# Patient Record
Sex: MALE | Race: WHITE | NOT HISPANIC OR LATINO | Employment: OTHER | URBAN - METROPOLITAN AREA
[De-identification: names, ages, dates, MRNs, and addresses within clinical notes are randomized per-mention and may not be internally consistent; named-entity substitution may affect disease eponyms.]

---

## 2017-11-01 ENCOUNTER — OFFICE VISIT (OUTPATIENT)
Dept: CARDIOLOGY | Facility: MEDICAL CENTER | Age: 57
End: 2017-11-01
Payer: MEDICARE

## 2017-11-01 VITALS
SYSTOLIC BLOOD PRESSURE: 130 MMHG | BODY MASS INDEX: 31.25 KG/M2 | HEIGHT: 69 IN | WEIGHT: 211 LBS | HEART RATE: 84 BPM | DIASTOLIC BLOOD PRESSURE: 72 MMHG | OXYGEN SATURATION: 93 %

## 2017-11-01 DIAGNOSIS — I25.84 CORONARY ARTERY DISEASE DUE TO CALCIFIED CORONARY LESION: ICD-10-CM

## 2017-11-01 DIAGNOSIS — I10 ESSENTIAL HYPERTENSION, BENIGN: ICD-10-CM

## 2017-11-01 DIAGNOSIS — E78.5 DYSLIPIDEMIA: ICD-10-CM

## 2017-11-01 DIAGNOSIS — I25.10 CORONARY ARTERY DISEASE DUE TO CALCIFIED CORONARY LESION: ICD-10-CM

## 2017-11-01 PROCEDURE — 99204 OFFICE O/P NEW MOD 45 MIN: CPT | Performed by: INTERNAL MEDICINE

## 2017-11-01 RX ORDER — IBUPROFEN 800 MG/1
800 TABLET ORAL EVERY 8 HOURS PRN
Status: ON HOLD | COMMUNITY
End: 2018-07-05

## 2017-11-01 RX ORDER — SIMVASTATIN 20 MG
20 TABLET ORAL NIGHTLY
COMMUNITY
End: 2021-04-25

## 2017-11-01 RX ORDER — LANOLIN ALCOHOL/MO/W.PET/CERES
1000 CREAM (GRAM) TOPICAL DAILY
COMMUNITY

## 2017-11-01 RX ORDER — NITROGLYCERIN 0.4 MG/1
0.4 TABLET SUBLINGUAL
COMMUNITY

## 2017-11-01 RX ORDER — ARIPIPRAZOLE 15 MG/1
15 TABLET ORAL 2 TIMES DAILY
Status: ON HOLD | COMMUNITY
End: 2021-05-14

## 2017-11-01 RX ORDER — LOSARTAN POTASSIUM 25 MG/1
25 TABLET ORAL DAILY
COMMUNITY

## 2017-11-01 RX ORDER — CARBAMAZEPINE 200 MG/1
200 TABLET ORAL 3 TIMES DAILY
Status: ON HOLD | COMMUNITY
End: 2021-05-14

## 2017-11-01 RX ORDER — OXYBUTYNIN CHLORIDE 5 MG/1
5 TABLET ORAL 3 TIMES DAILY
Status: ON HOLD | COMMUNITY
End: 2021-05-14

## 2017-11-01 RX ORDER — BUSPIRONE HYDROCHLORIDE 15 MG/1
15 TABLET ORAL 2 TIMES DAILY
COMMUNITY
End: 2018-07-20

## 2017-11-01 RX ORDER — GABAPENTIN 300 MG/1
300 CAPSULE ORAL 3 TIMES DAILY
COMMUNITY

## 2017-11-01 ASSESSMENT — ENCOUNTER SYMPTOMS
MEMORY LOSS: 1
FEVER: 0
SPEECH CHANGE: 0
ABDOMINAL PAIN: 0
CHILLS: 0
BRUISES/BLEEDS EASILY: 0
MYALGIAS: 0
EYE PAIN: 0
NAUSEA: 0
BLURRED VISION: 0
HEMOPTYSIS: 0
VOMITING: 0
NERVOUS/ANXIOUS: 0
EYE DISCHARGE: 0
SEIZURES: 1
COUGH: 0
LOSS OF CONSCIOUSNESS: 0
DEPRESSION: 1
PALPITATIONS: 0
INSOMNIA: 1
WHEEZING: 0

## 2017-11-01 NOTE — LETTER
[facility]   [address], [address]  [place] [address]  Phone: [phone]  Fax: [phone]              Perry Davis  1960    Encounter Date: 11/1/2017    Jacob Watkins M.D.          PROGRESS NOTE:  Subjective:   Perry Davis is a 57 y.o. male who presents today For new patient evaluation because of history of coronary bypass graft surgery.    Patient suffered a myocardial infarction and underwent coronary bypass graft surgery in 2015. At that time, he received 4 grafts. He did have an echocardiogram within the last year by his cardiologist.    He is having significant difficulty with life stress. He has had no chest discomfort, dyspnea, edema, palpitations or lightheadedness.    History reviewed. No pertinent past medical history.  Past Surgical History:   Procedure Laterality Date   • HEMORRHOIDECTOMY     • MULTIPLE CORONARY ARTERY BYPASS       Family History   Problem Relation Age of Onset   • Heart Attack Father 75   • Heart Disease Father 59     CABG   • Heart Disease Sister 55     CABG     History   Smoking Status   • Current Every Day Smoker   • Packs/day: 0.50   • Years: 40.00   • Types: Cigarettes   Smokeless Tobacco   • Never Used     No Known Allergies  Outpatient Encounter Prescriptions as of 11/1/2017   Medication Sig Dispense Refill   • simvastatin (ZOCOR) 20 MG Tab Take 20 mg by mouth every evening.     • ibuprofen (MOTRIN) 800 MG Tab Take 800 mg by mouth every 8 hours as needed.     • oxybutynin (DITROPAN) 5 MG Tab Take 5 mg by mouth 3 times a day.     • losartan (COZAAR) 25 MG Tab Take 25 mg by mouth every day.     • aripiprazole (ABILIFY) 15 MG Tab Take 15 mg by mouth every day.     • metoprolol (LOPRESSOR) 25 MG Tab Take 25 mg by mouth 2 times a day.     • busPIRone (BUSPAR) 15 MG tablet Take 15 mg by mouth 2 times a day.     • metformin (GLUCOPHAGE) 1000 MG tablet Take 1,000 mg by mouth 2 times a day, with meals.     • carbamazepine  "(TEGRETOL) 200 MG Tab Take 200 mg by mouth 3 times a day.     • Cyanocobalamin (VITAMIN B-12) 1000 MCG Tab Take 1,000 mcg by mouth every day.     • nitroglycerin (NITROSTAT) 0.4 MG SL Tab Place 0.4 mg under tongue every 5 minutes as needed for Chest Pain.     • gabapentin (NEURONTIN) 300 MG Cap Take 300 mg by mouth 3 times a day.       No facility-administered encounter medications on file as of 11/1/2017.      Review of Systems   Constitutional: Negative for chills and fever.   HENT: Positive for tinnitus. Negative for congestion.    Eyes: Negative for blurred vision, pain and discharge.   Respiratory: Negative for cough, hemoptysis and wheezing.    Cardiovascular: Negative for chest pain and palpitations.   Gastrointestinal: Negative for abdominal pain, nausea and vomiting.   Musculoskeletal: Negative for joint pain and myalgias.   Skin: Negative for itching and rash.   Neurological: Positive for seizures. Negative for speech change and loss of consciousness.   Endo/Heme/Allergies: Does not bruise/bleed easily.   Psychiatric/Behavioral: Positive for depression, memory loss and suicidal ideas. The patient has insomnia. The patient is not nervous/anxious.    All other systems reviewed and are negative.       Objective:   /72   Pulse 84   Ht 1.753 m (5' 9\")   Wt 95.7 kg (211 lb)   SpO2 93%   BMI 31.16 kg/m²      Physical Exam   Constitutional: He is oriented to person, place, and time. He appears well-developed. No distress.   HENT:   Mouth/Throat: Mucous membranes are normal.   Eyes: Conjunctivae and EOM are normal.   Neck: No JVD present. No tracheal deviation present. No thyroid mass and no thyromegaly present.   Cardiovascular: Normal rate, regular rhythm and intact distal pulses.    No murmur heard.  Pulmonary/Chest: Effort normal and breath sounds normal. No respiratory distress. He exhibits no tenderness.   Abdominal: Soft. There is no tenderness.   Musculoskeletal: Normal range of motion. He " exhibits no edema.   Neurological: He is alert and oriented to person, place, and time. He has normal strength. He displays no tremor.   Skin: Skin is warm and dry. He is not diaphoretic.   Psychiatric: He has a normal mood and affect. His behavior is normal.   Vitals reviewed.      Assessment:     1. Coronary artery disease due to calcified coronary lesion: CABG x4, July 2015     2. Essential hypertension, benign     3. Dyslipidemia           Medical Decision Making:  Today's Assessment / Status / Plan:     Mr. Davis is clinically stable. I would like to obtain his recent lab work at his prior echocardiogram for review in addition to his operative report. He will follow-up in about 3 months.      Delores Washington, P.A.  [address] [address]  VIA Facsimile: [phone]

## 2017-11-01 NOTE — PROGRESS NOTES
Subjective:   Perry Davis is a 57 y.o. male who presents today For new patient evaluation because of history of coronary bypass graft surgery.    Patient suffered a myocardial infarction and underwent coronary bypass graft surgery in 2015. At that time, he received 4 grafts. He did have an echocardiogram within the last year by his cardiologist.    He is having significant difficulty with life stress. He has had no chest discomfort, dyspnea, edema, palpitations or lightheadedness.    History reviewed. No pertinent past medical history.  Past Surgical History:   Procedure Laterality Date   • HEMORRHOIDECTOMY     • MULTIPLE CORONARY ARTERY BYPASS       Family History   Problem Relation Age of Onset   • Heart Attack Father 75   • Heart Disease Father 59     CABG   • Heart Disease Sister 55     CABG     History   Smoking Status   • Current Every Day Smoker   • Packs/day: 0.50   • Years: 40.00   • Types: Cigarettes   Smokeless Tobacco   • Never Used     No Known Allergies  Outpatient Encounter Prescriptions as of 11/1/2017   Medication Sig Dispense Refill   • simvastatin (ZOCOR) 20 MG Tab Take 20 mg by mouth every evening.     • ibuprofen (MOTRIN) 800 MG Tab Take 800 mg by mouth every 8 hours as needed.     • oxybutynin (DITROPAN) 5 MG Tab Take 5 mg by mouth 3 times a day.     • losartan (COZAAR) 25 MG Tab Take 25 mg by mouth every day.     • aripiprazole (ABILIFY) 15 MG Tab Take 15 mg by mouth every day.     • metoprolol (LOPRESSOR) 25 MG Tab Take 25 mg by mouth 2 times a day.     • busPIRone (BUSPAR) 15 MG tablet Take 15 mg by mouth 2 times a day.     • metformin (GLUCOPHAGE) 1000 MG tablet Take 1,000 mg by mouth 2 times a day, with meals.     • carbamazepine (TEGRETOL) 200 MG Tab Take 200 mg by mouth 3 times a day.     • Cyanocobalamin (VITAMIN B-12) 1000 MCG Tab Take 1,000 mcg by mouth every day.     • nitroglycerin (NITROSTAT) 0.4 MG SL Tab Place 0.4 mg under tongue every 5 minutes as needed for Chest Pain.     •  "gabapentin (NEURONTIN) 300 MG Cap Take 300 mg by mouth 3 times a day.       No facility-administered encounter medications on file as of 11/1/2017.      Review of Systems   Constitutional: Negative for chills and fever.   HENT: Positive for tinnitus. Negative for congestion.    Eyes: Negative for blurred vision, pain and discharge.   Respiratory: Negative for cough, hemoptysis and wheezing.    Cardiovascular: Negative for chest pain and palpitations.   Gastrointestinal: Negative for abdominal pain, nausea and vomiting.   Musculoskeletal: Negative for joint pain and myalgias.   Skin: Negative for itching and rash.   Neurological: Positive for seizures. Negative for speech change and loss of consciousness.   Endo/Heme/Allergies: Does not bruise/bleed easily.   Psychiatric/Behavioral: Positive for depression, memory loss and suicidal ideas. The patient has insomnia. The patient is not nervous/anxious.    All other systems reviewed and are negative.       Objective:   /72   Pulse 84   Ht 1.753 m (5' 9\")   Wt 95.7 kg (211 lb)   SpO2 93%   BMI 31.16 kg/m²     Physical Exam   Constitutional: He is oriented to person, place, and time. He appears well-developed. No distress.   HENT:   Mouth/Throat: Mucous membranes are normal.   Eyes: Conjunctivae and EOM are normal.   Neck: No JVD present. No tracheal deviation present. No thyroid mass and no thyromegaly present.   Cardiovascular: Normal rate, regular rhythm and intact distal pulses.    No murmur heard.  Pulmonary/Chest: Effort normal and breath sounds normal. No respiratory distress. He exhibits no tenderness.   Abdominal: Soft. There is no tenderness.   Musculoskeletal: Normal range of motion. He exhibits no edema.   Neurological: He is alert and oriented to person, place, and time. He has normal strength. He displays no tremor.   Skin: Skin is warm and dry. He is not diaphoretic.   Psychiatric: He has a normal mood and affect. His behavior is normal.   Vitals " reviewed.      Assessment:     1. Coronary artery disease due to calcified coronary lesion: CABG x4, July 2015     2. Essential hypertension, benign     3. Dyslipidemia           Medical Decision Making:  Today's Assessment / Status / Plan:     Mr. Davis is clinically stable. I would like to obtain his recent lab work at his prior echocardiogram for review in addition to his operative report. He will follow-up in about 3 months.

## 2018-04-20 DIAGNOSIS — Z01.812 PRE-OPERATIVE LABORATORY EXAMINATION: ICD-10-CM

## 2018-04-20 LAB
ERYTHROCYTE [DISTWIDTH] IN BLOOD BY AUTOMATED COUNT: 44.5 FL (ref 35.9–50)
HCT VFR BLD AUTO: 53.5 % (ref 42–52)
HGB BLD-MCNC: 18.4 G/DL (ref 14–18)
INR PPP: 1.01 (ref 0.87–1.13)
MCH RBC QN AUTO: 32.1 PG (ref 27–33)
MCHC RBC AUTO-ENTMCNC: 34.4 G/DL (ref 33.7–35.3)
MCV RBC AUTO: 93.2 FL (ref 81.4–97.8)
PLATELET # BLD AUTO: 173 K/UL (ref 164–446)
PMV BLD AUTO: 11.7 FL (ref 9–12.9)
PROTHROMBIN TIME: 13 SEC (ref 12–14.6)
RBC # BLD AUTO: 5.74 M/UL (ref 4.7–6.1)
WBC # BLD AUTO: 9.6 K/UL (ref 4.8–10.8)

## 2018-04-20 PROCEDURE — 36415 COLL VENOUS BLD VENIPUNCTURE: CPT

## 2018-04-20 PROCEDURE — 85027 COMPLETE CBC AUTOMATED: CPT

## 2018-04-20 PROCEDURE — 85610 PROTHROMBIN TIME: CPT

## 2018-04-20 NOTE — OR NURSING
"Pt states he will not accept blood and wishes to be in the bloodless program. Pt educated on program and pt continues to respond that, \"I will only take blood from family.\" Bloodless paperwork given to pt and instructed to bring back on DOS. ADRIENNE Odell in IR and with Dr Zuleta's  Iza.     Pt instructed to not smoke DOS and to stop recreational drugs 48 hours prior to procedure per pre-op guidelines. After conversation pt's stated, \"Ya right like that's going to happen, I got stoned right before my heart surgery and nothing happened.\" Information and education provided to patient, but patient continued to laugh and shake head.   "

## 2018-04-24 ENCOUNTER — APPOINTMENT (OUTPATIENT)
Dept: RADIOLOGY | Facility: MEDICAL CENTER | Age: 58
End: 2018-04-24
Attending: UROLOGY
Payer: MEDICARE

## 2018-04-24 ENCOUNTER — HOSPITAL ENCOUNTER (OUTPATIENT)
Facility: MEDICAL CENTER | Age: 58
End: 2018-04-24
Attending: UROLOGY | Admitting: UROLOGY
Payer: MEDICARE

## 2018-04-24 VITALS
DIASTOLIC BLOOD PRESSURE: 62 MMHG | BODY MASS INDEX: 30.73 KG/M2 | RESPIRATION RATE: 16 BRPM | WEIGHT: 207.45 LBS | OXYGEN SATURATION: 97 % | SYSTOLIC BLOOD PRESSURE: 111 MMHG | HEART RATE: 55 BPM | TEMPERATURE: 96.9 F | HEIGHT: 69 IN

## 2018-04-24 DIAGNOSIS — N13.1 CROSSING VESSEL AND STRICTURE OF URETER WITH HYDRONEPHROSIS: ICD-10-CM

## 2018-04-24 PROCEDURE — 160002 HCHG RECOVERY MINUTES (STAT)

## 2018-04-24 PROCEDURE — 99153 MOD SED SAME PHYS/QHP EA: CPT

## 2018-04-24 PROCEDURE — A9270 NON-COVERED ITEM OR SERVICE: HCPCS

## 2018-04-24 PROCEDURE — 700111 HCHG RX REV CODE 636 W/ 250 OVERRIDE (IP): Performed by: RADIOLOGY

## 2018-04-24 PROCEDURE — 700111 HCHG RX REV CODE 636 W/ 250 OVERRIDE (IP)

## 2018-04-24 PROCEDURE — 700117 HCHG RX CONTRAST REV CODE 255: Performed by: RADIOLOGY

## 2018-04-24 PROCEDURE — 700102 HCHG RX REV CODE 250 W/ 637 OVERRIDE(OP)

## 2018-04-24 PROCEDURE — 700101 HCHG RX REV CODE 250

## 2018-04-24 RX ORDER — OXYCODONE HYDROCHLORIDE 5 MG/1
5 TABLET ORAL
Status: DISCONTINUED | OUTPATIENT
Start: 2018-04-24 | End: 2018-04-24 | Stop reason: HOSPADM

## 2018-04-24 RX ORDER — MIDAZOLAM HYDROCHLORIDE 1 MG/ML
INJECTION INTRAMUSCULAR; INTRAVENOUS
Status: COMPLETED
Start: 2018-04-24 | End: 2018-04-24

## 2018-04-24 RX ORDER — LIDOCAINE HYDROCHLORIDE 10 MG/ML
INJECTION, SOLUTION INFILTRATION; PERINEURAL
Status: COMPLETED
Start: 2018-04-24 | End: 2018-04-24

## 2018-04-24 RX ORDER — ONDANSETRON 2 MG/ML
4 INJECTION INTRAMUSCULAR; INTRAVENOUS PRN
Status: DISCONTINUED | OUTPATIENT
Start: 2018-04-24 | End: 2018-04-24 | Stop reason: HOSPADM

## 2018-04-24 RX ORDER — OXYCODONE HYDROCHLORIDE 5 MG/1
TABLET ORAL
Status: COMPLETED
Start: 2018-04-24 | End: 2018-04-24

## 2018-04-24 RX ORDER — SODIUM CHLORIDE 9 MG/ML
500 INJECTION, SOLUTION INTRAVENOUS
Status: DISCONTINUED | OUTPATIENT
Start: 2018-04-24 | End: 2018-04-24 | Stop reason: HOSPADM

## 2018-04-24 RX ORDER — MIDAZOLAM HYDROCHLORIDE 1 MG/ML
.5-2 INJECTION INTRAMUSCULAR; INTRAVENOUS PRN
Status: DISCONTINUED | OUTPATIENT
Start: 2018-04-24 | End: 2018-04-24 | Stop reason: HOSPADM

## 2018-04-24 RX ORDER — OXYCODONE HYDROCHLORIDE 5 MG/1
2.5 TABLET ORAL
Status: DISCONTINUED | OUTPATIENT
Start: 2018-04-24 | End: 2018-04-24 | Stop reason: HOSPADM

## 2018-04-24 RX ADMIN — LIDOCAINE HYDROCHLORIDE: 10 INJECTION, SOLUTION INFILTRATION; PERINEURAL at 08:15

## 2018-04-24 RX ADMIN — FENTANYL CITRATE 50 MCG: 50 INJECTION, SOLUTION INTRAMUSCULAR; INTRAVENOUS at 08:34

## 2018-04-24 RX ADMIN — FENTANYL CITRATE 50 MCG: 50 INJECTION, SOLUTION INTRAMUSCULAR; INTRAVENOUS at 08:30

## 2018-04-24 RX ADMIN — MIDAZOLAM 2 MG: 1 INJECTION INTRAMUSCULAR; INTRAVENOUS at 08:18

## 2018-04-24 RX ADMIN — IOHEXOL 16 ML: 300 INJECTION, SOLUTION INTRAVENOUS at 09:00

## 2018-04-24 RX ADMIN — OXYCODONE HYDROCHLORIDE 5 MG: 5 TABLET ORAL at 10:04

## 2018-04-24 RX ADMIN — MIDAZOLAM 1 MG: 1 INJECTION INTRAMUSCULAR; INTRAVENOUS at 08:34

## 2018-04-24 RX ADMIN — MIDAZOLAM 1 MG: 1 INJECTION INTRAMUSCULAR; INTRAVENOUS at 08:30

## 2018-04-24 ASSESSMENT — PAIN SCALES - GENERAL
PAINLEVEL_OUTOF10: 0
PAINLEVEL_OUTOF10: 5
PAINLEVEL_OUTOF10: 1
PAINLEVEL_OUTOF10: 0

## 2018-04-24 NOTE — OR SURGEON
Immediate Post- Operative Note        PostOp Diagnosis: Needs suprapubic catheter placement      Procedure(s): Fluro and Ct guided suprapubic catheter placement      Estimated Blood Loss: Less than 5 ml        Complications: None            4/24/2018     9:36 AM     Latrell Jacobson

## 2018-04-24 NOTE — OR NURSING
"Donald catheter to be clamped upon arrival to [facility] per written order. No donald catheter in place upon arrival. Patient refuses donald catheter by stating, \"The reason I'm having this done today is because catheters don't work.\"  Patient instructed not to urinate prior to procedure. Patient agreeable at this time.    IR staff and MD notified re: refusal.  "

## 2018-04-24 NOTE — OR NURSING
0853    PATIENT RECEIVED FROM IR,  S/P SUPRAPUBIC CATH INSERTION.  SUPRAPUBIC CATH CONNECTING TO LEG BAG AND DRAINS OUT CLEAR URINE.  WIFE IS @ BEDSIDE.  DR WADE TALKING IN LENGTH WITH WIFE.    0930   PATIENT TAKING PO FLUID AND SNACK OK.    1000   MEDICATED PER MAR'S FOR INCISIONAL PAIN.    1025   PATIENT GETS UP TO BATHROOM.  LEGBAG DRAINS AND THROUGH TEACHING AND RETURN DEMONSTRATION GIVEN TO PATIENT AND FAMILY.  PATIENT INSTRUCTED TO NEVER CLOSE THE STOPCOCK.  DC INSTRUCTIONS GIVEN TO PATIENT AND FAMILY.  VERBALIZED UNDERSTANDING OF ALL.  HL DC.  PATIENT DC TO HOME,  VIA WHEELCHAIR, ESCORTED OUT BY CNA.

## 2018-04-24 NOTE — DISCHARGE INSTRUCTIONS
ACTIVITY: Rest and take it easy for the first 24 hours.  A responsible adult is recommended to remain with you during that time.  It is normal to feel sleepy.  We encourage you to not do anything that requires balance, judgment or coordination.    MILD FLU-LIKE SYMPTOMS ARE NORMAL. YOU MAY EXPERIENCE GENERALIZED MUSCLE ACHES, THROAT IRRITATION, HEADACHE AND/OR SOME NAUSEA.    FOR 24 HOURS DO NOT:  Drive, operate machinery or run household appliances.  Drink beer or alcoholic beverages.   Make important decisions or sign legal documents.    SPECIAL INSTRUCTIONS: *KEEP INCISION DRY @ ALL TIME.  **    DIET: To avoid nausea, slowly advance diet as tolerated, avoiding spicy or greasy foods for the first day.  Add more substantial food to your diet according to your physician's instructions.  Babies can be fed formula or breast milk as soon as they are hungry.  INCREASE FLUIDS AND FIBER TO AVOID CONSTIPATION.    SURGICAL DRESSING/BATHING: *WHEN SHOWER NEED TO COVER SITE WITH SARAN WRAP.  EMPTY URINE BAG AS NEEDED.  TEACHING DONE TO WIFE AND PATIENT**    FOLLOW-UP APPOINTMENT:  A follow-up appointment should be arranged with your doctor in *DR [facility]   [phone]**; call to schedule.    You should CALL YOUR PHYSICIAN if you develop:  Fever greater than 101 degrees F.  Pain not relieved by medication, or persistent nausea or vomiting.  Excessive bleeding (blood soaking through dressing) or unexpected drainage from the wound.  Extreme redness or swelling around the incision site, drainage of pus or foul smelling drainage.  Inability to urinate or empty your bladder within 8 hours.  Problems with breathing or chest pain.    You should call 911 if you develop problems with breathing or chest pain.  If you are unable to contact your doctor or surgical center, you should go to the nearest emergency room or urgent care center.  Physician's telephone #: *[phone]**    If any questions arise, call your doctor.  If your doctor  is not available, please feel free to call the Surgical Center at [phone].  The Center is open Monday through Friday from 7AM to 7PM.  You can also call the HEALTH HOTLINE open 24 hours/day, 7 days/week and speak to a nurse at [phone], or toll free at [phone].    A registered nurse may call you a few days after your surgery to see how you are doing after your procedure.    MEDICATIONS: Resume taking daily medication.  Take prescribed pain medication with food.  If no medication is prescribed, you may take non-aspirin pain medication if needed.  PAIN MEDICATION CAN BE VERY CONSTIPATING.  Take a stool softener or laxative such as senokot, pericolace, or milk of magnesia if needed.      If your physician has prescribed pain medication that includes Acetaminophen (Tylenol), do not take additional Acetaminophen (Tylenol) while taking the prescribed medication.    Depression / Suicide Risk    As you are discharged from this [facility] facility, it is important to learn how to keep safe from harming yourself.    Recognize the warning signs:  · Abrupt changes in personality, positive or negative- including increase in energy   · Giving away possessions  · Change in eating patterns- significant weight changes-  positive or negative  · Change in sleeping patterns- unable to sleep or sleeping all the time   · Unwillingness or inability to communicate  · Depression  · Unusual sadness, discouragement and loneliness  · Talk of wanting to die  · Neglect of personal appearance   · Rebelliousness- reckless behavior  · Withdrawal from people/activities they love  · Confusion- inability to concentrate     If you or a loved one observes any of these behaviors or has concerns about self-harm, here's what you can do:  · Talk about it- your feelings and reasons for harming yourself  · Remove any means that you might use to hurt yourself (examples: pills, rope, extension cords, firearm)  · Get professional help from  the community (Mental Health, Substance Abuse, psychological counseling)  · Do not be alone:Call your Safe Contact- someone whom you trust who will be there for you.  · Call your local CRISIS HOTLINE 466-7682 or 872-181-2062  · Call your local Children's Mobile Crisis Response Team [place] (850) 980-4418 or www.ClickToShop  · Call the toll free National Suicide Prevention Hotlines   · National Suicide Prevention Lifeline 987-158-MGVR (8108)  · National Hope Line Network 800-SUICIDE (015-3285)

## 2018-04-26 ENCOUNTER — HOSPITAL ENCOUNTER (OUTPATIENT)
Facility: MEDICAL CENTER | Age: 58
End: 2018-04-26
Attending: UROLOGY | Admitting: UROLOGY
Payer: MEDICARE

## 2018-05-15 ENCOUNTER — HOSPITAL ENCOUNTER (OUTPATIENT)
Dept: RADIOLOGY | Facility: MEDICAL CENTER | Age: 58
End: 2018-05-15
Attending: UROLOGY
Payer: MEDICARE

## 2018-05-15 PROCEDURE — 700117 HCHG RX CONTRAST REV CODE 255: Performed by: UROLOGY

## 2018-05-15 PROCEDURE — 51600 INJECTION FOR BLADDER X-RAY: CPT

## 2018-05-15 RX ADMIN — IOHEXOL 175 ML: 240 INJECTION, SOLUTION INTRATHECAL; INTRAVASCULAR; INTRAVENOUS; ORAL at 14:35

## 2018-05-21 VITALS — HEIGHT: 69 IN | BODY MASS INDEX: 31.22 KG/M2 | WEIGHT: 210.76 LBS

## 2018-05-21 DIAGNOSIS — Z01.810 PRE-OPERATIVE CARDIOVASCULAR EXAMINATION: ICD-10-CM

## 2018-05-21 DIAGNOSIS — Z01.812 PRE-OPERATIVE LABORATORY EXAMINATION: ICD-10-CM

## 2018-05-21 LAB
ANION GAP SERPL CALC-SCNC: 9 MMOL/L (ref 0–11.9)
BUN SERPL-MCNC: 17 MG/DL (ref 8–22)
CALCIUM SERPL-MCNC: 9.6 MG/DL (ref 8.5–10.5)
CHLORIDE SERPL-SCNC: 106 MMOL/L (ref 96–112)
CO2 SERPL-SCNC: 20 MMOL/L (ref 20–33)
CREAT SERPL-MCNC: 0.96 MG/DL (ref 0.5–1.4)
EKG IMPRESSION: NORMAL
ERYTHROCYTE [DISTWIDTH] IN BLOOD BY AUTOMATED COUNT: 46.8 FL (ref 35.9–50)
EST. AVERAGE GLUCOSE BLD GHB EST-MCNC: 169 MG/DL
GLUCOSE SERPL-MCNC: 141 MG/DL (ref 65–99)
HBA1C MFR BLD: 7.5 % (ref 0–5.6)
HCT VFR BLD AUTO: 53.7 % (ref 42–52)
HGB BLD-MCNC: 17.9 G/DL (ref 14–18)
MCH RBC QN AUTO: 31.7 PG (ref 27–33)
MCHC RBC AUTO-ENTMCNC: 33.3 G/DL (ref 33.7–35.3)
MCV RBC AUTO: 95.2 FL (ref 81.4–97.8)
PLATELET # BLD AUTO: 188 K/UL (ref 164–446)
PMV BLD AUTO: 11.7 FL (ref 9–12.9)
POTASSIUM SERPL-SCNC: 4.4 MMOL/L (ref 3.6–5.5)
RBC # BLD AUTO: 5.64 M/UL (ref 4.7–6.1)
SODIUM SERPL-SCNC: 135 MMOL/L (ref 135–145)
WBC # BLD AUTO: 11.4 K/UL (ref 4.8–10.8)

## 2018-05-21 PROCEDURE — 85027 COMPLETE CBC AUTOMATED: CPT

## 2018-05-21 PROCEDURE — 93010 ELECTROCARDIOGRAM REPORT: CPT | Performed by: INTERNAL MEDICINE

## 2018-05-21 PROCEDURE — 36415 COLL VENOUS BLD VENIPUNCTURE: CPT

## 2018-05-21 PROCEDURE — 93005 ELECTROCARDIOGRAM TRACING: CPT

## 2018-05-21 PROCEDURE — 80048 BASIC METABOLIC PNL TOTAL CA: CPT

## 2018-05-21 PROCEDURE — 83036 HEMOGLOBIN GLYCOSYLATED A1C: CPT

## 2018-05-21 RX ORDER — RANITIDINE 150 MG/1
150 TABLET ORAL PRN
Status: ON HOLD | COMMUNITY
End: 2018-07-05

## 2018-05-21 NOTE — OR NURSING
"Pre-admit appointment completed. \"Preparing for your procedure\" sheet given to pt along with verbal and written instructions. Pt instructed to continue regularly prescribed medications through the day before surgery. Pt instructed to take the following medications the day of surgery with a sip of water, per anesthesia protocol; tegretol, neurontin, lopressor, zantac.     Note pt very upset and stating is due to catheter leaking. Pt states he has been sleeping in urine because of catheter leak and has called urology office several times. Pt has appointment today at the office. Pre admit RN assessed catheter and noted SP cath in place, but suture out. Tubing has black electrical tape around catheter port and no visible leak, but pt states is one site of leaking. Leg bag appears well worn with some tears to bottom edge. Noted urine in bag and no visible leakage at this time, but pt states it is leaking from there too. Pt states his anxiety level is high and it causes angina, so pt takes nitrostat for relief. States last used 3 days ago. States he only uses up to 2 per day due to anxiety induced angina and he was instructed by his cardiologist to go to ER if he ever requires 3 doses. Pt's cardiologist is in [place], [place]. Instructed to follow up with his PCP to discuss meds for anxiety to help prevent need for nitrostat. Pt's mother will discuss with his PCP as pt has appointment with PCP 6/6/18.  Reassure pt that RN will speak with urology office. Enc pt and mom to go directly to urology office if any similar problems in the future.    Spoke with Iza @ urology office of above. Pt has appointment today to change out SP catheter, and she states there are not notes of pt calling several times. She will inform Dr Zuleta of above and have him address note to PCP regarding use of nitrostat and anxiety. Urology office will obtain UA/culture today at office visit.    PT given MAINOR education due to hx of sleep apnea and no " "CPAP. Note pt scored 6 on MAINOR screen.      Patient enrolled in Bloodless program. Physician notified (via fax) of bloodless enrollment.  Bloodless consent signed.  Pt declined Advanced directive or durable power of  information offered. \"BLOODLESS\" entered into allergy section, \"NO BLOOD\" armband and stickers attached to chart in pre-admit.                         "

## 2018-05-24 ENCOUNTER — OFFICE VISIT (OUTPATIENT)
Dept: CARDIOLOGY | Facility: MEDICAL CENTER | Age: 58
End: 2018-05-24
Payer: MEDICARE

## 2018-05-24 VITALS
SYSTOLIC BLOOD PRESSURE: 128 MMHG | DIASTOLIC BLOOD PRESSURE: 88 MMHG | HEIGHT: 69 IN | HEART RATE: 82 BPM | BODY MASS INDEX: 31.25 KG/M2 | WEIGHT: 211 LBS | OXYGEN SATURATION: 95 %

## 2018-05-24 DIAGNOSIS — I25.84 CORONARY ARTERY DISEASE DUE TO CALCIFIED CORONARY LESION: ICD-10-CM

## 2018-05-24 DIAGNOSIS — I10 ESSENTIAL HYPERTENSION, BENIGN: ICD-10-CM

## 2018-05-24 DIAGNOSIS — F41.9 ANXIETY: ICD-10-CM

## 2018-05-24 DIAGNOSIS — R07.89 OTHER CHEST PAIN: ICD-10-CM

## 2018-05-24 DIAGNOSIS — E11.9 TYPE 2 DIABETES MELLITUS WITHOUT COMPLICATION, WITHOUT LONG-TERM CURRENT USE OF INSULIN (HCC): ICD-10-CM

## 2018-05-24 DIAGNOSIS — I25.10 CORONARY ARTERY DISEASE DUE TO CALCIFIED CORONARY LESION: ICD-10-CM

## 2018-05-24 DIAGNOSIS — R06.02 SHORTNESS OF BREATH: ICD-10-CM

## 2018-05-24 DIAGNOSIS — G47.39 OTHER SLEEP APNEA: ICD-10-CM

## 2018-05-24 DIAGNOSIS — E78.5 DYSLIPIDEMIA: ICD-10-CM

## 2018-05-24 DIAGNOSIS — G89.29 OTHER CHRONIC PAIN: ICD-10-CM

## 2018-05-24 LAB — EKG IMPRESSION: NORMAL

## 2018-05-24 PROCEDURE — 93000 ELECTROCARDIOGRAM COMPLETE: CPT | Performed by: NURSE PRACTITIONER

## 2018-05-24 PROCEDURE — 99214 OFFICE O/P EST MOD 30 MIN: CPT | Performed by: NURSE PRACTITIONER

## 2018-05-24 ASSESSMENT — ENCOUNTER SYMPTOMS
BACK PAIN: 1
FEVER: 0
CLAUDICATION: 0
SHORTNESS OF BREATH: 1
MYALGIAS: 1
PND: 0
DEPRESSION: 1
COUGH: 0
PALPITATIONS: 0
ABDOMINAL PAIN: 0
SEIZURES: 1
NERVOUS/ANXIOUS: 1
ORTHOPNEA: 0

## 2018-05-24 NOTE — PROGRESS NOTES
"Chief Complaint   Patient presents with   • Coronary Artery Disease     Surgical clearance       Subjective:   Perry Davis is a 57 y.o. male who presents today for surgical clearance for upcoming urology surgical operation.    He is a prior patient of Dr. Watkins in our office, he wants to switch to a different cardiologist for his next apt.    Hx of CAD with prior CABG X4 in '14 (unknown vessels-obtain records), HTN, HLD, anxiety/depression, marijuana and cigarette use, bipolar disorder, renal disorder with suprapubic catheter, GERD, un-treated MAINOR, and DM.    He presents today very frustrated to be in the clinic. He doesn't want to follow up with our office and \"we bill him too many payments.\" He also was not happy with his last office visit with Dr. Watkins.    He has many frustrations, many of which are non-cardiac. He discussed his prior job frustrations and frustrations with many life issues during this apt.    He never sat during the apt, he paced the entire time.    He states he has been taking nitro for chest pain that comes on with anxiety. It happens at rest but it is very similar to his previous chest pains he had in the past prior to MI and bypass surgery.    He has chronic shortness of breath with exertion. He continues to smoke marijuana and cigarettes.    He doesn't drink ETOH.    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    • Diabetes (HCC)     oral medication . 5/21/18-AVG am=90's.   • Heart burn     Treated with Zantac.   • High cholesterol    • Hypertension    • Indigestion     Treated with Zantac.    • Infectious disease 1989 or 1+990    had staph infection in spine, had PICC line, rash all over body,  then thoracic fusion   • Marijuana use 05/21/2018    Daily use for pain control.   • Myocardial infarct (HCC) 04/05/2014    CABG-2015. Cardiologist in " "[facility].   • Pain     hip, knees   • Pain 05/21/2018    \"all over\"   • Psychiatric problem     depression, anxiety, bipolar    • Renal disorder     Hx of renal stone.   • Seizure (HCC)     last seizure 4/5/2014-unknown etiology, but possibly related to stress.   • Sleep apnea     no CPAP    • Snoring    • Urinary bladder disorder     5/21/18-currently has donald cath to leg bag.   • Urinary incontinence     urethral strictures.     Past Surgical History:   Procedure Laterality Date   • CYSTOSCOPY  04/24/2018    SP cath placed.   • HEMORRHOIDECTOMY  02/2016   • HIP ARTHROSCOPY Right 01/2016   • KNEE ARTHROSCOPY Left 01/2016   • LITHOTRIPSY  2016    unsuccessful   • CYSTOSCOPY  2016    S/P unsuccessful lithotripsy   • MULTIPLE CORONARY ARTERY BYPASS  07/2015    4 vessel   • OTHER SURGICAL PROCEDURE Right 1993    Closed some veins in leg.   • THORACIC FUSION POSTERIOR  late 1980's     Family History   Problem Relation Age of Onset   • Heart Attack Father 75   • Heart Disease Father 59     CABG   • Heart Disease Sister 55     CABG     Social History     Social History   • Marital status: Single     Spouse name: N/A   • Number of children: N/A   • Years of education: N/A     Occupational History   • Not on file.     Social History Main Topics   • Smoking status: Current Every Day Smoker     Packs/day: 0.50     Years: 40.00     Types: Cigarettes   • Smokeless tobacco: Never Used   • Alcohol use No   • Drug use: Yes     Types: Inhaled      Comment: marijuana- daily    • Sexual activity: Not on file     Other Topics Concern   • Not on file     Social History Narrative   • No narrative on file     Allergies   Allergen Reactions   • Bloodless      Personal preference     Outpatient Encounter Prescriptions as of 5/24/2018   Medication Sig Dispense Refill   • Empagliflozin-Metformin HCl (SYNJARDY) 12.5-1000 MG Tab Take  by mouth.     • raNITidine (ZANTAC) 150 MG Tab Take 150 mg by mouth as needed for Heartburn.  " "   • simvastatin (ZOCOR) 20 MG Tab Take 20 mg by mouth every evening.     • ibuprofen (MOTRIN) 800 MG Tab Take 800 mg by mouth every 8 hours as needed.     • oxybutynin (DITROPAN) 5 MG Tab Take 5 mg by mouth 3 times a day.     • losartan (COZAAR) 25 MG Tab Take 25 mg by mouth every day.     • aripiprazole (ABILIFY) 15 MG Tab Take 15 mg by mouth 2 Times a Day.     • metoprolol (LOPRESSOR) 25 MG Tab Take 25 mg by mouth 2 times a day.     • busPIRone (BUSPAR) 15 MG tablet Take 15 mg by mouth 2 times a day.     • carbamazepine (TEGRETOL) 200 MG Tab Take 200 mg by mouth 3 times a day.     • Cyanocobalamin (VITAMIN B-12) 1000 MCG Tab Take 1,000 mcg by mouth every day.     • nitroglycerin (NITROSTAT) 0.4 MG SL Tab Place 0.4 mg under tongue every 5 minutes as needed for Chest Pain.     • gabapentin (NEURONTIN) 300 MG Cap Take 300 mg by mouth 3 times a day.     • [DISCONTINUED] metformin (GLUCOPHAGE) 1000 MG tablet Take 1,000 mg by mouth 2 times a day, with meals.       No facility-administered encounter medications on file as of 5/24/2018.      Review of Systems   Constitutional: Negative for fever and malaise/fatigue.   Respiratory: Positive for shortness of breath. Negative for cough.    Cardiovascular: Positive for chest pain. Negative for palpitations, orthopnea, claudication, leg swelling and PND.   Gastrointestinal: Negative for abdominal pain.   Genitourinary:        Suprapubic catheter that leaks     Musculoskeletal: Positive for back pain, joint pain and myalgias.   Neurological: Positive for seizures.   Psychiatric/Behavioral: Positive for depression. The patient is nervous/anxious.                     Objective:   /86   Pulse 82   Ht 1.753 m (5' 9\")   Wt 95.7 kg (211 lb)   SpO2 95%   BMI 31.16 kg/m²     Physical Exam   Constitutional: He appears well-developed and well-nourished.   HENT:   Head: Normocephalic and atraumatic.   Eyes: EOM are normal.   Neck: No JVD present.   Cardiovascular: Normal rate, " regular rhythm, normal heart sounds and intact distal pulses.    Pulmonary/Chest: Effort normal and breath sounds normal.   Musculoskeletal: He exhibits no edema.   Cane for mobility with back, joint, leg pain   Skin: Skin is warm and dry.   Psychiatric: He has a normal mood and affect.   Nursing note and vitals reviewed.      Assessment:     1. Coronary artery disease due to calcified coronary lesion  EKG    NM-CARDIAC STRESS TEST   2. Dyslipidemia  NM-CARDIAC STRESS TEST   3. Essential hypertension, benign  NM-CARDIAC STRESS TEST   4. Other sleep apnea     5. Type 2 diabetes mellitus without complication, without long-term current use of insulin (HCC)     6. Anxiety     7. Other chest pain     8. Other chronic pain     9. Shortness of breath       Medical Decision Making:  Today's Assessment / Status / Plan:     1. CAD with prior CABG ('14)  -pending records, records requested from [facility] in [place], [place]  -atypical angina but CHRISTENSEN with exertion  -recommend stress imaging prior to surgery for ischemic eval  -nitro education given   -recommended stress management with anxiety attacks causing chest pain  -cont asa, statin, bb    2. HLD  -statin  -no recent labs, recommend labs from prior cardiology office  -LDL goal <70 with CAD  -lipid and CMP recommended yearly-defer to PCP if patient doesn't come back    3. HTN  -moderate control, came down after calmed patient down in office  -cont bb, losartan    4. MAINOR  -untreated  -recommend follow up with repeat sleep study and pulmonary referral if warranted    5. DM  -managed by PCP    6. Anxiety/depression/chronic pain  -managed by PCP    7. Smoking  -recommend cessation  -he is not eager for cessation at this time    Again, this patient was very disgruntled during the appointment. I recommended him to our medical records and billing department for concerns on payments and medical records concerns. He will look into this.     FU in clinic in 6 months with  new cardiologist, will call with stress test results    Patient verbalizes understanding and agrees with the plan of care.     Collaborating MD: Vasquez MATOS

## 2018-05-24 NOTE — LETTER
"     [facility]   [address], [address]  [place] [address]  Phone: [phone]  Fax: [phone]              Perry Davis  1960    Encounter Date: 5/24/2018    JASWINDER Lua          PROGRESS NOTE:  Chief Complaint   Patient presents with   • Coronary Artery Disease     Surgical clearance       Subjective:   Perry Davis is a 57 y.o. male who presents today for surgical clearance for upcoming urology surgical operation.    He is a prior patient of Dr. Watkins in our office, he wants to switch to a different cardiologist for his next apt.    Hx of CAD with prior CABG X4 in '14 (unknown vessels-obtain records), HTN, HLD, anxiety/depression, marijuana and cigarette use, bipolar disorder, renal disorder with suprapubic catheter, GERD, un-treated MAINOR, and DM.    He presents today very frustrated to be in the clinic. He doesn't want to follow up with our office and \"we bill him too many payments.\" He also was not happy with his last office visit with Dr. Watkins.    He has many frustrations, many of which are non-cardiac. He discussed his prior job frustrations and frustrations with many life issues during this apt.    He never sat during the apt, he paced the entire time.    He states he has been taking nitro for chest pain that comes on with anxiety. It happens at rest but it is very similar to his previous chest pains he had in the past prior to MI and bypass surgery.    He has chronic shortness of breath with exertion. He continues to smoke marijuana and cigarettes.    He doesn't drink ETOH.    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    • Diabetes (HCC)     oral medication . 5/21/18-AVG am=90's.   • Heart burn     Treated with Zantac.   • High cholesterol    • Hypertension    • Indigestion    " "Treated with Zantac.    • Infectious disease 1989 or 1+990    had staph infection in spine, had PICC line, rash all over body,  then thoracic fusion   • Marijuana use 05/21/2018    Daily use for pain control.   • Myocardial infarct (HCC) 04/05/2014    CABG-2015. Cardiologist in [facility].   • Pain     hip, knees   • Pain 05/21/2018    \"all over\"   • Psychiatric problem     depression, anxiety, bipolar    • Renal disorder     Hx of renal stone.   • Seizure (HCC)     last seizure 4/5/2014-unknown etiology, but possibly related to stress.   • Sleep apnea     no CPAP    • Snoring    • Urinary bladder disorder     5/21/18-currently has donald cath to leg bag.   • Urinary incontinence     urethral strictures.     Past Surgical History:   Procedure Laterality Date   • CYSTOSCOPY  04/24/2018    SP cath placed.   • HEMORRHOIDECTOMY  02/2016   • HIP ARTHROSCOPY Right 01/2016   • KNEE ARTHROSCOPY Left 01/2016   • LITHOTRIPSY  2016    unsuccessful   • CYSTOSCOPY  2016    S/P unsuccessful lithotripsy   • MULTIPLE CORONARY ARTERY BYPASS  07/2015    4 vessel   • OTHER SURGICAL PROCEDURE Right 1993    Closed some veins in leg.   • THORACIC FUSION POSTERIOR  late 1980's     Family History   Problem Relation Age of Onset   • Heart Attack Father 75   • Heart Disease Father 59     CABG   • Heart Disease Sister 55     CABG     Social History     Social History   • Marital status: Single     Spouse name: N/A   • Number of children: N/A   • Years of education: N/A     Occupational History   • Not on file.     Social History Main Topics   • Smoking status: Current Every Day Smoker     Packs/day: 0.50     Years: 40.00     Types: Cigarettes   • Smokeless tobacco: Never Used   • Alcohol use No   • Drug use: Yes     Types: Inhaled      Comment: marijuana- daily    • Sexual activity: Not on file     Other Topics Concern   • Not on file     Social History Narrative   • No narrative on file     Allergies   Allergen Reactions   • " Bloodless      Personal preference     Outpatient Encounter Prescriptions as of 5/24/2018   Medication Sig Dispense Refill   • Empagliflozin-Metformin HCl (SYNJARDY) 12.5-1000 MG Tab Take  by mouth.     • raNITidine (ZANTAC) 150 MG Tab Take 150 mg by mouth as needed for Heartburn.     • simvastatin (ZOCOR) 20 MG Tab Take 20 mg by mouth every evening.     • ibuprofen (MOTRIN) 800 MG Tab Take 800 mg by mouth every 8 hours as needed.     • oxybutynin (DITROPAN) 5 MG Tab Take 5 mg by mouth 3 times a day.     • losartan (COZAAR) 25 MG Tab Take 25 mg by mouth every day.     • aripiprazole (ABILIFY) 15 MG Tab Take 15 mg by mouth 2 Times a Day.     • metoprolol (LOPRESSOR) 25 MG Tab Take 25 mg by mouth 2 times a day.     • busPIRone (BUSPAR) 15 MG tablet Take 15 mg by mouth 2 times a day.     • carbamazepine (TEGRETOL) 200 MG Tab Take 200 mg by mouth 3 times a day.     • Cyanocobalamin (VITAMIN B-12) 1000 MCG Tab Take 1,000 mcg by mouth every day.     • nitroglycerin (NITROSTAT) 0.4 MG SL Tab Place 0.4 mg under tongue every 5 minutes as needed for Chest Pain.     • gabapentin (NEURONTIN) 300 MG Cap Take 300 mg by mouth 3 times a day.     • [DISCONTINUED] metformin (GLUCOPHAGE) 1000 MG tablet Take 1,000 mg by mouth 2 times a day, with meals.       No facility-administered encounter medications on file as of 5/24/2018.      Review of Systems   Constitutional: Negative for fever and malaise/fatigue.   Respiratory: Positive for shortness of breath. Negative for cough.    Cardiovascular: Positive for chest pain. Negative for palpitations, orthopnea, claudication, leg swelling and PND.   Gastrointestinal: Negative for abdominal pain.   Genitourinary:        Suprapubic catheter that leaks     Musculoskeletal: Positive for back pain, joint pain and myalgias.   Neurological: Positive for seizures.   Psychiatric/Behavioral: Positive for depression. The patient is nervous/anxious.                     Objective:   /86   Pulse 82  "  Ht 1.753 m (5' 9\")   Wt 95.7 kg (211 lb)   SpO2 95%   BMI 31.16 kg/m²      Physical Exam   Constitutional: He appears well-developed and well-nourished.   HENT:   Head: Normocephalic and atraumatic.   Eyes: EOM are normal.   Neck: No JVD present.   Cardiovascular: Normal rate, regular rhythm, normal heart sounds and intact distal pulses.    Pulmonary/Chest: Effort normal and breath sounds normal.   Musculoskeletal: He exhibits no edema.   Cane for mobility with back, joint, leg pain   Skin: Skin is warm and dry.   Psychiatric: He has a normal mood and affect.   Nursing note and vitals reviewed.      Assessment:     1. Coronary artery disease due to calcified coronary lesion  EKG    NM-CARDIAC STRESS TEST   2. Dyslipidemia  NM-CARDIAC STRESS TEST   3. Essential hypertension, benign  NM-CARDIAC STRESS TEST   4. Other sleep apnea     5. Type 2 diabetes mellitus without complication, without long-term current use of insulin (HCC)     6. Anxiety     7. Other chest pain     8. Other chronic pain     9. Shortness of breath       Medical Decision Making:  Today's Assessment / Status / Plan:     1. CAD with prior CABG ('14)  -pending records, records requested from [facility] in [place], [place]  -atypical angina but CHRISTENSEN with exertion  -recommend stress imaging prior to surgery for ischemic eval  -nitro education given   -recommended stress management with anxiety attacks causing chest pain  -cont asa, statin, bb    2. HLD  -statin  -no recent labs, recommend labs from prior cardiology office  -LDL goal <70 with CAD  -lipid and CMP recommended yearly-defer to PCP if patient doesn't come back    3. HTN  -moderate control, came down after calmed patient down in office  -cont bb, losartan    4. MAINOR  -untreated  -recommend follow up with repeat sleep study and pulmonary referral if warranted    5. DM  -managed by PCP    6. Anxiety/depression/chronic pain  -managed by PCP    7. Smoking  -recommend cessation  -he is " not eager for cessation at this time    Again, this patient was very disgruntled during the appointment. I recommended him to our medical records and billing department for concerns on payments and medical records concerns. He will look into this.     FU in clinic in 6 months with new cardiologist, will call with stress test results    Patient verbalizes understanding and agrees with the plan of care.     Collaborating MD: Vasquez MATOS          No Recipients

## 2018-05-25 ENCOUNTER — TELEPHONE (OUTPATIENT)
Dept: CARDIOLOGY | Facility: MEDICAL CENTER | Age: 58
End: 2018-05-25

## 2018-05-25 NOTE — TELEPHONE ENCOUNTER
Received cardiac clearance request from [facility] for perineal urethrostomy specifically requesting clearance for pt to hold anticoagulants for 7 days prior to surgery (however pt not on oac per chart) and clearance to go ahead with surgery.     Pt is pending cardiac stress test 6/04.

## 2018-06-04 ENCOUNTER — HOSPITAL ENCOUNTER (OUTPATIENT)
Dept: RADIOLOGY | Facility: MEDICAL CENTER | Age: 58
End: 2018-06-04
Attending: NURSE PRACTITIONER
Payer: MEDICARE

## 2018-06-04 DIAGNOSIS — E78.5 DYSLIPIDEMIA: ICD-10-CM

## 2018-06-04 DIAGNOSIS — I10 ESSENTIAL HYPERTENSION, BENIGN: ICD-10-CM

## 2018-06-04 DIAGNOSIS — I25.84 CORONARY ARTERY DISEASE DUE TO CALCIFIED CORONARY LESION: ICD-10-CM

## 2018-06-04 DIAGNOSIS — I25.10 CORONARY ARTERY DISEASE DUE TO CALCIFIED CORONARY LESION: ICD-10-CM

## 2018-06-04 PROCEDURE — A9502 TC99M TETROFOSMIN: HCPCS

## 2018-06-04 PROCEDURE — 700111 HCHG RX REV CODE 636 W/ 250 OVERRIDE (IP)

## 2018-06-04 RX ORDER — REGADENOSON 0.08 MG/ML
INJECTION, SOLUTION INTRAVENOUS
Status: COMPLETED
Start: 2018-06-04 | End: 2018-06-04

## 2018-06-04 RX ADMIN — REGADENOSON 0.4 MG: 0.08 INJECTION, SOLUTION INTRAVENOUS at 14:20

## 2018-06-21 ENCOUNTER — HOSPITAL ENCOUNTER (OUTPATIENT)
Dept: LAB | Facility: MEDICAL CENTER | Age: 58
End: 2018-06-21
Attending: UROLOGY
Payer: MEDICARE

## 2018-06-21 LAB
ANION GAP SERPL CALC-SCNC: 10 MMOL/L (ref 0–11.9)
APTT PPP: 34.3 SEC (ref 24.7–36)
BASOPHILS # BLD AUTO: 0.2 % (ref 0–1.8)
BASOPHILS # BLD: 0.02 K/UL (ref 0–0.12)
BUN SERPL-MCNC: 18 MG/DL (ref 8–22)
CALCIUM SERPL-MCNC: 8.9 MG/DL (ref 8.5–10.5)
CHLORIDE SERPL-SCNC: 105 MMOL/L (ref 96–112)
CO2 SERPL-SCNC: 20 MMOL/L (ref 20–33)
CREAT SERPL-MCNC: 1.07 MG/DL (ref 0.5–1.4)
EOSINOPHIL # BLD AUTO: 0.59 K/UL (ref 0–0.51)
EOSINOPHIL NFR BLD: 6 % (ref 0–6.9)
ERYTHROCYTE [DISTWIDTH] IN BLOOD BY AUTOMATED COUNT: 43.7 FL (ref 35.9–50)
GLUCOSE SERPL-MCNC: 128 MG/DL (ref 65–99)
HCT VFR BLD AUTO: 53.2 % (ref 42–52)
HGB BLD-MCNC: 18 G/DL (ref 14–18)
IMM GRANULOCYTES # BLD AUTO: 0.03 K/UL (ref 0–0.11)
IMM GRANULOCYTES NFR BLD AUTO: 0.3 % (ref 0–0.9)
INR PPP: 0.98 (ref 0.87–1.13)
LYMPHOCYTES # BLD AUTO: 3.07 K/UL (ref 1–4.8)
LYMPHOCYTES NFR BLD: 31.4 % (ref 22–41)
MCH RBC QN AUTO: 31.6 PG (ref 27–33)
MCHC RBC AUTO-ENTMCNC: 33.8 G/DL (ref 33.7–35.3)
MCV RBC AUTO: 93.5 FL (ref 81.4–97.8)
MONOCYTES # BLD AUTO: 0.67 K/UL (ref 0–0.85)
MONOCYTES NFR BLD AUTO: 6.9 % (ref 0–13.4)
NEUTROPHILS # BLD AUTO: 5.4 K/UL (ref 1.82–7.42)
NEUTROPHILS NFR BLD: 55.2 % (ref 44–72)
NRBC # BLD AUTO: 0 K/UL
NRBC BLD-RTO: 0 /100 WBC
PLATELET # BLD AUTO: 167 K/UL (ref 164–446)
PMV BLD AUTO: 12.2 FL (ref 9–12.9)
POTASSIUM SERPL-SCNC: 4.6 MMOL/L (ref 3.6–5.5)
PROTHROMBIN TIME: 12.7 SEC (ref 12–14.6)
RBC # BLD AUTO: 5.69 M/UL (ref 4.7–6.1)
SODIUM SERPL-SCNC: 135 MMOL/L (ref 135–145)
WBC # BLD AUTO: 9.8 K/UL (ref 4.8–10.8)

## 2018-06-21 PROCEDURE — 85025 COMPLETE CBC W/AUTO DIFF WBC: CPT

## 2018-06-21 PROCEDURE — 85610 PROTHROMBIN TIME: CPT | Mod: GZ

## 2018-06-21 PROCEDURE — 87086 URINE CULTURE/COLONY COUNT: CPT

## 2018-06-21 PROCEDURE — 80048 BASIC METABOLIC PNL TOTAL CA: CPT

## 2018-06-21 PROCEDURE — 85730 THROMBOPLASTIN TIME PARTIAL: CPT | Mod: GZ

## 2018-06-23 LAB
BACTERIA UR CULT: ABNORMAL
BACTERIA UR CULT: ABNORMAL
SIGNIFICANT IND 70042: ABNORMAL
SITE SITE: ABNORMAL
SOURCE SOURCE: ABNORMAL

## 2018-07-02 ENCOUNTER — APPOINTMENT (OUTPATIENT)
Dept: ADMISSIONS | Facility: MEDICAL CENTER | Age: 58
DRG: 663 | End: 2018-07-02
Attending: UROLOGY
Payer: MEDICARE

## 2018-07-02 DIAGNOSIS — Z01.812 PRE-PROCEDURAL LABORATORY EXAMINATION: ICD-10-CM

## 2018-07-02 NOTE — OR NURSING
Pre admit:  Due to patient having a catheter UA was not done during pre admit appointment today.  Dr. Zuleta's office notified and per Dr. Song Melendez's nurse, patient had a recent UA done in the office and doesn't need to repeat it.

## 2018-07-05 ENCOUNTER — HOSPITAL ENCOUNTER (INPATIENT)
Facility: MEDICAL CENTER | Age: 58
LOS: 1 days | DRG: 663 | End: 2018-07-13
Attending: UROLOGY | Admitting: UROLOGY
Payer: MEDICARE

## 2018-07-05 DIAGNOSIS — G89.18 POSTOPERATIVE PAIN: ICD-10-CM

## 2018-07-05 LAB
ERYTHROCYTE [DISTWIDTH] IN BLOOD BY AUTOMATED COUNT: 44.5 FL (ref 35.9–50)
GLUCOSE BLD-MCNC: 122 MG/DL (ref 65–99)
GLUCOSE BLD-MCNC: 125 MG/DL (ref 65–99)
GLUCOSE BLD-MCNC: 191 MG/DL (ref 65–99)
HCT VFR BLD AUTO: 40.6 % (ref 42–52)
HGB BLD-MCNC: 13.8 G/DL (ref 14–18)
MCH RBC QN AUTO: 32.5 PG (ref 27–33)
MCHC RBC AUTO-ENTMCNC: 34 G/DL (ref 33.7–35.3)
MCV RBC AUTO: 95.5 FL (ref 81.4–97.8)
PLATELET # BLD AUTO: 169 K/UL (ref 164–446)
PMV BLD AUTO: 11.1 FL (ref 9–12.9)
RBC # BLD AUTO: 4.25 M/UL (ref 4.7–6.1)
WBC # BLD AUTO: 16.7 K/UL (ref 4.8–10.8)

## 2018-07-05 PROCEDURE — 160035 HCHG PACU - 1ST 60 MINS PHASE I: Performed by: UROLOGY

## 2018-07-05 PROCEDURE — 500383 HCHG DRAIN, PENROSE 3/8X12: Performed by: UROLOGY

## 2018-07-05 PROCEDURE — 500698 HCHG HEMOCLIP, MEDIUM: Performed by: UROLOGY

## 2018-07-05 PROCEDURE — 500697 HCHG HEMOCLIP, LARGE (ORANGE): Performed by: UROLOGY

## 2018-07-05 PROCEDURE — A9270 NON-COVERED ITEM OR SERVICE: HCPCS

## 2018-07-05 PROCEDURE — 500257: Performed by: UROLOGY

## 2018-07-05 PROCEDURE — 700101 HCHG RX REV CODE 250

## 2018-07-05 PROCEDURE — 160048 HCHG OR STATISTICAL LEVEL 1-5: Performed by: UROLOGY

## 2018-07-05 PROCEDURE — 501138 HCHG PLUG, FOLEY CATH: Performed by: UROLOGY

## 2018-07-05 PROCEDURE — 500371 HCHG DRAIN, BLAKE 10MM: Performed by: UROLOGY

## 2018-07-05 PROCEDURE — 0TQD0ZZ REPAIR URETHRA, OPEN APPROACH: ICD-10-PCS | Performed by: UROLOGY

## 2018-07-05 PROCEDURE — 160041 HCHG SURGERY MINUTES - EA ADDL 1 MIN LEVEL 4: Performed by: UROLOGY

## 2018-07-05 PROCEDURE — 500266 HCHG CATH, SUPRAPUBIC INTRO SET: Performed by: UROLOGY

## 2018-07-05 PROCEDURE — 0TJB8ZZ INSPECTION OF BLADDER, VIA NATURAL OR ARTIFICIAL OPENING ENDOSCOPIC: ICD-10-PCS | Performed by: UROLOGY

## 2018-07-05 PROCEDURE — 700101 HCHG RX REV CODE 250: Performed by: UROLOGY

## 2018-07-05 PROCEDURE — 160028 HCHG SURGERY MINUTES - 1ST 30 MINS LEVEL 3: Performed by: UROLOGY

## 2018-07-05 PROCEDURE — 500389 HCHG DRAIN, RESERVOIR SUCT JP 100CC: Performed by: UROLOGY

## 2018-07-05 PROCEDURE — A4358 URINARY LEG OR ABDOMEN BAG: HCPCS | Performed by: UROLOGY

## 2018-07-05 PROCEDURE — 500892 HCHG PACK, PERI-GYN: Performed by: UROLOGY

## 2018-07-05 PROCEDURE — 160039 HCHG SURGERY MINUTES - EA ADDL 1 MIN LEVEL 3: Performed by: UROLOGY

## 2018-07-05 PROCEDURE — 700111 HCHG RX REV CODE 636 W/ 250 OVERRIDE (IP)

## 2018-07-05 PROCEDURE — A6223 GAUZE >16<=48 NO W/SAL W/O B: HCPCS | Performed by: UROLOGY

## 2018-07-05 PROCEDURE — 700105 HCHG RX REV CODE 258

## 2018-07-05 PROCEDURE — 501445 HCHG STAPLER, SKIN DISP: Performed by: UROLOGY

## 2018-07-05 PROCEDURE — 82962 GLUCOSE BLOOD TEST: CPT

## 2018-07-05 PROCEDURE — 160029 HCHG SURGERY MINUTES - 1ST 30 MINS LEVEL 4: Performed by: UROLOGY

## 2018-07-05 PROCEDURE — A6403 STERILE GAUZE>16 <= 48 SQ IN: HCPCS | Performed by: UROLOGY

## 2018-07-05 PROCEDURE — 160002 HCHG RECOVERY MINUTES (STAT): Performed by: UROLOGY

## 2018-07-05 PROCEDURE — 500042 HCHG BAG, URINARY DRAINAGE (CLOSED): Performed by: UROLOGY

## 2018-07-05 PROCEDURE — 0W3M0ZZ CONTROL BLEEDING IN MALE PERINEUM, OPEN APPROACH: ICD-10-PCS | Performed by: UROLOGY

## 2018-07-05 PROCEDURE — 500380 HCHG DRAIN, PENROSE 1/4X12: Performed by: UROLOGY

## 2018-07-05 PROCEDURE — A4338 INDWELLING CATHETER LATEX: HCPCS | Performed by: UROLOGY

## 2018-07-05 PROCEDURE — 501329 HCHG SET, CYSTO IRRIG Y TUR: Performed by: UROLOGY

## 2018-07-05 PROCEDURE — 700102 HCHG RX REV CODE 250 W/ 637 OVERRIDE(OP)

## 2018-07-05 PROCEDURE — 160009 HCHG ANES TIME/MIN: Performed by: UROLOGY

## 2018-07-05 PROCEDURE — G0378 HOSPITAL OBSERVATION PER HR: HCPCS

## 2018-07-05 PROCEDURE — 36415 COLL VENOUS BLD VENIPUNCTURE: CPT

## 2018-07-05 PROCEDURE — 0VC50ZZ EXTIRPATION OF MATTER FROM SCROTUM, OPEN APPROACH: ICD-10-PCS | Performed by: UROLOGY

## 2018-07-05 PROCEDURE — A6222 GAUZE <=16 IN NO W/SAL W/O B: HCPCS | Performed by: UROLOGY

## 2018-07-05 PROCEDURE — 501330 HCHG SET, CYSTO IRRIG TUBING: Performed by: UROLOGY

## 2018-07-05 PROCEDURE — 160036 HCHG PACU - EA ADDL 30 MINS PHASE I: Performed by: UROLOGY

## 2018-07-05 PROCEDURE — 501838 HCHG SUTURE GENERAL: Performed by: UROLOGY

## 2018-07-05 PROCEDURE — 0T2BX0Z CHANGE DRAINAGE DEVICE IN BLADDER, EXTERNAL APPROACH: ICD-10-PCS | Performed by: UROLOGY

## 2018-07-05 PROCEDURE — 85027 COMPLETE CBC AUTOMATED: CPT

## 2018-07-05 RX ORDER — ATROPA BELLADONNA AND OPIUM 16.2; 6 MG/1; MG/1
SUPPOSITORY RECTAL
Status: COMPLETED
Start: 2018-07-05 | End: 2018-07-05

## 2018-07-05 RX ORDER — DEXTROSE MONOHYDRATE, SODIUM CHLORIDE, AND POTASSIUM CHLORIDE 50; 1.49; 4.5 G/1000ML; G/1000ML; G/1000ML
INJECTION, SOLUTION INTRAVENOUS CONTINUOUS
Status: DISCONTINUED | OUTPATIENT
Start: 2018-07-05 | End: 2018-07-09

## 2018-07-05 RX ORDER — ONDANSETRON 2 MG/ML
4 INJECTION INTRAMUSCULAR; INTRAVENOUS EVERY 6 HOURS PRN
Status: DISCONTINUED | OUTPATIENT
Start: 2018-07-05 | End: 2018-07-13 | Stop reason: HOSPADM

## 2018-07-05 RX ORDER — BUPIVACAINE HYDROCHLORIDE AND EPINEPHRINE 5; 5 MG/ML; UG/ML
INJECTION, SOLUTION EPIDURAL; INTRACAUDAL; PERINEURAL
Status: DISCONTINUED | OUTPATIENT
Start: 2018-07-05 | End: 2018-07-05 | Stop reason: HOSPADM

## 2018-07-05 RX ORDER — LIDOCAINE HYDROCHLORIDE 10 MG/ML
0.5 INJECTION, SOLUTION INFILTRATION; PERINEURAL
Status: ACTIVE | OUTPATIENT
Start: 2018-07-05 | End: 2018-07-06

## 2018-07-05 RX ORDER — OXYCODONE HYDROCHLORIDE 10 MG/1
20 TABLET ORAL
Status: DISCONTINUED | OUTPATIENT
Start: 2018-07-05 | End: 2018-07-13 | Stop reason: HOSPADM

## 2018-07-05 RX ORDER — GABAPENTIN 300 MG/1
300 CAPSULE ORAL 3 TIMES DAILY
Status: DISCONTINUED | OUTPATIENT
Start: 2018-07-05 | End: 2018-07-10

## 2018-07-05 RX ORDER — ONDANSETRON 2 MG/ML
INJECTION INTRAMUSCULAR; INTRAVENOUS
Status: COMPLETED
Start: 2018-07-05 | End: 2018-07-05

## 2018-07-05 RX ORDER — ARIPIPRAZOLE 10 MG/1
15 TABLET ORAL 2 TIMES DAILY
Status: DISCONTINUED | OUTPATIENT
Start: 2018-07-05 | End: 2018-07-10

## 2018-07-05 RX ORDER — ACETAMINOPHEN 500 MG
1000 TABLET ORAL EVERY 6 HOURS
Status: DISPENSED | OUTPATIENT
Start: 2018-07-06 | End: 2018-07-10

## 2018-07-05 RX ORDER — OXYCODONE HCL 5 MG/5 ML
SOLUTION, ORAL ORAL
Status: COMPLETED
Start: 2018-07-05 | End: 2018-07-05

## 2018-07-05 RX ORDER — SODIUM CHLORIDE 9 MG/ML
INJECTION, SOLUTION INTRAVENOUS CONTINUOUS
Status: DISCONTINUED | OUTPATIENT
Start: 2018-07-05 | End: 2018-07-09

## 2018-07-05 RX ORDER — LOSARTAN POTASSIUM 50 MG/1
25 TABLET ORAL DAILY
Status: DISCONTINUED | OUTPATIENT
Start: 2018-07-05 | End: 2018-07-13 | Stop reason: HOSPADM

## 2018-07-05 RX ORDER — OXYCODONE HYDROCHLORIDE 10 MG/1
10 TABLET ORAL
Status: DISCONTINUED | OUTPATIENT
Start: 2018-07-05 | End: 2018-07-13 | Stop reason: HOSPADM

## 2018-07-05 RX ORDER — MORPHINE SULFATE 4 MG/ML
INJECTION, SOLUTION INTRAMUSCULAR; INTRAVENOUS
Status: COMPLETED
Start: 2018-07-05 | End: 2018-07-05

## 2018-07-05 RX ORDER — MORPHINE SULFATE 10 MG/ML
INJECTION, SOLUTION INTRAMUSCULAR; INTRAVENOUS
Status: COMPLETED
Start: 2018-07-05 | End: 2018-07-05

## 2018-07-05 RX ORDER — CARBAMAZEPINE 200 MG/1
200 TABLET ORAL 3 TIMES DAILY
Status: DISCONTINUED | OUTPATIENT
Start: 2018-07-05 | End: 2018-07-10

## 2018-07-05 RX ORDER — NITROGLYCERIN 0.4 MG/1
0.4 TABLET SUBLINGUAL
Status: DISCONTINUED | OUTPATIENT
Start: 2018-07-05 | End: 2018-07-13 | Stop reason: HOSPADM

## 2018-07-05 RX ORDER — MORPHINE SULFATE 10 MG/ML
5 INJECTION, SOLUTION INTRAMUSCULAR; INTRAVENOUS
Status: DISCONTINUED | OUTPATIENT
Start: 2018-07-05 | End: 2018-07-13 | Stop reason: HOSPADM

## 2018-07-05 RX ORDER — OXYCODONE HYDROCHLORIDE AND ACETAMINOPHEN 5; 325 MG/1; MG/1
1-2 TABLET ORAL EVERY 6 HOURS PRN
Qty: 30 TAB | Refills: 0 | Status: SHIPPED | OUTPATIENT
Start: 2018-07-05 | End: 2018-07-09

## 2018-07-05 RX ORDER — SODIUM CHLORIDE 9 MG/ML
INJECTION, SOLUTION INTRAVENOUS
Status: COMPLETED
Start: 2018-07-05 | End: 2018-07-05

## 2018-07-05 RX ORDER — OXYCODONE HYDROCHLORIDE 5 MG/1
TABLET ORAL
Status: COMPLETED
Start: 2018-07-05 | End: 2018-07-05

## 2018-07-05 RX ORDER — LIDOCAINE HYDROCHLORIDE 10 MG/ML
INJECTION, SOLUTION INFILTRATION; PERINEURAL
Status: COMPLETED
Start: 2018-07-05 | End: 2018-07-05

## 2018-07-05 RX ORDER — BUSPIRONE HYDROCHLORIDE 10 MG/1
15 TABLET ORAL 2 TIMES DAILY
Status: DISCONTINUED | OUTPATIENT
Start: 2018-07-05 | End: 2018-07-10

## 2018-07-05 RX ORDER — HALOPERIDOL 5 MG/ML
INJECTION INTRAMUSCULAR
Status: DISPENSED
Start: 2018-07-05 | End: 2018-07-06

## 2018-07-05 RX ORDER — POLYETHYLENE GLYCOL 3350 17 G/17G
1 POWDER, FOR SOLUTION ORAL DAILY
Status: DISCONTINUED | OUTPATIENT
Start: 2018-07-06 | End: 2018-07-10

## 2018-07-05 RX ORDER — MIDAZOLAM HYDROCHLORIDE 1 MG/ML
INJECTION INTRAMUSCULAR; INTRAVENOUS
Status: COMPLETED
Start: 2018-07-05 | End: 2018-07-05

## 2018-07-05 RX ADMIN — MIDAZOLAM 1 MG: 1 INJECTION INTRAMUSCULAR; INTRAVENOUS at 10:20

## 2018-07-05 RX ADMIN — LIDOCAINE HYDROCHLORIDE 0.5 ML: 10 INJECTION, SOLUTION INFILTRATION; PERINEURAL at 06:15

## 2018-07-05 RX ADMIN — SODIUM CHLORIDE: 9 INJECTION, SOLUTION INTRAVENOUS at 12:30

## 2018-07-05 RX ADMIN — MORPHINE SULFATE 5 MG: 10 INJECTION INTRAVENOUS at 12:15

## 2018-07-05 RX ADMIN — HYDROMORPHONE HYDROCHLORIDE 0.5 MG: 10 INJECTION, SOLUTION INTRAMUSCULAR; INTRAVENOUS; SUBCUTANEOUS at 12:55

## 2018-07-05 RX ADMIN — ATROPA BELLADONNA AND OPIUM 60 MG: 16.2; 6 SUPPOSITORY RECTAL at 11:18

## 2018-07-05 RX ADMIN — MORPHINE SULFATE 4 MG: 4 INJECTION INTRAVENOUS at 10:35

## 2018-07-05 RX ADMIN — MIDAZOLAM 1 MG: 1 INJECTION INTRAMUSCULAR; INTRAVENOUS at 09:50

## 2018-07-05 RX ADMIN — FENTANYL CITRATE 50 MCG: 50 INJECTION, SOLUTION INTRAMUSCULAR; INTRAVENOUS at 19:25

## 2018-07-05 RX ADMIN — FENTANYL CITRATE 50 MCG: 50 INJECTION, SOLUTION INTRAMUSCULAR; INTRAVENOUS at 19:10

## 2018-07-05 RX ADMIN — ONDANSETRON 4 MG: 2 INJECTION INTRAMUSCULAR; INTRAVENOUS at 10:33

## 2018-07-05 RX ADMIN — HYDROMORPHONE HYDROCHLORIDE 0.5 MG: 10 INJECTION, SOLUTION INTRAMUSCULAR; INTRAVENOUS; SUBCUTANEOUS at 10:58

## 2018-07-05 RX ADMIN — SODIUM CHLORIDE: 9 INJECTION, SOLUTION INTRAVENOUS at 06:15

## 2018-07-05 RX ADMIN — ONDANSETRON 4 MG: 2 INJECTION INTRAMUSCULAR; INTRAVENOUS at 19:55

## 2018-07-05 RX ADMIN — FENTANYL CITRATE 50 MCG: 50 INJECTION, SOLUTION INTRAMUSCULAR; INTRAVENOUS at 09:50

## 2018-07-05 RX ADMIN — HYDROMORPHONE HYDROCHLORIDE 0.5 MG: 10 INJECTION, SOLUTION INTRAMUSCULAR; INTRAVENOUS; SUBCUTANEOUS at 11:06

## 2018-07-05 RX ADMIN — SODIUM CHLORIDE: 9 INJECTION, SOLUTION INTRAVENOUS at 21:15

## 2018-07-05 RX ADMIN — FENTANYL CITRATE 50 MCG: 50 INJECTION, SOLUTION INTRAMUSCULAR; INTRAVENOUS at 10:14

## 2018-07-05 RX ADMIN — FENTANYL CITRATE 50 MCG: 50 INJECTION, SOLUTION INTRAMUSCULAR; INTRAVENOUS at 11:19

## 2018-07-05 RX ADMIN — HYDROMORPHONE HYDROCHLORIDE 0.5 MG: 10 INJECTION, SOLUTION INTRAMUSCULAR; INTRAVENOUS; SUBCUTANEOUS at 12:25

## 2018-07-05 RX ADMIN — POTASSIUM CHLORIDE, DEXTROSE MONOHYDRATE AND SODIUM CHLORIDE: 150; 5; 450 INJECTION, SOLUTION INTRAVENOUS at 23:01

## 2018-07-05 RX ADMIN — MORPHINE SULFATE 4 MG: 4 INJECTION INTRAVENOUS at 11:00

## 2018-07-05 RX ADMIN — ALBUTEROL SULFATE 5 MG: 2.5 SOLUTION RESPIRATORY (INHALATION) at 10:15

## 2018-07-05 RX ADMIN — OXYCODONE HYDROCHLORIDE 20 MG: 5 TABLET ORAL at 13:20

## 2018-07-05 RX ADMIN — MORPHINE SULFATE 5 MG: 10 INJECTION INTRAVENOUS at 12:20

## 2018-07-05 RX ADMIN — OXYCODONE HYDROCHLORIDE 10 MG: 5 SOLUTION ORAL at 10:08

## 2018-07-05 RX ADMIN — FENTANYL CITRATE 50 MCG: 50 INJECTION, SOLUTION INTRAMUSCULAR; INTRAVENOUS at 12:29

## 2018-07-05 ASSESSMENT — PAIN SCALES - GENERAL
PAINLEVEL_OUTOF10: 10
PAINLEVEL_OUTOF10: 0
PAINLEVEL_OUTOF10: 4
PAINLEVEL_OUTOF10: 10
PAINLEVEL_OUTOF10: 8
PAINLEVEL_OUTOF10: 10
PAINLEVEL_OUTOF10: 4
PAINLEVEL_OUTOF10: 10
PAINLEVEL_OUTOF10: 8
PAINLEVEL_OUTOF10: 8
PAINLEVEL_OUTOF10: 10
PAINLEVEL_OUTOF10: 9
PAINLEVEL_OUTOF10: 10
PAINLEVEL_OUTOF10: 4
PAINLEVEL_OUTOF10: 10

## 2018-07-05 ASSESSMENT — COGNITIVE AND FUNCTIONAL STATUS - GENERAL
CLIMB 3 TO 5 STEPS WITH RAILING: A LITTLE
EATING MEALS: A LITTLE
DAILY ACTIVITIY SCORE: 18
HELP NEEDED FOR BATHING: A LITTLE
MOVING TO AND FROM BED TO CHAIR: A LITTLE
SUGGESTED CMS G CODE MODIFIER DAILY ACTIVITY: CK
SUGGESTED CMS G CODE MODIFIER MOBILITY: CJ
DRESSING REGULAR UPPER BODY CLOTHING: A LITTLE
DRESSING REGULAR LOWER BODY CLOTHING: A LITTLE
MOBILITY SCORE: 20
WALKING IN HOSPITAL ROOM: A LITTLE
PERSONAL GROOMING: A LITTLE
TOILETING: A LITTLE
STANDING UP FROM CHAIR USING ARMS: A LITTLE

## 2018-07-05 ASSESSMENT — PATIENT HEALTH QUESTIONNAIRE - PHQ9
SUM OF ALL RESPONSES TO PHQ9 QUESTIONS 1 AND 2: 0
1. LITTLE INTEREST OR PLEASURE IN DOING THINGS: NOT AT ALL
2. FEELING DOWN, DEPRESSED, IRRITABLE, OR HOPELESS: NOT AT ALL

## 2018-07-05 ASSESSMENT — LIFESTYLE VARIABLES
EVER_SMOKED: YES
ALCOHOL_USE: NO

## 2018-07-05 NOTE — OR NURSING
Danisha Barrios and Song at bedside. The scrotum remains grossly enlarged and dark.. The mother is in route. To OR. VSS.

## 2018-07-05 NOTE — PROGRESS NOTES
Patient with continued severe pain in PACU, admit orders written.    Exam now, however, reveals massive scrotal and suprapubic hematoma, tense, painful.    I explained to him and his mother (by phone) need to re-explore in the operating room to do our best to drain the hematoma, and stop the bleeding.    OR imminent.

## 2018-07-05 NOTE — OR NURSING
Pre op admission completed.  Pt and mother educated on surgical plan of care, all questions answered.  Bed in low position and call light within reach.  Hourly rounding in place.  Pt states he has a suprapubic catheter in place.  Pt requests bloodless program.  Bloodless consent completed by pt and on chart.  Paragraph #6 on surgical consent crossed off and initialed by pt.  Bloodless protocol followed.  Pt requested that IV site by clipped by RN prior to IV insertion.  This RN clipped site.

## 2018-07-05 NOTE — OR SURGEON
Immediate Post OP Note    PreOp Diagnosis: panurethral stricture    PostOp Diagnosis: same    Procedure(s):  PERINEAL URETHROSTOMY - Wound Class: Clean Contaminated    Surgeon(s):  Valdez Zuleta M.D.    Anesthesiologist/Type of Anesthesia:  Anesthesiologist: Anahi Hooker M.D./General    Surgical Staff:  Circulator: Lisa Delarosa R.N.  Relief Circulator: Scarlet Christie R.N.  Scrub Person: Tuan Charlton    Specimens removed if any:  * No specimens in log *    Estimated Blood Loss: 50mL    Findings: inverted U flap technique    Complications: none        7/5/2018 9:36 AM Valdez Zuleta M.D.

## 2018-07-05 NOTE — OP REPORT
DATE OF SERVICE:  07/05/2018    NAME OF OPERATIONS:  1.  Perineal urethrostomy.  2.  Exchange of suprapubic tube.  3.  Cystoscopy.    PREOPERATIVE DIAGNOSIS:  Panurethral stricture.    POSTOPERATIVE DIAGNOSIS:  Panurethral stricture.    PRIMARY SURGEON:  Valdez Zuleta MD    ANESTHESIOLOGIST:  Anahi Hooker MD    FINDINGS:  Bulbar urethral to perineal anastomosis utilizing the inverted U   flap technique.  Membranous urethra calibrated to 28 Indonesian without   difficulty.  Cystoscopy demonstrated significant edema within the bladder.  No   stones.  Suprapubic tube changed to a 14-Indonesian.    INDICATIONS:  Briefly, the patient is a 57-year-old gentleman with a history   of BXO resulting in significant and severe urethral stricture disease.  Upon   consideration of his options, he has elected to undergo perineal urethrostomy.    Informed consent has been obtained.    OPERATION IN DETAIL:  The patient was taken to the operating room, placed on   the operating table in the supine position.  After administration of general   anesthetic, his suprapubic region was prepped and draped sterilely.  His   suprapubic tube was removed and a new 14-Indonesian sterile catheter was placed.    This was placed to gravity drainage.    The patient was then placed in an exaggerated lithotomy position with some   towels underneath his lower back.  His perineum and genitals were prepped and   draped sterilely.  An inverted U incision was made in the peritoneum with the   apex just at the very bottom of the scrotum.  A full thickness flap was then   dropped until the bulbospongiosus muscle was encountered in the midline.  A   [place] retractor was used for better visualization of the operative field.    Bulbospongiosus muscle was then opened in the midline.  Dissection of the   bulbar urethra was then carried out proximally and distally.  The proximal   aspect was taken to the level of the external sphincter and approximately 4 cm    proximally.    Blade was then used to incise longitudinally in the ventral aspect of the   urethra cutting through corpora spongiosum until the true lumen was   encountered.  Next, Metzenbaum scissors were used to open up along the length   of the dissected urethra.  At this point, bougie dilators were used to   calibrate the passage into the bladder through the membranous urethra.  A   22-28 South Korean was carried out without difficulty.    The flap was then slightly debulked of a significant amount of fatty tissues   underlying it.  It still was quite robust in terms of its thickness.  Little   of the apex was then excised and 3-0 Vicryl sutures were used to approximate   the most proximal aspect of the urethral cut edge to the apex of the skin   flap.  Three of these were done in a simple interrupted manner.    At this point, flexible cystoscopy was performed.  I was able to pass a   cystoscopy through the membranous urethra and into the prostate.  Prostate was   within normal limits.  In the bladder, there was a significant amount of   edema from the previous indwelling catheter.  No stones were identified.    The urethrostomy was then matured utilizing 3-0 Vicryl sutures.  First stitch   was placed at the apex, the most distal aspect of the urethrotomy making sure   to include mucosa, spongiosum, and dermis as well as skin.  This was tied   down.  The lateral aspects were then sutured with interrupted 3-0 Vicryl in a   like manner.  Once done, the skin of the inferior aspect of the flap closure   was closed with interrupted horizontal mattress sutures of 2-0 Vicryl.    Finally, a 5-0 Monocryl was used to close the skin edge in a running manner on   both of the skin edges.  On final inspection, appeared to be widely patent   urethra.  The 18-South Korean Gomez catheter passed easily in and was found to   irrigate well.  The suprapubic tube was plugged.  Some Xeroform gauze was   packed around the base of the Gomez and the  Gomez was placed to gravity   drainage.  The patient tolerated the procedure well and was taken to recovery   room in stable condition.       ____________________________________     Valdez Zuleta MD MCM / STU    DD:  07/05/2018 09:47:12  DT:  07/05/2018 10:20:16    D#:  [id number]  Job#:  [id number]

## 2018-07-06 LAB
ANION GAP SERPL CALC-SCNC: 7 MMOL/L (ref 0–11.9)
BUN SERPL-MCNC: 16 MG/DL (ref 8–22)
CALCIUM SERPL-MCNC: 7.5 MG/DL (ref 8.5–10.5)
CHLORIDE SERPL-SCNC: 107 MMOL/L (ref 96–112)
CO2 SERPL-SCNC: 22 MMOL/L (ref 20–33)
CREAT SERPL-MCNC: 0.82 MG/DL (ref 0.5–1.4)
ERYTHROCYTE [DISTWIDTH] IN BLOOD BY AUTOMATED COUNT: 43.8 FL (ref 35.9–50)
ERYTHROCYTE [DISTWIDTH] IN BLOOD BY AUTOMATED COUNT: 44.4 FL (ref 35.9–50)
GLUCOSE SERPL-MCNC: 153 MG/DL (ref 65–99)
HCT VFR BLD AUTO: 32.7 % (ref 42–52)
HCT VFR BLD AUTO: 34.7 % (ref 42–52)
HGB BLD-MCNC: 11 G/DL (ref 14–18)
HGB BLD-MCNC: 11.6 G/DL (ref 14–18)
MCH RBC QN AUTO: 31.4 PG (ref 27–33)
MCH RBC QN AUTO: 32 PG (ref 27–33)
MCHC RBC AUTO-ENTMCNC: 33.4 G/DL (ref 33.7–35.3)
MCHC RBC AUTO-ENTMCNC: 33.6 G/DL (ref 33.7–35.3)
MCV RBC AUTO: 93.8 FL (ref 81.4–97.8)
MCV RBC AUTO: 95.1 FL (ref 81.4–97.8)
PLATELET # BLD AUTO: 143 K/UL (ref 164–446)
PLATELET # BLD AUTO: 149 K/UL (ref 164–446)
PMV BLD AUTO: 11.1 FL (ref 9–12.9)
PMV BLD AUTO: 11.3 FL (ref 9–12.9)
POTASSIUM SERPL-SCNC: 4.3 MMOL/L (ref 3.6–5.5)
RBC # BLD AUTO: 3.44 M/UL (ref 4.7–6.1)
RBC # BLD AUTO: 3.7 M/UL (ref 4.7–6.1)
SODIUM SERPL-SCNC: 136 MMOL/L (ref 135–145)
WBC # BLD AUTO: 11.3 K/UL (ref 4.8–10.8)
WBC # BLD AUTO: 13.9 K/UL (ref 4.8–10.8)

## 2018-07-06 PROCEDURE — 85027 COMPLETE CBC AUTOMATED: CPT

## 2018-07-06 PROCEDURE — A9270 NON-COVERED ITEM OR SERVICE: HCPCS | Performed by: UROLOGY

## 2018-07-06 PROCEDURE — 36415 COLL VENOUS BLD VENIPUNCTURE: CPT

## 2018-07-06 PROCEDURE — 700111 HCHG RX REV CODE 636 W/ 250 OVERRIDE (IP): Performed by: UROLOGY

## 2018-07-06 PROCEDURE — 700101 HCHG RX REV CODE 250: Performed by: UROLOGY

## 2018-07-06 PROCEDURE — 80048 BASIC METABOLIC PNL TOTAL CA: CPT

## 2018-07-06 PROCEDURE — G0378 HOSPITAL OBSERVATION PER HR: HCPCS

## 2018-07-06 PROCEDURE — 700102 HCHG RX REV CODE 250 W/ 637 OVERRIDE(OP): Performed by: UROLOGY

## 2018-07-06 RX ORDER — CEFAZOLIN SODIUM 2 G/100ML
2 INJECTION, SOLUTION INTRAVENOUS EVERY 8 HOURS
Status: DISCONTINUED | OUTPATIENT
Start: 2018-07-06 | End: 2018-07-11

## 2018-07-06 RX ORDER — METOCLOPRAMIDE HYDROCHLORIDE 5 MG/ML
10 INJECTION INTRAMUSCULAR; INTRAVENOUS EVERY 8 HOURS PRN
Status: DISCONTINUED | OUTPATIENT
Start: 2018-07-06 | End: 2018-07-13 | Stop reason: HOSPADM

## 2018-07-06 RX ADMIN — METOPROLOL TARTRATE 25 MG: 25 TABLET, FILM COATED ORAL at 20:37

## 2018-07-06 RX ADMIN — GABAPENTIN 300 MG: 300 CAPSULE ORAL at 22:11

## 2018-07-06 RX ADMIN — CARBAMAZEPINE 200 MG: 200 TABLET ORAL at 22:13

## 2018-07-06 RX ADMIN — CARBAMAZEPINE 200 MG: 200 TABLET ORAL at 09:57

## 2018-07-06 RX ADMIN — POTASSIUM CHLORIDE, DEXTROSE MONOHYDRATE AND SODIUM CHLORIDE: 150; 5; 450 INJECTION, SOLUTION INTRAVENOUS at 09:56

## 2018-07-06 RX ADMIN — ARIPIPRAZOLE 15 MG: 10 TABLET ORAL at 09:54

## 2018-07-06 RX ADMIN — POLYETHYLENE GLYCOL 3350 1 PACKET: 17 POWDER, FOR SOLUTION ORAL at 09:53

## 2018-07-06 RX ADMIN — ACETAMINOPHEN 1000 MG: 500 TABLET, FILM COATED ORAL at 23:06

## 2018-07-06 RX ADMIN — METOCLOPRAMIDE 10 MG: 5 INJECTION, SOLUTION INTRAMUSCULAR; INTRAVENOUS at 09:55

## 2018-07-06 RX ADMIN — CEFAZOLIN SODIUM 2 G: 2 INJECTION, SOLUTION INTRAVENOUS at 15:54

## 2018-07-06 RX ADMIN — BUSPIRONE HYDROCHLORIDE 15 MG: 10 TABLET ORAL at 09:54

## 2018-07-06 RX ADMIN — GABAPENTIN 300 MG: 300 CAPSULE ORAL at 15:54

## 2018-07-06 RX ADMIN — CEFAZOLIN SODIUM 2 G: 2 INJECTION, SOLUTION INTRAVENOUS at 21:53

## 2018-07-06 RX ADMIN — OXYCODONE HYDROCHLORIDE 10 MG: 10 TABLET ORAL at 23:06

## 2018-07-06 RX ADMIN — POTASSIUM CHLORIDE, DEXTROSE MONOHYDRATE AND SODIUM CHLORIDE: 150; 5; 450 INJECTION, SOLUTION INTRAVENOUS at 15:53

## 2018-07-06 RX ADMIN — GABAPENTIN 300 MG: 300 CAPSULE ORAL at 09:53

## 2018-07-06 RX ADMIN — BUSPIRONE HYDROCHLORIDE 15 MG: 10 TABLET ORAL at 22:12

## 2018-07-06 RX ADMIN — METOPROLOL TARTRATE 25 MG: 25 TABLET, FILM COATED ORAL at 09:53

## 2018-07-06 RX ADMIN — LOSARTAN POTASSIUM 25 MG: 50 TABLET ORAL at 20:33

## 2018-07-06 RX ADMIN — POTASSIUM CHLORIDE, DEXTROSE MONOHYDRATE AND SODIUM CHLORIDE: 150; 5; 450 INJECTION, SOLUTION INTRAVENOUS at 20:28

## 2018-07-06 RX ADMIN — CARBAMAZEPINE 200 MG: 200 TABLET ORAL at 15:54

## 2018-07-06 RX ADMIN — CEFAZOLIN SODIUM 2 G: 2 INJECTION, SOLUTION INTRAVENOUS at 09:55

## 2018-07-06 RX ADMIN — ARIPIPRAZOLE 15 MG: 10 TABLET ORAL at 22:11

## 2018-07-06 ASSESSMENT — PAIN SCALES - GENERAL
PAINLEVEL_OUTOF10: 10
PAINLEVEL_OUTOF10: 4
PAINLEVEL_OUTOF10: 4
PAINLEVEL_OUTOF10: 10
PAINLEVEL_OUTOF10: 4

## 2018-07-06 NOTE — PROGRESS NOTES
Pt need to get prescription refilled for home medication E/metformin HCL. Pt still nauseous after havin Zofran @2000. Will call   For orders

## 2018-07-06 NOTE — PROGRESS NOTES
Report Received from Day RN, assumed care @1900  A/O x 4  VSS  Pain- 10/10 Groin  O2- 4L NC  IV- R forearm running TKO  Diet- npo  Void- Pos  BM-neg  Wound- Suprepubic Gomez capped, UretheroFoley in place, L and R scrotum penrose drain in place  Scrotum appears black and blue and swollen  SCDs- ON  Bed Locked in Lowest Position  Call light in reach    2 RN skin check completed

## 2018-07-06 NOTE — CARE PLAN
Problem: Communication  Goal: The ability to communicate needs accurately and effectively will improve  Outcome: PROGRESSING AS EXPECTED  Pt uses call light effectively    Problem: Venous Thromboembolism (VTW)/Deep Vein Thrombosis (DVT) Prevention:  Goal: Patient will participate in Venous Thrombosis (VTE)/Deep Vein Thrombosis (DVT)Prevention Measures  Outcome: PROGRESSING AS EXPECTED  Pt wearing SCDs

## 2018-07-06 NOTE — OR NURSING
Report received from [place].  Pt awake alert c/o pain and medicated with rx'd meds. Vs wnl. poc to transfer to surgical room. Will update Rn on gsu.

## 2018-07-06 NOTE — PROGRESS NOTES
"Urology Progress Note    Post op Day # 1    Overnight Events: No acute    S: No fevers, chills, nausea or vomiting.   Pain is better controlled  O:   Blood pressure 115/53, pulse 86, temperature 36.5 °C (97.7 °F), resp. rate 17, height 1.753 m (5' 9\"), weight 94.1 kg (207 lb 7.3 oz), SpO2 99 %.  Recent Labs      07/06/18   0251   SODIUM  136   POTASSIUM  4.3   CHLORIDE  107   CO2  22   GLUCOSE  153*   BUN  16   CREATININE  0.82   CALCIUM  7.5*     Recent Labs      07/05/18   1834  07/06/18   0251   WBC  16.7*  13.9*   RBC  4.25*  3.70*   HEMOGLOBIN  13.8*  11.6*   HEMATOCRIT  40.6*  34.7*   MCV  95.5  93.8   MCH  32.5  31.4   MCHC  34.0  33.4*   RDW  44.5  43.8   PLATELETCT  169  149*   MPV  11.1  11.1         Intake/Output Summary (Last 24 hours) at 07/06/18 0714  Last data filed at 07/06/18 0400   Gross per 24 hour   Intake             5300 ml   Output             3650 ml   Net             1650 ml       Exam:  Suprapubic region less tense, but tender, scrotum much decreased in size from yesterday, very ecchymotic, tender.  Penrose draining blood into dressing. Perineum soft.   Urine: clear      A/P:  There are no active hospital problems to display for this patient.      Stable. Acute blood loss after surgery, scrotal and suprapubic hematoma  Continue pressure dressing, dressing changes  Lab checks  Continue donald x 2 weeks  Continued hospitalization required for monitoring, pain control  "

## 2018-07-06 NOTE — PROGRESS NOTES
2 RN skin check    Pt arrives on unit with discolored and swollen scrotun. Suprapubic donald and urtherofoley present. Pt has 2 penrose drains in his scrotum    No other areas of concern

## 2018-07-06 NOTE — OR SURGEON
Immediate Post OP Note    PreOp Diagnosis: scrotal hematoma    PostOp Diagnosis: same    Procedure(s):  EVACUATION OF HEMATOMA - Wound Class: Clean Contaminated    Surgeon(s):  Valdez Zuleta M.D.    Anesthesiologist/Type of Anesthesia:  Anesthesiologist: Arsen Barrios M.D./General    Surgical Staff:  Circulator: Juan Woods R.N.  Scrub Person: Manjula Taylor  Count Roosevelt: Cherise De La Torre R.N.; Nicole Grace RMERCED    Specimens removed if any:  * No specimens in log *    Estimated Blood Loss: 300mL    Findings: bilateral scrotal hematoma.  Entire urethrostomy taken down and bleeding apparently from very apex near scrotum.  Bilateral penrose drains.    Complications: none        7/5/2018 6:08 PM Valdez Zuleta M.D.

## 2018-07-06 NOTE — OP REPORT
DATE OF SERVICE:  07/05/2018    NAME OF OPERATION:  Take back for postoperative bleeding, evacuation of   scrotal hematoma.    PREOPERATIVE DIAGNOSIS:  Scrotal and suprapubic hematoma.    POSTOPERATIVE DIAGNOSIS:  Scrotal and suprapubic hematoma.    PRIMARY SURGEON:  Valdez Zuleta MD    ANESTHESIOLOGIST:  Arsen Barrios MD    FINDINGS:  Entire urethrostomy taken down to inspect for evidence of bleeding.    Peritoneum was soft around the repair.  There was mild oozing from the   urethra.  Upon entering the base of the scrotum at the apex, the large   hematoma cavity was identified on both sides.  This region was copiously   irrigated and clot evacuated.  There really was no obvious source of bleeding,   but perhaps some scrotal midline artery.  Bilateral Penrose drains were   placed in the scrotum at the case conclusion.    INDICATIONS:  Briefly, the patient is a 57-year-old gentleman who earlier   today underwent a perineal urethrostomy.  He had significant pain in recovery.    On exam, he was found to have an expanding and tense scrotal hematoma that   seemed to be extending into the suprapubic region.  Indications were for   reexploration and take back.    OPERATION IN DETAIL:  The patient was taken to the operating room, placed on   the operating table in supine position.  After administration of general   anesthetic, he was placed in an exaggerated lithotomy position.  His genitals   and perineum were prepped and draped sterilely.  On direct palpation of the   perineum, there was no evidence of any tense hematoma.  The lateral aspects of   the urethrostomy repair were taken down to inspect for any evidence of   bleeding, none was identified.  At this point, given the concern for missed   source of bleeding, the entire urethrostomy was taken down exposing the entire   perineum.  At this point, it was fairly apparent that at the apex of this   incision, which was opened to more fully evaluate the  scrotum, that the   bleeding was coming from the apex at the base of the scrotum.  I cut into the   dartos fascia through the scrotum and entered in the hematoma cavity.  A pool   suction was utilized to evacuate the clot about the bilateral scrotum and   manually some additional clot was debrided.  There was no obvious bleeding,   although the tissues were of course diffusely oozing of blood.  The bilateral   scrotum was copiously irrigated.  Two stab incisions were made in each of the   right and left scrotum for passage of a Penrose drain.  These were secured to   the skin with 3-0 nylon stitch.  The deep dartos fascia where apparently the   bleeding was coming from was oversewn with interrupted 2-0 Vicryl sutures.  At   this point, the urethrostomy repair was reconstituted that this was much more   challenging now that the tissues were so edematous.  The apex of the urethra   was identified and sutured to the skin towards the apex of the incision.  The   lateral aspect was further reapproximated with 2-0 interrupted Vicryl sutures.    Once this was done, there appeared to be adequate spatulation.  There   appeared to be some denudation of the mucosal surfaces from the multiple   needle sticks.  This was causing underlying bleeding from the corpus   spongiosum and these were oversewn with 3-0 Vicryl sutures.  The remainder of   the perineal incision on either side was closed with interrupted horizontal   2-0 Vicryl mattress sutures.  A 5-0 Monocryl was used to close the skin in a   running manner over these lateral skin repairs.  There did appear to be still   what might have been fresh bleeding coming from the right hemiscrotum.    Incision was made to open up this drain site for further inspection.  I was   able to do so and unfortunately not see any clear source of bleeding.  It   appeared that the tissues were oozing from the ____ edematous with blood   scrotal tissues.  A running 3-0 Vicryl suture was used  to close the small   incision in the deep dartos fascial layer and a 3-0 Vicryl suture was used to   close the skin in a running layer as well.  Xeroform gauze was packed around   the urethral catheter.  A copious fluffs were placed and a scrotal support was   placed.    He will be admitted to the hospital for ongoing monitoring, pain control, and   laboratory assessment.       ____________________________________     Valdez Zuleta MD MCM / [facility]    DD:  07/05/2018 18:16:33  DT:  07/05/2018 20:10:04    D#:  [id number]  Job#:  [id number]

## 2018-07-06 NOTE — OR NURSING
Pt requesting transfer to room. Reports scrotal pain and declines additional mediation. Call to Dr. Zuleta verified held orders for diet and meds.     Pt provided diet juice and tolerated.     Report to Rn -2.

## 2018-07-06 NOTE — PROGRESS NOTES
Pt educated on pain medications available to him, declines need. Pt also declines nausea, educated on options availble to him. Moderate output on new dressing.

## 2018-07-06 NOTE — PROGRESS NOTES
Recieved new order for Reglan from Dr. Hull. Patient is refusing all medications at this time. Pt educated on current blood pressure of 145/67 and heart rate of 88. Per patient that is lower than his usual and he goes a few days w/o meds sometimes with no issue.    Pt is still nauseous but does not want  nausea meds anymore. Pt also states he is in immense pain and was educated on IV and PO medication options, but declines both. Per patient he has a high pain tolerance. Pt is relaxing comfortably at this time and dressing was changed. All patient needs met at this time

## 2018-07-07 LAB
ERYTHROCYTE [DISTWIDTH] IN BLOOD BY AUTOMATED COUNT: 44.6 FL (ref 35.9–50)
HCT VFR BLD AUTO: 33.6 % (ref 42–52)
HGB BLD-MCNC: 11.2 G/DL (ref 14–18)
MCH RBC QN AUTO: 31.5 PG (ref 27–33)
MCHC RBC AUTO-ENTMCNC: 33.3 G/DL (ref 33.7–35.3)
MCV RBC AUTO: 94.6 FL (ref 81.4–97.8)
PLATELET # BLD AUTO: 142 K/UL (ref 164–446)
PMV BLD AUTO: 11.3 FL (ref 9–12.9)
RBC # BLD AUTO: 3.55 M/UL (ref 4.7–6.1)
WBC # BLD AUTO: 11 K/UL (ref 4.8–10.8)

## 2018-07-07 PROCEDURE — 700102 HCHG RX REV CODE 250 W/ 637 OVERRIDE(OP): Performed by: UROLOGY

## 2018-07-07 PROCEDURE — 700101 HCHG RX REV CODE 250: Performed by: UROLOGY

## 2018-07-07 PROCEDURE — A9270 NON-COVERED ITEM OR SERVICE: HCPCS | Performed by: UROLOGY

## 2018-07-07 PROCEDURE — G0378 HOSPITAL OBSERVATION PER HR: HCPCS

## 2018-07-07 PROCEDURE — 700111 HCHG RX REV CODE 636 W/ 250 OVERRIDE (IP): Performed by: UROLOGY

## 2018-07-07 PROCEDURE — 85027 COMPLETE CBC AUTOMATED: CPT

## 2018-07-07 PROCEDURE — 36415 COLL VENOUS BLD VENIPUNCTURE: CPT

## 2018-07-07 RX ADMIN — CEFAZOLIN SODIUM 2 G: 2 INJECTION, SOLUTION INTRAVENOUS at 20:35

## 2018-07-07 RX ADMIN — POTASSIUM CHLORIDE, DEXTROSE MONOHYDRATE AND SODIUM CHLORIDE: 150; 5; 450 INJECTION, SOLUTION INTRAVENOUS at 16:17

## 2018-07-07 RX ADMIN — ARIPIPRAZOLE 15 MG: 10 TABLET ORAL at 09:53

## 2018-07-07 RX ADMIN — GABAPENTIN 300 MG: 300 CAPSULE ORAL at 09:52

## 2018-07-07 RX ADMIN — CARBAMAZEPINE 200 MG: 200 TABLET ORAL at 20:34

## 2018-07-07 RX ADMIN — POLYETHYLENE GLYCOL 3350 1 PACKET: 17 POWDER, FOR SOLUTION ORAL at 09:54

## 2018-07-07 RX ADMIN — BUSPIRONE HYDROCHLORIDE 15 MG: 10 TABLET ORAL at 09:52

## 2018-07-07 RX ADMIN — LOSARTAN POTASSIUM 25 MG: 50 TABLET ORAL at 20:34

## 2018-07-07 RX ADMIN — POTASSIUM CHLORIDE, DEXTROSE MONOHYDRATE AND SODIUM CHLORIDE: 150; 5; 450 INJECTION, SOLUTION INTRAVENOUS at 06:01

## 2018-07-07 RX ADMIN — CARBAMAZEPINE 200 MG: 200 TABLET ORAL at 09:53

## 2018-07-07 RX ADMIN — GABAPENTIN 300 MG: 300 CAPSULE ORAL at 20:35

## 2018-07-07 RX ADMIN — CARBAMAZEPINE 200 MG: 200 TABLET ORAL at 14:54

## 2018-07-07 RX ADMIN — CEFAZOLIN SODIUM 2 G: 2 INJECTION, SOLUTION INTRAVENOUS at 14:56

## 2018-07-07 RX ADMIN — METOPROLOL TARTRATE 25 MG: 25 TABLET, FILM COATED ORAL at 09:53

## 2018-07-07 RX ADMIN — METOPROLOL TARTRATE 25 MG: 25 TABLET, FILM COATED ORAL at 20:34

## 2018-07-07 RX ADMIN — CEFAZOLIN SODIUM 2 G: 2 INJECTION, SOLUTION INTRAVENOUS at 06:01

## 2018-07-07 RX ADMIN — ARIPIPRAZOLE 15 MG: 10 TABLET ORAL at 20:35

## 2018-07-07 RX ADMIN — GABAPENTIN 300 MG: 300 CAPSULE ORAL at 14:53

## 2018-07-07 RX ADMIN — BUSPIRONE HYDROCHLORIDE 15 MG: 10 TABLET ORAL at 20:35

## 2018-07-07 ASSESSMENT — PAIN SCALES - GENERAL
PAINLEVEL_OUTOF10: 3
PAINLEVEL_OUTOF10: 3
PAINLEVEL_OUTOF10: ASSUMED PAIN PRESENT

## 2018-07-07 NOTE — PROGRESS NOTES
"Urology Progress Note    Post op Day # 2    Overnight Events: None    S: No fevers, chills, nausea or vomiting.  Good pain control.  Requesting to ambulate    O:   Blood pressure 132/64, pulse 86, temperature 36.5 °C (97.7 °F), resp. rate 18, height 1.753 m (5' 9\"), weight 94.1 kg (207 lb 7.3 oz), SpO2 92 %.  Recent Labs      07/06/18   0251   SODIUM  136   POTASSIUM  4.3   CHLORIDE  107   CO2  22   GLUCOSE  153*   BUN  16   CREATININE  0.82   CALCIUM  7.5*     Recent Labs      07/06/18   0251  07/06/18   1700  07/07/18   0337   WBC  13.9*  11.3*  11.0*   RBC  3.70*  3.44*  3.55*   HEMOGLOBIN  11.6*  11.0*  11.2*   HEMATOCRIT  34.7*  32.7*  33.6*   MCV  93.8  95.1  94.6   MCH  31.4  32.0  31.5   MCHC  33.4*  33.6*  33.3*   RDW  43.8  44.4  44.6   PLATELETCT  149*  143*  142*   MPV  11.1  11.3  11.3         Intake/Output Summary (Last 24 hours) at 07/07/18 1051  Last data filed at 07/07/18 0752   Gross per 24 hour   Intake             2070 ml   Output             5000 ml   Net            -2930 ml       Exam:  scrotum decreased in size, very ecchymotic, tender.  Penrose draining bright red blood into dressing. Perineum soft.              Urine: clear        A/P:  There are no active hospital problems to display for this patient.      PLAN:  Ambulate, IS.  Continue pressure dressing, dressing changes  Repeat am labs  Continue donald x 2 weeks  Continued hospitalization required for monitoring and pain control  "

## 2018-07-07 NOTE — PROGRESS NOTES
Assumed care at 0700. Received report from night shift RN. Bedside report completed. AOx4.    Denies pain  Denies nausea.  Tolerating diet but has poor appetite.   Urethrofoley to DD. -BM  IVF infusing.   B/L scrotal penrose drains in place, dressing changed  Ambulating with 1 assist. Pt call light and belongings within reach, fall precautions in place.

## 2018-07-07 NOTE — PROGRESS NOTES
Patient AOX4  Adventitious lung sounds noted on left lung throughout, Course crackles and Ronchi. Right lung field was clear.  Patients dressing under scrotum was removed. We added Gauze padding to the area.  Patient did not receive breakfast this morning. It was not delivered to the room.

## 2018-07-07 NOTE — PROGRESS NOTES
Report Received from Day RN, assumed care @1900  A/O x 4  VSS  Pain-10/10 in scrotum, pt states he manages it with sleeping, educated med options declines at this time  O2-2l NC  IV-R FA  Diet- Diabetic, states theres nausea denies need for medication  Void-   BM- pos 7/6  Wound-  Supra pubic donald capped. Urethrostomy in place w donald bag.  Bilat NABILA drains on scrotum  Scrtum appears swollen and discolored    SCDs-  Bed Locked in Lowest Position  Call light in reach

## 2018-07-07 NOTE — CARE PLAN
Problem: Communication  Goal: The ability to communicate needs accurately and effectively will improve  Outcome: PROGRESSING AS EXPECTED  Pt uses call light effectively    Problem: Fluid Volume:  Goal: Will maintain balanced intake and output  Outcome: PROGRESSING AS EXPECTED  Pt is on IV fluids to supplement poor appetite

## 2018-07-08 LAB
BASOPHILS # BLD AUTO: 0.1 % (ref 0–1.8)
BASOPHILS # BLD: 0.01 K/UL (ref 0–0.12)
EOSINOPHIL # BLD AUTO: 0.1 K/UL (ref 0–0.51)
EOSINOPHIL NFR BLD: 1.1 % (ref 0–6.9)
ERYTHROCYTE [DISTWIDTH] IN BLOOD BY AUTOMATED COUNT: 43.6 FL (ref 35.9–50)
HCT VFR BLD AUTO: 34.5 % (ref 42–52)
HGB BLD-MCNC: 11.7 G/DL (ref 14–18)
IMM GRANULOCYTES # BLD AUTO: 0.07 K/UL (ref 0–0.11)
IMM GRANULOCYTES NFR BLD AUTO: 0.8 % (ref 0–0.9)
LYMPHOCYTES # BLD AUTO: 2.18 K/UL (ref 1–4.8)
LYMPHOCYTES NFR BLD: 24.1 % (ref 22–41)
MCH RBC QN AUTO: 31.8 PG (ref 27–33)
MCHC RBC AUTO-ENTMCNC: 33.9 G/DL (ref 33.7–35.3)
MCV RBC AUTO: 93.8 FL (ref 81.4–97.8)
MONOCYTES # BLD AUTO: 0.68 K/UL (ref 0–0.85)
MONOCYTES NFR BLD AUTO: 7.5 % (ref 0–13.4)
NEUTROPHILS # BLD AUTO: 6.02 K/UL (ref 1.82–7.42)
NEUTROPHILS NFR BLD: 66.4 % (ref 44–72)
NRBC # BLD AUTO: 0 K/UL
NRBC BLD-RTO: 0 /100 WBC
PLATELET # BLD AUTO: 159 K/UL (ref 164–446)
PMV BLD AUTO: 11.3 FL (ref 9–12.9)
RBC # BLD AUTO: 3.68 M/UL (ref 4.7–6.1)
WBC # BLD AUTO: 9.1 K/UL (ref 4.8–10.8)

## 2018-07-08 PROCEDURE — 85025 COMPLETE CBC W/AUTO DIFF WBC: CPT

## 2018-07-08 PROCEDURE — G0378 HOSPITAL OBSERVATION PER HR: HCPCS

## 2018-07-08 PROCEDURE — 700111 HCHG RX REV CODE 636 W/ 250 OVERRIDE (IP): Performed by: UROLOGY

## 2018-07-08 PROCEDURE — 700101 HCHG RX REV CODE 250: Performed by: UROLOGY

## 2018-07-08 PROCEDURE — A9270 NON-COVERED ITEM OR SERVICE: HCPCS | Performed by: UROLOGY

## 2018-07-08 PROCEDURE — 36415 COLL VENOUS BLD VENIPUNCTURE: CPT

## 2018-07-08 PROCEDURE — 700102 HCHG RX REV CODE 250 W/ 637 OVERRIDE(OP): Performed by: UROLOGY

## 2018-07-08 PROCEDURE — 700105 HCHG RX REV CODE 258

## 2018-07-08 RX ORDER — SODIUM CHLORIDE 9 MG/ML
INJECTION, SOLUTION INTRAVENOUS
Status: COMPLETED
Start: 2018-07-08 | End: 2018-07-08

## 2018-07-08 RX ADMIN — BUSPIRONE HYDROCHLORIDE 15 MG: 10 TABLET ORAL at 21:01

## 2018-07-08 RX ADMIN — ARIPIPRAZOLE 15 MG: 10 TABLET ORAL at 21:01

## 2018-07-08 RX ADMIN — BUSPIRONE HYDROCHLORIDE 15 MG: 10 TABLET ORAL at 08:02

## 2018-07-08 RX ADMIN — ACETAMINOPHEN 1000 MG: 500 TABLET, FILM COATED ORAL at 17:18

## 2018-07-08 RX ADMIN — CEFAZOLIN SODIUM 2 G: 2 INJECTION, SOLUTION INTRAVENOUS at 21:01

## 2018-07-08 RX ADMIN — GABAPENTIN 300 MG: 300 CAPSULE ORAL at 21:02

## 2018-07-08 RX ADMIN — CEFAZOLIN SODIUM 2 G: 2 INJECTION, SOLUTION INTRAVENOUS at 14:05

## 2018-07-08 RX ADMIN — GABAPENTIN 300 MG: 300 CAPSULE ORAL at 14:04

## 2018-07-08 RX ADMIN — OXYCODONE HYDROCHLORIDE 10 MG: 10 TABLET ORAL at 11:56

## 2018-07-08 RX ADMIN — CARBAMAZEPINE 200 MG: 200 TABLET ORAL at 08:04

## 2018-07-08 RX ADMIN — CARBAMAZEPINE 200 MG: 200 TABLET ORAL at 14:05

## 2018-07-08 RX ADMIN — ACETAMINOPHEN 1000 MG: 500 TABLET, FILM COATED ORAL at 11:54

## 2018-07-08 RX ADMIN — POTASSIUM CHLORIDE, DEXTROSE MONOHYDRATE AND SODIUM CHLORIDE: 150; 5; 450 INJECTION, SOLUTION INTRAVENOUS at 04:16

## 2018-07-08 RX ADMIN — CEFAZOLIN SODIUM 2 G: 2 INJECTION, SOLUTION INTRAVENOUS at 05:59

## 2018-07-08 RX ADMIN — GABAPENTIN 300 MG: 300 CAPSULE ORAL at 08:04

## 2018-07-08 RX ADMIN — METOPROLOL TARTRATE 25 MG: 25 TABLET, FILM COATED ORAL at 08:04

## 2018-07-08 RX ADMIN — METOPROLOL TARTRATE 25 MG: 25 TABLET, FILM COATED ORAL at 21:02

## 2018-07-08 RX ADMIN — LOSARTAN POTASSIUM 25 MG: 50 TABLET ORAL at 21:02

## 2018-07-08 RX ADMIN — SODIUM CHLORIDE 500 ML: 9 INJECTION, SOLUTION INTRAVENOUS at 14:10

## 2018-07-08 ASSESSMENT — PAIN SCALES - GENERAL
PAINLEVEL_OUTOF10: 10
PAINLEVEL_OUTOF10: 10
PAINLEVEL_OUTOF10: 0
PAINLEVEL_OUTOF10: 0

## 2018-07-08 NOTE — PROGRESS NOTES
Report received from day RN. Assumed care at 1915.  A/O x4. Calls appropriately before getting OOB.  On room air. Saturation >90%.  Reports pain 15/10 out of 0-10 pain scale at this time, but declines needing any pain medication.  No c/o of N/V. Tolerating regular diet fine.  (+) flatus, (+) BM, normoactive BS, LBM was 7/7 (+) output through urethral catheter.  Noted, general scrotal edema, bilateral penrose drain underneath scrotum at groin site. Urethreal catheter underneath scrotum. ABD pad placed in disposable underwear.  Ambulates independently.  SCD's off. VSS  Reviewed POC for evening. All questions answered.   Call light within reach. Bed at lowest position w/ bed brakes locked. Hourly rounding in place.

## 2018-07-08 NOTE — PROGRESS NOTES
Pt AAOx4.  Pt states pain rating at 15/10, declining intervention at this time. Pt educated on availability of PRN pain meds. Ice pack offered, refused. Pt elevating scrotum.   Bilateral penrose drains in place to scrotum, abd pad in place, changed this morning.  Urethral catheter in place, clear, yellow urine noted.  Suprapubic catheter line in place, clamped.  LBM 7/7, + flatus.   Diabetic diet in place, no c/o n/v.   POC reviewed with pt.  Call light within reach, pt educated to call for assistance.  Hourly rounding in place.

## 2018-07-08 NOTE — CARE PLAN
Problem: Communication  Goal: The ability to communicate needs accurately and effectively will improve  Outcome: PROGRESSING AS EXPECTED  Reviewed plan of care for the evening. Call light within reach. Hourly rounding in place. All questions answered and all needs met.     Problem: Infection  Goal: Will remain free from infection  Outcome: PROGRESSING AS EXPECTED  Monitor daily labs. Scheduled antibiotics given.

## 2018-07-08 NOTE — CARE PLAN
Problem: Pain Management  Goal: Pain level will decrease to patient's comfort goal  Outcome: PROGRESSING AS EXPECTED  Pt educated on pain medication and adequate pain control. Pt stating he is in pain but refusing pain medication despite education. Ice pack offered. Extra pillows in use. Encouraging scrotal elevation to help with edema.     Problem: Mobility  Goal: Risk for activity intolerance will decrease  Outcome: PROGRESSING AS EXPECTED  Mobility assessed. Pt up self, steady gait noted. Rest periods provided. Encouraging ambulation.

## 2018-07-08 NOTE — PROGRESS NOTES
"Urology Progress Note    Post op Day # 3    Overnight Events: None    S: No fevers, chills, nausea or vomiting.  +flatus. +BM.  Ambulating.  H/H stable.    O:   Blood pressure 128/66, pulse 82, temperature 36.7 °C (98.1 °F), resp. rate 18, height 1.753 m (5' 9\"), weight 94.1 kg (207 lb 7.3 oz), SpO2 90 %.  Recent Labs      07/06/18   0251   SODIUM  136   POTASSIUM  4.3   CHLORIDE  107   CO2  22   GLUCOSE  153*   BUN  16   CREATININE  0.82   CALCIUM  7.5*     Recent Labs      07/06/18   1700  07/07/18   0337  07/08/18   0255   WBC  11.3*  11.0*  9.1   RBC  3.44*  3.55*  3.68*   HEMOGLOBIN  11.0*  11.2*  11.7*   HEMATOCRIT  32.7*  33.6*  34.5*   MCV  95.1  94.6  93.8   MCH  32.0  31.5  31.8   MCHC  33.6*  33.3*  33.9   RDW  44.4  44.6  43.6   PLATELETCT  143*  142*  159*   MPV  11.3  11.3  11.3         Intake/Output Summary (Last 24 hours) at 07/08/18 1110  Last data filed at 07/08/18 0800   Gross per 24 hour   Intake             3650 ml   Output             5500 ml   Net            -1850 ml       Exam:  Suprapubic area soft, scrotum continues to decrease in size.  Very ecchymotic and tender.  Penrose draining much less blood into dressing.  Dressing changed at 0830 and there are small areas of blood on dressing.  Not saturated like yesterday.     A/P:  There are no active hospital problems to display for this patient.        PLAN:  Ambulate, IS.  Continue pressure dressing, dressing changes  Repeat am labs  Continue donald x 2 weeks  Anticipate home tomorrow if remains stable  "

## 2018-07-09 LAB
APTT PPP: 26.3 SEC (ref 24.7–36)
ERYTHROCYTE [DISTWIDTH] IN BLOOD BY AUTOMATED COUNT: 43.8 FL (ref 35.9–50)
FIBRINOGEN PPP-MCNC: 521 MG/DL (ref 215–460)
HCT VFR BLD AUTO: 34.1 % (ref 42–52)
HGB BLD-MCNC: 11.6 G/DL (ref 14–18)
INR PPP: 1.05 (ref 0.87–1.13)
MCH RBC QN AUTO: 31.9 PG (ref 27–33)
MCHC RBC AUTO-ENTMCNC: 34 G/DL (ref 33.7–35.3)
MCV RBC AUTO: 93.7 FL (ref 81.4–97.8)
MEDICATIONS NOTED 1688: NORMAL
PLATELET # BLD AUTO: 205 K/UL (ref 164–446)
PLT FUNCTION COL/EPI  1661: 116 SEC (ref 83–170)
PMV BLD AUTO: 11.1 FL (ref 9–12.9)
PROTHROMBIN TIME: 13.4 SEC (ref 12–14.6)
RBC # BLD AUTO: 3.64 M/UL (ref 4.7–6.1)
WBC # BLD AUTO: 9.4 K/UL (ref 4.8–10.8)

## 2018-07-09 PROCEDURE — 85730 THROMBOPLASTIN TIME PARTIAL: CPT

## 2018-07-09 PROCEDURE — 700111 HCHG RX REV CODE 636 W/ 250 OVERRIDE (IP): Performed by: UROLOGY

## 2018-07-09 PROCEDURE — 85240 CLOT FACTOR VIII AHG 1 STAGE: CPT

## 2018-07-09 PROCEDURE — 85291 CLOT FACTOR XIII FIBRIN SCRN: CPT

## 2018-07-09 PROCEDURE — G0378 HOSPITAL OBSERVATION PER HR: HCPCS

## 2018-07-09 PROCEDURE — 700102 HCHG RX REV CODE 250 W/ 637 OVERRIDE(OP): Performed by: UROLOGY

## 2018-07-09 PROCEDURE — 85246 CLOT FACTOR VIII VW ANTIGEN: CPT

## 2018-07-09 PROCEDURE — 85576 BLOOD PLATELET AGGREGATION: CPT

## 2018-07-09 PROCEDURE — 85245 CLOT FACTOR VIII VW RISTOCTN: CPT

## 2018-07-09 PROCEDURE — 700105 HCHG RX REV CODE 258: Performed by: PHYSICIAN ASSISTANT

## 2018-07-09 PROCEDURE — 85384 FIBRINOGEN ACTIVITY: CPT

## 2018-07-09 PROCEDURE — 85027 COMPLETE CBC AUTOMATED: CPT

## 2018-07-09 PROCEDURE — A9270 NON-COVERED ITEM OR SERVICE: HCPCS | Performed by: UROLOGY

## 2018-07-09 PROCEDURE — 700105 HCHG RX REV CODE 258

## 2018-07-09 PROCEDURE — 85610 PROTHROMBIN TIME: CPT

## 2018-07-09 PROCEDURE — 36415 COLL VENOUS BLD VENIPUNCTURE: CPT

## 2018-07-09 RX ORDER — SODIUM CHLORIDE 9 MG/ML
INJECTION, SOLUTION INTRAVENOUS
Status: ACTIVE
Start: 2018-07-09 | End: 2018-07-09

## 2018-07-09 RX ORDER — SODIUM CHLORIDE 9 MG/ML
INJECTION, SOLUTION INTRAVENOUS CONTINUOUS
Status: DISCONTINUED | OUTPATIENT
Start: 2018-07-09 | End: 2018-07-12

## 2018-07-09 RX ADMIN — GABAPENTIN 300 MG: 300 CAPSULE ORAL at 09:02

## 2018-07-09 RX ADMIN — BUSPIRONE HYDROCHLORIDE 15 MG: 10 TABLET ORAL at 09:02

## 2018-07-09 RX ADMIN — METOPROLOL TARTRATE 25 MG: 25 TABLET, FILM COATED ORAL at 21:22

## 2018-07-09 RX ADMIN — BUSPIRONE HYDROCHLORIDE 15 MG: 10 TABLET ORAL at 21:22

## 2018-07-09 RX ADMIN — CARBAMAZEPINE 200 MG: 200 TABLET ORAL at 09:03

## 2018-07-09 RX ADMIN — SODIUM CHLORIDE: 9 INJECTION, SOLUTION INTRAVENOUS at 09:45

## 2018-07-09 RX ADMIN — GABAPENTIN 300 MG: 300 CAPSULE ORAL at 21:22

## 2018-07-09 RX ADMIN — GABAPENTIN 300 MG: 300 CAPSULE ORAL at 13:41

## 2018-07-09 RX ADMIN — ARIPIPRAZOLE 15 MG: 10 TABLET ORAL at 21:21

## 2018-07-09 RX ADMIN — CARBAMAZEPINE 200 MG: 200 TABLET ORAL at 13:42

## 2018-07-09 RX ADMIN — CEFAZOLIN SODIUM 2 G: 2 INJECTION, SOLUTION INTRAVENOUS at 13:41

## 2018-07-09 RX ADMIN — CEFAZOLIN SODIUM 2 G: 2 INJECTION, SOLUTION INTRAVENOUS at 05:59

## 2018-07-09 RX ADMIN — CEFAZOLIN SODIUM 2 G: 2 INJECTION, SOLUTION INTRAVENOUS at 21:23

## 2018-07-09 RX ADMIN — CARBAMAZEPINE 200 MG: 200 TABLET ORAL at 21:22

## 2018-07-09 RX ADMIN — LOSARTAN POTASSIUM 25 MG: 50 TABLET ORAL at 21:22

## 2018-07-09 ASSESSMENT — PAIN SCALES - GENERAL
PAINLEVEL_OUTOF10: 5
PAINLEVEL_OUTOF10: 10
PAINLEVEL_OUTOF10: 10

## 2018-07-09 NOTE — PROGRESS NOTES
Bleeding better controlled. Packing left in place to prevent further bleeding. Orders for bed rest at this time. Will continue to monitor.

## 2018-07-09 NOTE — CONSULTS
DATE OF SERVICE:  07/09/2018    INPATIENT HEMATOLOGY CONSULTATION    REASON FOR CONSULT:  Postoperative bleeding.    HISTORY OF PRESENT ILLNESS:  The patient is a very nice 57-year-old man with a   history of hypertension, hyperlipidemia, diabetes, coronary artery disease,   GERD, COPD, decreased short-term memory, kidney stones and anxiety, who also   has a longstanding history of urethral stricture.  He follows with Dr. Valdez Zuleta as his urologist.  Because of the chronic stricture, it was decided   to perform a perineal urethrostomy.  He has a history of a suprapubic tube   and so this tube was also to be exchanged.  He was brought to the operating   room on 07/05/2018 and this procedure was performed.    Shortly after surgery, he began having trouble with increased bleeding   symptoms.  On the afternoon of 07/05, he was brought back to the operating   room because of the postoperative bleeding, and underwent wound evaluation as   well as evacuation of a scrotal and suprapubic hematoma with a drain   placement.  Since that time, he has had continued trouble with bleeding   unfortunately.  At this point, Dr. Zuleta feels this is not postoperative   in nature, as with his return visit to the operating room, he could not find   any major postoperative source of bleed.  Dr. Zuleta is now requesting   hematology evaluation for consideration of why he is still having significant   bleeding.    He is postoperative day #4 today.  He was having significant bleeding even   this morning, but when I came to talk to the patient today, he tells me his   bleeding has stopped.  He has blood work done on 06/21/2018, which was part of   his preoperative blood work which showed hemoglobin of 18.0.  His blood work   on 07/05 showed hemoglobin of 13.8.  Over the last 3 days, his hemoglobin has   trended down to 11.6 today.  His CBCs have otherwise been normal including a   normal platelet count.  He had a preoperative  PT, INR, and PTT, all of which   were normal.    PAST MEDICAL HISTORY:  1.  Hypertension.  2.  Hyperlipidemia.  3.  Diabetes type 2.  4.  Coronary artery disease -- history of AMI, he had a 4-vessel CABG in 2015.  5.  Urethral stricture.  6.  GERD.  7.  Short-term memory deficits.  8.  COPD.  9.  History of kidney stones.  10.  Anxiety.    PAST SURGICAL HISTORY:  1.  A 4-vessel CABG bypass surgery in 2015.  2.  Left knee arthroscopic surgery in 2015.  3.  Right hip arthroscopic surgery in 2015.  4.  Hemorrhoid surgery in 2015.    ALLERGIES:  No known drug allergies.    HOME MEDICATIONS:  B complex, buspirone, carbamazepine, losartan, metoprolol,   Percocet, ibuprofen, simvastatin, Synjardy, aripiprazole, gabapentin,   Nitrostat, oxybutynin.    FAMILY HISTORY:  He denies any family history of bleeding disorders or   clotting disorders.    SOCIAL HISTORY:  He was born in [place], [place].  He currently lives   [place] [facility] with his mother.  He is single.  He has no children.    He is currently a retired .  He has smoked cigarettes   for 44 years.  He currently smokes a half pack per day, but has been as high   as 3 packs per day.  He does not drink alcohol or use any marijuana or any   illicit drugs.    REVIEW OF SYSTEMS:  In terms of review of systems, he denies any bleeding or   clotting disorders.  In all of his surgeries in the past, he has never had any   bleeding issues or problems.  He has had night sweats for many years as well   as headaches for many years, which have not changed recently.  He has chronic   tenderness.  He has some blurry vision with his diabetes.  He does suffer with   heartburn.  He has had nausea during this hospitalization because of pain   medications.  He does have a chronic cough, which is minimally productive of   yellow phlegm.  He does get some chest pains with anxiety.  He does have some   achiness in his feet, which may be related to  neuropathy.  He does   occasionally have some throbbing pain in his back.  Otherwise, a complete   review of systems per the AMA and CMS criteria is negative.    PHYSICAL EXAMINATION:  VITAL SIGNS:  His height is 5 feet 9 inches, his weight is 94.1 kilograms.    His T-max is 98.6, pulse 95, blood pressure 104/65, respirations 18, satting   97% on room air.  GENERAL:  No acute distress.  Pleasant gentleman, appears stated age.  HEENT:  NC/AT.  Sclerae are anicteric.  Conjunctivae clear.  Oropharynx is   clear without any erythema, exudate, or discharge.  NECK:  Supple, nontender.  No elevated JVP, no carotid bruits, no thyromegaly.  CHEST:  Clear to auscultation and percussion bilaterally.  No wheeze, rales,   or rhonchi.  CARDIOVASCULAR:  Regular rate and rhythm.  No murmurs, gallops, or rubs.    Normal S1 and S2.  ABDOMEN:  Soft, nontender in the upper abdomen, minimally tender in the lower   abdomen.  No guarding or rebound.  Normoactive bowel sounds.  EXTREMITIES:  No cyanosis, clubbing, or edema.  Full range of motion.  No   joint swelling.  GENITOURINARY:  Deferred.  He currently has bandages and gauze in place.  NEUROLOGIC:  Cranial nerves II-XII are intact.  He has intact sensation to   light touch throughout.  He moves all 4 extremities appropriately.    LABORATORY DATA:  White blood cell count 9.4, hemoglobin 11.6, hematocrit   34.1%, platelets 205, MCV 94, fibrinogen 521.  PT 13.4, INR 1.05, PTT 26.3.    Platelet function analysis is normal.    ASSESSMENT AND PLAN:  The patient is a very nice 57-year-old man with the   medical history listed above, who is postop day #4 from a perineal   urethrostomy procedure with exchange of superpubic tube.  He has had trouble   with significant postoperative bleeding over the course of his recovery.  He   has normal workup including normal PT, INR, PTT, normal platelet count, normal   platelet function analysis, and normal fibrinogen.  We have been asked to   consult on  the case.    At this point by history, his bleeding certainly seems unlikely to be related   to an underlying inherited coagulopathy.  He has no family history of such.    He has had a number of surgeries in the past including a coronary artery   bypass graft 4-vessel bypass surgery, and he denies having any significant or   abnormal bleeding with these surgeries.  All of this makes an inherited   disorder seem less likely.    In terms of causes of bleeding, he has no evidence of a coagulation factor   deficiency.  If he did, his PT, PTT, and INR should be abnormal in one respect   or another.  This would argue against a clotting factor deficiency.  We have   ordered a factor XIII level, as this can be abnormal and deficient, but the PT   and PTT would still be normal in this setting.  Again, I think it is unlikely   that he has an inherited clotting factor abnormality, but we will check this   clotting factor.  We have also collected blood for a von Willebrand's factor   panel.  Sometimes with a minor von Willebrand's disease, the PT and PTT would   be normal, but we could see some bleeding in the setting of a normal PT and   PTT.  We will await the results of this panel.    Other causes could include a platelet function disorder.  He has normal   platelet count, so that speaks against some platelet disorders.  We had a   platelet function analysis and that returned normal.  This speaks against   problems with his platelets.  He had a normal BMP, so he is not uremic.  I do   not see a liver function panel, so we will order a CMP in the morning to make   sure he does not have any liver abnormalities.  If he did have liver   abnormalities again, I would expect the PT to be prolonged, which we are not   seeing in this situation.    We could consider disorders of fibrinolysis.  There are very rare inherited   disorders including a plasminogen activator inhibitor-1 (JUSTIN-1) deficiency or   an alpha 2 antiplasmin  deficiency.  Again, he does not seem to have a   consistent history of inherited disorder.  There are acquired disorders of   fibrinolysis including DIC.  With significant DIC, I would have expected the   fibrinogen to drop and the platelets to drop as well as abnormalities of the   PT/PTT, which we are not seeing in this situation.  This tends to speak   against significant DIC.    He was taken back to the operating room, and there was no identified surgical   source of bleeding.  This is still on the differential, but seems less likely   per surgery report.    Other causes of increased bleeding could include the fact that this surgery   was of the genitourinary tract.  These types of surgeries are associated with   increased fibrinolysis related to enzymes along the genitourinary tract that   promote increased fibrinolysis and decreased clotting.  This may be what is at   play in this situation.    One option would be to treat him with tranexamic acid.  This is an   antifibrinolytic treatment that has been used in many types of surgeries to   help decrease bleeding.  He does have some downsides including a less than 1%   risk of deep venous thrombosis or a pulmonary embolism.  We also can see other   side effects including sedation and some stomach upset.  It is generally   fairly safe, however.  At this point, we talked about using this medication,   and we have decided to hold off, as his bleeding has recently subsided.  If he   starts with progressive bleeding again, I think we should give him a trial on   this medication.  We usually use the tranexamic acid at 1000 mg t.i.d. and   see if that stops his bleeding.  I talked to him about the pros and cons of   this medication.  He would like to hold off on it and see if his bleeding is   going to finally stop at this point.    Previously, he was of the opinion that he wanted to be bloodless, and was   declining any transfusions.  At this point, he tells me he has  changed his   mind.  He is more open to transfusions if needed.    At this point, I will continue to follow along the case and help manage his   postoperative bleeding.    Thank you very much for referral of this very nice gentleman.       ____________________________________     MD JOE DAMON / STU    DD:  07/09/2018 15:21:12  DT:  07/09/2018 16:29:31    D#:  [id number]  Job#:  [id number]

## 2018-07-09 NOTE — PROGRESS NOTES
Pt bleeding from urethral catheter site. Packing changed q15 minutes due to saturation. Dr. Osorio paged. PA at bedside. Pelvic swelling increasing. Blood pressure trending down 102/56. Orders to hold metoprolol at this time.     Pt bloodless. Educated pt on current blood loss. Pt states he is bloodless because father had a bad reaction to blood approximately 2 years ago. Pt stating, if he needs blood he is willing to get it. Allergies updated. Pt ok to receive blood if needed.    PA to update Dr. Osorio. Plan for possible surgery today.

## 2018-07-09 NOTE — CARE PLAN
Problem: Urinary Elimination:  Goal: Ability to reestablish a normal urinary elimination pattern will improve  Outcome: PROGRESSING AS EXPECTED  Urethral catheter in place. Monitoring output. Adequate yellow urine noted. Encouraging PO intake.     Problem: Mobility  Goal: Risk for activity intolerance will decrease  Outcome: PROGRESSING AS EXPECTED  Mobility assessed. Pt up ambulating unit self, steady gait noted. No blood draining from urethral cathter site after ambulation.

## 2018-07-09 NOTE — PROGRESS NOTES
Assumed care of pt.   Pt AAOx4.  C/o pain at 5/10, declining intervention at this time.  Urethral catheter in place below scrotum, stat lock put in place.   Pt noted to be bleeding this morning after MD removed sutures from scrotum, packing held in place for pressure. Perineum noted to have increased swelling. MD updated. Bleeding noted to be slowing down. Labs stable.   Clear yellow urine noted in collection bag.   BP noted to be trending down during bleeding episode, NS started at 100 mL/hr per orders, BP stable at this time.  Suprapubic catheter line in place, clamped.   Pt's diet changed from diabetic to NPO pending possible surgery later today.  LBM 7/7, + flatus.  Pt ambulating unit up self.  POC reviewed with pt.  Call light within reach, pt educated to call for assistance.  Hourly rounding in place.

## 2018-07-09 NOTE — PROGRESS NOTES
"Urology Progress Note    Post op Day # 4    Overnight Events: None    S: No fevers, chills, nausea or vomiting.      O:   Blood pressure 131/68, pulse 60, temperature 36.5 °C (97.7 °F), resp. rate 18, height 1.753 m (5' 9\"), weight 94.1 kg (207 lb 7.3 oz), SpO2 97 %.      Recent Labs      07/06/18   1700  07/07/18   0337  07/08/18   0255   WBC  11.3*  11.0*  9.1   RBC  3.44*  3.55*  3.68*   HEMOGLOBIN  11.0*  11.2*  11.7*   HEMATOCRIT  32.7*  33.6*  34.5*   MCV  95.1  94.6  93.8   MCH  32.0  31.5  31.8   MCHC  33.6*  33.3*  33.9   RDW  44.4  44.6  43.6   PLATELETCT  143*  142*  159*   MPV  11.3  11.3  11.3         Intake/Output Summary (Last 24 hours) at 07/09/18 0719  Last data filed at 07/09/18 0305   Gross per 24 hour   Intake             2550 ml   Output             3200 ml   Net             -650 ml       Exam:  Abdomen soft, benign.  SP region soft, scrotum ecchymotic but soft.  Perineum soft.  Penrose drains removed.  Urethrostomy site inspected.  ++ ongoing bleeding, oozing, region odorous.     Urine: clear      A/P:  There are no active hospital problems to display for this patient.      Ongoing bleeding from surgical site.  Patient reports similar history in past- unexplained ongoing post-op bleeding problems.    For now, bleeding appears localized to urethrostomy.  PLAN- sitz baths, then REPACK guaze around donald at urethrostomy for pressure dressing there.  Will continue to monitor in hospital.  "

## 2018-07-10 LAB
ABO GROUP BLD: NORMAL
ABO GROUP BLD: NORMAL
BLD GP AB SCN SERPL QL: NORMAL
ERYTHROCYTE [DISTWIDTH] IN BLOOD BY AUTOMATED COUNT: 45.1 FL (ref 35.9–50)
HCT VFR BLD AUTO: 32.3 % (ref 42–52)
HGB BLD-MCNC: 10.6 G/DL (ref 14–18)
MCH RBC QN AUTO: 31.5 PG (ref 27–33)
MCHC RBC AUTO-ENTMCNC: 32.8 G/DL (ref 33.7–35.3)
MCV RBC AUTO: 95.8 FL (ref 81.4–97.8)
PLATELET # BLD AUTO: 232 K/UL (ref 164–446)
PMV BLD AUTO: 10.5 FL (ref 9–12.9)
RBC # BLD AUTO: 3.37 M/UL (ref 4.7–6.1)
RH BLD: NORMAL
RH BLD: NORMAL
WBC # BLD AUTO: 10.5 K/UL (ref 4.8–10.8)

## 2018-07-10 PROCEDURE — 700111 HCHG RX REV CODE 636 W/ 250 OVERRIDE (IP)

## 2018-07-10 PROCEDURE — 160009 HCHG ANES TIME/MIN: Performed by: UROLOGY

## 2018-07-10 PROCEDURE — 86850 RBC ANTIBODY SCREEN: CPT

## 2018-07-10 PROCEDURE — 160048 HCHG OR STATISTICAL LEVEL 1-5: Performed by: UROLOGY

## 2018-07-10 PROCEDURE — 0W3M0ZZ CONTROL BLEEDING IN MALE PERINEUM, OPEN APPROACH: ICD-10-PCS | Performed by: UROLOGY

## 2018-07-10 PROCEDURE — 160029 HCHG SURGERY MINUTES - 1ST 30 MINS LEVEL 4: Performed by: UROLOGY

## 2018-07-10 PROCEDURE — 700101 HCHG RX REV CODE 250

## 2018-07-10 PROCEDURE — A6403 STERILE GAUZE>16 <= 48 SQ IN: HCPCS | Performed by: UROLOGY

## 2018-07-10 PROCEDURE — 700111 HCHG RX REV CODE 636 W/ 250 OVERRIDE (IP): Performed by: UROLOGY

## 2018-07-10 PROCEDURE — 85027 COMPLETE CBC AUTOMATED: CPT

## 2018-07-10 PROCEDURE — 501445 HCHG STAPLER, SKIN DISP: Performed by: UROLOGY

## 2018-07-10 PROCEDURE — 160041 HCHG SURGERY MINUTES - EA ADDL 1 MIN LEVEL 4: Performed by: UROLOGY

## 2018-07-10 PROCEDURE — A4314 CATH W/DRAINAGE 2-WAY LATEX: HCPCS | Performed by: UROLOGY

## 2018-07-10 PROCEDURE — G0378 HOSPITAL OBSERVATION PER HR: HCPCS

## 2018-07-10 PROCEDURE — 86901 BLOOD TYPING SEROLOGIC RH(D): CPT

## 2018-07-10 PROCEDURE — 700105 HCHG RX REV CODE 258: Performed by: PHYSICIAN ASSISTANT

## 2018-07-10 PROCEDURE — A6222 GAUZE <=16 IN NO W/SAL W/O B: HCPCS | Performed by: UROLOGY

## 2018-07-10 PROCEDURE — 501838 HCHG SUTURE GENERAL: Performed by: UROLOGY

## 2018-07-10 PROCEDURE — 160036 HCHG PACU - EA ADDL 30 MINS PHASE I: Performed by: UROLOGY

## 2018-07-10 PROCEDURE — 36415 COLL VENOUS BLD VENIPUNCTURE: CPT

## 2018-07-10 PROCEDURE — 700102 HCHG RX REV CODE 250 W/ 637 OVERRIDE(OP): Performed by: UROLOGY

## 2018-07-10 PROCEDURE — A9270 NON-COVERED ITEM OR SERVICE: HCPCS | Performed by: UROLOGY

## 2018-07-10 PROCEDURE — 86900 BLOOD TYPING SEROLOGIC ABO: CPT

## 2018-07-10 PROCEDURE — 500423 HCHG DRESSING, ABD COMBINE: Performed by: UROLOGY

## 2018-07-10 PROCEDURE — 160002 HCHG RECOVERY MINUTES (STAT): Performed by: UROLOGY

## 2018-07-10 PROCEDURE — 160035 HCHG PACU - 1ST 60 MINS PHASE I: Performed by: UROLOGY

## 2018-07-10 PROCEDURE — 500380 HCHG DRAIN, PENROSE 1/4X12: Performed by: UROLOGY

## 2018-07-10 RX ORDER — ARIPIPRAZOLE 10 MG/1
15 TABLET ORAL 2 TIMES DAILY
Status: DISCONTINUED | OUTPATIENT
Start: 2018-07-11 | End: 2018-07-13 | Stop reason: HOSPADM

## 2018-07-10 RX ORDER — POLYETHYLENE GLYCOL 3350 17 G/17G
1 POWDER, FOR SOLUTION ORAL DAILY
Status: DISCONTINUED | OUTPATIENT
Start: 2018-07-11 | End: 2018-07-13 | Stop reason: HOSPADM

## 2018-07-10 RX ORDER — CARBAMAZEPINE 200 MG/1
200 TABLET ORAL 3 TIMES DAILY
Status: DISCONTINUED | OUTPATIENT
Start: 2018-07-11 | End: 2018-07-13 | Stop reason: HOSPADM

## 2018-07-10 RX ORDER — GABAPENTIN 300 MG/1
300 CAPSULE ORAL 3 TIMES DAILY
Status: DISCONTINUED | OUTPATIENT
Start: 2018-07-11 | End: 2018-07-13 | Stop reason: HOSPADM

## 2018-07-10 RX ORDER — HALOPERIDOL 5 MG/ML
INJECTION INTRAMUSCULAR
Status: COMPLETED
Start: 2018-07-10 | End: 2018-07-10

## 2018-07-10 RX ORDER — LABETALOL HYDROCHLORIDE 5 MG/ML
INJECTION, SOLUTION INTRAVENOUS
Status: COMPLETED
Start: 2018-07-10 | End: 2018-07-10

## 2018-07-10 RX ORDER — ONDANSETRON 2 MG/ML
INJECTION INTRAMUSCULAR; INTRAVENOUS
Status: COMPLETED
Start: 2018-07-10 | End: 2018-07-10

## 2018-07-10 RX ORDER — MIDAZOLAM HYDROCHLORIDE 1 MG/ML
INJECTION INTRAMUSCULAR; INTRAVENOUS
Status: COMPLETED
Start: 2018-07-10 | End: 2018-07-10

## 2018-07-10 RX ORDER — BUSPIRONE HYDROCHLORIDE 10 MG/1
15 TABLET ORAL 2 TIMES DAILY
Status: DISCONTINUED | OUTPATIENT
Start: 2018-07-11 | End: 2018-07-13 | Stop reason: HOSPADM

## 2018-07-10 RX ADMIN — ONDANSETRON 4 MG: 2 INJECTION, SOLUTION INTRAMUSCULAR; INTRAVENOUS at 09:39

## 2018-07-10 RX ADMIN — MORPHINE SULFATE 5 MG: 10 INJECTION INTRAVENOUS at 09:38

## 2018-07-10 RX ADMIN — BUSPIRONE HYDROCHLORIDE 15 MG: 10 TABLET ORAL at 21:22

## 2018-07-10 RX ADMIN — OXYCODONE HYDROCHLORIDE 20 MG: 10 TABLET ORAL at 11:34

## 2018-07-10 RX ADMIN — ARIPIPRAZOLE 15 MG: 10 TABLET ORAL at 21:22

## 2018-07-10 RX ADMIN — CEFAZOLIN SODIUM 2 G: 2 INJECTION, SOLUTION INTRAVENOUS at 21:29

## 2018-07-10 RX ADMIN — GABAPENTIN 300 MG: 300 CAPSULE ORAL at 18:46

## 2018-07-10 RX ADMIN — METOPROLOL TARTRATE 25 MG: 25 TABLET, FILM COATED ORAL at 21:29

## 2018-07-10 RX ADMIN — METOPROLOL TARTRATE 25 MG: 25 TABLET, FILM COATED ORAL at 04:48

## 2018-07-10 RX ADMIN — GABAPENTIN 300 MG: 300 CAPSULE ORAL at 21:29

## 2018-07-10 RX ADMIN — FENTANYL CITRATE 50 MCG: 50 INJECTION, SOLUTION INTRAMUSCULAR; INTRAVENOUS at 15:35

## 2018-07-10 RX ADMIN — SODIUM CHLORIDE: 9 INJECTION, SOLUTION INTRAVENOUS at 09:39

## 2018-07-10 RX ADMIN — BUSPIRONE HYDROCHLORIDE 15 MG: 10 TABLET ORAL at 04:49

## 2018-07-10 RX ADMIN — CARBAMAZEPINE 200 MG: 200 TABLET ORAL at 04:48

## 2018-07-10 RX ADMIN — CARBAMAZEPINE 200 MG: 200 TABLET ORAL at 11:34

## 2018-07-10 RX ADMIN — HALOPERIDOL LACTATE 1 MG: 5 INJECTION, SOLUTION INTRAMUSCULAR at 16:21

## 2018-07-10 RX ADMIN — ARIPIPRAZOLE 15 MG: 10 TABLET ORAL at 04:49

## 2018-07-10 RX ADMIN — LOSARTAN POTASSIUM 25 MG: 50 TABLET ORAL at 21:29

## 2018-07-10 RX ADMIN — CARBAMAZEPINE 200 MG: 200 TABLET ORAL at 18:46

## 2018-07-10 RX ADMIN — METOCLOPRAMIDE 10 MG: 5 INJECTION, SOLUTION INTRAMUSCULAR; INTRAVENOUS at 07:53

## 2018-07-10 RX ADMIN — CEFAZOLIN SODIUM 2 G: 2 INJECTION, SOLUTION INTRAVENOUS at 04:49

## 2018-07-10 RX ADMIN — OXYCODONE HYDROCHLORIDE 20 MG: 10 TABLET ORAL at 04:48

## 2018-07-10 RX ADMIN — GABAPENTIN 300 MG: 300 CAPSULE ORAL at 04:49

## 2018-07-10 RX ADMIN — LABETALOL HYDROCHLORIDE 5 MG: 5 INJECTION, SOLUTION INTRAVENOUS at 15:50

## 2018-07-10 RX ADMIN — ONDANSETRON 4 MG: 2 INJECTION INTRAMUSCULAR; INTRAVENOUS at 15:50

## 2018-07-10 ASSESSMENT — PAIN SCALES - GENERAL
PAINLEVEL_OUTOF10: 3
PAINLEVEL_OUTOF10: 0
PAINLEVEL_OUTOF10: 0
PAINLEVEL_OUTOF10: 3
PAINLEVEL_OUTOF10: 10
PAINLEVEL_OUTOF10: 3
PAINLEVEL_OUTOF10: 4
PAINLEVEL_OUTOF10: 5
PAINLEVEL_OUTOF10: 9

## 2018-07-10 NOTE — PROGRESS NOTES
Pt back from PACU    AOx1- could only tell me his name. Pt reoriented to time, situation and place. Pt hallucinating.     Pt on 2L nasal cannula. Saturating well. No SOB.     Reporting nausea. Medicated per MAR.     New donald bag is labeled. Stat lock in place    Cotton underwear in place- gauze in place under scrotum- no new drainage. No actively bleeding at this time.

## 2018-07-10 NOTE — PROGRESS NOTES
Pt taken down to pre-op via hospital bed. Pre-op nurse updated on pt's current active bleeding status.   Pt went down on room air, premedicated for pain, AOX4

## 2018-07-10 NOTE — OP REPORT
DATE OF SERVICE:  07/10/2018    NAME OF OPERATION:  Exploration of perineum with control of postoperative   bleeding after urethrostomy.    PREOPERATIVE DIAGNOSIS:  Perineal bleeding.    POSTOPERATIVE DIAGNOSIS:  Perineal bleeding.    PRIMARY SURGEON:  Valdez Zuleta MD    FIRST ASSISTANT:  Colin Freeman MD    ANESTHESIOLOGIST:  Javy Burns MD    FINDINGS:  It appeared that the mucosal lining in the left side of the   spongiosum had torn and he was bleeding from the spongiosal tissues.  Thus,   the entire repair could not have to be taken down, but multiple stitches used   to oversew the corporal spongiosum.    INDICATIONS:  Briefly, the patient is a gentleman with a history of perineal   urethrostomy 5 days ago.  He has had complications with postoperative bleeding   and has had ongoing bleeding requiring a take back now for the second time.    OPERATION IN DETAIL:  The patient was taken to the operating room, placed on   the operating table in supine position.  After administration of general   anesthetic, he was placed in high lithotomy position.  On first inspection,   the patient was not bleeding.  With initiation of the scrub, however, the   patient started bleeding profusely around where the catheter was previously   inserted, but has since been removed.    He was prepped and draped sterilely.  Further inspection revealed this to be   the only site of bleeding.  There was no evidence of other particular   expanding hematomas or other concerns for suture line bleeding.  Thus, our   attention was focused tear.    A 3-0 and 4-0 Vicryl sutures were used to oversew the bleeding spongiosal   tissues.  Eventually, with placement of the final stitch, there was no   evidence of ongoing bleeding.  Some Surgiflo was placed over these tissues and   gentle pressure was held for 10 minutes.  The dressing was taken down and   inspected.  The region was palpated.  There was no evidence of any bleeding    whatsoever.  A Xeroform gauze was placed over this region, and fluffs and   scrotal support were placed.  He was taken to the recovery room in stable   condition.       ____________________________________     Valdez Zuleta MD MCM / STU    DD:  07/10/2018 15:01:31  DT:  07/10/2018 15:18:04    D#:  [id number]  Job#:  [id number]

## 2018-07-10 NOTE — OR SURGEON
Immediate Post OP Note    PreOp Diagnosis: perineal bleeding    PostOp Diagnosis: same    Procedure(s):  GROIN EXPLORATION - Wound Class: Clean Contaminated    Surgeon(s):  MARICRUZ Valdez M.D.    Anesthesiologist/Type of Anesthesia:  Anesthesiologist: Javy Burns M.D./General    Surgical Staff:  Circulator: Kasie Goodman R.N.  Scrub Person: Freda Cosby    Specimens removed if any:  * No specimens in log *    Estimated Blood Loss: 50mL    Findings: area of rama spongiosum on LEFT raw and bleeding.  Oversewn, without evidence of any ongoing bleeding.  Observed for 10minutes after, under anesthesia.    Complications: none        7/10/2018 3:09 PM Valdez Zuleta M.D.

## 2018-07-10 NOTE — PROGRESS NOTES
"Urology Progress Note    Post op Day # 5    Overnight Events: None    S: No fevers, chills, + nausea, worsening scrotal pain    O:   Blood pressure 119/80, pulse 84, temperature 36.6 °C (97.8 °F), resp. rate 18, height 1.753 m (5' 9\"), weight 94.1 kg (207 lb 7.3 oz), SpO2 93 %.      Recent Labs      07/08/18   0255  07/09/18   0936  07/10/18   0414   WBC  9.1  9.4  10.5   RBC  3.68*  3.64*  3.37*   HEMOGLOBIN  11.7*  11.6*  10.6*   HEMATOCRIT  34.5*  34.1*  32.3*   MCV  93.8  93.7  95.8   MCH  31.8  31.9  31.5   MCHC  33.9  34.0  32.8*   RDW  43.6  43.8  45.1   PLATELETCT  159*  205  232   MPV  11.3  11.1  10.5         Intake/Output Summary (Last 24 hours) at 07/10/18 0925  Last data filed at 07/10/18 0734   Gross per 24 hour   Intake             1200 ml   Output             2125 ml   Net             -925 ml       Exam: scrotum and SP region still soft, but tenderness of right scrotum.  Perineum does not show expanding hematoma.  Upon taking down dressing of urethra, active bleeding.     A/P:  There are no active hospital problems to display for this patient.      Ongoing bleeding.  Appreciate hematology assistance given history of reported prior post-op bleeding problems.  At this point, I feel we have to once again explore the area to attempt to stop what may still be surgical site bleeding, despite our conservative efforts to date.  I explained rationale to him and his mother (by phone).  "

## 2018-07-10 NOTE — PROGRESS NOTES
AOx4  Drowsy but easily arousable.     Pain rated 9/10 in scrotum- pt repositioned for comfort- Pain meds to be given once nausea subsides.    Pt on room air. Lower lobes diminished. Pt has weak, dry cough.     Bowels sound normoactive x4. LBM: 7/6. + gas    Suprapubic catheter assessed- taped in place. Slight swelling around incision site.     Bilateral groin swelling present, soft when palpated.     Scrotum has 3+ swelling edema, skin color is dark. Catheter in place under scrotum- yellow, clear urine present in donald bag. Stat lock in place.     PIV running fluids to the right wrist. Dressing reinforced.     Pt actively wretching, no vomit coming out- Reglan IV given. Scrotum assessed and no active bleeding visible. Abdominal pad in place under scrotum.     POC discussed. Awaiting surgeons arrival for reassessment

## 2018-07-10 NOTE — PROGRESS NOTES
Pt nauseous and vomiting.  Afterward pt found to be bleeding from urethral catheter site again.   Pressure held with gauze by primary RN.  Multiple gauze packings saturated.  Page sent to [facility] service for updates.

## 2018-07-11 ENCOUNTER — APPOINTMENT (OUTPATIENT)
Dept: RADIOLOGY | Facility: MEDICAL CENTER | Age: 58
DRG: 663 | End: 2018-07-11
Attending: UROLOGY
Payer: MEDICARE

## 2018-07-11 LAB
FACT VIII ACT/NOR PPP: 145 % (ref 56–191)
VWF AG ACT/NOR PPP IA: 109 % (ref 52–214)
VWF:RCO ACT/NOR PPP PL AGG: 109 % (ref 51–215)

## 2018-07-11 PROCEDURE — 76870 US EXAM SCROTUM: CPT

## 2018-07-11 PROCEDURE — 700111 HCHG RX REV CODE 636 W/ 250 OVERRIDE (IP): Performed by: UROLOGY

## 2018-07-11 PROCEDURE — G0378 HOSPITAL OBSERVATION PER HR: HCPCS

## 2018-07-11 PROCEDURE — 700102 HCHG RX REV CODE 250 W/ 637 OVERRIDE(OP): Performed by: UROLOGY

## 2018-07-11 PROCEDURE — A9270 NON-COVERED ITEM OR SERVICE: HCPCS | Performed by: UROLOGY

## 2018-07-11 PROCEDURE — 700105 HCHG RX REV CODE 258: Performed by: PHYSICIAN ASSISTANT

## 2018-07-11 RX ORDER — BACITRACIN ZINC 500 [USP'U]/G
OINTMENT TOPICAL ONCE
Status: COMPLETED | OUTPATIENT
Start: 2018-07-11 | End: 2018-07-11

## 2018-07-11 RX ORDER — BACITRACIN ZINC 500 [USP'U]/G
OINTMENT TOPICAL 2 TIMES DAILY
Status: DISCONTINUED | OUTPATIENT
Start: 2018-07-11 | End: 2018-07-13 | Stop reason: HOSPADM

## 2018-07-11 RX ORDER — CIPROFLOXACIN 2 MG/ML
400 INJECTION, SOLUTION INTRAVENOUS EVERY 12 HOURS
Status: DISCONTINUED | OUTPATIENT
Start: 2018-07-11 | End: 2018-07-12

## 2018-07-11 RX ADMIN — SODIUM CHLORIDE: 9 INJECTION, SOLUTION INTRAVENOUS at 17:01

## 2018-07-11 RX ADMIN — ONDANSETRON 4 MG: 2 INJECTION, SOLUTION INTRAMUSCULAR; INTRAVENOUS at 14:33

## 2018-07-11 RX ADMIN — ARIPIPRAZOLE 15 MG: 10 TABLET ORAL at 05:09

## 2018-07-11 RX ADMIN — GABAPENTIN 300 MG: 300 CAPSULE ORAL at 12:00

## 2018-07-11 RX ADMIN — CARBAMAZEPINE 200 MG: 200 TABLET ORAL at 18:09

## 2018-07-11 RX ADMIN — SODIUM CHLORIDE: 9 INJECTION, SOLUTION INTRAVENOUS at 05:08

## 2018-07-11 RX ADMIN — METOPROLOL TARTRATE 25 MG: 25 TABLET, FILM COATED ORAL at 18:08

## 2018-07-11 RX ADMIN — GABAPENTIN 300 MG: 300 CAPSULE ORAL at 05:09

## 2018-07-11 RX ADMIN — CIPROFLOXACIN 400 MG: 2 INJECTION, SOLUTION INTRAVENOUS at 20:52

## 2018-07-11 RX ADMIN — BUSPIRONE HYDROCHLORIDE 15 MG: 10 TABLET ORAL at 18:09

## 2018-07-11 RX ADMIN — ONDANSETRON 4 MG: 2 INJECTION, SOLUTION INTRAMUSCULAR; INTRAVENOUS at 08:18

## 2018-07-11 RX ADMIN — OXYCODONE HYDROCHLORIDE 20 MG: 10 TABLET ORAL at 13:19

## 2018-07-11 RX ADMIN — BACITRACIN ZINC: 500 OINTMENT TOPICAL at 07:15

## 2018-07-11 RX ADMIN — BACITRACIN ZINC: 500 OINTMENT TOPICAL at 18:10

## 2018-07-11 RX ADMIN — CARBAMAZEPINE 200 MG: 200 TABLET ORAL at 12:00

## 2018-07-11 RX ADMIN — GABAPENTIN 300 MG: 300 CAPSULE ORAL at 18:09

## 2018-07-11 RX ADMIN — CARBAMAZEPINE 200 MG: 200 TABLET ORAL at 05:13

## 2018-07-11 RX ADMIN — LOSARTAN POTASSIUM 25 MG: 50 TABLET ORAL at 20:52

## 2018-07-11 RX ADMIN — ARIPIPRAZOLE 15 MG: 10 TABLET ORAL at 18:08

## 2018-07-11 RX ADMIN — OXYCODONE HYDROCHLORIDE 20 MG: 10 TABLET ORAL at 08:18

## 2018-07-11 RX ADMIN — CEFAZOLIN SODIUM 2 G: 2 INJECTION, SOLUTION INTRAVENOUS at 13:19

## 2018-07-11 RX ADMIN — CEFAZOLIN SODIUM 2 G: 2 INJECTION, SOLUTION INTRAVENOUS at 05:09

## 2018-07-11 RX ADMIN — BUSPIRONE HYDROCHLORIDE 15 MG: 10 TABLET ORAL at 05:09

## 2018-07-11 RX ADMIN — METOCLOPRAMIDE 10 MG: 5 INJECTION, SOLUTION INTRAMUSCULAR; INTRAVENOUS at 13:19

## 2018-07-11 RX ADMIN — METOPROLOL TARTRATE 25 MG: 25 TABLET, FILM COATED ORAL at 05:09

## 2018-07-11 ASSESSMENT — PAIN SCALES - GENERAL
PAINLEVEL_OUTOF10: 6
PAINLEVEL_OUTOF10: 5
PAINLEVEL_OUTOF10: 8
PAINLEVEL_OUTOF10: 6
PAINLEVEL_OUTOF10: 10

## 2018-07-11 NOTE — OR NURSING
1635-Pt.back to room.Verbalized minimal P/O pain,no bleeding noted from left scrotal surgical site.

## 2018-07-11 NOTE — PROGRESS NOTES
"Urology Progress Note    Post op Day # 6    Overnight Events: None    S: No fevers, chills, nausea or vomiting.     O:   Blood pressure (!) 97/67, pulse 85, temperature 36.8 °C (98.2 °F), resp. rate 18, height 1.753 m (5' 9\"), weight 94.1 kg (207 lb 7.3 oz), SpO2 93 %.      Recent Labs      07/09/18   0936  07/10/18   0414   WBC  9.4  10.5   RBC  3.64*  3.37*   HEMOGLOBIN  11.6*  10.6*   HEMATOCRIT  34.1*  32.3*   MCV  93.7  95.8   MCH  31.9  31.5   MCHC  34.0  32.8*   RDW  43.8  45.1   PLATELETCT  205  232   MPV  11.1  10.5         Intake/Output Summary (Last 24 hours) at 07/11/18 0715  Last data filed at 07/11/18 0316   Gross per 24 hour   Intake              610 ml   Output             3020 ml   Net            -2410 ml       Exam:  Abdomen soft, benign.  Region soft around scrotum and perineum.   He is tender in right scrotum.  Mild drainage from prior drain site.  No bleeding from urethrostomy.   Urine: clear      A/P:  There are no active hospital problems to display for this patient.      Stable. Bleeding stopped.  Ongoing right testis tenderness, continue abx, scrotal support.  Ambulate, IS  Neosporin to urethrostomy site bid  Possible discharge tomorrow  "

## 2018-07-11 NOTE — PROGRESS NOTES
AOx4    Pain rated 10/10 in right testicle- medicated per MAR and repositioned for comfort.     Pt educated that considering his blood loss and narcotic use- he has to call before getting out of bed in order for the staff to keep him safe. Pt verbalized understanding. Pt educated that this RN will have to use a bed alarm if he gets up alone.     Pt on room air. Saturating well. Pt has a small cough. States that he has had the cough for a long time. Lower lobes sound diminished.     Bowels normal. Diet regular. Tolerating well.     PIV running IVF at 100 ml/hr.     Scrotal edema present. Scant bloody drainage from scrotal suture. Dressing changed.     Neosporin to be applied under scrotum BID.     POC discussed

## 2018-07-11 NOTE — PROGRESS NOTES
Pt now AOx4. No more hallucinations. Pt experienced sensitivity to anesthesia. Nausea gone. Denying any pain. Still on 2L.   No bleeding

## 2018-07-12 LAB
FACT XIII CLOT DIS 24H PPP QL: NORMAL
FACT XIII CLOT DIS 24H PPP QL: NORMAL

## 2018-07-12 PROCEDURE — G0378 HOSPITAL OBSERVATION PER HR: HCPCS

## 2018-07-12 PROCEDURE — 700105 HCHG RX REV CODE 258: Performed by: PHYSICIAN ASSISTANT

## 2018-07-12 PROCEDURE — 770006 HCHG ROOM/CARE - MED/SURG/GYN SEMI*

## 2018-07-12 PROCEDURE — 700111 HCHG RX REV CODE 636 W/ 250 OVERRIDE (IP): Performed by: UROLOGY

## 2018-07-12 PROCEDURE — 700102 HCHG RX REV CODE 250 W/ 637 OVERRIDE(OP): Performed by: UROLOGY

## 2018-07-12 PROCEDURE — A9270 NON-COVERED ITEM OR SERVICE: HCPCS | Performed by: UROLOGY

## 2018-07-12 RX ORDER — CIPROFLOXACIN 500 MG/1
500 TABLET, FILM COATED ORAL EVERY 12 HOURS
Status: DISCONTINUED | OUTPATIENT
Start: 2018-07-12 | End: 2018-07-13 | Stop reason: HOSPADM

## 2018-07-12 RX ADMIN — BUSPIRONE HYDROCHLORIDE 15 MG: 10 TABLET ORAL at 05:17

## 2018-07-12 RX ADMIN — BACITRACIN ZINC: 500 OINTMENT TOPICAL at 12:15

## 2018-07-12 RX ADMIN — METOPROLOL TARTRATE 25 MG: 25 TABLET, FILM COATED ORAL at 17:11

## 2018-07-12 RX ADMIN — BUSPIRONE HYDROCHLORIDE 15 MG: 10 TABLET ORAL at 17:10

## 2018-07-12 RX ADMIN — BACITRACIN ZINC: 500 OINTMENT TOPICAL at 05:18

## 2018-07-12 RX ADMIN — GABAPENTIN 300 MG: 300 CAPSULE ORAL at 12:15

## 2018-07-12 RX ADMIN — POLYETHYLENE GLYCOL 3350 1 PACKET: 17 POWDER, FOR SOLUTION ORAL at 05:16

## 2018-07-12 RX ADMIN — CIPROFLOXACIN 400 MG: 2 INJECTION, SOLUTION INTRAVENOUS at 05:16

## 2018-07-12 RX ADMIN — GABAPENTIN 300 MG: 300 CAPSULE ORAL at 17:10

## 2018-07-12 RX ADMIN — SODIUM CHLORIDE: 9 INJECTION, SOLUTION INTRAVENOUS at 02:44

## 2018-07-12 RX ADMIN — CIPROFLOXACIN HYDROCHLORIDE 500 MG: 500 TABLET, FILM COATED ORAL at 17:10

## 2018-07-12 RX ADMIN — CARBAMAZEPINE 200 MG: 200 TABLET ORAL at 12:15

## 2018-07-12 RX ADMIN — CARBAMAZEPINE 200 MG: 200 TABLET ORAL at 05:17

## 2018-07-12 RX ADMIN — BACITRACIN ZINC: 500 OINTMENT TOPICAL at 17:10

## 2018-07-12 RX ADMIN — CARBAMAZEPINE 200 MG: 200 TABLET ORAL at 17:11

## 2018-07-12 RX ADMIN — GABAPENTIN 300 MG: 300 CAPSULE ORAL at 05:17

## 2018-07-12 RX ADMIN — ARIPIPRAZOLE 15 MG: 10 TABLET ORAL at 05:17

## 2018-07-12 RX ADMIN — ARIPIPRAZOLE 15 MG: 10 TABLET ORAL at 17:11

## 2018-07-12 RX ADMIN — METOPROLOL TARTRATE 25 MG: 25 TABLET, FILM COATED ORAL at 05:17

## 2018-07-12 ASSESSMENT — PAIN SCALES - GENERAL
PAINLEVEL_OUTOF10: 3
PAINLEVEL_OUTOF10: 0

## 2018-07-12 NOTE — DISCHARGE PLANNING
Anticipated Discharge Disposition: Home     Action: This RN CM spoke with [facility] PA.  Per Danny he will speak with Dr. Zuleta to see if the pt is cleared for discharge today.    Barriers to Discharge: none    Plan: Pending medical clearance from MD.  Pt has no needs upon discharge.

## 2018-07-12 NOTE — PROGRESS NOTES
AOx4    Pain rated 3/10 in scrotum- pt reports that it is tolerable and does not want any pain medication. Oxycodone has been making the pt very nauseous. Pt repositioned for comfort.     Pt on room air. Lungs sound diminished in lower lobes. IS education reinforced. Pt refusing to use at this time.     Bowels normoactive x4. Tolerating diet now. + gas. LBM:7/12 this morning.     Urethraostomy bag changed to leg bag for pt comfort. Pt educated on how to change bag. Scrotal edema still present but decreased in size since yesterday.   Small trickle of blood still present from suture underneath scrotum- gauze changed as needed. Neosporin on. Bruising to lower abdomen present. Bilateral groin edema still present.   Suprapubic catheter assessed- still clamped and taped in place. Scant purulent drainage present around site. No redness, no swelling.     PIV to the right hand is SL. Pt caught plugging himself back into the IVF and educated that he absolutely can not touch the pumps or self administer anything fluid- pt verbalized understanding. SL orders in.     Pt up with a SBA and a steady gait.    Pt very irritable and rude to staff. Non compliant with calling for help. Refusing bed alarm. Second RN notified and still refusing.     POC discussed- discharge tomorrow.

## 2018-07-13 VITALS
HEIGHT: 69 IN | RESPIRATION RATE: 17 BRPM | WEIGHT: 207.45 LBS | HEART RATE: 82 BPM | OXYGEN SATURATION: 96 % | TEMPERATURE: 98 F | DIASTOLIC BLOOD PRESSURE: 59 MMHG | SYSTOLIC BLOOD PRESSURE: 114 MMHG | BODY MASS INDEX: 30.73 KG/M2

## 2018-07-13 PROCEDURE — 700102 HCHG RX REV CODE 250 W/ 637 OVERRIDE(OP): Performed by: UROLOGY

## 2018-07-13 PROCEDURE — A9270 NON-COVERED ITEM OR SERVICE: HCPCS | Performed by: UROLOGY

## 2018-07-13 RX ORDER — BACITRACIN ZINC 500 [USP'U]/G
1 OINTMENT TOPICAL 2 TIMES DAILY
Qty: 1 TUBE | Refills: 0 | Status: SHIPPED | OUTPATIENT
Start: 2018-07-13 | End: 2018-07-20

## 2018-07-13 RX ORDER — CIPROFLOXACIN 500 MG/1
500 TABLET, FILM COATED ORAL EVERY 12 HOURS
Qty: 20 TAB | Refills: 0 | Status: SHIPPED | OUTPATIENT
Start: 2018-07-13 | End: 2021-04-24

## 2018-07-13 RX ORDER — MORPHINE SULFATE 4 MG/ML
4 INJECTION, SOLUTION INTRAMUSCULAR; INTRAVENOUS ONCE
Status: DISCONTINUED | OUTPATIENT
Start: 2018-07-13 | End: 2018-07-13 | Stop reason: CLARIF

## 2018-07-13 RX ADMIN — BACITRACIN ZINC: 500 OINTMENT TOPICAL at 05:48

## 2018-07-13 RX ADMIN — ARIPIPRAZOLE 15 MG: 10 TABLET ORAL at 05:47

## 2018-07-13 RX ADMIN — CARBAMAZEPINE 200 MG: 200 TABLET ORAL at 05:48

## 2018-07-13 RX ADMIN — CARBAMAZEPINE 200 MG: 200 TABLET ORAL at 12:14

## 2018-07-13 RX ADMIN — METOPROLOL TARTRATE 25 MG: 25 TABLET, FILM COATED ORAL at 05:48

## 2018-07-13 RX ADMIN — BUSPIRONE HYDROCHLORIDE 15 MG: 10 TABLET ORAL at 05:48

## 2018-07-13 RX ADMIN — GABAPENTIN 300 MG: 300 CAPSULE ORAL at 12:14

## 2018-07-13 RX ADMIN — CIPROFLOXACIN HYDROCHLORIDE 500 MG: 500 TABLET, FILM COATED ORAL at 05:48

## 2018-07-13 RX ADMIN — LOSARTAN POTASSIUM 25 MG: 50 TABLET ORAL at 05:48

## 2018-07-13 RX ADMIN — GABAPENTIN 300 MG: 300 CAPSULE ORAL at 05:48

## 2018-07-13 ASSESSMENT — PAIN SCALES - GENERAL: PAINLEVEL_OUTOF10: 10

## 2018-07-13 NOTE — DISCHARGE INSTRUCTIONS
Discharge Instructions    Discharged to home by car with relative. Discharged via walking, hospital escort: Refused.  Special equipment needed: Not Applicable    Be sure to schedule a follow-up appointment with your primary care doctor or any specialists as instructed.     Discharge Plan:   Influenza Vaccine Indication: Patient Refuses    I understand that a diet low in cholesterol, fat, and sodium is recommended for good health. Unless I have been given specific instructions below for another diet, I accept this instruction as my diet prescription.   Other diet: as tolerated    Special Instructions: None    · Is patient discharged on Warfarin / Coumadin?   No     Depression / Suicide Risk    As you are discharged from this [facility] facility, it is important to learn how to keep safe from harming yourself.    Recognize the warning signs:  · Abrupt changes in personality, positive or negative- including increase in energy   · Giving away possessions  · Change in eating patterns- significant weight changes-  positive or negative  · Change in sleeping patterns- unable to sleep or sleeping all the time   · Unwillingness or inability to communicate  · Depression  · Unusual sadness, discouragement and loneliness  · Talk of wanting to die  · Neglect of personal appearance   · Rebelliousness- reckless behavior  · Withdrawal from people/activities they love  · Confusion- inability to concentrate     If you or a loved one observes any of these behaviors or has concerns about self-harm, here's what you can do:  · Talk about it- your feelings and reasons for harming yourself  · Remove any means that you might use to hurt yourself (examples: pills, rope, extension cords, firearm)  · Get professional help from the community (Mental Health, Substance Abuse, psychological counseling)  · Do not be alone:Call your Safe Contact- someone whom you trust who will be there for you.  · Call your local CRISIS HOTLINE 373-2411 or  [phone]  · Call your local Children's Mobile Crisis Response Team [place] (957) 629-6011 or www.IMRSV  · Call the toll free National Suicide Prevention Hotlines   · National Suicide Prevention Lifeline 648-170-KGCE (9507)  · National Hope Line Network 800-SUICIDE (107-5691)      ACTIVITY: Rest and take it easy for the first 24 hours.  A responsible adult is recommended to remain with you during that time.  It is normal to feel sleepy.  We encourage you to not do anything that requires balance, judgment or coordination.    MILD FLU-LIKE SYMPTOMS ARE NORMAL. YOU MAY EXPERIENCE GENERALIZED MUSCLE ACHES, THROAT IRRITATION, HEADACHE AND/OR SOME NAUSEA.    FOR 24 HOURS DO NOT:  Drive, operate machinery or run household appliances.  Drink beer or alcoholic beverages.   Make important decisions or sign legal documents.    SPECIAL INSTRUCTIONS:   SPT with plug.    Urethral donald to large bag to gravity drainage.    We will call for follow up.    DIET: To avoid nausea, slowly advance diet as tolerated, avoiding spicy or greasy foods for the first day.  Add more substantial food to your diet according to your physician's instructions.  INCREASE FLUIDS AND FIBER TO AVOID CONSTIPATION.    SURGICAL DRESSING/BATHING: Ok to shower tomorrow.    FOLLOW-UP APPOINTMENT:  A follow-up appointment should be arranged with your doctor; they will call to schedule.    You should CALL YOUR PHYSICIAN if you develop:  Fever greater than 101 degrees F.  Pain not relieved by medication, or persistent nausea or vomiting.  Excessive bleeding (blood soaking through dressing) or unexpected drainage from the wound.  Extreme redness or swelling around the incision site, drainage of pus or foul smelling drainage.  Inability to urinate or empty your bladder within 8 hours.  Problems with breathing or chest pain.    You should call 911 if you develop problems with breathing or chest pain.  If you are unable to contact your doctor or  surgical center, you should go to the nearest emergency room or urgent care center.  Physician's telephone #: Dr. Zuleta [phone].    If any questions arise, call your doctor.  If your doctor is not available, please feel free to call the Surgical Center at [phone].  The Center is open Monday through Friday from 7AM to 7PM.  You can also call the HEALTH HOTLINE open 24 hours/day, 7 days/week and speak to a nurse at [phone], or toll free at [phone].    A registered nurse may call you a few days after your surgery to see how you are doing after your procedure.    MEDICATIONS: Resume taking daily medication.  Take prescribed pain medication with food.  If no medication is prescribed, you may take non-aspirin pain medication if needed.  PAIN MEDICATION CAN BE VERY CONSTIPATING.  Take a stool softener or laxative such as senokot, pericolace, or milk of magnesia if needed.      Orchitis  Introduction  Orchitis is swelling (inflammation) of a testicle caused by infection. Testicles are the male organs that produce sperm. The testicles are held in a fleshy sac (scrotum) located behind the penis. Orchitis usually affects only one testicle, but it can occur in both. The condition can develop suddenly.  Orchitis can be caused by many different kinds of bacteria and viruses.  What are the causes?  Orchitis can be caused by either a bacterial or viral infection.  Bacterial Infections  · These often occur along with an infection of the coiled tube that collects sperm and sits on top of the testicle (epididymis).  · In men who are not sexually active, bacterial orchitis usually starts as a urinary tract infection and spreads to the testicle.  · In sexually active men, sexually transmitted infections are the most common cause of bacterial orchitis. These can include:  ¨ Gonorrhea.  ¨ Chlamydia.  Viral Infections  · Mumps is still the most common cause of viral orchitis, though mumps is now rare in many  areas because of vaccination.  · Other viruses that can cause orchitis include:  ¨ The chickenpox virus (varicella-zoster virus).  ¨ The virus that causes mononucleosis (Riddhi-Barr virus).  What increases the risk?  Boys and men who have not been vaccinated against mumps are at risk for mumps orchitis.  Risk factors for bacterial orchitis include:  · Frequent urinary tract infections.  · High-risk sexual behaviors.  · Having a sexual partner with a sexually transmitted infection.  · Having had urinary tract surgery.  · Using a tube passed through the penis to drain urine (Gomez catheter).  · An enlarged prostate gland.  What are the signs or symptoms?  The most common symptoms of orchitis are swelling and pain in the scrotum. Other signs and symptoms may include:  · Feeling generally sick (malaise).  · Fever and chills.  · Painful urination.  · Painful ejaculation.  · Blood or discharge from the penis.  · Nausea.  · Headache.  · Fatigue.  How is this diagnosed?  Your health care provider may suspect orchitis if you have a painful, swollen testicle along with other signs and symptoms of the condition. A physical exam will be done. Tests may also be done to help your health care provider make a diagnosis. These may include:  · A blood test to check for signs of infection.  · A urine test to check for a urinary tract infection.  · Using a swab to collect a fluid sample from the tip of the penis to test for sexually transmitted infections.  · Taking an image of the testicle using sound waves and a computer (testicular ultrasound).  How is this treated?  Treatment of orchitis depends on the cause. For orchitis caused by a bacterial infection, your health care provider will most likely prescribe antibiotic medicines. Bacterial infections usually clear up within a few days.  Both viral infections and bacterial infections may be treated with:  · Bed rest.  · Anti-inflammatory medicines.  · Pain medicines.  · Elevating the  scrotum and applying ice.  Follow these instructions at home:  · Rest as directed by your health care provider.  · Take medicines only as directed by your health care provider.  · If you were prescribed an antibiotic medicine, finish it all even if you start to feel better.  · Elevate your scrotum and apply ice as directed:  ¨ Put ice in a plastic bag.  ¨ Place a small towel or pillow between your legs.  ¨ Rest your scrotum on the pillow or towel.  ¨ Place another towel between your skin and the plastic bag.  ¨ Leave the ice on for 20 minutes, 2-3 times a day.  Contact a health care provider if:  · You have a fever.  · Pain and swelling have not gotten better after 3 days.  Get help right away if:  · Your pain is getting worse.  · The swelling in your testicle gets worse.  This information is not intended to replace advice given to you by your health care provider. Make sure you discuss any questions you have with your health care provider.  Document Released: 12/15/2001 Document Revised: 05/25/2017 Document Reviewed: 05/07/2015  © 2017 [employer]      If your physician has prescribed pain medication that includes Acetaminophen (Tylenol), do not take additional Acetaminophen (Tylenol) while taking the prescribed medication.    Depression / Suicide Risk    As you are discharged from this [facility] facility, it is important to learn how to keep safe from harming yourself.    Recognize the warning signs:  · Abrupt changes in personality, positive or negative- including increase in energy   · Giving away possessions  · Change in eating patterns- significant weight changes-  positive or negative  · Change in sleeping patterns- unable to sleep or sleeping all the time   · Unwillingness or inability to communicate  · Depression  · Unusual sadness, discouragement and loneliness  · Talk of wanting to die  · Neglect of personal appearance   · Rebelliousness- reckless behavior  · Withdrawal from people/activities they  love  · Confusion- inability to concentrate     If you or a loved one observes any of these behaviors or has concerns about self-harm, here's what you can do:  · Talk about it- your feelings and reasons for harming yourself  · Remove any means that you might use to hurt yourself (examples: pills, rope, extension cords, firearm)  · Get professional help from the community (Mental Health, Substance Abuse, psychological counseling)  · Do not be alone:Call your Safe Contact- someone whom you trust who will be there for you.  · Call your local CRISIS HOTLINE 582-5913 or 465-640-6946  · Call your local Children's Mobile Crisis Response Team [place] (167) 171-3655 or www.[employer]  · Call the toll free National Suicide Prevention Hotlines   · National Suicide Prevention Lifeline 153-993-DBHF (5823)  · National Hope Line Network 800-SUICIDE (108-7008)

## 2018-07-13 NOTE — DISCHARGE SUMMARY
Discharge Summary    CHIEF COMPLAINT ON ADMISSION  Panurethral stricture    Reason for Admission  CROSSING VESSEL AND STRICTURE OF Urethra     Admission Date  7/5/2018    CODE STATUS  Full Code    HPI & HOSPITAL COURSE  This is a 57 y.o. male here with panurethral stricture, now S/P perineal urethrostomy 7/5/2018, exchange of SP tube, and cystoscopy.  Following the procedure he had scrotal pain and was eventually noted to have a scrotal and suprapubic hematoma.  He was taken back to the OR for evacuation of scrotal hematoma 7/5/18.  For a few days following this, scrotum had decreased in size, donald was draining well. Bleeding persisted despite conservative efforts of pressure dressing.  He was taken back to the OR 7/10 for exploration of perineum with control of postoperative bleeding after urethrostomy.  Since this procedure, his bleeding has been well controlled.  He did developed right testis tenderness and the antibiotic was changed to Cipro for orchitis.  Today there is no bleeding and pain is well controlled.  He is stable for discharge and will discharge with catheter in place, Cipro and [facility]        Therefore, he is discharged in good and stable condition to home with close outpatient follow-up.    The patient met 2-midnight criteria for an inpatient stay at the time of discharge.    Discharge Date  7/13    FOLLOW UP ITEMS POST DISCHARGE  Post op, follow up for management of catheters.    FOLLOW UP  Future Appointments  Date Time Provider Department Center   11/26/2018 12:40 PM Saurabh Rogers M.D. [facility] None     aVldez Zuleta M.D.  [address] [place] [address]  [phone]    Schedule an appointment as soon as possible for a visit  For a hospital follow up visit     ** Needs a post op with Dr. Zuleta.  [facility] [place] will facilitate follow up.      MEDICATIONS ON DISCHARGE     Medication List      CONTINUE taking these medications      Instructions    ARIPiprazole 15 MG Tabs  Commonly known as:  ABILIFY   Take 15 mg by mouth 2 Times a Day.  Dose:  15 mg     busPIRone 15 MG tablet  Commonly known as:  BUSPAR   Take 15 mg by mouth 2 times a day.  Dose:  15 mg     carBAMazepine 200 MG Tabs  Commonly known as:  TEGRETOL   Take 200 mg by mouth 3 times a day.  Dose:  200 mg     gabapentin 300 MG Caps  Commonly known as:  NEURONTIN   Take 300 mg by mouth 3 times a day.  Dose:  300 mg     losartan 25 MG Tabs  Commonly known as:  COZAAR   Take 25 mg by mouth every day.  Dose:  25 mg     metoprolol 25 MG Tabs  Commonly known as:  LOPRESSOR   Take 25 mg by mouth 2 times a day.  Dose:  25 mg     nitroglycerin 0.4 MG Subl  Commonly known as:  NITROSTAT   Place 0.4 mg under tongue every 5 minutes as needed for Chest Pain.  Dose:  0.4 mg     NON SPECIFIED   Take 1 Tab by mouth 2 Times a Day. Antibiotic  Dose:  1 Tab     oxybutynin 5 MG Tabs  Commonly known as:  DITROPAN   Take 5 mg by mouth 3 times a day.  Dose:  5 mg     simvastatin 20 MG Tabs  Commonly known as:  ZOCOR   Take 20 mg by mouth every evening.  Dose:  20 mg     SYNJARDY 12.5-1000 MG Tabs  Generic drug:  Empagliflozin-Metformin HCl   Take 1 Tab by mouth every day.  Dose:  1 Tab     vitamin B-12 1000 MCG Tabs   Take 1,000 mcg by mouth every day.  Dose:  1000 mcg        STOP taking these medications    ibuprofen 800 MG Tabs  Commonly known as:  MOTRIN        ASK your doctor about these medications      Instructions   oxyCODONE-acetaminophen 5-325 MG Tabs  Commonly known as:  PERCOCET  Ask about: Should I take this medication?   Take 1-2 Tabs by mouth every 6 hours as needed for up to 30 doses.  Dose:  1-2 Tab            Allergies  No Known Allergies    DIET  Orders Placed This Encounter   Procedures   • Diet Order Regular (start with clear liquids)     Standing Status:   Standing     Number of Occurrences:   1     Order Specific Question:   Diet:     Answer:   Regular [1]     Comments:   start with clear liquids        ACTIVITY  As tolerated.  Weight bearing as tolerated    CONSULTATIONS  Hematology for ongoing bleeding following procedure.    PROCEDURES  Perineal urethrostomy, exchange of SP tube, and cystoscopy (7/5/2018).    Evacuation of scrotal hematoma (7/5/2018)  Exploration of perineum with control of postoperative bleeding after urethrostomy (7/10/2018)      LABORATORY  Lab Results   Component Value Date    SODIUM 136 07/06/2018    POTASSIUM 4.3 07/06/2018    CHLORIDE 107 07/06/2018    CO2 22 07/06/2018    GLUCOSE 153 (H) 07/06/2018    BUN 16 07/06/2018    CREATININE 0.82 07/06/2018        Lab Results   Component Value Date    WBC 10.5 07/10/2018    HEMOGLOBIN 10.6 (L) 07/10/2018    HEMATOCRIT 32.3 (L) 07/10/2018    PLATELETCT 232 07/10/2018        Total time of the discharge process exceeds 30 minutes.

## 2018-07-13 NOTE — CARE PLAN
Problem: Infection  Goal: Will remain free from infection  Outcome: PROGRESSING AS EXPECTED  Educated pt to perform hand hygiene regularly and after any activity.     Problem: Pain Management  Goal: Pain level will decrease to patient's comfort goal    Intervention: Follow pain managment plan developed in collaboration with patient and Interdisciplinary Team  Will continue to assess pain  Levels. Educated pt of alternative ways to reduce pain.

## 2018-07-13 NOTE — PROGRESS NOTES
Discharging Patient home per physician order.  Discharged with mother.  Demonstrated understanding of discharge instructions, follow up appointments, home medications, prescriptions, home care for surgical wound, and nursing care instructions for wound given.  Ambulating with no assistance, voiding without difficulty, pain well controlled, tolerating oral medications, oxygen saturation greater than 95% , tolerating diet.   Educational handouts given and discussed.  Verbalized understanding of discharge instructions and educational handouts.  All questions answered.  Belongings with patient at time of discharge.

## 2018-07-13 NOTE — PROGRESS NOTES
Pt up self to shower. Didn't inform staff members of showers- was unable to cover his PIV to protect it. Pt called this RN in afterwards- this RN attempted to change dressing over IV and came to find the IV was pulled out of the skin. Pt upset. Pt educated that he needs peripheral access. Pt refusing.

## 2018-07-13 NOTE — PROGRESS NOTES
Report received.  Assumed Care.  Pt in bed, 407 2. AOx4, responds appropriately.  Denies pain, SOB.  Plan of care discussed.  Explained importance of calling before getting OOB and pt verbalizes understanding.  Call light and belongings within reach, treaded slipper socks on, bed alarm in use, bed in lowest position.  Will monitor frequently.

## 2018-07-13 NOTE — PROGRESS NOTES
Pt very agitated walking the halls stating he wants to go home. Pt states his mother is going to be very mad if he cant leave when she gets here. PA paged.

## 2018-07-13 NOTE — PROGRESS NOTES
Observed pt completing dressing change. Pt educated on dressing care and maintenance. All needs are met at this time. Call light within reach and personal items are available.

## 2018-07-16 ENCOUNTER — PATIENT OUTREACH (OUTPATIENT)
Dept: HEALTH INFORMATION MANAGEMENT | Facility: OTHER | Age: 58
End: 2018-07-16

## 2018-07-17 ENCOUNTER — PATIENT OUTREACH (OUTPATIENT)
Dept: HEALTH INFORMATION MANAGEMENT | Facility: OTHER | Age: 58
End: 2018-07-17

## 2018-07-20 ENCOUNTER — NON-PROVIDER VISIT (OUTPATIENT)
Dept: WOUND CARE | Facility: MEDICAL CENTER | Age: 58
End: 2018-07-20
Attending: PHYSICIAN ASSISTANT
Payer: MEDICARE

## 2018-07-20 ENCOUNTER — HOSPITAL ENCOUNTER (OUTPATIENT)
Facility: MEDICAL CENTER | Age: 58
End: 2018-07-20
Attending: NURSE PRACTITIONER
Payer: MEDICARE

## 2018-07-20 DIAGNOSIS — L08.9 INFECTED WOUND: Primary | ICD-10-CM

## 2018-07-20 DIAGNOSIS — T14.8XXA INFECTED WOUND: Primary | ICD-10-CM

## 2018-07-20 PROCEDURE — 97602 WOUND(S) CARE NON-SELECTIVE: CPT

## 2018-07-20 PROCEDURE — 99213 OFFICE O/P EST LOW 20 MIN: CPT

## 2018-07-20 PROCEDURE — 87205 SMEAR GRAM STAIN: CPT

## 2018-07-20 PROCEDURE — 87076 CULTURE ANAEROBE IDENT EACH: CPT

## 2018-07-20 PROCEDURE — 87070 CULTURE OTHR SPECIMN AEROBIC: CPT
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
## 2018-07-21 DIAGNOSIS — T14.8XXA INFECTED WOUND: ICD-10-CM

## 2018-07-21 DIAGNOSIS — L08.9 INFECTED WOUND: ICD-10-CM

## 2018-07-21 LAB
GRAM STN SPEC: NORMAL
SIGNIFICANT IND 70042: NORMAL
SITE SITE: NORMAL
SOURCE SOURCE: NORMAL

## 2018-07-24 ENCOUNTER — NON-PROVIDER VISIT (OUTPATIENT)
Dept: WOUND CARE | Facility: MEDICAL CENTER | Age: 58
End: 2018-07-24
Attending: PHYSICIAN ASSISTANT
Payer: MEDICARE

## 2018-07-24 DIAGNOSIS — L08.9 WOUND INFECTION: Primary | ICD-10-CM

## 2018-07-24 DIAGNOSIS — T14.8XXA WOUND INFECTION: Primary | ICD-10-CM

## 2018-07-24 LAB
BACTERIA WND AEROBE CULT: ABNORMAL
BACTERIA WND AEROBE CULT: ABNORMAL
GRAM STN SPEC: ABNORMAL
SIGNIFICANT IND 70042: ABNORMAL
SITE SITE: ABNORMAL
SOURCE SOURCE: ABNORMAL

## 2018-07-24 PROCEDURE — 97602 WOUND(S) CARE NON-SELECTIVE: CPT

## 2018-07-24 RX ORDER — AMOXICILLIN AND CLAVULANATE POTASSIUM 875; 125 MG/1; MG/1
1 TABLET, FILM COATED ORAL 2 TIMES DAILY
Qty: 20 TAB | Refills: 0 | OUTPATIENT
Start: 2018-07-24 | End: 2018-08-03

## 2018-07-24 NOTE — PROGRESS NOTES
Wound culture results reviewed.  Heavy growth of bacteroides fragilis.  Discussed with ID pharmacist, Mell Serna, informed that Augmentin appropriate.  Rx for Augmentin called to [facility].  TC to patient, informed him of the above.  Will also call Dr. Alexander office to inform of RX
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
## 2018-07-27 ENCOUNTER — OFFICE VISIT (OUTPATIENT)
Dept: WOUND CARE | Facility: MEDICAL CENTER | Age: 58
End: 2018-07-27
Attending: PHYSICIAN ASSISTANT
Payer: MEDICARE

## 2018-07-27 DIAGNOSIS — N50.82 SCROTAL PAIN: ICD-10-CM

## 2018-07-27 DIAGNOSIS — T14.8XXA OPEN WOUND: Primary | ICD-10-CM

## 2018-07-27 DIAGNOSIS — T81.31XD DEHISCENCE OF OPERATIVE WOUND, SUBSEQUENT ENCOUNTER: ICD-10-CM

## 2018-07-27 DIAGNOSIS — Z93.6: ICD-10-CM

## 2018-07-27 PROBLEM — T81.31XA SURGICAL WOUND DEHISCENCE: Status: ACTIVE | Noted: 2018-07-27

## 2018-07-27 PROCEDURE — 99213 OFFICE O/P EST LOW 20 MIN: CPT | Performed by: NURSE PRACTITIONER

## 2018-07-27 NOTE — PROGRESS NOTES
Encounter Note                Referring Physician: Valdez Zuleta M.D.  Primary Physician:    Jane Mclaughlin P.A.-C.    Consulting Physicians:          Wound(s): Perineum, dehisced surgical wound        HPI:    Patient with panurethral stricture, S/P perineal urethrostomy 7/5/2018, exchange of SP tube, and cystoscopy.  Following the procedure he had scrotal pain and was eventually noted to have a scrotal and suprapubic hematoma.  He was taken back to the OR for evacuation of scrotal hematoma 7/5/18.  For a few days following this, scrotum had decreased in size, donald was draining well. Bleeding persisted despite conservative efforts of pressure dressing.  He was taken back to the OR 7/10 for exploration of perineum with control of postoperative bleeding after urethrostomy.  The surgical site broke down, leaving him with an open wound to his perineum.  He was referred to the wound clinic for evaluation and treatment.  He began treatment at [facility] on 7/20.  The wound bed was necrotic and there was a foul odor noted.  Dakin's moistened gauze BID was ordered.  Patient has been coming into the clinic 1-2 times per week.  Pt has been changing the dressing himself at home in between clinic visits, and giving himself sitz baths for comfort.  He is also seeing Dr. Zuleta once per week.    He presents today for wound care.  He was seen by Dr. Zuleta two days ago and had his catheters changed (suprapubic and perineal urethrostomy).  Dr. Zuleta has asked that pt be seen in the wound clinic at least 2x/week for careful monitoring of this wound.  He has ok'd wound VAC, however patient does not like this idea.  The wound today is significantly improved, minimal odor, wound bed 80-90% red, granulating tissue.  We will continue with Dakin's for a few more days and then switch to saline gauze or alginate/hydrofiber.       Wound Characteristics                                                    Location: Perineum     Encounter  Date: 07/27/2018   Tissue Type and %: 80% moist red, 20% tan   Periwound: Decreasing Erythema   Drainage: Minimal serous   Exposed structures Sutures, catheter   Wound Edges:   open   Odor: none   S&S of Infection:   erythema   Edema: localized   Sensation: Intact, hypersensitive to testes               Measurements: Perineum Initial Evaluation  Date: 07/27/2018      Length (cm) 6   Width (cm) 4.5   Depth (cm) 5   Area (cm2) 27 cm2   Tract/undermine None noted         PROCEDURE:  Patient allowed to remove his own packing (his preference).  Tech utilized to elevate scrotum using gauze as a sling.  Wound irrigated with NS, filled with Dakin's moistened kerlix, covered with dry 4x4s and then an abd pad.  Secured with underwear and Spanties        ASSESSMENT/PLAN      ICD-10-CM   1. Open wound- s/p exploration of perineum with control of postoperative bleeding.  Open wound with urethrostomy within, catheter in place, to DD.  Dakin's BID to manage bioburden.  Pt to be seen in clinic 2x /week for assessment and close monitoring.  Dc Dakin's next week if wound is clean enough, switch to saline gauze or alginate/hydrofiber.  Pt to continue with sitz baths (soothing per pt), and BID dressing changes.  Consider NPWT if wound stalls or deteriorates.  T14.8XXA   2. Dehiscence of operative wound, subsequent encounter- see above T81.31XD   3. Scrotal pain- Pain meds per surgeon N50.82   4. Status post urethrostomy (HCC)- 7/5/18, d/t panurethral stricture Z93.6       Time spent with patient, 30 minutes- review of history and diagnostics, wound assessment, wound care, education and counseling

## 2018-07-31 ENCOUNTER — NON-PROVIDER VISIT (OUTPATIENT)
Dept: WOUND CARE | Facility: MEDICAL CENTER | Age: 58
End: 2018-07-31
Attending: PHYSICIAN ASSISTANT
Payer: MEDICARE

## 2018-07-31 PROCEDURE — 97602 WOUND(S) CARE NON-SELECTIVE: CPT

## 2018-07-31 NOTE — WOUND TEAM
"Advanced Wound Care  [facility]  [address]  [address]  [place] [address]  [phone] Fax: [phone]    Encounter Note  For Certification Period: 07/20/2018 - 10/10/2018  Start of Care: 07/20/2018          Referring Physician: Valdez Zuleta M.D.  Primary Physician:    Jane Mclaughlin P.A.-C.    Consulting Physicians:         Wound(s): Perineum  Pharmacy of Choice:        Subjective:        HPI:       Copied from Dr. Zuleta's discharge summary on 07/13/2018 -   Patient with panurethral stricture, now S/P perineal urethrostomy 7/5/2018, exchange of SP tube, and cystoscopy.  Following the procedure he had scrotal pain and was eventually noted to have a scrotal and suprapubic hematoma.  He was taken back to the OR for evacuation of scrotal hematoma 7/5/18.  For a few days following this, scrotum had decreased in size, donald was draining well. Bleeding persisted despite conservative efforts of pressure dressing.  He was taken back to the OR 7/10 for exploration of perineum with control of postoperative bleeding after urethrostomy.  Since this procedure, his bleeding has been well controlled.  He did developed right testis tenderness and the antibiotic was changed to Cipro for orchitis.  Today there is no bleeding and pain is well controlled.  He is stable for discharge and will discharge with catheter in place, Cipro and plan for a follow up with [facility].     Pain:        +10/10; pt states he has run out of pain medication, and due to new state laws in unable to obtain any refills for 2 weeks.     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    • Bronchitis     \"I cough\"   • Diabetes (HCC)     oral medication . 5/21/18-AVG am=90's.   • Heart burn     Treated with Zantac.   • High cholesterol    • Hypertension    • " "Indigestion     Treated with Zantac.    • Infectious disease 1989 or 1+990    had staph infection in spine, had PICC line, rash all over body,  then thoracic fusion   • Marijuana use 05/21/2018    Daily use for pain control.   • Myocardial infarct (HCC) 04/05/2014    CABG-2015. Cardiologist in [facility].   • Pain     hip, knees   • Pain 05/21/2018    \"all over\"   • Psychiatric problem     depression, anxiety, bipolar    • Renal disorder     Hx of renal stone.   • Seizure (HCC)     last seizure 4/5/2014-unknown etiology, but possibly related to stress.   • Sleep apnea     no CPAP, no supplemental oxygen   • Snoring    • Urinary bladder disorder     5/21/18-currently has donald cath to leg bag.   • Urinary incontinence     urethral strictures.       Current Outpatient Prescriptions:   •  amoxicillin-clavulanate, 1 Tab, Oral, BID  •  ciprofloxacin, 500 mg, Oral, Q12HRS  •  NON SPECIFIED, 1 Tab, Oral, BID, 7/4/2018 at finished  •  Empagliflozin-Metformin HCl, 1 Tab, Oral, DAILY, 7/4/2018 at 1800  •  simvastatin, 20 mg, Oral, Nightly, 7/4/2018 at 1800  •  oxybutynin, 5 mg, Oral, TID, 7/4/2018 at 1800  •  losartan, 25 mg, Oral, DAILY, 7/4/2018 at 1800  •  ARIPiprazole, 15 mg, Oral, BID, 7/4/2018 at 1800  •  metoprolol, 25 mg, Oral, BID, 7/4/2018 at 1800  •  carBAMazepine, 200 mg, Oral, TID, 7/4/2018 at 1800  •  vitamin B-12, 1,000 mcg, Oral, DAILY, 7/4/2018 at 1800  •  nitroglycerin, 0.4 mg, Sublingual, Q5 MIN PRN, month ago at Unknown time  •  gabapentin, 300 mg, Oral, TID, 7/4/2018 at 1800    Allergies: No Known Allergies    Past Surgical History:   Past Surgical History:   Procedure Laterality Date   • GROIN EXPLORATION  7/10/2018    Procedure: GROIN EXPLORATION;  Surgeon: Valdez Zuleta M.D.;  Location: SURGERY [facility];  Service: Urology   • URETHRECTOMY  7/5/2018    Procedure: PERINEAL URETHROSTOMY;  Surgeon: Valdez Zuleta M.D.;  Location: SURGERY [facility];  Service: Urology "   • EVACUATION OF HEMATOMA Bilateral 7/5/2018    Procedure: EVACUATION OF HEMATOMA;  Surgeon: Valdez Zuleta M.D.;  Location: SURGERY [facility];  Service: Urology   • CYSTOSCOPY  04/24/2018    SP cath placed.   • HEMORRHOIDECTOMY  02/2016   • HIP ARTHROSCOPY Right 01/2016   • KNEE ARTHROSCOPY Left 01/2016   • LITHOTRIPSY  2016    unsuccessful   • CYSTOSCOPY  2016    S/P unsuccessful lithotripsy   • MULTIPLE CORONARY ARTERY BYPASS  07/2015    4 vessel   • OTHER SURGICAL PROCEDURE Right 1993    Closed some veins in leg.   • THORACIC FUSION POSTERIOR  late 1980's       Social History:   Social History     Social History   • Marital status: Single     Spouse name: N/A   • Number of children: N/A   • Years of education: N/A     Occupational History   • Not on file.     Social History Main Topics   • Smoking status: Current Every Day Smoker     Packs/day: 0.50     Years: 40.00     Types: Cigarettes   • Smokeless tobacco: Never Used   • Alcohol use No   • Drug use: Yes     Types: Inhaled      Comment: last smoked marijuana 7/3/2018   • Sexual activity: Not on file     Other Topics Concern   • Not on file     Social History Narrative   • No narrative on file             Objective:      Tests and Measures: 07/20/2018 - Pt states FSBS 108 this morning. Last A1c on 05/21/2018 - 7.5    Orthotic, protective, supportive devices: none    Fall Risk Assessment (antonia all that apply with an X):  Completed at initial evaluation on 07/20/2018   65 years or older     Fall within the last 2 years  Ambulatory devices  Loss of protective sensation in feet,   Use of prostethic/orthotic, years    Presence of lower extremity/foot/toe amputation   Taking medication that increases risk (per facility policy)    Interventions Recommended (if any of the above are selected):   Use of Assistive Device:________________________   Supervision with ambulation:  Caregiver   Assistance with ambulation:  Caregiver   Home safety education:   Educational material provided         Wound Characteristics                                                    Location: Perineum   Initial Evaluation  Date: 2018   Encounter  Date: 2018   Tissue Type and %: 10% moist red, 90% thick yellow non viable tissue 85% moist red, 30% thick yellow non viable tissue   Periwound: Erythema, sutures Decreasing Erythema   Drainage: Moderate serous (pt changed packing this morning) Minimal serous   Exposed structures Sutures, catheter Sutures, catheter   Wound Edges:   Open open   Odor: Moderate foul none   S&S of Infection:   Erythema, odor - Pt currently on PO Cipro Erythema; on oral ABX   Edema: None localized   Sensation: Intact, hypersensitive to testes Intact, hypersensitive to testes               Measurements: Perineum Initial Evaluation  Date: 2018   Encounter Date: 2018   Length (cm) 6 4.5   Width (cm) 4.5 4   Depth (cm) 5 4   Area (cm2) 27 cm2 18cm2   Tract/undermine None noted None noted             -Wound culture collected 2018; still pending 18, pt on oral ABX     Procedures: carefully lift scrotum gently with gauze sling to examine wound with aide duncan Pickard Technician, with no complaints or pain notified by patient. Pt removed packing on his own.   Debridement :  Non selective with NS, gauze, and cotton tip applicator   Cleansed with: NS irrigation                                                                        Periwound protected with: No sting skin prep   Primary dressin/4 strength Dakin's soaked roll gauze   Secondary Dressing: ABD pad held in place by patient's underwear   Other: P     Patient Education: POC and wound progress discussed with pt. Improving erythema and edema, increasing viable tissue. Pt continues to do sitz bath BID and changes packing on his own after each treatment with no trouble. Discussed 1/4 strength Dakin's this visit. Possible switch next visit, to saline gauze or alginate/hydrofiber per  "Geraldine's APRN note on 7/27/18. Reviewed  s/s infection - chills, fever, malaise, NV, increased redness/swelling/pain/exudate - and to go to ER/Urgent Care. Pt verbalized understanding to all.    7/24/18:Discussed plan of care with patient & APRN; Bobby Chua plans to get in contact with Dr. Zuleta to discuss plan of care, patient sees him tomorrow. Patient does not want home health coming to his mom's house which he resides at. Patient states he is confident in changing and caring for his wound himself at home; he showed other scars of previous wounds which he cared for while in [facility]. Patient wants to be shaved prior to any tape application; such as for NPWT. Patient is very concerned over the handling of his scrotum, stating we are \"[facility]anists\" at [facility]; however patient does not want to assist in positioning. Discussed wound status with increased quality tissue; patient states he feels like area is elodia & closing in.      7/20/18: Discussed POC with patient and APRN. Pt states that testes are exquisitely tender, and threatened to become violent if anatomy was handled too roughly. While discussing POC, pt states, \"Why am I here? Missy handled gunshot wounds and being stabbed ,this is nothing.\" Reviewed with patient that due to anatomical location of wound, and the fact that he has 2 catheters are concern for infection and other issues if wound is not addressed appropriately. Pt appears uninterested when listening to education. Bobby Chua APRN in to suggest care moving forward. She attempted to contact Dr. Zuleta, but he had left the office for the day. Discussed possible Home health, wound vac placement or LTAC. Pt seems strongly opposed to the idea of LTAC, or any type of tape being used to secure dressings due to hair. Pt states that he would have to ask his mother about a RN coming to the house to treat wound as he is currently staying with her due to her health. Pt states that he re packs wounds " "multiple times a day due to taking sitz baths, and would want to continue to do this even with home care visits. When daily dressing changes suggested to patient, pt scoffs, and states, \"Im not coming here every day.\" Per APRN suggestion, wound packed with Dakin's gauze and covered with ABD. Pt instructed on clean technique, how often to change dressing, and packing procedure. Pt states, \"Missy been packing it this whole time, I can handle it.\" Instructed pt on s/s infection - chills, fever, malaise, NV, increased redness/swelling/pain/exudate - and to go to ER/Urgent Care; to keep dressing D/I, and to return to [facility] on 07/23, then weekly for wound care appointment pending further assessment of needs. Pt agreeable with POC so far, and verbalizes understanding of all instruction.     Professional Collaboration: None today      Assessment:      Wound etiology: Surgical/Infection    Wound Progress:  Increased viable tissue    Rationale for Treatment: Dakins for to control bioburden    Patient tolerance/compliance: Pt tolerated treatment however appears uninterested in education.     Complicating factors: Anatomical location, pain    Need for ongoing Advanced Wound Care services:Patient requires skilled therapeutic wound care services for product selection, application of product, debridement, close monitoring with clinical assessment for expedite of wound healing.     Plan:      Treatment Plan and Recommendations:  Diagnosis/ICD10: N35.9 (ICD-10-CM) - Urethral stricture, unspecified    Procedures/CPT: Non selective debridement RN - [id number]    Frequency: 2x/week    Treatment Goals: STG 2 Weeks  LTG 4 Weeks   Granulation Tissue: 25% 50%   Decrease Necrotic Tissue to: 75% 50%   Wound Phase:  Proliferative Proliferative   Decrease Size by: 30% 50%   Periwound:  Mild erythema Intact   Decrease tracts/undermining by: NA NA   Decrease Pain:  8/10 5/10       At the time of each visit a thorough assessment of the patient is " completed to assure the  appropriateness of our plan of care.  The dressings or modalities may need to be adapted   from the original plan to address any significant changes in the wound environment.          Clinician Signature:_______________________________Date__________________      Physician Signature:______________________________Date:__________________

## 2018-08-03 ENCOUNTER — NON-PROVIDER VISIT (OUTPATIENT)
Dept: WOUND CARE | Facility: MEDICAL CENTER | Age: 58
End: 2018-08-03
Attending: PHYSICIAN ASSISTANT
Payer: MEDICARE

## 2018-08-03 PROCEDURE — 97602 WOUND(S) CARE NON-SELECTIVE: CPT

## 2018-08-03 NOTE — WOUND TEAM
"Advanced Wound Care  [facility]  [address]  [address]  [place] [address]  [phone] Fax: [phone]    Encounter Note  For Certification Period: 07/20/2018 - 10/10/2018  Start of Care: 07/20/2018          Referring Physician: Valdez Zuleta M.D.  Primary Physician:    Jane Mclaughlin P.A.-C.    Consulting Physicians:  CARLOS Perez       Wound(s): Perineum  Pharmacy of Choice:        Subjective:        HPI:       Copied from Dr. Zuleta's discharge summary on 07/13/2018 -   Patient with panurethral stricture, now S/P perineal urethrostomy 7/5/2018, exchange of SP tube, and cystoscopy.  Following the procedure he had scrotal pain and was eventually noted to have a scrotal and suprapubic hematoma.  He was taken back to the OR for evacuation of scrotal hematoma 7/5/18.  For a few days following this, scrotum had decreased in size, donald was draining well. Bleeding persisted despite conservative efforts of pressure dressing.  He was taken back to the OR 7/10 for exploration of perineum with control of postoperative bleeding after urethrostomy.  Since this procedure, his bleeding has been well controlled.  He did developed right testis tenderness and the antibiotic was changed to Cipro for orchitis.  Today there is no bleeding and pain is well controlled.  He is stable for discharge and will discharge with catheter in place, Cipro and plan for a follow up with [facility].     Pain:      States \"pretty tender\"    Med:  pt stated he was removed by Augementin by Dr. ZuletaThat was prescribed by Bobby Chua on 7/24 due to + cx    Past Medical History:    Allergies: No Known Allergies    Past Surgical History:     Social History:   Social History     Social History   • Marital status: Single     Spouse name: N/A   • Number of children: N/A   • Years of education: N/A     Occupational History   • Not on file.     Social History Main Topics   • Smoking status: Current Every " Day Smoker     Packs/day: 0.50     Years: 40.00     Types: Cigarettes   • Smokeless tobacco: Never Used   • Alcohol use No   • Drug use: Yes     Types: Inhaled      Comment: last smoked marijuana 7/3/2018   • Sexual activity: Not on file     Other Topics Concern   • Not on file     Social History Narrative   • No narrative on file             Objective:      Tests and Measures: did not take FSBS this am.     07/20/2018 - Pt states FSBS 108 this morning. Last A1c on 05/21/2018 - 7.5    Orthotic, protective, supportive devices: none    Fall Risk Assessment (antonia all that apply with an X):  - fall risk. Completed at initial evaluation on 07/20/2018       Wound Characteristics                                                    Location: Perineum   Initial Evaluation  Date: 07/20/2018   Encounter  Date: 08/03/2018   Tissue Type and %: 10% moist red, 90% thick yellow non viable tissue 90% moist red, 10% marbled yellow stringy non viable tissue   Periwound: Erythema, sutures Decreasing Erythema, sutures   Drainage: Moderate serous (pt changed packing this morning) Minimal serous   Exposed structures Sutures, catheter Sutures, catheter   Wound Edges:   Open Open, epibole   Odor: Moderate foul none   S&S of Infection:   Erythema, odor - Pt currently on PO Cipro Erythema, edema   Edema: None localized   Sensation: Intact, hypersensitive to testes Intact, hypersensitive to testes               Measurements: Perineum Initial Evaluation  Date: 07/20/2018   Encounter Date: 07/30/2018   Length (cm) 6 4.5   Width (cm) 4.5 4   Depth (cm) 5 4   Area (cm2) 27 cm2 18cm2   Tract/undermine None noted None noted              Procedures: carefully lift scrotum gently with gauze sling to examine wound with aide of Technician, with no complaints or pain notified by patient. Pt removed packing on his own.   Debridement :  Non selective with NS, gauze, and cotton tip applicator    Cleansed with: NS irrigation, cotton tip applicator                                                                        Periwound protected with: No sting skin prep   Primary dressin/4 strength Dakin's soaked roll gauze   Secondary Dressing: ABD pad held in place by patient's underwear   Other: pts own underwear, mesh underwear      Patient Education: POC and wound progress discussed with pt. Improving erythema and edema, increasing viable tissue. Pt continues to do sitz bath BID and changes packing on his own after each treatment with no trouble. Continued 1/4 strength Dakin's this visit. Possible switch next visit, to saline gauze or alginate/hydrofiber per Geraldine's APRN note on 18. Discussed tight tubing and possibility of pressure ulcer if not loosened, cleaned underneath in wound and periwound; pt refused to loosen because it rubs on his scrotum otherwise.  Reviewed  s/s infection - chills, fever, malaise, NV, increased redness/swelling/pain/exudate - and to go to ER/Urgent Care. Pt verbalized understanding to all.    Professional Collaboration: None today      Assessment:      Wound etiology: Surgical/Infection    Wound Progress:  Increased viable tissue    Rationale for Treatment: Dakins for to control bioburden, ABD for padding and absorption    Patient tolerance/compliance: Pt tolerated treatment however appears uninterested in education.     Complicating factors: Anatomical location, pain    Need for ongoing Advanced Wound Care services:Patient requires skilled therapeutic wound care services for product selection, application of product, debridement, close monitoring with clinical assessment for expedite of wound healing.     Plan:      Treatment Plan and Recommendations:  Diagnosis/ICD10: N35.9 (ICD-10-CM) - Urethral stricture, unspecified    Procedures/CPT: Non selective debridement RN - [id number]    Frequency: 2x/week    Treatment Goals: STG 2 Weeks  LTG 4 Weeks   Granulation Tissue: 25% 50%   Decrease Necrotic Tissue to: 75% 50%   Wound Phase:  Proliferative  Proliferative   Decrease Size by: 30% 50%   Periwound:  Mild erythema Intact   Decrease tracts/undermining by: NA NA   Decrease Pain:  8/10 5/10       At the time of each visit a thorough assessment of the patient is completed to assure the  appropriateness of our plan of care.  The dressings or modalities may need to be adapted   from the original plan to address any significant changes in the wound environment.          Clinician Signature:_______________________________Date__________________      Physician Signature:______________________________Date:__________________

## 2018-08-07 NOTE — PROGRESS NOTES
IR Procedure Note:    Site Marked and Confirmed with MD, patient and RN pre procedure. Dr. Jacobson placed a suprapubic catheter.  The pt tolerated the procedure well; ETCo2 baseline 34, with consistent waveform during the procedure.  Gauze and medipore applied to lower abdomen, CDI and soft.  Pt alert and verbally appropriate post procedure, vital signs stable during procedure and transport, see flow sheet for vital signs.  Report given to Megan SMITH.  RN transported pt to PPU.    KTM Advance APDL.  14f x 25cm. REF: [id number].  LOT:  [id number]     [facility]  Same-Day Surgery   Adult Discharge Orders & Instructions     For 24 hours after surgery    1. Get plenty of rest.  A responsible adult must stay with you for at least 24 hours after you leave the hospital.   2. Do not drive or use heavy equipment.  If you have weakness or tingling, don't drive or use heavy equipment until this feeling goes away.  3. Do not drink alcohol.  4. Avoid strenuous or risky activities.  Ask for help when climbing stairs.   5. You may feel lightheaded.  IF so, sit for a few minutes before standing.  Have someone help you get up.   6. If you have nausea (feel sick to your stomach): Drink only clear liquids such as apple juice, ginger ale, broth or 7-Up.  Rest may also help.  Be sure to drink enough fluids.  Move to a regular diet as you feel able.  7. You may have a slight fever. Call the doctor if your fever is over 100 F (37.7 C) (taken under the tongue) or lasts longer than 24 hours.  8. You may have a dry mouth, a sore throat, muscle aches or trouble sleeping.  These should go away after 24 hours.  9. Do not make important or legal decisions.   Call your doctor for any of the followin.  Signs of infection (fever, growing tenderness at the surgery site, a large amount of drainage or bleeding, severe pain, foul-smelling drainage, redness, swelling).    2. It has been over 8 to 10 hours since surgery and you are still not able to urinate (pass water).    3.  Headache for over 24 hours.    To contact a doctor, call Dr. [facility] at [phone] or:        [phone] and ask for the resident on call for Oral Surgery or Otolaryngology (answered 24 hours a day)      Emergency Department:    [facility]: [phone]       (TTY for hearing impaired: [phone])

## 2018-08-09 ENCOUNTER — NON-PROVIDER VISIT (OUTPATIENT)
Dept: WOUND CARE | Facility: MEDICAL CENTER | Age: 58
End: 2018-08-09
Attending: PHYSICIAN ASSISTANT
Payer: MEDICARE

## 2018-08-09 PROCEDURE — 97602 WOUND(S) CARE NON-SELECTIVE: CPT
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
## 2018-08-13 ENCOUNTER — PATIENT OUTREACH (OUTPATIENT)
Dept: HEALTH INFORMATION MANAGEMENT | Facility: OTHER | Age: 58
End: 2018-08-13

## 2018-08-14 ENCOUNTER — NON-PROVIDER VISIT (OUTPATIENT)
Dept: WOUND CARE | Facility: MEDICAL CENTER | Age: 58
End: 2018-08-14
Attending: PHYSICIAN ASSISTANT
Payer: MEDICARE

## 2018-08-14 PROCEDURE — 97602 WOUND(S) CARE NON-SELECTIVE: CPT
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
## 2018-08-15 NOTE — PROGRESS NOTES
Perry Davis was an elective admission who discharged on 7/13. [facility] was able to assist the patient when the patient was tentatively scheduled for an appointment with wound care the day of outreach but the order from urology had not been received. [facility] called urology and they had the incorrect fax number. I provided them with the correct # and they resent the order so the patient could keep his appointment. [facility] was also able to speak with the patient several times to ensure that he was taking his medications as directed and following up with providers. The patient kept appointments with Urology on 7/19, 7/25, and 7/30. He also kept appointments with Wound Care Services starting 7/20 and Infectious Disease 7/27. The patient declined assisted with scheduling a soner appointment with their PCP provider. The patient has appointments scheduled for 10/17 with their PCP and 11/26 with Cardiology.

## 2018-08-17 ENCOUNTER — NON-PROVIDER VISIT (OUTPATIENT)
Dept: WOUND CARE | Facility: MEDICAL CENTER | Age: 58
End: 2018-08-17
Attending: PHYSICIAN ASSISTANT
Payer: MEDICARE

## 2018-08-17 PROCEDURE — 97602 WOUND(S) CARE NON-SELECTIVE: CPT
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
## 2018-08-21 ENCOUNTER — NON-PROVIDER VISIT (OUTPATIENT)
Dept: WOUND CARE | Facility: MEDICAL CENTER | Age: 58
End: 2018-08-21
Attending: PHYSICIAN ASSISTANT
Payer: MEDICARE

## 2018-08-21 PROCEDURE — 97602 WOUND(S) CARE NON-SELECTIVE: CPT
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
## 2018-08-24 ENCOUNTER — APPOINTMENT (OUTPATIENT)
Dept: WOUND CARE | Facility: MEDICAL CENTER | Age: 58
End: 2018-08-24
Attending: PHYSICIAN ASSISTANT
Payer: MEDICARE

## 2018-08-28 ENCOUNTER — NON-PROVIDER VISIT (OUTPATIENT)
Dept: WOUND CARE | Facility: MEDICAL CENTER | Age: 58
End: 2018-08-28
Attending: PHYSICIAN ASSISTANT
Payer: MEDICARE

## 2018-08-28 PROCEDURE — 97602 WOUND(S) CARE NON-SELECTIVE: CPT
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
## 2018-09-01 ENCOUNTER — NON-PROVIDER VISIT (OUTPATIENT)
Dept: WOUND CARE | Facility: MEDICAL CENTER | Age: 58
End: 2018-09-01
Attending: PHYSICIAN ASSISTANT
Payer: MEDICARE

## 2018-09-01 PROCEDURE — 99211 OFF/OP EST MAY X REQ PHY/QHP: CPT

## 2018-09-01 NOTE — CERTIFICATION
Advanced Wound Care  [facility]  [address]  [address]  [place] [address]  [phone] Fax: [phone]    Discharge Note  For Certification Period: 07/20/2018 - 10/10/2018  Start of Care: 07/20/2018                Referring Physician: Valdez Zuleta M.D.  Primary Provider: Jane Mclaughlin P.A.-C.  Consulting Providers:  CARLOS Perez   Wound Etiology: Surgical, infection.  Wound location: Perineum  ICD-10: N35.9 (ICD-10-CM) - Urethral stricture, unspecified  Date of Discharge: 9/1/2018    Assessment:  Discharge patient at this time secondary to wound resolution. Thank you for the referral and the opportunity to treat your patient.           Subjective:        HPI: Copied from Dr. Zuleta's discharge summary on 07/13/2018 -   Patient with panurethral stricture, now S/P perineal urethrostomy 7/5/2018, exchange of SP tube, and cystoscopy.  Following the procedure he had scrotal pain and was eventually noted to have a scrotal and suprapubic hematoma.  He was taken back to the OR for evacuation of scrotal hematoma 7/5/18.  For a few days following this, scrotum had decreased in size, donald was draining well. Bleeding persisted despite conservative efforts of pressure dressing.  He was taken back to the OR 7/10 for exploration of perineum with control of postoperative bleeding after urethrostomy.  Since this procedure, his bleeding has been well controlled.  He did developed right testis tenderness and the antibiotic was changed to Cipro for orchitis.  Today there is no bleeding and pain is well controlled.  He is stable for discharge and will discharge with catheter in place, Cipro and plan for a follow up with [facility] [place].     Pain: Patient denies acute pain at wound site today.    Medications:  Current Outpatient Prescriptions [facility] in [facility]   Medication Sig Dispense Refill   • ciprofloxacin (CIPRO) 500 MG Tab Take 1 Tab by mouth every 12 hours. 20 Tab 0   • NON SPECIFIED Take  1 Tab by mouth 2 Times a Day. Antibiotic     • Empagliflozin-Metformin HCl (SYNJARDY) 12.5-1000 MG Tab Take 1 Tab by mouth every day.     • simvastatin (ZOCOR) 20 MG Tab Take 20 mg by mouth every evening.     • oxybutynin (DITROPAN) 5 MG Tab Take 5 mg by mouth 3 times a day.     • losartan (COZAAR) 25 MG Tab Take 25 mg by mouth every day.     • aripiprazole (ABILIFY) 15 MG Tab Take 15 mg by mouth 2 Times a Day.     • metoprolol (LOPRESSOR) 25 MG Tab Take 25 mg by mouth 2 times a day.     • carbamazepine (TEGRETOL) 200 MG Tab Take 200 mg by mouth 3 times a day.     • Cyanocobalamin (VITAMIN B-12) 1000 MCG Tab Take 1,000 mcg by mouth every day.     • nitroglycerin (NITROSTAT) 0.4 MG SL Tab Place 0.4 mg under tongue every 5 minutes as needed for Chest Pain.     • gabapentin (NEURONTIN) 300 MG Cap Take 300 mg by mouth 3 times a day.       No current [facility]-ordered facility-administered medications on file.        Allergies: No Known Allergies        Objective:      Tests and Measures:  9/1/2018: None today.    07/20/2018 - Pt states FSBS 108 this morning. Last A1c on 05/21/2018 - 7.5    Orthotic, protective, supportive devices: None    Fall Risk Assessment (antonia all that apply with an X):  - fall risk. Completed at initial evaluation on 07/20/2018       Wound Characteristics                                                    Location:   Perineum   Initial Evaluation  Date: 07/20/2018   Encounter Date:   9/1/2018   Tissue Type and %: 10% moist red, 90% thick yellow non viable tissue 100% epithelialized   Periwound: Erythema, sutures Scar tissue, intact.   Drainage: Moderate serous (pt changed packing this morning) None   Exposed structures Sutures, catheter Perineal urethrostomy   Wound Edges:   Open Closed   Odor: Moderate foul None   S&S of Infection:   Erythema, odor - Pt currently on PO Cipro None   Edema: None None   Sensation: Intact, hypersensitive to testes Intact               Measurements: Perineum Initial  Evaluation  Date: 07/20/2018   Encounter Date:   9/1/2018   Length (cm) 6 Resolved   Width (cm) 4.5    Depth (cm) 5    Area (cm2) 27 cm2    Tract/undermine None noted             Procedures:    Debridement: None   Cleansed with: None                                                                      Periwound protected with: None   Primary dressing: Open to air   Secondary Dressing:    Other:      Patient Education: Wound progress and plan of care discussed. Wound is resolved today. Patient is knowledgeable regarding how to clean his perineal urethrostomy, and would like to be discharged today rather than to return for a skin check.    Professional Collaboration: Discharge note sent to referring provider via EPIC.      Assessment:      Wound etiology: Surgical/Infection    Wound Progress: Wound is resolved today.    Rationale for Treatment: Resolved wound site left open to air.    Patient tolerance/compliance: Patient agrees with plan of care.    Complicating factors: Anatomical location, pain.    Need for ongoing Advanced Wound Care services: Patient discharged today.    Plan:      Treatment Plan and Recommendations:  Diagnosis/ICD10: N35.9 (ICD-10-CM) - Urethral stricture, unspecified    Procedures/CPT: Level I Established Visit 56858    Frequency: Discharged.    Treatment Goals: STG 2 Weeks  LTG 4 Weeks   Granulation Tissue: Resolved Resolved   Decrease Necrotic Tissue to:     Wound Phase:  Maturation Maturation   Decrease Size by:     Periwound:  Intact Intact   Decrease tracts/undermining by:     Decrease Pain:  None None       At the time of each visit a thorough assessment of the patient is completed to assure the  appropriateness of our plan of care.  The dressings or modalities may need to be adapted   from the original plan to address any significant changes in the wound environment.

## 2018-11-26 ENCOUNTER — OFFICE VISIT (OUTPATIENT)
Dept: CARDIOLOGY | Facility: MEDICAL CENTER | Age: 58
End: 2018-11-26
Payer: MEDICARE

## 2018-11-26 VITALS
HEIGHT: 69 IN | HEART RATE: 82 BPM | OXYGEN SATURATION: 96 % | DIASTOLIC BLOOD PRESSURE: 88 MMHG | SYSTOLIC BLOOD PRESSURE: 150 MMHG | WEIGHT: 210 LBS | BODY MASS INDEX: 31.1 KG/M2

## 2018-11-26 DIAGNOSIS — R07.89 OTHER CHEST PAIN: ICD-10-CM

## 2018-11-26 DIAGNOSIS — E78.5 DYSLIPIDEMIA: ICD-10-CM

## 2018-11-26 DIAGNOSIS — I10 ESSENTIAL HYPERTENSION, BENIGN: ICD-10-CM

## 2018-11-26 DIAGNOSIS — I25.84 CORONARY ARTERY DISEASE DUE TO CALCIFIED CORONARY LESION: ICD-10-CM

## 2018-11-26 DIAGNOSIS — I25.10 CORONARY ARTERY DISEASE DUE TO CALCIFIED CORONARY LESION: ICD-10-CM

## 2018-11-26 PROCEDURE — 99213 OFFICE O/P EST LOW 20 MIN: CPT | Performed by: INTERNAL MEDICINE

## 2018-11-26 NOTE — PROGRESS NOTES
"Chief Complaint   Patient presents with   • Coronary Artery Disease       Subjective:   Perry Davis is a 58 y.o. male who presents today for evaluation of coronary artery disease.  Patient had four-vessel coronary bypass surgery in 2015 currently is asymptomatic.  He was last seen in May 2018 when he had anxiety related chest discomfort.  These symptoms led to a nuclear perfusion study that revealed no ischemia.  The patient has not taken nitroglycerin for 9 months by his estimation.  He has no symptoms suggesting congestive heart failure although he is short of breath from deconditioning and probably COPD.  He is continually disturbed by a urologic situation that actually seems to be a little better at this time.  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    • Bronchitis     \"I cough\"   • Diabetes (HCC)     oral medication . 5/21/18-AVG am=90's.   • Heart burn     Treated with Zantac.   • High cholesterol    • Hypertension    • Indigestion     Treated with Zantac.    • Infectious disease 1989 or 1+990    had staph infection in spine, had PICC line, rash all over body,  then thoracic fusion   • Marijuana use 05/21/2018    Daily use for pain control.   • Myocardial infarct (Formerly [facility]) 04/05/2014    CABG-2015. Cardiologist in [facility].   • Pain     hip, knees   • Pain 05/21/2018    \"all over\"   • Psychiatric problem     depression, anxiety, bipolar    • Renal disorder     Hx of renal stone.   • Seizure (Formerly [facility])     last seizure 4/5/2014-unknown etiology, but possibly related to stress.   • Sleep apnea     no CPAP, no supplemental oxygen   • Snoring    • Urinary bladder disorder     5/21/18-currently has donald cath to leg bag.   • Urinary incontinence     urethral strictures.     Past Surgical History:   Procedure Laterality Date   • GROIN EXPLORATION  7/10/2018    " Procedure: GROIN EXPLORATION;  Surgeon: Valdez Zuleta M.D.;  Location: SURGERY [facility];  Service: Urology   • URETHRECTOMY  7/5/2018    Procedure: PERINEAL URETHROSTOMY;  Surgeon: Valdez Zuleta M.D.;  Location: SURGERY [facility];  Service: Urology   • EVACUATION OF HEMATOMA Bilateral 7/5/2018    Procedure: EVACUATION OF HEMATOMA;  Surgeon: Valdez Zuleta M.D.;  Location: SURGERY [facility];  Service: Urology   • CYSTOSCOPY  04/24/2018    SP cath placed.   • HEMORRHOIDECTOMY  02/2016   • HIP ARTHROSCOPY Right 01/2016   • KNEE ARTHROSCOPY Left 01/2016   • LITHOTRIPSY  2016    unsuccessful   • CYSTOSCOPY  2016    S/P unsuccessful lithotripsy   • MULTIPLE CORONARY ARTERY BYPASS  07/2015    4 vessel   • OTHER SURGICAL PROCEDURE Right 1993    Closed some veins in leg.   • THORACIC FUSION POSTERIOR  late 1980's     Family History   Problem Relation Age of Onset   • Heart Attack Father 75   • Heart Disease Father 59        CABG   • Heart Disease Sister 55        CABG     Social History     Social History   • Marital status: Single     Spouse name: N/A   • Number of children: N/A   • Years of education: N/A     Occupational History   • Not on file.     Social History Main Topics   • Smoking status: Current Every Day Smoker     Packs/day: 0.50     Years: 40.00     Types: Cigarettes   • Smokeless tobacco: Never Used   • Alcohol use No   • Drug use: Yes     Types: Inhaled      Comment: last smoked marijuana 7/3/2018   • Sexual activity: Not on file     Other Topics Concern   • Not on file     Social History Narrative   • No narrative on file     No Known Allergies  Outpatient Encounter Prescriptions as of 11/26/2018   Medication Sig Dispense Refill   • Empagliflozin-Metformin HCl (SYNJARDY) 12.5-1000 MG Tab Take 1 Tab by mouth every day.     • simvastatin (ZOCOR) 20 MG Tab Take 20 mg by mouth every evening.     • oxybutynin (DITROPAN) 5 MG Tab Take 5 mg by mouth 3 times a day.     •  "losartan (COZAAR) 25 MG Tab Take 25 mg by mouth every day.     • aripiprazole (ABILIFY) 15 MG Tab Take 15 mg by mouth 2 Times a Day.     • metoprolol (LOPRESSOR) 25 MG Tab Take 25 mg by mouth 2 times a day.     • carbamazepine (TEGRETOL) 200 MG Tab Take 200 mg by mouth 3 times a day.     • Cyanocobalamin (VITAMIN B-12) 1000 MCG Tab Take 1,000 mcg by mouth every day.     • nitroglycerin (NITROSTAT) 0.4 MG SL Tab Place 0.4 mg under tongue every 5 minutes as needed for Chest Pain.     • gabapentin (NEURONTIN) 300 MG Cap Take 300 mg by mouth 3 times a day.     • ciprofloxacin (CIPRO) 500 MG Tab Take 1 Tab by mouth every 12 hours. 20 Tab 0   • NON SPECIFIED Take 1 Tab by mouth 2 Times a Day. Antibiotic       No facility-administered encounter medications on file as of 11/26/2018.      ROS .  See HPI     Objective:   /88 (BP Location: Left arm, Patient Position: Sitting, BP Cuff Size: Adult)   Pulse 82   Ht 1.753 m (5' 9\")   Wt 95.3 kg (210 lb)   SpO2 96%   BMI 31.01 kg/m²     Physical Exam General: WD, WN, male in NAD.   Neck: No JVD.  Good carotid upstroke and no bruit  Chest: Clear to A & P after coughing  Heart: Regular rhythm, Normal S1 and S2. No murmur, gallop, rub, click  Ext: No edema  Neuro: Alert, oriented  Psych: normal mood, affect    Assessment:     1. Coronary artery disease due to calcified coronary lesion: CABG x4, July 2015     2. Dyslipidemia     3. Essential hypertension, benign     4. Other chest pain         Medical Decision Making:  Today's Assessment / Status / Plan:   Patient is asymptomatic from a cardiac standpoint.  He has a normal nuclear perfusion study in June 2018.  If general anesthesia is required for arthroscopic or hip replacement surgery patient's cardiac risk is acceptably low because he is asymptomatic.  His blood pressure is a little bit elevated today but he over 60 at home.  Tends to be elevated when he is excited exerts himself.  His most recent A1c is 7.5 and his " renal function is normal.  No changes in his medications are made at this time.  He will return in 6 months.  I told him I would be glad to provide a clearance statement for his orthopedic surgeon if that is required.

## 2021-04-19 ENCOUNTER — OFFICE VISIT (OUTPATIENT)
Dept: URGENT CARE | Facility: PHYSICIAN GROUP | Age: 61
End: 2021-04-19
Payer: MEDICARE

## 2021-04-19 VITALS
BODY MASS INDEX: 26.96 KG/M2 | HEART RATE: 120 BPM | WEIGHT: 182 LBS | SYSTOLIC BLOOD PRESSURE: 170 MMHG | TEMPERATURE: 100.2 F | OXYGEN SATURATION: 97 % | HEIGHT: 69 IN | RESPIRATION RATE: 22 BRPM | DIASTOLIC BLOOD PRESSURE: 90 MMHG

## 2021-04-19 DIAGNOSIS — M62.838 TRAPEZIUS MUSCLE SPASM: ICD-10-CM

## 2021-04-19 DIAGNOSIS — M54.2 CERVICALGIA: ICD-10-CM

## 2021-04-19 PROCEDURE — 99203 OFFICE O/P NEW LOW 30 MIN: CPT | Mod: 25 | Performed by: NURSE PRACTITIONER

## 2021-04-19 RX ORDER — CYCLOBENZAPRINE HCL 10 MG
10 TABLET ORAL 3 TIMES DAILY PRN
Qty: 20 TABLET | Refills: 0 | Status: SHIPPED | OUTPATIENT
Start: 2021-04-19

## 2021-04-19 RX ORDER — LINAGLIPTIN 5 MG/1
5 TABLET, FILM COATED ORAL DAILY
COMMUNITY

## 2021-04-19 RX ORDER — ATORVASTATIN CALCIUM 80 MG/1
80 TABLET, FILM COATED ORAL NIGHTLY
Status: ON HOLD | COMMUNITY
End: 2021-05-14

## 2021-04-19 RX ORDER — METHYLPREDNISOLONE 4 MG/1
TABLET ORAL
Qty: 21 TABLET | Refills: 0 | Status: ON HOLD | OUTPATIENT
Start: 2021-04-19 | End: 2021-05-14

## 2021-04-19 RX ORDER — KETOROLAC TROMETHAMINE 30 MG/ML
30 INJECTION, SOLUTION INTRAMUSCULAR; INTRAVENOUS ONCE
Status: COMPLETED | OUTPATIENT
Start: 2021-04-19 | End: 2021-04-19

## 2021-04-19 RX ADMIN — KETOROLAC TROMETHAMINE 30 MG: 30 INJECTION, SOLUTION INTRAMUSCULAR; INTRAVENOUS at 19:20

## 2021-04-19 ASSESSMENT — ENCOUNTER SYMPTOMS: BACK PAIN: 1

## 2021-04-20 NOTE — PROGRESS NOTES
"Subjective:      Perry Davis is a 60 y.o. male who presents with Back Pain (upper back pain. pt thinks he may have pulled something on saturday. extreme pain causing nausea/ vomiting)            Back Pain  This is a new problem. Episode onset: pt reports new onset of neck and upper back pain that started 3 days ago. He denies any injury to the area. he states he was just \"moving his boat\" but not doing anything unusual. reports he is vomiting due to the pain. The problem occurs constantly. The problem has been gradually worsening since onset. The pain is severe.       Review of Systems   Musculoskeletal: Positive for back pain and neck pain.   All other systems reviewed and are negative.    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    • Bronchitis     \"I cough\"   • Diabetes (Formerly [facility])     oral medication . 5/21/18-AVG am=90's.   • Heart burn     Treated with Zantac.   • High cholesterol    • Hypertension    • Indigestion     Treated with Zantac.    • Infectious disease 1989 or 1+990    had staph infection in spine, had PICC line, rash all over body,  then thoracic fusion   • Marijuana use 05/21/2018    Daily use for pain control.   • Myocardial infarct (Formerly [facility]) 04/05/2014    CABG-2015. Cardiologist in [facility].   • Pain     hip, knees   • Pain 05/21/2018    \"all over\"   • Psychiatric problem     depression, anxiety, bipolar    • Renal disorder     Hx of renal stone.   • Seizure (Formerly [facility])     last seizure 4/5/2014-unknown etiology, but possibly related to stress.   • Sleep apnea     no CPAP, no supplemental oxygen   • Snoring    • Urinary bladder disorder     5/21/18-currently has donald cath to leg bag.   • Urinary incontinence     urethral strictures.      Past Surgical History:   Procedure Laterality Date   • GROIN EXPLORATION  7/10/2018    Procedure: GROIN " EXPLORATION;  Surgeon: Valdez Zuleta M.D.;  Location: SURGERY [facility];  Service: Urology   • URETHRECTOMY  7/5/2018    Procedure: PERINEAL URETHROSTOMY;  Surgeon: Valdez Zuleta M.D.;  Location: SURGERY [facility];  Service: Urology   • EVACUATION OF HEMATOMA Bilateral 7/5/2018    Procedure: EVACUATION OF HEMATOMA;  Surgeon: Valdez Zuleta M.D.;  Location: SURGERY [facility];  Service: Urology   • CYSTOSCOPY  04/24/2018    SP cath placed.   • HEMORRHOIDECTOMY  02/2016   • HIP ARTHROSCOPY Right 01/2016   • KNEE ARTHROSCOPY Left 01/2016   • LITHOTRIPSY  2016    unsuccessful   • CYSTOSCOPY  2016    S/P unsuccessful lithotripsy   • MULTIPLE CORONARY ARTERY BYPASS  07/2015    4 vessel   • OTHER SURGICAL PROCEDURE Right 1993    Closed some veins in leg.   • THORACIC FUSION POSTERIOR  late 1980's      Social History     Socioeconomic History   • Marital status: Single     Spouse name: Not on file   • Number of children: Not on file   • Years of education: Not on file   • Highest education level: Not on file   Occupational History   • Not on file   Tobacco Use   • Smoking status: Current Every Day Smoker     Packs/day: 0.50     Years: 40.00     Pack years: 20.00     Types: Cigarettes   • Smokeless tobacco: Never Used   Substance and Sexual Activity   • Alcohol use: No   • Drug use: Yes     Types: Inhaled     Comment: last smoked marijuana 7/3/2018   • Sexual activity: Not on file   Other Topics Concern   • Not on file   Social History Narrative   • Not on file     Social Determinants of Health     Financial Resource Strain:    • Difficulty of Paying Living Expenses:    Food Insecurity:    • Worried About Running Out of Food in the Last Year:    • Ran Out of Food in the Last Year:    Transportation Needs:    • Lack of Transportation (Medical):    • Lack of Transportation (Non-Medical):    Physical Activity:    • Days of Exercise per Week:    • Minutes of Exercise per Session:    Stress:    "  • Feeling of Stress :    Social Connections:    • Frequency of Communication with Friends and Family:    • Frequency of Social Gatherings with Friends and Family:    • Attends Muslim Services:    • Active Member of Clubs or Organizations:    • Attends Club or Organization Meetings:    • Marital Status:    Intimate Partner Violence:    • Fear of Current or Ex-Partner:    • Emotionally Abused:    • Physically Abused:    • Sexually Abused:           Objective:     BP (!) 170/90 (BP Location: Right arm, Patient Position: Sitting, BP Cuff Size: Small adult)   Pulse (!) 120   Temp 37.9 °C (100.2 °F) (Temporal)   Resp (!) 22   Ht 1.753 m (5' 9\")   Wt 82.6 kg (182 lb)   SpO2 97%   BMI 26.88 kg/m²      Physical Exam  Vitals and nursing note reviewed.   Constitutional:       General: He is in acute distress.      Appearance: Normal appearance. He is well-developed.      Comments: Pt presents in considerable discomfort from pain   HENT:      Head: Normocephalic and atraumatic.      Right Ear: External ear normal.      Left Ear: External ear normal.      Nose: Nose normal.      Mouth/Throat:      Mouth: Mucous membranes are moist.   Eyes:      Conjunctiva/sclera: Conjunctivae normal.      Pupils: Pupils are equal, round, and reactive to light.   Neck:     Cardiovascular:      Rate and Rhythm: Normal rate and regular rhythm.   Pulmonary:      Effort: Pulmonary effort is normal.   Musculoskeletal:         General: Normal range of motion.      Cervical back: Normal range of motion. No signs of trauma or torticollis. Muscular tenderness present. No spinous process tenderness.   Skin:     General: Skin is warm and dry.      Capillary Refill: Capillary refill takes less than 2 seconds.   Neurological:      General: No focal deficit present.      Mental Status: He is alert and oriented to person, place, and time. Mental status is at baseline.   Psychiatric:         Mood and Affect: Mood normal.         Behavior: Behavior " normal.         Thought Content: Thought content normal.                 Assessment/Plan:        1. Trapezius muscle spasm  - ketorolac (TORADOL) injection 30 mg  - cyclobenzaprine (FLEXERIL) 10 mg Tab; Take 1 tablet by mouth 3 times a day as needed for Muscle Spasms.  Dispense: 20 tablet; Refill: 0  - methylPREDNISolone (MEDROL DOSEPAK) 4 MG Tablet Therapy Pack; Follow schedule on package instructions.  Dispense: 21 tablet; Refill: 0    2. Cervicalgia  - ketorolac (TORADOL) injection 30 mg  - methylPREDNISolone (MEDROL DOSEPAK) 4 MG Tablet Therapy Pack; Follow schedule on package instructions.  Dispense: 21 tablet; Refill: 0    Pt presents in considerable discomfort, this is likely why his BP, pulse and respirations are elevated  He tolerated injection well, no adverse effects noted, will discharge  Sedating effects of flexeril discussed  Alternate tylenol and ibuprofen as needed for pain  Warm compresses to neck and upper back to help with pain relief  Gentle exercise  No heavy lifting  Red flags discussed and when to seek care in the ER  Supportive care, differential diagnoses, and indications for immediate follow-up discussed with patient.    Pathogenesis of diagnosis discussed including typical length and natural progression.      Instructed to return to  or nearest emergency department if symptoms fail to improve, for any change in condition, further concerns, or new concerning symptoms.  Patient states understanding of the plan of care and discharge instructions.

## 2021-04-22 ENCOUNTER — OFFICE VISIT (OUTPATIENT)
Dept: URGENT CARE | Facility: PHYSICIAN GROUP | Age: 61
End: 2021-04-22
Payer: MEDICARE

## 2021-04-22 ENCOUNTER — HOSPITAL ENCOUNTER (OUTPATIENT)
Dept: RADIOLOGY | Facility: MEDICAL CENTER | Age: 61
DRG: 003 | End: 2021-04-22
Attending: FAMILY MEDICINE
Payer: MEDICARE

## 2021-04-22 VITALS
HEIGHT: 69 IN | HEART RATE: 111 BPM | WEIGHT: 180 LBS | DIASTOLIC BLOOD PRESSURE: 84 MMHG | TEMPERATURE: 98.2 F | SYSTOLIC BLOOD PRESSURE: 182 MMHG | OXYGEN SATURATION: 95 % | BODY MASS INDEX: 26.66 KG/M2 | RESPIRATION RATE: 20 BRPM

## 2021-04-22 DIAGNOSIS — M43.6 TORTICOLLIS: ICD-10-CM

## 2021-04-22 DIAGNOSIS — M54.2 NECK PAIN: ICD-10-CM

## 2021-04-22 DIAGNOSIS — I10 HYPERTENSION, UNSPECIFIED TYPE: ICD-10-CM

## 2021-04-22 DIAGNOSIS — R73.9 HYPERGLYCEMIA: ICD-10-CM

## 2021-04-22 LAB
GLUCOSE BLD-MCNC: NORMAL MG/DL (ref 70–100)
HBA1C MFR BLD: 13 % (ref 0–5.6)
INT CON NEG: NEGATIVE
INT CON POS: POSITIVE

## 2021-04-22 PROCEDURE — 96372 THER/PROPH/DIAG INJ SC/IM: CPT | Performed by: FAMILY MEDICINE

## 2021-04-22 PROCEDURE — 72040 X-RAY EXAM NECK SPINE 2-3 VW: CPT

## 2021-04-22 PROCEDURE — 99215 OFFICE O/P EST HI 40 MIN: CPT | Mod: 25 | Performed by: FAMILY MEDICINE

## 2021-04-22 PROCEDURE — 82962 GLUCOSE BLOOD TEST: CPT | Performed by: FAMILY MEDICINE

## 2021-04-22 PROCEDURE — 83036 HEMOGLOBIN GLYCOSYLATED A1C: CPT | Performed by: FAMILY MEDICINE

## 2021-04-22 RX ORDER — KETOROLAC TROMETHAMINE 30 MG/ML
30 INJECTION, SOLUTION INTRAMUSCULAR; INTRAVENOUS ONCE
Status: DISCONTINUED | OUTPATIENT
Start: 2021-04-22 | End: 2021-04-22

## 2021-04-22 RX ORDER — ACETAMINOPHEN AND CODEINE PHOSPHATE 300; 30 MG/1; MG/1
1 TABLET ORAL EVERY 6 HOURS PRN
Qty: 5 TABLET | Refills: 0 | Status: ON HOLD | OUTPATIENT
Start: 2021-04-22 | End: 2021-05-14

## 2021-04-22 RX ORDER — KETOROLAC TROMETHAMINE 30 MG/ML
30 INJECTION, SOLUTION INTRAMUSCULAR; INTRAVENOUS ONCE
Status: COMPLETED | OUTPATIENT
Start: 2021-04-22 | End: 2021-04-22

## 2021-04-22 RX ADMIN — KETOROLAC TROMETHAMINE 30 MG: 30 INJECTION, SOLUTION INTRAMUSCULAR; INTRAVENOUS at 18:23

## 2021-04-23 NOTE — PROGRESS NOTES
"Subjective:      Chief Complaint   Patient presents with   • Neck Pain     neck\" pt states that the pain radiates from neck to shoulder blades\"                  Neck Pain   This is a new problem. Episode onset: 3 d ago. The problem occurs constantly. The problem has been unchanged.   Pt states the pain started after he spent the day fishing.      \       The pain is moderate and constant and sharp.. The symptoms are aggravated by position. Stiffness is present in the morning. Associated symptoms include: nausea. Pertinent negatives include no fever, headaches, numbness, pain with swallowing or tingling. Pt has tried nothing for the symptoms.     #2. HTN - pt has hx HTN, but has been out of medications for over one year.    Reports that he can not afford his medications.         #3. DM -  [facility] and reports has been out of medications for over one year.    Reports that he can not afford his medications.         Pt denies CP, sob.     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    • Bronchitis     \"I cough\"   • Diabetes (HCC)     oral medication . 5/21/18-AVG am=90's.   • Heart burn     Treated with Zantac.   • High cholesterol    • Hypertension    • Indigestion     Treated with Zantac.    • Infectious disease 1989 or 1+990    had staph infection in spine, had PICC line, rash all over body,  then thoracic fusion   • Marijuana use 05/21/2018    Daily use for pain control.   • Myocardial infarct (HCC) 04/05/2014    CABG-2015. Cardiologist in [facility].   • Pain     hip, knees   • Pain 05/21/2018    \"all over\"   • Psychiatric problem     depression, anxiety, bipolar    • Renal disorder     Hx of renal stone.   • Seizure (HCC)     last seizure 4/5/2014-unknown etiology, but possibly related to stress.   • Sleep apnea     no CPAP, no supplemental oxygen " "  • Snoring    • Urinary bladder disorder     5/21/18-currently has donald cath to leg bag.   • Urinary incontinence     urethral strictures.         Social History     Tobacco Use   • Smoking status: Current Every Day Smoker     Packs/day: 0.50     Years: 40.00     Pack years: 20.00     Types: Cigarettes   • Smokeless tobacco: Never Used   Substance Use Topics   • Alcohol use: No   • Drug use: Yes     Types: Inhaled     Comment: last smoked marijuana 7/3/2018         Family hx was reviewed - no pertinent past family hx        Review of Systems   Constitutional: Negative for fever, chills and weight loss.   HENT - denies cough, ear pain, congestion, sore throat  Eyes: denies vision changes, discharge  Respiratory: Negative for cough and wheezing.    Cardiovascular: Negative for chest pain or PND.   Gastrointestinal:  No abdominal pain,  vomiting, diarrhea.  Negative for  blood in stool.     Neurological:  . Negative for tingling, weakness, numbnes.   musculoskeletal - denies myalgias, calf pain  Psych - denies anxiety/depression/mood changes.  Skin: no itching or rash  All other systems reviewed and are negative.           Objective:   BP (!) 182/84 (BP Location: Left arm, Patient Position: Sitting, BP Cuff Size: Adult)   Pulse (!) 111   Temp 36.8 °C (98.2 °F) (Temporal)   Resp 20   Ht 1.753 m (5' 9\")   Wt 81.6 kg (180 lb)   SpO2 95%         Physical Exam   Constitutional: pt is oriented to person, place, and time. Pt appears well-developed and well-nourished. No distress.   HENT:   Head: Normocephalic and atraumatic.   Eyes: Conjunctivae are normal.   Cardiovascular: Normal rate and regular rhythm.    Pulmonary/Chest: Effort normal and breath sounds normal. No respiratory distress. Pt has no wheezes.   Musculoskeletal:        Cervical spine : pt exhibits decreased range of motion, tenderness over bony spine.  Pt exhibits no swelling, no muscle spasms.   Neurological: pt is alert and oriented to person, place, " "and time.   Strength:    Deltoid - 5/5 on right  5/5 on left     - 5/5 on right, 5/5 on left   Skin: Skin is warm. Pt is not diaphoretic. No erythema.   Psychiatric:  Pt's behavior is normal.   Nursing note and vitals reviewed.    DX-CERVICAL SPINE-2 OR 3 VIEWS  Order: [id number]  Status:  Final result   Visible to patient:  No (scheduled for 4/23/2021  4:34 PM) Next appt:  None Dx:  Torticollis  Details    Reading Physician Reading Date Result Priority   Rogers Payton M.D.  [phone] 4/22/2021 Urgent Care      Narrative & Impression        4/22/2021 6:16 PM     HISTORY/REASON FOR EXAM:  Atraumatic Pain.  Neck pain for 4 days.     TECHNIQUE/EXAM DESCRIPTION AND NUMBER OF VIEWS:  Cervical spine series, 2 views.     COMPARISON:  None.     FINDINGS:  Mild reversal of curvature centered at the C5 level.  Vertebral body heights are preserved.  Multilevel loss of disc height and osteophyte formation.  Cervicothoracic junction is obscured.  Prevertebral soft tissues are within normal limits.  Odontoid is grossly intact.  Lateral masses of C1 are grossly intact.     IMPRESSION:     1.  Limited exam.  Cervicothoracic junction is obscured.  2.  No gross cervical spine fracture or subluxation.  3.  Moderate multilevel degenerative changes.                 A/P:            1.  Neck pain     X-rays were personally reviewed by myself.   There is no fracture, just arthritic changes as noted above.      Pt reports pain improved after toradol injection     - ketorolac (TORADOL) injection 30 mg  - Acetaminophen-Codeine (TYLENOL/CODEINE #3) 300-30 MG Tab; Take 1 tablet by mouth every 6 hours as needed for up to 7 days.  Dispense: 5 tablet; Refill: 0  - diclofenac sodium 1 % Gel; Apply 4 g topically every 8 hours as needed.  Dispense: 100 g; Refill: 0    2. Hyperglycemia  Random glucose - \"HIGH\" - machine will read up to 600, I believe, so his blood glucose is at least that high.     Likely is in DKA and recommend further " "evaluation in ED, which he refused.   I explained the very real risk of further deterioration.   Pt voiced understanding and still refused to go.       I offered to refill his medications (or cheaper alternatives) - pt refused due to \"no money\",  and desires to f/u with his PCP at the [facility].  I informed him that this is not a reasonable plan and urged him to reconsider, but he was adamant in his refusal to go  AMA form signed  - UC AMA/Refusal of Treatment    3. Hypertension, unspecified type  BP is dangerously high.   S/p 4 vessel CABG 2015  Current smoker      I feel that he is imminent danger of going into stroke and recommend further evaluation in ED, which he refused.   I explained the very real risk of further deterioration.   Pt voiced understanding and still refused to go.       - UC AMA/Refusal of Treatment           "

## 2021-04-24 ASSESSMENT — ENCOUNTER SYMPTOMS: NECK PAIN: 1

## 2021-04-25 ENCOUNTER — HOSPITAL ENCOUNTER (INPATIENT)
Facility: MEDICAL CENTER | Age: 61
LOS: 19 days | DRG: 003 | End: 2021-05-14
Attending: EMERGENCY MEDICINE | Admitting: INTERNAL MEDICINE
Payer: MEDICARE

## 2021-04-25 ENCOUNTER — APPOINTMENT (OUTPATIENT)
Dept: RADIOLOGY | Facility: MEDICAL CENTER | Age: 61
DRG: 003 | End: 2021-04-25
Attending: EMERGENCY MEDICINE
Payer: MEDICARE

## 2021-04-25 DIAGNOSIS — J96.01 ACUTE RESPIRATORY FAILURE WITH HYPOXIA (HCC): ICD-10-CM
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
## 2021-04-25 DIAGNOSIS — G06.1 SPINAL EPIDURAL ABSCESS: ICD-10-CM

## 2021-04-25 DIAGNOSIS — Z93.6: ICD-10-CM

## 2021-04-25 DIAGNOSIS — I46.9 CARDIAC ARREST (HCC): ICD-10-CM

## 2021-04-25 DIAGNOSIS — Z91.199 NONCOMPLIANCE WITH DIET AND MEDICATION REGIMEN: ICD-10-CM

## 2021-04-25 DIAGNOSIS — I63.9 ISCHEMIC STROKE (HCC): ICD-10-CM

## 2021-04-25 DIAGNOSIS — Z86.73 HISTORY OF ISCHEMIC STROKE: ICD-10-CM

## 2021-04-25 DIAGNOSIS — Z91.148 NONCOMPLIANCE WITH DIET AND MEDICATION REGIMEN: ICD-10-CM

## 2021-04-25 DIAGNOSIS — E11.10 DIABETIC KETOACIDOSIS WITHOUT COMA ASSOCIATED WITH TYPE 2 DIABETES MELLITUS (HCC): ICD-10-CM

## 2021-04-25 DIAGNOSIS — E11.9 TYPE 2 DIABETES MELLITUS WITHOUT COMPLICATION, WITHOUT LONG-TERM CURRENT USE OF INSULIN (HCC): ICD-10-CM

## 2021-04-25 DIAGNOSIS — S16.1XXA STRAIN OF NECK MUSCLE, INITIAL ENCOUNTER: ICD-10-CM

## 2021-04-25 PROBLEM — Z72.0 TOBACCO ABUSE: Chronic | Status: ACTIVE | Noted: 2021-04-25

## 2021-04-25 PROBLEM — F12.10 MARIJUANA ABUSE: Chronic | Status: ACTIVE | Noted: 2021-04-25

## 2021-04-25 PROBLEM — M54.9 ACUTE BILATERAL BACK PAIN: Status: ACTIVE | Noted: 2021-04-25

## 2021-04-25 PROBLEM — E87.29 HIGH ANION GAP METABOLIC ACIDOSIS: Status: ACTIVE | Noted: 2021-04-25

## 2021-04-25 PROBLEM — D69.6 THROMBOCYTOPENIA (HCC): Status: ACTIVE | Noted: 2021-04-25

## 2021-04-25 LAB
ALBUMIN SERPL BCP-MCNC: 2.9 G/DL (ref 3.2–4.9)
ALBUMIN/GLOB SERPL: 0.8 G/DL
ALP SERPL-CCNC: 147 U/L (ref 30–99)
ALT SERPL-CCNC: 45 U/L (ref 2–50)
ANION GAP SERPL CALC-SCNC: 19 MMOL/L (ref 7–16)
ANION GAP SERPL CALC-SCNC: 20 MMOL/L (ref 7–16)
APTT PPP: 31.8 SEC (ref 24.7–36)
AST SERPL-CCNC: 29 U/L (ref 12–45)
B-OH-BUTYR SERPL-MCNC: 3.68 MMOL/L (ref 0.02–0.27)
BASOPHILS # BLD AUTO: 0 % (ref 0–1.8)
BASOPHILS # BLD: 0 K/UL (ref 0–0.12)
BILIRUB SERPL-MCNC: 0.6 MG/DL (ref 0.1–1.5)
BUN SERPL-MCNC: 26 MG/DL (ref 8–22)
BUN SERPL-MCNC: 27 MG/DL (ref 8–22)
BURR CELLS BLD QL SMEAR: NORMAL
CALCIUM SERPL-MCNC: 8.4 MG/DL (ref 8.5–10.5)
CALCIUM SERPL-MCNC: 8.6 MG/DL (ref 8.5–10.5)
CHLORIDE SERPL-SCNC: 83 MMOL/L (ref 96–112)
CHLORIDE SERPL-SCNC: 87 MMOL/L (ref 96–112)
CO2 SERPL-SCNC: 17 MMOL/L (ref 20–33)
CO2 SERPL-SCNC: 18 MMOL/L (ref 20–33)
CREAT SERPL-MCNC: 0.79 MG/DL (ref 0.5–1.4)
CREAT SERPL-MCNC: 0.86 MG/DL (ref 0.5–1.4)
EKG IMPRESSION: NORMAL
EOSINOPHIL # BLD AUTO: 0 K/UL (ref 0–0.51)
EOSINOPHIL NFR BLD: 0 % (ref 0–6.9)
ERYTHROCYTE [DISTWIDTH] IN BLOOD BY AUTOMATED COUNT: 42.9 FL (ref 35.9–50)
GLOBULIN SER CALC-MCNC: 3.7 G/DL (ref 1.9–3.5)
GLUCOSE BLD-MCNC: 282 MG/DL (ref 65–99)
GLUCOSE BLD-MCNC: 315 MG/DL (ref 65–99)
GLUCOSE SERPL-MCNC: 345 MG/DL (ref 65–99)
GLUCOSE SERPL-MCNC: 363 MG/DL (ref 65–99)
HCT VFR BLD AUTO: 51.4 % (ref 42–52)
HGB BLD-MCNC: 17.9 G/DL (ref 14–18)
INR PPP: 0.97 (ref 0.87–1.13)
LACTATE BLD-SCNC: 1.7 MMOL/L (ref 0.5–2)
LYMPHOCYTES # BLD AUTO: 0.85 K/UL (ref 1–4.8)
LYMPHOCYTES NFR BLD: 6.1 % (ref 22–41)
MAGNESIUM SERPL-MCNC: 2 MG/DL (ref 1.5–2.5)
MANUAL DIFF BLD: NORMAL
MCH RBC QN AUTO: 30.6 PG (ref 27–33)
MCHC RBC AUTO-ENTMCNC: 34.8 G/DL (ref 33.7–35.3)
MCV RBC AUTO: 87.9 FL (ref 81.4–97.8)
MONOCYTES # BLD AUTO: 0.36 K/UL (ref 0–0.85)
MONOCYTES NFR BLD AUTO: 2.6 % (ref 0–13.4)
MORPHOLOGY BLD-IMP: NORMAL
NEUTROPHILS # BLD AUTO: 12.78 K/UL (ref 1.82–7.42)
NEUTROPHILS NFR BLD: 88.7 % (ref 44–72)
NEUTS BAND NFR BLD MANUAL: 2.6 % (ref 0–10)
NRBC # BLD AUTO: 0 K/UL
NRBC BLD-RTO: 0 /100 WBC
PHOSPHATE SERPL-MCNC: 4.9 MG/DL (ref 2.5–4.5)
PLATELET # BLD AUTO: 51 K/UL (ref 164–446)
PLATELET BLD QL SMEAR: NORMAL
PLATELETS.RETICULATED NFR BLD AUTO: 16.8 K/UL (ref 0.6–13.1)
PMV BLD AUTO: 12.7 FL (ref 9–12.9)
POIKILOCYTOSIS BLD QL SMEAR: NORMAL
POTASSIUM SERPL-SCNC: 3.8 MMOL/L (ref 3.6–5.5)
POTASSIUM SERPL-SCNC: 3.9 MMOL/L (ref 3.6–5.5)
PROT SERPL-MCNC: 6.6 G/DL (ref 6–8.2)
PROTHROMBIN TIME: 13.2 SEC (ref 12–14.6)
RBC # BLD AUTO: 5.85 M/UL (ref 4.7–6.1)
RBC BLD AUTO: PRESENT
SODIUM SERPL-SCNC: 120 MMOL/L (ref 135–145)
SODIUM SERPL-SCNC: 124 MMOL/L (ref 135–145)
TROPONIN T SERPL-MCNC: 13 NG/L (ref 6–19)
WBC # BLD AUTO: 14 K/UL (ref 4.8–10.8)

## 2021-04-25 PROCEDURE — 700111 HCHG RX REV CODE 636 W/ 250 OVERRIDE (IP): Performed by: EMERGENCY MEDICINE

## 2021-04-25 PROCEDURE — 700102 HCHG RX REV CODE 250 W/ 637 OVERRIDE(OP): Performed by: STUDENT IN AN ORGANIZED HEALTH CARE EDUCATION/TRAINING PROGRAM

## 2021-04-25 PROCEDURE — 72125 CT NECK SPINE W/O DYE: CPT | Mod: ME

## 2021-04-25 PROCEDURE — 87040 BLOOD CULTURE FOR BACTERIA: CPT | Mod: 91

## 2021-04-25 PROCEDURE — U0005 INFEC AGEN DETEC AMPLI PROBE: HCPCS

## 2021-04-25 PROCEDURE — 80053 COMPREHEN METABOLIC PANEL: CPT

## 2021-04-25 PROCEDURE — 85007 BL SMEAR W/DIFF WBC COUNT: CPT

## 2021-04-25 PROCEDURE — A9270 NON-COVERED ITEM OR SERVICE: HCPCS | Performed by: STUDENT IN AN ORGANIZED HEALTH CARE EDUCATION/TRAINING PROGRAM

## 2021-04-25 PROCEDURE — 99285 EMERGENCY DEPT VISIT HI MDM: CPT

## 2021-04-25 PROCEDURE — 93005 ELECTROCARDIOGRAM TRACING: CPT | Performed by: EMERGENCY MEDICINE

## 2021-04-25 PROCEDURE — 85027 COMPLETE CBC AUTOMATED: CPT

## 2021-04-25 PROCEDURE — 80048 BASIC METABOLIC PNL TOTAL CA: CPT

## 2021-04-25 PROCEDURE — 85730 THROMBOPLASTIN TIME PARTIAL: CPT

## 2021-04-25 PROCEDURE — 84484 ASSAY OF TROPONIN QUANT: CPT

## 2021-04-25 PROCEDURE — 72128 CT CHEST SPINE W/O DYE: CPT | Mod: ME

## 2021-04-25 PROCEDURE — 83735 ASSAY OF MAGNESIUM: CPT

## 2021-04-25 PROCEDURE — 72131 CT LUMBAR SPINE W/O DYE: CPT | Mod: ME

## 2021-04-25 PROCEDURE — 84100 ASSAY OF PHOSPHORUS: CPT

## 2021-04-25 PROCEDURE — U0003 INFECTIOUS AGENT DETECTION BY NUCLEIC ACID (DNA OR RNA); SEVERE ACUTE RESPIRATORY SYNDROME CORONAVIRUS 2 (SARS-COV-2) (CORONAVIRUS DISEASE [COVID-19]), AMPLIFIED PROBE TECHNIQUE, MAKING USE OF HIGH THROUGHPUT TECHNOLOGIES AS DESCRIBED BY CMS-2020-01-R: HCPCS

## 2021-04-25 PROCEDURE — 85055 RETICULATED PLATELET ASSAY: CPT

## 2021-04-25 PROCEDURE — 99223 1ST HOSP IP/OBS HIGH 75: CPT | Mod: GC,AI | Performed by: INTERNAL MEDICINE

## 2021-04-25 PROCEDURE — 82010 KETONE BODYS QUAN: CPT

## 2021-04-25 PROCEDURE — 36415 COLL VENOUS BLD VENIPUNCTURE: CPT

## 2021-04-25 PROCEDURE — 87181 SC STD AGAR DILUTION PER AGT: CPT

## 2021-04-25 PROCEDURE — 96374 THER/PROPH/DIAG INJ IV PUSH: CPT

## 2021-04-25 PROCEDURE — 87077 CULTURE AEROBIC IDENTIFY: CPT

## 2021-04-25 PROCEDURE — 85610 PROTHROMBIN TIME: CPT

## 2021-04-25 PROCEDURE — 700105 HCHG RX REV CODE 258: Performed by: STUDENT IN AN ORGANIZED HEALTH CARE EDUCATION/TRAINING PROGRAM

## 2021-04-25 PROCEDURE — 96375 TX/PRO/DX INJ NEW DRUG ADDON: CPT

## 2021-04-25 PROCEDURE — 82962 GLUCOSE BLOOD TEST: CPT | Mod: 91

## 2021-04-25 PROCEDURE — 700105 HCHG RX REV CODE 258: Performed by: EMERGENCY MEDICINE

## 2021-04-25 PROCEDURE — 71260 CT THORAX DX C+: CPT | Mod: MF

## 2021-04-25 PROCEDURE — 700117 HCHG RX CONTRAST REV CODE 255: Performed by: EMERGENCY MEDICINE

## 2021-04-25 PROCEDURE — 83605 ASSAY OF LACTIC ACID: CPT

## 2021-04-25 PROCEDURE — 770020 HCHG ROOM/CARE - TELE (206)

## 2021-04-25 RX ORDER — ASPIRIN 81 MG/1
81 TABLET, CHEWABLE ORAL DAILY
Status: DISCONTINUED | OUTPATIENT
Start: 2021-04-25 | End: 2021-04-26

## 2021-04-25 RX ORDER — MORPHINE SULFATE 4 MG/ML
4 INJECTION, SOLUTION INTRAMUSCULAR; INTRAVENOUS ONCE
Status: COMPLETED | OUTPATIENT
Start: 2021-04-25 | End: 2021-04-25

## 2021-04-25 RX ORDER — SODIUM CHLORIDE 9 MG/ML
INJECTION, SOLUTION INTRAVENOUS CONTINUOUS
Status: DISCONTINUED | OUTPATIENT
Start: 2021-04-25 | End: 2021-04-25

## 2021-04-25 RX ORDER — OXYCODONE HYDROCHLORIDE 5 MG/1
5 TABLET ORAL EVERY 4 HOURS PRN
Status: DISCONTINUED | OUTPATIENT
Start: 2021-04-25 | End: 2021-04-25

## 2021-04-25 RX ORDER — ACETAMINOPHEN 500 MG
1000 TABLET ORAL EVERY 8 HOURS
Status: DISCONTINUED | OUTPATIENT
Start: 2021-04-25 | End: 2021-04-26

## 2021-04-25 RX ORDER — SODIUM CHLORIDE 9 MG/ML
1000 INJECTION, SOLUTION INTRAVENOUS CONTINUOUS
Status: ACTIVE | OUTPATIENT
Start: 2021-04-25 | End: 2021-04-25

## 2021-04-25 RX ORDER — CYCLOBENZAPRINE HCL 10 MG
5 TABLET ORAL 3 TIMES DAILY
Status: DISCONTINUED | OUTPATIENT
Start: 2021-04-25 | End: 2021-04-26

## 2021-04-25 RX ORDER — POLYETHYLENE GLYCOL 3350 17 G/17G
1 POWDER, FOR SOLUTION ORAL
Status: DISCONTINUED | OUTPATIENT
Start: 2021-04-25 | End: 2021-04-28

## 2021-04-25 RX ORDER — LIDOCAINE 50 MG/G
3 PATCH TOPICAL EVERY 24 HOURS
Status: DISCONTINUED | OUTPATIENT
Start: 2021-04-25 | End: 2021-05-14 | Stop reason: HOSPADM

## 2021-04-25 RX ORDER — GABAPENTIN 300 MG/1
300 CAPSULE ORAL 3 TIMES DAILY
Status: DISCONTINUED | OUTPATIENT
Start: 2021-04-25 | End: 2021-04-26

## 2021-04-25 RX ORDER — SODIUM CHLORIDE, SODIUM LACTATE, POTASSIUM CHLORIDE, CALCIUM CHLORIDE 600; 310; 30; 20 MG/100ML; MG/100ML; MG/100ML; MG/100ML
INJECTION, SOLUTION INTRAVENOUS CONTINUOUS
Status: DISCONTINUED | OUTPATIENT
Start: 2021-04-25 | End: 2021-04-26

## 2021-04-25 RX ORDER — MENTHOL AND METHYL SALICYLATE 10; 30 G/100G; G/100G
CREAM TOPICAL PRN
Status: DISCONTINUED | OUTPATIENT
Start: 2021-04-25 | End: 2021-04-28

## 2021-04-25 RX ORDER — ATORVASTATIN CALCIUM 40 MG/1
80 TABLET, FILM COATED ORAL NIGHTLY
Status: DISCONTINUED | OUTPATIENT
Start: 2021-04-25 | End: 2021-04-28

## 2021-04-25 RX ORDER — ONDANSETRON 2 MG/ML
4 INJECTION INTRAMUSCULAR; INTRAVENOUS ONCE
Status: COMPLETED | OUTPATIENT
Start: 2021-04-25 | End: 2021-04-25

## 2021-04-25 RX ORDER — BISACODYL 10 MG
10 SUPPOSITORY, RECTAL RECTAL
Status: DISCONTINUED | OUTPATIENT
Start: 2021-04-25 | End: 2021-04-28

## 2021-04-25 RX ORDER — LOSARTAN POTASSIUM 25 MG/1
25 TABLET ORAL DAILY
Status: DISCONTINUED | OUTPATIENT
Start: 2021-04-25 | End: 2021-04-27

## 2021-04-25 RX ORDER — AMOXICILLIN 250 MG
2 CAPSULE ORAL 2 TIMES DAILY
Status: DISCONTINUED | OUTPATIENT
Start: 2021-04-25 | End: 2021-04-28

## 2021-04-25 RX ORDER — DEXTROSE MONOHYDRATE 25 G/50ML
50 INJECTION, SOLUTION INTRAVENOUS
Status: DISCONTINUED | OUTPATIENT
Start: 2021-04-25 | End: 2021-04-29

## 2021-04-25 RX ORDER — CYCLOBENZAPRINE HCL 10 MG
10 TABLET ORAL 3 TIMES DAILY
Status: DISCONTINUED | OUTPATIENT
Start: 2021-04-25 | End: 2021-04-25

## 2021-04-25 RX ORDER — OXYCODONE HYDROCHLORIDE 10 MG/1
10 TABLET ORAL EVERY 4 HOURS PRN
Status: DISCONTINUED | OUTPATIENT
Start: 2021-04-25 | End: 2021-04-27

## 2021-04-25 RX ORDER — HYDROMORPHONE HYDROCHLORIDE 1 MG/ML
0.5 INJECTION, SOLUTION INTRAMUSCULAR; INTRAVENOUS; SUBCUTANEOUS ONCE
Status: COMPLETED | OUTPATIENT
Start: 2021-04-25 | End: 2021-04-25

## 2021-04-25 RX ORDER — ACETAMINOPHEN 325 MG/1
650 TABLET ORAL EVERY 6 HOURS PRN
Status: DISCONTINUED | OUTPATIENT
Start: 2021-04-25 | End: 2021-04-25

## 2021-04-25 RX ADMIN — METOPROLOL TARTRATE 25 MG: 25 TABLET, FILM COATED ORAL at 18:17

## 2021-04-25 RX ADMIN — SODIUM CHLORIDE, POTASSIUM CHLORIDE, SODIUM LACTATE AND CALCIUM CHLORIDE: 600; 310; 30; 20 INJECTION, SOLUTION INTRAVENOUS at 19:46

## 2021-04-25 RX ADMIN — SODIUM CHLORIDE: 9 INJECTION, SOLUTION INTRAVENOUS at 18:34

## 2021-04-25 RX ADMIN — HYDROMORPHONE HYDROCHLORIDE: 1 INJECTION, SOLUTION INTRAMUSCULAR; INTRAVENOUS; SUBCUTANEOUS at 13:48

## 2021-04-25 RX ADMIN — LOSARTAN POTASSIUM 25 MG: 25 TABLET, FILM COATED ORAL at 18:16

## 2021-04-25 RX ADMIN — SODIUM CHLORIDE 1000 ML: 9 INJECTION, SOLUTION INTRAVENOUS at 13:11

## 2021-04-25 RX ADMIN — IOHEXOL 80 ML: 350 INJECTION, SOLUTION INTRAVENOUS at 14:11

## 2021-04-25 RX ADMIN — OXYCODONE 5 MG: 5 TABLET ORAL at 16:52

## 2021-04-25 RX ADMIN — ONDANSETRON 4 MG: 2 INJECTION INTRAMUSCULAR; INTRAVENOUS at 12:03

## 2021-04-25 RX ADMIN — MORPHINE SULFATE 4 MG: 4 INJECTION INTRAVENOUS at 12:03

## 2021-04-25 RX ADMIN — OXYCODONE HYDROCHLORIDE 10 MG: 10 TABLET ORAL at 21:40

## 2021-04-25 RX ADMIN — INSULIN HUMAN 5 UNITS: 100 INJECTION, SOLUTION PARENTERAL at 20:46

## 2021-04-25 RX ADMIN — DOCUSATE SODIUM 50 MG AND SENNOSIDES 8.6 MG 2 TABLET: 8.6; 5 TABLET, FILM COATED ORAL at 18:15

## 2021-04-25 RX ADMIN — GABAPENTIN 300 MG: 300 CAPSULE ORAL at 18:15

## 2021-04-25 RX ADMIN — ACETAMINOPHEN 1000 MG: 500 TABLET ORAL at 21:32

## 2021-04-25 RX ADMIN — ATORVASTATIN CALCIUM 80 MG: 80 TABLET, FILM COATED ORAL at 20:49

## 2021-04-25 RX ADMIN — INSULIN HUMAN 6 UNITS: 100 INJECTION, SOLUTION PARENTERAL at 18:30

## 2021-04-25 RX ADMIN — ACETAMINOPHEN 1000 MG: 500 TABLET ORAL at 18:16

## 2021-04-25 RX ADMIN — ASPIRIN 81 MG: 81 TABLET, CHEWABLE ORAL at 18:15

## 2021-04-25 RX ADMIN — CYCLOBENZAPRINE 5 MG: 10 TABLET, FILM COATED ORAL at 18:16

## 2021-04-25 ASSESSMENT — PATIENT HEALTH QUESTIONNAIRE - PHQ9
3. TROUBLE FALLING OR STAYING ASLEEP OR SLEEPING TOO MUCH: SEVERAL DAYS
SUM OF ALL RESPONSES TO PHQ QUESTIONS 1-9: 5
3. TROUBLE FALLING OR STAYING ASLEEP OR SLEEPING TOO MUCH: SEVERAL DAYS
4. FEELING TIRED OR HAVING LITTLE ENERGY: SEVERAL DAYS
SUM OF ALL RESPONSES TO PHQ QUESTIONS 1-9: 5
1. LITTLE INTEREST OR PLEASURE IN DOING THINGS: SEVERAL DAYS
7. TROUBLE CONCENTRATING ON THINGS, SUCH AS READING THE NEWSPAPER OR WATCHING TELEVISION: NOT AT ALL
6. FEELING BAD ABOUT YOURSELF - OR THAT YOU ARE A FAILURE OR HAVE LET YOURSELF OR YOUR FAMILY DOWN: SEVERAL DAYS
8. MOVING OR SPEAKING SO SLOWLY THAT OTHER PEOPLE COULD HAVE NOTICED. OR THE OPPOSITE, BEING SO FIGETY OR RESTLESS THAT YOU HAVE BEEN MOVING AROUND A LOT MORE THAN USUAL: NOT AT ALL
6. FEELING BAD ABOUT YOURSELF - OR THAT YOU ARE A FAILURE OR HAVE LET YOURSELF OR YOUR FAMILY DOWN: SEVERAL DAYS
8. MOVING OR SPEAKING SO SLOWLY THAT OTHER PEOPLE COULD HAVE NOTICED. OR THE OPPOSITE, BEING SO FIGETY OR RESTLESS THAT YOU HAVE BEEN MOVING AROUND A LOT MORE THAN USUAL: NOT AT ALL
1. LITTLE INTEREST OR PLEASURE IN DOING THINGS: SEVERAL DAYS
7. TROUBLE CONCENTRATING ON THINGS, SUCH AS READING THE NEWSPAPER OR WATCHING TELEVISION: NOT AT ALL
4. FEELING TIRED OR HAVING LITTLE ENERGY: SEVERAL DAYS
5. POOR APPETITE OR OVEREATING: SEVERAL DAYS
5. POOR APPETITE OR OVEREATING: SEVERAL DAYS
9. THOUGHTS THAT YOU WOULD BE BETTER OFF DEAD, OR OF HURTING YOURSELF: NOT AT ALL
SUM OF ALL RESPONSES TO PHQ9 QUESTIONS 1 AND 2: 1
2. FEELING DOWN, DEPRESSED, IRRITABLE, OR HOPELESS: NOT AT ALL
9. THOUGHTS THAT YOU WOULD BE BETTER OFF DEAD, OR OF HURTING YOURSELF: NOT AT ALL
SUM OF ALL RESPONSES TO PHQ9 QUESTIONS 1 AND 2: 1
2. FEELING DOWN, DEPRESSED, IRRITABLE, OR HOPELESS: NOT AT ALL

## 2021-04-25 ASSESSMENT — COGNITIVE AND FUNCTIONAL STATUS - GENERAL
EATING MEALS: TOTAL
DAILY ACTIVITIY SCORE: 21
MOBILITY SCORE: 19
MOVING TO AND FROM BED TO CHAIR: A LITTLE
MOVING FROM LYING ON BACK TO SITTING ON SIDE OF FLAT BED: A LITTLE
WALKING IN HOSPITAL ROOM: A LITTLE
CLIMB 3 TO 5 STEPS WITH RAILING: A LITTLE
SUGGESTED CMS G CODE MODIFIER DAILY ACTIVITY: CJ
SUGGESTED CMS G CODE MODIFIER MOBILITY: CK
STANDING UP FROM CHAIR USING ARMS: A LITTLE

## 2021-04-25 ASSESSMENT — ENCOUNTER SYMPTOMS
PALPITATIONS: 0
NECK PAIN: 1
DIARRHEA: 0
WEAKNESS: 1
CHILLS: 0
FALLS: 1
FOCAL WEAKNESS: 0
SEIZURES: 0
VOMITING: 1
HEADACHES: 0
MEMORY LOSS: 0
CONSTIPATION: 0
DOUBLE VISION: 0
DIZZINESS: 0
FEVER: 0
SHORTNESS OF BREATH: 0
ABDOMINAL PAIN: 0
SPEECH CHANGE: 0
COUGH: 0
BLURRED VISION: 0
WHEEZING: 0
SORE THROAT: 0
TINGLING: 1
NERVOUS/ANXIOUS: 0
MYALGIAS: 1
NAUSEA: 1
LOSS OF CONSCIOUSNESS: 0
BACK PAIN: 1

## 2021-04-25 ASSESSMENT — LIFESTYLE VARIABLES
AVERAGE NUMBER OF DAYS PER WEEK YOU HAVE A DRINK CONTAINING ALCOHOL: 0
TOTAL SCORE: 0
ALCOHOL_USE: NO
EVER FELT BAD OR GUILTY ABOUT YOUR DRINKING: NO
HAVE YOU EVER FELT YOU SHOULD CUT DOWN ON YOUR DRINKING: NO
TOTAL SCORE: 0
CONSUMPTION TOTAL: NEGATIVE
EVER HAD A DRINK FIRST THING IN THE MORNING TO STEADY YOUR NERVES TO GET RID OF A HANGOVER: NO
HOW MANY TIMES IN THE PAST YEAR HAVE YOU HAD 5 OR MORE DRINKS IN A DAY: 0
TOTAL SCORE: 0
SUBSTANCE_ABUSE: 1
ON A TYPICAL DAY WHEN YOU DRINK ALCOHOL HOW MANY DRINKS DO YOU HAVE: 0
HAVE PEOPLE ANNOYED YOU BY CRITICIZING YOUR DRINKING: NO
DOES PATIENT WANT TO STOP DRINKING: NO

## 2021-04-25 ASSESSMENT — PAIN DESCRIPTION - PAIN TYPE
TYPE: CHRONIC PAIN
TYPE: ACUTE PAIN
TYPE: CHRONIC PAIN

## 2021-04-25 NOTE — H&P
"History & Physical Note    Date of Admission: 4/25/2021  Admission Status: Inpatient  [facility] Team: FRITZ  Attending: Dr. Garcia   Senior Resident: Dr. Paredes  Intern: Dr. Cano  Contact Number: [phone]    Chief Complaint:   Chief Complaint   Patient presents with   • Back Pain   • Neck Pain        History of Present Illness (HPI):     Note: History is given by both patient and patient's mother, Mary Davis ([phone]).    Perry Davis is a 60 y.o. male with a past medical history significant for coronary artery disease (s/p CABG x4 in 2015), type 2 diabetes mellitus (A1c 13% in 4/2021, currently not on medications), HTN/HLD, marijuana use, remote h/o lower back surgery (1980s 2/2 lower spine cyst), and osteoarthritis who presented 4/25/2021 with neck pain and back pain.  About 1 week ago, patient had been working on his boat when he \"worked too much on his back\" and started getting back pain.  Back pain got worse, after which he presented to [facility] urgent care twice, and was given Toradol injections which help with his back pain somewhat, as well as Tylenol with codeine, which subsequently made him feel dizzy and lose his baseline balance.  Today, while he was getting out of the bathtub, he slipped and fell on his back and neck with \"both legs flipped up\".  He states that since then, the pain got worse; is described as a sharp sensation starting in the back of his head, radiating down his neck, bilateral arms (with tingling sensation), as well as over his entire back, comes concentrating on his spinal area; it is a 10/10 on a pain scale.  He did not lose consciousness.  He was able to crawl to his mother's bedroom, who then called EMS.  He came into the emergency room wearing a back brace/neck brace that he had had for years during his previous surgery in the 1980s for his back.  He stated that this helped with his back pain as well.  Patient does not have chest pain, shortness of breath, changes " in vision, loss of bowel or bladder function.  He does have some nausea with vomiting.  He presented to the ED for this.    Of note, during his last 2 visits to the urgent care, he was found to have very high blood glucose levels as well as hypertension, and was urged to go to the ED.  He had previously been getting medications from wound care center, however, ever since Covid happened he has not been able to fill all of his meds, which has been about for a year.    Review of Systems:   Review of Systems   Constitutional: Positive for malaise/fatigue. Negative for chills and fever.   HENT: Negative for congestion and sore throat.    Eyes: Negative for blurred vision and double vision.   Respiratory: Negative for cough, shortness of breath and wheezing.    Cardiovascular: Negative for chest pain, palpitations and leg swelling.   Gastrointestinal: Positive for nausea and vomiting. Negative for abdominal pain, constipation and diarrhea.   Genitourinary: Negative for dysuria, frequency and urgency.   Musculoskeletal: Positive for back pain, falls, joint pain, myalgias and neck pain.   Neurological: Positive for tingling and weakness. Negative for dizziness, speech change, focal weakness, seizures, loss of consciousness and headaches.   Psychiatric/Behavioral: Positive for substance abuse. Negative for memory loss and suicidal ideas. The patient is not nervous/anxious.          Past Medical History:   Past Medical History was reviewed with patient.   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), Heart burn, High cholesterol, Hypertension, Indigestion, Infectious disease (1989 or 1+990), Marijuana use (05/21/2018), Myocardial infarct (HCC) (04/05/2014), Pain, Pain (05/21/2018), Psychiatric problem, Renal disorder, Seizure ([facility]), Sleep apnea, Snoring, Urinary bladder disorder, and Urinary incontinence.    Past Surgical History: Past Surgical History was reviewed with  patient.   has a past surgical history that includes hemorrhoidectomy (02/2016); multiple coronary artery bypass (07/2015); hip arthroscopy (Right, 01/2016); knee arthroscopy (Left, 01/2016); lithotripsy (2016); cystoscopy (2016); other surgical procedure (Right, 1993); cystoscopy (04/24/2018); thoracic fusion posterior (late 1980's); urethrectomy (7/5/2018); evacuation of hematoma (Bilateral, 7/5/2018); and groin exploration (7/10/2018).    Medications: Medications have been reviewed with patient. Not taking any other than ASA 81 and Tylenol #3  Prior to Admission Medications   Prescriptions Last Dose Informant Patient Reported? Taking?   Acetaminophen-Codeine (TYLENOL/CODEINE #3) 300-30 MG Tab 4/25/2021 at Unknown time Patient's Home Pharmacy No No   Sig: Take 1 tablet by mouth every 6 hours as needed for up to 7 days.   Cyanocobalamin (VITAMIN B-12) 1000 MCG Tab >2 weeks at unknown Patient Yes No   Sig: Take 1,000 mcg by mouth every day.   Empagliflozin-Metformin HCl (SYNJARDY) 12.5-1000 MG Tab >2 weeks at unknown Patient Yes No   Sig: Take 1 Tab by mouth every day.   aripiprazole (ABILIFY) 15 MG Tab >2 weeks at unknown Patient Yes No   Sig: Take 15 mg by mouth 2 Times a Day.   atorvastatin (LIPITOR) 80 MG tablet >2 weeks at unknown Patient Yes No   Sig: Take 80 mg by mouth every evening.   carbamazepine (TEGRETOL) 200 MG Tab >2 weeks at unknown Patient Yes No   Sig: Take 200 mg by mouth 3 times a day.   cyclobenzaprine (FLEXERIL) 10 mg Tab unknown at unknown Patient's Home Pharmacy No No   Sig: Take 1 tablet by mouth 3 times a day as needed for Muscle Spasms.   diclofenac sodium 1 % Gel unknown at unknown Patient's Home Pharmacy No No   Sig: Apply 4 g topically every 8 hours as needed.   gabapentin (NEURONTIN) 300 MG Cap >2 weeks at unknown Patient Yes No   Sig: Take 300 mg by mouth 3 times a day.   linagliptin (TRADJENTA) 5 MG Tab tablet >2 weeks at unknown Patient Yes No   Sig: Take 5 mg by mouth every day.    losartan (COZAAR) 25 MG Tab >2 weeks at unknown Patient Yes No   Sig: Take 25 mg by mouth every day.   methylPREDNISolone (MEDROL DOSEPAK) 4 MG Tablet Therapy Pack unknown at unknown Patient's Home Pharmacy No No   Sig: Follow schedule on package instructions.   metoprolol (LOPRESSOR) 25 MG Tab >2 weeks at unknown Patient Yes No   Sig: Take 25 mg by mouth 2 times a day.   nitroglycerin (NITROSTAT) 0.4 MG SL Tab >2 weeks at unknown Patient Yes No   Sig: Place 0.4 mg under tongue every 5 minutes as needed for Chest Pain.   oxybutynin (DITROPAN) 5 MG Tab >2 weeks at unknown Patient Yes No   Sig: Take 5 mg by mouth 3 times a day.      Facility-Administered Medications: None        Allergies: Allergies have been reviewed with patient.  No Known Allergies    Family History:   family history includes Heart Attack (age of onset: 75) in his father; Heart Disease (age of onset: 55) in his sister; Heart Disease (age of onset: 59) in his father.   Father - diabetes mellitus    Social History:   Tobacco: Smokes one half PPD x20 years  Alcohol: None  Recreational drugs (illegal and prescription): Smokes marijuana daily  Employment: Works on a boat  Activity Level: Performs all ADLs and IADLs by self  Living situation: Lives in house in [place] with mom  Recent travel: None  Primary Care Provider: reviewed none  Other (stressors, spirituality, exposures): Recently overworked/overexerted his back while working on his boat    Physical Exam:   Vitals:  Temp:  [36.1 °C (97 °F)-36.6 °C (97.8 °F)] 36.6 °C (97.8 °F)  Pulse:  [100-112] 100  Resp:  [18-28] 18  BP: (146-183)/(79-99) 183/99  SpO2:  [90 %-97 %] 97 %    Physical Exam  Vitals and nursing note reviewed. Exam conducted with a chaperone present (mom, Mary Davis at bedside).   Constitutional:       General: He is in acute distress.      Appearance: Normal appearance. He is normal weight. He is ill-appearing and diaphoretic. He is not toxic-appearing.   HENT:      Head:  Normocephalic and atraumatic.      Right Ear: External ear normal.      Left Ear: External ear normal.      Nose: Nose normal. No congestion or rhinorrhea.      Mouth/Throat:      Mouth: Mucous membranes are dry.      Pharynx: Oropharynx is clear. No oropharyngeal exudate or posterior oropharyngeal erythema.   Eyes:      General: No scleral icterus.        Right eye: No discharge.         Left eye: No discharge.      Extraocular Movements: Extraocular movements intact.      Conjunctiva/sclera: Conjunctivae normal.      Pupils: Pupils are equal, round, and reactive to light.   Neck:      Comments: Tender to palpation in posterior aspect of neck  Cardiovascular:      Rate and Rhythm: Regular rhythm. Tachycardia present.      Pulses: Normal pulses.      Heart sounds: Normal heart sounds. No murmur.   Pulmonary:      Effort: Pulmonary effort is normal. No respiratory distress.      Breath sounds: Normal breath sounds. No wheezing or rales.      Comments: Tenderness to palpation diffusely in rib cage area bilaterally  Chest:      Chest wall: Tenderness present.   Abdominal:      General: Abdomen is flat. Bowel sounds are normal. There is no distension.      Palpations: Abdomen is soft.      Tenderness: There is no abdominal tenderness. There is no right CVA tenderness, left CVA tenderness or guarding.   Musculoskeletal:         General: Tenderness and deformity present. Normal range of motion.      Cervical back: Tenderness present. No rigidity.      Right lower leg: No edema.      Left lower leg: No edema.      Comments: + Kyphosis.  Tenderness to palpation and paraspinal area from C1 to S1/S2 area.  No noticeable abrasions/scars/open wounds.   Lymphadenopathy:      Cervical: No cervical adenopathy.   Skin:     General: Skin is warm.      Capillary Refill: Capillary refill takes less than 2 seconds.      Coloration: Skin is not jaundiced.   Neurological:      General: No focal deficit present.      Mental Status: He is  alert and oriented to person, place, and time. Mental status is at baseline.      GCS: GCS eye subscore is 4. GCS verbal subscore is 5. GCS motor subscore is 6.      Cranial Nerves: No cranial nerve deficit, dysarthria or facial asymmetry.      Sensory: Sensation is intact. No sensory deficit.      Motor: Motor function is intact. No weakness, atrophy or seizure activity.      Coordination: Coordination normal.      Deep Tendon Reflexes: Reflexes normal.      Reflex Scores:       Tricep reflexes are 1+ on the right side and 1+ on the left side.       Bicep reflexes are 1+ on the right side and 1+ on the left side.       Brachioradialis reflexes are 1+ on the right side and 1+ on the left side.       Patellar reflexes are 1+ on the right side and 1+ on the left side.       Achilles reflexes are 1+ on the right side and 1+ on the left side.     Comments: Unable to perform cranial nerve XI exam due to patient tenderness/pain. cranial nerves II through X, XII intact. Patient declined rest of neuro exam.   Psychiatric:         Mood and Affect: Mood normal.         Behavior: Behavior normal.         Thought Content: Thought content normal.         Judgment: Judgment normal.         Labs:   Recent Labs     04/25/21  1204   WBC 14.0*   RBC 5.85   HEMOGLOBIN 17.9   HEMATOCRIT 51.4   MCV 87.9   MCH 30.6   RDW 42.9   PLATELETCT 51*   MPV 12.7   NEUTSPOLYS 88.70*   LYMPHOCYTES 6.10*   MONOCYTES 2.60   EOSINOPHILS 0.00   BASOPHILS 0.00   RBCMORPHOLO Present     Recent Labs     04/25/21  1204   SODIUM 124*   POTASSIUM 3.8   CHLORIDE 87*   CO2 17*   GLUCOSE 363*   BUN 26*     Results for orders placed or performed during the hospital encounter of 04/25/21   CBC WITH DIFFERENTIAL   Result Value Ref Range    WBC 14.0 (H) 4.8 - 10.8 K/uL    RBC 5.85 4.70 - 6.10 M/uL    Hemoglobin 17.9 14.0 - 18.0 g/dL    Hematocrit 51.4 42.0 - 52.0 %    MCV 87.9 81.4 - 97.8 fL    MCH 30.6 27.0 - 33.0 pg    MCHC 34.8 33.7 - 35.3 g/dL    RDW 42.9 35.9  - 50.0 fL    Platelet Count 51 (L) 164 - 446 K/uL    MPV 12.7 9.0 - 12.9 fL    Neutrophils-Polys 88.70 (H) 44.00 - 72.00 %    Lymphocytes 6.10 (L) 22.00 - 41.00 %    Monocytes 2.60 0.00 - 13.40 %    Eosinophils 0.00 0.00 - 6.90 %    Basophils 0.00 0.00 - 1.80 %    Nucleated RBC 0.00 /100 WBC    Neutrophils (Absolute) 12.78 (H) 1.82 - 7.42 K/uL    Lymphs (Absolute) 0.85 (L) 1.00 - 4.80 K/uL    Monos (Absolute) 0.36 0.00 - 0.85 K/uL    Eos (Absolute) 0.00 0.00 - 0.51 K/uL    Baso (Absolute) 0.00 0.00 - 0.12 K/uL    NRBC (Absolute) 0.00 K/uL   PROTHROMBIN TIME   Result Value Ref Range    PT 13.2 12.0 - 14.6 sec    INR 0.97 0.87 - 1.13   APTT   Result Value Ref Range    APTT 31.8 24.7 - 36.0 sec   COMP METABOLIC PANEL   Result Value Ref Range    Sodium 124 (L) 135 - 145 mmol/L    Potassium 3.8 3.6 - 5.5 mmol/L    Chloride 87 (L) 96 - 112 mmol/L    Co2 17 (L) 20 - 33 mmol/L    Anion Gap 20.0 (H) 7.0 - 16.0    Glucose 363 (H) 65 - 99 mg/dL    Bun 26 (H) 8 - 22 mg/dL    Creatinine 0.86 0.50 - 1.40 mg/dL    Calcium 8.6 8.5 - 10.5 mg/dL    AST(SGOT) 29 12 - 45 U/L    ALT(SGPT) 45 2 - 50 U/L    Alkaline Phosphatase 147 (H) 30 - 99 U/L    Total Bilirubin 0.6 0.1 - 1.5 mg/dL    Albumin 2.9 (L) 3.2 - 4.9 g/dL    Total Protein 6.6 6.0 - 8.2 g/dL    Globulin 3.7 (H) 1.9 - 3.5 g/dL    A-G Ratio 0.8 g/dL   TROPONIN   Result Value Ref Range    Troponin T 13 6 - 19 ng/L   BETA-HYDROXYBUTYRIC ACID   Result Value Ref Range    beta-Hydroxybutyric Acid 3.68 (H) 0.02 - 0.27 mmol/L   ESTIMATED GFR   Result Value Ref Range    GFR If African American >60 >60 mL/min/1.73 m 2    GFR If Non African American >60 >60 mL/min/1.73 m 2   DIFFERENTIAL MANUAL   Result Value Ref Range    Bands-Stabs 2.60 0.00 - 10.00 %    Manual Diff Status PERFORMED    PERIPHERAL SMEAR REVIEW   Result Value Ref Range    Peripheral Smear Review see below    PLATELET ESTIMATE   Result Value Ref Range    Plt Estimation Marked Decrease    MORPHOLOGY   Result Value Ref  Range    RBC Morphology Present     Poikilocytosis 1+     Echinocytes 1+    IMMATURE PLT FRACTION   Result Value Ref Range    Imm. Plt Fraction 16.8 (H) 0.6 - 13.1 K/uL   LACTIC ACID   Result Value Ref Range    Lactic Acid 1.7 0.5 - 2.0 mmol/L   SARS-CoV-2 PCR (24 hour In-House): Collect NP swab in VTM    Specimen: Nasopharyngeal; Respirate   Result Value Ref Range    SARS-CoV-2 Source NP Swab    EKG (NOW)   Result Value Ref Range    Report       [facility]    Test Date:  2021  Pt Name:    GILDARDO MAGANA                Department: ER  MRN:        [id number]                      Room:       [facility]  Gender:     Male                         Technician: [id number]  :        1960                   Requested By:ALEX VIVAR  Order #:    [id number]                    Reading MD: ALEX VIVAR MD    Measurements  Intervals                                Axis  Rate:       106                          P:          69  SD:         147                          QRS:        22  QRSD:       87                           T:          45  QT:         360  QTc:        479    Interpretive Statements  Sinus tachycardia  Atrial premature complexes  Probable left atrial enlargement  Borderline T wave abnormalities  Borderline prolonged QT interval  Baseline wander in lead(s) I,II,III,aVR,aVL,aVF,V1,V2,V6  Compared to ECG 2018 12:48:04  Atrial premature complex(es) now present  Sinus rhythm no longer presen t  T-wave abnormality still present  Electronically Signed On 2021 14:43:14 PDT by ALEX VIVAR MD         EKG: Per my read, sinus tachycardia, normal qtc interval.    Imaging:   CT-CHEST (THORAX) WITH   Final Result      No displaced rib fracture is identified.      No acute cardiopulmonary process is seen.      Atherosclerotic plaque including coronary artery calcification.      CT-TSPINE W/O PLUS RECONS   Final Result      No CT evidence of acute traumatic abnormality.      Mild T6/7  anterior wedging compatible with healed mild chronic compression fracture and there is interbody fusion.      CT-LSPINE W/O PLUS RECONS   Final Result      No CT evidence of acute traumatic injury.      CT-CSPINE WITHOUT PLUS RECONS   Final Result      Multilevel degenerative changes.      Prevertebral edema. Further evaluation with MRI is recommended as this can be seen in the setting of ligamentous injury.      Bilateral carotid atherosclerotic plaque.         MR-CERVICAL SPINE-WITH & W/O    (Results Pending)       Previous Data Review: Reviewed    Problem Representation:60 y.o. male with a past medical history significant for coronary artery disease (s/p CABG x4 in 2015), type 2 diabetes mellitus (A1c 13% in 4/2021, currently not on medications), HTN/HLD, marijuana use, remote h/o lower back surgery (1980s 2/2 lower spine cyst), and osteoarthritis who presented with neck pain and back pain x 1 week, s/p mechanical GLF on 4/25/2021. Also concerning for high BG and high BP in context of not taking previous medications for >1 year.    * Acute bilateral back pain- (present on admission)  Assessment & Plan  This is after mechanical ground-level fall on 4/25/2021, due to balance  issues from taking Tylenol #3 for about 1 week due to back pain  From working on his boat.  Patient has both back and neck pain.  CT spine showed a healed compression fracture from T6/7, prevertebral edema, multilevel degeneration.  CT thorax showed no broken ribs.  However, needs further investigation.  Patient currently does not have loss of bowel or bladder function.    Follow-up on MRI C-spine with and without contrast  If patient starts losing bowel/bladder function, or has acute decompensation, or MRI C-spine shows acute process, immediately consult neurosurgery  Pain control: Cyclobenzaprine 5 mg 3 times daily, Tylenol 1 g 3 times daily, oxycodone 5 every 4 hours as needed, heat packs      High anion gap metabolic acidosis- (present on  admission)  Assessment & Plan  Also see problem of diabetic ketoacidosis.    Diabetic ketoacidosis (HCC)- (present on admission)  Assessment & Plan  Presented with glucose level of 363.  Anion gap of 20.  Beta hydroxybutyrate of 3.68, elevated.  Lactic acid of 1.7.  This is especially concerning because the patient has not been able to get his diabetes medications for more than a year due to issues with Covid.  Hemoglobin A1c 13% in 4/2021.  Currently patient does not have symptoms of severe hyperglycemia such as altered mental status or feeling very thirsty, however, patient has at high risk of having more of the symptoms.    Insulin sliding scale, hypoglycemia protocol, Accu-Cheks  Repeat BMP in 6 hours, monitor anion gap  Low threshold to transfer to ICU          Thrombocytopenia (HCC)- (present on admission)  Assessment & Plan  Presented with a platelet count of 51.  This is unknown for reasons why, the patient does not drink alcohol per history which is corroborated by mother.  There is no known history of any blood clot/clotting disorder in the family.    We will not start any chemical VTE prophylaxis for now, will use SCDs  Continue to monitor, repeat CBC tomorrow    Type 2 diabetes mellitus without complication, without long-term current use of insulin (HCC)- (present on admission)  Assessment & Plan  Also see problems of diabetic ketoacidosis and high anion gap metabolic acidosis    Has not been taking his medications for at least 1 year due to the Covid epidemic.  Presented with A1c of 13% in 4/2021.  This is very concerning as the patient was previously taking his medications regularly and was not insulin-dependent.  The patient may need to be on insulin for a while in an outpatient setting until the A1c comes down.  This will require help from his mother, with whom he lives.    Insulin sliding scale, hypoglycemia protocol, Accu-Cheks    Essential hypertension, benign- (present on admission)  Assessment &  Plan  Patient has not been taking his home medications for hypertension for at least 1 year due to difficulty obtaining medications.    In the hospital, presented with elevated blood pressure with systolic blood pressure up to the 180s.  This is likely with a component of acute pain.  Restart home medications: Losartan and metoprolol    Dyslipidemia- (present on admission)  Assessment & Plan  Restart on home atorvastatin 80  Follow-up on repeat lipid panel; can decrease atorvastatin to 40 if needed    Coronary artery disease due to calcified coronary lesion: CABG x4, July 2015- (present on admission)  Assessment & Plan  Chronic issue.  Patient has a significant history of MI in both his father and his sister.  Patient has not been taking any of his cardioprotective meds other than his baby aspirin in at least 1 year.    Restart on: Home metoprolol, losartan, aspirin 81, atorvastatin    Tobacco abuse- (present on admission)  Assessment & Plan  Encouraged tobacco cessation    Marijuana abuse- (present on admission)  Assessment & Plan  Chronic issue  Encourage marijuana cessation       Quality Measures:  Code: Full  VTE: SCDs (no chemical VTE ppx due to low platelet count)  Diet: Consistent carb  Disposition: Remain inpatient    Miriam Cano MD, MPH    Please note that this dictation was created using voice recognition software. I have worked with technical experts from [facility] to optimize the interface.  I have made every reasonable attempt to correct obvious errors, but there may be errors of grammar and possibly content that I did not discover before finalizing the note.

## 2021-04-25 NOTE — ED TRIAGE NOTES
Pt to triage in  wearing a rigid tlso brace, soft c-collar and bilat velcro wrist splints.  Chief Complaint   Patient presents with   • Back Pain   • Neck Pain     Pt staes he has a hx of back and neck pain. Was pushing his boat around this week then had a slip and fall coming out of the shower this am and now has increased pain. Pt able to stand and walk in triage.

## 2021-04-25 NOTE — ED PROVIDER NOTES
ED Provider Note    Scribed for Lauren Hawley M.D. by Raymon Apodaca. 4/25/2021  11:29 AM    Primary care provider: Jane Mclaughlin P.A.-C.  Means of arrival: Wheel chair  History obtained from: Patient  History limited by: None    CHIEF COMPLAINT  Chief Complaint   Patient presents with    Back Pain    Neck Pain       HPI  Perry Davis is a 60 y.o. male who presents to the Emergency Department complaining of acute on chronic neck pain. He has a history of chronic neck and back pain and has had increased pain for the last three days. He reports significant pain specifically at his posterior neck, bilateral clavicles and radiating to his right arm.  He was seen at the urgent care 3 days ago and was given Tylenol with codeine and an x-ray but has had minimal relief.   Today, he slipped in the shower and fell on his back and reports pain along his entire spine. He also reports sternal pain similar to a previous sternal fracture. He denies loss of consciousness. Due to his pain from the past few days which he exacerbated today, he states he has been having difficulty eating and sleeping. He reports weakness and decreased balance but denies any loss of bowel or bladder control. He does not report any alleviating factors. He endorses a history of smoking but denies any drug or alcohol use.  He has a history of cardiovascular disease and diabetes and elevated blood pressure and states he has not been on his medications in over a year.    PPE Note: I personally donned full PPE for all patient encounters during this visit, including wearing an N95 respirator mask, gloves, gown, and goggles.     Scribe remained outside the patient's room and did not have any contact with the patient for the duration of patient encounter.     REVIEW OF SYSTEMS  HEENT:  No ear pain, headache or dizziness, no dental or facial pain no loss of consciousness   CARDIAC: Positive mid sternal chest pain, no palpitaions   PULMONARY: no  dyspnea, hematemesis or chest wall contusions  BACK: Positive diffuse back and neck pain, lower extremity weakness.  Neuro: no weakness, numbness aphasia or headache  Musculoskeletal: Right arm pain, bilateral shoulder pain, no deformtiy, no swelling no extremity weakness or numbness  SKIN: no erythema or contusions     See history of present illness. All other systems are negative. C.    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    SURGICAL HISTORY   has a past surgical history that includes hemorrhoidectomy (02/2016); multiple coronary artery bypass (07/2015); hip arthroscopy (Right, 01/2016); knee arthroscopy (Left, 01/2016); lithotripsy (2016); cystoscopy (2016); other surgical procedure (Right, 1993); cystoscopy (04/24/2018); thoracic fusion posterior (late 1980's); urethrectomy (7/5/2018); evacuation of hematoma (Bilateral, 7/5/2018); and groin exploration (7/10/2018).    SOCIAL HISTORY  Social History     Tobacco Use    Smoking status: Current Every Day Smoker     Packs/day: 0.50     Years: 40.00     Pack years: 20.00     Types: Cigarettes    Smokeless tobacco: Never Used   Substance Use Topics    Alcohol use: No    Drug use: Yes     Types: Inhaled     Comment: last smoked marijuana 7/3/2018      Social History     Substance and Sexual Activity   Drug Use Yes    Types: Inhaled    Comment: last smoked marijuana 7/3/2018       FAMILY HISTORY  Family History   Problem Relation Age of Onset    Heart Attack Father 75    Heart Disease Father 59        CABG    Heart Disease Sister 55        CABG       CURRENT MEDICATIONS  Current  "Outpatient Medications   Medication Instructions    Acetaminophen-Codeine (TYLENOL/CODEINE #3) 300-30 MG Tab 1 tablet, Oral, EVERY 6 HOURS PRN    ARIPiprazole (ABILIFY) 15 mg, Oral, 2 TIMES DAILY    atorvastatin (LIPITOR) 80 mg, Oral, NIGHTLY    carBAMazepine (TEGRETOL) 200 mg, Oral, 3 TIMES DAILY    cyclobenzaprine (FLEXERIL) 10 mg, Oral, 3 TIMES DAILY PRN    diclofenac sodium 4 g, Apply externally, EVERY 8 HOURS PRN    Empagliflozin-Metformin HCl (SYNJARDY) 12.5-1000 MG Tab 1 tablet, Oral, DAILY    gabapentin (NEURONTIN) 300 mg, Oral, 3 TIMES DAILY    losartan (COZAAR) 25 mg, Oral, DAILY    methylPREDNISolone (MEDROL DOSEPAK) 4 MG Tablet Therapy Pack Follow schedule on package instructions.    metoprolol tartrate (LOPRESSOR) 25 mg, Oral, 2 TIMES DAILY    nitroglycerin (NITROSTAT) 0.4 mg, Sublingual, EVERY 5 MINUTES PRN    oxybutynin (DITROPAN) 5 mg, Oral, 3 TIMES DAILY    simvastatin (ZOCOR) 20 mg, Oral, NIGHTLY    Tradjenta 5 mg, Oral, DAILY    vitamin B-12 1,000 mcg, Oral, DAILY          ALLERGIES  No Known Allergies    PHYSICAL EXAM  VITAL SIGNS: /83   Pulse (!) 110   Temp 36.1 °C (97 °F) (Temporal)   Resp (!) 24   Ht 1.753 m (5' 9\")   Wt 84 kg (185 lb 3 oz)   SpO2 93%   BMI 27.35 kg/m²     Constitutional: Well developed, Well nourished, Uncomfortable appearing in moderate distress, Non-toxic appearance.   HEENT: Normocephalic, Atraumatic,  external ears normal, pharynx pink,  Mucous  Membranes moist, No rhinorrhea or mucosal edema  Eyes: PERRL, EOMI, Conjunctiva normal, No discharge.   Neck: Decreased range of motion with tenderness even to light touch to palpation of the C-spine.  No obvious bony step-offs crepitance or contusions  Lymphatic: No lymphadenopathy    Cardiovascular: Tachycardic, No murmurs,  rubs, or gallops.   Thorax & Lungs: Tachypneic, ungs clear to auscultation bilaterally, No wheezes, rhales or rhonchi, No chest wall tenderness.   Abdomen: Bowel sounds normal, Soft, non tender, " non distended,  No pulsatile masses., no rebound guarding or peritoneal signs.   Skin: Warm, Dry, No erythema, No rash,   Back: The and LS-spine are tender even to light touch. No bruising or deformities noted.  VS bony step-offs or crepitance  Neurologic: Alert & oriented x 3, Normal motor function, Normal sensory function, No focal deficits noted. Patient reports some lower extremity weakness but has 5/5 equal strengths in upper extremities. Patient stands unsteadily with wide gait to keep balance.  Extremities: Both upper extremities in splints. Equal, intact distal pulses, No cyanosis, clubbing or edema.   Musculoskeletal: Good range of motion in all major joints.    DIAGNOSTIC STUDIES / PROCEDURES    LABS  Results for orders placed or performed during the hospital encounter of 04/25/21   CBC WITH DIFFERENTIAL   Result Value Ref Range    WBC 14.0 (H) 4.8 - 10.8 K/uL    RBC 5.85 4.70 - 6.10 M/uL    Hemoglobin 17.9 14.0 - 18.0 g/dL    Hematocrit 51.4 42.0 - 52.0 %    MCV 87.9 81.4 - 97.8 fL    MCH 30.6 27.0 - 33.0 pg    MCHC 34.8 33.7 - 35.3 g/dL    RDW 42.9 35.9 - 50.0 fL    Platelet Count 51 (L) 164 - 446 K/uL    MPV 12.7 9.0 - 12.9 fL    Neutrophils-Polys 88.70 (H) 44.00 - 72.00 %    Lymphocytes 6.10 (L) 22.00 - 41.00 %    Monocytes 2.60 0.00 - 13.40 %    Eosinophils 0.00 0.00 - 6.90 %    Basophils 0.00 0.00 - 1.80 %    Nucleated RBC 0.00 /100 WBC    Neutrophils (Absolute) 12.78 (H) 1.82 - 7.42 K/uL    Lymphs (Absolute) 0.85 (L) 1.00 - 4.80 K/uL    Monos (Absolute) 0.36 0.00 - 0.85 K/uL    Eos (Absolute) 0.00 0.00 - 0.51 K/uL    Baso (Absolute) 0.00 0.00 - 0.12 K/uL    NRBC (Absolute) 0.00 K/uL   PROTHROMBIN TIME   Result Value Ref Range    PT 13.2 12.0 - 14.6 sec    INR 0.97 0.87 - 1.13   APTT   Result Value Ref Range    APTT 31.8 24.7 - 36.0 sec   COMP METABOLIC PANEL   Result Value Ref Range    Sodium 124 (L) 135 - 145 mmol/L    Potassium 3.8 3.6 - 5.5 mmol/L    Chloride 87 (L) 96 - 112 mmol/L    Co2 17 (L)  20 - 33 mmol/L    Anion Gap 20.0 (H) 7.0 - 16.0    Glucose 363 (H) 65 - 99 mg/dL    Bun 26 (H) 8 - 22 mg/dL    Creatinine 0.86 0.50 - 1.40 mg/dL    Calcium 8.6 8.5 - 10.5 mg/dL    AST(SGOT) 29 12 - 45 U/L    ALT(SGPT) 45 2 - 50 U/L    Alkaline Phosphatase 147 (H) 30 - 99 U/L    Total Bilirubin 0.6 0.1 - 1.5 mg/dL    Albumin 2.9 (L) 3.2 - 4.9 g/dL    Total Protein 6.6 6.0 - 8.2 g/dL    Globulin 3.7 (H) 1.9 - 3.5 g/dL    A-G Ratio 0.8 g/dL   TROPONIN   Result Value Ref Range    Troponin T 13 6 - 19 ng/L   BETA-HYDROXYBUTYRIC ACID   Result Value Ref Range    beta-Hydroxybutyric Acid 3.68 (H) 0.02 - 0.27 mmol/L   ESTIMATED GFR   Result Value Ref Range    GFR If African American >60 >60 mL/min/1.73 m 2    GFR If Non African American >60 >60 mL/min/1.73 m 2   DIFFERENTIAL MANUAL   Result Value Ref Range    Bands-Stabs 2.60 0.00 - 10.00 %    Manual Diff Status PERFORMED    PERIPHERAL SMEAR REVIEW   Result Value Ref Range    Peripheral Smear Review see below    PLATELET ESTIMATE   Result Value Ref Range    Plt Estimation Marked Decrease    MORPHOLOGY   Result Value Ref Range    RBC Morphology Present     Poikilocytosis 1+     Echinocytes 1+    IMMATURE PLT FRACTION   Result Value Ref Range    Imm. Plt Fraction 16.8 (H) 0.6 - 13.1 K/uL   EKG (NOW)   Result Value Ref Range    Report       [facility] Emergency Dept.    Test Date:  2021  Pt Name:    GILDARDO MAGANA                Department: ER  MRN:        [id number]                      Room:       [facility]  Gender:     Male                         Technician: [id number]  :        1960                   Requested By:ALEX VIVAR  Order #:    [id number]                    Reading MD: ALEX VIVAR MD    Measurements  Intervals                                Axis  Rate:       106                          P:          69  HI:         147                          QRS:        22  QRSD:       87                           T:          45  QT:         360  QTc:         479    Interpretive Statements  Sinus tachycardia  Atrial premature complexes  Probable left atrial enlargement  Borderline T wave abnormalities  Borderline prolonged QT interval  Baseline wander in lead(s) I,II,III,aVR,aVL,aVF,V1,V2,V6  Compared to ECG 05/24/2018 12:48:04  Atrial premature complex(es) now present  Sinus rhythm no longer presen t  T-wave abnormality still present  Electronically Signed On 4- 14:43:14 PDT by ALEX VIVAR MD        All labs reviewed by me.    EKG  12 lead EKG interpreted by me as noted above.    RADIOLOGY  CT-CHEST (THORAX) WITH   Final Result      No displaced rib fracture is identified.      No acute cardiopulmonary process is seen.      Atherosclerotic plaque including coronary artery calcification.      CT-TSPINE W/O PLUS RECONS   Final Result      No CT evidence of acute traumatic abnormality.      Mild T6/7 anterior wedging compatible with healed mild chronic compression fracture and there is interbody fusion.      CT-LSPINE W/O PLUS RECONS   Final Result      No CT evidence of acute traumatic injury.      CT-CSPINE WITHOUT PLUS RECONS   Final Result      Multilevel degenerative changes.      Prevertebral edema. Further evaluation with MRI is recommended as this can be seen in the setting of ligamentous injury.      Bilateral carotid atherosclerotic plaque.           The radiologist's interpretation of all radiological studies have been reviewed by me.    COURSE & MEDICAL DECISION MAKING  Nursing notes, VS, PMSFHx reviewed in chart.    Patient's medical records reviewed for continuity of care. The patient presented to [facility] urgent care three days ago for neck pain. He had a BG >600 and told staff at  that he has been off medications for diabetes and hypertension for over a year.  wanted to send the patient to the ED for treatment with concern of DKA and the patient refused, [facility]  provided the patient Tylenol/Codeine  and sent him home AMA.    11:29 AM Patient seen and examined at bedside. Patient will be treated with morphine 4 mg injection and Zofran 4 mg injection for his pain. Ordered CT-Chest w/, CT-CSpine w/o, CT-TSpine w/o, CT-LSpine w/o, CBC w/ diff, Prothrombin time, APTT, CMP, Troponin, and EKG to evaluate his symptoms. The differential diagnoses include but are not limited to: Spinal fracture, rib fracture, degenerative disc disease.    1:06 PM - Patient's labs reviewed which show an elevated BG of 363, decreased sodium and CO2, and increased anion gap. Ordered 1L NS infusion to treat the patient for DKA.    1:21 PM - RN informed me the patient was unable to lie down for CT due to his pain. Ordered Dilaudid 0.5 mg injection for pain management.    2:15 PM - Patient's radiology studies were reviewed as noted above. Will plan for hospitalization due to radiologist's recommendation for further evaluation with MRI. Ordered blood culture and lactic acid for further evaluation.    2:44 PM - Patient was reevaluated at bedside. I informed him that due to his unstable neck injury he would need to be hospitalized for further evaluation. He agreed to this plan after a long discussion about the risks associated with leaving his condition untreated. Paged Hospitalist. Ordered SARS-CoV-2 PCR per hospitalization protocol.    3:03 PM - I discussed the patient's case and the above findings with Dr. Paredes (Hospitalist) who agreed to evaluate the patient for hospitalization.    HYDRATION: Based on the patient's presentation of DKA the patient was given IV fluids. IV Hydration was used because oral hydration was not adequate alone. Upon recheck following hydration, the patient was improved.    CRITICAL CARE  The very real possibilty of a deterioration of this patient's condition required the highest level of my preparedness for sudden, emergent intervention.  I provided critical care services, which included medication orders, frequent  reevaluations of the patient's condition and response to treatment, ordering and reviewing test results, and discussing the case with various consultants.  The critical care time associated with the care of the patient was 35 minutes. Review chart for interventions. This time is exclusive of any other billable procedures.      DISPOSITION:  Patient will be hospitalized by Dr. Garcia in guarded condition.     FINAL IMPRESSION  1. Fall, initial encounter    2. Strain of neck muscle, initial encounter    3. Diabetic ketoacidosis without coma associated with type 2 diabetes mellitus (HCC)    4. Noncompliance with diet and medication regimen    5.      The critical care time associated with this patient was 35 minutes exclusive of any other billable procedures.     Raymon ARNOLD (Yashibeduarda), am scribing for, and in the presence of, Lauren Hawley M.D..    Electronically signed by: Raymon Apodaca (Yashibeduarda), 4/25/2021    Lauren ARNOLD M.D. personally performed the services described in this documentation, as scribed by Raymon Apodaca in my presence, and it is both accurate and complete.    C.    The note accurately reflects work and decisions made by me.  Lauren Hawley M.D.  4/25/2021  4:12 PM

## 2021-04-25 NOTE — ED NOTES
Wheeled to the restroom assisted by Student RN, pt able to ambulate few steps from gurney to toilet and able to sit up straight.  Pt wheeled back to room and assisted in transferring to [facility].

## 2021-04-25 NOTE — ED NOTES
1150  Erp at bedside. Plan of care explained to pt. Pt noted hyperventilating during piv insertion. Instructed to take slow deep breath. piv established labs sent.

## 2021-04-25 NOTE — ED NOTES
Med Rec completed: per patient at bedside, and call to home pharmacy, the only drugs filled recently are those given at discharge at urgent care facility on [address] 4/22/21:  Tylenol c Codeine   Medrol Dosepak  Diclofenac gel  Flexeril 10mg tabs    Pt reports not taking medications for over 2 weeks due to lack of refills.    Preferred Pharmacy: [facility] [phone]  Allergies:  No Known Allergies    No ORAL antibiotics in last 14 days

## 2021-04-26 PROBLEM — R13.10 DIFFICULTY SWALLOWING: Status: ACTIVE | Noted: 2021-04-26

## 2021-04-26 PROBLEM — A40.9 SEPSIS DUE TO STREPTOCOCCUS SPECIES (HCC): Status: ACTIVE | Noted: 2021-04-26

## 2021-04-26 LAB
ALBUMIN SERPL BCP-MCNC: 2.6 G/DL (ref 3.2–4.9)
ALBUMIN/GLOB SERPL: 0.8 G/DL
ALP SERPL-CCNC: 124 U/L (ref 30–99)
ALT SERPL-CCNC: 36 U/L (ref 2–50)
ANION GAP SERPL CALC-SCNC: 14 MMOL/L (ref 7–16)
ANION GAP SERPL CALC-SCNC: 14 MMOL/L (ref 7–16)
ANION GAP SERPL CALC-SCNC: 16 MMOL/L (ref 7–16)
AST SERPL-CCNC: 27 U/L (ref 12–45)
B-OH-BUTYR SERPL-MCNC: 3.15 MMOL/L (ref 0.02–0.27)
BASOPHILS # BLD AUTO: 0.4 % (ref 0–1.8)
BASOPHILS # BLD: 0.06 K/UL (ref 0–0.12)
BILIRUB SERPL-MCNC: 0.9 MG/DL (ref 0.1–1.5)
BUN SERPL-MCNC: 19 MG/DL (ref 8–22)
BUN SERPL-MCNC: 21 MG/DL (ref 8–22)
BUN SERPL-MCNC: 25 MG/DL (ref 8–22)
CALCIUM SERPL-MCNC: 8 MG/DL (ref 8.5–10.5)
CALCIUM SERPL-MCNC: 8.2 MG/DL (ref 8.5–10.5)
CALCIUM SERPL-MCNC: 8.2 MG/DL (ref 8.5–10.5)
CHLORIDE SERPL-SCNC: 87 MMOL/L (ref 96–112)
CHLORIDE SERPL-SCNC: 89 MMOL/L (ref 96–112)
CHLORIDE SERPL-SCNC: 89 MMOL/L (ref 96–112)
CHOLEST SERPL-MCNC: 140 MG/DL (ref 100–199)
CO2 SERPL-SCNC: 18 MMOL/L (ref 20–33)
CO2 SERPL-SCNC: 21 MMOL/L (ref 20–33)
CO2 SERPL-SCNC: 23 MMOL/L (ref 20–33)
CREAT SERPL-MCNC: 0.48 MG/DL (ref 0.5–1.4)
CREAT SERPL-MCNC: 0.53 MG/DL (ref 0.5–1.4)
CREAT SERPL-MCNC: 0.62 MG/DL (ref 0.5–1.4)
EOSINOPHIL # BLD AUTO: 0.05 K/UL (ref 0–0.51)
EOSINOPHIL NFR BLD: 0.3 % (ref 0–6.9)
ERYTHROCYTE [DISTWIDTH] IN BLOOD BY AUTOMATED COUNT: 42.3 FL (ref 35.9–50)
GGT SERPL-CCNC: 186 U/L (ref 7–51)
GLOBULIN SER CALC-MCNC: 3.1 G/DL (ref 1.9–3.5)
GLUCOSE BLD-MCNC: 213 MG/DL (ref 65–99)
GLUCOSE BLD-MCNC: 217 MG/DL (ref 65–99)
GLUCOSE BLD-MCNC: 231 MG/DL (ref 65–99)
GLUCOSE SERPL-MCNC: 174 MG/DL (ref 65–99)
GLUCOSE SERPL-MCNC: 219 MG/DL (ref 65–99)
GLUCOSE SERPL-MCNC: 223 MG/DL (ref 65–99)
HAV IGM SERPL QL IA: NORMAL
HBV CORE IGM SER QL: NORMAL
HBV SURFACE AG SER QL: NORMAL
HCT VFR BLD AUTO: 47.6 % (ref 42–52)
HCV AB SER QL: NORMAL
HDLC SERPL-MCNC: 11 MG/DL
HGB BLD-MCNC: 16.5 G/DL (ref 14–18)
IMM GRANULOCYTES # BLD AUTO: 0.3 K/UL (ref 0–0.11)
IMM GRANULOCYTES NFR BLD AUTO: 1.9 % (ref 0–0.9)
LDLC SERPL CALC-MCNC: 69 MG/DL
LYMPHOCYTES # BLD AUTO: 1.15 K/UL (ref 1–4.8)
LYMPHOCYTES NFR BLD: 7.4 % (ref 22–41)
MAGNESIUM SERPL-MCNC: 1.9 MG/DL (ref 1.5–2.5)
MAGNESIUM SERPL-MCNC: 2 MG/DL (ref 1.5–2.5)
MCH RBC QN AUTO: 30.1 PG (ref 27–33)
MCHC RBC AUTO-ENTMCNC: 34.7 G/DL (ref 33.7–35.3)
MCV RBC AUTO: 86.7 FL (ref 81.4–97.8)
MONOCYTES # BLD AUTO: 1.24 K/UL (ref 0–0.85)
MONOCYTES NFR BLD AUTO: 8 % (ref 0–13.4)
MRSA DNA SPEC QL NAA+PROBE: NORMAL
NEUTROPHILS # BLD AUTO: 12.68 K/UL (ref 1.82–7.42)
NEUTROPHILS NFR BLD: 82 % (ref 44–72)
NRBC # BLD AUTO: 0 K/UL
NRBC BLD-RTO: 0 /100 WBC
PHOSPHATE SERPL-MCNC: 1.8 MG/DL (ref 2.5–4.5)
PHOSPHATE SERPL-MCNC: 4.1 MG/DL (ref 2.5–4.5)
PLATELET # BLD AUTO: 47 K/UL (ref 164–446)
PMV BLD AUTO: 13.8 FL (ref 9–12.9)
POTASSIUM SERPL-SCNC: 3.2 MMOL/L (ref 3.6–5.5)
POTASSIUM SERPL-SCNC: 3.6 MMOL/L (ref 3.6–5.5)
POTASSIUM SERPL-SCNC: 3.7 MMOL/L (ref 3.6–5.5)
PROT SERPL-MCNC: 5.7 G/DL (ref 6–8.2)
RBC # BLD AUTO: 5.49 M/UL (ref 4.7–6.1)
SARS-COV-2 RNA RESP QL NAA+PROBE: NOTDETECTED
SIGNIFICANT IND 70042: NORMAL
SITE SITE: NORMAL
SODIUM SERPL-SCNC: 122 MMOL/L (ref 135–145)
SODIUM SERPL-SCNC: 123 MMOL/L (ref 135–145)
SODIUM SERPL-SCNC: 126 MMOL/L (ref 135–145)
SOURCE SOURCE: NORMAL
SPECIMEN SOURCE: NORMAL
TRIGL SERPL-MCNC: 299 MG/DL (ref 0–149)
WBC # BLD AUTO: 15.5 K/UL (ref 4.8–10.8)

## 2021-04-26 PROCEDURE — 82010 KETONE BODYS QUAN: CPT

## 2021-04-26 PROCEDURE — 700105 HCHG RX REV CODE 258: Performed by: STUDENT IN AN ORGANIZED HEALTH CARE EDUCATION/TRAINING PROGRAM

## 2021-04-26 PROCEDURE — 97161 PT EVAL LOW COMPLEX 20 MIN: CPT

## 2021-04-26 PROCEDURE — 700101 HCHG RX REV CODE 250: Performed by: STUDENT IN AN ORGANIZED HEALTH CARE EDUCATION/TRAINING PROGRAM

## 2021-04-26 PROCEDURE — 97166 OT EVAL MOD COMPLEX 45 MIN: CPT

## 2021-04-26 PROCEDURE — 80053 COMPREHEN METABOLIC PANEL: CPT

## 2021-04-26 PROCEDURE — 83735 ASSAY OF MAGNESIUM: CPT

## 2021-04-26 PROCEDURE — A9270 NON-COVERED ITEM OR SERVICE: HCPCS | Performed by: STUDENT IN AN ORGANIZED HEALTH CARE EDUCATION/TRAINING PROGRAM

## 2021-04-26 PROCEDURE — 99233 SBSQ HOSP IP/OBS HIGH 50: CPT | Mod: GC | Performed by: INTERNAL MEDICINE

## 2021-04-26 PROCEDURE — 770020 HCHG ROOM/CARE - TELE (206)

## 2021-04-26 PROCEDURE — 36415 COLL VENOUS BLD VENIPUNCTURE: CPT

## 2021-04-26 PROCEDURE — 87535 HIV-1 PROBE&REVERSE TRNSCRPJ: CPT

## 2021-04-26 PROCEDURE — 700102 HCHG RX REV CODE 250 W/ 637 OVERRIDE(OP): Performed by: STUDENT IN AN ORGANIZED HEALTH CARE EDUCATION/TRAINING PROGRAM

## 2021-04-26 PROCEDURE — 700111 HCHG RX REV CODE 636 W/ 250 OVERRIDE (IP): Performed by: STUDENT IN AN ORGANIZED HEALTH CARE EDUCATION/TRAINING PROGRAM

## 2021-04-26 PROCEDURE — 80048 BASIC METABOLIC PNL TOTAL CA: CPT

## 2021-04-26 PROCEDURE — 82962 GLUCOSE BLOOD TEST: CPT

## 2021-04-26 PROCEDURE — 84100 ASSAY OF PHOSPHORUS: CPT

## 2021-04-26 PROCEDURE — 80061 LIPID PANEL: CPT

## 2021-04-26 PROCEDURE — 85025 COMPLETE CBC W/AUTO DIFF WBC: CPT

## 2021-04-26 PROCEDURE — 82977 ASSAY OF GGT: CPT

## 2021-04-26 PROCEDURE — 80074 ACUTE HEPATITIS PANEL: CPT

## 2021-04-26 PROCEDURE — 87641 MR-STAPH DNA AMP PROBE: CPT

## 2021-04-26 RX ORDER — SODIUM CHLORIDE 9 MG/ML
500 INJECTION, SOLUTION INTRAVENOUS ONCE
Status: COMPLETED | OUTPATIENT
Start: 2021-04-26 | End: 2021-04-26

## 2021-04-26 RX ORDER — POTASSIUM CHLORIDE 20 MEQ/1
40 TABLET, EXTENDED RELEASE ORAL
Status: COMPLETED | OUTPATIENT
Start: 2021-04-26 | End: 2021-04-26

## 2021-04-26 RX ORDER — INSULIN GLARGINE 100 [IU]/ML
10 INJECTION, SOLUTION SUBCUTANEOUS EVERY EVENING
Status: DISCONTINUED | OUTPATIENT
Start: 2021-04-26 | End: 2021-04-27

## 2021-04-26 RX ORDER — LORAZEPAM 2 MG/ML
2 INJECTION INTRAMUSCULAR
Status: ACTIVE | OUTPATIENT
Start: 2021-04-26 | End: 2021-04-26

## 2021-04-26 RX ORDER — SODIUM CHLORIDE 9 MG/ML
INJECTION, SOLUTION INTRAVENOUS CONTINUOUS
Status: DISCONTINUED | OUTPATIENT
Start: 2021-04-26 | End: 2021-04-28

## 2021-04-26 RX ORDER — POTASSIUM CHLORIDE 20 MEQ/1
40 TABLET, EXTENDED RELEASE ORAL ONCE
Status: COMPLETED | OUTPATIENT
Start: 2021-04-27 | End: 2021-04-27

## 2021-04-26 RX ORDER — CYCLOBENZAPRINE HCL 10 MG
10 TABLET ORAL 3 TIMES DAILY
Status: DISCONTINUED | OUTPATIENT
Start: 2021-04-26 | End: 2021-04-28

## 2021-04-26 RX ORDER — GABAPENTIN 400 MG/1
400 CAPSULE ORAL 3 TIMES DAILY
Status: DISCONTINUED | OUTPATIENT
Start: 2021-04-26 | End: 2021-04-28

## 2021-04-26 RX ORDER — MORPHINE SULFATE 4 MG/ML
2 INJECTION, SOLUTION INTRAMUSCULAR; INTRAVENOUS EVERY 4 HOURS PRN
Status: DISCONTINUED | OUTPATIENT
Start: 2021-04-26 | End: 2021-04-27

## 2021-04-26 RX ORDER — ACETAMINOPHEN 500 MG
1000 TABLET ORAL EVERY 6 HOURS
Status: DISCONTINUED | OUTPATIENT
Start: 2021-04-27 | End: 2021-04-28

## 2021-04-26 RX ADMIN — LOSARTAN POTASSIUM 25 MG: 25 TABLET, FILM COATED ORAL at 05:47

## 2021-04-26 RX ADMIN — DOCUSATE SODIUM 50 MG AND SENNOSIDES 8.6 MG 2 TABLET: 8.6; 5 TABLET, FILM COATED ORAL at 05:46

## 2021-04-26 RX ADMIN — INSULIN HUMAN 3 UNITS: 100 INJECTION, SOLUTION PARENTERAL at 17:08

## 2021-04-26 RX ADMIN — ACETAMINOPHEN 1000 MG: 500 TABLET ORAL at 05:46

## 2021-04-26 RX ADMIN — CYCLOBENZAPRINE 5 MG: 10 TABLET, FILM COATED ORAL at 05:46

## 2021-04-26 RX ADMIN — INSULIN GLARGINE 10 UNITS: 100 INJECTION, SOLUTION SUBCUTANEOUS at 17:08

## 2021-04-26 RX ADMIN — INSULIN HUMAN 3 UNITS: 100 INJECTION, SOLUTION PARENTERAL at 21:26

## 2021-04-26 RX ADMIN — OXYCODONE HYDROCHLORIDE 10 MG: 10 TABLET ORAL at 19:40

## 2021-04-26 RX ADMIN — ATORVASTATIN CALCIUM 80 MG: 80 TABLET, FILM COATED ORAL at 21:23

## 2021-04-26 RX ADMIN — INSULIN HUMAN 3 UNITS: 100 INJECTION, SOLUTION PARENTERAL at 12:59

## 2021-04-26 RX ADMIN — ASPIRIN 81 MG: 81 TABLET, CHEWABLE ORAL at 05:46

## 2021-04-26 RX ADMIN — ACETAMINOPHEN 1000 MG: 500 TABLET ORAL at 21:23

## 2021-04-26 RX ADMIN — SODIUM CHLORIDE: 9 INJECTION, SOLUTION INTRAVENOUS at 08:55

## 2021-04-26 RX ADMIN — CYCLOBENZAPRINE 10 MG: 10 TABLET, FILM COATED ORAL at 12:57

## 2021-04-26 RX ADMIN — OXYCODONE HYDROCHLORIDE 10 MG: 10 TABLET ORAL at 12:57

## 2021-04-26 RX ADMIN — GABAPENTIN 300 MG: 300 CAPSULE ORAL at 05:47

## 2021-04-26 RX ADMIN — MAGNESIUM CITRATE 296 ML: 1.75 LIQUID ORAL at 08:18

## 2021-04-26 RX ADMIN — GABAPENTIN 400 MG: 400 CAPSULE ORAL at 17:00

## 2021-04-26 RX ADMIN — ACETAMINOPHEN 1000 MG: 500 TABLET ORAL at 14:15

## 2021-04-26 RX ADMIN — VANCOMYCIN HYDROCHLORIDE 2000 MG: 500 INJECTION, POWDER, LYOPHILIZED, FOR SOLUTION INTRAVENOUS at 06:04

## 2021-04-26 RX ADMIN — SODIUM CHLORIDE 500 ML: 9 INJECTION, SOLUTION INTRAVENOUS at 08:24

## 2021-04-26 RX ADMIN — CYCLOBENZAPRINE 10 MG: 10 TABLET, FILM COATED ORAL at 17:00

## 2021-04-26 RX ADMIN — OXYCODONE HYDROCHLORIDE 10 MG: 10 TABLET ORAL at 08:47

## 2021-04-26 RX ADMIN — SODIUM CHLORIDE: 9 INJECTION, SOLUTION INTRAVENOUS at 17:18

## 2021-04-26 RX ADMIN — CEFTRIAXONE SODIUM 2 G: 2 INJECTION, POWDER, FOR SOLUTION INTRAMUSCULAR; INTRAVENOUS at 08:56

## 2021-04-26 RX ADMIN — METOPROLOL TARTRATE 25 MG: 25 TABLET, FILM COATED ORAL at 05:46

## 2021-04-26 RX ADMIN — LIDOCAINE 3 PATCH: 50 PATCH TOPICAL at 17:00

## 2021-04-26 RX ADMIN — INSULIN HUMAN 3 UNITS: 100 INJECTION, SOLUTION PARENTERAL at 08:15

## 2021-04-26 RX ADMIN — GABAPENTIN 400 MG: 400 CAPSULE ORAL at 12:57

## 2021-04-26 RX ADMIN — DOCUSATE SODIUM 50 MG AND SENNOSIDES 8.6 MG 2 TABLET: 8.6; 5 TABLET, FILM COATED ORAL at 17:00

## 2021-04-26 RX ADMIN — METOPROLOL TARTRATE 25 MG: 25 TABLET, FILM COATED ORAL at 17:00

## 2021-04-26 RX ADMIN — POTASSIUM CHLORIDE 40 MEQ: 1500 TABLET, EXTENDED RELEASE ORAL at 05:46

## 2021-04-26 RX ADMIN — MORPHINE SULFATE 2 MG: 4 INJECTION INTRAVENOUS at 22:01

## 2021-04-26 RX ADMIN — POTASSIUM CHLORIDE 40 MEQ: 1500 TABLET, EXTENDED RELEASE ORAL at 04:02

## 2021-04-26 ASSESSMENT — ENCOUNTER SYMPTOMS
LOSS OF CONSCIOUSNESS: 0
NERVOUS/ANXIOUS: 0
HEADACHES: 0
TINGLING: 1
SORE THROAT: 1
DOUBLE VISION: 0
SPEECH CHANGE: 0
WEAKNESS: 1
CONSTIPATION: 0
COUGH: 1
WHEEZING: 0
FEVER: 0
CHILLS: 0
VOMITING: 1
DIZZINESS: 0
FALLS: 1
FOCAL WEAKNESS: 0
MYALGIAS: 1
BACK PAIN: 1
PALPITATIONS: 0
ABDOMINAL PAIN: 0
NECK PAIN: 1
DIARRHEA: 0
HEMOPTYSIS: 0
SEIZURES: 0
BLURRED VISION: 0
SHORTNESS OF BREATH: 0
NAUSEA: 1
MEMORY LOSS: 0

## 2021-04-26 ASSESSMENT — COGNITIVE AND FUNCTIONAL STATUS - GENERAL
MOVING FROM LYING ON BACK TO SITTING ON SIDE OF FLAT BED: A LITTLE
WALKING IN HOSPITAL ROOM: A LOT
DAILY ACTIVITIY SCORE: 18
MOBILITY SCORE: 15
SUGGESTED CMS G CODE MODIFIER DAILY ACTIVITY: CK
TURNING FROM BACK TO SIDE WHILE IN FLAT BAD: A LITTLE
SUGGESTED CMS G CODE MODIFIER MOBILITY: CK
HELP NEEDED FOR BATHING: A LOT
CLIMB 3 TO 5 STEPS WITH RAILING: A LOT
MOVING TO AND FROM BED TO CHAIR: A LOT
DRESSING REGULAR UPPER BODY CLOTHING: A LITTLE
DRESSING REGULAR LOWER BODY CLOTHING: A LOT
STANDING UP FROM CHAIR USING ARMS: A LITTLE
TOILETING: A LITTLE

## 2021-04-26 ASSESSMENT — PAIN DESCRIPTION - PAIN TYPE
TYPE: ACUTE PAIN
TYPE: CHRONIC PAIN
TYPE: ACUTE PAIN

## 2021-04-26 ASSESSMENT — GAIT ASSESSMENTS
ASSISTIVE DEVICE: HAND HELD ASSIST
DISTANCE (FEET): 3
GAIT LEVEL OF ASSIST: MODERATE ASSIST
DEVIATION: SHUFFLED GAIT;BRADYKINETIC;DECREASED BASE OF SUPPORT

## 2021-04-26 ASSESSMENT — ACTIVITIES OF DAILY LIVING (ADL): TOILETING: INDEPENDENT

## 2021-04-26 ASSESSMENT — LIFESTYLE VARIABLES: SUBSTANCE_ABUSE: 1

## 2021-04-26 NOTE — ASSESSMENT & PLAN NOTE
Presented with difficulty swallowing, is coughing and vomiting when swallowing large/hard foods    -SLP evaluation post extubation  -Aspiration and seizure precautions  -Soft GI diet

## 2021-04-26 NOTE — THERAPY
"Physical Therapy   Initial Evaluation     Patient Name: Perry Davis  Age:  60 y.o., Sex:  male  Medical Record #: [id number]  Today's Date: 4/26/2021     Precautions: Fall Risk  Comments: Parkinson's Disease    Assessment  Patient is 60 y.o. male admitted after a fall with neck and back pain. Back pain started 1 week ago after working on his boat and since has gotten worse. Pt had 2 urgent care visits and was given Toradol injections and was prescribed tylenol with codeine. Per notes, meds made pt dizzy and experience balance deficits leading to a fall in the bathtub. CT of l-spine and t-spine both cleared for acute traumatic abnormality. CT of c-spine showed multilevel degenerative changes and prevertebral edema. PMH significant for CAD, CABG x4 in 2015, DM2, HTN, mariajuana use, lumbar spine surgery, Osteoarthitis, parkinson's disease. Pt currently complaining of significant pain but agreeable to work with therapy. Pt required min assist with bed mobility, STS, and mod assist for gait of 3ft using HHA. Pt demonstrated mild ataxia when stepping but reported to therapist that he has Parkinson's Disease and has tremors at baseline. PT will cont while pt is in acute care setting to address deficits.     Currently recommend placement but anticipate with improved pain management, pt will be able to progress back to baseline and dc home with home health services.       Plan    Recommend Physical Therapy 3 times per week until therapy goals are met for the following treatments:  Bed Mobility, Gait Training, Neuro Re-Education / Balance, Self Care/Home Evaluation, Therapeutic Activities and Therapeutic Exercises    DC Equipment Recommendations: Unable to determine at this time  Discharge Recommendations: Other -(anticipate home with home health once pain improves)       Subjective    \"I need more drugs\"       04/26/21 1123   Prior Living Situation   Prior Services None   Housing / Facility [address]   Steps Into " Home 0   Steps In Home 0   Bathroom Set up Bathtub / Shower Combination   Equipment Owned Front-Wheel Walker   Lives with - Patient's Self Care Capacity Parents   Comments pt lives with his mother and they assist eachother as needed.    Prior Level of Functional Mobility   Bed Mobility Independent   Transfer Status Independent   Ambulation Independent   Distance Ambulation (Feet)   (community)   Assistive Devices Used None   Comments independent with mobility prior   History of Falls   History of Falls Yes   Date of Last Fall   (reason for admit)   Cognition    Cognition / Consciousness WDL   Comments distracted by pain   Strength Lower Body   Lower Body Strength  WDL   Comments functional assessment   Coordination Lower Body    Coordination Lower Body  X   Comments hx of parkinsons likely causing tremors   Gait Analysis   Gait Level Of Assist Moderate Assist   Assistive Device Hand Held Assist   Distance (Feet) 3   # of Times Distance was Traveled 1   Deviation Shuffled Gait;Bradykinetic;Decreased Base Of Support   # of Stairs Climbed 0   Weight Bearing Status no restrictions   Comments slightly ataxic   Bed Mobility    Supine to Sit Minimal Assist   Sit to Supine Supervised   Scooting Supervised   Comments log roll   Functional Mobility   Sit to Stand Minimal Assist   Transfer Method Stand Step   Mobility bed mob, STS, 3 ft of gait   Short Term Goals    Short Term Goal # 1 pt will be able to complete bed mobility from flat bed with SPV in 6tx in order to return home   Short Term Goal # 2 pt will be able to complete functional transfers with SPV and LRAD in 6tx in order to return home   Short Term Goal # 3 pt will be able to ambulate 150ft with LRAD and SPV in 6tx in order to return home   Education Group   Education Provided Role of Physical Therapist;Cervical Precautions   Cervical Precautions Patient Response Patient;Acceptance;Explanation;Verbal Demonstration   Role of Physical Therapist Patient Response  Patient;Acceptance;Explanation;Verbal Demonstration   Anticipated Discharge Equipment and Recommendations   DC Equipment Recommendations Unable to determine at this time   Discharge Recommendations Other -  (anticipate home with home health once pain improves)

## 2021-04-26 NOTE — PROGRESS NOTES
Daily Progress Note:     Date of Service: 4/26/2021  Primary Team: [facility] Team   Attending: Dax Garcia M.D.   Senior Resident: Dr. Reggie MD  Intern: Dr. Miriam Cano M.D.  Contact:  [phone]    Chief Complaint:   Chief Complaint   Patient presents with   • Back Pain   • Neck Pain         Subjective:   Overnight, the patient's blood cultures returned 2x positive for strep. His AG improved with increase in bicarb. Patient was started on vanc IV and then was changed to IV C3 this AM. This morning, patient states he feels sick. States he still has chest pain (musculoskeletal in origin), back pain, and neck pain. Denies headache. States he has some cough, sore throat, and neck pain. He tries to eat food but afterward he coughs and vomits it up.    Consultants/Specialty:  Radiology    Review of Systems:   Review of Systems   Constitutional: Positive for malaise/fatigue. Negative for chills and fever.   HENT: Positive for sore throat. Negative for congestion.    Eyes: Negative for blurred vision and double vision.   Respiratory: Positive for cough. Negative for hemoptysis, shortness of breath and wheezing.    Cardiovascular: Positive for chest pain. Negative for palpitations and leg swelling.        MSK chest pain   Gastrointestinal: Positive for nausea and vomiting. Negative for abdominal pain, constipation and diarrhea.   Genitourinary: Negative for dysuria, frequency and urgency.   Musculoskeletal: Positive for back pain, falls, joint pain, myalgias and neck pain.   Neurological: Positive for tingling and weakness. Negative for dizziness, speech change, focal weakness, seizures, loss of consciousness and headaches.   Psychiatric/Behavioral: Positive for substance abuse. Negative for memory loss and suicidal ideas. The patient is not nervous/anxious.        Objective Data:   Physical Exam:   Vitals:   Temp:  [36 °C (96.8 °F)-36.6 °C (97.9 °F)] 36.5 °C (97.7 °F)  Pulse:  [] 96  Resp:  [16-28]  18  BP: (145-183)/(70-99) 145/75  SpO2:  [90 %-97 %] 90 %     Physical Exam  Vitals and nursing note reviewed.   Constitutional:       General: He is in acute distress.      Appearance: Normal appearance. He is normal weight. He is ill-appearing and diaphoretic. He is not toxic-appearing.   HENT:      Head: Normocephalic and atraumatic.      Comments: Julio cheeks     Right Ear: External ear normal.      Left Ear: External ear normal.      Nose: Nose normal. No congestion or rhinorrhea.      Mouth/Throat:      Mouth: Mucous membranes are dry.      Pharynx: Oropharynx is clear. No oropharyngeal exudate or posterior oropharyngeal erythema.      Comments: Hoarse voice  Eyes:      General: No scleral icterus.        Right eye: No discharge.         Left eye: No discharge.      Extraocular Movements: Extraocular movements intact.      Conjunctiva/sclera: Conjunctivae normal.      Pupils: Pupils are equal, round, and reactive to light.   Neck:      Comments: Tender to palpation in posterior aspect of neck  Cardiovascular:      Rate and Rhythm: Regular rhythm. Tachycardia present.      Pulses: Normal pulses.      Heart sounds: Normal heart sounds. No murmur.   Pulmonary:      Effort: Pulmonary effort is normal. No respiratory distress.      Breath sounds: Normal breath sounds. No wheezing or rales.      Comments: Tenderness to palpation diffusely in rib cage area bilaterally  Chest:      Chest wall: Tenderness present.   Abdominal:      General: Abdomen is flat. Bowel sounds are normal. There is no distension.      Palpations: Abdomen is soft.      Tenderness: There is no abdominal tenderness. There is no right CVA tenderness, left CVA tenderness or guarding.   Musculoskeletal:         General: Tenderness and deformity present. Normal range of motion.      Cervical back: Tenderness present. No rigidity.      Right lower leg: No edema.      Left lower leg: No edema.      Comments: + Kyphosis.  Tenderness to palpation and  paraspinal area from C1 to S1/S2 area.  No noticeable abrasions/scars/open wounds.   Lymphadenopathy:      Cervical: No cervical adenopathy.   Skin:     General: Skin is warm.      Capillary Refill: Capillary refill takes less than 2 seconds.      Coloration: Skin is not jaundiced.   Neurological:      General: No focal deficit present.      Mental Status: He is alert and oriented to person, place, and time. Mental status is at baseline.      GCS: GCS eye subscore is 4. GCS verbal subscore is 5. GCS motor subscore is 6.      Cranial Nerves: No cranial nerve deficit, dysarthria or facial asymmetry.      Sensory: Sensation is intact. No sensory deficit.      Motor: Motor function is intact. No weakness, atrophy or seizure activity.      Coordination: Coordination normal.      Deep Tendon Reflexes: Reflexes normal.      Reflex Scores:       Tricep reflexes are 1+ on the right side and 1+ on the left side.       Bicep reflexes are 1+ on the right side and 1+ on the left side.       Brachioradialis reflexes are 1+ on the right side and 1+ on the left side.       Patellar reflexes are 1+ on the right side and 1+ on the left side.       Achilles reflexes are 1+ on the right side and 1+ on the left side.     Comments: Unable to perform cranial nerve XI exam due to patient tenderness/pain. cranial nerves II through X, XII intact. Patient declined rest of neuro exam.   Psychiatric:         Mood and Affect: Mood normal.         Behavior: Behavior normal.         Thought Content: Thought content normal.         Judgment: Judgment normal.           Labs:   Recent Labs     04/25/21  1204 04/26/21  0156   WBC 14.0* 15.5*   RBC 5.85 5.49   HEMOGLOBIN 17.9 16.5   HEMATOCRIT 51.4 47.6   MCV 87.9 86.7   MCH 30.6 30.1   RDW 42.9 42.3   PLATELETCT 51* 47*   MPV 12.7 13.8*   NEUTSPOLYS 88.70* 82.00*   LYMPHOCYTES 6.10* 7.40*   MONOCYTES 2.60 8.00   EOSINOPHILS 0.00 0.30   BASOPHILS 0.00 0.40   RBCMORPHOLO Present  --      Recent Labs      04/25/21  1204 04/25/21  1924 04/26/21  0156   SODIUM 124* 120* 126*   POTASSIUM 3.8 3.9 3.2*   CHLORIDE 87* 83* 89*   CO2 17* 18* 23   GLUCOSE 363* 345* 174*   BUN 26* 27* 25*        Imaging:   CT-CHEST (THORAX) WITH   Final Result      No displaced rib fracture is identified.      No acute cardiopulmonary process is seen.      Atherosclerotic plaque including coronary artery calcification.      CT-TSPINE W/O PLUS RECONS   Final Result      No CT evidence of acute traumatic abnormality.      Mild T6/7 anterior wedging compatible with healed mild chronic compression fracture and there is interbody fusion.      CT-LSPINE W/O PLUS RECONS   Final Result      No CT evidence of acute traumatic injury.      CT-CSPINE WITHOUT PLUS RECONS   Final Result      Multilevel degenerative changes.      Prevertebral edema. Further evaluation with MRI is recommended as this can be seen in the setting of ligamentous injury.      Bilateral carotid atherosclerotic plaque.         MR-CERVICAL SPINE-WITH & W/O    (Results Pending)       Problem Representation:   60 y.o. male with a past medical history significant for coronary artery disease (s/p CABG x4 in 2015), type 2 diabetes mellitus (A1c 13% in 4/2021, currently not on medications), HTN/HLD, marijuana use, remote h/o lower back surgery (1980s 2/2 lower spine cyst), and osteoarthritis who presented with neck pain and back pain x 1 week, s/p mechanical GLF on 4/25/2021. Also concerning for high BG and high BP in context of not taking previous medications for >1 year. Found to have strep bacteremia, currently on C3.    * Sepsis due to Streptococcus species (HCC)- (present on admission)  Assessment & Plan  This is Sepsis Present on admission  SIRS criteria identified on my evaluation include: Tachycardia, with heart rate greater than 90 BPM, Tachypnea, with respirations greater than 20 per minute and Leukocytosis, with WBC greater than 12,000  Source is possible  retropharyngeal/pharyngeal  Suspected onset of infection (date and time) Unknown  Sepsis protocol initiated  Fluid resuscitation ordered per protocol  IV antibiotics as appropriate for source of sepsis  While organ dysfunction may be noted elsewhere in this problem list or in the chart, degree of organ dysfunction does not meet CMS criteria for severe sepsis    Possibly due to pharyngitis or retropharyngeal abscess, as seen with prevertebral swelling on CT 4/25. This is especially in light of sore throat w/neck pain, vomiting when coughing    4/25 evening: Started on vancomycin  4/26 AM: d/c'ed vanc, started C3  -IVF resuscitation  -Follow up on MRI C-spine  -If soft tissue abscess -> drainage, biopsy, continue 4-6 weeks antibiotics  -Follow up on final cultures and sensitivities  -Repeat blood cultures 4/27  -Holding ASA 81 in case patient will need biopsy    High anion gap metabolic acidosis- (present on admission)  Assessment & Plan  IMPROVING  Also see problem of diabetic ketoacidosis.    Acute bilateral back pain- (present on admission)  Assessment & Plan  This is after mechanical ground-level fall on 4/25/2021, due to balance  issues from taking Tylenol #3 for about 1 week due to back pain  From working on his boat.  Patient has both back and neck pain.  CT spine showed a healed compression fracture from T6/7, prevertebral edema, multilevel degeneration.  CT thorax showed no broken ribs.  However, needs further investigation.  Patient currently does not have loss of bowel or bladder function.    Follow-up on MRI C-spine with and without contrast (under seation)  If patient starts losing bowel/bladder function, or has acute decompensation, or MRI C-spine shows acute process, immediately consult neurosurgery  Pain control: Cyclobenzaprine 10 mg 3 times daily, Tylenol 1 g 3 times daily, oxycodone 10 every 4 hours as needed, heat packs  PT/OT consult      Diabetic ketoacidosis (HCC)- (present on  admission)  Assessment & Plan  IMPROVING  Presented with glucose level of 363.  Anion gap of 20.  Beta hydroxybutyrate of 3.68, elevated.  Lactic acid of 1.7.  This is especially concerning because the patient has not been able to get his diabetes medications for more than a year due to issues with Covid.  Hemoglobin A1c 13% in 4/2021.  Currently patient does not have symptoms of severe hyperglycemia such as altered mental status or feeling very thirsty, however, patient has at high risk of having more of the symptoms.    -AG closed on AM of 4/26    Insulin sliding scale, hypoglycemia protocol, Accu-Cheks  Low threshold to transfer to ICU  -Diabetes education        Difficulty swallowing- (present on admission)  Assessment & Plan  Patient has difficulty swallowing, is coughing and vomiting when swallowing large/hard foods    -Aspiration and seizure precautions  -Soft GI diet    Thrombocytopenia (HCC)- (present on admission)  Assessment & Plan  Presented with a platelet count of 51.  This is unknown for reasons why, the patient does not drink alcohol per history which is corroborated by mother.  There is no known history of any blood clot/clotting disorder in the family.    We will not start any chemical VTE prophylaxis for now, will use SCDs  Plt 47 on 4/26. This is likely due to sepsis, though lower on the differential includes previously undiagnosed infection.    -Follow up on HIV and Hep panel  -Continue C3    Type 2 diabetes mellitus without complication, without long-term current use of insulin (HCC)- (present on admission)  Assessment & Plan  Also see problems of diabetic ketoacidosis and high anion gap metabolic acidosis    Has not been taking his medications for at least 1 year due to the Covid epidemic.  Presented with A1c of 13% in 4/2021.  This is very concerning as the patient was previously taking his medications regularly and was not insulin-dependent.  The patient may need to be on insulin for a while  in an outpatient setting until the A1c comes down.  This will require help from his mother, with whom he lives.    Insulin sliding scale, hypoglycemia protocol, Accu-Cheks    Essential hypertension, benign- (present on admission)  Assessment & Plan  Patient has not been taking his home medications for hypertension for at least 1 year due to difficulty obtaining medications.    In the hospital, presented with elevated blood pressure with systolic blood pressure up to the 180s.  This is likely with a component of acute pain.  Restart home medications: Losartan and metoprolol    Dyslipidemia- (present on admission)  Assessment & Plan  Continue home atorvastatin 80    Coronary artery disease due to calcified coronary lesion: CABG x4, July 2015- (present on admission)  Assessment & Plan  Chronic issue.  Patient has a significant history of MI in both his father and his sister.  Patient has not been taking any of his cardioprotective meds other than his baby aspirin in at least 1 year.    Restart on: Home metoprolol, losartan, atorvastatin  Holding ASA due to possibly needing biopsy of retropharyngeal abscess/soft tissue abscess    Tobacco abuse- (present on admission)  Assessment & Plan  Encouraged tobacco cessation    Marijuana abuse- (present on admission)  Assessment & Plan  Chronic issue  Encourage marijuana cessation      Quality Measures:  Code: Full  VTE: SCDs  Diet: GI soft, consistent carb; NPO at midnight for full sedation w/ MRI C-spine tomorrow  Disposition: Remain inpatient    Please note that this dictation was created using voice recognition software. I have worked with technical experts from [employer] to optimize the interface.  I have made every reasonable attempt to correct obvious errors, but there may be errors of grammar and possibly content that I did not discover before finalizing the note.

## 2021-04-26 NOTE — ASSESSMENT & PLAN NOTE
This is Sepsis Present on admission  SIRS criteria identified on my evaluation include: Tachycardia, with heart rate greater than 90 BPM, Tachypnea, with respirations greater than 20 per minute and Leukocytosis, with WBC greater than 12,000  Source is possible retropharyngeal/pharyngeal  Suspected onset of infection (date and time) Unknown  Sepsis protocol initiated  Fluid resuscitation ordered per protocol  IV antibiotics as appropriate for source of sepsis  While organ dysfunction may be noted elsewhere in this problem list or in the chart, degree of organ dysfunction does not meet CMS criteria for severe sepsis    MRI C-T-spine with evidence of epidural abscess, discitis, vertebral osteomyelitis.  Underwent emergent laminectomy, decompression 4/28.  Blood culture 4/24 and cervical thoracic 4/28 : Growth of Streptococcus anginosus. Blood c/s 4/27 1/2 + for Strep. Viridans, likely contaminant. 4/28 1/2 + CONS, likely contaminant, currently on ampicillin.    4/25 evening: Started on vancomycin  4/26 AM: d/c'ed vanc, started IV ceftriaxone, anticipate 6 weeks course of treatment  -IVF resuscitation  -Infectious disease following  -CHRISTOPHER done, results pending

## 2021-04-26 NOTE — PROGRESS NOTES
Report received from night shift RN, assumed care of pt. Plan of care discussed with pt, labs and chart reviewed. Pt A&Ox4. All needs met at this time. Tele box on. Call light within reach, bed locked and in lowest position. All fall precautions and hourly rounding in place. Will continue to monitor.

## 2021-04-26 NOTE — ASSESSMENT & PLAN NOTE
Resolved  -Insulin sliding scale, hypoglycemia protocol, Accu-Cheks  -Diabetic education  -Diabetes education

## 2021-04-26 NOTE — CARE PLAN
Problem: Safety  Goal: Will remain free from falls  Outcome: PROGRESSING AS EXPECTED   Pt instructed on level of risk for falls. Non-slip socks on. Bed locked and in lowest position.     Problem: Knowledge Deficit  Goal: Knowledge of disease process/condition, treatment plan, diagnostic tests, and medications will improve  Outcome: PROGRESSING AS EXPECTED   Discussed plan of care and medications with patient. Collaborated with provider to get MRI with sedation scheduled to rule out further injuries r/t fall.     Problem: Pain Management  Goal: Pain level will decrease to patient's comfort goal  Outcome: PROGRESSING SLOWER THAN EXPECTED   Pt reporting nearly constant pain. Pharmacologic and nonpharmacologic methods in place. Pain is worst in neck and back.

## 2021-04-26 NOTE — THERAPY
Occupational Therapy   Initial Evaluation     Patient Name: Perry Davis  Age:  60 y.o., Sex:  male  Medical Record #: [id number]  Today's Date: 4/26/2021     Precautions  Precautions: (P) Fall Risk  Comments: (P) function is limited by pain level    Assessment  Patient is 60 y.o. male with a diagnosis of back and neck pain, fell in bathtub.  Additional factors influencing patient status / progress: high pain level impedes function, good family support available upon DC  Will benefit from OT to focus on ADLS, body mechanics, transfers, activity tolerance.      Plan    Recommend Occupational Therapy 3 times per week until therapy goals are met for the following treatments:  Adaptive Equipment, Self Care/Activities of Daily Living, Therapeutic Activities and Therapeutic Exercises.    DC Equipment Recommendations: (P) Unable to determine at this time  Discharge Recommendations: (P) Recommend home health for continued occupational therapy services(anticipate function will improve as pain is controlled)     Subjective    Cooperative. Function is limited by pain level     Objective       04/26/21 1130   Total Time Spent   Total Time Spent (Mins) 35   Charge Group   OT Evaluation OT Evaluation Mod   Initial Contact Note    Initial Contact Note Order Received and Verified, Occupational Therapy Evaluation in Progress with Full Report to Follow.   Prior Living Situation   Prior Services None   Housing / Facility 1 Story House   Steps Into Home 0   Steps In Home 0   Bathroom Set up Bathtub / Shower Combination   Equipment Owned Front-Wheel Walker   Lives with - Patient's Self Care Capacity Parents   Comments lives with his mother, they assist each other as needed, did not use DME prior to fall/ onset of severe pain   Prior Level of ADL Function   Self Feeding Independent   Grooming / Hygiene Independent   Bathing Independent   Dressing Independent   Toileting Independent   Prior Level of IADL Function   Medication  Management Independent   Laundry Independent   Kitchen Mobility Independent   Finances Independent   Home Management Requires Assist   Shopping Independent   Prior Level Of Mobility Independent Without Device in Home   History of Falls   History of Falls Yes   Date of Last Fall   (reason for admit)   Precautions   Precautions Fall Risk   Comments function is limited by pain level   Vitals   O2 Delivery Device None - Room Air   Pain 0 - 10 Group   Therapist Pain Assessment During Activity;Post Activity Pain Same as Prior to Activity;Nurse Notified;10  (reports a 12)   Cognition    Cognition / Consciousness WDL   Passive ROM Upper Body   Passive ROM Upper Body WDL   Active ROM Upper Body   Active ROM Upper Body  WDL   Dominant Hand Right   Strength Upper Body   Upper Body Strength  WDL   Sensation Upper Body   Upper Extremity Sensation  WDL   Upper Body Muscle Tone   Upper Body Muscle Tone  WDL   Coordination Upper Body   Coordination WDL   Balance Assessment   Sitting Balance (Static) Fair   Sitting Balance (Dynamic) Fair   Standing Balance (Static) Fair -   Standing Balance (Dynamic) Poor +   Weight Shift Sitting Fair   Weight Shift Standing Poor   Bed Mobility    Supine to Sit Minimal Assist   Sit to Supine Minimal Assist   Scooting Minimal Assist   ADL Assessment   Eating Supervision   Grooming Supervision;Seated   Bathing   (NT)   Upper Body Dressing Minimal Assist   Lower Body Dressing Maximal Assist   How much help from another person does the patient currently need...   Putting on and taking off regular lower body clothing? 2   Bathing (including washing, rinsing, and drying)? 2   Toileting, which includes using a toilet, bedpan, or urinal? 3   Putting on and taking off regular upper body clothing? 3   Taking care of personal grooming such as brushing teeth? 4   Eating meals? 4   6 Clicks Daily Activity Score 18   Functional Mobility   Sit to Stand Minimal Assist   Bed, Chair, Wheelchair Transfer Minimal  Assist   Toilet Transfers Minimal Assist   Transfer Method Stand Pivot   Visual Perception   Visual Perception  WDL   Patient / Family Goals   Patient / Family Goal #1 none stated   Short Term Goals   Short Term Goal # 1 supervised level for toileting tasks   Short Term Goal # 2 set up for seated dressingt asks, appropriate use of AE   Short Term Goal # 3 tolerate stand at sink x 10 minutes for ADL participation at SBA level   Short Term Goal # 4 demo good carryover of back precautions during ADL completion   Education Group   Education Provided Back Safety   Role of Occupational Therapist Patient Response Patient;Acceptance;Explanation;Demonstration;Reinforcement Needed   Back Safety Patient Response Patient;Acceptance;Explanation;Demonstration;Reinforcement Needed   Problem List   Problem List Decreased Active Daily Living Skills;Decreased Functional Mobility;Decreased Activity Tolerance;Impaired Postural Control / Balance   Anticipated Discharge Equipment and Recommendations   DC Equipment Recommendations Unable to determine at this time   Discharge Recommendations Recommend home health for continued occupational therapy services  (anticipate function will improve as pain is controlled)   Interdisciplinary Plan of Care Collaboration   IDT Collaboration with  Nursing;Physical Therapist   Patient Position at End of Therapy In Bed;Call Light within Reach;Tray Table within Reach;Phone within Reach   Collaboration Comments report given   Session Information   Date / Session Number  4/26,1 ( 1/3,5/2)   Priority 2

## 2021-04-26 NOTE — ASSESSMENT & PLAN NOTE
Noncompliance issues  In the hospital, presented with elevated blood pressure with systolic blood pressure up to the 180s.  This is likely with a component of acute pain.  Hold blood pressure medications due to hypotension.  Resume as appropriate

## 2021-04-26 NOTE — DISCHARGE PLANNING
Anticipated Discharge Disposition: home    Action: Patient discussed in IDT rounds. He is not medically cleared and being treated for infection.  Discharge needs unknown at this time.    Barriers to Discharge: medical clearance    Plan: Case coordination to f/u with treatment team for discharge planning needs

## 2021-04-26 NOTE — ASSESSMENT & PLAN NOTE
Resolved  Plt 47 on 4/26. This is likely due to sepsis, though lower on the differential includes previously undiagnosed infection.  Hep panel negative. HIV non reactive

## 2021-04-26 NOTE — PROGRESS NOTES
"Pharmacy Kinetics 60 y.o. male on vancomycin day # 1 2021    Currently on Vancomycin 1500 mg iv q12hr  Provider specified end date: TBD     Indication for Treatment: GPC bacteremia (unknown source)    Pertinent history per medical record: Admitted on 2021 after GLF, complaints acute bilateral back pain. WBC is elevated and trending up. Pt is becoming tachycardic. 2 of 2 PBCx grew GPC, possible strep. Source unknown. Imaging unremarkable, need to follow up with MRI C-spine results. IV vancomycin initiated empirically while waiting for sensitivity.     Other antibiotics: none    Allergies: Patient has no known allergies.     List concerns for renal function: low albumin (2.6), hypermetabolic state (SIRS), CT contrast    Pertinent cultures to date:   : PBCx 2 Gram positive cocci: Possible Streptococcus sp.    MRSA nares swab if pneumonia is a concern (ordered/positive/negative/n-a): ordered    Recent Labs     21  1204 21  0156   WBC 14.0* 15.5*   NEUTSPOLYS 88.70* 82.00*   BANDSSTABS 2.60  --      Recent Labs     21  1204 21  1924 21  0156   BUN 26* 27* 25*   CREATININE 0.86 0.79 0.62   ALBUMIN 2.9*  --  2.6*     No results for input(s): VANCOTROUGH, VANCOPEAK, VANCORANDOM in the last 72 hours.    Intake/Output Summary (Last 24 hours) at 2021 0618  Last data filed at 2021 0600  Gross per 24 hour   Intake 2143.33 ml   Output 1150 ml   Net 993.33 ml      /70   Pulse (!) 105   Temp 36.6 °C (97.9 °F) (Temporal)   Resp 16   Ht 1.753 m (5' 9\")   Wt 84 kg (185 lb 3 oz)   SpO2 92%  Temp (24hrs), Av.3 °C (97.4 °F), Min:36 °C (96.8 °F), Max:36.6 °C (97.9 °F)      A/P   1. Vancomycin dose change: new start, 17 mg/kg IV q12hr x 3 days ordered  2. Next vancomycin level: not yet ordered, recommend in 2 days (or sooner if clinically indicated)  3. Goal trough: 10-15 mcg/ml  4. Comments: unknown vancomycin hx, no major risk factors for renal accumulation. Pharmacy " will continue to monitor, order trough, and recommend de-escalation if appropriate.     Eliza Kasper, FavianD

## 2021-04-26 NOTE — ASSESSMENT & PLAN NOTE
Presented with worsening neck and upper back pain after ground-level fall.   CT C-spine concerning for prevertebral edema, ligamental damage.  MRI recommended    MRI C/T-spine with extensive epidural abscess, discitis, osteomyelitis s/p laminectomy and decompression  See plan for spinal epidural abscess  As needed pain medications  PT/OT as appropriate

## 2021-04-26 NOTE — ASSESSMENT & PLAN NOTE
Presented with elevated blood glucose and mild DKA.  Apparently issues with noncompliance.   HbA1c done on admission 13  Continue insulin sliding scale, Lantus  Accu-Cheks, hypoglycemia protocols  Diabetic diet months allowed p.o.  Diabetes education

## 2021-04-26 NOTE — NON-PROVIDER
"This note is intended for the purposes of medical student education and feedback only.   Please refer to the documentation by this patient's assigned medical practitioner for details of care and plans.    Reason for admission: (Patient is a 60 year old Male with CAD. T2DM, HTN, HLD, post CABG, who was admitted with acute neck pain, cough, sore throat, neuropathy and diffuse bilateral back pain. He is thrombocytopenic and CBP bacteremic.)     HD/POD#: 2    SUBJECTIVE  (Overnight events?) No overnight events.  (How the patient is today?) He was sitting up in bed drinking juice and ranks his pain level as a \"10+/10\"    OBJECTIVE   Vital Signs:  • Max 24 hour temp:97.7  • Current temp:97.7  • Current HR:96  • Current BP:145/75  • Current RR:18  • Current O2 Sat: 90 on room air    Physical Exam (Components adjusted/added/removed when applicable):  General: middle aged man apprearing older than stated age sitting up on hospital bed drinking juice. In no acute distress, alert and oriented.   HEENT: Raspy voice and limited speech. Normocephalic, no thyromegaly or lymphadenopathy or gross deformities.  Cardiovascular: RRR, no mourners, rubs or gallops  Respiratory: exam limited by kyphosis and back pain and tenderness  Neurological: CN II-X and XII grossly intact. Limited ROM of neck due to pain limits a complete neuro exam.     Ins/Outs:  • P/O Intake:240  • IV Intake:2143.3  • Urine output:1150  • Drain output:none  • Other output:none    Lab Results:  Recent Labs     04/25/21  1204 04/26/21  0156   WBC 14.0* 15.5*   RBC 5.85 5.49   HEMOGLOBIN 17.9 16.5   HEMATOCRIT 51.4 47.6   MCV 87.9 86.7   MCH 30.6 30.1   MCHC 34.8 34.7   RDW 42.9 42.3   PLATELETCT 51* 47*   MPV 12.7 13.8*     Recent Labs     04/25/21  1204 04/25/21  1924 04/26/21  0156   SODIUM 124* 120* 126*   POTASSIUM 3.8 3.9 3.2*   CHLORIDE 87* 83* 89*   CO2 17* 18* 23   GLUCOSE 363* 345* 174*   BUN 26* 27* 25*   CREATININE 0.86 0.79 0.62   CALCIUM 8.6 8.4* 8.2* "     Recent Labs     04/25/21  1204   APTT 31.8   INR 0.97     Recent Labs     04/25/21  1204 04/26/21  0156   ASTSGOT 29 27   ALTSGPT 45 36   TBILIRUBIN 0.6 0.9   ALKPHOSPHAT 147* 124*   GLOBULIN 3.7* 3.1   INR 0.97  --        Imaging Results:  CT Cspine 4/25 revealed prevertebral edema consistent with possible ligamentous injury or possible abscess.    ASSESSMENT/PLAN  1)Back and neck pain, possible TSCI or prevertebral abscess- Awaiting MRI and soft tissue CT scan of the neck to R/O trauma and confirm if there is an abscess present. May consult IR to drain.  2)GPC Bacteremia- continue his IV Recofen for at least two weeks depending on the source of infx. Possible source includes a retropharyngeal abscess based on his sxs of sore throat, cough, neck pain and hx of T2DM. Will await results of soft tissue CT and swallowing study.  3)Thrombocytopenia-possibly due to sepsis or liver injury. He had an elevated Gamma GT and alk phos. Continue IV abx and repeate CBC daily.  4)J4XQ-tyhmahul insulin sliding scale  5)HTN-continue current antihypertensive meds and monitor BP  6)HLD- continue atorvastatin  7)OA- Continue current pain management  8)CAD- Continue to monitor vitals and continue statin regimen and antihypertensives and diabetic medications    PROPHYLAXIS  • DVT: none  • GI: Soft diet   • Other: Soft diet

## 2021-04-27 ENCOUNTER — APPOINTMENT (OUTPATIENT)
Dept: RADIOLOGY | Facility: MEDICAL CENTER | Age: 61
DRG: 003 | End: 2021-04-27
Attending: STUDENT IN AN ORGANIZED HEALTH CARE EDUCATION/TRAINING PROGRAM
Payer: MEDICARE

## 2021-04-27 ENCOUNTER — ANESTHESIA (OUTPATIENT)
Dept: SURGERY | Facility: MEDICAL CENTER | Age: 61
DRG: 003 | End: 2021-04-27
Payer: MEDICARE

## 2021-04-27 ENCOUNTER — ANESTHESIA EVENT (OUTPATIENT)
Dept: SURGERY | Facility: MEDICAL CENTER | Age: 61
DRG: 003 | End: 2021-04-27
Payer: MEDICARE

## 2021-04-27 PROBLEM — E87.1 HYPONATREMIA: Status: ACTIVE | Noted: 2021-04-27

## 2021-04-27 PROBLEM — E83.42 HYPOMAGNESEMIA: Status: ACTIVE | Noted: 2021-04-27

## 2021-04-27 PROBLEM — E87.6 HYPOKALEMIA: Status: ACTIVE | Noted: 2021-04-27

## 2021-04-27 LAB
ALBUMIN SERPL BCP-MCNC: 2.4 G/DL (ref 3.2–4.9)
ALBUMIN/GLOB SERPL: 0.7 G/DL
ALP SERPL-CCNC: 140 U/L (ref 30–99)
ALT SERPL-CCNC: 32 U/L (ref 2–50)
ANION GAP SERPL CALC-SCNC: 11 MMOL/L (ref 7–16)
ANION GAP SERPL CALC-SCNC: 13 MMOL/L (ref 7–16)
AST SERPL-CCNC: 21 U/L (ref 12–45)
BILIRUB SERPL-MCNC: 1.2 MG/DL (ref 0.1–1.5)
BUN SERPL-MCNC: 16 MG/DL (ref 8–22)
BUN SERPL-MCNC: 16 MG/DL (ref 8–22)
CALCIUM SERPL-MCNC: 7.8 MG/DL (ref 8.5–10.5)
CALCIUM SERPL-MCNC: 7.8 MG/DL (ref 8.5–10.5)
CFT BLD TEG: 5.8 MIN (ref 5–10)
CHLORIDE SERPL-SCNC: 89 MMOL/L (ref 96–112)
CHLORIDE SERPL-SCNC: 91 MMOL/L (ref 96–112)
CLOT ANGLE BLD TEG: 61.9 DEGREES (ref 53–72)
CLOT LYSIS 30M P MA LENFR BLD TEG: 0 % (ref 0–8)
CO2 SERPL-SCNC: 20 MMOL/L (ref 20–33)
CO2 SERPL-SCNC: 21 MMOL/L (ref 20–33)
CREAT SERPL-MCNC: 0.41 MG/DL (ref 0.5–1.4)
CREAT SERPL-MCNC: 0.44 MG/DL (ref 0.5–1.4)
CT.EXTRINSIC BLD ROTEM: 2.1 MIN (ref 1–3)
ERYTHROCYTE [DISTWIDTH] IN BLOOD BY AUTOMATED COUNT: 43.7 FL (ref 35.9–50)
ETEST SENSITIVITY ETEST: NORMAL
GLOBULIN SER CALC-MCNC: 3.4 G/DL (ref 1.9–3.5)
GLUCOSE BLD-MCNC: 157 MG/DL (ref 65–99)
GLUCOSE BLD-MCNC: 167 MG/DL (ref 65–99)
GLUCOSE BLD-MCNC: 173 MG/DL (ref 65–99)
GLUCOSE BLD-MCNC: 176 MG/DL (ref 65–99)
GLUCOSE BLD-MCNC: 187 MG/DL (ref 65–99)
GLUCOSE BLD-MCNC: 216 MG/DL (ref 65–99)
GLUCOSE SERPL-MCNC: 164 MG/DL (ref 65–99)
GLUCOSE SERPL-MCNC: 172 MG/DL (ref 65–99)
HCT VFR BLD AUTO: 44 % (ref 42–52)
HGB BLD-MCNC: 15.4 G/DL (ref 14–18)
MAGNESIUM SERPL-MCNC: 1.7 MG/DL (ref 1.5–2.5)
MAGNESIUM SERPL-MCNC: 1.8 MG/DL (ref 1.5–2.5)
MCF BLD TEG: 63.5 MM (ref 50–70)
MCH RBC QN AUTO: 30.4 PG (ref 27–33)
MCHC RBC AUTO-ENTMCNC: 35 G/DL (ref 33.7–35.3)
MCV RBC AUTO: 86.8 FL (ref 81.4–97.8)
OSMOLALITY SERPL: 264 MOSM/KG H2O (ref 278–298)
OSMOLALITY SERPL: 270 MOSM/KG H2O (ref 278–298)
OSMOLALITY UR: 745 MOSM/KG H2O (ref 300–900)
PA AA BLD-ACNC: 50.6 %
PA ADP BLD-ACNC: 93 %
PHOSPHATE SERPL-MCNC: 2.2 MG/DL (ref 2.5–4.5)
PHOSPHATE SERPL-MCNC: 2.2 MG/DL (ref 2.5–4.5)
PLATELET # BLD AUTO: 77 K/UL (ref 164–446)
PMV BLD AUTO: 12.4 FL (ref 9–12.9)
POTASSIUM SERPL-SCNC: 3.6 MMOL/L (ref 3.6–5.5)
POTASSIUM SERPL-SCNC: 3.6 MMOL/L (ref 3.6–5.5)
POTASSIUM UR-SCNC: 43 MMOL/L
PROT SERPL-MCNC: 5.8 G/DL (ref 6–8.2)
RBC # BLD AUTO: 5.07 M/UL (ref 4.7–6.1)
SODIUM SERPL-SCNC: 122 MMOL/L (ref 135–145)
SODIUM SERPL-SCNC: 123 MMOL/L (ref 135–145)
SODIUM UR-SCNC: 70 MMOL/L
TEG ALGORITHM TGALG: NORMAL
TSH SERPL DL<=0.005 MIU/L-ACNC: 1.76 UIU/ML (ref 0.38–5.33)
WBC # BLD AUTO: 16.4 K/UL (ref 4.8–10.8)

## 2021-04-27 PROCEDURE — 700101 HCHG RX REV CODE 250: Performed by: STUDENT IN AN ORGANIZED HEALTH CARE EDUCATION/TRAINING PROGRAM

## 2021-04-27 PROCEDURE — 71045 X-RAY EXAM CHEST 1 VIEW: CPT

## 2021-04-27 PROCEDURE — 99233 SBSQ HOSP IP/OBS HIGH 50: CPT | Mod: GC | Performed by: INTERNAL MEDICINE

## 2021-04-27 PROCEDURE — 700111 HCHG RX REV CODE 636 W/ 250 OVERRIDE (IP): Performed by: ANESTHESIOLOGY

## 2021-04-27 PROCEDURE — 87077 CULTURE AEROBIC IDENTIFY: CPT

## 2021-04-27 PROCEDURE — 85347 COAGULATION TIME ACTIVATED: CPT

## 2021-04-27 PROCEDURE — 83930 ASSAY OF BLOOD OSMOLALITY: CPT

## 2021-04-27 PROCEDURE — 84300 ASSAY OF URINE SODIUM: CPT

## 2021-04-27 PROCEDURE — 700102 HCHG RX REV CODE 250 W/ 637 OVERRIDE(OP): Performed by: STUDENT IN AN ORGANIZED HEALTH CARE EDUCATION/TRAINING PROGRAM

## 2021-04-27 PROCEDURE — A9270 NON-COVERED ITEM OR SERVICE: HCPCS | Performed by: STUDENT IN AN ORGANIZED HEALTH CARE EDUCATION/TRAINING PROGRAM

## 2021-04-27 PROCEDURE — 700105 HCHG RX REV CODE 258: Performed by: STUDENT IN AN ORGANIZED HEALTH CARE EDUCATION/TRAINING PROGRAM

## 2021-04-27 PROCEDURE — 700117 HCHG RX CONTRAST REV CODE 255: Performed by: STUDENT IN AN ORGANIZED HEALTH CARE EDUCATION/TRAINING PROGRAM

## 2021-04-27 PROCEDURE — 700101 HCHG RX REV CODE 250

## 2021-04-27 PROCEDURE — 160002 HCHG RECOVERY MINUTES (STAT)

## 2021-04-27 PROCEDURE — 82533 TOTAL CORTISOL: CPT

## 2021-04-27 PROCEDURE — 85027 COMPLETE CBC AUTOMATED: CPT

## 2021-04-27 PROCEDURE — 84443 ASSAY THYROID STIM HORMONE: CPT

## 2021-04-27 PROCEDURE — 82962 GLUCOSE BLOOD TEST: CPT | Mod: 91

## 2021-04-27 PROCEDURE — 4410588 MR-CERVICAL SPINE-WITH & W/O

## 2021-04-27 PROCEDURE — 87040 BLOOD CULTURE FOR BACTERIA: CPT | Mod: 91

## 2021-04-27 PROCEDURE — 85384 FIBRINOGEN ACTIVITY: CPT

## 2021-04-27 PROCEDURE — 700111 HCHG RX REV CODE 636 W/ 250 OVERRIDE (IP): Performed by: STUDENT IN AN ORGANIZED HEALTH CARE EDUCATION/TRAINING PROGRAM

## 2021-04-27 PROCEDURE — 84133 ASSAY OF URINE POTASSIUM: CPT

## 2021-04-27 PROCEDURE — 83735 ASSAY OF MAGNESIUM: CPT

## 2021-04-27 PROCEDURE — A9576 INJ PROHANCE MULTIPACK: HCPCS | Performed by: STUDENT IN AN ORGANIZED HEALTH CARE EDUCATION/TRAINING PROGRAM

## 2021-04-27 PROCEDURE — 700101 HCHG RX REV CODE 250: Performed by: ANESTHESIOLOGY

## 2021-04-27 PROCEDURE — 770020 HCHG ROOM/CARE - TELE (206)

## 2021-04-27 PROCEDURE — 85576 BLOOD PLATELET AGGREGATION: CPT

## 2021-04-27 PROCEDURE — 80053 COMPREHEN METABOLIC PANEL: CPT

## 2021-04-27 PROCEDURE — 700105 HCHG RX REV CODE 258: Performed by: ANESTHESIOLOGY

## 2021-04-27 PROCEDURE — 80048 BASIC METABOLIC PNL TOTAL CA: CPT

## 2021-04-27 PROCEDURE — 84100 ASSAY OF PHOSPHORUS: CPT

## 2021-04-27 PROCEDURE — 83935 ASSAY OF URINE OSMOLALITY: CPT

## 2021-04-27 RX ORDER — LABETALOL HYDROCHLORIDE 5 MG/ML
5 INJECTION, SOLUTION INTRAVENOUS
Status: DISCONTINUED | OUTPATIENT
Start: 2021-04-27 | End: 2021-04-27 | Stop reason: HOSPADM

## 2021-04-27 RX ORDER — LOSARTAN POTASSIUM 50 MG/1
50 TABLET ORAL DAILY
Status: DISCONTINUED | OUTPATIENT
Start: 2021-04-28 | End: 2021-04-28

## 2021-04-27 RX ORDER — MAGNESIUM SULFATE HEPTAHYDRATE 40 MG/ML
2 INJECTION, SOLUTION INTRAVENOUS ONCE
Status: COMPLETED | OUTPATIENT
Start: 2021-04-27 | End: 2021-04-27

## 2021-04-27 RX ORDER — MIDAZOLAM HYDROCHLORIDE 1 MG/ML
INJECTION INTRAMUSCULAR; INTRAVENOUS
Status: DISPENSED
Start: 2021-04-27 | End: 2021-04-28

## 2021-04-27 RX ORDER — INSULIN GLARGINE 100 [IU]/ML
15 INJECTION, SOLUTION SUBCUTANEOUS EVERY EVENING
Status: DISCONTINUED | OUTPATIENT
Start: 2021-04-27 | End: 2021-04-30

## 2021-04-27 RX ORDER — KETAMINE HYDROCHLORIDE 50 MG/ML
INJECTION, SOLUTION INTRAMUSCULAR; INTRAVENOUS
Status: COMPLETED
Start: 2021-04-27 | End: 2021-04-28

## 2021-04-27 RX ORDER — LIDOCAINE HYDROCHLORIDE 20 MG/ML
INJECTION, SOLUTION EPIDURAL; INFILTRATION; INTRACAUDAL; PERINEURAL PRN
Status: DISCONTINUED | OUTPATIENT
Start: 2021-04-27 | End: 2021-04-27 | Stop reason: SURG

## 2021-04-27 RX ORDER — MIDAZOLAM HYDROCHLORIDE 1 MG/ML
1 INJECTION INTRAMUSCULAR; INTRAVENOUS
Status: DISCONTINUED | OUTPATIENT
Start: 2021-04-27 | End: 2021-04-27 | Stop reason: HOSPADM

## 2021-04-27 RX ORDER — OXYCODONE HCL 5 MG/5 ML
10 SOLUTION, ORAL ORAL
Status: DISCONTINUED | OUTPATIENT
Start: 2021-04-27 | End: 2021-04-27 | Stop reason: HOSPADM

## 2021-04-27 RX ORDER — ONDANSETRON 2 MG/ML
4 INJECTION INTRAMUSCULAR; INTRAVENOUS
Status: DISCONTINUED | OUTPATIENT
Start: 2021-04-27 | End: 2021-04-27 | Stop reason: HOSPADM

## 2021-04-27 RX ORDER — HYDROMORPHONE HYDROCHLORIDE 1 MG/ML
0.2 INJECTION, SOLUTION INTRAMUSCULAR; INTRAVENOUS; SUBCUTANEOUS
Status: DISCONTINUED | OUTPATIENT
Start: 2021-04-27 | End: 2021-04-27 | Stop reason: HOSPADM

## 2021-04-27 RX ORDER — HYDROMORPHONE HYDROCHLORIDE 1 MG/ML
0.5 INJECTION, SOLUTION INTRAMUSCULAR; INTRAVENOUS; SUBCUTANEOUS
Status: DISCONTINUED | OUTPATIENT
Start: 2021-04-27 | End: 2021-04-28

## 2021-04-27 RX ORDER — CARVEDILOL 12.5 MG/1
6.25 TABLET ORAL 2 TIMES DAILY WITH MEALS
Status: DISCONTINUED | OUTPATIENT
Start: 2021-04-27 | End: 2021-04-28

## 2021-04-27 RX ORDER — EPINEPHRINE 1 MG/ML(1)
AMPUL (ML) INJECTION PRN
Status: DISCONTINUED | OUTPATIENT
Start: 2021-04-27 | End: 2021-04-27 | Stop reason: SURG

## 2021-04-27 RX ORDER — HYDROMORPHONE HYDROCHLORIDE 1 MG/ML
0.4 INJECTION, SOLUTION INTRAMUSCULAR; INTRAVENOUS; SUBCUTANEOUS
Status: DISCONTINUED | OUTPATIENT
Start: 2021-04-27 | End: 2021-04-27 | Stop reason: HOSPADM

## 2021-04-27 RX ORDER — HALOPERIDOL 5 MG/ML
1 INJECTION INTRAMUSCULAR
Status: DISCONTINUED | OUTPATIENT
Start: 2021-04-27 | End: 2021-04-27 | Stop reason: HOSPADM

## 2021-04-27 RX ORDER — OXYCODONE HYDROCHLORIDE 10 MG/1
10 TABLET ORAL EVERY 4 HOURS PRN
Status: DISCONTINUED | OUTPATIENT
Start: 2021-04-27 | End: 2021-04-29

## 2021-04-27 RX ORDER — METOPROLOL TARTRATE 1 MG/ML
1 INJECTION, SOLUTION INTRAVENOUS
Status: DISCONTINUED | OUTPATIENT
Start: 2021-04-27 | End: 2021-04-27 | Stop reason: HOSPADM

## 2021-04-27 RX ORDER — HYDRALAZINE HYDROCHLORIDE 20 MG/ML
5 INJECTION INTRAMUSCULAR; INTRAVENOUS
Status: DISCONTINUED | OUTPATIENT
Start: 2021-04-27 | End: 2021-04-27 | Stop reason: HOSPADM

## 2021-04-27 RX ORDER — PHENYLEPHRINE HYDROCHLORIDE 10 MG/ML
INJECTION, SOLUTION INTRAMUSCULAR; INTRAVENOUS; SUBCUTANEOUS PRN
Status: DISCONTINUED | OUTPATIENT
Start: 2021-04-27 | End: 2021-04-27 | Stop reason: SURG

## 2021-04-27 RX ORDER — MORPHINE SULFATE 4 MG/ML
2 INJECTION, SOLUTION INTRAMUSCULAR; INTRAVENOUS EVERY 4 HOURS PRN
Status: DISCONTINUED | OUTPATIENT
Start: 2021-04-27 | End: 2021-04-27

## 2021-04-27 RX ORDER — SODIUM CHLORIDE, SODIUM GLUCONATE, SODIUM ACETATE, POTASSIUM CHLORIDE AND MAGNESIUM CHLORIDE 526; 502; 368; 37; 30 MG/100ML; MG/100ML; MG/100ML; MG/100ML; MG/100ML
INJECTION, SOLUTION INTRAVENOUS
Status: DISCONTINUED | OUTPATIENT
Start: 2021-04-27 | End: 2021-04-27 | Stop reason: SURG

## 2021-04-27 RX ORDER — DIPHENHYDRAMINE HYDROCHLORIDE 50 MG/ML
12.5 INJECTION INTRAMUSCULAR; INTRAVENOUS
Status: DISCONTINUED | OUTPATIENT
Start: 2021-04-27 | End: 2021-04-27 | Stop reason: HOSPADM

## 2021-04-27 RX ORDER — HYDROMORPHONE HYDROCHLORIDE 1 MG/ML
0.1 INJECTION, SOLUTION INTRAMUSCULAR; INTRAVENOUS; SUBCUTANEOUS
Status: DISCONTINUED | OUTPATIENT
Start: 2021-04-27 | End: 2021-04-27 | Stop reason: HOSPADM

## 2021-04-27 RX ORDER — OXYCODONE HCL 5 MG/5 ML
5 SOLUTION, ORAL ORAL
Status: DISCONTINUED | OUTPATIENT
Start: 2021-04-27 | End: 2021-04-27 | Stop reason: HOSPADM

## 2021-04-27 RX ORDER — MEPERIDINE HYDROCHLORIDE 25 MG/ML
12.5 INJECTION INTRAMUSCULAR; INTRAVENOUS; SUBCUTANEOUS
Status: DISCONTINUED | OUTPATIENT
Start: 2021-04-27 | End: 2021-04-27 | Stop reason: HOSPADM

## 2021-04-27 RX ORDER — POTASSIUM CHLORIDE 20 MEQ/1
40 TABLET, EXTENDED RELEASE ORAL ONCE
Status: COMPLETED | OUTPATIENT
Start: 2021-04-27 | End: 2021-04-27

## 2021-04-27 RX ORDER — LABETALOL HYDROCHLORIDE 5 MG/ML
10 INJECTION, SOLUTION INTRAVENOUS EVERY 4 HOURS PRN
Status: DISCONTINUED | OUTPATIENT
Start: 2021-04-27 | End: 2021-04-28

## 2021-04-27 RX ORDER — SODIUM CHLORIDE, SODIUM LACTATE, POTASSIUM CHLORIDE, CALCIUM CHLORIDE 600; 310; 30; 20 MG/100ML; MG/100ML; MG/100ML; MG/100ML
INJECTION, SOLUTION INTRAVENOUS CONTINUOUS
Status: DISCONTINUED | OUTPATIENT
Start: 2021-04-27 | End: 2021-04-27 | Stop reason: HOSPADM

## 2021-04-27 RX ADMIN — GADOTERIDOL 15 ML: 279.3 INJECTION, SOLUTION INTRAVENOUS at 14:15

## 2021-04-27 RX ADMIN — PHENYLEPHRINE HYDROCHLORIDE 100 MCG: 10 INJECTION INTRAVENOUS at 13:54

## 2021-04-27 RX ADMIN — MAGNESIUM SULFATE IN WATER 2 G: 40 INJECTION, SOLUTION INTRAVENOUS at 19:58

## 2021-04-27 RX ADMIN — CYCLOBENZAPRINE 10 MG: 10 TABLET, FILM COATED ORAL at 05:18

## 2021-04-27 RX ADMIN — DIBASIC SODIUM PHOSPHATE, MONOBASIC POTASSIUM PHOSPHATE AND MONOBASIC SODIUM PHOSPHATE 500 MG: 852; 155; 130 TABLET ORAL at 05:18

## 2021-04-27 RX ADMIN — INSULIN GLARGINE 15 UNITS: 100 INJECTION, SOLUTION SUBCUTANEOUS at 17:38

## 2021-04-27 RX ADMIN — PHENYLEPHRINE HYDROCHLORIDE 100 MCG: 10 INJECTION INTRAVENOUS at 13:48

## 2021-04-27 RX ADMIN — MORPHINE SULFATE 2 MG: 4 INJECTION INTRAVENOUS at 19:40

## 2021-04-27 RX ADMIN — LOSARTAN POTASSIUM 25 MG: 25 TABLET, FILM COATED ORAL at 05:18

## 2021-04-27 RX ADMIN — PHENYLEPHRINE HYDROCHLORIDE 100 MCG: 10 INJECTION INTRAVENOUS at 14:15

## 2021-04-27 RX ADMIN — LABETALOL HYDROCHLORIDE 10 MG: 5 INJECTION, SOLUTION INTRAVENOUS at 12:43

## 2021-04-27 RX ADMIN — FENTANYL CITRATE 150 MCG: 50 INJECTION, SOLUTION INTRAMUSCULAR; INTRAVENOUS at 13:30

## 2021-04-27 RX ADMIN — EPINEPHRINE 10 MCG: 1 INJECTION INTRAMUSCULAR; INTRAVENOUS; SUBCUTANEOUS at 14:02

## 2021-04-27 RX ADMIN — EPINEPHRINE 10 MCG: 1 INJECTION INTRAMUSCULAR; INTRAVENOUS; SUBCUTANEOUS at 14:23

## 2021-04-27 RX ADMIN — EPINEPHRINE 10 MCG: 1 INJECTION INTRAMUSCULAR; INTRAVENOUS; SUBCUTANEOUS at 14:15

## 2021-04-27 RX ADMIN — PHENYLEPHRINE HYDROCHLORIDE 200 MCG: 10 INJECTION INTRAVENOUS at 14:00

## 2021-04-27 RX ADMIN — PHENYLEPHRINE HYDROCHLORIDE 100 MCG: 10 INJECTION INTRAVENOUS at 13:36

## 2021-04-27 RX ADMIN — PHENYLEPHRINE HYDROCHLORIDE 100 MCG: 10 INJECTION INTRAVENOUS at 14:23

## 2021-04-27 RX ADMIN — CEFTRIAXONE SODIUM 2 G: 2 INJECTION, POWDER, FOR SOLUTION INTRAMUSCULAR; INTRAVENOUS at 05:19

## 2021-04-27 RX ADMIN — EPHEDRINE SULFATE 10 MG: 50 INJECTION INTRAMUSCULAR; INTRAVENOUS; SUBCUTANEOUS at 13:56

## 2021-04-27 RX ADMIN — EPHEDRINE SULFATE 20 MG: 50 INJECTION INTRAMUSCULAR; INTRAVENOUS; SUBCUTANEOUS at 13:34

## 2021-04-27 RX ADMIN — EPHEDRINE SULFATE 10 MG: 50 INJECTION INTRAMUSCULAR; INTRAVENOUS; SUBCUTANEOUS at 13:50

## 2021-04-27 RX ADMIN — LABETALOL HYDROCHLORIDE 10 MG: 5 INJECTION, SOLUTION INTRAVENOUS at 06:51

## 2021-04-27 RX ADMIN — GABAPENTIN 400 MG: 400 CAPSULE ORAL at 05:18

## 2021-04-27 RX ADMIN — ALBUTEROL SULFATE 2.5 MG: 2.5 SOLUTION RESPIRATORY (INHALATION) at 17:25

## 2021-04-27 RX ADMIN — EPHEDRINE SULFATE 10 MG: 50 INJECTION INTRAMUSCULAR; INTRAVENOUS; SUBCUTANEOUS at 13:54

## 2021-04-27 RX ADMIN — PHENYLEPHRINE HYDROCHLORIDE 100 MCG: 10 INJECTION INTRAVENOUS at 13:50

## 2021-04-27 RX ADMIN — INSULIN HUMAN 2 UNITS: 100 INJECTION, SOLUTION PARENTERAL at 17:37

## 2021-04-27 RX ADMIN — OXYCODONE HYDROCHLORIDE 10 MG: 10 TABLET ORAL at 00:35

## 2021-04-27 RX ADMIN — DIBASIC SODIUM PHOSPHATE, MONOBASIC POTASSIUM PHOSPHATE AND MONOBASIC SODIUM PHOSPHATE 500 MG: 852; 155; 130 TABLET ORAL at 00:39

## 2021-04-27 RX ADMIN — LIDOCAINE HYDROCHLORIDE 100 MG: 20 INJECTION, SOLUTION EPIDURAL; INFILTRATION; INTRACAUDAL at 13:30

## 2021-04-27 RX ADMIN — EPINEPHRINE 10 MCG: 1 INJECTION INTRAMUSCULAR; INTRAVENOUS; SUBCUTANEOUS at 14:00

## 2021-04-27 RX ADMIN — MAGNESIUM SULFATE 2 G: 2 INJECTION INTRAVENOUS at 04:05

## 2021-04-27 RX ADMIN — ACETAMINOPHEN 1000 MG: 500 TABLET ORAL at 06:49

## 2021-04-27 RX ADMIN — POTASSIUM CHLORIDE 40 MEQ: 1500 TABLET, EXTENDED RELEASE ORAL at 00:35

## 2021-04-27 RX ADMIN — PHENYLEPHRINE HYDROCHLORIDE 100 MCG: 10 INJECTION INTRAVENOUS at 13:56

## 2021-04-27 RX ADMIN — POTASSIUM CHLORIDE 40 MEQ: 1500 TABLET, EXTENDED RELEASE ORAL at 06:49

## 2021-04-27 RX ADMIN — SODIUM CHLORIDE, SODIUM GLUCONATE, SODIUM ACETATE, POTASSIUM CHLORIDE AND MAGNESIUM CHLORIDE: 526; 502; 368; 37; 30 INJECTION, SOLUTION INTRAVENOUS at 13:23

## 2021-04-27 RX ADMIN — INSULIN HUMAN 2 UNITS: 100 INJECTION, SOLUTION PARENTERAL at 12:39

## 2021-04-27 RX ADMIN — FENTANYL CITRATE 50 MCG: 50 INJECTION, SOLUTION INTRAMUSCULAR; INTRAVENOUS at 13:38

## 2021-04-27 RX ADMIN — INSULIN HUMAN 2 UNITS: 100 INJECTION, SOLUTION PARENTERAL at 07:42

## 2021-04-27 RX ADMIN — PROPOFOL 200 MG: 10 INJECTION, EMULSION INTRAVENOUS at 13:30

## 2021-04-27 RX ADMIN — DIBASIC SODIUM PHOSPHATE, MONOBASIC POTASSIUM PHOSPHATE AND MONOBASIC SODIUM PHOSPHATE 500 MG: 852; 155; 130 TABLET ORAL at 12:43

## 2021-04-27 RX ADMIN — SODIUM CHLORIDE: 9 INJECTION, SOLUTION INTRAVENOUS at 19:58

## 2021-04-27 RX ADMIN — OXYCODONE HYDROCHLORIDE 10 MG: 10 TABLET ORAL at 07:53

## 2021-04-27 RX ADMIN — METOPROLOL TARTRATE 25 MG: 25 TABLET, FILM COATED ORAL at 05:17

## 2021-04-27 RX ADMIN — LIDOCAINE 3 PATCH: 50 PATCH TOPICAL at 17:46

## 2021-04-27 RX ADMIN — GABAPENTIN 400 MG: 400 CAPSULE ORAL at 12:43

## 2021-04-27 ASSESSMENT — LIFESTYLE VARIABLES: SUBSTANCE_ABUSE: 1

## 2021-04-27 ASSESSMENT — ENCOUNTER SYMPTOMS
NAUSEA: 1
VOMITING: 1
NERVOUS/ANXIOUS: 0
DIZZINESS: 0
HEADACHES: 0
FOCAL WEAKNESS: 0
MEMORY LOSS: 0
FALLS: 1
BLURRED VISION: 0
CONSTIPATION: 0
SEIZURES: 0
SORE THROAT: 1
BACK PAIN: 1
LOSS OF CONSCIOUSNESS: 0
NECK PAIN: 1
SPEECH CHANGE: 0
WHEEZING: 0
HEMOPTYSIS: 0
DIARRHEA: 0
PALPITATIONS: 0
MYALGIAS: 1
CHILLS: 0
FEVER: 0
SHORTNESS OF BREATH: 0
WEAKNESS: 1
COUGH: 1
ABDOMINAL PAIN: 0
DOUBLE VISION: 0
TINGLING: 1

## 2021-04-27 ASSESSMENT — PAIN DESCRIPTION - PAIN TYPE
TYPE: ACUTE PAIN;CHRONIC PAIN
TYPE: ACUTE PAIN
TYPE: ACUTE PAIN
TYPE: ACUTE PAIN;CHRONIC PAIN
TYPE: ACUTE PAIN

## 2021-04-27 ASSESSMENT — FIBROSIS 4 INDEX: FIB4 SCORE: 2.8

## 2021-04-27 ASSESSMENT — PAIN SCALES - GENERAL: PAIN_LEVEL: 0

## 2021-04-27 NOTE — PROGRESS NOTES
Pt back to floor with RN and CNA. On 2L of oxygen via NC. Monitor room notified, back on tele box..

## 2021-04-27 NOTE — PROGRESS NOTES
Received bedside report from SARAH Lagos. Pt restless from pain. POC discussed with pt and verbalizes no questions. Pt denies any additional needs at this time. Bed in lowest position, bed alarm on. Pt educated on fall risk and verbalized understanding, call light within reach, hourly rounding initiated.

## 2021-04-27 NOTE — CARE PLAN
"Problem: Communication  Goal: The ability to communicate needs accurately and effectively will improve  Outcome: PROGRESSING AS EXPECTED  Pt is A&Ox4 and can communicate appropriately. Pt uses call light to request assistance from staff.     Problem: Pain Management  Goal: Pain level will decrease to patient's comfort goal  Outcome: PROGRESSING AS EXPECTED   Pt reports that pain level ranges from 2-8. Pt states that he experiences \"surges\" of pain periodically in which intensity increases.    Problem: Venous Thromboembolism (VTW)/Deep Vein Thrombosis (DVT) Prevention:  Goal: Patient will participate in Venous Thrombosis (VTE)/Deep Vein Thrombosis (DVT)Prevention Measures  Outcome: PROGRESSING SLOWER THAN EXPECTED   Pt refuses SCDs and ambulates infrequently due to pain in neck and spine. ROM is limited by his pain.   "

## 2021-04-27 NOTE — PROGRESS NOTES
Diabetes education: Met with pt this afternoon. Please see consult note. Pt is [place].  Plan: CDE will follow up tomorrow to attempt insulin instruction. Please have pt give his own insulin and do finger stick with nursing. Pt may need assistance with approved services as he does not have a Medicare part D coverage.

## 2021-04-27 NOTE — ANESTHESIA POSTPROCEDURE EVALUATION
Patient: Perry Davis    Procedure Summary     Date: 04/27/21 Room / Location: [facility] INTERVENTIONAL RADIOLOGY 03 / SURGERY [facility]    Anesthesia Start: 1323 Anesthesia Stop: 1452    Procedure: RECOVERY ONLY Diagnosis:     Surgeons: Recoveryonly Surgery Responsible Provider: Ravi Lopez D.O.    Anesthesia Type: general ASA Status: 3          Final Anesthesia Type: general  Last vitals  BP   Blood Pressure: (!) 93/43    Temp   37.6 °C (99.6 °F)    Pulse   86   Resp   16    SpO2   98 %      Anesthesia Post Evaluation    Patient location during evaluation: PACU  Patient participation: complete - patient participated  Level of consciousness: awake and alert  Pain score: 0    Airway patency: patent  Anesthetic complications: no  Cardiovascular status: hemodynamically stable  Respiratory status: acceptable  Hydration status: euvolemic    PONV: none          No complications documented.     Nurse Pain Score: 0 (NPRS)

## 2021-04-27 NOTE — DISCHARGE PLANNING
Anticipated Discharge Disposition: home vs home with home health    Action: Per request of Dr. Cano, RN LEXY called [facility] to obtain the medication list he was last prescribed at their facility. List obtained and given to Dr. Cano.It was from September of 2020. Patient is currently not medically cleared.    Barriers to Discharge: medical clearance, may need home health depending on pain and progress with PT/OT    Plan: Case coordination to continue to follow for discharge planning support    7055-Patient gone from room for procedure. RN CM attempted to reach patient's mother for assessment but no answer at either phone number listed on facesheet.

## 2021-04-27 NOTE — OR NURSING
Patient arrived to PACU, hypotensive. Dr Lopez remains at bedside managing BP. Patient resting quietly, appears comfortable. OPA in place, respirations even and unlabored.

## 2021-04-27 NOTE — ANESTHESIA TIME REPORT
Anesthesia Start and Stop Event Times     Date Time Event    4/27/2021 1303 Ready for Procedure     1323 Anesthesia Start     1452 Anesthesia Stop        Responsible Staff  04/27/21    Name Role Begin End    Ravi Lopez D.O. Anesth 1323 1452        Preop Diagnosis (Free Text):  Pre-op Diagnosis             Preop Diagnosis (Codes):    Post op Diagnosis  Abscess in epidural space of cervical spine      Premium Reason  Non-Premium    Comments:

## 2021-04-27 NOTE — PROGRESS NOTES
· 2 RN skin check complete with Carmel SMITH  · Devices in place N/A.  · Skin assessed under devices N/A.  · Confirmed pressure ulcers found on N/A.  · New potential pressure ulcers noted on N/A. Wound consult placed? N/A. Photo uploaded? N/A.   · The following interventions are in place  mepilex.    Feet bilaterally dry and flaky. Right great toe was cracked from dryness. Excoriation on sacrum.

## 2021-04-27 NOTE — CONSULTS
Diabetes education : Pt has a hx of diabetes and per chart was to be on oral medications but had been off of them for a period of time ( one note says 1 yr). [facility] and [facility] not [facility] of [facility]. Spoke with pt and he to says [facility] not follow [facility]. He states he does not have a Medicare Part D coverage that is why the medications were too costly for him.  Pt was admitted with blood sugar of 363 and Hg a1c of 13.0%.  Pt is currently on Lantus 10 units pm and regular insulin sliding scale coverage ac and hs with blood sugars of 213 (3 units), 231  ( 3 units) and not charted or downloaded ( 3 units).  Pt was sitting eating when visited and not interested in much conversation. Pt is [place] as well .  Plan: CDE will follow up tomorrow to attempt insulin instruction. Please have pt give his own insulin and do finger stick with nursing. Pt may need assistance with approved services as he does not have a Medicare part D coverage.

## 2021-04-27 NOTE — ASSESSMENT & PLAN NOTE
Mild hyponatremia , likely chronic   Likely SIADH.  Serum Osm 264, urine osm 745, urine Na 70  Follow daily Na  Will consider fluid restriction as appropriate if worsens

## 2021-04-27 NOTE — PROGRESS NOTES
Daily Progress Note:     Date of Service: 4/27/2021  Primary Team: [facility] Team   Attending: Dax Garcia M.D.   Senior Resident: Dr. Reggie MD  Intern: Dr. Miriam Cano M.D.  Contact:  [phone]    Chief Complaint:   Chief Complaint   Patient presents with   • Back Pain   • Neck Pain         Subjective:     Overnight, the patient had persistent pain in his neck and back, which was alleviated by IV morphine.  For a brief moment of time, he was AAO x1 and also had some numbness in his upper arms and lower legs.  At that time, he did not have any hypoglycemia.  The night resident, Dr. Jane, saw him at bedside, and and at that time his mental status and physical status changed; he went back to AAO x3 and he had improved sensation/last numbness in his arms and legs.  His blood pressure remained high, in the 180s/60s-70s despite the use of IV labetalol.  This morning, he states he still has chest pain (musculoskeletal in origin), back pain, and neck pain. Denies headache. States he has some cough, sore throat, and neck pain.  Upon reassessment, patient had 2/10 back pain.    Consultants/Specialty:  Radiology    Review of Systems:   Review of Systems   Constitutional: Positive for malaise/fatigue. Negative for chills and fever.   HENT: Positive for sore throat. Negative for congestion.    Eyes: Negative for blurred vision and double vision.   Respiratory: Positive for cough. Negative for hemoptysis, shortness of breath and wheezing.    Cardiovascular: Positive for chest pain. Negative for palpitations and leg swelling.        MSK chest pain   Gastrointestinal: Positive for nausea and vomiting. Negative for abdominal pain, constipation and diarrhea.   Genitourinary: Negative for dysuria, frequency and urgency.   Musculoskeletal: Positive for back pain, falls, joint pain, myalgias and neck pain.   Neurological: Positive for tingling and weakness. Negative for dizziness, speech change, focal weakness,  seizures, loss of consciousness and headaches.        Positive for altered level of orientation during the night, AAO x1, reverted to AAO x3 shortly thereafter   Psychiatric/Behavioral: Positive for substance abuse. Negative for memory loss and suicidal ideas. The patient is not nervous/anxious.        Objective Data:   Physical Exam:   Vitals:   Temp:  [36.1 °C (97 °F)-37.1 °C (98.7 °F)] 36.5 °C (97.7 °F)  Pulse:  [] 100  Resp:  [18-20] 18  BP: (169-193)/() 192/105  SpO2:  [86 %-96 %] 93 %     Physical Exam  Vitals and nursing note reviewed.   Constitutional:       General: He is in acute distress.      Appearance: Normal appearance. He is normal weight. He is ill-appearing. He is not toxic-appearing or diaphoretic.      Comments: Laying in bed, sleeping peacefully   HENT:      Head: Normocephalic and atraumatic.      Comments: Julio cheeks     Right Ear: External ear normal.      Left Ear: External ear normal.      Nose: Nose normal. No congestion or rhinorrhea.      Mouth/Throat:      Mouth: Mucous membranes are dry.      Pharynx: Oropharynx is clear. No oropharyngeal exudate or posterior oropharyngeal erythema.      Comments: Hoarse voice  Eyes:      General: No scleral icterus.        Right eye: No discharge.         Left eye: No discharge.      Extraocular Movements: Extraocular movements intact.      Conjunctiva/sclera: Conjunctivae normal.      Pupils: Pupils are equal, round, and reactive to light.   Neck:      Comments: Tender to palpation in posterior aspect of neck  Cardiovascular:      Rate and Rhythm: Regular rhythm. Tachycardia present.      Pulses: Normal pulses.      Heart sounds: Normal heart sounds. No murmur.   Pulmonary:      Effort: Pulmonary effort is normal. No respiratory distress.      Breath sounds: Normal breath sounds. No wheezing or rales.      Comments: Tenderness to palpation diffusely in rib cage area bilaterally  Chest:      Chest wall: Tenderness present.   Abdominal:       General: Abdomen is flat. Bowel sounds are normal. There is no distension.      Palpations: Abdomen is soft.      Tenderness: There is no abdominal tenderness. There is no right CVA tenderness, left CVA tenderness or guarding.   Musculoskeletal:         General: Tenderness and deformity present. Normal range of motion.      Cervical back: Tenderness present. No rigidity.      Right lower leg: No edema.      Left lower leg: No edema.      Comments: + Kyphosis.  Tenderness to palpation and paraspinal area from C1 to S1/S2 area.  No noticeable abrasions/scars/open wounds.   Skin:     General: Skin is warm.      Capillary Refill: Capillary refill takes less than 2 seconds.      Coloration: Skin is not jaundiced.   Neurological:      General: No focal deficit present.      Mental Status: He is alert and oriented to person, place, and time. Mental status is at baseline.      GCS: GCS eye subscore is 4. GCS verbal subscore is 5. GCS motor subscore is 6.      Cranial Nerves: No cranial nerve deficit, dysarthria or facial asymmetry.      Sensory: Sensation is intact. No sensory deficit.      Motor: Motor function is intact. No weakness, atrophy or seizure activity.      Coordination: Coordination normal.      Deep Tendon Reflexes:      Reflex Scores:       Tricep reflexes are 1+ on the right side and 1+ on the left side.       Bicep reflexes are 1+ on the right side and 1+ on the left side.       Brachioradialis reflexes are 1+ on the right side and 1+ on the left side.       Patellar reflexes are 1+ on the right side and 1+ on the left side.       Achilles reflexes are 1+ on the right side and 1+ on the left side.     Comments: Unable to perform cranial nerve XI exam due to patient tenderness/pain. cranial nerves II through X, XII intact. Patient declined rest of neuro exam.   Psychiatric:         Mood and Affect: Mood normal.         Behavior: Behavior normal.         Thought Content: Thought content normal.          Judgment: Judgment normal.           Labs:   Recent Labs     04/25/21  1204 04/26/21  0156 04/27/21  0256   WBC 14.0* 15.5* 16.4*   RBC 5.85 5.49 5.07   HEMOGLOBIN 17.9 16.5 15.4   HEMATOCRIT 51.4 47.6 44.0   MCV 87.9 86.7 86.8   MCH 30.6 30.1 30.4   RDW 42.9 42.3 43.7   PLATELETCT 51* 47* 77*   MPV 12.7 13.8* 12.4   NEUTSPOLYS 88.70* 82.00*  --    LYMPHOCYTES 6.10* 7.40*  --    MONOCYTES 2.60 8.00  --    EOSINOPHILS 0.00 0.30  --    BASOPHILS 0.00 0.40  --    RBCMORPHOLO Present  --   --      Recent Labs     04/26/21  1426 04/26/21  2137 04/27/21  0248   SODIUM 123* 122* 122*   POTASSIUM 3.6 3.7 3.6   CHLORIDE 89* 87* 89*   CO2 18* 21 20   GLUCOSE 223* 219* 172*   BUN 21 19 16        Imaging:   CT-CHEST (THORAX) WITH   Final Result      No displaced rib fracture is identified.      No acute cardiopulmonary process is seen.      Atherosclerotic plaque including coronary artery calcification.      CT-TSPINE W/O PLUS RECONS   Final Result      No CT evidence of acute traumatic abnormality.      Mild T6/7 anterior wedging compatible with healed mild chronic compression fracture and there is interbody fusion.      CT-LSPINE W/O PLUS RECONS   Final Result      No CT evidence of acute traumatic injury.      CT-CSPINE WITHOUT PLUS RECONS   Final Result      Multilevel degenerative changes.      Prevertebral edema. Further evaluation with MRI is recommended as this can be seen in the setting of ligamentous injury.      Bilateral carotid atherosclerotic plaque.         MR-CERVICAL SPINE-WITH & W/O    (Results Pending)       Problem Representation:   60 y.o. male with a past medical history significant for coronary artery disease (s/p CABG x4 in 2015), type 2 diabetes mellitus (A1c 13% in 4/2021, currently not on medications), HTN/HLD, marijuana use, remote h/o lower back surgery (1980s 2/2 lower spine cyst), and osteoarthritis who presented with neck pain and back pain x 1 week, s/p mechanical GLF on 4/25/2021. Also  concerning for high BG and high BP in context of not taking previous medications for >1 year. Found to have strep bacteremia, currently on C3.      * Sepsis due to Streptococcus species (HCC)- (present on admission)  Assessment & Plan  This is Sepsis Present on admission  SIRS criteria identified on my evaluation include: Tachycardia, with heart rate greater than 90 BPM, Tachypnea, with respirations greater than 20 per minute and Leukocytosis, with WBC greater than 12,000  Source is possible retropharyngeal/pharyngeal  Suspected onset of infection (date and time) Unknown  Sepsis protocol initiated  Fluid resuscitation ordered per protocol  IV antibiotics as appropriate for source of sepsis  While organ dysfunction may be noted elsewhere in this problem list or in the chart, degree of organ dysfunction does not meet CMS criteria for severe sepsis    Possibly due to pharyngitis or retropharyngeal abscess, as seen with prevertebral swelling on CT 4/25. This is especially in light of sore throat w/neck pain, vomiting when coughing    4/25 evening: Started on vancomycin  4/26 AM: d/c'ed vanc, started C3  -IVF resuscitation  -Follow up on MRI C-spine: (under full anesthesia, n.p.o.)  -If MRI C-spine does not show any signs of infection, consider CT abdomen for further investigation of underlying infection  -If soft tissue abscess -> drainage, biopsy, continue 4-6 weeks antibiotics, may need neurosurgery consult  -Follow up on final cultures and sensitivities; per my conversation with  on 4/26, final cultures may return 4/28  -Repeat blood cultures 4/27: Follow-up  -Holding home ASA 81 in case patient will need biopsy    High anion gap metabolic acidosis- (present on admission)  Assessment & Plan  IMPROVING  Also see problem of diabetic ketoacidosis.    Acute bilateral back pain- (present on admission)  Assessment & Plan  This is after mechanical ground-level fall on 4/25/2021, due to balance  issues from taking  Tylenol #3 for about 1 week due to back pain  From working on his boat.  Patient has both back and neck pain.  CT spine showed a healed compression fracture from T6/7, prevertebral edema, multilevel degeneration.  CT thorax showed no broken ribs.  However, needs further investigation.  Patient currently does not have loss of bowel or bladder function.    Follow-up on MRI C-spine with and without contrast  If patient starts losing bowel/bladder function, or has acute decompensation, or MRI C-spine shows acute process, immediately consult neurosurgery  Pain control: Cyclobenzaprine 10 mg 3 times daily, Tylenol 1 g 3 times daily, oxycodone 10 every 4 hours as needed, IV morphine, heat packs  PT/OT consult      Diabetic ketoacidosis (HCC)- (present on admission)  Assessment & Plan  IMPROVING  Presented with glucose level of 363.  Anion gap of 20.  Beta hydroxybutyrate of 3.68, elevated.  Lactic acid of 1.7.  This is especially concerning because the patient has not been able to get his diabetes medications for more than a year due to issues with Covid.  Hemoglobin A1c 13% in 4/2021.  Currently patient does not have symptoms of severe hyperglycemia such as altered mental status or feeling very thirsty, however, patient has at high risk of having more of the symptoms.    Insulin sliding scale, hypoglycemia protocol, Accu-Cheks  Low threshold to transfer to ICU  -Diabetes education  -Continue to monitor BMP, monitor anion gap and beta hydroxybutyrate      Hyponatremia- (present on admission)  Assessment & Plan  This could be an issue of SIADH due to pain.  There are other differential diagnoses, need to rule that out.    Follow up on: TSH w/ reflex to FT4, cortisol, urine potassium, urine sodium, urine osm, serum osm    Hypomagnesemia- (present on admission)  Assessment & Plan  Replete with goal of MG=2    Hypokalemia- (present on admission)  Assessment & Plan  Replete with goal of K of 4    Difficulty swallowing- (present  on admission)  Assessment & Plan  Patient has difficulty swallowing, is coughing and vomiting when swallowing large/hard foods    -Aspiration and seizure precautions  -Soft GI diet    Thrombocytopenia (HCC)- (present on admission)  Assessment & Plan  IMPROVING  Presented with a platelet count of 51.  This is unknown for reasons why, the patient does not drink alcohol per history which is corroborated by mother.  There is no known history of any blood clot/clotting disorder in the family.    We will not start any chemical VTE prophylaxis for now, will use SCDs  Plt 47 on 4/26. This is likely due to sepsis, though lower on the differential includes previously undiagnosed infection.  Hep panel negative.    -Follow up on HIV lab  -Continue C3, narrow antibiotic pending sensitivity results    Type 2 diabetes mellitus without complication, without long-term current use of insulin (HCC)- (present on admission)  Assessment & Plan  Also see problems of diabetic ketoacidosis and high anion gap metabolic acidosis    Has not been taking his medications for at least 1 year due to the Covid epidemic.  Presented with A1c of 13% in 4/2021.  This is very concerning as the patient was previously taking his medications regularly and was not insulin-dependent.  The patient may need to be on insulin for a while in an outpatient setting until the A1c comes down.  This will require help from his mother, with whom he lives.    Insulin sliding scale, hypoglycemia protocol, Accu-Cheks, glargine 15 units nightly  Diabetic education    Essential hypertension, benign- (present on admission)  Assessment & Plan  Patient has not been taking his home medications for hypertension since at least September 2020 due to difficulty obtaining medications.  Currently, patient has blood pressures of 140s-190s/90s-110s.  This is likely a component of acute pain, however, likely also patient has been having uncontrolled hypertension for at least since  September 2020.     In the hospital, presented with elevated blood pressure with systolic blood pressure up to the 180s.  This is likely with a component of acute pain.  Carvedilol 6.25 mg twice daily  Increase losartan to 50 mg daily    Dyslipidemia- (present on admission)  Assessment & Plan  Continue home atorvastatin 80    Coronary artery disease due to calcified coronary lesion: CABG x4, July 2015- (present on admission)  Assessment & Plan  Chronic issue.  Patient has a significant history of MI in both his father and his sister.  Patient has not been taking any of his cardioprotective meds other than his baby aspirin in at least 1 year.    Restart on: Home atorvastatin  -Increasing losartan to 50 mg daily due to persistently high blood pressures  -Carvedilol 6.25 mg twice daily  Holding ASA due to possibly needing biopsy of retropharyngeal abscess/soft tissue abscess    Tobacco abuse- (present on admission)  Assessment & Plan  Encouraged tobacco cessation    Marijuana abuse- (present on admission)  Assessment & Plan  Chronic issue  Encourage marijuana cessation      Quality Measures:  Code: Full  VTE: SCDs; no chemical VTE ppx in case patient needs biopsy procedure  Diet: GI soft, consistent carb; NPO for MRI C-spine under full anesthesia  Disposition: Remain inpatient    Please note that this dictation was created using voice recognition software. I have worked with technical experts from [employer] to optimize the interface.  I have made every reasonable attempt to correct obvious errors, but there may be errors of grammar and possibly content that I did not discover before finalizing the note.

## 2021-04-27 NOTE — NON-PROVIDER
This note is intended for the purposes of medical student education and feedback only.   Please refer to the documentation by this patient's assigned medical practitioner for details of care and plans.    Reason for admission: Patient is a 60 year old male with CAD, HTN, HLD, OA, T2DM and post CABG admitted for acute neck and back pain and sepsis    HD/POD#: 3    SUBJECTIVE  No overnight events reported by patient of nursing staff.  Patient is well appearing but still in pain. He ranks it a 2/10 today verses 10+/10 from yesterday.    OBJECTIVE   Vital Signs:  • Max 24 hour temp:97.6  • Current temp:97.6  • Current HR:110  • Current BP:185/99  • Current RR:18  • Current O2 Sat: 93 on room air    Physical Exam:  General: middle aged man appearing older than stated age resting on hospital bed.   Cardiovascular: RRR no mummers rubs or gallops  Respiratory: difficult to appreciate due to patients back pain and kyphosis    Ins/Outs:  • IV Intake:2143.3  • Urine output:1115      Lab Results:  Recent Labs     04/25/21  1204 04/26/21  0156 04/27/21  0256   WBC 14.0* 15.5* 16.4*   RBC 5.85 5.49 5.07   HEMOGLOBIN 17.9 16.5 15.4   HEMATOCRIT 51.4 47.6 44.0   MCV 87.9 86.7 86.8   MCH 30.6 30.1 30.4   MCHC 34.8 34.7 35.0   RDW 42.9 42.3 43.7   PLATELETCT 51* 47* 77*   MPV 12.7 13.8* 12.4     Recent Labs     04/26/21  1426 04/26/21  2137 04/27/21  0248   SODIUM 123* 122* 122*   POTASSIUM 3.6 3.7 3.6   CHLORIDE 89* 87* 89*   CO2 18* 21 20   GLUCOSE 223* 219* 172*   BUN 21 19 16   CREATININE 0.48* 0.53 0.41*   CALCIUM 8.0* 8.2* 7.8*     Recent Labs     04/25/21  1204   APTT 31.8   INR 0.97     Recent Labs     04/25/21  1204 04/26/21  0156 04/27/21  0248   ASTSGOT 29 27 21   ALTSGPT 45 36 32   TBILIRUBIN 0.6 0.9 1.2   ALKPHOSPHAT 147* 124* 140*   GLOBULIN 3.7* 3.1 3.4   INR 0.97  --   --        Imaging Results:  Pending MRI of Cspine    ASSESSMENT/PLAN  Problem Representation:   60 y.o. male with a past medical history significant  for coronary artery disease (s/p CABG x4 in 2015), type 2 diabetes mellitus (A1c 13% in 4/2021, currently not on medications), HTN/HLD, marijuana use, remote h/o lower back surgery (1980s 2/2 lower spine cyst), and osteoarthritis who presented with neck pain and back pain x 1 week, s/p mechanical GLF on 4/25/2021. Also concerning for high BG and high BP in context of not taking previous medications for >1 year. Found to have strep bacteremia, currently on C3.     * Sepsis due to Streptococcus species (HCC)- (present on admission)  Assessment & Plan  This is Sepsis Present on admission  SIRS criteria identified on my evaluation include: Tachycardia, with heart rate greater than 90 BPM, Tachypnea, with respirations greater than 20 per minute and Leukocytosis, with WBC greater than 12,000  Source is possible retropharyngeal/pharyngeal  Suspected onset of infection (date and time) Unknown  Sepsis protocol initiated  Fluid resuscitation ordered per protocol  IV antibiotics as appropriate for source of sepsis  While organ dysfunction may be noted elsewhere in this problem list or in the chart, degree of organ dysfunction does not meet CMS criteria for severe sepsis     Possibly due to pharyngitis or retropharyngeal abscess, as seen with prevertebral swelling on CT 4/25. This is especially in light of sore throat w/neck pain, vomiting when coughing     4/25 evening: Started on vancomycin  4/26 AM: d/c'ed vanc, started C3  -IVF resuscitation  -Follow up on MRI C-spine  -If soft tissue abscess -> drainage, biopsy, continue 4-6 weeks antibiotics  -Follow up on final cultures and sensitivities  -Repeat blood cultures 4/27  -Holding ASA 81 in case patient will need biopsy  -His WBC count, Alk Phos levels and Tbili are increasing from yesterday, and his Gamma-GT was elevated. Consider abdominal CT to look for other sources of infection if MRI of C-spine is non-significant     High anion gap metabolic acidosis- (present on  admission)  Assessment & Plan  IMPROVING  Also see problem of diabetic ketoacidosis.     Acute bilateral back pain- (present on admission)  Assessment & Plan  This is after mechanical ground-level fall on 4/25/2021, due to balance  issues from taking Tylenol #3 for about 1 week due to back pain  From working on his boat.  Patient has both back and neck pain.  CT spine showed a healed compression fracture from T6/7, prevertebral edema, multilevel degeneration.  CT thorax showed no broken ribs.  However, needs further investigation.  Patient currently does not have loss of bowel or bladder function.     Follow-up on MRI C-spine with and without contrast (under seation)  If patient starts losing bowel/bladder function, or has acute decompensation, or MRI C-spine shows acute process, immediately consult neurosurgery  Pain control: Cyclobenzaprine 10 mg 3 times daily, Tylenol 1 g 3 times daily, oxycodone 10 every 4 hours as needed, heat packs  PT/OT consult        Diabetic ketoacidosis (HCC)- (present on admission)  Assessment & Plan  IMPROVING  Presented with glucose level of 363.  Anion gap of 20.  Beta hydroxybutyrate of 3.68, elevated.  Lactic acid of 1.7.  This is especially concerning because the patient has not been able to get his diabetes medications for more than a year due to issues with Covid.  Hemoglobin A1c 13% in 4/2021.  Currently patient does not have symptoms of severe hyperglycemia such as altered mental status or feeling very thirsty, however, patient has at high risk of having more of the symptoms.     -AG closed on AM of 4/26     Insulin sliding scale, hypoglycemia protocol, Accu-Cheks  Low threshold to transfer to ICU  -Diabetes education           Difficulty swallowing- (present on admission)  Assessment & Plan  Patient has difficulty swallowing, is coughing and vomiting when swallowing large/hard foods     -Aspiration and seizure precautions  -Soft GI diet     Thrombocytopenia (HCC)- (present on  admission)  Assessment & Plan  Presented with a platelet count of 51.  This is unknown for reasons why, the patient does not drink alcohol per history which is corroborated by mother.  There is no known history of any blood clot/clotting disorder in the family.     We will not start any chemical VTE prophylaxis for now, will use SCDs  Plt 47 on 4/26. This is likely due to sepsis, though lower on the differential includes previously undiagnosed infection.     -Follow up on HIV and Hep panel  -Continue C3     Type 2 diabetes mellitus without complication, without long-term current use of insulin ([facility])- (present on admission)  Assessment & Plan  Also see problems of diabetic ketoacidosis and high anion gap metabolic acidosis     Has not been taking his medications for at least 1 year due to the Covid epidemic.  Presented with A1c of 13% in 4/2021.  This is very concerning as the patient was previously taking his medications regularly and was not insulin-dependent.  The patient may need to be on insulin for a while in an outpatient setting until the A1c comes down.  This will require help from his mother, with whom he lives.     Insulin sliding scale, hypoglycemia protocol, Accu-Cheks.       Essential hypertension, benign- (present on admission)  Assessment & Plan  Patient has not been taking his home medications for hypertension for at least 1 year due to difficulty obtaining medications.     In the hospital, presented with elevated blood pressure with systolic blood pressure up to the 180s. This is likely with a component of acute pain.  Restart home medications: Losartan.  -His BP was high overnight reachimng the 180s. May be attributed to both pain and essential HTN. Discontinued metoprolol and started on labetolol 10 mg and started morphine 2mg Q4hrs PRN      Dyslipidemia- (present on admission)  Assessment & Plan  Continue home atorvastatin 80     Coronary artery disease due to calcified coronary lesion: CABG x4,  July 2015- (present on admission)  Assessment & Plan  Chronic issue.  Patient has a significant history of MI in both his father and his sister.  Patient has not been taking any of his cardioprotective meds other than his baby aspirin in at least 1 year.     Restart on: Home metoprolol, losartan, atorvastatin  Holding ASA due to possibly needing biopsy of retropharyngeal abscess/soft tissue abscess     Tobacco abuse- (present on admission)  Assessment & Plan  Encouraged tobacco cessation     Marijuana abuse- (present on admission)  Assessment & Plan  Chronic issue  Encourage marijuana cessation        Quality Measures:  Code: Full  VTE: SCDs  Diet: GI soft, consistent carb; NPO at midnight for full sedation w/ MRI C-spine tomorrow  Disposition: Remain inpatient     Please note that this dictation was created using voice recognition software. I have worked with technical experts from [employer] to optimize the interface.  I have made every reasonable attempt to correct obvious errors, but there may be errors of grammar and possibly content that I did not discover before finalizing the note.     PROPHYLAXIS  • DVT: SCDs  • GI: soft food

## 2021-04-27 NOTE — PROGRESS NOTES
Dr. Cano notified of BP: 193/92. Pt asymptomatic, 2/10 pain at this time. MD will see pt during rounds, no new orders at this time. Will continue to monitor.

## 2021-04-27 NOTE — ANESTHESIA PROCEDURE NOTES
Airway    Date/Time: 4/27/2021 1:30 PM  Performed by: Ravi Lopez D.O.  Authorized by: Ravi Lopez D.O.     Location:  OR  Urgency:  Elective  Indications for Airway Management:  Anesthesia      Spontaneous Ventilation: absent    Sedation Level:  Deep  Preoxygenated: Yes    Final Airway Type:  Supraglottic airway  Final Supraglottic Airway:  Standard LMA    SGA Size:  5  Number of Attempts at Approach:  1

## 2021-04-27 NOTE — CARE PLAN
Problem: Safety  Goal: Free from accidental injury  Outcome: PROGRESSING AS EXPECTED     Pt educated on safety precautions and precautions in place. Treaded socks on, bed locked and in lowest position, belongings and call light within reach. Bed alarm in place and on.       Problem: Pain  Goal: Alleviation of Pain or a reduction in pain to the patient's comfort goal  Outcome: PROGRESSING SLOWER THAN EXPECTED     Patient still complains of pain that ranges from 5-10.

## 2021-04-27 NOTE — PROGRESS NOTES
Report received from night shift RN, assumed care of pt. Pt is A&Ox4. Plan of care discussed with pt, labs and chart reviewed. All needs met at this time. Tele box on. Call light within reach, bed locked and in lowest position. All fall precautions and hourly rounding in place. Pt reporting 6/10 pain in back and neck. Will continue to monitor.

## 2021-04-27 NOTE — ANESTHESIA PREPROCEDURE EVALUATION
Relevant Problems   ANESTHESIA   (+) Other sleep apnea      PULMONARY   (+) Shortness of breath      CARDIAC   (+) Coronary artery disease due to calcified coronary lesion: CABG x4, July 2015   (+) Essential hypertension, benign      ENDO   (+) Type 2 diabetes mellitus without complication, without long-term current use of insulin (HCC)      Other   (+) Diabetic ketoacidosis (HCC)   (+) Difficulty swallowing   (+) Tobacco abuse       Physical Exam    Airway   Mallampati: II  TM distance: >3 FB  Neck ROM: full       Cardiovascular - normal exam  Rhythm: regular  Rate: normal  (-) murmur     Dental - normal exam           Pulmonary - normal exam  Breath sounds clear to auscultation     Abdominal    Neurological - normal exam                 Anesthesia Plan    ASA 3   ASA physical status 3 criteria: CAD/stents (> 3 months)    Plan - general       Airway plan will be LMA          Induction: intravenous      Pertinent diagnostic labs and testing reviewed    Informed Consent:    Anesthetic plan and risks discussed with patient.

## 2021-04-28 ENCOUNTER — APPOINTMENT (OUTPATIENT)
Dept: RADIOLOGY | Facility: MEDICAL CENTER | Age: 61
DRG: 003 | End: 2021-04-28
Attending: NEUROLOGICAL SURGERY
Payer: MEDICARE

## 2021-04-28 ENCOUNTER — APPOINTMENT (OUTPATIENT)
Dept: RADIOLOGY | Facility: MEDICAL CENTER | Age: 61
DRG: 003 | End: 2021-04-28
Attending: INTERNAL MEDICINE
Payer: MEDICARE

## 2021-04-28 ENCOUNTER — ANESTHESIA EVENT (OUTPATIENT)
Dept: SURGERY | Facility: MEDICAL CENTER | Age: 61
DRG: 003 | End: 2021-04-28
Payer: MEDICARE

## 2021-04-28 ENCOUNTER — APPOINTMENT (OUTPATIENT)
Dept: CARDIOLOGY | Facility: MEDICAL CENTER | Age: 61
DRG: 003 | End: 2021-04-28
Attending: INTERNAL MEDICINE
Payer: MEDICARE

## 2021-04-28 ENCOUNTER — APPOINTMENT (OUTPATIENT)
Dept: RADIOLOGY | Facility: MEDICAL CENTER | Age: 61
DRG: 003 | End: 2021-04-28
Attending: STUDENT IN AN ORGANIZED HEALTH CARE EDUCATION/TRAINING PROGRAM
Payer: MEDICARE

## 2021-04-28 ENCOUNTER — ANESTHESIA (OUTPATIENT)
Dept: SURGERY | Facility: MEDICAL CENTER | Age: 61
DRG: 003 | End: 2021-04-28
Payer: MEDICARE

## 2021-04-28 PROBLEM — G06.1 SPINAL EPIDURAL ABSCESS: Status: ACTIVE | Noted: 2021-04-28

## 2021-04-28 PROBLEM — Z86.73 HISTORY OF ISCHEMIC STROKE: Status: ACTIVE | Noted: 2021-04-28

## 2021-04-28 LAB
ABO GROUP BLD: NORMAL
ALBUMIN SERPL BCP-MCNC: 2 G/DL (ref 3.2–4.9)
ALBUMIN/GLOB SERPL: 0.6 G/DL
ALP SERPL-CCNC: 109 U/L (ref 30–99)
ALT SERPL-CCNC: 22 U/L (ref 2–50)
AMPHET UR QL SCN: NEGATIVE
ANION GAP SERPL CALC-SCNC: 14 MMOL/L (ref 7–16)
AST SERPL-CCNC: 18 U/L (ref 12–45)
BACTERIA BLD CULT: ABNORMAL
BARBITURATES UR QL SCN: NEGATIVE
BARCODED ABORH UBTYP: 5100
BARCODED PRD CODE UBPRD: NORMAL
BARCODED UNIT NUM UBUNT: NORMAL
BASE EXCESS BLDA CALC-SCNC: -3 MMOL/L (ref -4–3)
BASE EXCESS BLDA CALC-SCNC: -7 MMOL/L (ref -4–3)
BENZODIAZ UR QL SCN: POSITIVE
BILIRUB SERPL-MCNC: 0.8 MG/DL (ref 0.1–1.5)
BLD GP AB SCN SERPL QL: NORMAL
BODY TEMPERATURE: ABNORMAL DEGREES
BODY TEMPERATURE: ABNORMAL DEGREES
BREATHS SETTING VENT: 18
BREATHS SETTING VENT: 22
BUN SERPL-MCNC: 20 MG/DL (ref 8–22)
BZE UR QL SCN: NEGATIVE
CALCIUM SERPL-MCNC: 7.7 MG/DL (ref 8.5–10.5)
CANNABINOIDS UR QL SCN: POSITIVE
CHLORIDE SERPL-SCNC: 92 MMOL/L (ref 96–112)
CO2 BLDA-SCNC: 19 MMOL/L (ref 20–33)
CO2 BLDA-SCNC: 26 MMOL/L (ref 20–33)
CO2 SERPL-SCNC: 19 MMOL/L (ref 20–33)
COMPONENT P 8504P: NORMAL
CORTIS SERPL-MCNC: 20.9 UG/DL (ref 0–23)
CREAT SERPL-MCNC: 0.49 MG/DL (ref 0.5–1.4)
DELSYS IDSYS: ABNORMAL
DELSYS IDSYS: ABNORMAL
EKG IMPRESSION: NORMAL
END TIDAL CARBON DIOXIDE IECO2: 26 MMHG
END TIDAL CARBON DIOXIDE IECO2: 32 MMHG
ERYTHROCYTE [DISTWIDTH] IN BLOOD BY AUTOMATED COUNT: 45.8 FL (ref 35.9–50)
EST. AVERAGE GLUCOSE BLD GHB EST-MCNC: 303 MG/DL
GLOBULIN SER CALC-MCNC: 3.4 G/DL (ref 1.9–3.5)
GLUCOSE BLD-MCNC: 175 MG/DL (ref 65–99)
GLUCOSE BLD-MCNC: 196 MG/DL (ref 65–99)
GLUCOSE BLD-MCNC: 197 MG/DL (ref 65–99)
GLUCOSE BLD-MCNC: 198 MG/DL (ref 65–99)
GLUCOSE SERPL-MCNC: 173 MG/DL (ref 65–99)
GRAM STN SPEC: NORMAL
GRAM STN SPEC: NORMAL
HBA1C MFR BLD: 12.2 % (ref 4–5.6)
HCO3 BLDA-SCNC: 18.3 MMOL/L (ref 17–25)
HCO3 BLDA-SCNC: 24.7 MMOL/L (ref 17–25)
HCT VFR BLD AUTO: 40.7 % (ref 42–52)
HGB BLD-MCNC: 13.9 G/DL (ref 14–18)
HOROWITZ INDEX BLDA+IHG-RTO: 178 MM[HG]
HOROWITZ INDEX BLDA+IHG-RTO: 78 MM[HG]
LV EJECT FRACT  99904: 60
LV EJECT FRACT MOD 2C 99903: 55
LV EJECT FRACT MOD 4C 99902: 46.6
LV EJECT FRACT MOD BP 99901: 62.73
MAGNESIUM SERPL-MCNC: 2.3 MG/DL (ref 1.5–2.5)
MCH RBC QN AUTO: 30.5 PG (ref 27–33)
MCHC RBC AUTO-ENTMCNC: 34.2 G/DL (ref 33.7–35.3)
MCV RBC AUTO: 89.3 FL (ref 81.4–97.8)
METHADONE UR QL SCN: NEGATIVE
MODE IMODE: ABNORMAL
MODE IMODE: ABNORMAL
O2/TOTAL GAS SETTING VFR VENT: 100 %
O2/TOTAL GAS SETTING VFR VENT: 100 %
OPIATES UR QL SCN: POSITIVE
OXYCODONE UR QL SCN: POSITIVE
PCO2 BLDA: 36.1 MMHG (ref 26–37)
PCO2 BLDA: 51.3 MMHG (ref 26–37)
PCP UR QL SCN: NEGATIVE
PEEP END EXPIRATORY PRESSURE IPEEP: 12 CMH20
PEEP END EXPIRATORY PRESSURE IPEEP: 8 CMH20
PH BLDA: 7.29 [PH] (ref 7.4–7.5)
PH BLDA: 7.31 [PH] (ref 7.4–7.5)
PHOSPHATE SERPL-MCNC: 3.3 MG/DL (ref 2.5–4.5)
PLATELET # BLD AUTO: 105 K/UL (ref 164–446)
PMV BLD AUTO: 12.5 FL (ref 9–12.9)
PO2 BLDA: 178 MMHG (ref 64–87)
PO2 BLDA: 78 MMHG (ref 64–87)
POTASSIUM SERPL-SCNC: 4 MMOL/L (ref 3.6–5.5)
PRODUCT TYPE UPROD: NORMAL
PROPOXYPH UR QL SCN: NEGATIVE
PROT SERPL-MCNC: 5.4 G/DL (ref 6–8.2)
RBC # BLD AUTO: 4.56 M/UL (ref 4.7–6.1)
RH BLD: NORMAL
SAO2 % BLDA: 94 % (ref 93–99)
SAO2 % BLDA: 99 % (ref 93–99)
SIGNIFICANT IND 70042: ABNORMAL
SIGNIFICANT IND 70042: ABNORMAL
SIGNIFICANT IND 70042: NORMAL
SIGNIFICANT IND 70042: NORMAL
SITE SITE: ABNORMAL
SITE SITE: ABNORMAL
SITE SITE: NORMAL
SITE SITE: NORMAL
SODIUM SERPL-SCNC: 125 MMOL/L (ref 135–145)
SOURCE SOURCE: ABNORMAL
SOURCE SOURCE: ABNORMAL
SOURCE SOURCE: NORMAL
SOURCE SOURCE: NORMAL
SPECIMEN DRAWN FROM PATIENT: ABNORMAL
SPECIMEN DRAWN FROM PATIENT: ABNORMAL
TIDAL VOLUME IVT: 430 ML
TIDAL VOLUME IVT: 430 ML
TRIGL SERPL-MCNC: 174 MG/DL (ref 0–149)
UNIT STATUS USTAT: NORMAL
WBC # BLD AUTO: 15.2 K/UL (ref 4.8–10.8)

## 2021-04-28 PROCEDURE — 0B9H8ZX DRAINAGE OF LUNG LINGULA, VIA NATURAL OR ARTIFICIAL OPENING ENDOSCOPIC, DIAGNOSTIC: ICD-10-PCS | Performed by: INTERNAL MEDICINE

## 2021-04-28 PROCEDURE — 84478 ASSAY OF TRIGLYCERIDES: CPT

## 2021-04-28 PROCEDURE — 36430 TRANSFUSION BLD/BLD COMPNT: CPT

## 2021-04-28 PROCEDURE — 700111 HCHG RX REV CODE 636 W/ 250 OVERRIDE (IP): Performed by: INTERNAL MEDICINE

## 2021-04-28 PROCEDURE — 700117 HCHG RX CONTRAST REV CODE 255: Performed by: STUDENT IN AN ORGANIZED HEALTH CARE EDUCATION/TRAINING PROGRAM

## 2021-04-28 PROCEDURE — 87205 SMEAR GRAM STAIN: CPT | Mod: 91

## 2021-04-28 PROCEDURE — 94002 VENT MGMT INPAT INIT DAY: CPT

## 2021-04-28 PROCEDURE — 700101 HCHG RX REV CODE 250: Performed by: ANESTHESIOLOGY

## 2021-04-28 PROCEDURE — A9576 INJ PROHANCE MULTIPACK: HCPCS | Performed by: INTERNAL MEDICINE

## 2021-04-28 PROCEDURE — 87075 CULTR BACTERIA EXCEPT BLOOD: CPT | Mod: 91

## 2021-04-28 PROCEDURE — 51798 US URINE CAPACITY MEASURE: CPT

## 2021-04-28 PROCEDURE — 82962 GLUCOSE BLOOD TEST: CPT | Mod: 91

## 2021-04-28 PROCEDURE — 99291 CRITICAL CARE FIRST HOUR: CPT | Mod: GC,25 | Performed by: INTERNAL MEDICINE

## 2021-04-28 PROCEDURE — 501838 HCHG SUTURE GENERAL: Performed by: NEUROLOGICAL SURGERY

## 2021-04-28 PROCEDURE — 36556 INSERT NON-TUNNEL CV CATH: CPT

## 2021-04-28 PROCEDURE — 0B9F8ZX DRAINAGE OF RIGHT LOWER LUNG LOBE, VIA NATURAL OR ARTIFICIAL OPENING ENDOSCOPIC, DIAGNOSTIC: ICD-10-PCS | Performed by: INTERNAL MEDICINE

## 2021-04-28 PROCEDURE — 00NX0ZZ RELEASE THORACIC SPINAL CORD, OPEN APPROACH: ICD-10-PCS | Performed by: NEUROLOGICAL SURGERY

## 2021-04-28 PROCEDURE — 110454 HCHG SHELL REV 250: Performed by: NEUROLOGICAL SURGERY

## 2021-04-28 PROCEDURE — 71045 X-RAY EXAM CHEST 1 VIEW: CPT

## 2021-04-28 PROCEDURE — 87150 DNA/RNA AMPLIFIED PROBE: CPT

## 2021-04-28 PROCEDURE — 700117 HCHG RX CONTRAST REV CODE 255: Performed by: INTERNAL MEDICINE

## 2021-04-28 PROCEDURE — 700111 HCHG RX REV CODE 636 W/ 250 OVERRIDE (IP): Performed by: ANESTHESIOLOGY

## 2021-04-28 PROCEDURE — C1726 CATH, BAL DIL, NON-VASCULAR: HCPCS

## 2021-04-28 PROCEDURE — 0B9C8ZX DRAINAGE OF RIGHT UPPER LUNG LOBE, VIA NATURAL OR ARTIFICIAL OPENING ENDOSCOPIC, DIAGNOSTIC: ICD-10-PCS | Performed by: INTERNAL MEDICINE

## 2021-04-28 PROCEDURE — 31624 DX BRONCHOSCOPE/LAVAGE: CPT | Performed by: INTERNAL MEDICINE

## 2021-04-28 PROCEDURE — 86901 BLOOD TYPING SEROLOGIC RH(D): CPT

## 2021-04-28 PROCEDURE — 160048 HCHG OR STATISTICAL LEVEL 1-5: Performed by: NEUROLOGICAL SURGERY

## 2021-04-28 PROCEDURE — 93010 ELECTROCARDIOGRAM REPORT: CPT | Performed by: INTERNAL MEDICINE

## 2021-04-28 PROCEDURE — 70551 MRI BRAIN STEM W/O DYE: CPT | Mod: MG

## 2021-04-28 PROCEDURE — 80307 DRUG TEST PRSMV CHEM ANLYZR: CPT

## 2021-04-28 PROCEDURE — 93005 ELECTROCARDIOGRAM TRACING: CPT | Performed by: INTERNAL MEDICINE

## 2021-04-28 PROCEDURE — 70450 CT HEAD/BRAIN W/O DYE: CPT | Mod: MG

## 2021-04-28 PROCEDURE — 700101 HCHG RX REV CODE 250: Performed by: STUDENT IN AN ORGANIZED HEALTH CARE EDUCATION/TRAINING PROGRAM

## 2021-04-28 PROCEDURE — 87077 CULTURE AEROBIC IDENTIFY: CPT | Mod: 91

## 2021-04-28 PROCEDURE — 302214 INTUBATION BOX: Performed by: INTERNAL MEDICINE

## 2021-04-28 PROCEDURE — 700102 HCHG RX REV CODE 250 W/ 637 OVERRIDE(OP): Performed by: STUDENT IN AN ORGANIZED HEALTH CARE EDUCATION/TRAINING PROGRAM

## 2021-04-28 PROCEDURE — 160002 HCHG RECOVERY MINUTES (STAT)

## 2021-04-28 PROCEDURE — 700101 HCHG RX REV CODE 250: Performed by: INTERNAL MEDICINE

## 2021-04-28 PROCEDURE — 99153 MOD SED SAME PHYS/QHP EA: CPT

## 2021-04-28 PROCEDURE — 82803 BLOOD GASES ANY COMBINATION: CPT

## 2021-04-28 PROCEDURE — 72157 MRI CHEST SPINE W/O & W/DYE: CPT | Mod: MF

## 2021-04-28 PROCEDURE — 36556 INSERT NON-TUNNEL CV CATH: CPT | Mod: RT,GC | Performed by: INTERNAL MEDICINE

## 2021-04-28 PROCEDURE — 70544 MR ANGIOGRAPHY HEAD W/O DYE: CPT | Mod: MG

## 2021-04-28 PROCEDURE — 02HV33Z INSERTION OF INFUSION DEVICE INTO SUPERIOR VENA CAVA, PERCUTANEOUS APPROACH: ICD-10-PCS | Performed by: INTERNAL MEDICINE

## 2021-04-28 PROCEDURE — 0B9G8ZX DRAINAGE OF LEFT UPPER LUNG LOBE, VIA NATURAL OR ARTIFICIAL OPENING ENDOSCOPIC, DIAGNOSTIC: ICD-10-PCS | Performed by: INTERNAL MEDICINE

## 2021-04-28 PROCEDURE — 302977 HCHG BRONCHOSCOPY PROC-THERAPEUTIC

## 2021-04-28 PROCEDURE — C1729 CATH, DRAINAGE: HCPCS | Performed by: NEUROLOGICAL SURGERY

## 2021-04-28 PROCEDURE — 700111 HCHG RX REV CODE 636 W/ 250 OVERRIDE (IP): Performed by: NEUROLOGICAL SURGERY

## 2021-04-28 PROCEDURE — 700105 HCHG RX REV CODE 258: Performed by: INTERNAL MEDICINE

## 2021-04-28 PROCEDURE — 500864 HCHG NEEDLE, SPINAL 18G: Performed by: NEUROLOGICAL SURGERY

## 2021-04-28 PROCEDURE — 70549 MR ANGIOGRAPH NECK W/O&W/DYE: CPT | Mod: MG

## 2021-04-28 PROCEDURE — 31500 INSERT EMERGENCY AIRWAY: CPT

## 2021-04-28 PROCEDURE — 700111 HCHG RX REV CODE 636 W/ 250 OVERRIDE (IP): Performed by: NURSE PRACTITIONER

## 2021-04-28 PROCEDURE — 160029 HCHG SURGERY MINUTES - 1ST 30 MINS LEVEL 4: Performed by: NEUROLOGICAL SURGERY

## 2021-04-28 PROCEDURE — 700105 HCHG RX REV CODE 258: Performed by: PHYSICIAN ASSISTANT

## 2021-04-28 PROCEDURE — 93306 TTE W/DOPPLER COMPLETE: CPT | Mod: 26 | Performed by: INTERNAL MEDICINE

## 2021-04-28 PROCEDURE — 94003 VENT MGMT INPAT SUBQ DAY: CPT

## 2021-04-28 PROCEDURE — 83036 HEMOGLOBIN GLYCOSYLATED A1C: CPT

## 2021-04-28 PROCEDURE — 85027 COMPLETE CBC AUTOMATED: CPT

## 2021-04-28 PROCEDURE — 700111 HCHG RX REV CODE 636 W/ 250 OVERRIDE (IP): Performed by: PHYSICIAN ASSISTANT

## 2021-04-28 PROCEDURE — A9270 NON-COVERED ITEM OR SERVICE: HCPCS | Performed by: STUDENT IN AN ORGANIZED HEALTH CARE EDUCATION/TRAINING PROGRAM

## 2021-04-28 PROCEDURE — 0B9J8ZX DRAINAGE OF LEFT LOWER LUNG LOBE, VIA NATURAL OR ARTIFICIAL OPENING ENDOSCOPIC, DIAGNOSTIC: ICD-10-PCS | Performed by: INTERNAL MEDICINE

## 2021-04-28 PROCEDURE — 83735 ASSAY OF MAGNESIUM: CPT

## 2021-04-28 PROCEDURE — 86850 RBC ANTIBODY SCREEN: CPT

## 2021-04-28 PROCEDURE — 84100 ASSAY OF PHOSPHORUS: CPT

## 2021-04-28 PROCEDURE — 0B9D8ZX DRAINAGE OF RIGHT MIDDLE LUNG LOBE, VIA NATURAL OR ARTIFICIAL OPENING ENDOSCOPIC, DIAGNOSTIC: ICD-10-PCS | Performed by: INTERNAL MEDICINE

## 2021-04-28 PROCEDURE — 700101 HCHG RX REV CODE 250: Performed by: NEUROLOGICAL SURGERY

## 2021-04-28 PROCEDURE — 99223 1ST HOSP IP/OBS HIGH 75: CPT | Performed by: PSYCHIATRY & NEUROLOGY

## 2021-04-28 PROCEDURE — 700105 HCHG RX REV CODE 258: Performed by: STUDENT IN AN ORGANIZED HEALTH CARE EDUCATION/TRAINING PROGRAM

## 2021-04-28 PROCEDURE — 86900 BLOOD TYPING SEROLOGIC ABO: CPT

## 2021-04-28 PROCEDURE — 87070 CULTURE OTHR SPECIMN AEROBIC: CPT

## 2021-04-28 PROCEDURE — 72020 X-RAY EXAM OF SPINE 1 VIEW: CPT

## 2021-04-28 PROCEDURE — 160009 HCHG ANES TIME/MIN: Performed by: NEUROLOGICAL SURGERY

## 2021-04-28 PROCEDURE — C1751 CATH, INF, PER/CENT/MIDLINE: HCPCS

## 2021-04-28 PROCEDURE — 0B9M8ZX DRAINAGE OF BILATERAL LUNGS, VIA NATURAL OR ARTIFICIAL OPENING ENDOSCOPIC, DIAGNOSTIC: ICD-10-PCS | Performed by: INTERNAL MEDICINE

## 2021-04-28 PROCEDURE — 80053 COMPREHEN METABOLIC PANEL: CPT

## 2021-04-28 PROCEDURE — 99223 1ST HOSP IP/OBS HIGH 75: CPT | Performed by: INTERNAL MEDICINE

## 2021-04-28 PROCEDURE — 160041 HCHG SURGERY MINUTES - EA ADDL 1 MIN LEVEL 4: Performed by: NEUROLOGICAL SURGERY

## 2021-04-28 PROCEDURE — 37799 UNLISTED PX VASCULAR SURGERY: CPT

## 2021-04-28 PROCEDURE — 500249 HCHG CATH, LUMBAR HERMETIC: Performed by: NEUROLOGICAL SURGERY

## 2021-04-28 PROCEDURE — 31645 BRNCHSC W/THER ASPIR 1ST: CPT | Performed by: INTERNAL MEDICINE

## 2021-04-28 PROCEDURE — 51102 DRAIN BL W/CATH INSERTION: CPT

## 2021-04-28 PROCEDURE — 72158 MRI LUMBAR SPINE W/O & W/DYE: CPT

## 2021-04-28 PROCEDURE — A9576 INJ PROHANCE MULTIPACK: HCPCS | Performed by: STUDENT IN AN ORGANIZED HEALTH CARE EDUCATION/TRAINING PROGRAM

## 2021-04-28 PROCEDURE — 93306 TTE W/DOPPLER COMPLETE: CPT

## 2021-04-28 PROCEDURE — 87040 BLOOD CULTURE FOR BACTERIA: CPT

## 2021-04-28 PROCEDURE — 94640 AIRWAY INHALATION TREATMENT: CPT

## 2021-04-28 PROCEDURE — 00NW0ZZ RELEASE CERVICAL SPINAL CORD, OPEN APPROACH: ICD-10-PCS | Performed by: NEUROLOGICAL SURGERY

## 2021-04-28 PROCEDURE — P9034 PLATELETS, PHERESIS: HCPCS

## 2021-04-28 PROCEDURE — 302136 NUTRITION PUMP: Performed by: INTERNAL MEDICINE

## 2021-04-28 PROCEDURE — 770022 HCHG ROOM/CARE - ICU (200)

## 2021-04-28 PROCEDURE — 700111 HCHG RX REV CODE 636 W/ 250 OVERRIDE (IP): Performed by: STUDENT IN AN ORGANIZED HEALTH CARE EDUCATION/TRAINING PROGRAM

## 2021-04-28 PROCEDURE — 500112 HCHG BONE WAX: Performed by: NEUROLOGICAL SURGERY

## 2021-04-28 PROCEDURE — 99152 MOD SED SAME PHYS/QHP 5/>YRS: CPT

## 2021-04-28 RX ORDER — IPRATROPIUM BROMIDE AND ALBUTEROL SULFATE 2.5; .5 MG/3ML; MG/3ML
3 SOLUTION RESPIRATORY (INHALATION)
Status: CANCELLED | OUTPATIENT
Start: 2021-04-28

## 2021-04-28 RX ORDER — AMOXICILLIN 250 MG
1 CAPSULE ORAL NIGHTLY
Status: DISCONTINUED | OUTPATIENT
Start: 2021-04-28 | End: 2021-05-14 | Stop reason: HOSPADM

## 2021-04-28 RX ORDER — CYCLOBENZAPRINE HCL 10 MG
10 TABLET ORAL 3 TIMES DAILY
Status: DISCONTINUED | OUTPATIENT
Start: 2021-04-28 | End: 2021-04-28

## 2021-04-28 RX ORDER — ONDANSETRON 2 MG/ML
4 INJECTION INTRAMUSCULAR; INTRAVENOUS EVERY 4 HOURS PRN
Status: DISCONTINUED | OUTPATIENT
Start: 2021-04-28 | End: 2021-05-14 | Stop reason: HOSPADM

## 2021-04-28 RX ORDER — VANCOMYCIN HYDROCHLORIDE 1 G/20ML
INJECTION, POWDER, LYOPHILIZED, FOR SOLUTION INTRAVENOUS
Status: COMPLETED | OUTPATIENT
Start: 2021-04-28 | End: 2021-04-28

## 2021-04-28 RX ORDER — BISACODYL 10 MG
10 SUPPOSITORY, RECTAL RECTAL
Status: CANCELLED | OUTPATIENT
Start: 2021-04-28

## 2021-04-28 RX ORDER — POLYETHYLENE GLYCOL 3350 17 G/17G
1 POWDER, FOR SOLUTION ORAL 2 TIMES DAILY PRN
Status: DISCONTINUED | OUTPATIENT
Start: 2021-04-28 | End: 2021-04-29

## 2021-04-28 RX ORDER — NITROGLYCERIN 0.4 MG/1
0.4 TABLET SUBLINGUAL
Status: DISCONTINUED | OUTPATIENT
Start: 2021-04-28 | End: 2021-04-28

## 2021-04-28 RX ORDER — FAMOTIDINE 20 MG/1
20 TABLET, FILM COATED ORAL 2 TIMES DAILY
Status: DISCONTINUED | OUTPATIENT
Start: 2021-04-28 | End: 2021-05-14 | Stop reason: HOSPADM

## 2021-04-28 RX ORDER — MIDAZOLAM HYDROCHLORIDE 1 MG/ML
3 INJECTION INTRAMUSCULAR; INTRAVENOUS ONCE
Status: COMPLETED | OUTPATIENT
Start: 2021-04-28 | End: 2021-04-28

## 2021-04-28 RX ORDER — IPRATROPIUM BROMIDE AND ALBUTEROL SULFATE 2.5; .5 MG/3ML; MG/3ML
3 SOLUTION RESPIRATORY (INHALATION)
Status: DISCONTINUED | OUTPATIENT
Start: 2021-04-28 | End: 2021-05-14 | Stop reason: HOSPADM

## 2021-04-28 RX ORDER — DEXMEDETOMIDINE HYDROCHLORIDE 4 UG/ML
.1-1.5 INJECTION, SOLUTION INTRAVENOUS CONTINUOUS
Status: DISCONTINUED | OUTPATIENT
Start: 2021-04-28 | End: 2021-04-28

## 2021-04-28 RX ORDER — ENEMA 19; 7 G/133ML; G/133ML
1 ENEMA RECTAL
Status: DISCONTINUED | OUTPATIENT
Start: 2021-04-28 | End: 2021-05-14 | Stop reason: HOSPADM

## 2021-04-28 RX ORDER — CARBAMAZEPINE 200 MG/1
200 TABLET ORAL 3 TIMES DAILY
Status: DISCONTINUED | OUTPATIENT
Start: 2021-04-28 | End: 2021-05-11

## 2021-04-28 RX ORDER — CEFAZOLIN SODIUM 2 G/100ML
2 INJECTION, SOLUTION INTRAVENOUS EVERY 8 HOURS
Status: DISCONTINUED | OUTPATIENT
Start: 2021-04-28 | End: 2021-04-28

## 2021-04-28 RX ORDER — ROCURONIUM BROMIDE 10 MG/ML
50 INJECTION, SOLUTION INTRAVENOUS ONCE
Status: COMPLETED | OUTPATIENT
Start: 2021-04-28 | End: 2021-04-28

## 2021-04-28 RX ORDER — MIDAZOLAM HYDROCHLORIDE 1 MG/ML
INJECTION INTRAMUSCULAR; INTRAVENOUS
Status: ACTIVE
Start: 2021-04-28 | End: 2021-04-28

## 2021-04-28 RX ORDER — OXYBUTYNIN CHLORIDE 5 MG/1
5 TABLET ORAL 3 TIMES DAILY
Status: DISCONTINUED | OUTPATIENT
Start: 2021-04-28 | End: 2021-04-28

## 2021-04-28 RX ORDER — MIDAZOLAM HYDROCHLORIDE 1 MG/ML
INJECTION INTRAMUSCULAR; INTRAVENOUS
Status: ACTIVE
Start: 2021-04-28 | End: 2021-04-29

## 2021-04-28 RX ORDER — BISACODYL 10 MG
10 SUPPOSITORY, RECTAL RECTAL
Status: DISCONTINUED | OUTPATIENT
Start: 2021-04-28 | End: 2021-05-10

## 2021-04-28 RX ORDER — SODIUM CHLORIDE 9 MG/ML
INJECTION, SOLUTION INTRAVENOUS CONTINUOUS
Status: DISCONTINUED | OUTPATIENT
Start: 2021-04-28 | End: 2021-04-28

## 2021-04-28 RX ORDER — LEVETIRACETAM 10 MG/ML
1000 INJECTION INTRAVASCULAR EVERY 12 HOURS
Status: DISCONTINUED | OUTPATIENT
Start: 2021-04-28 | End: 2021-04-30

## 2021-04-28 RX ORDER — VITAMIN B COMPLEX
5000 TABLET ORAL DAILY
Status: DISCONTINUED | OUTPATIENT
Start: 2021-04-28 | End: 2021-04-28

## 2021-04-28 RX ORDER — AMOXICILLIN 250 MG
2 CAPSULE ORAL 2 TIMES DAILY
Status: CANCELLED | OUTPATIENT
Start: 2021-04-28

## 2021-04-28 RX ORDER — DIPHENHYDRAMINE HYDROCHLORIDE 50 MG/ML
12.5 INJECTION INTRAMUSCULAR; INTRAVENOUS
Status: DISCONTINUED | OUTPATIENT
Start: 2021-04-28 | End: 2021-04-28 | Stop reason: HOSPADM

## 2021-04-28 RX ORDER — HALOPERIDOL 5 MG/ML
1 INJECTION INTRAMUSCULAR
Status: DISCONTINUED | OUTPATIENT
Start: 2021-04-28 | End: 2021-04-28 | Stop reason: HOSPADM

## 2021-04-28 RX ORDER — FAMOTIDINE 20 MG/1
20 TABLET, FILM COATED ORAL EVERY 12 HOURS
Status: CANCELLED | OUTPATIENT
Start: 2021-04-28

## 2021-04-28 RX ORDER — DOCUSATE SODIUM 100 MG/1
100 CAPSULE, LIQUID FILLED ORAL 2 TIMES DAILY
Status: DISCONTINUED | OUTPATIENT
Start: 2021-04-28 | End: 2021-04-28

## 2021-04-28 RX ORDER — CALCIUM CARBONATE 500 MG/1
500 TABLET, CHEWABLE ORAL 2 TIMES DAILY
Status: DISCONTINUED | OUTPATIENT
Start: 2021-04-28 | End: 2021-04-28

## 2021-04-28 RX ORDER — NALOXONE HYDROCHLORIDE 0.4 MG/ML
INJECTION, SOLUTION INTRAMUSCULAR; INTRAVENOUS; SUBCUTANEOUS
Status: DISCONTINUED
Start: 2021-04-28 | End: 2021-04-28

## 2021-04-28 RX ORDER — ACETYLCYSTEINE 200 MG/ML
3-4 SOLUTION ORAL; RESPIRATORY (INHALATION)
Status: DISCONTINUED | OUTPATIENT
Start: 2021-04-28 | End: 2021-04-30

## 2021-04-28 RX ORDER — PROMETHAZINE HYDROCHLORIDE 25 MG/1
12.5-25 TABLET ORAL EVERY 4 HOURS PRN
Status: DISCONTINUED | OUTPATIENT
Start: 2021-04-28 | End: 2021-04-29

## 2021-04-28 RX ORDER — AMOXICILLIN 250 MG
1 CAPSULE ORAL NIGHTLY
Status: DISCONTINUED | OUTPATIENT
Start: 2021-04-28 | End: 2021-04-28

## 2021-04-28 RX ORDER — LABETALOL HYDROCHLORIDE 5 MG/ML
10 INJECTION, SOLUTION INTRAVENOUS EVERY 4 HOURS PRN
Status: DISCONTINUED | OUTPATIENT
Start: 2021-04-28 | End: 2021-05-14 | Stop reason: HOSPADM

## 2021-04-28 RX ORDER — LEVETIRACETAM 500 MG/5ML
INJECTION, SOLUTION, CONCENTRATE INTRAVENOUS PRN
Status: DISCONTINUED | OUTPATIENT
Start: 2021-04-28 | End: 2021-04-28 | Stop reason: SURG

## 2021-04-28 RX ORDER — PROMETHAZINE HYDROCHLORIDE 25 MG/1
12.5-25 SUPPOSITORY RECTAL EVERY 4 HOURS PRN
Status: DISCONTINUED | OUTPATIENT
Start: 2021-04-28 | End: 2021-05-10

## 2021-04-28 RX ORDER — ONDANSETRON 2 MG/ML
4 INJECTION INTRAMUSCULAR; INTRAVENOUS
Status: DISCONTINUED | OUTPATIENT
Start: 2021-04-28 | End: 2021-04-28 | Stop reason: HOSPADM

## 2021-04-28 RX ORDER — POLYETHYLENE GLYCOL 3350 17 G/17G
1 POWDER, FOR SOLUTION ORAL
Status: CANCELLED | OUTPATIENT
Start: 2021-04-28

## 2021-04-28 RX ORDER — MIDAZOLAM HYDROCHLORIDE 1 MG/ML
4 INJECTION INTRAMUSCULAR; INTRAVENOUS ONCE
Status: COMPLETED | OUTPATIENT
Start: 2021-04-28 | End: 2021-04-28

## 2021-04-28 RX ORDER — ARIPIPRAZOLE 10 MG/1
15 TABLET ORAL 2 TIMES DAILY
Status: DISCONTINUED | OUTPATIENT
Start: 2021-04-28 | End: 2021-05-11

## 2021-04-28 RX ORDER — MIDAZOLAM HYDROCHLORIDE 1 MG/ML
1 INJECTION INTRAMUSCULAR; INTRAVENOUS
Status: DISCONTINUED | OUTPATIENT
Start: 2021-04-28 | End: 2021-04-28 | Stop reason: HOSPADM

## 2021-04-28 RX ORDER — DOCUSATE SODIUM 50 MG/5ML
100 LIQUID ORAL 2 TIMES DAILY
Status: DISCONTINUED | OUTPATIENT
Start: 2021-04-28 | End: 2021-05-13

## 2021-04-28 RX ORDER — ONDANSETRON 4 MG/1
4 TABLET, ORALLY DISINTEGRATING ORAL EVERY 4 HOURS PRN
Status: DISCONTINUED | OUTPATIENT
Start: 2021-04-28 | End: 2021-04-29

## 2021-04-28 RX ORDER — KETAMINE HYDROCHLORIDE 50 MG/ML
INJECTION, SOLUTION INTRAMUSCULAR; INTRAVENOUS PRN
Status: DISCONTINUED | OUTPATIENT
Start: 2021-04-28 | End: 2021-04-28 | Stop reason: SURG

## 2021-04-28 RX ORDER — LIDOCAINE HYDROCHLORIDE 20 MG/ML
INJECTION, SOLUTION EPIDURAL; INFILTRATION; INTRACAUDAL; PERINEURAL PRN
Status: DISCONTINUED | OUTPATIENT
Start: 2021-04-28 | End: 2021-04-28 | Stop reason: SURG

## 2021-04-28 RX ORDER — CHOLECALCIFEROL (VITAMIN D3) 125 MCG
1000 CAPSULE ORAL DAILY
Status: DISCONTINUED | OUTPATIENT
Start: 2021-04-28 | End: 2021-04-28

## 2021-04-28 RX ORDER — ACETAMINOPHEN 500 MG
1000 TABLET ORAL EVERY 6 HOURS
Status: DISCONTINUED | OUTPATIENT
Start: 2021-04-28 | End: 2021-05-07

## 2021-04-28 RX ORDER — PHENYLEPHRINE HCL IN 0.9% NACL 0.5 MG/5ML
SYRINGE (ML) INTRAVENOUS PRN
Status: DISCONTINUED | OUTPATIENT
Start: 2021-04-28 | End: 2021-04-28 | Stop reason: SURG

## 2021-04-28 RX ORDER — CEFTRIAXONE 1 G/1
INJECTION, POWDER, FOR SOLUTION INTRAMUSCULAR; INTRAVENOUS PRN
Status: DISCONTINUED | OUTPATIENT
Start: 2021-04-28 | End: 2021-04-28 | Stop reason: SURG

## 2021-04-28 RX ORDER — CARBAMAZEPINE 200 MG/1
200 TABLET ORAL 3 TIMES DAILY
Status: DISCONTINUED | OUTPATIENT
Start: 2021-04-28 | End: 2021-04-28

## 2021-04-28 RX ORDER — PROCHLORPERAZINE EDISYLATE 5 MG/ML
5-10 INJECTION INTRAMUSCULAR; INTRAVENOUS EVERY 4 HOURS PRN
Status: DISCONTINUED | OUTPATIENT
Start: 2021-04-28 | End: 2021-05-10

## 2021-04-28 RX ORDER — ATORVASTATIN CALCIUM 40 MG/1
80 TABLET, FILM COATED ORAL NIGHTLY
Status: DISCONTINUED | OUTPATIENT
Start: 2021-04-28 | End: 2021-05-03

## 2021-04-28 RX ORDER — NOREPINEPHRINE BITARTRATE 0.03 MG/ML
0-30 INJECTION, SOLUTION INTRAVENOUS CONTINUOUS
Status: DISCONTINUED | OUTPATIENT
Start: 2021-04-28 | End: 2021-05-03

## 2021-04-28 RX ORDER — PHENYLEPHRINE HYDROCHLORIDE 10 MG/ML
INJECTION, SOLUTION INTRAMUSCULAR; INTRAVENOUS; SUBCUTANEOUS PRN
Status: DISCONTINUED | OUTPATIENT
Start: 2021-04-28 | End: 2021-04-28 | Stop reason: SURG

## 2021-04-28 RX ORDER — VITAMIN B COMPLEX
5000 TABLET ORAL DAILY
Status: DISCONTINUED | OUTPATIENT
Start: 2021-04-29 | End: 2021-05-14 | Stop reason: HOSPADM

## 2021-04-28 RX ORDER — ARIPIPRAZOLE 10 MG/1
15 TABLET ORAL 2 TIMES DAILY
Status: DISCONTINUED | OUTPATIENT
Start: 2021-04-28 | End: 2021-04-28

## 2021-04-28 RX ORDER — AMOXICILLIN 250 MG
1 CAPSULE ORAL
Status: DISCONTINUED | OUTPATIENT
Start: 2021-04-28 | End: 2021-04-29

## 2021-04-28 RX ORDER — CEFAZOLIN SODIUM 1 G/3ML
INJECTION, POWDER, FOR SOLUTION INTRAMUSCULAR; INTRAVENOUS
Status: DISCONTINUED | OUTPATIENT
Start: 2021-04-28 | End: 2021-04-28 | Stop reason: HOSPADM

## 2021-04-28 RX ORDER — CHOLECALCIFEROL (VITAMIN D3) 125 MCG
1000 CAPSULE ORAL DAILY
Status: DISCONTINUED | OUTPATIENT
Start: 2021-04-29 | End: 2021-05-14 | Stop reason: HOSPADM

## 2021-04-28 RX ORDER — BUPIVACAINE HYDROCHLORIDE AND EPINEPHRINE 5; 5 MG/ML; UG/ML
INJECTION, SOLUTION EPIDURAL; INTRACAUDAL; PERINEURAL
Status: DISCONTINUED | OUTPATIENT
Start: 2021-04-28 | End: 2021-04-28 | Stop reason: HOSPADM

## 2021-04-28 RX ADMIN — ARIPIPRAZOLE 15 MG: 10 TABLET ORAL at 17:48

## 2021-04-28 RX ADMIN — CARBAMAZEPINE 200 MG: 200 TABLET ORAL at 23:46

## 2021-04-28 RX ADMIN — INSULIN HUMAN 2 UNITS: 100 INJECTION, SOLUTION PARENTERAL at 18:23

## 2021-04-28 RX ADMIN — Medication 25 MCG/HR: at 09:40

## 2021-04-28 RX ADMIN — ACETAMINOPHEN 1000 MG: 500 TABLET ORAL at 17:49

## 2021-04-28 RX ADMIN — ROCURONIUM BROMIDE 50 MG: 10 INJECTION, SOLUTION INTRAVENOUS at 11:11

## 2021-04-28 RX ADMIN — PHENYLEPHRINE HYDROCHLORIDE 100 MCG: 10 INJECTION INTRAVENOUS at 06:37

## 2021-04-28 RX ADMIN — MIDAZOLAM HYDROCHLORIDE 4 MG: 1 INJECTION, SOLUTION INTRAMUSCULAR; INTRAVENOUS at 10:58

## 2021-04-28 RX ADMIN — ALBUTEROL SULFATE 2.5 MG: 2.5 SOLUTION RESPIRATORY (INHALATION) at 22:22

## 2021-04-28 RX ADMIN — Medication 100 MCG: at 07:19

## 2021-04-28 RX ADMIN — LIDOCAINE 3 PATCH: 50 PATCH TOPICAL at 17:51

## 2021-04-28 RX ADMIN — GADOTERIDOL 14 ML: 279.3 INJECTION, SOLUTION INTRAVENOUS at 01:00

## 2021-04-28 RX ADMIN — PROPOFOL 5 MCG/KG/MIN: 10 INJECTION, EMULSION INTRAVENOUS at 10:25

## 2021-04-28 RX ADMIN — CARBAMAZEPINE 200 MG: 200 TABLET ORAL at 17:48

## 2021-04-28 RX ADMIN — PROPOFOL 70 MCG/KG/MIN: 10 INJECTION, EMULSION INTRAVENOUS at 18:18

## 2021-04-28 RX ADMIN — Medication 100 MCG: at 07:00

## 2021-04-28 RX ADMIN — DOCUSATE SODIUM 100 MG: 50 LIQUID ORAL at 17:50

## 2021-04-28 RX ADMIN — INSULIN HUMAN 2 UNITS: 100 INJECTION, SOLUTION PARENTERAL at 20:45

## 2021-04-28 RX ADMIN — CEFTRIAXONE SODIUM 2 G: 2 INJECTION, POWDER, FOR SOLUTION INTRAMUSCULAR; INTRAVENOUS at 17:53

## 2021-04-28 RX ADMIN — NOREPINEPHRINE BITARTRATE 5 MCG/MIN: 1 INJECTION, SOLUTION, CONCENTRATE INTRAVENOUS at 08:47

## 2021-04-28 RX ADMIN — LEVETIRACETAM 1500 MG: 100 INJECTION INTRAVENOUS at 05:42

## 2021-04-28 RX ADMIN — KETAMINE HYDROCHLORIDE 50 MG: 50 INJECTION INTRAMUSCULAR; INTRAVENOUS at 00:35

## 2021-04-28 RX ADMIN — FAMOTIDINE 20 MG: 10 INJECTION INTRAVENOUS at 17:49

## 2021-04-28 RX ADMIN — ACETAMINOPHEN 1000 MG: 500 TABLET ORAL at 23:43

## 2021-04-28 RX ADMIN — IOHEXOL 25 ML: 300 INJECTION, SOLUTION INTRAVENOUS at 17:30

## 2021-04-28 RX ADMIN — MIDAZOLAM HYDROCHLORIDE 3 MG: 1 INJECTION, SOLUTION INTRAMUSCULAR; INTRAVENOUS at 16:00

## 2021-04-28 RX ADMIN — INSULIN GLARGINE 15 UNITS: 100 INJECTION, SOLUTION SUBCUTANEOUS at 18:24

## 2021-04-28 RX ADMIN — KETAMINE HYDROCHLORIDE 50 MG: 50 INJECTION INTRAMUSCULAR; INTRAVENOUS at 00:12

## 2021-04-28 RX ADMIN — ROCURONIUM BROMIDE 50 MG: 10 INJECTION, SOLUTION INTRAVENOUS at 05:39

## 2021-04-28 RX ADMIN — PROPOFOL 35 MCG/KG/MIN: 10 INJECTION, EMULSION INTRAVENOUS at 23:45

## 2021-04-28 RX ADMIN — INSULIN HUMAN 2 UNITS: 100 INJECTION, SOLUTION PARENTERAL at 10:02

## 2021-04-28 RX ADMIN — SODIUM CHLORIDE: 9 INJECTION, SOLUTION INTRAVENOUS at 05:33

## 2021-04-28 RX ADMIN — ROCURONIUM BROMIDE 50 MG: 10 INJECTION, SOLUTION INTRAVENOUS at 06:16

## 2021-04-28 RX ADMIN — GADOTERIDOL 15 ML: 279.3 INJECTION, SOLUTION INTRAVENOUS at 16:22

## 2021-04-28 RX ADMIN — DOCUSATE SODIUM 50 MG AND SENNOSIDES 8.6 MG 1 TABLET: 8.6; 5 TABLET, FILM COATED ORAL at 20:41

## 2021-04-28 RX ADMIN — ATORVASTATIN CALCIUM 80 MG: 40 TABLET, FILM COATED ORAL at 20:41

## 2021-04-28 RX ADMIN — PROPOFOL 70 MCG/KG/MIN: 10 INJECTION, EMULSION INTRAVENOUS at 17:39

## 2021-04-28 RX ADMIN — LIDOCAINE HYDROCHLORIDE 100 MG: 20 INJECTION, SOLUTION EPIDURAL; INFILTRATION; INTRACAUDAL at 05:39

## 2021-04-28 RX ADMIN — PROPOFOL 50 MG: 10 INJECTION, EMULSION INTRAVENOUS at 05:39

## 2021-04-28 RX ADMIN — CEFTRIAXONE SODIUM 2 G: 2 INJECTION, POWDER, FOR SOLUTION INTRAMUSCULAR; INTRAVENOUS at 13:08

## 2021-04-28 RX ADMIN — LEVETIRACETAM INJECTION 1000 MG: 10 INJECTION INTRAVENOUS at 17:53

## 2021-04-28 RX ADMIN — KETAMINE HYDROCHLORIDE 50 MG: 50 INJECTION INTRAMUSCULAR; INTRAVENOUS at 00:05

## 2021-04-28 RX ADMIN — NOREPINEPHRINE BITARTRATE 10 MCG/MIN: 1 INJECTION, SOLUTION, CONCENTRATE INTRAVENOUS at 17:38

## 2021-04-28 RX ADMIN — SODIUM CHLORIDE: 9 INJECTION, SOLUTION INTRAVENOUS at 08:54

## 2021-04-28 RX ADMIN — ACETYLCYSTEINE 3 ML: 200 INHALANT RESPIRATORY (INHALATION) at 22:20

## 2021-04-28 RX ADMIN — CEFTRIAXONE SODIUM 2 G: 1 INJECTION, POWDER, FOR SOLUTION INTRAMUSCULAR; INTRAVENOUS at 05:42

## 2021-04-28 RX ADMIN — DEXMEDETOMIDINE 0.2 MCG/KG/HR: 200 INJECTION, SOLUTION INTRAVENOUS at 08:45

## 2021-04-28 RX ADMIN — PROPOFOL 70 MCG/KG/MIN: 10 INJECTION, EMULSION INTRAVENOUS at 13:49

## 2021-04-28 RX ADMIN — LEVETIRACETAM INJECTION 1000 MG: 10 INJECTION INTRAVENOUS at 08:52

## 2021-04-28 ASSESSMENT — PAIN DESCRIPTION - PAIN TYPE
TYPE: ACUTE PAIN

## 2021-04-28 ASSESSMENT — FIBROSIS 4 INDEX: FIB4 SCORE: 2.12

## 2021-04-28 ASSESSMENT — PAIN SCALES - GENERAL: PAIN_LEVEL: 10

## 2021-04-28 NOTE — OR SURGEON
Immediate Post OP Note    PreOp Diagnosis: Cervicothoracic Epidural abscess with Cerebellar CVA      PostOp Diagnosis: Same      Procedure(s):  LAMINECTOMY, SPINE, CERVICAL, POSTERIOR APPROACH - C3-T2 REMOVAL EPIDURAL ABSCESS - Wound Class: Dirty or Infected    Surgeon(s):  New Hazel III, M.D.    Anesthesiologist/Type of Anesthesia:  Anesthesiologist: Arjun Reynolds M.D.; Fidencio Swain M.D./General    Surgical Staff:  Assistant: Anil Langley P.A.-C.  Circulator: Melyssa Alfonso R.N.; Lise Johns R.N.  Relief Circulator: Zulma Fuentes R.N.  Relief Scrub: Nader Washington  Scrub Person: Mady Olivia R.N.    Specimens removed if any:  ID Type Source Tests Collected by Time Destination   1 : CERVICAL THORACIC EPIDURAL ABSCESS 1 Other Other AEROBIC/ANAEROBIC CULTURE (SURGERY) New Hazel III, M.D. 4/28/2021 0642    2 : CERVICAL THORACIC EPIDURAL ABSCESS 2 Other Other AEROBIC/ANAEROBIC CULTURE (SURGERY) New Hazel III, M.D. 4/28/2021 0655        Estimated Blood Loss: 100cc    Findings: Abscess extending down to T6.  See op Note    Complications: None        4/28/2021 7:51 AM Anil Langley P.A.-C.

## 2021-04-28 NOTE — OP REPORT
DATE OF SERVICE:  04/28/2021     PREOPERATIVE DIAGNOSES:  1.  Cervical spinal epidural abscess.  2.  Cervical stenosis.  3.  Thoracic stenosis.  4.  Cervicothoracic myelopathy.     POSTOPERATIVE DIAGNOSES:  1.  Cervical spinal epidural abscess.  2.  Cervical stenosis.  3.  Thoracic stenosis.  4.  Cervicothoracic myelopathy.     PROCEDURES PERFORMED:  1.  C3, C4, C5, C6. C7. T1, T2 laminectomy; decompression of thecal sac and   nerve roots.  2.  Cervicothoracic extradural spinal mass/epidural abscess removal, 59   modifier.     SURGEON:  New Hazel MD     ASSISTANT:  Alexi Langley PA-C.     ANESTHESIA:  General.     COMPLICATIONS:  None.     ESTIMATED BLOOD LOSS:  100 mL.     SPECIMEN:  Cervical spinal epidural abscess swabs for culture.     FINDINGS:  Purulent material caudal irrigated out with long EVD likely down to   the T6 area. Spine well decompressed.  Preop MRI showed no obvious stroke to   account for mental status changes.  She was taken emergently to surgery and   called intraoperatively with a right cerebellar stroke.  The patient remained   intubated, anesthesia agrees to ICU for an exam and a stat MRI brain, stroke   protocol, stroke consult.  Prognosis is very guarded at this point.     HISTORY OF PRESENT ILLNESS:  This is a 60-year-old man admitted 3 days ago   with neck pain.  He had progressive weakness.  I was called last night late   with an MRI of the cervical spine that showed a spinal epidural abscess   extending past the sagittal views in the thoracic spine.  A stat thoracic and   MRI thoracolumbar with and without contrast was obtained.  This spinal   epidural abscess extends down to T7.  He returned from MRI with reasonable   mentation, went back to the floor and then arrived and fairly unresponsive.    Stat head CT showed no obvious hemorrhagic stroke, but the later reach showed   a right cerebellar stroke.  We found that out intraoperatively. There is no   family available to  discuss cervical spinal epidural abscess due to the   progressive myelopathy and weakness, surgery with and so we took him   emergently. Compression was from C3 down to T2 and then had more liquified   version at more caudal.     SUMMARY OF OPERATIVE PROCEDURE:  The patient was brought to the operating   suite, placed under general anesthesia, lines secured by anesthesia.  MAPS   were kept greater than 80 during the case, placed in Tomas headholder   fixation, chest rolls in  tuck prone and had a Tomas headholder   attached to the bed in anatomic position.  X-ray fluoroscopy was brought into   draw a midline C3-T2 incision.  This was infiltrated with Marcaine with   epinephrine.  Preoperative antibiotics were given.  Proper timeout was   performed.  The patient was prepped and draped in sterile fashion.     Linear incision was made and soft tissues dissected with bipolar and monopolar   electrocautery.  We found the  dorsal fascia, incised it sharply using   subperiosteal technique, stripped the muscles off, C3, C4, C5, C6, C7, T1, T2   lamina.  We spared the facet.  We advanced our retractors, infiltrated muscle   with Marcaine, verified levels with x-ray, turned attention to decompression.     C3-C4 C5-C6, C6-C7 T1-T2 laminectomy, decompression of thecal sac and nerves:    Using a Leksell rongeur Chet bone cutter, the spinous process of C3, C4,   C5, C6, C7-T1 and we used a Midas Zac drill, drilled off the lamina widely at   C3, C4, C5, C6, C7, T1, T2 and resected the remaining ligament and bone with   Kerrison rongeurs.  We noted the epidural phlegmon.     Extradural mass removal, 59 modifier.  The additional coding is due to the   amount of work to remove this spinal epidural abscess safely using microscopic   techniques and loupe magnification, we slowly teased off all the phlegmon off   the more rostral part and then used an epidural EVD catheter threaded up   about 20 cm deep along the  thoracic spinal cord and irrigated out copious   amounts of purulent material down caudally.  We sent this with swabs which had   already been taken.  We had excellent decompression after removal of all the   organized phlegmon above without getting a CSF leak and the presumed thoracic   decompression from all the purulence that came out with antibiotic irrigation.     We copiously irrigated with bacitracin saline.  We tunneled a 7.5 fluted NABILA   and secured to skin with stitch.  Closed the wound in anatomic layers, 0   Vicryl for muscle, 0 Vicryl for the fascia, 2-0 Vicryls for the dermis and   staples for the skin.  Sterile dressing was applied.  The patient was prone to   supine and will remain intubated and go to the ICU for an exam and a stat MRI   brain. Prognosis is very guarded at this point. There is no family to discuss   this with again, attempted postoperatively, discussed with the mother.        ______________________________  MD DELVIS Cancino III/GONZALEZ/DEBRA    DD:  04/28/2021 07:35  DT:  04/28/2021 08:33    Job#:  [id number]

## 2021-04-28 NOTE — PROGRESS NOTES
IR RN note    Patient underwent a Suprapubic Catheter placement by Dr. Borden.  Procedure site was verified by MD using imaging guidance. Consent was signed.  IR Edi Arita and Norman assisted. Patient was placed in a Supine position.  Patient intubated and sedated a this time, RT at bedside and ICU RN at bedside.  Mid lower abdomin access site.   A gauze and medical tape dressing was placed over surgical site.. Pt transported by by bed with ICU RN to bedside.

## 2021-04-28 NOTE — PROGRESS NOTES
Non hemorrhagic R cerebellar stroke on CT, d/w radiologist intraop, preop exam moribund, surgery completed as quickly as possible, patient going ICU, intensivist aware, need neuro exam and STAT MRI /MRA brain stroke protocol, prognosis very guarded at this point.

## 2021-04-28 NOTE — PROCEDURES
Procedure Note    Date: 4/28/2021  Time: 12 pm   Supervisor: Dr. Dumont    Procedure: Central Venous Line placement  Site: Right IJ     Indication: Vasopressor need , critically ill   Consent: Informed consent obtained from patient or designated decision maker after explaining the benefits/risks of the procedure including but not limited to bleeding, infection, nerve or other deep structure injury, pneumothorax/hemothorax, arrythmia, or death. Patient or surrogate expressed understanding and agreement and signed consent which can be found in the patient's chart.    Procedure: After obtaining consent, a time-out was performed. Appropriate site confirmed with ultrasound and patient positioned, prepped, and draped in sterile fashion. All those present wearing cap and mask and those physically participating remained adhering to sterile fashion with cap, mask, gloves, and gown. 5mL of local anesthetic injected (1% lidocaine without epinephrine) achieving appropriate comfort level for patient. Vein localized and accessed using continuous ultrasound guidance and a 16 Fr triple lumen catheter placed using Seldinger technique. Able to aspirate dark, non-pulsatile blood through each lumen and sterile saline flushed easily before capping. Line secured and dressed in sterile fashion. Patient tolerated procedure well without any difficulties and remains in care of bedside nurse. CXR will be performed to confirm appropriate placement for internal jugular or subclavian CVLs.    EBL: minimal  Complications: none   CXR: ordered    Gerard Alexander PGY-2   Internal Medicine

## 2021-04-28 NOTE — CONSULTS
DATE OF SERVICE:  04/28/2021     CHIEF COMPLAINT:  Upper extremity weakness.     HISTORY OF PRESENT ILLNESS:  A man who arrived 3 days ago with complaint of   chronic neck pain.  He had developing increasing pain and weakness in the   right arm than the left arm.  I was called last night with a CT of the   cervical spine, which shows a suspicion for spinal epidural abscess and   cervical spondylosis and stenosis from C2 running down to the T4. An emergent   MRI of thoracic and lumbar spine was obtained last night with anesthesia,   shows the abscess run down to T6.  He had not complained of any lower   extremity symptoms before, but I did not see him.  He is now in preop area   after MRI for cervical decompression for spinal epidural abscess ____ the   cervicothoracic junction and he is somnolent.  He apparently recovered 2 hours   ago and was talking. There is no etiology for this. His blood sugar was high.    His oxygen levels were normal.  We do not have an ABG.  His sodium is 125.    He has been given his platelets for thrombocytopenia of unknown etiology.  In   summary, a man with significant medical issues.  He has a strep bacteremia,   treated with ceftriaxone currently.  His white count is 16.4, was 14 on   admission; platelets are 77, 51 on admission.     PAST MEDICAL HISTORY:  That which is mentioned in the HPI.  There is no family   available to discuss by phone either.  It is gleaned mostly from the chart.    He has anginal syndrome, diabetes, high cholesterol, hypertension, marijuana   use, MI, renal disorder, history of seizures.     PAST SURGICAL HISTORY:  Include hemorrhoidectomy, coronary artery bypass,   arthroscopy, cystoscopy, thoracic fusion in the late 80s, groin exploration,   ____.     SOCIAL HISTORY:  He smokes and does not drink.  According to the notes, he   formerly smoked marijuana.     PHYSICAL EXAMINATION:  HEENT:  He opens his eyes occasionally to deep stimulation.  Pupils are    symmetric.  EXTREMITIES:  I cannot move or get any movement in the upper and lower   extremities.     ASSESSMENT AND PLAN:  The patient has a cervical spinal epidural abscess and   is going for emergent surgery.  I do not know the etiology of this mental   status change. We are going to get stat head CT and evaluate that. There is no   family available to discuss the consent for surgery.  It is compressive.  I   do not have a meaningful neurologic exam currently.  We will get one of those   postoperatively.  Prognosis is very unclear at this point.     Thanks for allowing me to participate in his care.        ______________________________  MD DELVIS Cancino III/GONZALEZ/DALIA    DD:  04/28/2021 05:31  DT:  04/28/2021 07:42    Job#:  [id number]

## 2021-04-28 NOTE — PROGRESS NOTES
"Pt complaining 10/10 pain in neck and back post morphine administration. Pt unable to sit up due to pain. Pt states upper extremities right and left arm are in pain with touch. Pt unable to squeeze RN's fingers. Pt states \"legs are numb.\" Pt A&O x4.    Dr. Jane updated.  "

## 2021-04-28 NOTE — PROGRESS NOTES
Classic R PICA stroke distribution on MRI. No hemorrhagic conversion yet. Hold on aspirin for 48 hours until we get a more consistent exam. MRA read pending , but appears to be diminuitive flow in upper right extracranial (V1-2) vert segments, so presumed mechanism is cervical paravertebral inflammation from infection causing varsospasm and ischemia

## 2021-04-28 NOTE — ANESTHESIA POSTPROCEDURE EVALUATION
Patient: Perry Davis    Procedure Summary     Date: 04/28/21 Room / Location: [place] [address] / SURGERY [facility]    Anesthesia Start: 0533 Anesthesia Stop: 0811    Procedure: LAMINECTOMY, SPINE, CERVICAL, POSTERIOR APPROACH - C3-T2 REMOVAL EPIDURAL ABSCESS (Bilateral Spine Cervical) Diagnosis: (CERVICAL SPINAL EPIDURAL ABSCESS)    Surgeons: New Hazel III, M.D. Responsible Provider: Arjun Reynolds M.D.    Anesthesia Type: general ASA Status: 3 - Emergent          Final Anesthesia Type: general  Last vitals  BP   Blood Pressure: 106/71, Arterial BP: (!) 139/35    Temp   37.3 °C (99.2 °F)    Pulse   86   Resp   (!) 32    SpO2   (!) 87 %      Anesthesia Post Evaluation    Patient location during evaluation: ICU  Patient participation: complete - patient participated  Level of consciousness: confused, combative and awake  Pain score: 10  Neuro evaluation.    Airway patency: patent  Anesthetic complications: no  Cardiovascular status: hemodynamically stable  Respiratory status: acceptable  Hydration status: euvolemic    PONV: none          No complications documented.     Nurse Pain Score: 10 (NPRS)

## 2021-04-28 NOTE — PROGRESS NOTES
Called by covering resident about UE symptoms. Bacteremic, with cervical SEA, extends down to thoracic spine, needs STAT MR thoracic and lumbar with and without contrast. Significant hyponatmia and thrombocytopenia,  need medical w/u of etiology and TEG with platelet mapping before surgery as soon as possible. Also needs ENT consult for posdible retropharyngeal abscess as infectious source.

## 2021-04-28 NOTE — CARE PLAN
Ventilator Daily Summary    Vent Day #1  22/430/12+/100%      Ventilator settings changed this shift: Pt newly intubated peep increased per protocol    Weaning trials: none due to oxygen demand    Respiratory Procedures: bronchoscopy     Plan: Continue current ventilator settings and wean mechanical ventilation as tolerated per physician orders.

## 2021-04-28 NOTE — ASSESSMENT & PLAN NOTE
CT C-spine 4/25 with prevertebral edema.    MRI C/T-spine with abnormal peripherally enhancing epidural collection extending from C2 to visualize lower thoracic 11.  Largest collection in the upper thoracic posterior epidural space at the level of T2-T3.  Lower extent of the collection is causing multilevel effacement of the thecal sac and cord compression.  Thoracic spinal cord anteriorly displaced and compressed at levels T2 and T3.  T2 hyperintensity at C5-C6 tests space likely representing discitis.  Focal enhancement of C6 vertebral body likely secondary to osteomyelitis.  Prevertebral edema/fluid collection.  Concern of age-indeterminate bilateral vertebral occlusions.  Underwent emergent laminectomy of C3-C4 C5-C6, C6/C7, T1-T2 laminectomy, decompression of thecal sac and nerves by Dr. Hazel 4/28/2021.   Neurosurgery following, recommending goal BP <160  Sedation, pain management as appropriate

## 2021-04-28 NOTE — NON-PROVIDER
This note is intended for the purposes of medical student education and feedback only.   Please refer to the documentation by this patient's assigned medical practitioner for details of care and plans.    Reason for admission: Patient is a 60 year old male with T2DM, HTN, HLD, CAD and OA  who presented with neck pain and sepsis on admission and was diagnosed with a cervicothoracic epidural abscess and had a R cerebellar CVA. He is status post op from a laminectomy and was admitted to ICU, and care was transferred to Dr. Alexander.     HD/POD#: 4    SUBJECTIVE  Overnight, the patient had persistent pain in his neck and back, which was alleviated by IV morphine.  For a brief moment of time, he was AAO x1 and also had some numbness in his upper arms and lower legs.  At that time, he did not have any hypoglycemia.  The night resident, Dr. Jane, saw him at bedside, and and at that time his mental status and physical status changed; he went back to AAO x3 and he had improved sensation/last numbness in his arms and legs.  His blood pressure remained high, in the 180s/60s-70s despite the use of IV labetalol.  This morning, he states he still has chest pain (musculoskeletal in origin), back pain, and neck pain. Denies headache. States he has some cough, sore throat, and neck pain.  Upon reassessment, patient had 2/10 back pain.   He became somnolent and unresponsive and could not sign the consent form for surgery and underwent a stat CT which revealed a R cerebellar infarct, and his laminectomy was performed quickly. He was transferred to the ICU for post-stroke protocol and MRI of head and spine are pending. Attempting to call patients mother but currently cannot get ahold of her.     OBJECTIVE   Vital Signs:  • Max 24 hour temp:99.2  • Current temp:99.2  • Current HR:79  • Current BP:116/60  • Current RR:20  Current O2 Sat: 93    Physical Exam (Components adjusted/added/removed when applicable):  General:   HEENT:    Cardiovascular:   Respiratory:   Abdominal exam:   Extremities:   Neurological:     Ins/Outs:  • P/O Intake:  • IV Intake:1000  • Urine output: voided 2x  • Drain output:  • Other output:    Lab Results:  Recent Labs     04/26/21  0156 04/27/21  0256 04/28/21  0310   WBC 15.5* 16.4* 15.2*   RBC 5.49 5.07 4.56*   HEMOGLOBIN 16.5 15.4 13.9*   HEMATOCRIT 47.6 44.0 40.7*   MCV 86.7 86.8 89.3   MCH 30.1 30.4 30.5   MCHC 34.7 35.0 34.2   RDW 42.3 43.7 45.8   PLATELETCT 47* 77* 105*   MPV 13.8* 12.4 12.5     Recent Labs     04/27/21  0248 04/27/21  1152 04/28/21  0310   SODIUM 122* 123* 125*   POTASSIUM 3.6 3.6 4.0   CHLORIDE 89* 91* 92*   CO2 20 21 19*   GLUCOSE 172* 164* 173*   BUN 16 16 20   CREATININE 0.41* 0.44* 0.49*   CALCIUM 7.8* 7.8* 7.7*     Recent Labs     04/25/21  1204   APTT 31.8   INR 0.97     Recent Labs     04/25/21  1204 04/25/21  1204 04/26/21  0156 04/27/21  0248 04/28/21  0310   ASTSGOT 29   < > 27 21 18   ALTSGPT 45   < > 36 32 22   TBILIRUBIN 0.6   < > 0.9 1.2 0.8   ALKPHOSPHAT 147*   < > 124* 140* 109*   GLOBULIN 3.7*   < > 3.1 3.4 3.4   INR 0.97  --   --   --   --     < > = values in this interval not displayed.       Imaging Results:  Stat head CT revealed a R cerebellar CVA and soft tissue CT and preop diagnosis was a C3-T6 epidurall abcess. Awaiting postoperative MRI    ASSESSMENT/PLAN  * Cervicothoracic epidural abscess  Consulted neurosurgery and patient underwent an laminectomy    * R cerebellar CVA  Transferred to ICU team for post stroke protocol care    * Sepsis due to Streptococcus species (HCC)- (present on admission)  Assessment & Plan  This is Sepsis Present on admission  SIRS criteria identified on my evaluation include: Tachycardia, with heart rate greater than 90 BPM, Tachypnea, with respirations greater than 20 per minute and Leukocytosis, with WBC greater than 12,000  Source is possible retropharyngeal/pharyngeal  Suspected onset of infection (date and time) Unknown  Sepsis  protocol initiated  Fluid resuscitation ordered per protocol  IV antibiotics as appropriate for source of sepsis  While organ dysfunction may be noted elsewhere in this problem list or in the chart, degree of organ dysfunction does not meet CMS criteria for severe sepsis     Possibly due to pharyngitis or retropharyngeal abscess, as seen with prevertebral swelling on CT 4/25. This is especially in light of sore throat w/neck pain, vomiting when coughing     4/25 evening: Started on vancomycin  4/26 AM: d/c'ed vanc, started C3  -IVF resuscitation  -Follow up on MRI C-spine: (under full anesthesia, n.p.o.)  -If MRI C-spine does not show any signs of infection, consider CT abdomen for further investigation of underlying infection  -If soft tissue abscess -> drainage, biopsy, continue 4-6 weeks antibiotics, may need neurosurgery consult  -Follow up on final cultures and sensitivities; per my conversation with  on 4/26, final cultures may return 4/28  -Repeat blood cultures 4/27: Follow-up  -Holding home ASA 81 in case patient will need biopsy     High anion gap metabolic acidosis- (present on admission)  Assessment & Plan  IMPROVING  Also see problem of diabetic ketoacidosis.     Acute bilateral back pain- (present on admission)  Assessment & Plan  This is after mechanical ground-level fall on 4/25/2021, due to balance  issues from taking Tylenol #3 for about 1 week due to back pain  From working on his boat.  Patient has both back and neck pain.  CT spine showed a healed compression fracture from T6/7, prevertebral edema, multilevel degeneration.  CT thorax showed no broken ribs.  However, needs further investigation.  Patient currently does not have loss of bowel or bladder function.     Follow-up on MRI C-spine with and without contrast  If patient starts losing bowel/bladder function, or has acute decompensation, or MRI C-spine shows acute process, immediately consult neurosurgery  Pain control:  Cyclobenzaprine 10 mg 3 times daily, Tylenol 1 g 3 times daily, oxycodone 10 every 4 hours as needed, IV morphine, heat packs  PT/OT consult        Diabetic ketoacidosis (HCC)- (present on admission)  Assessment & Plan  IMPROVING  Presented with glucose level of 363.  Anion gap of 20.  Beta hydroxybutyrate of 3.68, elevated.  Lactic acid of 1.7.  This is especially concerning because the patient has not been able to get his diabetes medications for more than a year due to issues with Covid.  Hemoglobin A1c 13% in 4/2021.  Currently patient does not have symptoms of severe hyperglycemia such as altered mental status or feeling very thirsty, however, patient has at high risk of having more of the symptoms.     Insulin sliding scale, hypoglycemia protocol, Accu-Cheks  Low threshold to transfer to ICU  -Diabetes education  -Continue to monitor BMP, monitor anion gap and beta hydroxybutyrate        Hyponatremia- (present on admission)  Assessment & Plan  This could be an issue of SIADH due to pain.  There are other differential diagnoses, need to rule that out.     Follow up on: TSH w/ reflex to FT4, cortisol, urine potassium, urine sodium, urine osm, serum osm     Hypomagnesemia- (present on admission)  Assessment & Plan  Replete with goal of MG=2     Hypokalemia- (present on admission)  Assessment & Plan  Replete with goal of K of 4     Difficulty swallowing- (present on admission)  Assessment & Plan  Patient has difficulty swallowing, is coughing and vomiting when swallowing large/hard foods     -Aspiration and seizure precautions  -Soft GI diet     Thrombocytopenia (HCC)- (present on admission)  Assessment & Plan  IMPROVING  Presented with a platelet count of 51.  This is unknown for reasons why, the patient does not drink alcohol per history which is corroborated by mother.  There is no known history of any blood clot/clotting disorder in the family.     We will not start any chemical VTE prophylaxis for now, will  use SCDs  Plt 47 on 4/26. This is likely due to sepsis, though lower on the differential includes previously undiagnosed infection.  Hep panel negative.     -Follow up on HIV lab  -Continue C3, narrow antibiotic pending sensitivity results     Type 2 diabetes mellitus without complication, without long-term current use of insulin (HCC)- (present on admission)  Assessment & Plan  Also see problems of diabetic ketoacidosis and high anion gap metabolic acidosis     Has not been taking his medications for at least 1 year due to the Covid epidemic.  Presented with A1c of 13% in 4/2021.  This is very concerning as the patient was previously taking his medications regularly and was not insulin-dependent.  The patient may need to be on insulin for a while in an outpatient setting until the A1c comes down.  This will require help from his mother, with whom he lives.     Insulin sliding scale, hypoglycemia protocol, Accu-Cheks, glargine 15 units nightly  Diabetic education     Essential hypertension, benign- (present on admission)  Assessment & Plan  Patient has not been taking his home medications for hypertension since at least September 2020 due to difficulty obtaining medications.  Currently, patient has blood pressures of 140s-190s/90s-110s.  This is likely a component of acute pain, however, likely also patient has been having uncontrolled hypertension for at least since September 2020.      In the hospital, presented with elevated blood pressure with systolic blood pressure up to the 180s.  This is likely with a component of acute pain.  Carvedilol 6.25 mg twice daily  Increase losartan to 50 mg daily     Dyslipidemia- (present on admission)  Assessment & Plan  Continue home atorvastatin 80     Coronary artery disease due to calcified coronary lesion: CABG x4, July 2015- (present on admission)  Assessment & Plan  Chronic issue.  Patient has a significant history of MI in both his father and his sister.  Patient has not  been taking any of his cardioprotective meds other than his baby aspirin in at least 1 year.     Restart on: Home atorvastatin  -Increasing losartan to 50 mg daily due to persistently high blood pressures  -Carvedilol 6.25 mg twice daily  Holding ASA due to possibly needing biopsy of retropharyngeal abscess/soft tissue abscess     Tobacco abuse- (present on admission)  Assessment & Plan  Encouraged tobacco cessation     Marijuana abuse- (present on admission)  Assessment & Plan  Chronic issue  Encourage marijuana cessation        Quality Measures:  Code: Full  VTE: SCDs; no chemical VTE ppx in case patient needs biopsy procedure  Diet: GI soft, consistent carb; NPO for MRI C-spine under full anesthesia  Disposition: Remain inpatient     Please note that this dictation was created using voice recognition software. I have worked with technical experts from [employer] to optimize the interface.  I have made every reasonable attempt to correct obvious errors, but there may be errors of grammar and possibly content that I did not discover before finalizing the note.

## 2021-04-28 NOTE — ASSESSMENT & PLAN NOTE
Found to have acute change in mental status 4/27/2021.   CT head w/o concerning for evolving right cerebellar infarct.   Intubated prior to neurosurgical intervention.  High intensity statin, Dr. Hazel recommending holding aspirin for now due to risk of hemorrhagic conversion, thrombocytopenia.  MRA brain, MRA neck: Findings suggesting right vertebral artery occlusion. Possible moderate focal stenosis in the mid cervical left vertebral artery.  TTE with bubble study: EF 60%, no evidence of right-to-left shunt, no valve lesions.  CHRISTOPHER pending   Goal euthermia, normotension, eunatremia  PT, OT, SLP evaluation as able  Neurology following  4/30: okay to start ASA per NS

## 2021-04-28 NOTE — PROGRESS NOTES
Pt transported to [facility], Eduard RN, Jose RN. Tele box removed. Monitor room notified.

## 2021-04-28 NOTE — DISCHARGE PLANNING
[facility] Transitional Care Coordination    Referral from: Dr. Alexander    Insurance Provider on Facesheet: Medicare   Potential Rehab Diagnosis: Cervicothoracic Epidural abscess with Cerebellar CVA          Chart review indicates patient on going medical management and anticipated therapy needs to possibly meet inpatient rehab facility criteria with the goal of returning to community.    D/C support: Mother      Physiatry consultation per  per protocol.           Thank you for the referral.

## 2021-04-28 NOTE — ANESTHESIA PREPROCEDURE EVALUATION
Relevant Problems   ANESTHESIA   (+) Other sleep apnea      PULMONARY   (+) Shortness of breath      CARDIAC   (+) Coronary artery disease due to calcified coronary lesion: CABG x4, July 2015   (+) Essential hypertension, benign      ENDO   (+) Type 2 diabetes mellitus without complication, without long-term current use of insulin (HCC)       Physical Exam    Airway   Mallampati: III  TM distance: <3 FB  Neck ROM: full       Cardiovascular - normal exam  Rhythm: regular  Rate: normal  (-) murmur     Dental - normal exam        Facial Hair   Pulmonary - normal exam  Breath sounds clear to auscultation     Abdominal    Neurological - normal exam                 Anesthesia Plan    ASA 3       Plan - MAC               Induction: intravenous          Informed Consent:    Anesthetic plan and risks discussed with patient.

## 2021-04-28 NOTE — PROGRESS NOTES
Patient somnolent in preop and unable to consent for cervical thoracic lami for SEA decompression, sedation ketamine sometime ago. No family available. Will check stat head CT and then to OR, considered an emergency given UEweakness. Prognosis unclear overall given infectious burden

## 2021-04-28 NOTE — PROGRESS NOTES
Update 7:10 AM:    I received updates from the night resident, Dr. Carl Jane, regarding the patient's situation. I tried calling the patient's mother, Mary Davis, multiple times on both her landline and cell, no answer; left voicemail. Will attempt to reach her again later.    Update:  I received a Voalte message from RN Maty Grace at 7:31 AM that the patient's mother called back. I called the patient's mother immediately (>25 minutes spent in call). She was upset that nobody had updated her last night and that there were multiple issues with getting his pain under control with pain medications. I discussed the entire progression of his medical course throughout the night, including the multiple MRIs, CT head, stroke diagnosis, and surgery. She was understandably very upset. I explained that the entire medical team was doing the best we could to take care of him, and that I would give her another update as soon as I saw him again. She thanked me for the call.    Will continue to update family regularly.    Update, 8:24 AM:     I called the mother again (10 minute call) to update her after the patient had just been transferred to the ICU (I saw the patient afterward, RN staff was setting his equipment up, getting him settled into the room). Spoke to Dr. Alexander, the ICU team taking over. I reassured Ms. Davis that patient was in good hands, and confirmed with RN that 2 visitors (her and her daughter, an RN student) could come visit him. She thanked me for the prompt call back.

## 2021-04-28 NOTE — ANESTHESIA PROCEDURE NOTES
Airway    Date/Time: 4/28/2021 5:39 AM  Performed by: Fidencio Swain M.D.  Authorized by: Fidencio Swain M.D.     Location:  OR  Urgency:  Elective  Indications for Airway Management:  Anesthesia      Spontaneous Ventilation: absent    Sedation Level:  Deep  Preoxygenated: Yes    Patient Position:  Sniffing  Final Airway Type:  Endotracheal airway  Final Endotracheal Airway:  ETT  Cuffed: Yes    Technique Used for Successful ETT Placement:  Video laryngoscopy    Insertion Site:  Oral  Blade Type:  Glide  Laryngoscope Blade/Videolaryngoscope Blade Size:  3  ETT Size (mm):  8.0  Measured from:  Teeth  ETT to Teeth (cm):  24  Placement Verified by: auscultation and capnometry    Cormack-Lehane Classification:  Grade I - full view of glottis  Number of Attempts at Approach:  1

## 2021-04-28 NOTE — ANESTHESIA PREPROCEDURE EVALUATION
Relevant Problems   ANESTHESIA   (+) Other sleep apnea      PULMONARY   (+) Shortness of breath      CARDIAC   (+) Coronary artery disease due to calcified coronary lesion: CABG x4, July 2015   (+) Essential hypertension, benign      ENDO   (+) Type 2 diabetes mellitus without complication, without long-term current use of insulin (HCC)       Physical Exam    Airway   Mallampati: IV  TM distance: <3 FB  Neck ROM: full       Cardiovascular - normal exam  Rhythm: regular  Rate: normal  (-) murmur     Dental - normal exam        Facial Hair   Pulmonary - normal exam  Breath sounds clear to auscultation     Abdominal    Neurological - normal exam                 Anesthesia Plan    ASA 3- EMERGENT   ASA physical status emergent criteria: neurologic compromise requiring immediate intervention    Plan - general       Airway plan will be ETT          Induction: intravenous      Pertinent diagnostic labs and testing reviewed    Informed Consent:    Anesthetic plan and risks discussed with patient.

## 2021-04-28 NOTE — FLOWSHEET NOTE
Patient to MRI inpatient dept via [facility]. Patient informed process and plan of care during this visit.  Anesthesiologist Dr. Lopez spoke with Patient and discussed provider's plan of care.  Consent obtained.  MRI completed. Pt transported to [facility] PACU via [facility] with this RN and Dr. Lopez. Pulse oximeter on and 02 mask at 8L/minute. B/P low and  At PACU bedside to administer more medications. Report to Akiko SMITH

## 2021-04-28 NOTE — CONSULTS
"Consults   INFECTIOUS DISEASES INPATIENT CONSULT NOTE     Date of Service: 4/28/2021    Consult Requested By: Dax Garcia M.D.    Reason for Consultation: Bacteremia and cervical abscess    History of Present Illness:   Perry Davis is a 60 y.o.  admitted 4/25/2021. Pt has a past medical history of uncontrolled diabetes, CAD status post CABG times 4/2015, marijuana use and to arthritis.  Presented complaining of neck and back pain ongoing for approximately 1 week and fall getting out of the bathtub.  He had been seen at urgent care for back pain approximately 1 week prior to admission and given Toradol injections.      Hospital Course:   Afebrile and vitals unremarkable other than hypertension.  Leukocytosis ongoing since arrival.  MRI C-spine on 4/26 with epidural collection noted from C2-T3.  Largest collection noted in upper thoracic posterior epidural space at T2-T3.  This is causing multilevel cord compression.  Also with likely discitis at C5-6 and osteomyelitis at C5-C7.  Blood cultures on 4/25+ for Streptococcus species.      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    • Bronchitis     \"I cough\"   • Diabetes (HCC)     oral medication . 5/21/18-AVG am=90's.   • Heart burn     Treated with Zantac.   • High cholesterol    • Hypertension    • Indigestion     Treated with Zantac.    • Infectious disease 1989 or 1+990    had staph infection in spine, had PICC line, rash all over body,  then thoracic fusion   • Marijuana use 05/21/2018    Daily use for pain control.   • Myocardial infarct (HCC) 04/05/2014    CABG-2015. Cardiologist in [facility].   • Pain     hip, knees   • Pain 05/21/2018    \"all over\"   • Psychiatric problem     depression, anxiety, bipolar    "   • Renal disorder     Hx of renal stone.   • Seizure (HCC)     last seizure 4/5/2014-unknown etiology, but possibly related to stress.   • Sleep apnea     no CPAP, no supplemental oxygen   • Snoring    • Urinary bladder disorder     5/21/18-currently has donald cath to leg bag.   • Urinary incontinence     urethral strictures.       PSH:  Past Surgical History:   Procedure Laterality Date   • CERVICAL LAMINECTOMY POSTERIOR Bilateral 4/28/2021    Procedure: LAMINECTOMY, SPINE, CERVICAL, POSTERIOR APPROACH - C3-T2 REMOVAL EPIDURAL ABSCESS;  Surgeon: New Hazel III, M.D.;  Location: SURGERY [facility];  Service: Neurosurgery   • GROIN EXPLORATION  7/10/2018    Procedure: GROIN EXPLORATION;  Surgeon: Valdez Zuleta M.D.;  Location: SURGERY [facility];  Service: Urology   • URETHRECTOMY  7/5/2018    Procedure: PERINEAL URETHROSTOMY;  Surgeon: Valdez Zuleta M.D.;  Location: SURGERY [facility];  Service: Urology   • EVACUATION OF HEMATOMA Bilateral 7/5/2018    Procedure: EVACUATION OF HEMATOMA;  Surgeon: Valdez Zuleta M.D.;  Location: SURGERY [facility];  Service: Urology   • CYSTOSCOPY  04/24/2018    SP cath placed.   • HEMORRHOIDECTOMY  02/2016   • HIP ARTHROSCOPY Right 01/2016   • KNEE ARTHROSCOPY Left 01/2016   • LITHOTRIPSY  2016    unsuccessful   • CYSTOSCOPY  2016    S/P unsuccessful lithotripsy   • MULTIPLE CORONARY ARTERY BYPASS  07/2015    4 vessel   • OTHER SURGICAL PROCEDURE Right 1993    Closed some veins in leg.   • THORACIC FUSION POSTERIOR  late 1980's       FAMILY HX:  Family History   Problem Relation Age of Onset   • Heart Attack Father 75   • Heart Disease Father 59        CABG   • Heart Disease Sister 55        CABG       SOCIAL HX:  Social History     Socioeconomic History   • Marital status: Single     Spouse name: Not on file   • Number of children: Not on file   • Years of education: Not on file   • Highest education level: Not on file   Occupational History    • Not on file   Tobacco Use   • Smoking status: Current Every Day Smoker     Packs/day: 0.50     Years: 40.00     Pack years: 20.00     Types: Cigarettes   • Smokeless tobacco: Never Used   Substance and Sexual Activity   • Alcohol use: No   • Drug use: Yes     Types: Inhaled     Comment: last smoked marijuana 7/3/2018   • Sexual activity: Not on file   Other Topics Concern   • Not on file   Social History Narrative   • Not on file     Social Determinants of Health     Financial Resource Strain:    • Difficulty of Paying Living Expenses:    Food Insecurity:    • Worried About Running Out of Food in the Last Year:    • Ran Out of Food in the Last Year:    Transportation Needs:    • Lack of Transportation (Medical):    • Lack of Transportation (Non-Medical):    Physical Activity:    • Days of Exercise per Week:    • Minutes of Exercise per Session:    Stress:    • Feeling of Stress :    Social Connections:    • Frequency of Communication with Friends and Family:    • Frequency of Social Gatherings with Friends and Family:    • Attends [employer] Services:    • Active Member of Clubs or Organizations:    • Attends Club or Organization Meetings:    • Marital Status:    Intimate Partner Violence:    • Fear of Current or Ex-Partner:    • Emotionally Abused:    • Physically Abused:    • Sexually Abused:      Social History     Tobacco Use   Smoking Status Current Every Day Smoker   • Packs/day: 0.50   • Years: 40.00   • Pack years: 20.00   • Types: Cigarettes   Smokeless Tobacco Never Used     Social History     Substance and Sexual Activity   Alcohol Use No       Allergies/Intolerances:  No Known Allergies    History reviewed with the patient     Other Current Medications:    Current Facility-Administered Medications:   •  NALOXONE HCL 0.4 MG/ML INJ SOLN, , , ,   •  ARIPiprazole (Abilify) tablet 15 mg, 15 mg, Oral, BID, Anil Langley P.A.-C., Stopped at 04/28/21 0845  •  carBAMazepine (TEGRETOL) tablet 200 mg, 200  mg, Oral, TID, KALEIGH Mejia.A.-C., Stopped at 04/28/21 0845  •  cyanocobalamin (VITAMIN B-12) tablet 1,000 mcg, 1,000 mcg, Oral, DAILY, KALEIGH Mejia.A.-C., Stopped at 04/28/21 0845  •  cyclobenzaprine (Flexeril) tablet 10 mg, 10 mg, Oral, TID, KALEIGH Mejia.A.-C., Stopped at 04/28/21 0845  •  Empagliflozin-metFORMIN HCl 12.5-1000 MG TABS 1 tablet, 1 tablet, Oral, DAILY, KALEIGH Mejia.A.-C., Stopped at 04/28/21 0845  •  linagliptin (TRADJENTA) tablet 5 mg, 5 mg, Oral, DAILY, KALEIGH Mejia.A.-C., Stopped at 04/28/21 0845  •  nitroglycerin (NITROSTAT) tablet 0.4 mg, 0.4 mg, Sublingual, Q5 MIN PRN, KALEIGH Mejia.A.-C.  •  oxybutynin (DITROPAN) tablet 5 mg, 5 mg, Oral, TID, KALEIGH Mejia.A.-C., Stopped at 04/28/21 0845  •  Pharmacy Consult Request ...Pain Management Review 1 Each, 1 Each, Other, PHARMACY TO DOSE, KALEIGH Mejia.A.-C.  •  MD ALERT...DO NOT ADMINISTER NSAIDS or ASPIRIN unless ORDERED By Neurosurgery 1 Each, 1 Each, Other, PRN, KALEIGH Mejia.A.-C.  •  docusate sodium (COLACE) capsule 100 mg, 100 mg, Oral, BID, KALEIHG Mejia.A.-C., Stopped at 04/28/21 0845  •  senna-docusate (PERICOLACE or SENOKOT S) 8.6-50 MG per tablet 1 tablet, 1 tablet, Oral, Nightly, KALEIGH Mejia.A.-C.  •  senna-docusate (PERICOLACE or SENOKOT S) 8.6-50 MG per tablet 1 tablet, 1 tablet, Oral, Q24HRS PRN, Anil Langley, KALEIGH.A.-C.  •  polyethylene glycol/lytes (MIRALAX) PACKET 1 Packet, 1 Packet, Oral, BID PRN, Anil Langley, KALEIGH.A.-C.  •  magnesium hydroxide (MILK OF MAGNESIA) suspension 30 mL, 30 mL, Oral, QDAY PRN, Anil Langley, KALEIGH.A.-C.  •  bisacodyl (DULCOLAX) suppository 10 mg, 10 mg, Rectal, Q24HRS PRN, Anil Langley, KALEIGH.A.-C.  •  fleet enema 133 mL, 1 Each, Rectal, Once PRN, Anil Langley, KALEIGH.A.-C.  •  Respiratory Therapy Consult, , Inhalation, Continuous RT, Anil Langley, KALEIGH.A.-C.  •  NS infusion, , Intravenous, Continuous, Anil BAEZ  KALEIGH Langley.A.-C., Last Rate: 100 mL/hr at 04/28/21 0854, New Bag at 04/28/21 0854  •  ceFAZolin in dextrose (ANCEF) IVPB premix 2 g, 2 g, Intravenous, Q8HR **AND** MD Alert...Vancomycin per Pharmacy, , Other, PHARMACY TO DOSE, KALEIGH Mejia.A.-C.  •  ondansetron (ZOFRAN) syringe/vial injection 4 mg, 4 mg, Intravenous, Q4HRS PRN, KALEIGH Mejia.A.-C.  •  ondansetron (ZOFRAN ODT) dispertab 4 mg, 4 mg, Oral, Q4HRS PRN, KALEIGH Mejia.A.-C.  •  promethazine (PHENERGAN) tablet 12.5-25 mg, 12.5-25 mg, Oral, Q4HRS PRN, KALEIGH Mejia.A.-C.  •  promethazine (PHENERGAN) suppository 12.5-25 mg, 12.5-25 mg, Rectal, Q4HRS PRN, Anil Langley, P.A.-C.  •  prochlorperazine (COMPAZINE) injection 5-10 mg, 5-10 mg, Intravenous, Q4HRS PRN, Anil Langley, P.A.-C.  •  benzocaine-menthol (CEPACOL) lozenge 1 Lozenge, 1 Lozenge, Mouth/Throat, Q2HRS PRN, Anil Langley, KALEIGH.A.-C.  •  vitamin D (cholecalciferol) tablet 5,000 Units, 5,000 Units, Oral, DAILY, KALEIGH Mejia.A.-C., Stopped at 04/28/21 0845  •  calcium carbonate (TUMS) chewable tab 500 mg, 500 mg, Oral, BID, KALEIGH Mejia.A.-C., Stopped at 04/28/21 0845  •  levETIRAcetam (Keppra) 1000 mg in 100 mL NaCl IV premix, 1,000 mg, Intravenous, Q12HRS, Anil Langley P.A.-C., Stopped at 04/28/21 0907  •  norepinephrine (Levophed) 8 mg in 250 mL NS infusion (premix), 0-30 mcg/min, Intravenous, Continuous, Gerard Alexander M.D., Last Rate: 9.4 mL/hr at 04/28/21 0847, 5 mcg/min at 04/28/21 0847  •  fentaNYL (SUBLIMAZE) 50 mcg/mL in 50mL (Continuous Infusion), , Intravenous, Continuous, Gerard Alexander M.D., Last Rate: 2 mL/hr at 04/28/21 0940, 100 mcg/hr at 04/28/21 0940  •  propofol (DIPRIVAN) injection, 0-80 mcg/kg/min, Intravenous, Continuous, Last Rate: 2.2 mL/hr at 04/28/21 1025, 5 mcg/kg/min at 04/28/21 1025 **AND** Triglycerides Starting now and then Every 3 Days, , , Every 3 Days (0300), Alexi Dumont M.D.  •  ipratropium-albuterol  (DUONEB) nebulizer solution, 3 mL, Nebulization, Q4H PRN (RT), Alexi Dumont M.D.  •  labetalol (NORMODYNE/TRANDATE) injection 10 mg, 10 mg, Intravenous, Q4HRS PRN, Alexi Dumont M.D.  •  oxyCODONE immediate release (ROXICODONE) tablet 10 mg, 10 mg, Oral, Q4HRS PRN, Carl Jane M.D., 10 mg at 04/27/21 0753  •  insulin glargine (Lantus) injection, 15 Units, Subcutaneous, Q EVENING, Miriam Cano M.D., 15 Units at 04/27/21 1738  •  losartan (COZAAR) tablet 50 mg, 50 mg, Oral, DAILY, Miriam Cano M.D., Stopped at 04/28/21 0600  •  carvedilol (COREG) tablet 6.25 mg, 6.25 mg, Oral, BID WITH MEALS, Miriam Cano M.D., Stopped at 04/27/21 1730  •  phosphorus (K-Phos-Neutral) per tablet 500 mg, 500 mg, Oral, Q6HRS, Miriam Cano M.D., Stopped at 04/27/21 1915  •  HYDROmorphone (Dilaudid) injection 0.5 mg, 0.5 mg, Intravenous, Q3HRS PRN, Carl Jane M.D.  •  MIDAZOLAM HCL 2 MG/2ML INJ SOLN (WRAPPED), , , ,   •  cefTRIAXone (ROCEPHIN) 2 g in  mL IVPB, 2 g, Intravenous, Q24HRS, Eros Paredes M.D., Stopped at 04/27/21 0549  •  gabapentin (NEURONTIN) capsule 400 mg, 400 mg, Oral, TID, Miriam Cano M.D., Stopped at 04/27/21 1800  •  acetaminophen (TYLENOL) tablet 1,000 mg, 1,000 mg, Oral, Q6HRS, Carl Jane M.D., Stopped at 04/27/21 1800  •  insulin regular (HumuLIN R,NovoLIN R) injection, 2-9 Units, Subcutaneous, 4X/DAY ACHS, 2 Units at 04/28/21 1002 **AND** POC blood glucose manual result, , , Q AC AND BEDTIME(S) **AND** NOTIFY MD and PharmD, , , Once **AND** glucose 4 g chewable tablet 16 g, 16 g, Oral, Q15 MIN PRN **AND** dextrose 50% (D50W) injection 50 mL, 50 mL, Intravenous, Q15 MIN PRN, Eros Paredes M.D.  •  atorvastatin (LIPITOR) tablet 80 mg, 80 mg, Oral, Nightly, Miriam Cano M.D., Stopped at 04/27/21 2100  •  Icy Hot extra-strength 10-30 % topical cream, , Topical, PRN, Miriam Cano M.D.  •  lidocaine (LIDODERM) 5 % 3 Patch, 3 Patch, Transdermal, Q24HR, Miriam Cano M.D., 3  "Patch at 21 1746  [unfilled]    Most Recent Vital Signs:  /67   Pulse 77   Temp 37.3 °C (99.2 °F) (Temporal)   Resp (!) 32   Ht 1.753 m (5' 9\")   Wt 72.3 kg (159 lb 6.3 oz)   SpO2 94%   BMI 23.54 kg/m²   Temp  Av.4 °C (97.6 °F)  Min: 35.7 °C (96.3 °F)  Max: 37.6 °C (99.6 °F)    Physical Exam:  Physical Exam   Constitutional: He is well-developed, well-nourished, and in no distress.   HENT:   Head: Normocephalic and atraumatic.   Right Ear: External ear normal.   Left Ear: External ear normal.   Nose: Nose normal.   Eyes: Pupils are equal, round, and reactive to light. Conjunctivae and EOM are normal.   Cardiovascular: Normal rate, regular rhythm and normal heart sounds.   Pulmonary/Chest: Effort normal.   Faint crackles bilaterally    Abdominal: Soft. Bowel sounds are normal. He exhibits no distension.   Musculoskeletal:         General: No edema.      Cervical back: Normal range of motion and neck supple.   Neurological:   Intubated and sedated   Skin: Skin is warm and dry.           Pertinent Lab Results:  Recent Labs     21  0156 21  0256 21  0310   WBC 15.5* 16.4* 15.2*      Recent Labs     21  0156 21  0256 21  0310   HEMOGLOBIN 16.5 15.4 13.9*   HEMATOCRIT 47.6 44.0 40.7*   MCV 86.7 86.8 89.3   MCH 30.1 30.4 30.5   PLATELETCT 47* 77* 105*         Recent Labs     21  0248 21  1152 21  0310   SODIUM 122* 123* 125*   POTASSIUM 3.6 3.6 4.0   CHLORIDE 89* 91* 92*   CO2  19*   CREATININE 0.41* 0.44* 0.49*        Recent Labs     21  0156 21  0248 21  0310   ALBUMIN 2.6* 2.4* 2.0*        Pertinent Micro:  Results     Procedure Component Value Units Date/Time    BLOOD CULTURE [[id number]]  (Abnormal) Collected: 21 1433    Order Status: Completed Specimen: Blood from Peripheral Updated: 21 1117     Significant Indicator POS     Source BLD     Site PERIPHERAL     Culture Result Growth detected by " "Bactec instrument. 04/26/2021  04:57      Streptococcus anginosus    Narrative:      CALL  [place]  171 tel. [phone],  CALLED  171 tel. [phone] 04/26/2021, 04:59, RB PERF. RESULTS CALLED TO: RN  [id number]  1 of 2 for Blood Culture x 2 sites order. Per Hospital  Policy: Only change Specimen Src: to \"Line\" if specified by  physician order.  No site indicated    BLOOD CULTURE [[id number]]  (Abnormal) Collected: 04/25/21 1505    Order Status: Completed Specimen: Blood from Peripheral Updated: 04/27/21 1117     Significant Indicator POS     Source BLD     Site PERIPHERAL     Culture Result Growth detected by Bactec instrument. 04/26/2021  04:06      Streptococcus anginosus  Susceptibilities in progress      Narrative:      CALL  [place]  171 tel. [phone],  CALLED  171 tel. [phone] 04/26/2021, 04:09, RB PERF. RESULTS CALLED TO: RN  [id number]  2 of 2 blood culture x2  Sites order. Per Hospital Policy:  Only change Specimen Src: to \"Line\" if specified by physician  order.  No site indicated    E-Test [[id number]] Collected: 04/25/21 1505    Order Status: Completed Specimen: Other Updated: 04/27/21 1117     ETEST Sensitivity INTERIM    Narrative:      171 tel. [phone] 04/26/2021, 04:09, RB PERF. RESULTS CALLED TO: RN [id number]  2 of 2 blood culture x2  Sites order. Per Hospital Policy:  Only change Specimen Src: to \"Line\" if specified by physician  order.    BLOOD CULTURE [[id number]] Collected: 04/27/21 0248    Order Status: Completed Specimen: Blood from Peripheral Updated: 04/27/21 0311    Narrative:      Per Hospital Policy: Only change Specimen Src: to \"Line\" if  specified by physician order.    BLOOD CULTURE [[id number]] Collected: 04/27/21 0248    Order Status: Completed Specimen: Blood from Peripheral Updated: 04/27/21 0311    Narrative:      Per Hospital Policy: Only change Specimen Src: to \"Line\" if  specified by physician order.    MRSA By PCR (Amp) [[id number]] Collected: 04/26/21 0950    Order Status: Completed " "Specimen: Respirate from Nares Updated: 04/26/21 1343     Significant Indicator NEG     Source RESP     Site NARES     MRSA PCR Negative for MRSA by PCR.    Narrative:      Collected By:[id number] EMMA BAEZ  Collected By:[id number] EMMA BAEZ    SARS-CoV-2 PCR (24 hour In-House): Collect NP swab in [facility] [[id number]] Collected: 04/25/21 1448    Order Status: Completed Specimen: Respirate from Nasopharyngeal Updated: 04/26/21 1341     SARS-CoV-2 Source NP Swab     SARS-CoV-2 by PCR NotDetected     Comment: PATIENTS: Important information regarding your results and instructions can  be found at [email or web address]   \"After your  Covid-19 Test\"  [facility] providers: PLEASE REFER TO DE-ESCALATION AND RETESTING PROTOCOL  on [email or web address]  **The TaqPath COVID-19 SARS-CoV-2 RT-PCR test has been made available for  use under the Emergency Use Authorization (EUA) only.         Narrative:      Have you been in close contact with a person who is suspected  or known to be positive for COVID-19 within the last 30 days  (e.g. last seen that person < 30 days ago)->Unknown        No results found for: BLOODCULTU, BLDCULT, BCHOLD     Studies:  CT-CSPINE WITHOUT PLUS RECONS    Result Date: 4/25/2021 4/25/2021 1:29 PM HISTORY/REASON FOR EXAM: History of back and neck pain. Fall. TECHNIQUE/EXAM DESCRIPTION: CT cervical spine without contrast, with reconstructions. Helical scanning was performed from the skull base through T1.  Sagittal and coronal multiplanar reconstructions were generated from the axial images. Low dose optimization technique was utilized for this CT exam including automated exposure control and adjustment of the mA and/or kV according to patient size. COMPARISON:  None available. FINDINGS: No compression fracture is identified. There is an ununited fracture of the spinous process of T1. Intervertebral disc space narrowing and endplate spurring is most prominent at C6/C7. There are " degenerative changes of the facet joints. C1/C2 alignment is maintained. There is multilevel uncovertebral spurring and neural foraminal narrowing. There is cervical prevertebral edema. There is carotid atherosclerotic plaque bilaterally. Visualized lung apices are clear.     Multilevel degenerative changes. Prevertebral edema. Further evaluation with MRI is recommended as this can be seen in the setting of ligamentous injury. Bilateral carotid atherosclerotic plaque.     CT-CHEST (THORAX) WITH    Result Date: 4/25/2021 4/25/2021 1:29 PM HISTORY/REASON FOR EXAM:  Rib fracture suspected, traumatic. TECHNIQUE/EXAM DESCRIPTION: CT scan of the chest with contrast. Helical images were obtained from the lung apices through the adrenal glands following the bolus administration of  80 mL of Omnipaque 350 nonionic contrast. Sagittal and coronal reconstructions were performed. Low dose optimization technique was utilized for this CT exam including automated exposure control and adjustment of the mA and/or kV according to patient size. COMPARISON:  None. FINDINGS: There is atherosclerotic plaque. There is coronary artery calcification. The heart is not enlarged. No pericardial or pleural effusion is seen. There are small mediastinal lymph nodes. There is no hilar or axillary lymphadenopathy. No pneumothorax or pulmonary contusion is seen. Central airways are patent. Limited views were obtained of the upper abdomen. Hypodensity along the falciform ligament likely represents focal fat. Patient is status post median sternotomy. Degenerative changes are seen in the spine. No displaced rib fracture is identified.     No displaced rib fracture is identified. No acute cardiopulmonary process is seen. Atherosclerotic plaque including coronary artery calcification.    CT-HEAD W/O    Result Date: 4/28/2021 4/28/2021 5:20 AM HISTORY/REASON FOR EXAM: Altered mental status TECHNIQUE/EXAM DESCRIPTION:  CT of the head without contrast.  Sequential axial images were obtained from the vertex to the skull base without contrast. Up to date radiation dose reduction adjustments have been utilized to meet ALARA standards for radiation dose reduction. COMPARISON: None FINDINGS: There is moderate diffuse parenchymal volume loss observed. Periventricular and subcortical white matter low-attenuation changes are seen, most commonly associated with small vessel ischemic disease. The ventricles are normal in caliber and configuration. Area of low-density within the right cerebellar hemisphere is seen. There are no abnormal extra axial fluid collections or extra axial hemorrhage identified. The visualized paranasal sinuses and mastoid air cells are well aerated bilaterally. No depressed calvarial fractures are identified. The visualized globes and retrobulbar soft tissues appear within normal limits.     1.  Low-density in the region of the right cerebellar hemisphere, appearance compatible with evolving infarct. These findings were discussed with the patient's on-call clinician, Deniz, on 4/28/2021 6:14 AM.    CT-LSPINE W/O PLUS RECONS    Result Date: 4/25/2021 4/25/2021 1:29 PM HISTORY/REASON FOR EXAM:  Back pain after injury. TECHNIQUE/EXAM DESCRIPTION AND NUMBER OF VIEWS: CT lumbar spine without contrast, with reconstructions. The study was performed on a [employer] CT scanner. Thin-section helical scanning was performed from T12-L1 to the sacrum. Sagittal and coronal multiplanar reconstructions were generated from the axial images. Low dose optimization technique was utilized for this CT exam including automated exposure control and adjustment of the mA and/or kV according to patient size. COMPARISON: None. FINDINGS: Alignment of the lumbar spine is normal. There is no acute fracture or subluxation. The prevertebral and paraspinous soft tissues show no acute abnormality. There is severe atherosclerosis with a mixture of soft and calcified plaque. There is  lumbosacral junction vacuum disc phenomenon with endplate spurring, disc height loss The visualized sacrum and sacroiliac joints show no acute abnormality.     No CT evidence of acute traumatic injury.    CT-TSPINE W/O PLUS RECONS    Result Date: 4/25/2021 4/25/2021 1:29 PM HISTORY/REASON FOR EXAM:  Mid-back trauma Pain following injury. TECHNIQUE/EXAM DESCRIPTION AND NUMBER OF VIEWS: CT thoracic spine without contrast, with reconstructions. The study was performed on a XING CT scanner. Thin-section helical scanning was performed from C7-T1 through T12-L1. Sagittal and coronal multiplanar reconstructions were generated from the axial images. Low dose optimization technique was utilized for this CT exam including automated exposure control and adjustment of the mA and/or kV according to patient size. COMPARISON: None. FINDINGS: Alignment of the thoracic spine is normal. There is no acute displaced fracture. There is T6/7 interbody fusion with mild anterior wedging likely indicating remote mild compression fracture that has healed There is bulky endplate spurring with some bridging syndesmophytes in the mid thoracic spine Sternotomy wires The cervicothoracic junction appears intact. The prevertebral and paraspinous soft tissues show no acute abnormality.     No CT evidence of acute traumatic abnormality. Mild T6/7 anterior wedging compatible with healed mild chronic compression fracture and there is interbody fusion.    DX-CERVICAL SPINE-2 OR 3 VIEWS    Result Date: 4/22/2021 4/22/2021 6:16 PM HISTORY/REASON FOR EXAM:  Atraumatic Pain. Neck pain for 4 days. TECHNIQUE/EXAM DESCRIPTION AND NUMBER OF VIEWS: Cervical spine series, 2 views. COMPARISON:  None. FINDINGS: Mild reversal of curvature centered at the C5 level. Vertebral body heights are preserved. Multilevel loss of disc height and osteophyte formation. Cervicothoracic junction is obscured. Prevertebral soft tissues are within normal limits. Odontoid is grossly  intact.  Lateral masses of C1 are grossly intact.     1.  Limited exam.  Cervicothoracic junction is obscured. 2.  No gross cervical spine fracture or subluxation. 3.  Moderate multilevel degenerative changes.    DX-CHEST-PORTABLE (1 VIEW)    Result Date: 4/27/2021 4/27/2021 5:30 PM HISTORY/REASON FOR EXAM:  Shortness of Breath. TECHNIQUE/EXAM DESCRIPTION AND NUMBER OF VIEWS: Single portable view of the chest. COMPARISON: None. FINDINGS: LUNGS: Hypoinflation with left basilar atelectasis. Mild perihilar interstitial prominence. No effusions. PNEUMOTHORAX: None. LINES AND TUBES: None. MEDIASTINUM: No cardiomegaly. MUSCULOSKELETAL STRUCTURES: No acute displaced fracture. Median sternotomy.     1.  Hypoinflation with left basilar atelectasis. 2.  Mild perihilar interstitial prominence may be related to hypoinflation or edema.    DX-SPINE-ANY ONE VIEW    Result Date: 4/28/2021 4/28/2021 5:30 AM HISTORY/REASON FOR EXAM:  Cervical laminectomy TECHNIQUE/EXAM DESCRIPTION AND NUMBER OF VIEWS:  Single view of the spine. Digitized Intraoperative Radiograph FINDINGS: Fixation hardware projecting over the cervical spine Fluoroscopic time:2 seconds     Digitized intraoperative radiograph is submitted for review.  This examination is not for diagnostic purpose but for guidance during a surgical procedure.    MR-CERVICAL SPINE-WITH & W/O    Result Date: 4/27/2021 4/27/2021 1:02 PM HISTORY/REASON FOR EXAM:  Neck pain, abnormal neuro exam; Neck pain, initial exam; sepsis 2/2 strep bacteremia  -unknown source. rule out possible ?? retropharyngeal abscess, ??prevertebral abscess. TECHNIQUE/EXAM DESCRIPTION: MRI of the cervical spine without and with contrast. The study was performed on a [employer] 1.5 Britt MRI scanner. T1 sagittal, T2 fast spin-echo sagittal, and gradient echo axial images were obtained of the cervical spine. T1 post-contrast fat suppressed sagittal images were obtained of the cervical spine. Optional T1  post-contrast axial images may be obtained. 15 mL ProHance contrast was administered intravenously. COMPARISON: None. FINDINGS: There is abnormal disc T2 hyperintensity at C5-6. Mild amount of bone marrow edema is noted at C5, C6 and C7. There is also focal bone marrow enhancement at C6. There is multiseptated abnormal peripherally enhancing epidural collection noted extending from C2 to the visualized lower thoracic level. Largest collection is noted in the upper thoracic posterior epidural space at the levels of T2 and T3. The lower extent of the collection is not imaged. This is causing multilevel effacement of the thecal sac and cord compression. The thoracic spinal cord is anteriorly displaced and compressed at the levels of T2 and T3. At the level of C2-3,there is small left anterior epidural fluid collection. At the level of C3-4,there is small left anterior epidural fluid collection causing indentation of the thecal sac. At the level of C4-5,there is minimal epidural fluid collection. At the level of C5-6,there is diffuse disc bulge along with right posterior lateral epidural fluid collection causing severe canal compromise. At the level of C6-7,there is mild diffuse disc bulge. There is epidural fluid collection causing severe canal compromise. At the level of C7-T1,there is epidural fluid collection causing severe canal compromise. The visualized brain parenchyma is unremarkable. The cervical spinal cord is mildly enlarged which demonstrates mild increased intramedullary T2 signal intensity. There is abnormal T2 hyperintensity in the visualized bilateral vertebral arteries concerning for age indeterminate occlusion. There is mild amount of edema in the pre and retropharyngeal soft tissue.     1.  There is multiseptated abnormal peripherally enhancing epidural collection noted extending from C2 to the visualized lower thoracic level. Largest collection is noted in the upper thoracic posterior epidural space  at the levels of T2 and T3. The lower extent of the collection is not imaged. This is causing multilevel effacement of the thecal sac and cord compression. The thoracic spinal cord is anteriorly displaced and compressed at the levels of T2 and T3. Pre and postcontrast MR examination of  the thoracic spine is recommended for further evaluation. 2.  There is effacement of the cervical thecal sac due to the multiseptated epidural fluid collection. 3.  The cervical spinal cord is mildly enlarged with minimal increased T2 signal intensity. The possibility of cord inflammation cannot be entirely excluded. 4.  Abnormal T2 hyperintensity at C5-6 disc space likely representing discitis. 5.  Abnormal bone marrow edema of C5, C6 and C7 vertebral bodies with edema concerning for osteomyelitis. 6.  There is also focal enhancement of C6 vertebral body likely secondary to the osteomyelitis. 7.  Prevertebral edema/fluid collection. 8.  Nonvisualization of flow void of bilateral vertebral arteries concerning for age-indeterminate bilateral vertebral occlusions.     MR-LUMBAR SPINE-WITH & W/O    Result Date: 4/28/2021 4/27/2021 11:23 PM HISTORY/REASON FOR EXAM:  Back pain or radiculopathy, cancer or infection suspected; Strep bacteremia, back pain, bilateral leg numbness TECHNIQUE/EXAM DESCRIPTION: MRI of the lumbar spine without and with contrast. The study was performed on a [employer] 1.5 Britt MRI scanner. T1 sagittal, T2 fast spin-echo sagittal, and T2 axial images were obtained of the lumbar spine. T1 postcontrast fat-suppressed sagittal images were obtained. 14 mL ProHance contrast was administered intravenously. COMPARISON:  None. FINDINGS: Normal lumbar lordosis. Preservation of vertebral body heights, alignment and bone marrow signal. No evidence of discitis osteomyelitis. Conus medullaris terminates at T12-L1 and is normal in signal. No mass or fluid collection is seen within the lumbar spinal canal. T12-L1: Canal and  foramina are patent. L1-L2: Mild disc bulge. Canal and foramina are patent. L2-L3: Minimal disc bulge and facet degeneration. Canal and foramina are patent. L3-L4: Minimal disc bulge and facet degeneration. Canal and foramina are patent.. L4-L5: Mild disc bulge and facet degeneration. No significant spinal stenosis. Mild foraminal narrowing. L5-S1: Disc narrowing, mild disc osteophyte. No significant spinal stenosis. Moderate right and mild-to-moderate left foraminal narrowing. Distended urinary bladder.     No evidence of lumbar spine infection or epidural abscess. Mild spondylosis as detailed above without spinal stenosis.    MR-THORACIC SPINE-WITH & W/O    Result Date: 4/28/2021 4/27/2021 11:23 PM HISTORY/REASON FOR EXAM:  Mid-back pain, compression fracture suspected; possible epidural abscess/fluid collection TECHNIQUE/EXAM DESCRIPTION: MRI of the thoracic spine without and with contrast. The study was performed on a [email or web address] 1.5 Britt MRI scanner. T1 sagittal, T2 fast spin-echo sagittal, and T2 axial images were obtained of the thoracic spine. T1 post-contrast fat suppressed sagittal images were obtained. Optional T1 post-contrast axial images may be obtained. 14 mL ProHance contrast was administered intravenously. COMPARISON:  MRI of the cervical spine one-day prior. FINDINGS: There is abnormal dorsal epidural fluid collection seen within the partially visualized lower cervical spine and which extends inferiorly to about the T6 level. The maximum thickness is seen at about the T2-T3 level measuring 8 mm. This raises concern for epidural abscess, although epidural hematoma could also have this appearance. From T1 through T3 there is at least moderate thecal sac stenosis due to the epidural fluid collection. There is significant abnormal signal within the partially visualized  lower cervical and upper thoracic cord which could be infectious/inflammatory or other nonspecific cord edema. There is also  suggestion of a small ventral epidural fluid collection at C6-C7. There is also partially visualized abnormal marrow edema in the C6 and C7 vertebral bodies as detailed on earlier MRI report. There is suggestion of some prominent enhancement around the mid and lower thoracic cord seen on the sagittal postcontrast images however limited evaluation on axial images due to motion artifact. This is of uncertain etiology but could represent some leptomeningeal disease/infection. On the sagittal T2 images there is a questionable slight nodular appearance on the surface of lower thoracic cord. A differential would be a subtle spinal dural aVF. There is no abnormal signal seen within the mid/lower thoracic cord. There is no evidence of discitis osteomyelitis within the thoracic spine. There is T6-T7 interbody ankylosis. There is mild disc degeneration and some prominent anterolateral bridging osteophytes in the mid and lower thoracic spine. No significant posterior disc herniation is seen. Within the mid and lower thoracic spine there  is no significant spinal stenosis.     1.  Posterior epidural fluid collection/abscess within the lower cervical spine extending inferiorly to about the T6 level which could represent epidural abscess and/or hematoma. This measures 8 mm in maximal thickness at T2-T3 with at least moderate thecal sac stenosis. 2.  Abnormal signal within the cervical cord and upper thoracic cord suggesting cord edema or infectious/inflammatory etiology. 3.  Lower cervical spine findings are detailed on earlier report. 4.  Prominent enhancement along the surface of the mid and lower thoracic cord is of uncertain etiology and as detailed above, possibly representing leptomeningeal disease/infection. See details above. These findings were discussed with Dr. Cano on 4/28/2021 10:01 AM.     DX-PORTABLE FLUOROSCOPY < 1 HOUR    Result Date: 4/28/2021 4/28/2021 5:30 AM HISTORY/REASON FOR EXAM:  Cervical laminectomy  TECHNIQUE/EXAM DESCRIPTION AND NUMBER OF VIEWS: Portable fluoroscopy for less than one hour in OR. FINDINGS: The portable fluoroscopy unit was obligated to the procedure for less than one hour. Actual fluoro time was 2 seconds.     Portable fluoroscopy utilized for 2 seconds. INTERPRETING LOCATION: [address] [place], [place] [id number], [id number]      ASSESSMENT/PLAN:     60 y.o.  admitted 4/25/2021. Pt has a past medical history of uncontrolled diabetes, CAD status post CABG times 4/2015, marijuana use and to arthritis.  Presented complaining of neck and back pain ongoing for approximately 1 week and fall getting out of the bathtub.  He had been seen at urgent care for back pain approximately 1 week prior to admission and given Toradol injections.      Hospital Course:   Afebrile and vitals unremarkable other than hypertension.  Leukocytosis ongoing since arrival.  MRI C-spine on 4/26 with epidural collection noted from C2-T3.  Largest collection noted in upper thoracic posterior epidural space at T2-T3.  This is causing multilevel cord compression.  Also with likely discitis at C5-6 and osteomyelitis at C5-C7.  Blood cultures on 4/25+ for Streptococcus species.      Problem List    Sepsis, secondary to below  Bacteremia  -Blood cultures on 4/25 2/2 + Streptococcus anginosus  Leukocytosis, ongoing  Thrombocytopenia, going but improved today  Cervical thoracic infection, epidural abscess at C2-T3 as well as discitis and osteomyelitis  -Repeat MRI thoracic spine on 4/27 notes posterior epidural fluid  collection/abscess in lower cervical spine extending to T6 level, also noted abnormal signal within the cervical and upper thoracic cord  -Patient went to the OR with neurosurgery on 4/28 for see 4-T2 laminectomy, decompression of thecal sac and nerve roots as well as cervical thoracic extradural spinal mass/epidural abscess removal, linda purulence during OR, no CSF leak per op note  CVA  -Right cerebellar stroke  Uncontrolled  diabetes    Plan     --- Continue ceftriaxone, increased dose to 2 g every 12 for CNS penetration until MRI head is completed  --- Recommend TTE -ordered  --- Follow-up imaging  --- Repeat blood cultures, ordered  --- Monitor labs  --- Monitor and control blood sugars      Plan of care discussed with EMILY Garcia M.D.. Will continue to follow    Molly Mcmahan M.D.

## 2021-04-28 NOTE — CONSULTS
Neurology Initial Consult H&P  Neurohospitalist Service, [facility]    Referring Physician: Dax Garcia M.D.    Referral reason: R cerebellar stroke.    HPI: Perry Davis is a 60 y.o. man, history per chart review of anginal syndrome, MI with CABG x4 in 2015, DM, HLD, HTN, CKD, seizure, sleep apnea, tobacco use who presented to to [facility] ED by private vehicle on 4/25/21 with complaint of acute on chronic neck pain s/p slip and fall in shower. Additionally, the patient complained of back pain for 1 week for which he sought care at urgent care on two occassions and was treated with Toradol injection, as well as Tylenol with codeine, which subsequently made him feel dizzy and lose his balance. While in the ER he reportedly stated he has not been on his medications for over a year. Per chart review, upon presentation to ER he was axox4, no neurological deficits identified. There is no report of drug use, however, unable to confirm with patient at this time given AMS.    Hospital Course:  Leukocytosis ongoing since arrival. Blood cultures drawn 4/25 growing Streptococcus species.  MRI C-spine on 4/26 with epidural collection noted from C2-T3 with largest collection noted in upper thoracic posterior epidural space at T2-T3 causing multilevel cord compression.  Also with likely discitis at C5-6 and osteomyelitis at C5-C7. He went for emergent surgery: C3, C4, C5, C6. C7. T1, T2 laminectomy; decompression of thecal sac and nerve roots. 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 A non hemorrhagic right cerebellar stroke was diagnosed intraoperatively on CT. He was admitted to ICU. He is being treated with ceftriaxone currently.    Review of systems: Unable to perform due to patient just having received sedation and paralytic for airway management.    Past Medical History:    has a past medical history of Anginal syndrome (HCC) (05/21/2018), Bronchitis, Diabetes  ([facility]), Heart burn, High cholesterol, Hypertension, Indigestion, Infectious disease (1989 or 1+990), Marijuana use (05/21/2018), Myocardial infarct (HCC) (04/05/2014), Pain, Pain (05/21/2018), Psychiatric problem, Renal disorder, Seizure ([facility]), Sleep apnea, Snoring, Urinary bladder disorder, and Urinary incontinence.    FHx:  family history includes Heart Attack (age of onset: 75) in his father; Heart Disease (age of onset: 55) in his sister; Heart Disease (age of onset: 59) in his father.    SHx:   reports that he has been smoking cigarettes. He has a 20.00 pack-year smoking history. He has never used smokeless tobacco. He reports current drug use. Drug: Inhaled. He reports that he does not drink alcohol.    Allergies:  No Known Allergies    Medications:    Current Facility-Administered Medications:   •  ARIPiprazole (Abilify) tablet 15 mg, 15 mg, Oral, BID, NEO MejiaA.-C., Stopped at 04/28/21 0845  •  carBAMazepine (TEGRETOL) tablet 200 mg, 200 mg, Oral, TID, KALEIGH Mejia.A.-C., Stopped at 04/28/21 0845  •  cyanocobalamin (VITAMIN B-12) tablet 1,000 mcg, 1,000 mcg, Oral, DAILY, KALEIGH Mejia.A.-C., Stopped at 04/28/21 0845  •  Pharmacy Consult Request ...Pain Management Review 1 Each, 1 Each, Other, PHARMACY TO DOSE, ERLINDA Mejia.ERROL.  •  MD ALERT...DO NOT ADMINISTER NSAIDS or ASPIRIN unless ORDERED By Neurosurgery 1 Each, 1 Each, Other, PRN, KALEIGH Mejia.A.-C.  •  docusate sodium (COLACE) capsule 100 mg, 100 mg, Oral, BID, KALEIGH Mejia.A.-C., Stopped at 04/28/21 0845  •  senna-docusate (PERICOLACE or SENOKOT S) 8.6-50 MG per tablet 1 tablet, 1 tablet, Oral, Nightly, KALEIGH Mejia.A.-C.  •  senna-docusate (PERICOLACE or SENOKOT S) 8.6-50 MG per tablet 1 tablet, 1 tablet, Oral, Q24HRS PRN, Anil Langley P.A.-C.  •  polyethylene glycol/lytes (MIRALAX) PACKET 1 Packet, 1 Packet, Oral, BID PRN, Anil Langley P.A.-C.  •  magnesium hydroxide (MILK OF  MAGNESIA) suspension 30 mL, 30 mL, Oral, QDAY PRN, KALEIGH Mejia.A.-C.  •  bisacodyl (DULCOLAX) suppository 10 mg, 10 mg, Rectal, Q24HRS PRN, KALEIGH Mejia.A.-C.  •  fleet enema 133 mL, 1 Each, Rectal, Once PRN, KALEIGH Mejia.A.-C.  •  Respiratory Therapy Consult, , Inhalation, Continuous RT, KALEIGH Mejia.A.-C.  •  NS infusion, , Intravenous, Continuous, KALEIGH Mejia.A.-CSavannah, Last Rate: 100 mL/hr at 04/28/21 0854, New Bag at 04/28/21 0854  •  ceFAZolin in dextrose (ANCEF) IVPB premix 2 g, 2 g, Intravenous, Q8HR **AND** MD Alert...Vancomycin per Pharmacy, , Other, PHARMACY TO DOSE, KALEIGH Mejia.A.-C.  •  ondansetron (ZOFRAN) syringe/vial injection 4 mg, 4 mg, Intravenous, Q4HRS PRN, KALEIGH Mejia.A.-C.  •  ondansetron (ZOFRAN ODT) dispertab 4 mg, 4 mg, Oral, Q4HRS PRN, KALEIGH Mejia.A.-C.  •  promethazine (PHENERGAN) tablet 12.5-25 mg, 12.5-25 mg, Oral, Q4HRS PRN, Anil Langley, KALEIGH.A.-C.  •  promethazine (PHENERGAN) suppository 12.5-25 mg, 12.5-25 mg, Rectal, Q4HRS PRN, Anil Langley, P.A.-C.  •  prochlorperazine (COMPAZINE) injection 5-10 mg, 5-10 mg, Intravenous, Q4HRS PRN, Anil Langley, KALEIGH.A.-C.  •  vitamin D (cholecalciferol) tablet 5,000 Units, 5,000 Units, Oral, DAILY, KALEIGH Mejia.A.-C., Stopped at 04/28/21 0845  •  levETIRAcetam (Keppra) 1000 mg in 100 mL NaCl IV premix, 1,000 mg, Intravenous, Q12HRS, ERLINDA Mejia.-C., Stopped at 04/28/21 0907  •  norepinephrine (Levophed) 8 mg in 250 mL NS infusion (premix), 0-30 mcg/min, Intravenous, Continuous, Gerard Alexander M.D., Last Rate: 9.4 mL/hr at 04/28/21 0847, 5 mcg/min at 04/28/21 0847  •  fentaNYL (SUBLIMAZE) 50 mcg/mL in 50mL (Continuous Infusion), , Intravenous, Continuous, Gerard Alexander M.D., Last Rate: 1.5 mL/hr at 04/28/21 1015, 75 mcg/hr at 04/28/21 1015  •  propofol (DIPRIVAN) injection, 0-80 mcg/kg/min, Intravenous, Continuous, Last Rate: 2.2 mL/hr at 04/28/21 1025, 5  mcg/kg/min at 04/28/21 1025 **AND** Triglycerides Starting now and then Every 3 Days, , , Every 3 Days (0300), Alexi Dumont M.D.  •  ipratropium-albuterol (DUONEB) nebulizer solution, 3 mL, Nebulization, Q4H PRN (RT), Alexi Dumont M.D.  •  labetalol (NORMODYNE/TRANDATE) injection 10 mg, 10 mg, Intravenous, Q4HRS PRN, Alexi Dumont M.D.  •  MIDAZOLAM HCL 2 MG/2ML INJ SOLN (WRAPPED), , , ,   •  cefTRIAXone (ROCEPHIN) 2 g in  mL IVPB, 2 g, Intravenous, BID, Molly Mcmahan M.D.  •  rocuronium (ZEMURON) injection 50 mg, 50 mg, Intravenous, Once, Alexi Dumont M.D.  •  oxyCODONE immediate release (ROXICODONE) tablet 10 mg, 10 mg, Oral, Q4HRS PRN, Carl Jane M.D., 10 mg at 04/27/21 0753  •  insulin glargine (Lantus) injection, 15 Units, Subcutaneous, Q EVENING, Miriam Cano M.D., 15 Units at 04/27/21 1738  •  losartan (COZAAR) tablet 50 mg, 50 mg, Oral, DAILY, Miriam Cano M.D., Stopped at 04/28/21 0600  •  carvedilol (COREG) tablet 6.25 mg, 6.25 mg, Oral, BID WITH MEALS, Miriam Cano M.D., Stopped at 04/27/21 1730  •  phosphorus (K-Phos-Neutral) per tablet 500 mg, 500 mg, Oral, Q6HRS, Miriam Cano M.D., Stopped at 04/27/21 1915  •  HYDROmorphone (Dilaudid) injection 0.5 mg, 0.5 mg, Intravenous, Q3HRS PRN, Carl Jane M.D.  •  MIDAZOLAM HCL 2 MG/2ML INJ SOLN (WRAPPED), , , ,   •  acetaminophen (TYLENOL) tablet 1,000 mg, 1,000 mg, Oral, Q6HRS, Carl Jane M.D., Stopped at 04/27/21 1800  •  insulin regular (HumuLIN R,NovoLIN R) injection, 2-9 Units, Subcutaneous, 4X/DAY ACHS, 2 Units at 04/28/21 1002 **AND** POC blood glucose manual result, , , Q AC AND BEDTIME(S) **AND** NOTIFY MD and PharmD, , , Once **AND** glucose 4 g chewable tablet 16 g, 16 g, Oral, Q15 MIN PRN **AND** dextrose 50% (D50W) injection 50 mL, 50 mL, Intravenous, Q15 MIN PRN, Eros Paredes M.D.  •  atorvastatin (LIPITOR) tablet 80 mg, 80 mg, Oral, Nightly, Miriam Cano M.D., Stopped at 04/27/21 2100  •   lidocaine (LIDODERM) 5 % 3 Patch, 3 Patch, Transdermal, Q24HR, Miriam Cano M.D., 3 Patch at 04/27/21 1746    Physical Examination:     Vitals:    04/28/21 1000 04/28/21 1015 04/28/21 1030 04/28/21 1045   BP: 147/67 139/63 145/66 140/60   Pulse: 77 75 83 82   Resp: (!) 32 (!) 25 (!) 33 (!) 31   Temp:       TempSrc:       SpO2: 94% 90% 98% 97%   Weight:       Height:         *NOTE: The patient received sedation and paralytic for ETT replacement just prior to exam.    General: Patient is sedated and paralyzed  Eyes: examination of optic disks not indicated at this time given acuity of consult  CV: RRR    NEUROLOGICAL EXAM:     Mental status: Unable to determine due to recent administration of sedation and paralytics for airway management. Does not open eyes to noxious stimulus, does not follow commands  Speech and language: intubated  Cranial nerve exam: Pupils are equal, round and reactive to light bilaterally. Visual fields: does not blink to threat bilaterally. Gaze in neutral position. Corneal reflexes intact bilaterally.  Extraocular muscles are intact to oculocephalic maneuver ie VOR intact. Face is symmetric. Unable to assess sensation in the face due to mental status. Cough and gag reflexes are intact.  Motor exam: Does not participate in formal strength testing. Tone is normal. No abnormal movements were seen on exam.  Sensory exam: no response to noxious stimuli x4 extremities  Deep tendon reflexes:  1+ throughout. Toes mute bilaterally  Coordination: not participatory due to mental status  Gait: deferred due to ICU status    Objective Data:    Labs:  Lab Results   Component Value Date/Time    PROTHROMBTM 13.2 04/25/2021 12:04 PM    INR 0.97 04/25/2021 12:04 PM      Lab Results   Component Value Date/Time    WBC 15.2 (H) 04/28/2021 03:10 AM    RBC 4.56 (L) 04/28/2021 03:10 AM    HEMOGLOBIN 13.9 (L) 04/28/2021 03:10 AM    HEMATOCRIT 40.7 (L) 04/28/2021 03:10 AM    MCV 89.3 04/28/2021 03:10 AM    MCH 30.5  04/28/2021 03:10 AM    MCHC 34.2 04/28/2021 03:10 AM    MPV 12.5 04/28/2021 03:10 AM    NEUTSPOLYS 82.00 (H) 04/26/2021 01:56 AM    LYMPHOCYTES 7.40 (L) 04/26/2021 01:56 AM    MONOCYTES 8.00 04/26/2021 01:56 AM    EOSINOPHILS 0.30 04/26/2021 01:56 AM    BASOPHILS 0.40 04/26/2021 01:56 AM      Lab Results   Component Value Date/Time    SODIUM 125 (L) 04/28/2021 03:10 AM    POTASSIUM 4.0 04/28/2021 03:10 AM    CHLORIDE 92 (L) 04/28/2021 03:10 AM    CO2 19 (L) 04/28/2021 03:10 AM    GLUCOSE 173 (H) 04/28/2021 03:10 AM    BUN 20 04/28/2021 03:10 AM    CREATININE 0.49 (L) 04/28/2021 03:10 AM      Lab Results   Component Value Date/Time    CHOLSTRLTOT 140 04/26/2021 01:56 AM    LDL 69 04/26/2021 01:56 AM    HDL 11 (A) 04/26/2021 01:56 AM    TRIGLYCERIDE 299 (H) 04/26/2021 01:56 AM       Lab Results   Component Value Date/Time    ALKPHOSPHAT 109 (H) 04/28/2021 03:10 AM    ASTSGOT 18 04/28/2021 03:10 AM    ALTSGPT 22 04/28/2021 03:10 AM    TBILIRUBIN 0.8 04/28/2021 03:10 AM        Imaging/Testing:    I interpreted and/or reviewed the patient's neuroimaging    DX-SPINE-ANY ONE VIEW   Final Result      Digitized intraoperative radiograph is submitted for review.  This examination is not for diagnostic purpose but for guidance during a surgical procedure.      DX-PORTABLE FLUOROSCOPY < 1 HOUR   Final Result      Portable fluoroscopy utilized for 2 seconds.         INTERPRETING LOCATION: [address] [place] [address], [id number]      CT-HEAD W/O   Final Result         1.  Low-density in the region of the right cerebellar hemisphere, appearance compatible with evolving infarct.      These findings were discussed with the patient's on-call clinician, Deniz, on 4/28/2021 6:14 AM.      MR-THORACIC SPINE-WITH & W/O   Final Result      1.  Posterior epidural fluid collection/abscess within the lower cervical spine extending inferiorly to about the T6 level which could represent epidural abscess and/or hematoma. This measures 8 mm in maximal  thickness at T2-T3 with at least moderate    thecal sac stenosis.   2.  Abnormal signal within the cervical cord and upper thoracic cord suggesting cord edema or infectious/inflammatory etiology.   3.  Lower cervical spine findings are detailed on earlier report.   4.  Prominent enhancement along the surface of the mid and lower thoracic cord is of uncertain etiology and as detailed above, possibly representing leptomeningeal disease/infection. See details above.   These findings were discussed with Dr. Cano on 4/28/2021 10:01 AM.         MR-LUMBAR SPINE-WITH & W/O   Final Result      No evidence of lumbar spine infection or epidural abscess.      Mild spondylosis as detailed above without spinal stenosis.      DX-CHEST-PORTABLE (1 VIEW)   Final Result      1.  Hypoinflation with left basilar atelectasis.   2.  Mild perihilar interstitial prominence may be related to hypoinflation or edema.      MR-CERVICAL SPINE-WITH & W/O   Final Result      1.  There is multiseptated abnormal peripherally enhancing epidural collection noted extending from C2 to the visualized lower thoracic level. Largest collection is noted in the upper thoracic posterior epidural space at the levels of T2 and T3. The    lower extent of the collection is not imaged. This is causing multilevel effacement of the thecal sac and cord compression. The thoracic spinal cord is anteriorly displaced and compressed at the levels of T2 and T3. Pre and postcontrast MR examination of    the thoracic spine is recommended for further evaluation.   2.  There is effacement of the cervical thecal sac due to the multiseptated epidural fluid collection.   3.  The cervical spinal cord is mildly enlarged with minimal increased T2 signal intensity. The possibility of cord inflammation cannot be entirely excluded.   4.  Abnormal T2 hyperintensity at C5-6 disc space likely representing discitis.   5.  Abnormal bone marrow edema of C5, C6 and C7 vertebral bodies with  edema concerning for osteomyelitis.   6.  There is also focal enhancement of C6 vertebral body likely secondary to the osteomyelitis.   7.  Prevertebral edema/fluid collection.   8.  Nonvisualization of flow void of bilateral vertebral arteries concerning for age-indeterminate bilateral vertebral occlusions.         CT-CHEST (THORAX) WITH   Final Result      No displaced rib fracture is identified.      No acute cardiopulmonary process is seen.      Atherosclerotic plaque including coronary artery calcification.      CT-TSPINE W/O PLUS RECONS   Final Result      No CT evidence of acute traumatic abnormality.      Mild T6/7 anterior wedging compatible with healed mild chronic compression fracture and there is interbody fusion.      CT-LSPINE W/O PLUS RECONS   Final Result      No CT evidence of acute traumatic injury.      CT-CSPINE WITHOUT PLUS RECONS   Final Result      Multilevel degenerative changes.      Prevertebral edema. Further evaluation with MRI is recommended as this can be seen in the setting of ligamentous injury.      Bilateral carotid atherosclerotic plaque.         MR-BRAIN-W/O    (Results Pending)   MR-MRA HEAD-WITH    (Results Pending)   MR-MRA NECK-WITH    (Results Pending)   EC-ECHOCARDIOGRAM COMPLETE W/O CONT    (Results Pending)   DX-CHEST-2 VIEWS    (Results Pending)       Assessment and Plan:    Perry Davis is a 60 y.o. man, history per chart review of anginal syndrome, MI with CABG x4 in 2015, DM, HLD, HTN, CKD, seizure, sleep apnea, tobacco use who presented to to [facility] ED by private vehicle on 4/25/21 with complaint of acute on chronic neck pain s/p slip and fall in shower. Additionally, the patient complained of back pain for 1 week for which he sought care at urgent care on two occassions and was treated with Toradol injection, as well as Tylenol with codeine, which subsequently made him feel dizzy and lose his balance. He has had leukocytosis ongoing since arrival, with blood  cultures drawn 4/25 growing Streptococcus species. An MRI C-spine on 4/26 showed epidural collection noted from C2-T3 with largest collection noted in upper thoracic posterior epidural space at T2-T3 causing multilevel cord compression. Also with likely discitis at C5-6 and osteomyelitis at C5-C7. He went for emergent surgery: C3, C4, C5, C6. C7. T1, T2 laminectomy; decompression of thecal sac and nerve roots. 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 A non hemorrhagic right cerebellar stroke was diagnosed intraoperatively on CT. He was admitted to ICU, and is being treated with ceftriaxone currently. Unfortunately, neuro exam was not able to be obtained today due to recent administration of sedation and paralytics for airway management. MRI /MRA brain and MRA neck are pending. Secondary stroke prevention with aspirin may be initiated when cleared by neurosurgery. He has been started on high intensity statin, atorvastatin 80 mg q HS, LDL is 69, at goal of less than 70. Hemoglobin A1C is elevated at 12.2. Goal is less than 7. In regards to etiology of his stroke, there is concern for embolic stroke secondary to drug use given spinal epidural abscess and bacteremia. A CHRISTOPHER has been ordered to further evaluate. UDS remains pending. He remains admitted to ICU with guarded prognosis.    Plan:    -Admit ICU, q 2 hour neuro check, VS per protocol  -Collect urine for UDS  - BP goal is normotension  - obtain normoglycemia and avoid hypo- or hyper -natremia; aim for normothermia  - secondary stroke prevention therapy with ASA 325mg qd once cleared by neurosurgery  - high intensity statin per SPARCL Trial  -  MRI/MRA brain pending  - CHRISTOPHER, ordered  - evaluate and treat with PT/OT/ST when appropriate.    The evaluation of the patient, and recommended management, was discussed with attending neurologist, Dr. Zac Montero, Intensivist, Dr. Alexi Dumont and the ICU team.    Corinne Canavero, APRN  Acute  Care Neurohospitalist Service

## 2021-04-28 NOTE — PROGRESS NOTES
[facility] Progress Note      Admit Date: 4/25/2021    Resident(s): Gerard Alexander M.D.   Attending:  BREANNA ALCARAZ/ Dr. Dumont     Patient ID:    Name:  Perry Davis   YOB: 1960  Age:  60 y.o.  male   MRN:  [id number]    Hospital Course:  Perry Davis is a 60 y.o. male with a previous history of uncontrolled Diabetes -Hb1C 13 , HLD, CAD s/p CABGx4  2015, tobacco use ,OA, chronic low pain admitted  4/25 with worsening neck , upper back pain after a GLF , mild DKA. Found to have epidural abscess w/ cord compression, discitis involving cervical and thoracic spine, underwent emergent laminectomy and decompression by Dr. Hazel 4/28. Blood cultures from 4/25 2/2 positive strep anginosus on Ceftriaxone, followed by ID. Acute mental state change noted 4/27 , CT head with right cerebellar CVA, neurology consulting. Intubated for airway protection and transferred to ICU today post surgical intervention.     Consultants:  Critical Care  Neurosurgery   Neurology  Infectious Disease    Interval Events:    Neuro- obtunded and on ventilator support.  Prior to sedation was noted to have purposeful movements of all his extremities.     Temp - 96-99 F  HR-  70-90's   RR- 20-30's   BP- 's/ 50-60   Levophed for BP support.   Full Vent support.   I's and O's once suprapubic catheter placement by IR  Goal BP < 160 per NSG   MRI, MRA brain,  Neck ordered - pending   Left lung atelectasis on CXR , underwent Bronchoscopy with copious secretions removed L > R lung.       Vitals Range last 24h:  Temp:  [35.7 °C (96.3 °F)-37.6 °C (99.6 °F)] 37.3 °C (99.2 °F)  Pulse:  [75-97] 84  Resp:  [15-55] 28  BP: ()/(38-93) 125/53  SpO2:  [87 %-100 %] 90 %      Intake/Output Summary (Last 24 hours) at 4/28/2021 1444  Last data filed at 4/28/2021 1400  Gross per 24 hour   Intake 7747.35 ml   Output 155 ml   Net 7592.35 ml        Review of Systems   Unable to perform ROS: Intubated       PHYSICAL EXAM:  Vitals:     04/28/21 1315 04/28/21 1330 04/28/21 1345 04/28/21 1408   BP:       Pulse: 85 86 90 84   Resp: (!) 25 (!) 30 (!) 24 (!) 28   Temp:       TempSrc:       SpO2: 94% 95% 97% 90%   Weight:       Height:        Body mass index is 23.53 kg/m².    O2 therapy: Pulse Oximetry: 90 %, O2 (LPM): 100, O2 Delivery Device: Ventilator    Date 04/28/21 0700 - 04/29/21 0659   Shift 4321-8598 0811-4561 7041-8859 24 Hour Total   INTAKE   I.V. 6609   6609     Magnesium Sulfate Volume 100   100     Precedex Volume 24.6   24.6     Phenylephrine Volume 2.1   2.1     Propofol Volume 104.9   104.9     Norepinephrine Volume 89.2   89.2     Volume (mL) (NS infusion) 0   0     Volume (mL) (NS infusion) 6263.3   6263.3     Volume (mL) (lactated ringers infusion) 25   25   IV Piggyback 400   400     Volume (mL) (ceFAZolin in dextrose (ANCEF) IVPB premix 2 g) 0   0     Volume (mL) (levETIRAcetam (Keppra) 1000 mg in 100 mL NaCl IV premix) 100   100     Volume (mL) (cefTRIAXone (ROCEPHIN) 2 g in  mL IVPB) 100   100     Volume (mL) (cefTRIAXone (ROCEPHIN) 2 g in  mL IVPB) 200   200   Enteral 30   30     Free Water / Tube Flush 30   30   Shift Total 7039   7039   OUTPUT   Drains 30   30     Output (mL) (Closed/Suction Drain 1 Midline;Superior Back Ld Knowles 7 Fr.) 30   30   Blood 125   125     Est. Blood Loss 125   125   Shift Total 155   155   NET 6884   6884        Physical Exam   Constitutional: He is well-developed, well-nourished, and in no distress.   HENT:   Head: Normocephalic and atraumatic.   Nose: Nose normal.   Eyes: Pupils are equal, round, and reactive to light. Conjunctivae are normal.   Cardiovascular: Normal rate, regular rhythm and normal heart sounds.   Pulmonary/Chest: Effort normal.   Faint crackles bilaterally    Abdominal: Soft. Bowel sounds are normal. He exhibits no distension.   Musculoskeletal:         General: No edema.      Cervical back: Normal range of motion and neck supple.   Neurological:   Sedated and  intubated    Skin: Skin is warm and dry.       Recent Labs     04/28/21  0832 04/28/21  1046   ISTATAPH 7.291* 7.314*   ISTATAPCO2 51.3* 36.1   ISTATAPO2 178* 78   ISTATATCO2 26 19*   [id number] 99 94   ISTATARTHCO3 24.7 18.3   ISTATARTBE -3 -7*   ISTATTEMP see below see below   ISTATFIO2 100 100   ISTATSPEC Arterial Arterial     Recent Labs     04/27/21  0248 04/27/21  1152 04/28/21  0310   SODIUM 122* 123* 125*   POTASSIUM 3.6 3.6 4.0   CHLORIDE 89* 91* 92*   CO2 20 21 19*   BUN 16 16 20   CREATININE 0.41* 0.44* 0.49*   MAGNESIUM 1.7 1.8 2.3   PHOSPHORUS 2.2* 2.2* 3.3   CALCIUM 7.8* 7.8* 7.7*     Recent Labs     04/26/21  0156 04/26/21  1426 04/27/21  0248 04/27/21  1152 04/28/21 0310   ALTSGPT 36  --  32  --  22   ASTSGOT 27 -- 21  --  18   ALKPHOSPHAT 124*  --  140*  --  109*   TBILIRUBIN 0.9  --  1.2  --  0.8   GAMMAGT 186*  --   --   --   --    GLUCOSE 174*   < > 172* 164* 173*    < > = values in this interval not displayed.     Recent Labs     04/26/21 0156 04/27/21  0256 04/28/21 0310   RBC 5.49 5.07 4.56*   HEMOGLOBIN 16.5 15.4 13.9*   HEMATOCRIT 47.6 44.0 40.7*   PLATELETCT 47* 77* 105*     Recent Labs     04/26/21 0156 04/27/21 0248 04/27/21  0256 04/28/21  0310   WBC 15.5*  --  16.4* 15.2*   NEUTSPOLYS 82.00*  --   --   --    LYMPHOCYTES 7.40*  --   --   --    MONOCYTES 8.00  --   --   --    EOSINOPHILS 0.30  --   --   --    BASOPHILS 0.40  --   --   --    ASTSGOT 27 21  --  18   ALTSGPT 36 32  --  22   ALKPHOSPHAT 124* 140*  --  109*   TBILIRUBIN 0.9 1.2  --  0.8       Meds:  • Pharmacy Consult Request  1 Each     • MD ALERT...DO NOT ADMINISTER NSAIDS or ASPIRIN unless ORDERED By Neurosurgery  1 Each     • senna-docusate  1 tablet     • polyethylene glycol/lytes  1 Packet     • magnesium hydroxide  30 mL     • bisacodyl  10 mg     • fleet  1 Each     • Respiratory Therapy Consult       • NS   100 mL/hr at 04/28/21 0854   • ceFAZolin  2 g      And   • MD Alert...Vancomycin per Pharmacy       •  ondansetron  4 mg     • ondansetron  4 mg     • promethazine  12.5-25 mg     • promethazine  12.5-25 mg     • prochlorperazine  5-10 mg     • levETIRAcetam (Keppra) IV  1,000 mg Stopped (04/28/21 0907)   • norepinephrine (Levophed) infusion  0-30 mcg/min 10 mcg/min (04/28/21 0900)   • fentaNYL   100 mcg/hr (04/28/21 1100)   • propofol  0-80 mcg/kg/min 70 mcg/kg/min (04/28/21 1349)   • ipratropium-albuterol  3 mL     • labetalol  10 mg     • midazolam       • cefTRIAXone (ROCEPHIN) IV  2 g Stopped (04/28/21 1338)   • Pharmacy  1 Each     • acetaminophen  1,000 mg     • ARIPiprazole  15 mg     • atorvastatin  80 mg     • carBAMazepine  200 mg     • [START ON 4/29/2021] cyanocobalamin  1,000 mcg     • senna-docusate  1 tablet     • [START ON 4/29/2021] vitamin D  5,000 Units     • docusate sodium  100 mg     • famotidine  20 mg      Or   • famotidine  20 mg     • oxyCODONE immediate-release  10 mg     • insulin glargine  15 Units     • phosphorus  500 mg     • HYDROmorphone  0.5 mg     • insulin regular  2-9 Units      And   • dextrose 50%  50 mL     • lidocaine  3 Patch          Procedures:  Laminectomy, decompression by neurosurgery 4/28/2021    Imaging:  DX-ABDOMEN FOR TUBE PLACEMENT   Final Result         Feeding tube with tip projecting over the expected area of the distal stomach.      DX-CHEST-PORTABLE (1 VIEW)   Final Result      1.  Worsening consolidation of LEFT hemithorax with shift of mediastinal structures to the LEFT suggesting atelectasis, possibly due to endobronchial process.   2.  Supportive tubing as described above.   3.  No pneumothorax.      DX-CHEST-PORTABLE (1 VIEW)   Final Result         Endotracheal tube with tip projecting over the mid thoracic trachea.      Layering left pleural effusion with left lower lung opacity.      DX-SPINE-ANY ONE VIEW   Final Result      Digitized intraoperative radiograph is submitted for review.  This examination is not for diagnostic purpose but for guidance  during a surgical procedure.      DX-PORTABLE FLUOROSCOPY < 1 HOUR   Final Result      Portable fluoroscopy utilized for 2 seconds.         INTERPRETING LOCATION: [address] [facility], [place], [address]      CT-HEAD W/O   Final Result         1.  Low-density in the region of the right cerebellar hemisphere, appearance compatible with evolving infarct.      These findings were discussed with the patient's on-call clinician, Deniz, on 4/28/2021 6:14 AM.      MR-THORACIC SPINE-WITH & W/O   Final Result      1.  Posterior epidural fluid collection/abscess within the lower cervical spine extending inferiorly to about the T6 level which could represent epidural abscess and/or hematoma. This measures 8 mm in maximal thickness at T2-T3 with at least moderate    thecal sac stenosis.   2.  Abnormal signal within the cervical cord and upper thoracic cord suggesting cord edema or infectious/inflammatory etiology.   3.  Lower cervical spine findings are detailed on earlier report.   4.  Prominent enhancement along the surface of the mid and lower thoracic cord is of uncertain etiology and as detailed above, possibly representing leptomeningeal disease/infection. See details above.   These findings were discussed with Dr. Cano on 4/28/2021 10:01 AM.         MR-LUMBAR SPINE-WITH & W/O   Final Result      No evidence of lumbar spine infection or epidural abscess.      Mild spondylosis as detailed above without spinal stenosis.      DX-CHEST-PORTABLE (1 VIEW)   Final Result      1.  Hypoinflation with left basilar atelectasis.   2.  Mild perihilar interstitial prominence may be related to hypoinflation or edema.      MR-CERVICAL SPINE-WITH & W/O   Final Result      1.  There is multiseptated abnormal peripherally enhancing epidural collection noted extending from C2 to the visualized lower thoracic level. Largest collection is noted in the upper thoracic posterior epidural space at the levels of T2 and T3. The    lower extent of the  collection is not imaged. This is causing multilevel effacement of the thecal sac and cord compression. The thoracic spinal cord is anteriorly displaced and compressed at the levels of T2 and T3. Pre and postcontrast MR examination of    the thoracic spine is recommended for further evaluation.   2.  There is effacement of the cervical thecal sac due to the multiseptated epidural fluid collection.   3.  The cervical spinal cord is mildly enlarged with minimal increased T2 signal intensity. The possibility of cord inflammation cannot be entirely excluded.   4.  Abnormal T2 hyperintensity at C5-6 disc space likely representing discitis.   5.  Abnormal bone marrow edema of C5, C6 and C7 vertebral bodies with edema concerning for osteomyelitis.   6.  There is also focal enhancement of C6 vertebral body likely secondary to the osteomyelitis.   7.  Prevertebral edema/fluid collection.   8.  Nonvisualization of flow void of bilateral vertebral arteries concerning for age-indeterminate bilateral vertebral occlusions.         CT-CHEST (THORAX) WITH   Final Result      No displaced rib fracture is identified.      No acute cardiopulmonary process is seen.      Atherosclerotic plaque including coronary artery calcification.      CT-TSPINE W/O PLUS RECONS   Final Result      No CT evidence of acute traumatic abnormality.      Mild T6/7 anterior wedging compatible with healed mild chronic compression fracture and there is interbody fusion.      CT-LSPINE W/O PLUS RECONS   Final Result      No CT evidence of acute traumatic injury.      CT-CSPINE WITHOUT PLUS RECONS   Final Result      Multilevel degenerative changes.      Prevertebral edema. Further evaluation with MRI is recommended as this can be seen in the setting of ligamentous injury.      Bilateral carotid atherosclerotic plaque.         MR-BRAIN-W/O    (Results Pending)   MR-MRA HEAD-WITH    (Results Pending)   MR-MRA NECK-WITH    (Results Pending)   EC-ECHOCARDIOGRAM  COMPLETE W/O CONT    (Results Pending)   IR-DRAIN-BLADDER SUPRAPUB W/CATH    (Results Pending)       ASSESSEMENT and PLAN:    * Spinal epidural abscess- (present on admission)  Assessment & Plan  CT C-spine 4/25 with prevertebral edema.    MRI C/T-spine with abnormal peripherally enhancing epidural collection extending from C2 to visualize lower thoracic 11.  Largest collection in the upper thoracic posterior epidural space at the level of T2-T3.  Lower extent of the collection is causing multilevel effacement of the thecal sac and cord compression.  Thoracic spinal cord anteriorly displaced and compressed at levels T2 and T3.  T2 hyperintensity at C5-C6 tests space likely representing discitis.  Focal enhancement of C6 vertebral body likely secondary to osteomyelitis.  Prevertebral edema/fluid collection.  Concern of age-indeterminate bilateral vertebral occlusions.  Underwent emergent laminectomy of C3-C4 C5-C6, C6/C7, T1-T2 laminectomy, decompression of thecal sac and nerves by Dr. Hazel 4/28/2021.   Neurosurgery following, recommending goal BP <160  Sedation, pain management as appropriate        History of ischemic stroke  Assessment & Plan  Found to have acute change in mental status 4/27/2021.   CT head w/o concerning for evolving right cerebellar infarct.   Intubated prior to neurosurgical intervention.  High intensity statin, Dr. Hazel recommending holding aspirin for now due to risk of hemorrhagic conversion, thrombocytopenia.  Pending MR, MRA brain, MRA neck formal report.  Follow TTE with bubble study  Follow UDS  Goal euthermia, normotension, eunatremia  PT, OT, SLP evaluation as able  Neurology consult obtained, appreciate recommendations        Sepsis due to Streptococcus species (HCC)- (present on admission)  Assessment & Plan  This is Sepsis Present on admission  SIRS criteria identified on my evaluation include: Tachycardia, with heart rate greater than 90 BPM, Tachypnea, with respirations greater  than 20 per minute and Leukocytosis, with WBC greater than 12,000  Source is possible retropharyngeal/pharyngeal  Suspected onset of infection (date and time) Unknown  Sepsis protocol initiated  Fluid resuscitation ordered per protocol  IV antibiotics as appropriate for source of sepsis  While organ dysfunction may be noted elsewhere in this problem list or in the chart, degree of organ dysfunction does not meet CMS criteria for severe sepsis    MRI C-T-spine with evidence of epidural abscess, discitis, vertebral osteomyelitis.  Underwent emergent laminectomy, decompression 4/28.  Cultures sent.     4/25 evening: Started on vancomycin  4/26 AM: d/c'ed vanc, started IV ceftriaxone  -IVF resuscitation  -Repeat blood cultures 4/27: Follow-up  -Holding home ASA 81 in case patient will need biopsy  -Infectious disease consulting-appreciate recommendations    Thrombocytopenia (HCC)- (present on admission)  Assessment & Plan  IMPROVING  Presented with a platelet count of 51.  This is unknown for reasons why, the patient does not drink alcohol per history which is corroborated by mother.  There is no known history of any blood clot/clotting disorder in the family.    We will not start any chemical VTE prophylaxis for now, will use SCDs  Plt 47 on 4/26. This is likely due to sepsis, though lower on the differential includes previously undiagnosed infection.  Hep panel negative.    -Follow up on HIV lab  -Continue C3, narrow antibiotic pending sensitivity results    Acute bilateral back pain- (present on admission)  Assessment & Plan  Presented with worsening neck and upper back pain after ground-level fall.   CT C-spine concerning for prevertebral edema, ligamental damage.  MRI recommended    MRI C/T-spine with extensive epidural abscess, discitis, osteomyelitis s/p laminectomy and decompression  See plan for spinal epidural abscess  As needed pain medications  PT/OT as appropriate      Type 2 diabetes mellitus without  complication, without long-term current use of insulin (HCC)- (present on admission)  Assessment & Plan  Presented with elevated blood glucose and mild DKA.  Apparently issues with noncompliance.   HbA1c done on admission 13  Continue insulin sliding scale, Lantus  Accu-Cheks, hypoglycemia protocols  Diabetic diet months allowed p.o.  Diabetes education    Essential hypertension, benign- (present on admission)  Assessment & Plan  Noncompliance issues  In the hospital, presented with elevated blood pressure with systolic blood pressure up to the 180s.  This is likely with a component of acute pain.  Hold blood pressure medications since on Levophed.  Resume as appropriate    Coronary artery disease due to calcified coronary lesion: CABG x4, July 2015- (present on admission)  Assessment & Plan  History of    -Continue home atorvastatin  -Hold antihypertensives   -Hold aspirin until allowed by neurosurgery      Status post urethrostomy (HCC)- (present on admission)  Assessment & Plan  History of perineal urethrostomy 2018  Acute urinary retention post surgical intervention 4/28  Unable to place Gomez catheter through urethrostomy.    Interventional radiology consulting for suprapubic catheter placement.  Appreciate assistance    Hyponatremia- (present on admission)  Assessment & Plan  In 123-125 since admission , likely chronic   Likely SIADH.  Serum Osm 264, urine osm 745, urine Na 70  Follow daily Na  Will consider fluid restriction as appropriate if worsens    Hypomagnesemia- (present on admission)  Assessment & Plan  Replete with goal of Mg of 2    Hypokalemia- (present on admission)  Assessment & Plan  Replete as needed with goal of K of 4    Difficulty swallowing- (present on admission)  Assessment & Plan  Presented with difficulty swallowing, is coughing and vomiting when swallowing large/hard foods    -SLP evaluation post extubation  -Aspiration and seizure precautions  -Soft GI diet    Tobacco abuse- (present  on admission)  Assessment & Plan  Encouraged tobacco cessation    High anion gap metabolic acidosis- (present on admission)  Assessment & Plan  Resolved  Also see problem of diabetic ketoacidosis.    Marijuana abuse- (present on admission)  Assessment & Plan  Chronic issue  Encourage marijuana cessation    Diabetic ketoacidosis (HCC)- (present on admission)  Assessment & Plan  IMPROVING  Presented with elevated glucose, anion gap, beta hydroxybutyrate in the setting of uncontrolled type 2 diabetes and medication noncompliance    -Insulin sliding scale, hypoglycemia protocol, Accu-Cheks  -Diabetic education  -Diabetes education        Dyslipidemia- (present on admission)  Assessment & Plan  Continue home atorvastatin 80      DISPO: ICU    CODE STATUS: Full code    Quality Measures:  Feeding: Tube feeds  Analgesia: Fentanyl  Sedation: Precedex, propofol  Thromboprophylaxis: SCDs  Head of bed: >30 degrees  Ulcer prophylaxis: Pepcid  Glycemic control: Insulin sliding scale, Lantus  Bowel care: bowel regimen  Indwelling lines: PIV, art line, right IJ CVC  Deescalation of antibiotics: On ceftriaxone      Gerard Alexander M.D.

## 2021-04-28 NOTE — DIETARY
Nutrition Services: Nutrition Support Assessment  Day 3 of admit.  Perry Davis is a 60 y.o. male with admitting DX of DKA, type 2, not at goal, Cerebellar cerebrovascular accident (CVA) without late effect     Current problem list:  1. Sepsis due to Streptococcus species  2. Diabetic ketoacidosis  3. Acute bilateral back pain  4. High anion gap metabolic acidosis  5. Coronary artery disease  6. Dyslipidemia  7. Essential HTN, benign  8. Type 2 DM  9. Thrombocytopenia  10. Difficultly swallowing  11. Marijuana abuse  12. Tobacco abuse     Assessment:  Estimated Nutritional Needs: based on: Ht: 175.3 cm, Wt : 72.3 kg via bed scale, IBW: 72.6 kg, BMI: 23.53 - Normal     Calculation/Equation: REE per MSJ x 1.2 = 1842 kcal/day; RMR per [facility] (vent L/min 11.2, T max 24 hours 37.1) = 1803 kcal/day  Total Calories / day: 1750 - 1950  (Calories / k - 27)  Total Grams Protein / day: 87 - 108  (Grams Protein / k.2 - 1.5)  Total Fluids ml / day: 2172.8 ml    Evaluation:   1. Consult received for TF assessment. Pt is intubated and in need of nutrition support.   2. Enteral access: Gastric Cortrak   3. Labs: Na 125, Glucose 173, Creatinine 0.49, Glycohemoglobin 12.2, POC glucose 196, 175, 167  4. Meds: abilify, tegretol, ancef, vancomycin, rocephin, vitamin B12, colace, lantus, humulin R, keppra, cozaar, k-phos-neutral, pericolace, vitamin D, fentanyl, levophed @ 10 mcg/min  5. Propofol running @ 30.4 mL/hr which provides 803 kcal/day; RD will adjust TF as needed.   6. Fluids: NS infusion @ 100 mL/hr  7. Last BM:   8. Peptamen Intense VHP appropriate to meet pt's estimated protein needs while on propofol.      Malnutrition Risk: No criteria noted at this time.      Recommendations/Plan:  Start Peptamen Intense VHP @ 25 ml/hr and advance per protocol to 45 ml/hr (goal rate with propofol) to provide 1080 kcal + kcal from propofol (26 kcal/kg), 99 grams protein (1.4 gm/kg), and 907 ml free water per day.    When propofol d/c'd change TF to Impact Peptide 1.5 @ 50 ml/hr to provide 1800 kcal (25 kcal/kg), 113 grams protein (1.6 gm/kg), and 924 ml free water per day.  Fluids per MD.        RD following

## 2021-04-28 NOTE — PROCEDURES
Procedures    Date: 04/28/21   Time: 4:55 PM     Procedure: Bronchoscopy with bronchoalveolar lavage and therapeutic aspiration of secretions    Indication: hypoxia   Consent: Informed consent obtained from patient or designated decision maker after explaining the benefits/risks of the procedure including but not limited to bleeding, infection, airway trauma or loss therof, pneumothorax/hemothorax, arrythmia, or death. Patient or surrogate expressed understanding and agreement and signed consent which can be found in the patient's chart.    Procedure: After obtaining consent, a time-out was performed. Respiratory therapy and nursing at bedside throughout procedure. Patient provided sedation and analgesia throughout the procedure. Placed on full ventilator support with an FiO2 of 100% throughout the procedure. Using a fiberoptic bronchoscope, trachea entered via ETT achieving appropriate comfort level for patient. Airways visualized directly and the following intervention was performed: diagnostic and therapeutic lavage with all areas of lungs suctioned to clear. Findings as below. Patient tolerated procedure well without any difficulties and left in care of bedside nurse/RT.     Medications: propofol  Findings: Upper airway - Not visualized as bronchoscope passed through ETT.        Trachea to you - normal appearing mucosa without lesions or mass, ETT tip measured 2 cm from the you.        R proximal and distal airways - normal appearing mucosa without mass/lesion/anatomic variance; thick mucopurulent secretions         L proximal and distal airways - normal appearing mucosa without mass/lesion/anatomic variance; thick mucopurulent secretions           Complications: none

## 2021-04-28 NOTE — THERAPY
Speech Therapy Contact Note    Patient Name: Perry Davis  Age:  60 y.o., Sex:  male  Medical Record #: [id number]  Today's Date: 4/28/2021    Orders received for CSE. Per EMR, pt is intubated. Please place new orders when pt appropriate. Thank you.

## 2021-04-28 NOTE — THERAPY
Physical Therapy Contact Note     Post op consult for PT eval received, pt now in ICU, surgeon post op note available yet and per chart review a custom  order was placed which usually is needed for all OOB mobility; will monitor for appropriate timing of PT Re-eval when pt can participate;     Marilynn ODONNELL, PT,  [phone]

## 2021-04-28 NOTE — OR NURSING
In Preop pt become unresponsive. Surgeon and Anesthesia at bedside, Blood glucose 175, VS stable. Pt taken to CT for head scan. 0531 pt taken to surgery. Pt unable to sign consent, attempt to contact family on home phone and cell was unsuccessful.

## 2021-04-28 NOTE — ANESTHESIA PROCEDURE NOTES
Arterial Line  Performed by: Fidencio Swain M.D.  Authorized by: Fidencio Swain M.D.     Start Time:  4/28/2021 5:45 AM  End Time:  4/28/2021 5:56 AM  Localization: ultrasound guidance  Image captured, interpreted and electronically stored.  Patient Location:  OR  Indication: continuous blood pressure monitoring and blood sampling needed        Catheter Size:  20 G  Seldinger Technique?: Yes    Laterality:  Right  Site:  Radial artery

## 2021-04-28 NOTE — ANESTHESIA TIME REPORT
Anesthesia Start and Stop Event Times     Date Time Event    4/28/2021 0054 Ready for Procedure     0533 Anesthesia Start     0811 Anesthesia Stop        Responsible Staff  04/28/21    Name Role Begin End    Fidencio Swain M.D. Anesth 0533 [id number]    Arjun Reynolds M.D. Anesth 0648 0811        Preop Diagnosis (Free Text):  Pre-op Diagnosis     CERVICAL SPINAL EPIDURAL ABSCESS        Preop Diagnosis (Codes):    Post op Diagnosis  Epidural abscess      Premium Reason  A. 3PM - 7AM    Comments:

## 2021-04-28 NOTE — PROGRESS NOTES
Pt complains of SOB after having 2 small sips of water. Increased O2 to 3L via NC and brought RT to bedside for further assessment. Notified Dr. Cano of pt's condition and concern for aspiration. Order for stat bedside CXR placed. Per MD, okay to hold evening meds due to risk of aspiration. Will continue to monitor.

## 2021-04-28 NOTE — OR NURSING
0109: Pt arrived to PACU, accompanied by 2 ACLS RNs and anesthesia, Dr. Swain. Pt sedated on arrival. Monitors applied and report received from MD and RNs.   VSS on 6 L O2 via Face mask.     0115: Pt noted to be on slide board on MRI gurney. Slide board removed to prevent skin breakdown.     0120: Pt arouses to voice. Pt c/o neck pain however falls back to sleep when not stimulated and SBP <100. Will hold pain medication at this time and continue to monitor pt closely.     0130: Pt remains drowsy however is oriented x4. VSS on 3 LNC. Pt reports pain when awake but continues to fall back to sleep btwn care.     0143: Report called to SARAH Chapa and pt transported to [facility] via [facility], accompanied by 2 ACLS RNs.

## 2021-04-28 NOTE — PROGRESS NOTES
Patient back from PACU. Report Received from Iza SMITH. Pt on cardiac monitor. Monitor room notified.

## 2021-04-28 NOTE — PROGRESS NOTES
Pt too weak and in pain to write signature by himself for blood consent. SARAH Garcia and SARAH Chapa assisted patient with signing blood consent form.

## 2021-04-28 NOTE — RESPIRATORY CARE
Pt had large cuff leak and increase in oxygen and peep demand. Dr. Dumont notified upon tube exchange pt was partially extubated tube was advanced to 26 and to 28 after xray.

## 2021-04-28 NOTE — PROGRESS NOTES
MRI/MRA pending, patient reportedly thrashing all extremities, self extubated, getting that sorted out before going down for imaging, appreciate primary services work

## 2021-04-28 NOTE — ANESTHESIA TIME REPORT
Anesthesia Start and Stop Event Times     Date Time Event    4/28/2021 0001 Anesthesia Start     0119 Anesthesia Stop        Responsible Staff  04/28/21    Name Role Begin End    Fidencio Swain M.D. Anesth 0001 0119        Preop Diagnosis (Free Text):  Pre-op Diagnosis             Preop Diagnosis (Codes):    Post op Diagnosis  Spinal epidural abscess      Premium Reason  A. 3PM - 7AM    Comments:

## 2021-04-28 NOTE — PROGRESS NOTES
Diabetes education: Attempted to meet with pt this afternoon but at visit he was down for MRI. CDE to try again tomorrow.

## 2021-04-29 ENCOUNTER — APPOINTMENT (OUTPATIENT)
Dept: RADIOLOGY | Facility: MEDICAL CENTER | Age: 61
DRG: 003 | End: 2021-04-29
Attending: NURSE PRACTITIONER
Payer: MEDICARE

## 2021-04-29 ENCOUNTER — HOSPITAL ENCOUNTER (INPATIENT)
Facility: REHABILITATION | Age: 61
End: 2021-04-29
Attending: PHYSICAL MEDICINE & REHABILITATION | Admitting: PHYSICAL MEDICINE & REHABILITATION
Payer: MEDICARE

## 2021-04-29 LAB
ALBUMIN SERPL BCP-MCNC: 2 G/DL (ref 3.2–4.9)
ALBUMIN/GLOB SERPL: 0.6 G/DL
ALP SERPL-CCNC: 103 U/L (ref 30–99)
ALT SERPL-CCNC: 22 U/L (ref 2–50)
ANION GAP SERPL CALC-SCNC: 8 MMOL/L (ref 7–16)
AST SERPL-CCNC: 26 U/L (ref 12–45)
BASE EXCESS BLDA CALC-SCNC: -3 MMOL/L (ref -4–3)
BILIRUB SERPL-MCNC: 0.3 MG/DL (ref 0.1–1.5)
BODY TEMPERATURE: ABNORMAL DEGREES
BREATHS SETTING VENT: 22
BUN SERPL-MCNC: 28 MG/DL (ref 8–22)
CALCIUM SERPL-MCNC: 7.7 MG/DL (ref 8.5–10.5)
CHLORIDE SERPL-SCNC: 98 MMOL/L (ref 96–112)
CO2 BLDA-SCNC: 24 MMOL/L (ref 20–33)
CO2 SERPL-SCNC: 23 MMOL/L (ref 20–33)
CREAT SERPL-MCNC: 0.53 MG/DL (ref 0.5–1.4)
CRP SERPL HS-MCNC: 21.82 MG/DL (ref 0–0.75)
DELSYS IDSYS: ABNORMAL
END TIDAL CARBON DIOXIDE IECO2: 27 MMHG
ERYTHROCYTE [DISTWIDTH] IN BLOOD BY AUTOMATED COUNT: 47.8 FL (ref 35.9–50)
GLOBULIN SER CALC-MCNC: 3.4 G/DL (ref 1.9–3.5)
GLUCOSE BLD-MCNC: 201 MG/DL (ref 65–99)
GLUCOSE BLD-MCNC: 241 MG/DL (ref 65–99)
GLUCOSE BLD-MCNC: 248 MG/DL (ref 65–99)
GLUCOSE SERPL-MCNC: 214 MG/DL (ref 65–99)
HCO3 BLDA-SCNC: 22.6 MMOL/L (ref 17–25)
HCT VFR BLD AUTO: 32.2 % (ref 42–52)
HCT VFR BLD AUTO: 36.8 % (ref 42–52)
HGB BLD-MCNC: 10.8 G/DL (ref 14–18)
HGB BLD-MCNC: 12.1 G/DL (ref 14–18)
HIV1 RNA BLD QL NAA+PROBE: NOT DETECTED
HOROWITZ INDEX BLDA+IHG-RTO: 106 MM[HG]
INR PPP: 1.09 (ref 0.87–1.13)
MAGNESIUM SERPL-MCNC: 2.2 MG/DL (ref 1.5–2.5)
MCH RBC QN AUTO: 29.8 PG (ref 27–33)
MCHC RBC AUTO-ENTMCNC: 32.9 G/DL (ref 33.7–35.3)
MCV RBC AUTO: 90.6 FL (ref 81.4–97.8)
MODE IMODE: ABNORMAL
O2/TOTAL GAS SETTING VFR VENT: 70 %
PCO2 BLDA: 42.4 MMHG (ref 26–37)
PEEP END EXPIRATORY PRESSURE IPEEP: 12 CMH20
PH BLDA: 7.34 [PH] (ref 7.4–7.5)
PLATELET # BLD AUTO: 194 K/UL (ref 164–446)
PMV BLD AUTO: 12.4 FL (ref 9–12.9)
PO2 BLDA: 74 MMHG (ref 64–87)
POTASSIUM SERPL-SCNC: 4 MMOL/L (ref 3.6–5.5)
PREALB SERPL-MCNC: 7.3 MG/DL (ref 18–38)
PROT SERPL-MCNC: 5.4 G/DL (ref 6–8.2)
PROTHROMBIN TIME: 14.5 SEC (ref 12–14.6)
RBC # BLD AUTO: 4.06 M/UL (ref 4.7–6.1)
SAO2 % BLDA: 94 % (ref 93–99)
SODIUM SERPL-SCNC: 129 MMOL/L (ref 135–145)
SPECIMEN DRAWN FROM PATIENT: ABNORMAL
TIDAL VOLUME IVT: 520 ML
WBC # BLD AUTO: 14.3 K/UL (ref 4.8–10.8)

## 2021-04-29 PROCEDURE — 82803 BLOOD GASES ANY COMBINATION: CPT

## 2021-04-29 PROCEDURE — 85027 COMPLETE CBC AUTOMATED: CPT

## 2021-04-29 PROCEDURE — 84134 ASSAY OF PREALBUMIN: CPT

## 2021-04-29 PROCEDURE — 71045 X-RAY EXAM CHEST 1 VIEW: CPT

## 2021-04-29 PROCEDURE — 51798 US URINE CAPACITY MEASURE: CPT

## 2021-04-29 PROCEDURE — 94003 VENT MGMT INPAT SUBQ DAY: CPT

## 2021-04-29 PROCEDURE — A9270 NON-COVERED ITEM OR SERVICE: HCPCS | Performed by: STUDENT IN AN ORGANIZED HEALTH CARE EDUCATION/TRAINING PROGRAM

## 2021-04-29 PROCEDURE — 86140 C-REACTIVE PROTEIN: CPT

## 2021-04-29 PROCEDURE — 700102 HCHG RX REV CODE 250 W/ 637 OVERRIDE(OP): Performed by: STUDENT IN AN ORGANIZED HEALTH CARE EDUCATION/TRAINING PROGRAM

## 2021-04-29 PROCEDURE — 99233 SBSQ HOSP IP/OBS HIGH 50: CPT | Performed by: NURSE PRACTITIONER

## 2021-04-29 PROCEDURE — 700111 HCHG RX REV CODE 636 W/ 250 OVERRIDE (IP): Performed by: PHYSICIAN ASSISTANT

## 2021-04-29 PROCEDURE — 700101 HCHG RX REV CODE 250: Performed by: INTERNAL MEDICINE

## 2021-04-29 PROCEDURE — 99291 CRITICAL CARE FIRST HOUR: CPT | Mod: GC | Performed by: INTERNAL MEDICINE

## 2021-04-29 PROCEDURE — 80053 COMPREHEN METABOLIC PANEL: CPT

## 2021-04-29 PROCEDURE — 83735 ASSAY OF MAGNESIUM: CPT

## 2021-04-29 PROCEDURE — 85014 HEMATOCRIT: CPT

## 2021-04-29 PROCEDURE — 94799 UNLISTED PULMONARY SVC/PX: CPT

## 2021-04-29 PROCEDURE — 700101 HCHG RX REV CODE 250: Performed by: STUDENT IN AN ORGANIZED HEALTH CARE EDUCATION/TRAINING PROGRAM

## 2021-04-29 PROCEDURE — 85610 PROTHROMBIN TIME: CPT

## 2021-04-29 PROCEDURE — 700111 HCHG RX REV CODE 636 W/ 250 OVERRIDE (IP): Performed by: INTERNAL MEDICINE

## 2021-04-29 PROCEDURE — 700111 HCHG RX REV CODE 636 W/ 250 OVERRIDE (IP): Performed by: NURSE PRACTITIONER

## 2021-04-29 PROCEDURE — 94640 AIRWAY INHALATION TREATMENT: CPT

## 2021-04-29 PROCEDURE — 36600 WITHDRAWAL OF ARTERIAL BLOOD: CPT

## 2021-04-29 PROCEDURE — 700105 HCHG RX REV CODE 258: Performed by: INTERNAL MEDICINE

## 2021-04-29 PROCEDURE — 85018 HEMOGLOBIN: CPT

## 2021-04-29 PROCEDURE — 770022 HCHG ROOM/CARE - ICU (200)

## 2021-04-29 PROCEDURE — 82962 GLUCOSE BLOOD TEST: CPT

## 2021-04-29 PROCEDURE — 700105 HCHG RX REV CODE 258: Performed by: STUDENT IN AN ORGANIZED HEALTH CARE EDUCATION/TRAINING PROGRAM

## 2021-04-29 PROCEDURE — 99233 SBSQ HOSP IP/OBS HIGH 50: CPT | Performed by: INTERNAL MEDICINE

## 2021-04-29 RX ORDER — ONDANSETRON 4 MG/1
4 TABLET, ORALLY DISINTEGRATING ORAL EVERY 4 HOURS PRN
Status: DISCONTINUED | OUTPATIENT
Start: 2021-04-29 | End: 2021-05-14 | Stop reason: HOSPADM

## 2021-04-29 RX ORDER — PROMETHAZINE HYDROCHLORIDE 25 MG/1
12.5-25 TABLET ORAL EVERY 4 HOURS PRN
Status: DISCONTINUED | OUTPATIENT
Start: 2021-04-29 | End: 2021-05-10

## 2021-04-29 RX ORDER — DEXTROSE MONOHYDRATE 25 G/50ML
50 INJECTION, SOLUTION INTRAVENOUS
Status: DISCONTINUED | OUTPATIENT
Start: 2021-04-29 | End: 2021-05-03

## 2021-04-29 RX ORDER — AMOXICILLIN 250 MG
1 CAPSULE ORAL
Status: DISCONTINUED | OUTPATIENT
Start: 2021-04-29 | End: 2021-05-10

## 2021-04-29 RX ORDER — OXYCODONE HYDROCHLORIDE 10 MG/1
10 TABLET ORAL EVERY 4 HOURS PRN
Status: DISCONTINUED | OUTPATIENT
Start: 2021-04-29 | End: 2021-05-08

## 2021-04-29 RX ORDER — POLYETHYLENE GLYCOL 3350 17 G/17G
1 POWDER, FOR SOLUTION ORAL 2 TIMES DAILY PRN
Status: DISCONTINUED | OUTPATIENT
Start: 2021-04-29 | End: 2021-05-10

## 2021-04-29 RX ADMIN — FENTANYL CITRATE 100 MCG: 50 INJECTION, SOLUTION INTRAMUSCULAR; INTRAVENOUS at 06:24

## 2021-04-29 RX ADMIN — ALBUTEROL SULFATE 2.5 MG: 2.5 SOLUTION RESPIRATORY (INHALATION) at 14:41

## 2021-04-29 RX ADMIN — ACETYLCYSTEINE 4 ML: 200 INHALANT RESPIRATORY (INHALATION) at 10:28

## 2021-04-29 RX ADMIN — ALBUTEROL SULFATE 2.5 MG: 2.5 SOLUTION RESPIRATORY (INHALATION) at 10:28

## 2021-04-29 RX ADMIN — ARIPIPRAZOLE 15 MG: 10 TABLET ORAL at 05:05

## 2021-04-29 RX ADMIN — LIDOCAINE 3 PATCH: 50 PATCH TOPICAL at 16:44

## 2021-04-29 RX ADMIN — PROPOFOL 40 MCG/KG/MIN: 10 INJECTION, EMULSION INTRAVENOUS at 12:15

## 2021-04-29 RX ADMIN — ACETYLCYSTEINE 4 ML: 200 INHALANT RESPIRATORY (INHALATION) at 22:18

## 2021-04-29 RX ADMIN — ALBUTEROL SULFATE 2.5 MG: 2.5 SOLUTION RESPIRATORY (INHALATION) at 18:17

## 2021-04-29 RX ADMIN — FAMOTIDINE 20 MG: 20 TABLET ORAL at 18:05

## 2021-04-29 RX ADMIN — IPRATROPIUM BROMIDE AND ALBUTEROL SULFATE 3 ML: .5; 2.5 SOLUTION RESPIRATORY (INHALATION) at 11:31

## 2021-04-29 RX ADMIN — LEVETIRACETAM INJECTION 1000 MG: 10 INJECTION INTRAVENOUS at 18:06

## 2021-04-29 RX ADMIN — FENTANYL CITRATE 100 MCG: 50 INJECTION, SOLUTION INTRAMUSCULAR; INTRAVENOUS at 21:43

## 2021-04-29 RX ADMIN — ACETYLCYSTEINE 4 ML: 200 INHALANT RESPIRATORY (INHALATION) at 18:17

## 2021-04-29 RX ADMIN — INSULIN GLARGINE 15 UNITS: 100 INJECTION, SOLUTION SUBCUTANEOUS at 18:16

## 2021-04-29 RX ADMIN — DOCUSATE SODIUM 100 MG: 50 LIQUID ORAL at 18:06

## 2021-04-29 RX ADMIN — ACETYLCYSTEINE 3 ML: 200 INHALANT RESPIRATORY (INHALATION) at 02:41

## 2021-04-29 RX ADMIN — ARIPIPRAZOLE 15 MG: 10 TABLET ORAL at 18:05

## 2021-04-29 RX ADMIN — CARBAMAZEPINE 200 MG: 200 TABLET ORAL at 08:19

## 2021-04-29 RX ADMIN — FENTANYL CITRATE 100 MCG: 50 INJECTION, SOLUTION INTRAMUSCULAR; INTRAVENOUS at 05:22

## 2021-04-29 RX ADMIN — CARBAMAZEPINE 200 MG: 200 TABLET ORAL at 16:36

## 2021-04-29 RX ADMIN — ACETYLCYSTEINE 4 ML: 200 INHALANT RESPIRATORY (INHALATION) at 14:41

## 2021-04-29 RX ADMIN — ATORVASTATIN CALCIUM 80 MG: 40 TABLET, FILM COATED ORAL at 21:01

## 2021-04-29 RX ADMIN — PROPOFOL 40 MCG/KG/MIN: 10 INJECTION, EMULSION INTRAVENOUS at 17:52

## 2021-04-29 RX ADMIN — ACETAMINOPHEN 1000 MG: 500 TABLET ORAL at 18:05

## 2021-04-29 RX ADMIN — ALBUTEROL SULFATE 2.5 MG: 2.5 SOLUTION RESPIRATORY (INHALATION) at 22:16

## 2021-04-29 RX ADMIN — ACETYLCYSTEINE 4 ML: 200 INHALANT RESPIRATORY (INHALATION) at 06:24

## 2021-04-29 RX ADMIN — ALBUTEROL SULFATE 2.5 MG: 2.5 SOLUTION RESPIRATORY (INHALATION) at 06:24

## 2021-04-29 RX ADMIN — DOCUSATE SODIUM 100 MG: 50 LIQUID ORAL at 05:04

## 2021-04-29 RX ADMIN — PROPOFOL 35 MCG/KG/MIN: 10 INJECTION, EMULSION INTRAVENOUS at 05:26

## 2021-04-29 RX ADMIN — DOCUSATE SODIUM 50 MG AND SENNOSIDES 8.6 MG 1 TABLET: 8.6; 5 TABLET, FILM COATED ORAL at 21:01

## 2021-04-29 RX ADMIN — FENTANYL CITRATE 50 MCG: 50 INJECTION, SOLUTION INTRAMUSCULAR; INTRAVENOUS at 00:24

## 2021-04-29 RX ADMIN — CEFTRIAXONE SODIUM 2 G: 2 INJECTION, POWDER, FOR SOLUTION INTRAMUSCULAR; INTRAVENOUS at 05:04

## 2021-04-29 RX ADMIN — CYANOCOBALAMIN TAB 500 MCG 1000 MCG: 500 TAB at 05:05

## 2021-04-29 RX ADMIN — FENTANYL CITRATE 100 MCG: 50 INJECTION, SOLUTION INTRAMUSCULAR; INTRAVENOUS at 01:15

## 2021-04-29 RX ADMIN — INSULIN HUMAN 3 UNITS: 100 INJECTION, SOLUTION PARENTERAL at 08:25

## 2021-04-29 RX ADMIN — Medication 5000 UNITS: at 05:04

## 2021-04-29 RX ADMIN — ACETAMINOPHEN 1000 MG: 500 TABLET ORAL at 13:07

## 2021-04-29 RX ADMIN — LEVETIRACETAM INJECTION 1000 MG: 10 INJECTION INTRAVENOUS at 05:04

## 2021-04-29 RX ADMIN — FAMOTIDINE 20 MG: 20 TABLET ORAL at 05:04

## 2021-04-29 RX ADMIN — NOREPINEPHRINE BITARTRATE 2 MCG/MIN: 1 INJECTION, SOLUTION, CONCENTRATE INTRAVENOUS at 08:36

## 2021-04-29 RX ADMIN — ALBUTEROL SULFATE 2.5 MG: 2.5 SOLUTION RESPIRATORY (INHALATION) at 02:41

## 2021-04-29 RX ADMIN — ACETAMINOPHEN 1000 MG: 500 TABLET ORAL at 05:04

## 2021-04-29 ASSESSMENT — FIBROSIS 4 INDEX
FIB4 SCORE: 1.66
FIB4 SCORE: 1.66

## 2021-04-29 NOTE — PROGRESS NOTES
[facility] ICU Progress Note      Admit Date: 4/25/2021    Resident(s): Delroy Fulton M.D.   Attending:  BREANNA ALCARAZ/ Dr. Dumont     Patient ID:    Name:  Perry Davis   YOB: 1960  Age:  60 y.o.  male   MRN:  [id number]    Hospital Course:  Perry Davis is a 60 y.o. male with a previous history of uncontrolled Diabetes -Hb1C 13 , HLD, CAD s/p CABGx4  2015, tobacco use ,OA, chronic low pain admitted  4/25 with worsening neck , upper back pain after a GLF , mild DKA. Found to have epidural abscess w/ cord compression, discitis involving cervical and thoracic spine, underwent emergent laminectomy and decompression by Dr. Hazel 4/28. Blood cultures from 4/25 2/2 positive strep anginosus on Ceftriaxone, followed by ID. Acute mental state change noted 4/27 , CT head with right cerebellar CVA, neurology consulting. Intubated for airway protection and transferred to ICU on 4/28/21 for post surgical intervention.     Consultants:  Critical Care  Neurosurgery   Neurology  Infectious Disease    Interval Events:    4/28 Neuro- obtunded and on ventilator support.  Prior to sedation was noted to have purposeful movements of all his extremities.  4/29: POD #1. obtunded, on ventilator support .  Pressor support with Levophed.  CHRISTOPHER pending, repeat blood culture 4/28/2021 remains negative      Goal BP < 160 per NSG   MRI, MRA brain,  Neck ordered - Findings suggesting right vertebral artery occlusion. Possible moderate focal stenosis in the mid cervical left vertebral artery.  Cxr: Mild left basilar opacity may represent atelectasis. Improved aeration of the left lung.    Vitals Range last 24h:  Temp:  [36 °C (96.8 °F)-36.4 °C (97.5 °F)] 36.2 °C (97.2 °F)  Pulse:  [72-95] 88  Resp:  [8-40] 28  BP: ()/(55-83) 109/57  SpO2:  [90 %-98 %] 96 %      Intake/Output Summary (Last 24 hours) at 4/29/2021 1509  Last data filed at 4/29/2021 1200  Gross per 24 hour   Intake 1638.78 ml   Output 877 ml   Net 761.78  ml        Review of Systems   Unable to perform ROS: Intubated       PHYSICAL EXAM:  Vitals:    04/29/21 1345 04/29/21 1400 04/29/21 1415 04/29/21 1441   BP:  109/57     Pulse: 91 91 86 88   Resp: (!) 22 (!) 22 (!) 22 (!) 28   Temp:       TempSrc:       SpO2: 95% 95% 96% 96%   Weight:       Height:        Body mass index is 29.26 kg/m².    O2 therapy: Pulse Oximetry: 96 %, O2 Delivery Device: Ventilator    Date 04/29/21 0700 - 04/30/21 0659   Shift 9415-9704 3570-0203 3489-7657 24 Hour Total   INTAKE   I.V. 114.8   114.8     Propofol Volume 91.2   91.2     Norepinephrine Volume 23.6   23.6   Other 60   60     Medications (PO/Enteral Liquids) 60   60   NG/   210     Intake (mL) (Enteral Tube 04/28/21 Cortrak - Gastric Left nare) 210   210   Enteral 60   60     Free Water / Tube Flush 60   60   Shift Total 444.8   444.8   OUTPUT   Urine 0   0     Output (mL) (Urethral Catheter Other (Comment) 16 Fr.) 0   0   Shift Total 0   0   .8   444.8        Physical Exam   Constitutional: He is well-developed, well-nourished, and in no distress.   HENT:   Head: Normocephalic and atraumatic.   Nose: Nose normal.   Eyes: Pupils are equal, round, and reactive to light. Conjunctivae are normal.   Cardiovascular: Normal rate, regular rhythm and normal heart sounds.   Pulmonary/Chest: Effort normal.   B/l rhonchi   Abdominal: Soft. Bowel sounds are normal. He exhibits no distension.   Musculoskeletal:         General: No edema.      Cervical back: Neck supple.   Neurological:   Sedated and intubated    Skin: Skin is warm and dry.       Recent Labs     04/28/21  0832 04/28/21  1046 04/29/21  0632   ISTATAPH 7.291* 7.314* 7.336*   ISTATAPCO2 51.3* 36.1 42.4*   ISTATAPO2 178* 78 74   ISTATATCO2 26 19* 24   [id number] 99 94 94   ISTATARTHCO3 24.7 18.3 22.6   ISTATARTBE -3 -7* -3   ISTATTEMP see below see below see below   ISTATFIO2 100 100 70   ISTATSPEC Arterial Arterial Arterial     Recent Labs     04/27/21  0248  04/27/21 0248 04/27/21  1152 04/28/21  0310 04/29/21  0324   SODIUM 122*   < > 123* 125* 129*   POTASSIUM 3.6   < > 3.6 4.0 4.0   CHLORIDE 89*   < > 91* 92* 98   CO2 20   < > 21 19* 23   BUN 16   < > 16 20 28*   CREATININE 0.41*   < > 0.44* 0.49* 0.53   MAGNESIUM 1.7   < > 1.8 2.3 2.2   PHOSPHORUS 2.2*  --  2.2* 3.3  --    CALCIUM 7.8*   < > 7.8* 7.7* 7.7*    < > = values in this interval not displayed.     Recent Labs     04/27/21 0248 04/27/21 0248 04/27/21 1152 04/28/21 0310 04/29/21  0324   ALTSGPT 32  --   --  22 22   ASTSGOT 21  --   --  18 26   ALKPHOSPHAT 140*  --   --  109* 103*   TBILIRUBIN 1.2  --   --  0.8 0.3   PREALBUMIN  --   --   --   --  7.3*   GLUCOSE 172*   < > 164* 173* 214*    < > = values in this interval not displayed.     Recent Labs     04/27/21 0256 04/28/21 0310 04/29/21  0324   RBC 5.07 4.56* 4.06*   HEMOGLOBIN 15.4 13.9* 12.1*   HEMATOCRIT 44.0 40.7* 36.8*   PLATELETCT 77* 105* 194   PROTHROMBTM  --   --  14.5   INR  --   --  1.09     Recent Labs     04/27/21 0248 04/27/21  0256 04/28/21 0310 04/29/21  0324   WBC  --  16.4* 15.2* 14.3*   ASTSGOT 21  --  18 26   ALTSGPT 32  --  22 22   ALKPHOSPHAT 140*  --  109* 103*   TBILIRUBIN 1.2  --  0.8 0.3       Meds:  • MD Alert...Adult ICU Electrolyte Replacement per Pharmacy       • lidocaine  1-2 mL     • insulin regular  2-9 Units      And   • dextrose 50%  50 mL     • [START ON 4/30/2021] cefTRIAXone (ROCEPHIN) IV  2 g     • Pharmacy Consult Request  1 Each     • MD ALERT...DO NOT ADMINISTER NSAIDS or ASPIRIN unless ORDERED By Neurosurgery  1 Each     • senna-docusate  1 tablet     • polyethylene glycol/lytes  1 Packet     • magnesium hydroxide  30 mL     • bisacodyl  10 mg     • fleet  1 Each     • Respiratory Therapy Consult       • ondansetron  4 mg     • ondansetron  4 mg     • promethazine  12.5-25 mg     • promethazine  12.5-25 mg     • prochlorperazine  5-10 mg     • levETIRAcetam (Keppra) IV  1,000 mg Stopped (04/29/21 0519)    • norepinephrine (Levophed) infusion  0-30 mcg/min 1 mcg/min (04/29/21 1127)   • ipratropium-albuterol  3 mL     • labetalol  10 mg     • Pharmacy  1 Each     • acetaminophen  1,000 mg     • ARIPiprazole  15 mg     • atorvastatin  80 mg     • carBAMazepine  200 mg     • cyanocobalamin  1,000 mcg     • senna-docusate  1 tablet     • vitamin D  5,000 Units     • docusate sodium  100 mg     • famotidine  20 mg      Or   • famotidine  20 mg     • fentaNYL  50 mcg      And   • fentaNYL  100 mcg      And   • fentaNYL   Stopped (04/28/21 2118)    And   • propofol  0-80 mcg/kg/min 40 mcg/kg/min (04/29/21 1215)   • acetylcysteine  3-4 mL     • albuterol  2.5 mg     • oxyCODONE immediate-release  10 mg     • insulin glargine  15 Units     • lidocaine  3 Patch          Procedures:  Laminectomy, decompression by neurosurgery 4/28/2021    Imaging:  DX-CHEST-PORTABLE (1 VIEW)   Final Result      Mild left basilar opacity may represent atelectasis. Infection not excluded.      Improved aeration of the left lung.      Atherosclerotic plaque.         EC-ECHOCARDIOGRAM COMPLETE W/O CONT   Final Result      IR-DRAIN-BLADDER SUPRAPUB W/CATH   Final Result      1.     Ultrasound and fluoroscopic guided placement of a 16 [place] tip silicone suprapubic bladder catheter.      2.     Cystogram documenting catheter placement.         MR-BRAIN-W/O   Final Result      Acute large right cerebellar posterior inferior cerebellar artery territory infarct with possible small amount of petechial hemorrhage.      MR-MRA HEAD-W/O   Final Result      Findings suggesting right vertebral artery occlusion.      MR-MRA NECK-WITH & W/O AND SEQUENCES   Final Result      1.  Small caliber right vertebral artery which is not visualized on the noncontrast time-of-flight technique could be related to retrograde flow or diminutive caliber and sluggish flow.   2.  Possible moderate focal stenosis in the mid cervical left vertebral artery.   3.  No  significant carotid stenosis.      DX-ABDOMEN FOR TUBE PLACEMENT   Final Result         Feeding tube with tip projecting over the expected area of the distal stomach.      DX-CHEST-PORTABLE (1 VIEW)   Final Result      1.  Worsening consolidation of LEFT hemithorax with shift of mediastinal structures to the LEFT suggesting atelectasis, possibly due to endobronchial process.   2.  Supportive tubing as described above.   3.  No pneumothorax.      DX-CHEST-PORTABLE (1 VIEW)   Final Result         Endotracheal tube with tip projecting over the mid thoracic trachea.      Layering left pleural effusion with left lower lung opacity.      DX-SPINE-ANY ONE VIEW   Final Result      Digitized intraoperative radiograph is submitted for review.  This examination is not for diagnostic purpose but for guidance during a surgical procedure.      DX-PORTABLE FLUOROSCOPY < 1 HOUR   Final Result      Portable fluoroscopy utilized for 2 seconds.         INTERPRETING LOCATION: [address] [place] [address], [facility], [facility]      CT-HEAD W/O   Final Result         1.  Low-density in the region of the right cerebellar hemisphere, appearance compatible with evolving infarct.      These findings were discussed with the patient's on-call clinician, Deniz, on 4/28/2021 6:14 AM.      MR-THORACIC SPINE-WITH & W/O   Final Result      1.  Posterior epidural fluid collection/abscess within the lower cervical spine extending inferiorly to about the T6 level which could represent epidural abscess and/or hematoma. This measures 8 mm in maximal thickness at T2-T3 with at least moderate    thecal sac stenosis.   2.  Abnormal signal within the cervical cord and upper thoracic cord suggesting cord edema or infectious/inflammatory etiology.   3.  Lower cervical spine findings are detailed on earlier report.   4.  Prominent enhancement along the surface of the mid and lower thoracic cord is of uncertain etiology and as detailed above, possibly representing leptomeningeal  disease/infection. See details above.   These findings were discussed with Dr. Cano on 4/28/2021 10:01 AM.         MR-LUMBAR SPINE-WITH & W/O   Final Result      No evidence of lumbar spine infection or epidural abscess.      Mild spondylosis as detailed above without spinal stenosis.      DX-CHEST-PORTABLE (1 VIEW)   Final Result      1.  Hypoinflation with left basilar atelectasis.   2.  Mild perihilar interstitial prominence may be related to hypoinflation or edema.      MR-CERVICAL SPINE-WITH & W/O   Final Result      1.  There is multiseptated abnormal peripherally enhancing epidural collection noted extending from C2 to the visualized lower thoracic level. Largest collection is noted in the upper thoracic posterior epidural space at the levels of T2 and T3. The    lower extent of the collection is not imaged. This is causing multilevel effacement of the thecal sac and cord compression. The thoracic spinal cord is anteriorly displaced and compressed at the levels of T2 and T3. Pre and postcontrast MR examination of    the thoracic spine is recommended for further evaluation.   2.  There is effacement of the cervical thecal sac due to the multiseptated epidural fluid collection.   3.  The cervical spinal cord is mildly enlarged with minimal increased T2 signal intensity. The possibility of cord inflammation cannot be entirely excluded.   4.  Abnormal T2 hyperintensity at C5-6 disc space likely representing discitis.   5.  Abnormal bone marrow edema of C5, C6 and C7 vertebral bodies with edema concerning for osteomyelitis.   6.  There is also focal enhancement of C6 vertebral body likely secondary to the osteomyelitis.   7.  Prevertebral edema/fluid collection.   8.  Nonvisualization of flow void of bilateral vertebral arteries concerning for age-indeterminate bilateral vertebral occlusions.         CT-CHEST (THORAX) WITH   Final Result      No displaced rib fracture is identified.      No acute cardiopulmonary  process is seen.      Atherosclerotic plaque including coronary artery calcification.      CT-TSPINE W/O PLUS RECONS   Final Result      No CT evidence of acute traumatic abnormality.      Mild T6/7 anterior wedging compatible with healed mild chronic compression fracture and there is interbody fusion.      CT-LSPINE W/O PLUS RECONS   Final Result      No CT evidence of acute traumatic injury.      CT-CSPINE WITHOUT PLUS RECONS   Final Result      Multilevel degenerative changes.      Prevertebral edema. Further evaluation with MRI is recommended as this can be seen in the setting of ligamentous injury.      Bilateral carotid atherosclerotic plaque.         EC-CHRISTOPHER W/O CONT    (Results Pending)       ASSESSEMENT and PLAN:    * Spinal epidural abscess- (present on admission)  Assessment & Plan  CT C-spine 4/25 with prevertebral edema.    MRI C/T-spine with abnormal peripherally enhancing epidural collection extending from C2 to visualize lower thoracic 11.  Largest collection in the upper thoracic posterior epidural space at the level of T2-T3.  Lower extent of the collection is causing multilevel effacement of the thecal sac and cord compression.  Thoracic spinal cord anteriorly displaced and compressed at levels T2 and T3.  T2 hyperintensity at C5-C6 tests space likely representing discitis.  Focal enhancement of C6 vertebral body likely secondary to osteomyelitis.  Prevertebral edema/fluid collection.  Concern of age-indeterminate bilateral vertebral occlusions.  Underwent emergent laminectomy of C3-C4 C5-C6, C6/C7, T1-T2 laminectomy, decompression of thecal sac and nerves by Dr. Hazel 4/28/2021.   Neurosurgery following, recommending goal BP <160  Sedation, pain management as appropriate        History of ischemic stroke  Assessment & Plan  Found to have acute change in mental status 4/27/2021.   CT head w/o concerning for evolving right cerebellar infarct.   Intubated prior to neurosurgical intervention.  High  intensity statin, Dr. Hazel recommending holding aspirin for now due to risk of hemorrhagic conversion, thrombocytopenia.  MRA brain, MRA neck: Findings suggesting right vertebral artery occlusion. Possible moderate focal stenosis in the mid cervical left vertebral artery.  TTE with bubble study: EF 60%, no evidence of right-to-left shunt, no valve lesions.  CHRISTOPHER pending   Goal euthermia, normotension, eunatremia  PT, OT, SLP evaluation as able  Neurology consult: Start aspirin when cleared by neurosurgery, high intensity statin      Sepsis due to Streptococcus species (HCC)- (present on admission)  Assessment & Plan  This is Sepsis Present on admission  SIRS criteria identified on my evaluation include: Tachycardia, with heart rate greater than 90 BPM, Tachypnea, with respirations greater than 20 per minute and Leukocytosis, with WBC greater than 12,000  Source is possible retropharyngeal/pharyngeal  Suspected onset of infection (date and time) Unknown  Sepsis protocol initiated  Fluid resuscitation ordered per protocol  IV antibiotics as appropriate for source of sepsis  While organ dysfunction may be noted elsewhere in this problem list or in the chart, degree of organ dysfunction does not meet CMS criteria for severe sepsis    MRI C-T-spine with evidence of epidural abscess, discitis, vertebral osteomyelitis.  Underwent emergent laminectomy, decompression 4/28.  Blood culture 4/24 : Growth of Streptococcus anginosus. 4/27 blood culture showing growth of possible Streptococcus species, repeat culture 4/28 NGTD    4/25 evening: Started on vancomycin  4/26 AM: d/c'ed vanc, started IV ceftriaxone  -IVF resuscitation  -Infectious disease following  -CHRISTOPHER pending    Thrombocytopenia (HCC)- (present on admission)  Assessment & Plan  IMPROVING  Presented with a platelet count of 51.  This is unknown for reasons why, the patient does not drink alcohol per history which is corroborated by mother.  There is no known history  of any blood clot/clotting disorder in the family.    We will not start any chemical VTE prophylaxis for now, will use SCDs  Plt 47 on 4/26. This is likely due to sepsis, though lower on the differential includes previously undiagnosed infection.  Hep panel negative.    -Follow up on HIV lab  -Continue C3, narrow antibiotic pending sensitivity results    Acute bilateral back pain- (present on admission)  Assessment & Plan  Presented with worsening neck and upper back pain after ground-level fall.   CT C-spine concerning for prevertebral edema, ligamental damage.  MRI recommended    MRI C/T-spine with extensive epidural abscess, discitis, osteomyelitis s/p laminectomy and decompression  See plan for spinal epidural abscess  As needed pain medications  PT/OT as appropriate      Type 2 diabetes mellitus without complication, without long-term current use of insulin ([facility])- (present on admission)  Assessment & Plan  Presented with elevated blood glucose and mild DKA.  Apparently issues with noncompliance.   HbA1c done on admission 13  Continue insulin sliding scale, Lantus  Accu-Cheks, hypoglycemia protocols  Diabetic diet months allowed p.o.  Diabetes education    Essential hypertension, benign- (present on admission)  Assessment & Plan  Noncompliance issues  In the hospital, presented with elevated blood pressure with systolic blood pressure up to the 180s.  This is likely with a component of acute pain.  Hold blood pressure medications since on Levophed.  Resume as appropriate    Coronary artery disease due to calcified coronary lesion: CABG x4, July 2015- (present on admission)  Assessment & Plan  History of    -Continue home atorvastatin  -Hold antihypertensives   -Hold aspirin until allowed by neurosurgery      Status post urethrostomy ([facility])- (present on admission)  Assessment & Plan  History of perineal urethrostomy 2018  Acute urinary retention post surgical intervention 4/28  Unable to place Gomez catheter  through urethrostomy.    Suprapubic catheter placement by interventional radiology.     Hyponatremia- (present on admission)  Assessment & Plan  In 123-125 since admission , likely chronic   Likely SIADH.  Serum Osm 264, urine osm 745, urine Na 70  Follow daily Na  Will consider fluid restriction as appropriate if worsens    Hypomagnesemia- (present on admission)  Assessment & Plan  Replete with goal of Mg of 2    Hypokalemia- (present on admission)  Assessment & Plan  Replete as needed with goal of K of 4    Difficulty swallowing- (present on admission)  Assessment & Plan  Presented with difficulty swallowing, is coughing and vomiting when swallowing large/hard foods    -SLP evaluation post extubation  -Aspiration and seizure precautions  -Soft GI diet    Tobacco abuse- (present on admission)  Assessment & Plan  Encouraged tobacco cessation    High anion gap metabolic acidosis- (present on admission)  Assessment & Plan  Resolved  Also see problem of diabetic ketoacidosis.    Marijuana abuse- (present on admission)  Assessment & Plan  Chronic issue  Encourage marijuana cessation    Diabetic ketoacidosis (HCC)- (present on admission)  Assessment & Plan  Resolved  -Insulin sliding scale, hypoglycemia protocol, Accu-Cheks  -Diabetic education  -Diabetes education        Dyslipidemia- (present on admission)  Assessment & Plan  Continue home atorvastatin 80      DISPO: ICU    CODE STATUS: Full code    Quality Measures:  Feeding: Tube feeds  Analgesia: Fentanyl  Sedation: propofol  Thromboprophylaxis: SCDs  Head of bed: >30 degrees  Ulcer prophylaxis: Pepcid  Glycemic control: Insulin sliding scale, Lantus  Bowel care: bowel regimen  Indwelling lines: PIV, art line, right IJ CVC  Deescalation of antibiotics: On ceftriaxone      Delroy Fulton M.D.

## 2021-04-29 NOTE — PROGRESS NOTES
Neurology Progress Note  Neurohospitalist Service, [facility]    Referring Physician: Dax Garcia M.D.    Referral reason: R cerebellar stroke.    HPI: Perry Davis is a 60 y.o. man, history per chart review of anginal syndrome, MI with CABG x4 in 2015, DM, HLD, HTN, CKD, seizure, sleep apnea, tobacco use who presented to to [facility] ED by private vehicle on 4/25/21 with complaint of acute on chronic neck pain s/p slip and fall in shower. Additionally, the patient complained of back pain for 1 week for which he sought care at urgent care on two occassions and was treated with Toradol injection, as well as Tylenol with codeine, which subsequently made him feel dizzy and lose his balance. While in the ER he reportedly stated he has not been on his medications for over a year. Per chart review, upon presentation to ER he was axox4, no neurological deficits identified. There is no report of drug use, however, unable to confirm with patient at this time given AMS.    Hospital Course, interval note 4/28:  Leukocytosis ongoing since arrival. Blood cultures drawn 4/25 growing Streptococcus species.  MRI C-spine on 4/26 with epidural collection noted from C2-T3 with largest collection noted in upper thoracic posterior epidural space at T2-T3 causing multilevel cord compression.  Also with likely discitis at C5-6 and osteomyelitis at C5-C7. He went for emergent surgery: C3, C4, C5, C6. C7. T1, T2 laminectomy; decompression of thecal sac and nerve roots. 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 A non hemorrhagic right cerebellar stroke was diagnosed intraoperatively on CT. He was admitted to ICU. He is being treated with ceftriaxone currently.    Interval Note 4/29: Remains in ICU, intubated and sedated on propofol, POD 1. Strep anginosus bacteremia secondary to epidural abscess, being treated per ID. Had episode of agitation with self extubation and reintubation this  morning.    Review of systems: Unable to perform due to patient just having received sedation and paralytic for airway management.    Past Medical History:    has a past medical history of Anginal syndrome (HCC) (05/21/2018), Bronchitis, Diabetes (HCC), Heart burn, High cholesterol, Hypertension, Indigestion, Infectious disease (1989 or 1+990), Marijuana use (05/21/2018), Myocardial infarct (HCC) (04/05/2014), Pain, Pain (05/21/2018), Psychiatric problem, Renal disorder, Seizure ([facility]), Sleep apnea, Snoring, Urinary bladder disorder, and Urinary incontinence.    FHx:  family history includes Heart Attack (age of onset: 75) in his father; Heart Disease (age of onset: 55) in his sister; Heart Disease (age of onset: 59) in his father.    SHx:   reports that he has been smoking cigarettes. He has a 20.00 pack-year smoking history. He has never used smokeless tobacco. He reports current drug use. Drug: Inhaled. He reports that he does not drink alcohol.    Allergies:  No Known Allergies    Medications:    Current Facility-Administered Medications:   •  MD Alert...ICU Electrolyte Replacement per Pharmacy, , Other, PHARMACY TO DOSE, FREDERICK Maier.P.R.N.  •  lidocaine (XYLOCAINE) 1 % injection 1-2 mL, 1-2 mL, Tracheal Tube, Q30 MIN PRN, NOEL MaierP.R.N.  •  insulin regular (HumuLIN R,NovoLIN R) injection, 2-9 Units, Subcutaneous, Q6HRS **AND** POC blood glucose manual result, , , Q6H **AND** NOTIFY MD and PharmD, , , Once **AND** dextrose 50% (D50W) injection 50 mL, 50 mL, Intravenous, Q15 MIN PRN, Alexi Dumont M.D.  •  Pharmacy Consult Request ...Pain Management Review 1 Each, 1 Each, Other, PHARMACY TO DOSE, Anil Langley P.A.-C.  •  MD ALERT...DO NOT ADMINISTER NSAIDS or ASPIRIN unless ORDERED By Neurosurgery 1 Each, 1 Each, Other, PRN, Anil Langley P.A.-C.  •  senna-docusate (PERICOLACE or SENOKOT S) 8.6-50 MG per tablet 1 tablet, 1 tablet, Oral, Q24HRS PRN, Anil Langley P.A.-C.  •   polyethylene glycol/lytes (MIRALAX) PACKET 1 Packet, 1 Packet, Oral, BID PRN, Anil Langley, P.A.-C.  •  magnesium hydroxide (MILK OF MAGNESIA) suspension 30 mL, 30 mL, Oral, QDAY PRN, Anil Lagnley, P.A.-C.  •  bisacodyl (DULCOLAX) suppository 10 mg, 10 mg, Rectal, Q24HRS PRN, Anil Langley, P.A.-C.  •  fleet enema 133 mL, 1 Each, Rectal, Once PRN, Anli Langley, P.A.-C.  •  Respiratory Therapy Consult, , Inhalation, Continuous RT, Anil Langley, P.A.-C.  •  ondansetron (ZOFRAN) syringe/vial injection 4 mg, 4 mg, Intravenous, Q4HRS PRN, Anil Langley, P.A.-C.  •  ondansetron (ZOFRAN ODT) dispertab 4 mg, 4 mg, Oral, Q4HRS PRN, Anil Langley, P.A.-C.  •  promethazine (PHENERGAN) tablet 12.5-25 mg, 12.5-25 mg, Oral, Q4HRS PRN, Anil Langley, P.A.-C.  •  promethazine (PHENERGAN) suppository 12.5-25 mg, 12.5-25 mg, Rectal, Q4HRS PRN, Anil Langley, P.A.-C.  •  prochlorperazine (COMPAZINE) injection 5-10 mg, 5-10 mg, Intravenous, Q4HRS PRN, Anil Langley, P.A.-C.  •  levETIRAcetam (Keppra) 1000 mg in 100 mL NaCl IV premix, 1,000 mg, Intravenous, Q12HRS, Anil Langley, P.A.-C., Stopped at 04/29/21 0519  •  norepinephrine (Levophed) 8 mg in 250 mL NS infusion (premix), 0-30 mcg/min, Intravenous, Continuous, Gerard Alexander M.D., Stopped at 04/29/21 1043  •  ipratropium-albuterol (DUONEB) nebulizer solution, 3 mL, Nebulization, Q4H PRN (RT), Alexi Dumont M.D., Stopped at 04/28/21 1037  •  labetalol (NORMODYNE/TRANDATE) injection 10 mg, 10 mg, Intravenous, Q4HRS PRN, Alexi Dumont M.D.  •  cefTRIAXone (ROCEPHIN) 2 g in  mL IVPB, 2 g, Intravenous, BID, Molly Mcmahan M.D., Stopped at 04/29/21 0534  •  Pharmacy Consult: Enteral tube insertion - review meds/change route/product selection, 1 Each, Other, PHARMACY TO DOSE, Gerard Alexander M.D.  •  acetaminophen (TYLENOL) tablet 1,000 mg, 1,000 mg, Enteral Tube, Q6HRS, Gerard Alexander M.D., 1,000 mg at 04/29/21 0504  •   ARIPiprazole (Abilify) tablet 15 mg, 15 mg, Enteral Tube, BID, Gerard Alexander M.D., 15 mg at 04/29/21 0505  •  atorvastatin (LIPITOR) tablet 80 mg, 80 mg, Enteral Tube, Nightly, Gerard Alexander M.D., 80 mg at 04/28/21 2041  •  carBAMazepine (TEGRETOL) tablet 200 mg, 200 mg, Enteral Tube, TID, Gerard Alexander M.D., 200 mg at 04/29/21 0819  •  cyanocobalamin (VITAMIN B-12) tablet 1,000 mcg, 1,000 mcg, Enteral Tube, DAILY, Gerard Alexander M.D., 1,000 mcg at 04/29/21 0505  •  senna-docusate (PERICOLACE or SENOKOT S) 8.6-50 MG per tablet 1 tablet, 1 tablet, Enteral Tube, Nightly, Gerard Alexander M.D., 1 tablet at 04/28/21 2041  •  vitamin D (cholecalciferol) tablet 5,000 Units, 5,000 Units, Enteral Tube, DAILY, Gerard Alexander M.D., 5,000 Units at 04/29/21 0504  •  docusate sodium (Colace) oral solution 100 mg, 100 mg, Enteral Tube, BID, Gerard Alexander M.D., 100 mg at 04/29/21 0504  •  famotidine (PEPCID) tablet 20 mg, 20 mg, Enteral Tube, BID, 20 mg at 04/29/21 0504 **OR** famotidine (PEPCID) injection 20 mg, 20 mg, Intravenous, BID, Gerard Alexander M.D., 20 mg at 04/28/21 1749  •  fentaNYL (SUBLIMAZE) injection 50 mcg, 50 mcg, Intravenous, Q15 MIN PRN, 50 mcg at 04/29/21 0024 **AND** fentaNYL (SUBLIMAZE) injection 100 mcg, 100 mcg, Intravenous, Q15 MIN PRN, 100 mcg at 04/29/21 0624 **AND** fentaNYL (SUBLIMAZE) 50 mcg/mL in 50mL (Continuous Infusion), , Intravenous, Continuous, Stopped at 04/28/21 2118 **AND** propofol (DIPRIVAN) injection, 0-80 mcg/kg/min, Intravenous, Continuous, Last Rate: 15.2 mL/hr at 04/29/21 0550, 35 mcg/kg/min at 04/29/21 0550 **AND** [START ON 5/1/2021] Triglyceride, , , Every 3 Days (0300), FRANK MaierRSavananhN.  •  acetylcysteine (MUCOMYST) 20 % solution 3-4 mL, 3-4 mL, Inhalation, Q4HRS (RT), Alexi Dumont M.D., 4 mL at 04/29/21 1028  •  albuterol (PROVENTIL) 2.5mg/0.5ml nebulizer solution 2.5 mg, 2.5 mg, Nebulization, Q4HRS (RT), Alexi Dumont M.D., 2.5 mg at 04/29/21 1028  •  oxyCODONE  immediate release (ROXICODONE) tablet 10 mg, 10 mg, Oral, Q4HRS PRN, Carl Jane M.D., 10 mg at 04/27/21 0753  •  insulin glargine (Lantus) injection, 15 Units, Subcutaneous, Q EVENING, Miriam Cano M.D., 15 Units at 04/28/21 1824  •  lidocaine (LIDODERM) 5 % 3 Patch, 3 Patch, Transdermal, Q24HR, Miriam Cano M.D., 3 Patch at 04/28/21 1751    Physical Examination:     Vitals:    04/29/21 0800 04/29/21 0900 04/29/21 1000 04/29/21 1029   BP: 114/63 115/58 115/59    Pulse: 79 79 78 81   Resp: 18 20 19 (!) 23   Temp:       TempSrc:       SpO2: 96% 98% 98% 97%   Weight:       Height:         *NOTE: The patient is intubated and sedated with propofol at time of exam.    General: Sedated and intubated  Eyes: examination of optic disks not indicated at this time given acuity of consult  CV: RRR    NEUROLOGICAL EXAM:     Mental status: Briefly and sluggishly opens eyes to sternal rub, does not follow commands  Speech and language: intubated  Cranial nerve exam: Pupils are equal, round and reactive to light bilaterally. Visual fields: does not blink to threat bilaterally. Gaze in neutral position. Corneal reflexes intact bilaterally. VOR absent. Face is symmetric. Unable to assess sensation in the face due to mental status. Cough and gag reflexes are absent.  Motor exam: Does not participate in formal strength testing. No spontaneous limb movements observed. Tone is decreased throughout. No abnormal movements were seen on exam.  Sensory exam: no response to noxious stimuli x4 extremities  Deep tendon reflexes:  Toes mute bilaterally  Coordination: not participatory due to mental status  Gait: deferred due to ICU status    Objective Data:    Labs:  Lab Results   Component Value Date/Time    PROTHROMBTM 14.5 04/29/2021 03:24 AM    INR 1.09 04/29/2021 03:24 AM      Lab Results   Component Value Date/Time    WBC 14.3 (H) 04/29/2021 03:24 AM    RBC 4.06 (L) 04/29/2021 03:24 AM    HEMOGLOBIN 12.1 (L) 04/29/2021 03:24 AM     HEMATOCRIT 36.8 (L) 04/29/2021 03:24 AM    MCV 90.6 04/29/2021 03:24 AM    MCH 29.8 04/29/2021 03:24 AM    MCHC 32.9 (L) 04/29/2021 03:24 AM    MPV 12.4 04/29/2021 03:24 AM    NEUTSPOLYS 82.00 (H) 04/26/2021 01:56 AM    LYMPHOCYTES 7.40 (L) 04/26/2021 01:56 AM    MONOCYTES 8.00 04/26/2021 01:56 AM    EOSINOPHILS 0.30 04/26/2021 01:56 AM    BASOPHILS 0.40 04/26/2021 01:56 AM      Lab Results   Component Value Date/Time    SODIUM 129 (L) 04/29/2021 03:24 AM    POTASSIUM 4.0 04/29/2021 03:24 AM    CHLORIDE 98 04/29/2021 03:24 AM    CO2 23 04/29/2021 03:24 AM    GLUCOSE 214 (H) 04/29/2021 03:24 AM    BUN 28 (H) 04/29/2021 03:24 AM    CREATININE 0.53 04/29/2021 03:24 AM      Lab Results   Component Value Date/Time    CHOLSTRLTOT 140 04/26/2021 01:56 AM    LDL 69 04/26/2021 01:56 AM    HDL 11 (A) 04/26/2021 01:56 AM    TRIGLYCERIDE 174 (H) 04/28/2021 12:27 PM       Lab Results   Component Value Date/Time    ALKPHOSPHAT 103 (H) 04/29/2021 03:24 AM    ASTSGOT 26 04/29/2021 03:24 AM    ALTSGPT 22 04/29/2021 03:24 AM    TBILIRUBIN 0.3 04/29/2021 03:24 AM        Imaging/Testing:    I interpreted and/or reviewed the patient's neuroimaging    DX-CHEST-PORTABLE (1 VIEW)   Final Result      Mild left basilar opacity may represent atelectasis. Infection not excluded.      Improved aeration of the left lung.      Atherosclerotic plaque.         EC-ECHOCARDIOGRAM COMPLETE W/O CONT   Final Result      IR-DRAIN-BLADDER SUPRAPUB W/CATH   Final Result      1.     Ultrasound and fluoroscopic guided placement of a 16 [place] tip silicone suprapubic bladder catheter.      2.     Cystogram documenting catheter placement.         MR-BRAIN-W/O   Final Result      Acute large right cerebellar posterior inferior cerebellar artery territory infarct with possible small amount of petechial hemorrhage.      MR-MRA HEAD-W/O   Final Result      Findings suggesting right vertebral artery occlusion.      MR-MRA NECK-WITH & W/O AND SEQUENCES    Final Result      1.  Small caliber right vertebral artery which is not visualized on the noncontrast time-of-flight technique could be related to retrograde flow or diminutive caliber and sluggish flow.   2.  Possible moderate focal stenosis in the mid cervical left vertebral artery.   3.  No significant carotid stenosis.      DX-ABDOMEN FOR TUBE PLACEMENT   Final Result         Feeding tube with tip projecting over the expected area of the distal stomach.      DX-CHEST-PORTABLE (1 VIEW)   Final Result      1.  Worsening consolidation of LEFT hemithorax with shift of mediastinal structures to the LEFT suggesting atelectasis, possibly due to endobronchial process.   2.  Supportive tubing as described above.   3.  No pneumothorax.      DX-CHEST-PORTABLE (1 VIEW)   Final Result         Endotracheal tube with tip projecting over the mid thoracic trachea.      Layering left pleural effusion with left lower lung opacity.      DX-SPINE-ANY ONE VIEW   Final Result      Digitized intraoperative radiograph is submitted for review.  This examination is not for diagnostic purpose but for guidance during a surgical procedure.      DX-PORTABLE FLUOROSCOPY < 1 HOUR   Final Result      Portable fluoroscopy utilized for 2 seconds.         INTERPRETING LOCATION: [address] [place] [address], [facility]      CT-HEAD W/O   Final Result         1.  Low-density in the region of the right cerebellar hemisphere, appearance compatible with evolving infarct.      These findings were discussed with the patient's on-call clinician, Deniz, on 4/28/2021 6:14 AM.      MR-THORACIC SPINE-WITH & W/O   Final Result      1.  Posterior epidural fluid collection/abscess within the lower cervical spine extending inferiorly to about the T6 level which could represent epidural abscess and/or hematoma. This measures 8 mm in maximal thickness at T2-T3 with at least moderate    thecal sac stenosis.   2.  Abnormal signal within the cervical cord and upper thoracic  cord suggesting cord edema or infectious/inflammatory etiology.   3.  Lower cervical spine findings are detailed on earlier report.   4.  Prominent enhancement along the surface of the mid and lower thoracic cord is of uncertain etiology and as detailed above, possibly representing leptomeningeal disease/infection. See details above.   These findings were discussed with Dr. Cano on 4/28/2021 10:01 AM.         MR-LUMBAR SPINE-WITH & W/O   Final Result      No evidence of lumbar spine infection or epidural abscess.      Mild spondylosis as detailed above without spinal stenosis.      DX-CHEST-PORTABLE (1 VIEW)   Final Result      1.  Hypoinflation with left basilar atelectasis.   2.  Mild perihilar interstitial prominence may be related to hypoinflation or edema.      MR-CERVICAL SPINE-WITH & W/O   Final Result      1.  There is multiseptated abnormal peripherally enhancing epidural collection noted extending from C2 to the visualized lower thoracic level. Largest collection is noted in the upper thoracic posterior epidural space at the levels of T2 and T3. The    lower extent of the collection is not imaged. This is causing multilevel effacement of the thecal sac and cord compression. The thoracic spinal cord is anteriorly displaced and compressed at the levels of T2 and T3. Pre and postcontrast MR examination of    the thoracic spine is recommended for further evaluation.   2.  There is effacement of the cervical thecal sac due to the multiseptated epidural fluid collection.   3.  The cervical spinal cord is mildly enlarged with minimal increased T2 signal intensity. The possibility of cord inflammation cannot be entirely excluded.   4.  Abnormal T2 hyperintensity at C5-6 disc space likely representing discitis.   5.  Abnormal bone marrow edema of C5, C6 and C7 vertebral bodies with edema concerning for osteomyelitis.   6.  There is also focal enhancement of C6 vertebral body likely secondary to the osteomyelitis.    7.  Prevertebral edema/fluid collection.   8.  Nonvisualization of flow void of bilateral vertebral arteries concerning for age-indeterminate bilateral vertebral occlusions.         CT-CHEST (THORAX) WITH   Final Result      No displaced rib fracture is identified.      No acute cardiopulmonary process is seen.      Atherosclerotic plaque including coronary artery calcification.      CT-TSPINE W/O PLUS RECONS   Final Result      No CT evidence of acute traumatic abnormality.      Mild T6/7 anterior wedging compatible with healed mild chronic compression fracture and there is interbody fusion.      CT-LSPINE W/O PLUS RECONS   Final Result      No CT evidence of acute traumatic injury.      CT-CSPINE WITHOUT PLUS RECONS   Final Result      Multilevel degenerative changes.      Prevertebral edema. Further evaluation with MRI is recommended as this can be seen in the setting of ligamentous injury.      Bilateral carotid atherosclerotic plaque.         EC-CHRISTOPHER W/O CONT    (Results Pending)       Assessment and Plan:    Perry Davis is a 60 y.o. man, history per chart review of anginal syndrome, MI with CABG x4 in 2015, DM, HLD, HTN, CKD, seizure, sleep apnea, tobacco use who presented to to [facility] ED by private vehicle on 4/25/21 with complaint of acute on chronic neck pain s/p slip and fall in shower. Additionally, the patient complained of back pain for 1 week for which he sought care at urgent care on two occassions and was treated with Toradol injection, as well as Tylenol with codeine, which subsequently made him feel dizzy and lose his balance. He has had leukocytosis ongoing since arrival, with blood cultures drawn 4/25 growing Streptococcus species. An MRI C-spine on 4/26 showed epidural collection noted from C2-T3 with largest collection noted in upper thoracic posterior epidural space at T2-T3 causing multilevel cord compression. Also with likely discitis at C5-6 and osteomyelitis at C5-C7. He went for  emergent surgery with Dr. New Hazel: C3, C4, C5, C6. C7. T1, T2 laminectomy; decompression of thecal sac and nerve roots. 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 A non hemorrhagic right cerebellar stroke was diagnosed intraoperatively on CT. NIH Score: 33. He was admitted to ICU, and is being treated per ID for strep anginosus bacteremia secondary to epidural abscess. MRI Brain w/o shows acute large right cerebellar posterior inferior cerebellar artery territory infarct with possible small amount of petechial hemorrhage. MRA head and neck 4/28 shows right vertebral artery occlusion and possible moderate focal stenosis in the mid cervical left vertebral artery. Etiology of stroke is most easily explained by large vessel disease of the right vertebral artery. However, there is concern for embolic stroke secondary to drug use given spinal epidural abscess and bacteremia. TTE shows EF 60%, no PFO, mild concentric left ventricular hypertrophy, normal left arial size. A CHRISTOPHER has been ordered to further evaluate concern for possible endocarditis which has a high potential to embolize. UDS is positive for benzos, THC, opiates and oxycodone. For secondary stroke prevention, recommend ASA 81mg and high intensity statin. Timing of ASA to be guided by Neurosurgery. He has been started on high intensity statin, atorvastatin 40 mg q HS, LDL is 69, at goal of less than 70. Hemoglobin A1C is elevated at 12.2. Goal is less than 7. In regards to his profound encephalopathy this is more likely explained by sepsis than stroke and hopefully will improve with antimicrobial therapy. He remains admitted to ICU with guarded prognosis.    Plan:    - q 2 hour neuro check, VS per protocol  - BP goal is normotension  - obtain normoglycemia and avoid hypo- or hyper -natremia; aim for normothermia  - secondary stroke prevention therapy with ASA 325mg qd once cleared by neurosurgery  - high intensity statin per SPARCL  Trial  - CHRISTOPHER to evluate for endocarditis, ordered  - evaluate and treat with PT/OT/ST when appropriate.    The evaluation of the patient, and recommended management, was discussed with attending neurologist, Dr. Zac Montero, Intensivist, Dr. Alexi Dumont and the ICU team.    Corinne Canavero, APRN  [facility] Neurohospitalist Service

## 2021-04-29 NOTE — THERAPY
Occupational Therapy Contact Note    OT reconsult received post surgery. New R cerebellar stroke found, pt agitated w/ self extubation and reintubation, still awaiting custom  brace. Will hold and attempt once appropriate.    J[place]ica Orlando, OTR/L

## 2021-04-29 NOTE — CONSULTS
"    Physical Medicine and Rehabilitation Consultation         Initial Consult      Initial Consultation Date: 4/29/2021  Consulting provider: Dr. Alexander  Reason for consultation: assess for acute inpatient rehab appropriateness  LOS: 4 Day(s)      Chief complaint: Neck pain      HPI:   The patient is a 60 y.o. male with a past medical history of MI with CABG x4 (2015), DM, HL, HTN, CKD, hx of seizure, MAINOR, tobacco use;  who presented on 4/25/2021 11:18 AM with acute on chronic neck pain after a fall in the shower. No neurological deficits noted in the ER. MRI C spine with epidural collection in C2-T3, causing multilevel cord compression. Cervical spinal epidural abscess, cervical stenosis, thoracic stenosis, cervicothoracic myelopathy s/p C3-T2 laminectomy, decompression, and abscess removal on 4/28 with Dr. New Hazel. Nonhemorrhagic right cerebellar stroke identified intraoperatively on CT. Hospital course includes osteomyelitis and discitis.  A1c 12.2%. Intubated for airway protection, near self-extubation, sedated again afterwards.       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    • Bronchitis     \"I cough\"   • Diabetes (HCC)     oral medication . 5/21/18-AVG am=90's.   • Heart burn     Treated with Zantac.   • High cholesterol    • Hypertension    • Indigestion     Treated with Zantac.    • Infectious disease 1989 or 1+990    had staph infection in spine, had PICC line, rash all over body,  then thoracic fusion   • Marijuana use 05/21/2018    Daily use for pain control.   • Myocardial infarct (HCC) 04/05/2014    CABG-2015. Cardiologist in [facility].   • " "Pain     hip, knees   • Pain 05/21/2018    \"all over\"   • Psychiatric problem     depression, anxiety, bipolar    • Renal disorder     Hx of renal stone.   • Seizure (HCC)     last seizure 4/5/2014-unknown etiology, but possibly related to stress.   • Sleep apnea     no CPAP, no supplemental oxygen   • Snoring    • Urinary bladder disorder     5/21/18-currently has donald cath to leg bag.   • Urinary incontinence     urethral strictures.         PSH:  Past Surgical History:   Procedure Laterality Date   • CERVICAL LAMINECTOMY POSTERIOR Bilateral 4/28/2021    Procedure: LAMINECTOMY, SPINE, CERVICAL, POSTERIOR APPROACH - C3-T2 REMOVAL EPIDURAL ABSCESS;  Surgeon: New Hazel III, M.D.;  Location: SURGERY [facility];  Service: Neurosurgery   • GROIN EXPLORATION  7/10/2018    Procedure: GROIN EXPLORATION;  Surgeon: Valdez Zuleta M.D.;  Location: SURGERY [facility];  Service: Urology   • URETHRECTOMY  7/5/2018    Procedure: PERINEAL URETHROSTOMY;  Surgeon: Valdez Zuleta M.D.;  Location: SURGERY [facility];  Service: Urology   • EVACUATION OF HEMATOMA Bilateral 7/5/2018    Procedure: EVACUATION OF HEMATOMA;  Surgeon: Valdez Zuleta M.D.;  Location: SURGERY [facility];  Service: Urology   • CYSTOSCOPY  04/24/2018    SP cath placed.   • HEMORRHOIDECTOMY  02/2016   • HIP ARTHROSCOPY Right 01/2016   • KNEE ARTHROSCOPY Left 01/2016   • LITHOTRIPSY  2016    unsuccessful   • CYSTOSCOPY  2016    S/P unsuccessful lithotripsy   • MULTIPLE CORONARY ARTERY BYPASS  07/2015    4 vessel   • OTHER SURGICAL PROCEDURE Right 1993    Closed some veins in leg.   • THORACIC FUSION POSTERIOR  late 1980's         FHX: Reviewed.  Family History   Problem Relation Age of Onset   • Heart Attack Father 75   • Heart Disease Father 59        CABG   • Heart Disease Sister 55        CABG                 Medications:  Current Facility-Administered Medications   Medication Dose   • Pharmacy Consult Request ...Pain " Management Review 1 Each  1 Each   • MD ALERT...DO NOT ADMINISTER NSAIDS or ASPIRIN unless ORDERED By Neurosurgery 1 Each  1 Each   • senna-docusate (PERICOLACE or SENOKOT S) 8.6-50 MG per tablet 1 tablet  1 tablet   • polyethylene glycol/lytes (MIRALAX) PACKET 1 Packet  1 Packet   • magnesium hydroxide (MILK OF MAGNESIA) suspension 30 mL  30 mL   • bisacodyl (DULCOLAX) suppository 10 mg  10 mg   • fleet enema 133 mL  1 Each   • Respiratory Therapy Consult     • ondansetron (ZOFRAN) syringe/vial injection 4 mg  4 mg   • ondansetron (ZOFRAN ODT) dispertab 4 mg  4 mg   • promethazine (PHENERGAN) tablet 12.5-25 mg  12.5-25 mg   • promethazine (PHENERGAN) suppository 12.5-25 mg  12.5-25 mg   • prochlorperazine (COMPAZINE) injection 5-10 mg  5-10 mg   • levETIRAcetam (Keppra) 1000 mg in 100 mL NaCl IV premix  1,000 mg   • norepinephrine (Levophed) 8 mg in 250 mL NS infusion (premix)  0-30 mcg/min   • ipratropium-albuterol (DUONEB) nebulizer solution  3 mL   • labetalol (NORMODYNE/TRANDATE) injection 10 mg  10 mg   • cefTRIAXone (ROCEPHIN) 2 g in  mL IVPB  2 g   • Pharmacy Consult: Enteral tube insertion - review meds/change route/product selection  1 Each   • acetaminophen (TYLENOL) tablet 1,000 mg  1,000 mg   • ARIPiprazole (Abilify) tablet 15 mg  15 mg   • atorvastatin (LIPITOR) tablet 80 mg  80 mg   • carBAMazepine (TEGRETOL) tablet 200 mg  200 mg   • cyanocobalamin (VITAMIN B-12) tablet 1,000 mcg  1,000 mcg   • senna-docusate (PERICOLACE or SENOKOT S) 8.6-50 MG per tablet 1 tablet  1 tablet   • vitamin D (cholecalciferol) tablet 5,000 Units  5,000 Units   • docusate sodium (Colace) oral solution 100 mg  100 mg   • famotidine (PEPCID) tablet 20 mg  20 mg    Or   • famotidine (PEPCID) injection 20 mg  20 mg   • fentaNYL (SUBLIMAZE) injection 50 mcg  50 mcg    And   • fentaNYL (SUBLIMAZE) injection 100 mcg  100 mcg    And   • fentaNYL (SUBLIMAZE) 50 mcg/mL in 50mL (Continuous Infusion)      And   • propofol  "(DIPRIVAN) injection  0-80 mcg/kg/min   • acetylcysteine (MUCOMYST) 20 % solution 3-4 mL  3-4 mL   • albuterol (PROVENTIL) 2.5mg/0.5ml nebulizer solution 2.5 mg  2.5 mg   • oxyCODONE immediate release (ROXICODONE) tablet 10 mg  10 mg   • insulin glargine (Lantus) injection  15 Units   • insulin regular (HumuLIN R,NovoLIN R) injection  2-9 Units    And   • dextrose 50% (D50W) injection 50 mL  50 mL   • lidocaine (LIDODERM) 5 % 3 Patch  3 Patch       Allergies:  No Known Allergies      Vitals: /59   Pulse 78   Temp 36.2 °C (97.1 °F) (Temporal)   Resp 18   Ht 1.753 m (5' 9.02\")   Wt 89.6 kg (197 lb 8.5 oz)   SpO2 94%           Labs: Reviewed and significant for 4/29: Hgb 12.1, Na 129, K 4, Cr 0.53  Recent Labs     04/27/21  0256 04/28/21  0310 04/29/21  0324   RBC 5.07 4.56* 4.06*   HEMOGLOBIN 15.4 13.9* 12.1*   HEMATOCRIT 44.0 40.7* 36.8*   PLATELETCT 77* 105* 194   PROTHROMBTM  --   --  14.5   INR  --   --  1.09     Recent Labs     04/27/21  1152 04/28/21  0310 04/29/21  0324   SODIUM 123* 125* 129*   POTASSIUM 3.6 4.0 4.0   CHLORIDE 91* 92* 98   CO2 21 19* 23   GLUCOSE 164* 173* 214*   BUN 16 20 28*   CREATININE 0.44* 0.49* 0.53   CALCIUM 7.8* 7.7* 7.7*     Recent Results (from the past 24 hour(s))   POCT glucose device results    Collection Time: 04/28/21  9:58 AM   Result Value Ref Range    Glucose - Accu-Ck 196 (H) 65 - 99 mg/dL   POCT arterial blood gas device results    Collection Time: 04/28/21 10:46 AM   Result Value Ref Range    Ph 7.314 (L) 7.400 - 7.500    Pco2 36.1 26.0 - 37.0 mmHg    Po2 78 64 - 87 mmHg    Tco2 19 (L) 20 - 33 mmol/L    S02 94 93 - 99 %    Hco3 18.3 17.0 - 25.0 mmol/L    BE -7 (L) -4 - 3 mmol/L    Body Temp see below degrees    O2 Therapy 100 %    iPF Ratio 78     Specimen Arterial     DelSys Vent     End Tidal Carbon Dioxide 32 mmhg    Tidal Volume 430 mL    Peep End Expiratory Pressure 12 cmh20    Set Rate 22     Mode APV-CMV    BLOOD CULTURE    Collection Time: 04/28/21 " 11:34 AM    Specimen: Peripheral; Blood   Result Value Ref Range    Significant Indicator NEG     Source BLD     Site PERIPHERAL     Culture Result       No Growth  Note: Blood cultures are incubated for 5 days and  are monitored continuously.Positive blood cultures  are called to the RN and reported as soon as  they are identified.     BLOOD CULTURE    Collection Time: 21 11:34 AM    Specimen: Peripheral; Blood   Result Value Ref Range    Significant Indicator NEG     Source BLD     Site PERIPHERAL     Culture Result       No Growth  Note: Blood cultures are incubated for 5 days and  are monitored continuously.Positive blood cultures  are called to the RN and reported as soon as  they are identified.     Triglycerides Starting now and then Every 3 Days    Collection Time: 21 12:27 PM   Result Value Ref Range    Triglycerides 174 (H) 0 - 149 mg/dL   Hemoglobin A1C    Collection Time: 21 12:27 PM   Result Value Ref Range    Glycohemoglobin 12.2 (H) 4.0 - 5.6 %    Est Avg Glucose 303 mg/dL   EKG    Collection Time: 21  2:17 PM   Result Value Ref Range    Report       [facility]    Test Date:  2021  Pt Name:    GILDARDO MAGANA                Department: [facility]  MRN:        [id number]                      Room:       [facility]  Gender:     Male                         Technician: [facility]  :        1960                   Requested By:YESICA SULLIVAN  Order #:    [id number]                    Reading MD: Robert Perdomo MD    Measurements  Intervals                                Axis  Rate:       83                           P:          81  NV:         144                          QRS:        39  QRSD:       88                           T:          76  QT:         371  QTc:        436    Interpretive Statements  SINUS RHYTHM  LOW VOLTAGE IN FRONTAL LEADS  ATRIAL PREMATURE COMPLEX  Electronically Signed On 2021 15:21:51 PDT by Robert Perdomo MD     Urine drug screen    Collection Time:  04/28/21  5:36 PM   Result Value Ref Range    Amphetamines Urine Negative Negative    Barbiturates Negative Negative    Benzodiazepines Positive (A) Negative    Cocaine Metabolite Negative Negative    Methadone Negative Negative    Opiates Positive (A) Negative    Oxycodone Positive (A) Negative    Phencyclidine -Pcp Negative Negative    Propoxyphene Negative Negative    Cannabinoid Metab Positive (A) Negative   POCT glucose device results    Collection Time: 04/28/21  6:23 PM   Result Value Ref Range    Glucose - Accu-Ck 197 (H) 65 - 99 mg/dL   EC-ECHOCARDIOGRAM COMPLETE W/O CONT    Collection Time: 04/28/21  8:26 PM   Result Value Ref Range    Eject.Frac. MOD BP 62.73     Eject.Frac. MOD 4C 46.6     Eject.Frac. MOD 2C 55     Left Ventrical Ejection Fraction 60    POCT glucose device results    Collection Time: 04/28/21  8:44 PM   Result Value Ref Range    Glucose - Accu-Ck 198 (H) 65 - 99 mg/dL   Prealbumin    Collection Time: 04/29/21  3:24 AM   Result Value Ref Range    Pre-Albumin 7.3 (L) 18.0 - 38.0 mg/dL   CBC WITHOUT DIFFERENTIAL    Collection Time: 04/29/21  3:24 AM   Result Value Ref Range    WBC 14.3 (H) 4.8 - 10.8 K/uL    RBC 4.06 (L) 4.70 - 6.10 M/uL    Hemoglobin 12.1 (L) 14.0 - 18.0 g/dL    Hematocrit 36.8 (L) 42.0 - 52.0 %    MCV 90.6 81.4 - 97.8 fL    MCH 29.8 27.0 - 33.0 pg    MCHC 32.9 (L) 33.7 - 35.3 g/dL    RDW 47.8 35.9 - 50.0 fL    Platelet Count 194 164 - 446 K/uL    MPV 12.4 9.0 - 12.9 fL   Comp Metabolic Panel    Collection Time: 04/29/21  3:24 AM   Result Value Ref Range    Sodium 129 (L) 135 - 145 mmol/L    Potassium 4.0 3.6 - 5.5 mmol/L    Chloride 98 96 - 112 mmol/L    Co2 23 20 - 33 mmol/L    Anion Gap 8.0 7.0 - 16.0    Glucose 214 (H) 65 - 99 mg/dL    Bun 28 (H) 8 - 22 mg/dL    Creatinine 0.53 0.50 - 1.40 mg/dL    Calcium 7.7 (L) 8.5 - 10.5 mg/dL    AST(SGOT) 26 12 - 45 U/L    ALT(SGPT) 22 2 - 50 U/L    Alkaline Phosphatase 103 (H) 30 - 99 U/L    Total Bilirubin 0.3 0.1 - 1.5 mg/dL     Albumin 2.0 (L) 3.2 - 4.9 g/dL    Total Protein 5.4 (L) 6.0 - 8.2 g/dL    Globulin 3.4 1.9 - 3.5 g/dL    A-G Ratio 0.6 g/dL   MAGNESIUM    Collection Time: 04/29/21  3:24 AM   Result Value Ref Range    Magnesium 2.2 1.5 - 2.5 mg/dL   Prothrombin Time (INR)    Collection Time: 04/29/21  3:24 AM   Result Value Ref Range    PT 14.5 12.0 - 14.6 sec    INR 1.09 0.87 - 1.13   CRP QUANTITIVE (NON-CARDIAC)    Collection Time: 04/29/21  3:24 AM   Result Value Ref Range    Stat C-Reactive Protein 21.82 (H) 0.00 - 0.75 mg/dL   ESTIMATED GFR    Collection Time: 04/29/21  3:24 AM   Result Value Ref Range    GFR If African American >60 >60 mL/min/1.73 m 2    GFR If Non African American >60 >60 mL/min/1.73 m 2   POCT arterial blood gas device results    Collection Time: 04/29/21  6:32 AM   Result Value Ref Range    Ph 7.336 (L) 7.400 - 7.500    Pco2 42.4 (H) 26.0 - 37.0 mmHg    Po2 74 64 - 87 mmHg    Tco2 24 20 - 33 mmol/L    S02 94 93 - 99 %    Hco3 22.6 17.0 - 25.0 mmol/L    BE -3 -4 - 3 mmol/L    Body Temp see below degrees    O2 Therapy 70 %    iPF Ratio 106     Specimen Arterial     DelSys Vent     End Tidal Carbon Dioxide 27 mmhg    Tidal Volume 520 mL    Peep End Expiratory Pressure 12 cmh20    Set Rate 22     Mode APV-CMV    POCT glucose device results    Collection Time: 04/29/21  8:23 AM   Result Value Ref Range    Glucose - Accu-Ck 201 (H) 65 - 99 mg/dL           Imaging:  MR-BRAIN-W/O  Result Date: 4/28/2021 4/28/2021 3:31 PM HISTORY/REASON FOR EXAM:  Altered mental status; cerebellar stroke. TECHNIQUE/EXAM DESCRIPTION: MRI of the brain without contrast. T1 sagittal, T2 fast spin-echo axial, T1 coronal, FLAIR coronal, diffusion-weighted and apparent diffusion coefficient (ADC map) axial images were obtained of the whole brain. The study was performed on a "[employer]a 1.5 Britt MRI scanner. COMPARISON:  Head CT earlier in the day FINDINGS: Large acute right cerebellar infarct suggesting right posterior inferior  cerebellar artery territory. There is prominent T2 hyperintensity consistent with cytotoxic edema. A small amount of blooming artifact in the medial aspect of the right cerebellar hemisphere on GRE images could represent a small amount of petechial hemorrhage. There is mild localized mass effect with some mild mass effect on the fourth ventricle. No supratentorial infarct or hemorrhage. No extra-axial fluid collection. No midline shift or hydrocephalus. There is a nasogastric tube and fluid within the nasopharynx. Endotracheal tube partially visualized. Mild posterior ethmoid sinus mucosal thickening.     Acute large right cerebellar posterior inferior cerebellar artery territory infarct with possible small amount of petechial hemorrhage.      MR-CERVICAL SPINE-WITH & W/O  Result Date: 4/27/2021 4/27/2021 1:02 PM HISTORY/REASON FOR EXAM:  Neck pain, abnormal neuro exam; Neck pain, initial exam; sepsis 2/2 strep bacteremia  -unknown source. rule out possible ?? retropharyngeal abscess, ??prevertebral abscess. TECHNIQUE/EXAM DESCRIPTION: MRI of the cervical spine without and with contrast. The study was performed on a [employer] Signa 1.5 Britt MRI scanner. T1 sagittal, T2 fast spin-echo sagittal, and gradient echo axial images were obtained of the cervical spine. T1 post-contrast fat suppressed sagittal images were obtained of the cervical spine. Optional T1 post-contrast axial images may be obtained. 15 mL ProHance contrast was administered intravenously. COMPARISON: None. FINDINGS: There is abnormal disc T2 hyperintensity at C5-6. Mild amount of bone marrow edema is noted at C5, C6 and C7. There is also focal bone marrow enhancement at C6. There is multiseptated abnormal peripherally enhancing epidural collection noted extending from C2 to the visualized lower thoracic level. Largest collection is noted in the upper thoracic posterior epidural space at the levels of T2 and T3. The lower extent of the collection is not  imaged. This is causing multilevel effacement of the thecal sac and cord compression. The thoracic spinal cord is anteriorly displaced and compressed at the levels of T2 and T3. At the level of C2-3,there is small left anterior epidural fluid collection. At the level of C3-4,there is small left anterior epidural fluid collection causing indentation of the thecal sac. At the level of C4-5,there is minimal epidural fluid collection. At the level of C5-6,there is diffuse disc bulge along with right posterior lateral epidural fluid collection causing severe canal compromise. At the level of C6-7,there is mild diffuse disc bulge. There is epidural fluid collection causing severe canal compromise. At the level of C7-T1,there is epidural fluid collection causing severe canal compromise. The visualized brain parenchyma is unremarkable. The cervical spinal cord is mildly enlarged which demonstrates mild increased intramedullary T2 signal intensity. There is abnormal T2 hyperintensity in the visualized bilateral vertebral arteries concerning for age indeterminate occlusion. There is mild amount of edema in the pre and retropharyngeal soft tissue.     1.  There is multiseptated abnormal peripherally enhancing epidural collection noted extending from C2 to the visualized lower thoracic level. Largest collection is noted in the upper thoracic posterior epidural space at the levels of T2 and T3. The lower extent of the collection is not imaged. This is causing multilevel effacement of the thecal sac and cord compression. The thoracic spinal cord is anteriorly displaced and compressed at the levels of T2 and T3. Pre and postcontrast MR examination of  the thoracic spine is recommended for further evaluation. 2.  There is effacement of the cervical thecal sac due to the multiseptated epidural fluid collection. 3.  The cervical spinal cord is mildly enlarged with minimal increased T2 signal intensity. The possibility of cord  inflammation cannot be entirely excluded. 4.  Abnormal T2 hyperintensity at C5-6 disc space likely representing discitis. 5.  Abnormal bone marrow edema of C5, C6 and C7 vertebral bodies with edema concerning for osteomyelitis. 6.  There is also focal enhancement of C6 vertebral body likely secondary to the osteomyelitis. 7.  Prevertebral edema/fluid collection. 8.  Nonvisualization of flow void of bilateral vertebral arteries concerning for age-indeterminate bilateral vertebral occlusions.         MR-MRA NECK-WITH & W/O AND SEQUENCES  Result Date: 4/28/2021 4/28/2021 3:31 PM HISTORY/REASON FOR EXAM:  Emergency Medical Condition ? Stroke Cerebellar stroke TECHNIQUE/EXAM DESCRIPTION: MRA of the cervical carotid and vertebral arteries without and with contrast. The study was performed on a [employer] Signa 1.5 Britt MRI scanner. Precontrast MRA of the cervical carotid and vertebral arteries was performed with 2D time-of-flight (TOF) technique with acquisition in the axial plane. MRA of the arch origins of the great vessels, and cervical carotid and vertebral arteries was performed with 2D time-of-flight (TOF) technique with coronal slab acquisition from the level of the aortic arch to the carotid siphons during dynamic intravenous infusion of 15 mL of ProHance contrast. Images are reviewed at the PACS workstation as source images as well as maximum intensity ray projection (MIP) multiplanar 3D reconstructions. Cervical internal carotid artery percent stenosis is calculated using the standard method according to the NASCET criteria wherein a segment of uniform caliber distal cervical internal carotid is used as the reference denominator. COMPARISON:  None available. FINDINGS: The arch origins of the great vessels are intact. The cervical right vertebral artery is not well seen on the time-of-flight images. A very small caliber cervical right vertebral artery is seen on the postcontrast images with some mildly increased  caliber of the artery at about the C1-C2 level. The fact  that it is not seen on the time-of-flight images and is visualized on contrast enhanced sequence could be related to small nondominant caliber with sluggish flow or could represent retrograde flow within the right vertebral artery. There may be a focal moderate stenosis in the mid cervical left internal carotid artery and mild narrowing at its origin. Mild narrowing of the proximal left internal carotid artery with less than 50% stenosis. No significant right carotid stenosis is seen.     1.  Small caliber right vertebral artery which is not visualized on the noncontrast time-of-flight technique could be related to retrograde flow or diminutive caliber and sluggish flow. 2.  Possible moderate focal stenosis in the mid cervical left vertebral artery. 3.  No significant carotid stenosis.      MR-THORACIC SPINE-WITH & W/O  Result Date: 4/28/2021 4/27/2021 11:23 PM HISTORY/REASON FOR EXAM:  Mid-back pain, compression fracture suspected; possible epidural abscess/fluid collection TECHNIQUE/EXAM DESCRIPTION: MRI of the thoracic spine without and with contrast. The study was performed on a [employer] Signa 1.5 Britt MRI scanner. T1 sagittal, T2 fast spin-echo sagittal, and T2 axial images were obtained of the thoracic spine. T1 post-contrast fat suppressed sagittal images were obtained. Optional T1 post-contrast axial images may be obtained. 14 mL ProHance contrast was administered intravenously. COMPARISON:  MRI of the cervical spine one-day prior. FINDINGS: There is abnormal dorsal epidural fluid collection seen within the partially visualized lower cervical spine and which extends inferiorly to about the T6 level. The maximum thickness is seen at about the T2-T3 level measuring 8 mm. This raises concern for epidural abscess, although epidural hematoma could also have this appearance. From T1 through T3 there is at least moderate thecal sac stenosis due to the epidural  fluid collection. There is significant abnormal signal within the partially visualized  lower cervical and upper thoracic cord which could be infectious/inflammatory or other nonspecific cord edema. There is also suggestion of a small ventral epidural fluid collection at C6-C7. There is also partially visualized abnormal marrow edema in the C6 and C7 vertebral bodies as detailed on earlier MRI report. There is suggestion of some prominent enhancement around the mid and lower thoracic cord seen on the sagittal postcontrast images however limited evaluation on axial images due to motion artifact. This is of uncertain etiology but could represent some leptomeningeal disease/infection. On the sagittal T2 images there is a questionable slight nodular appearance on the surface of lower thoracic cord. A differential would be a subtle spinal dural aVF. There is no abnormal signal seen within the mid/lower thoracic cord. There is no evidence of discitis osteomyelitis within the thoracic spine. There is T6-T7 interbody ankylosis. There is mild disc degeneration and some prominent anterolateral bridging osteophytes in the mid and lower thoracic spine. No significant posterior disc herniation is seen. Within the mid and lower thoracic spine there  is no significant spinal stenosis.     1.  Posterior epidural fluid collection/abscess within the lower cervical spine extending inferiorly to about the T6 level which could represent epidural abscess and/or hematoma. This measures 8 mm in maximal thickness at T2-T3 with at least moderate thecal sac stenosis. 2.  Abnormal signal within the cervical cord and upper thoracic cord suggesting cord edema or infectious/inflammatory etiology. 3.  Lower cervical spine findings are detailed on earlier report. 4.  Prominent enhancement along the surface of the mid and lower thoracic cord is of uncertain etiology and as detailed above, possibly representing leptomeningeal disease/infection. See  details above. These findings were discussed with Dr. Cano on 4/28/2021 10:01 AM.         EC-ECHOCARDIOGRAM COMPLETE W/O CONT  Result Date: 4/28/2021  Transthoracic Echo Report Echocardiography Laboratory CONCLUSIONS No prior study is available for comparison. Normal left ventricular systolic function.  Left ventricular ejection fraction is visually estimated to be 60%. Normal diastolic function. Agitated saline (bubble) study was performed. No evidence of right to left shunt by agitated saline challenge. Normal inferior vena cava size and inspiratory collapse.              ASSESSMENT:  Patient is a 60 y.o. male admitted with acute on chronic neck pain, found to have cervical spinal epidural abscess, cervical stenosis, thoracic stenosis, cervicothoracic myelopathy s/p C3-T2 laminectomy, decompression, and abscess removal on 4/28 with Dr. New Hazel. Nonhemorrhagic right cerebellar stroke identified intraoperatively on CT. Hospital course includes osteomyelitis, discitis, urinary retention s/p IR suprapubic cath, uncontrolled DM/A1C12%, and hyponatremia     #Rehab:   -Vitals: Ventilator, stable  -Insurance: Medicare  -Discharge support: lives with mother, may be able to assist  -Rehab Impairment Code: 0001.1 - Stroke: Left Body Involvement (Right Brain) and 0004.1211 - Spinal Cord Dysfunction, Non-Traumatic: Quadriplegia, Incomplete C1-4  -Reason for admission: Spinal epidural abscess  -Ongoing medical management, including IV fentanyl, propofol, ventilator, levophed  -Pending therapy eval when feasible   -Dispo planning based on clinical progress, functional level, and discharge support availability     #Neuro: right cerebellar stroke; MRI brain with large right cerebellar PICA infarct  -hx of seizure disorder: Carbamazepine, Keppra      #Spine: Cervical spinal epidural abscess, cervical stenosis, thoracic stenosis, cervicothoracic myelopathy s/p C3-T2 laminectomy, decompression, and abscess removal on 4/28 with  Dr. New Hazel  -monitor for SCI based on MRI findings     #CV: MI with CABG x4 (2015), HL, HTN, EF60%  -atorvastatin, levophed drip    #GI: Pepcid     #Mood: abilify    #Pain: Tylenol, Lidoderm, oxycodone     #ID: Strep sepsis, epidural abscess, discitis, osteomyelitis; s/p surgical removal of abscess   -IV ceftriaxone     #DM: A1C 12.2%; DKA, improving   -SSI, lantus     #Supp: Vit B12, Vit D     #Pulm: Mucomyst, albuterol   -Ventilator: IV fentanyl, propofol      #Hyponatremia: Na 129 on 4/29     #Leukocytosis: WBC 14.3 on 4/29    #Anemia: Hgb 12.1 on 4/29     #Bowel: at risk of neurogenic bowel from SCI  -1x on 4/25, Colace, senna s    #Bladder: hx of urethrostomy (2018), unable to place donald through it; neurogenic bladder managed with cath  -s/p IR placement of suprapubic cath on 4/28    #DVT PPX: SCDs        Thank you for allowing us to participate in the care of this patient.             Marcello Puga MD  Physical Medicine and Rehabilitation   4/29/2021

## 2021-04-29 NOTE — DISCHARGE PLANNING
Hospital Care Management Team Assessment    In the case of an emergency, pt's NOK is Mary Davis (Mother) [phone].     This RNCM completed the following assessment via chart review.   Pt lives in a one story house with his mother. Prior to current hospitalization, pt was independent with ADLS/IADLS. Pt has a walker at home. Pt has a history of marijuana use and anxiety. Pt has no AD on file. Pt's discharge plan is to be determined, Pt currently requiring ventilator support and ICU level of care.       Care Transition Team Assessment    Information Source  Orientation Level: Unable to assess  Information Given By: Other (Comments)(chart review)  Who is responsible for making decisions for patient? : Legal next of kin  Name(s) of Primary Decision Maker: Mother- see emergency contacts    Readmission Evaluation  Is this a readmission?: No    Elopement Risk  Legal Hold: No  Ambulatory or Self Mobile in Wheelchair: No-Not an Elopement Risk  Disoriented: (sonal)  Psychiatric Symptoms: None  History of Wandering: No  Elopement this Admit: No  Vocalizing Wanting to Leave: No  Displays Behaviors, Body Language Wanting to Leave: No-Not at Risk for Elopement  Elopement Risk: Not at Risk for Elopement    Interdisciplinary Discharge Planning  Primary Care Physician: FRANCISCO J Maynard  Lives with - Patient's Self Care Capacity: Parents  Patient or legal guardian wants to designate a caregiver: Yes  Caregiver name: Maryam Davis  Caregiver contact info: [phone]  ([facility] [place]) Authorization for Release of Health Information has been completed: Yes(Only Mother (Maryam))  Housing / Facility: 1 Story House  Prior Services: None  Durable Medical Equipment: Walker    Discharge Preparedness  What is your plan after discharge?: Uncertain - pending medical team collaboration  What are your discharge supports?: Parent  Prior Functional Level: Ambulatory, Independent with Activities of Daily Living, Independent with Medication  Management    Functional Assesment  Prior Functional Level: Ambulatory, Independent with Activities of Daily Living, Independent with Medication Management    Vision / Hearing Impairment  Vision Impairment : No  Hearing Impairment : Yes  Hearing Impairment: Both Ears  Does Pt Need Special Equipment for the Hearing Impaired?: Yes-But Does not Need for Facility to Arrange Equipment    Advance Directive  Advance Directive?: Clinically incapacitated / Inappropriate    Domestic Abuse  Have you ever been the victim of abuse or violence?: No  Physical Abuse or Sexual Abuse: No  Verbal Abuse or Emotional Abuse: No  Possible Abuse/Neglect Reported to:: Not Applicable    Psychological Assessment  History of Substance Abuse: Marijuana  History of Psychiatric Problems: (anxiety)  Non-compliant with Treatment: Yes(left AMA from urgent care 3 days prior to admission)  Newly Diagnosed Illness: Yes     Anticipated Discharge Information  Discharge Disposition: Still a Patient (30)

## 2021-04-29 NOTE — RESPIRATORY CARE
Ventilator Daily Summary    Vent Day #1, 8.0@ 27    CMV:22, 430, +12, 70%    Treatments: mucomyst and albuterol Q4    Ventilator settings changed this shift:none  Weaning trials:none    Respiratory Procedures:none    Plan: Continue current ventilator settings and wean mechanical ventilation as tolerated per physician orders.

## 2021-04-29 NOTE — DOCUMENTATION QUERY
[facility]                                                                       Query Response Note      PATIENT:               GILDARDO MAGANA  ACCT #:                  [id number]  MRN:                     [id number]  :                      1960  ADMIT DATE:       2021 11:18 AM  DISCH DATE:          RESPONDING  PROVIDER #:        [id number]           QUERY TEXT:    BAL is documented in the .bronchoscopy procedure note.  With the current documentation, the location of the BAL is not clear. Based upon the clinical findings, risk factors, and treatment can this procedure be further specified?  Per coding guidelines  BAL location must be specified:    NOTE:  If an appropriate response is not listed below, please respond with a new note.     BAL was performed on:        The patient's Clinical Indicators include:  Per bronchoscopy report...therapeutic lavage with all areas of lungs suctioned to clear.  hospitalist PN acute hypoxic respiratory failure    Treatment:  Bronchoscopy with BAL, mechanical ventilation    Risk factors: Sepsis, acute hypoxic respiratory failure, current overall critical condition    Delfina Corrales RN  Clinical   [email or web address]  Options provided:   -- Lower lung lobe, left   -- Lower lung lobe, right   -- Lung lingual   -- Middle lung lobe, right   -- Middle lung lobe, left   -- Upper lung lobe, right   -- Lung, left   -- Lung, right   -- Lungs, bilateral   -- Unable to determine      Query created by: Delfina Corrales on 2021 10:51 AM    RESPONSE TEXT:    Lungs, bilateral          Electronically signed by:  YESICA SULLIVAN MD 2021 3:25 PM

## 2021-04-29 NOTE — PROGRESS NOTES
Infectious Disease Progress Note    Author: Molly Mcmahan M.D. Date & Time of service: 2021  9:53 AM    Chief Complaint:  Bacteremia and cervical abscess    Interval History:      Review of Systems:  Review of Systems   Unable to perform ROS: Intubated       Hemodynamics:  Temp (24hrs), Av.2 °C (97.2 °F), Min:36.2 °C (97.1 °F), Max:36.4 °C (97.5 °F)  Temperature: 36.2 °C (97.1 °F), Monitored Temp: 37.4 °C (99.3 °F)  Pulse  Av.5  Min: 72  Max: 121   Blood Pressure: 103/59, Arterial BP: 110/53       Physical Exam:  Physical Exam  Constitutional:       Appearance: He is not ill-appearing.   Cardiovascular:      Rate and Rhythm: Tachycardia present.      Heart sounds: Normal heart sounds.   Pulmonary:      Effort: Pulmonary effort is normal.      Comments: Coarse breath sounds bilaterally  Abdominal:      General: Abdomen is flat. Bowel sounds are normal. There is no distension.      Palpations: Abdomen is soft.      Tenderness: There is no abdominal tenderness. There is no guarding.   Musculoskeletal:      Right lower leg: No edema.      Left lower leg: No edema.      Comments: Neck drain in place with serosanguineous fluid   Skin:     General: Skin is warm and dry.   Neurological:      Comments: Intubated and sedated, not following commands, not moving extremities   Psychiatric:      Comments: Agitated         Meds:    Current Facility-Administered Medications:   •  MD Alert...Adult ICU Electrolyte Replacement per Pharmacy  •  lidocaine  •  Pharmacy Consult Request  •  MD ALERT...DO NOT ADMINISTER NSAIDS or ASPIRIN unless ORDERED By Neurosurgery  •  senna-docusate  •  polyethylene glycol/lytes  •  magnesium hydroxide  •  bisacodyl  •  fleet  •  Respiratory Therapy Consult  •  ondansetron  •  ondansetron  •  promethazine  •  promethazine  •  prochlorperazine  •  levETIRAcetam (Keppra) IV  •  norepinephrine (Levophed) infusion  •  ipratropium-albuterol  •  labetalol  •  cefTRIAXone (ROCEPHIN) IV  •   Pharmacy  •  acetaminophen  •  ARIPiprazole  •  atorvastatin  •  carBAMazepine  •  cyanocobalamin  •  senna-docusate  •  vitamin D  •  docusate sodium  •  famotidine **OR** famotidine  •  fentaNYL **AND** fentaNYL **AND** fentaNYL **AND** propofol **AND** [START ON 5/1/2021] Triglyceride  •  acetylcysteine  •  albuterol  •  oxyCODONE immediate-release  •  insulin glargine  •  insulin regular **AND** POC blood glucose manual result **AND** NOTIFY MD and PharmD **AND** [DISCONTINUED] glucose **AND** dextrose 50%  •  lidocaine    Labs:  Recent Labs     04/27/21  0256 04/28/21  0310 04/29/21  0324   WBC 16.4* 15.2* 14.3*   RBC 5.07 4.56* 4.06*   HEMOGLOBIN 15.4 13.9* 12.1*   HEMATOCRIT 44.0 40.7* 36.8*   MCV 86.8 89.3 90.6   MCH 30.4 30.5 29.8   RDW 43.7 45.8 47.8   PLATELETCT 77* 105* 194   MPV 12.4 12.5 12.4     Recent Labs     04/27/21  1152 04/28/21  0310 04/29/21  0324   SODIUM 123* 125* 129*   POTASSIUM 3.6 4.0 4.0   CHLORIDE 91* 92* 98   CO2 21 19* 23   GLUCOSE 164* 173* 214*   BUN 16 20 28*     Recent Labs     04/27/21  0248 04/27/21  0248 04/27/21  1152 04/28/21 0310 04/29/21  0324   ALBUMIN 2.4*  --   --  2.0* 2.0*   TBILIRUBIN 1.2  --   --  0.8 0.3   ALKPHOSPHAT 140*  --   --  109* 103*   TOTPROTEIN 5.8*  --   --  5.4* 5.4*   ALTSGPT 32  --   --  22 22   ASTSGOT 21  --   --  18 26   CREATININE 0.41*   < > 0.44* 0.49* 0.53    < > = values in this interval not displayed.       Imaging:  CT-CSPINE WITHOUT PLUS RECONS    Result Date: 4/25/2021 4/25/2021 1:29 PM HISTORY/REASON FOR EXAM: History of back and neck pain. Fall. TECHNIQUE/EXAM DESCRIPTION: CT cervical spine without contrast, with reconstructions. Helical scanning was performed from the skull base through T1.  Sagittal and coronal multiplanar reconstructions were generated from the axial images. Low dose optimization technique was utilized for this CT exam including automated exposure control and adjustment of the mA and/or kV according to patient  size. COMPARISON:  None available. FINDINGS: No compression fracture is identified. There is an ununited fracture of the spinous process of T1. Intervertebral disc space narrowing and endplate spurring is most prominent at C6/C7. There are degenerative changes of the facet joints. C1/C2 alignment is maintained. There is multilevel uncovertebral spurring and neural foraminal narrowing. There is cervical prevertebral edema. There is carotid atherosclerotic plaque bilaterally. Visualized lung apices are clear.     Multilevel degenerative changes. Prevertebral edema. Further evaluation with MRI is recommended as this can be seen in the setting of ligamentous injury. Bilateral carotid atherosclerotic plaque.     CT-CHEST (THORAX) WITH    Result Date: 4/25/2021 4/25/2021 1:29 PM HISTORY/REASON FOR EXAM:  Rib fracture suspected, traumatic. TECHNIQUE/EXAM DESCRIPTION: CT scan of the chest with contrast. Helical images were obtained from the lung apices through the adrenal glands following the bolus administration of  80 mL of Omnipaque 350 nonionic contrast. Sagittal and coronal reconstructions were performed. Low dose optimization technique was utilized for this CT exam including automated exposure control and adjustment of the mA and/or kV according to patient size. COMPARISON:  None. FINDINGS: There is atherosclerotic plaque. There is coronary artery calcification. The heart is not enlarged. No pericardial or pleural effusion is seen. There are small mediastinal lymph nodes. There is no hilar or axillary lymphadenopathy. No pneumothorax or pulmonary contusion is seen. Central airways are patent. Limited views were obtained of the upper abdomen. Hypodensity along the falciform ligament likely represents focal fat. Patient is status post median sternotomy. Degenerative changes are seen in the spine. No displaced rib fracture is identified.     No displaced rib fracture is identified. No acute cardiopulmonary process is  seen. Atherosclerotic plaque including coronary artery calcification.    CT-HEAD W/O    Result Date: 4/28/2021 4/28/2021 5:20 AM HISTORY/REASON FOR EXAM: Altered mental status TECHNIQUE/EXAM DESCRIPTION:  CT of the head without contrast. Sequential axial images were obtained from the vertex to the skull base without contrast. Up to date radiation dose reduction adjustments have been utilized to meet ALARA standards for radiation dose reduction. COMPARISON: None FINDINGS: There is moderate diffuse parenchymal volume loss observed. Periventricular and subcortical white matter low-attenuation changes are seen, most commonly associated with small vessel ischemic disease. The ventricles are normal in caliber and configuration. Area of low-density within the right cerebellar hemisphere is seen. There are no abnormal extra axial fluid collections or extra axial hemorrhage identified. The visualized paranasal sinuses and mastoid air cells are well aerated bilaterally. No depressed calvarial fractures are identified. The visualized globes and retrobulbar soft tissues appear within normal limits.     1.  Low-density in the region of the right cerebellar hemisphere, appearance compatible with evolving infarct. These findings were discussed with the patient's on-call clinician, Deniz, on 4/28/2021 6:14 AM.    CT-LSPINE W/O PLUS RECONS    Result Date: 4/25/2021 4/25/2021 1:29 PM HISTORY/REASON FOR EXAM:  Back pain after injury. TECHNIQUE/EXAM DESCRIPTION AND NUMBER OF VIEWS: CT lumbar spine without contrast, with reconstructions. The study was performed on a [employer] CT scanner. Thin-section helical scanning was performed from T12-L1 to the sacrum. Sagittal and coronal multiplanar reconstructions were generated from the axial images. Low dose optimization technique was utilized for this CT exam including automated exposure control and adjustment of the mA and/or kV according to patient size. COMPARISON: None. FINDINGS: Alignment of  the lumbar spine is normal. There is no acute fracture or subluxation. The prevertebral and paraspinous soft tissues show no acute abnormality. There is severe atherosclerosis with a mixture of soft and calcified plaque. There is lumbosacral junction vacuum disc phenomenon with endplate spurring, disc height loss The visualized sacrum and sacroiliac joints show no acute abnormality.     No CT evidence of acute traumatic injury.    CT-TSPINE W/O PLUS RECONS    Result Date: 4/25/2021 4/25/2021 1:29 PM HISTORY/REASON FOR EXAM:  Mid-back trauma Pain following injury. TECHNIQUE/EXAM DESCRIPTION AND NUMBER OF VIEWS: CT thoracic spine without contrast, with reconstructions. The study was performed on a [employer] CT scanner. Thin-section helical scanning was performed from C7-T1 through T12-L1. Sagittal and coronal multiplanar reconstructions were generated from the axial images. Low dose optimization technique was utilized for this CT exam including automated exposure control and adjustment of the mA and/or kV according to patient size. COMPARISON: None. FINDINGS: Alignment of the thoracic spine is normal. There is no acute displaced fracture. There is T6/7 interbody fusion with mild anterior wedging likely indicating remote mild compression fracture that has healed There is bulky endplate spurring with some bridging syndesmophytes in the mid thoracic spine Sternotomy wires The cervicothoracic junction appears intact. The prevertebral and paraspinous soft tissues show no acute abnormality.     No CT evidence of acute traumatic abnormality. Mild T6/7 anterior wedging compatible with healed mild chronic compression fracture and there is interbody fusion.    DX-CERVICAL SPINE-2 OR 3 VIEWS    Result Date: 4/22/2021 4/22/2021 6:16 PM HISTORY/REASON FOR EXAM:  Atraumatic Pain. Neck pain for 4 days. TECHNIQUE/EXAM DESCRIPTION AND NUMBER OF VIEWS: Cervical spine series, 2 views. COMPARISON:  None. FINDINGS: Mild reversal of curvature  centered at the C5 level. Vertebral body heights are preserved. Multilevel loss of disc height and osteophyte formation. Cervicothoracic junction is obscured. Prevertebral soft tissues are within normal limits. Odontoid is grossly intact.  Lateral masses of C1 are grossly intact.     1.  Limited exam.  Cervicothoracic junction is obscured. 2.  No gross cervical spine fracture or subluxation. 3.  Moderate multilevel degenerative changes.    DX-CHEST-PORTABLE (1 VIEW)    Result Date: 4/28/2021 4/28/2021 12:10 PM HISTORY/REASON FOR EXAM:  CENTRAL LINE PLACEMENT; CENTRAL LINE PLACEMENT. TECHNIQUE/EXAM DESCRIPTION AND NUMBER OF VIEWS: Single portable view of the chest. COMPARISON: 4/28/2021 11:17 AM FINDINGS: Mediastinal structures are shifted to the LEFT.  Increasing opacification of LEFT hemithorax. RIGHT lung is clear. Endotracheal tube in place with tip approximately 5 cm above the you. Feeding tube tip in place however tip is off the image. RIGHT internal jugular catheter tip at the lower SVC. No pneumothorax. Postoperative change from prior open heart surgery. Postoperative change of the neck with surgical drain present.     1.  Worsening consolidation of LEFT hemithorax with shift of mediastinal structures to the LEFT suggesting atelectasis, possibly due to endobronchial process. 2.  Supportive tubing as described above. 3.  No pneumothorax.    DX-CHEST-PORTABLE (1 VIEW)    Result Date: 4/28/2021 4/28/2021 11:16 AM HISTORY/REASON FOR EXAM:  replaced ETT; replaced ETT TECHNIQUE/EXAM DESCRIPTION AND NUMBER OF VIEWS: Single portable view of the chest. COMPARISON: 4/27/2021 FINDINGS: Endotracheal tube with tip projecting over the mid thoracic trachea. Hazy opacity in the left lower lobe Layering left pleural effusion. No pneumothorax. Stable cardiopericardial silhouette.     Endotracheal tube with tip projecting over the mid thoracic trachea. Layering left pleural effusion with left lower lung  opacity.    DX-CHEST-PORTABLE (1 VIEW)    Result Date: 4/27/2021 4/27/2021 5:30 PM HISTORY/REASON FOR EXAM:  Shortness of Breath. TECHNIQUE/EXAM DESCRIPTION AND NUMBER OF VIEWS: Single portable view of the chest. COMPARISON: None. FINDINGS: LUNGS: Hypoinflation with left basilar atelectasis. Mild perihilar interstitial prominence. No effusions. PNEUMOTHORAX: None. LINES AND TUBES: None. MEDIASTINUM: No cardiomegaly. MUSCULOSKELETAL STRUCTURES: No acute displaced fracture. Median sternotomy.     1.  Hypoinflation with left basilar atelectasis. 2.  Mild perihilar interstitial prominence may be related to hypoinflation or edema.    DX-SPINE-ANY ONE VIEW    Result Date: 4/28/2021 4/28/2021 5:30 AM HISTORY/REASON FOR EXAM:  Cervical laminectomy TECHNIQUE/EXAM DESCRIPTION AND NUMBER OF VIEWS:  Single view of the spine. Digitized Intraoperative Radiograph FINDINGS: Fixation hardware projecting over the cervical spine Fluoroscopic time:2 seconds     Digitized intraoperative radiograph is submitted for review.  This examination is not for diagnostic purpose but for guidance during a surgical procedure.    MR-BRAIN-W/O    Result Date: 4/28/2021 4/28/2021 3:31 PM HISTORY/REASON FOR EXAM:  Altered mental status; cerebellar stroke. TECHNIQUE/EXAM DESCRIPTION: MRI of the brain without contrast. T1 sagittal, T2 fast spin-echo axial, T1 coronal, FLAIR coronal, diffusion-weighted and apparent diffusion coefficient (ADC map) axial images were obtained of the whole brain. The study was performed on a [employer] 1.5 Britt MRI scanner. COMPARISON:  Head CT earlier in the day FINDINGS: Large acute right cerebellar infarct suggesting right posterior inferior cerebellar artery territory. There is prominent T2 hyperintensity consistent with cytotoxic edema. A small amount of blooming artifact in the medial aspect of the right cerebellar hemisphere on GRE images could represent a small amount of petechial hemorrhage. There is mild  localized mass effect with some mild mass effect on the fourth ventricle. No supratentorial infarct or hemorrhage. No extra-axial fluid collection. No midline shift or hydrocephalus. There is a nasogastric tube and fluid within the nasopharynx. Endotracheal tube partially visualized. Mild posterior ethmoid sinus mucosal thickening.     Acute large right cerebellar posterior inferior cerebellar artery territory infarct with possible small amount of petechial hemorrhage.    MR-CERVICAL SPINE-WITH & W/O    Result Date: 4/27/2021 4/27/2021 1:02 PM HISTORY/REASON FOR EXAM:  Neck pain, abnormal neuro exam; Neck pain, initial exam; sepsis 2/2 strep bacteremia  -unknown source. rule out possible ?? retropharyngeal abscess, ??prevertebral abscess. TECHNIQUE/EXAM DESCRIPTION: MRI of the cervical spine without and with contrast. The study was performed on a [employer] 1.5 Britt MRI scanner. T1 sagittal, T2 fast spin-echo sagittal, and gradient echo axial images were obtained of the cervical spine. T1 post-contrast fat suppressed sagittal images were obtained of the cervical spine. Optional T1 post-contrast axial images may be obtained. 15 mL ProHance contrast was administered intravenously. COMPARISON: None. FINDINGS: There is abnormal disc T2 hyperintensity at C5-6. Mild amount of bone marrow edema is noted at C5, C6 and C7. There is also focal bone marrow enhancement at C6. There is multiseptated abnormal peripherally enhancing epidural collection noted extending from C2 to the visualized lower thoracic level. Largest collection is noted in the upper thoracic posterior epidural space at the levels of T2 and T3. The lower extent of the collection is not imaged. This is causing multilevel effacement of the thecal sac and cord compression. The thoracic spinal cord is anteriorly displaced and compressed at the levels of T2 and T3. At the level of C2-3,there is small left anterior epidural fluid collection. At the level of  C3-4,there is small left anterior epidural fluid collection causing indentation of the thecal sac. At the level of C4-5,there is minimal epidural fluid collection. At the level of C5-6,there is diffuse disc bulge along with right posterior lateral epidural fluid collection causing severe canal compromise. At the level of C6-7,there is mild diffuse disc bulge. There is epidural fluid collection causing severe canal compromise. At the level of C7-T1,there is epidural fluid collection causing severe canal compromise. The visualized brain parenchyma is unremarkable. The cervical spinal cord is mildly enlarged which demonstrates mild increased intramedullary T2 signal intensity. There is abnormal T2 hyperintensity in the visualized bilateral vertebral arteries concerning for age indeterminate occlusion. There is mild amount of edema in the pre and retropharyngeal soft tissue.     1.  There is multiseptated abnormal peripherally enhancing epidural collection noted extending from C2 to the visualized lower thoracic level. Largest collection is noted in the upper thoracic posterior epidural space at the levels of T2 and T3. The lower extent of the collection is not imaged. This is causing multilevel effacement of the thecal sac and cord compression. The thoracic spinal cord is anteriorly displaced and compressed at the levels of T2 and T3. Pre and postcontrast MR examination of  the thoracic spine is recommended for further evaluation. 2.  There is effacement of the cervical thecal sac due to the multiseptated epidural fluid collection. 3.  The cervical spinal cord is mildly enlarged with minimal increased T2 signal intensity. The possibility of cord inflammation cannot be entirely excluded. 4.  Abnormal T2 hyperintensity at C5-6 disc space likely representing discitis. 5.  Abnormal bone marrow edema of C5, C6 and C7 vertebral bodies with edema concerning for osteomyelitis. 6.  There is also focal enhancement of C6  vertebral body likely secondary to the osteomyelitis. 7.  Prevertebral edema/fluid collection. 8.  Nonvisualization of flow void of bilateral vertebral arteries concerning for age-indeterminate bilateral vertebral occlusions.     MR-LUMBAR SPINE-WITH & W/O    Result Date: 4/28/2021 4/27/2021 11:23 PM HISTORY/REASON FOR EXAM:  Back pain or radiculopathy, cancer or infection suspected; Strep bacteremia, back pain, bilateral leg numbness TECHNIQUE/EXAM DESCRIPTION: MRI of the lumbar spine without and with contrast. The study was performed on a [employer] 1.5 Britt MRI scanner. T1 sagittal, T2 fast spin-echo sagittal, and T2 axial images were obtained of the lumbar spine. T1 postcontrast fat-suppressed sagittal images were obtained. 14 mL ProHance contrast was administered intravenously. COMPARISON:  None. FINDINGS: Normal lumbar lordosis. Preservation of vertebral body heights, alignment and bone marrow signal. No evidence of discitis osteomyelitis. Conus medullaris terminates at T12-L1 and is normal in signal. No mass or fluid collection is seen within the lumbar spinal canal. T12-L1: Canal and foramina are patent. L1-L2: Mild disc bulge. Canal and foramina are patent. L2-L3: Minimal disc bulge and facet degeneration. Canal and foramina are patent. L3-L4: Minimal disc bulge and facet degeneration. Canal and foramina are patent.. L4-L5: Mild disc bulge and facet degeneration. No significant spinal stenosis. Mild foraminal narrowing. L5-S1: Disc narrowing, mild disc osteophyte. No significant spinal stenosis. Moderate right and mild-to-moderate left foraminal narrowing. Distended urinary bladder.     No evidence of lumbar spine infection or epidural abscess. Mild spondylosis as detailed above without spinal stenosis.    MR-MRA HEAD-W/O    Result Date: 4/28/2021 4/28/2021 3:31 PM HISTORY/REASON FOR EXAM:  Emergency Medical Condition ? Stroke. Cerebellar infarct TECHNIQUE/EXAM DESCRIPTION: MRA of the head ([place]) without contrast. The study was performed on a [employer] 1.5 Britt MRI scanner. MRA of the [place] was performed with 3D time-of-flight technique with axial acquisition. Additional MRA of the internal carotid and vertebrobasilar arteries at the level of the skull base and craniocervical junction was also performed with 3D time-of-flight technique with axial acquisition. Images are reviewed at the PACS workstation as axial source images as well as maximum intensity ray projection (MIP) multiplanar 3D reconstructions. COMPARISON:  None FINDINGS: Nicole cavernous and supraclinoid ICA segments are patent. Patent left posterior communicating artery. MCA and ARA branches are patent. There is no significant flow within the intradural right vertebral artery. The intradural left vertebral artery, basilar artery and posterior cerebral arteries are patent. No significant aneurysm or high flow vascular malformation is seen.     Findings suggesting right vertebral artery occlusion.    MR-MRA NECK-WITH & W/O AND SEQUENCES    Result Date: 4/28/2021 4/28/2021 3:31 PM HISTORY/REASON FOR EXAM:  Emergency Medical Condition ? Stroke Cerebellar stroke TECHNIQUE/EXAM DESCRIPTION: MRA of the cervical carotid and vertebral arteries without and with contrast. The study was performed on a [employer] 1.5 Britt MRI scanner. Precontrast MRA of the cervical carotid and vertebral arteries was performed with 2D time-of-flight (TOF) technique with acquisition in the axial plane. MRA of the arch origins of the great vessels, and cervical carotid and vertebral arteries was performed with 2D time-of-flight (TOF) technique with coronal slab acquisition from the level of the aortic arch to the carotid siphons during dynamic intravenous infusion of 15 mL of ProHance contrast. Images are reviewed at the PACS workstation as source images as well as maximum intensity ray projection (MIP) multiplanar 3D reconstructions. Cervical internal  carotid artery percent stenosis is calculated using the standard method according to the NASCET criteria wherein a segment of uniform caliber distal cervical internal carotid is used as the reference denominator. COMPARISON:  None available. FINDINGS: The arch origins of the great vessels are intact. The cervical right vertebral artery is not well seen on the time-of-flight images. A very small caliber cervical right vertebral artery is seen on the postcontrast images with some mildly increased caliber of the artery at about the C1-C2 level. The fact  that it is not seen on the time-of-flight images and is visualized on contrast enhanced sequence could be related to small nondominant caliber with sluggish flow or could represent retrograde flow within the right vertebral artery. There may be a focal moderate stenosis in the mid cervical left internal carotid artery and mild narrowing at its origin. Mild narrowing of the proximal left internal carotid artery with less than 50% stenosis. No significant right carotid stenosis is seen.     1.  Small caliber right vertebral artery which is not visualized on the noncontrast time-of-flight technique could be related to retrograde flow or diminutive caliber and sluggish flow. 2.  Possible moderate focal stenosis in the mid cervical left vertebral artery. 3.  No significant carotid stenosis.    MR-THORACIC SPINE-WITH & W/O    Result Date: 4/28/2021 4/27/2021 11:23 PM HISTORY/REASON FOR EXAM:  Mid-back pain, compression fracture suspected; possible epidural abscess/fluid collection TECHNIQUE/EXAM DESCRIPTION: MRI of the thoracic spine without and with contrast. The study was performed on a [employer] 1.5 Britt MRI scanner. T1 sagittal, T2 fast spin-echo sagittal, and T2 axial images were obtained of the thoracic spine. T1 post-contrast fat suppressed sagittal images were obtained. Optional T1 post-contrast axial images may be obtained. 14 mL ProHance contrast was  administered intravenously. COMPARISON:  MRI of the cervical spine one-day prior. FINDINGS: There is abnormal dorsal epidural fluid collection seen within the partially visualized lower cervical spine and which extends inferiorly to about the T6 level. The maximum thickness is seen at about the T2-T3 level measuring 8 mm. This raises concern for epidural abscess, although epidural hematoma could also have this appearance. From T1 through T3 there is at least moderate thecal sac stenosis due to the epidural fluid collection. There is significant abnormal signal within the partially visualized  lower cervical and upper thoracic cord which could be infectious/inflammatory or other nonspecific cord edema. There is also suggestion of a small ventral epidural fluid collection at C6-C7. There is also partially visualized abnormal marrow edema in the C6 and C7 vertebral bodies as detailed on earlier MRI report. There is suggestion of some prominent enhancement around the mid and lower thoracic cord seen on the sagittal postcontrast images however limited evaluation on axial images due to motion artifact. This is of uncertain etiology but could represent some leptomeningeal disease/infection. On the sagittal T2 images there is a questionable slight nodular appearance on the surface of lower thoracic cord. A differential would be a subtle spinal dural aVF. There is no abnormal signal seen within the mid/lower thoracic cord. There is no evidence of discitis osteomyelitis within the thoracic spine. There is T6-T7 interbody ankylosis. There is mild disc degeneration and some prominent anterolateral bridging osteophytes in the mid and lower thoracic spine. No significant posterior disc herniation is seen. Within the mid and lower thoracic spine there  is no significant spinal stenosis.     1.  Posterior epidural fluid collection/abscess within the lower cervical spine extending inferiorly to about the T6 level which could  represent epidural abscess and/or hematoma. This measures 8 mm in maximal thickness at T2-T3 with at least moderate thecal sac stenosis. 2.  Abnormal signal within the cervical cord and upper thoracic cord suggesting cord edema or infectious/inflammatory etiology. 3.  Lower cervical spine findings are detailed on earlier report. 4.  Prominent enhancement along the surface of the mid and lower thoracic cord is of uncertain etiology and as detailed above, possibly representing leptomeningeal disease/infection. See details above. These findings were discussed with Dr. Cano on 4/28/2021 10:01 AM.     IR-DRAIN-BLADDER SUPRAPUB W/CATH    Result Date: 4/28/2021  HISTORY/REASON FOR EXAM:  60-year-old man with urinary retention. TECHNIQUE/EXAM DESCRIPTION AND NUMBER OF VIEWS:  Ultrasound and fluoroscopic guided suprapubic bladder catheter placement, Cystogram. MEDICATIONS: The patient remained on his ICU sedation regimen. FLUOROSCOPY TIME: 0.3 minutes; 5fluoroscopic images obtained CONTRAST: 40mL Omnipaque 300, intraurinary PROCEDURE:  Informed consent was obtained. The suprapubic region was prepped and draped in sterile manner. Following local anesthesia with copious 1% lidocaine, under ultrasound and fluoroscopic monitoring, an 18-gauge needle was advanced into the urinary bladder from a paramedian suprapubic approach. An Amplatz guidewire was placed in the urinary bladder. Serial tract dilatation was performed with a 22 [place]e telescoping dilator. A 16 [place] tip silicone Gomez type catheter was then placed in the balloon inflated with 10 mL of sterile saline. A cystogram was performed with AP and both oblique views demonstrating satisfactory position of the catheter with all side holes within the urinary bladder. The catheter was secured to the skin with 2-0 nylon suture and connected to gravity drainage. The Gomez catheter was removed. The patient tolerated the procedure well with no evidence of complication.  COMPARISON: None FINDINGS:  The cystogram shows satisfactory catheter position with all sideholes within the urinary bladder. There is no extravasation.     1.     Ultrasound and fluoroscopic guided placement of a 16 [place] tip silicone suprapubic bladder catheter. 2.     Cystogram documenting catheter placement.     DX-PORTABLE FLUOROSCOPY < 1 HOUR    Result Date: 2021 5:30 AM HISTORY/REASON FOR EXAM:  Cervical laminectomy TECHNIQUE/EXAM DESCRIPTION AND NUMBER OF VIEWS: Portable fluoroscopy for less than one hour in OR. FINDINGS: The portable fluoroscopy unit was obligated to the procedure for less than one hour. Actual fluoro time was 2 seconds.     Portable fluoroscopy utilized for 2 seconds. INTERPRETING LOCATION: [address] [place] [address], [id number]    EC-ECHOCARDIOGRAM COMPLETE W/O CONT    Result Date: 2021  Transthoracic Echo Report Echocardiography Laboratory CONCLUSIONS No prior study is available for comparison. Normal left ventricular systolic function.  Left ventricular ejection fraction is visually estimated to be 60%. Normal diastolic function. Agitated saline (bubble) study was performed. No evidence of right to left shunt by agitated saline challenge. Normal inferior vena cava size and inspiratory collapse. GILDARDO MAGANA Exam Date:         2021                    19:42 Exam Location:     Inpatient Priority:          Routine Ordering Physician:        YESICA SULLIVAN Referring Physician:       [id number] Marie Sonographer:               Faye Moya RDCS Age:    60     Gender:    M MRN:    [id number] :    1960 BSA:    1.87   Ht (in):    69     Wt (lb):    159 Exam Type:     Complete Indications:     CVA ICD Codes:       436 CPT Codes:       28484 BP:   140    /   60     HR:   74 Technical Quality:       Fair MEASUREMENTS  (Male / Female) Normal Values 2D ECHO LV Diastolic Diameter PLAX        3.8 cm                4.2 - 5.9 / 3.9 - 5.3 cm LV Systolic Diameter PLAX          2.4 cm                2.1 - 4.0 cm IVS Diastolic Thickness           1.4 cm                LVPW Diastolic Thickness          1.4 cm                LVOT Diameter                     2 cm                  Estimated LV Ejection Fraction    60 %                  LV Ejection Fraction MOD BP       62.7 %                >= 55  % LV Ejection Fraction MOD 4C       46.6 %                LV Ejection Fraction MOD 2C       55 %                  DOPPLER AV Peak Velocity                  1.7 m/s               AV Peak Gradient                  10.9 mmHg             AV Mean Gradient                  5.5 mmHg              LVOT Peak Velocity                1.2 m/s               AV Area Cont Eq vti               2.3 cm2               Mitral E Point Velocity           0.74 m/s              Mitral E to A Ratio               0.92                  MV Pressure Half Time             69.4 ms               MV Area PHT                       3.2 cm2               MV Deceleration Time              239 ms                PV Peak Velocity                  1.1 m/s               PV Peak Gradient                  4.6 mmHg              RVOT Peak Velocity                0.66 m/s              * Indicates values subject to auto-interpretation LV EF:  60    % FINDINGS Left Ventricle Normal left ventricular chamber size. Mild concentric left ventricular hypertrophy. Normal left ventricular systolic function.  Left ventricular ejection fraction is visually estimated to be 60%. Normal diastolic function. Right Ventricle Normal right ventricular size and systolic function. Right Atrium Normal right atrial size. Normal inferior vena cava size and inspiratory collapse. Left Atrium Normal left atrial size. Agitated saline (bubble) study was performed. With Valsalva maneuver. No evidence of right to left shunt by agitated saline challenge. Existing IV was used. Mitral Valve Structurally normal mitral valve without significant stenosis or regurgitation. Aortic  "Valve Aortic sclerosis without stenosis. No aortic insufficiency. Tricuspid Valve Structurally normal tricuspid valve without significant stenosis or regurgitation. Pulmonic Valve The pulmonic valve is not well visualized. No pulmonic insufficiency. Pericardium Normal pericardium without effusion. Aorta Normal aortic root for body surface area. Ascending aorta not well visualized. Zakiya Rebollar MD (Electronically Signed) Final Date:     28 April 2021                 21:30    DX-ABDOMEN FOR TUBE PLACEMENT    Result Date: 4/28/2021 4/28/2021 12:24 PM HISTORY/REASON FOR EXAM:  Tube evaluation. TECHNIQUE/EXAM DESCRIPTION AND NUMBER OF VIEWS:  1 view(s) of the abdomen. COMPARISON:  None. FINDINGS: Limited single view of the abdomen performed primarily to evaluate enteric tube position. The tip projects over the expected area of the distal stomach. The bowel gas pattern is within normal limits.     Feeding tube with tip projecting over the expected area of the distal stomach.      Micro:  Results     Procedure Component Value Units Date/Time    BLOOD CULTURE [[id number]] Collected: 04/28/21 1134    Order Status: Completed Specimen: Blood from Peripheral Updated: 04/29/21 0640     Significant Indicator NEG     Source BLD     Site PERIPHERAL     Culture Result No Growth  Note: Blood cultures are incubated for 5 days and  are monitored continuously.Positive blood cultures  are called to the RN and reported as soon as  they are identified.      Narrative:      Collected By:[id number] SAILAJA BAILEY  Per Hospital Policy: Only change Specimen Src: to \"Line\" if  specified by physician order.  No site indicated    BLOOD CULTURE [[id number]] Collected: 04/28/21 1134    Order Status: Completed Specimen: Blood from Peripheral Updated: 04/29/21 0640     Significant Indicator NEG     Source BLD     Site PERIPHERAL     Culture Result No Growth  Note: Blood cultures are incubated for 5 days and  are monitored continuously.Positive " "blood cultures  are called to the RN and reported as soon as  they are identified.      Narrative:      Collected By:[id number] SAILAJA BAILEY  Per Hospital Policy: Only change Specimen Src: to \"Line\" if  specified by physician order.  Collected By:[id number] SAILAJA BAILEY    BLOOD CULTURE [[id number]]  (Abnormal) Collected: 04/27/21 0248    Order Status: Completed Specimen: Blood from Peripheral Updated: 04/29/21 0600     Significant Indicator POS     Source BLD     Site PERIPHERAL     Culture Result Growth detected by Bactec instrument. 04/29/2021  05:59  Gram Stain: Gram positive cocci: Possible Streptococcus sp.      Narrative:      Per Hospital Policy: Only change Specimen Src: to \"Line\" if  specified by physician order.  Right AC    Anaerobic Culture [[id number]] Collected: 04/28/21 0642    Order Status: Completed Specimen: Wound Updated: 04/28/21 1854     Significant Indicator NEG     Source WND     Site Cerival Thoracic Epidural 1     Culture Result -    Narrative:      Surgery - swabs received    CULTURE WOUND W/ GRAM STAIN [[id number]] Collected: 04/28/21 0655    Order Status: Completed Specimen: Wound Updated: 04/28/21 1854     Significant Indicator NEG     Source WND     Site Cerival Thoric Epidural 2     Culture Result -     Gram Stain Result Few WBCs.  Few Gram positive cocci.      Narrative:      Surgery - swabs received    Anaerobic Culture [[id number]] Collected: 04/28/21 0655    Order Status: Completed Specimen: Wound Updated: 04/28/21 1854     Significant Indicator NEG     Source WND     Site Cerival Thoric Epidural 2     Culture Result -    Narrative:      Surgery - swabs received    GRAM STAIN [[id number]] Collected: 04/28/21 0642    Order Status: Completed Specimen: Wound Updated: 04/28/21 1854     Significant Indicator .     Source WND     Site Cerival Thoracic Epidural 1     Gram Stain Result Few WBCs.  Rare Gram positive cocci.      Narrative:      Surgery - swabs received    GRAM STAIN " "[[id number]] Collected: 04/28/21 0655    Order Status: Completed Specimen: Wound Updated: 04/28/21 1854     Significant Indicator .     Source WND     Site Cerival Thoric Epidural 2     Gram Stain Result Few WBCs.  Few Gram positive cocci.      Narrative:      Surgery - swabs received    CULTURE WOUND W/ GRAM STAIN [[id number]] Collected: 04/28/21 0642    Order Status: Completed Specimen: Wound Updated: 04/28/21 1854     Significant Indicator NEG     Source WND     Site Cerival Thoracic Epidural 1     Culture Result -     Gram Stain Result Few WBCs.  Rare Gram positive cocci.      Narrative:      Surgery - swabs received    BLOOD CULTURE [[id number]]  (Abnormal) Collected: 04/25/21 1433    Order Status: Completed Specimen: Blood from Peripheral Updated: 04/28/21 1122     Significant Indicator POS     Source BLD     Site PERIPHERAL     Culture Result Growth detected by Bactec instrument. 04/26/2021  04:57      Streptococcus anginosus  See previous culture for sensitivity report.      Narrative:      CALL  [place]  171 tel. [phone],  CALLED  171 tel. [phone] 04/26/2021, 04:59, RB PERF. RESULTS CALLED TO: RN  [id number]  1 of 2 for Blood Culture x 2 sites order. Per Hospital  Policy: Only change Specimen Src: to \"Line\" if specified by  physician order.  No site indicated    BLOOD CULTURE [[id number]]  (Abnormal)  (Susceptibility) Collected: 04/25/21 1505    Order Status: Completed Specimen: Blood from Peripheral Updated: 04/28/21 1122     Significant Indicator POS     Source BLD     Site PERIPHERAL     Culture Result Growth detected by Bactec instrument. 04/26/2021  04:06      Streptococcus anginosus    Narrative:      CALL  [place]  171 tel. [phone],  CALLED  171 tel. [phone] 04/26/2021, 04:09, RB PERF. RESULTS CALLED TO: RN  [id number]  2 of 2 blood culture x2  Sites order. Per Hospital Policy:  Only change Specimen Src: to \"Line\" if specified by physician  order.  No site indicated    Susceptibility     Streptococcus " "anginosus (1)     Antibiotic Interpretation Microscan Method Status    Penicillin Sensitive 0.064 mcg/mL E-TEST Final    Cefotaxime Sensitive 0.125 mcg/mL E-TEST Final                   BLOOD CULTURE [[id number]] Collected: 04/27/21 0248    Order Status: Completed Specimen: Blood from Peripheral Updated: 04/28/21 1048     Significant Indicator NEG     Source BLD     Site PERIPHERAL     Culture Result No Growth  Note: Blood cultures are incubated for 5 days and  are monitored continuously.Positive blood cultures  are called to the RN and reported as soon as  they are identified.      Narrative:      Per Hospital Policy: Only change Specimen Src: to \"Line\" if  specified by physician order.  Left AC    E-Test [[id number]] Collected: 04/25/21 1505    Order Status: Completed Specimen: Other Updated: 04/27/21 1117     ETEST Sensitivity INTERIM    Narrative:      171 tel. [phone] 04/26/2021, 04:09, RB PERF. RESULTS CALLED TO: RN [id number]  2 of 2 blood culture x2  Sites order. Per Hospital Policy:  Only change Specimen Src: to \"Line\" if specified by physician  order.    MRSA By PCR (Amp) [[id number]] Collected: 04/26/21 0950    Order Status: Completed Specimen: Respirate from Nares Updated: 04/26/21 1343     Significant Indicator NEG     Source RESP     Site NARES     MRSA PCR Negative for MRSA by PCR.    Narrative:      Collected By:[id number] EMMA ODONNELL.  Collected By:[id number] EMMA BAEZ    SARS-CoV-2 PCR (24 hour In-House): Collect NP swab in [facility] [[id number]] Collected: 04/25/21 1448    Order Status: Completed Specimen: Respirate from Nasopharyngeal Updated: 04/26/21 1341     SARS-CoV-2 Source NP Swab     SARS-CoV-2 by PCR NotDetected     Comment: PATIENTS: Important information regarding your results and instructions can  be found at [email or web address]   \"After your  Covid-19 Test\"  [facility] providers: PLEASE REFER TO DE-ESCALATION AND RETESTING PROTOCOL  on [email or web address]  **The TaqPath " COVID-19 SARS-CoV-2 RT-PCR test has been made available for  use under the Emergency Use Authorization (EUA) only.         Narrative:      Have you been in close contact with a person who is suspected  or known to be positive for COVID-19 within the last 30 days  (e.g. last seen that person < 30 days ago)->Unknown          Assessment:  Active Hospital Problems    Diagnosis    • *Spinal epidural abscess [G06.1]    • History of ischemic stroke [Z86.73]    • Sepsis due to Streptococcus species (HCC) [A40.9]    • Acute bilateral back pain [M54.9]    • Thrombocytopenia (HCC) [D69.6]    • Type 2 diabetes mellitus without complication, without long-term current use of insulin (HCC) [E11.9]    • Coronary artery disease due to calcified coronary lesion: CABG x4, July 2015 [I25.10, I25.84]    • Essential hypertension, benign [I10]    • Hypokalemia [E87.6]    • Hypomagnesemia [E83.42]    • Hyponatremia [E87.1]    • Difficulty swallowing [R13.10]    • Diabetic ketoacidosis (HCC) [E11.10]    • Marijuana abuse [F12.10]    • High anion gap metabolic acidosis [E87.2]    • Tobacco abuse [Z72.0]    • Dyslipidemia [E78.5]    • Status post urethrostomy (HCC) [Z93.6]      Interval 24 hours:      AF, O2 Vent 12/70%, pressors still on the trending down  Labs reviewed  Imaging personally reviewed both images and report.   Studies reviewed  Micro reviewed    The patient continues to be agitated, not moving any extremities and concern for quadriplegia due to CVA.  Antibiotics as below.    Assessment:  60 y.o.  admitted 4/25/2021. Pt has a past medical history of uncontrolled diabetes, CAD status post CABG times 4/2015, marijuana use and to arthritis.  Presented complaining of neck and back pain ongoing for approximately 1 week and fall getting out of the bathtub.  He had been seen at urgent care for back pain approximately 1 week prior to admission and given Toradol injections.       Hospital Course:   Afebrile and vitals unremarkable other  than hypertension.  Leukocytosis ongoing since arrival.  MRI C-spine on 4/26 with epidural collection noted from C2-T3.  Largest collection noted in upper thoracic posterior epidural space at T2-T3.  This is causing multilevel cord compression.  Also with likely discitis at C5-6 and osteomyelitis at C5-C7.  Blood cultures on 4/25+ for Streptococcus species.       Problem List     Sepsis, secondary to below  Bacteremia  -Blood cultures on 4/25 2/2 + Streptococcus anginosus, penicillin and cephalosporin susceptible  -Blood cultures on 4/27 1/1 + possible Streptococcus species  -Blood cultures on 4/28 2/2 no growth to date  -TTE with no vegetations  Leukocytosis, ongoing  Cervical thoracic infection, epidural abscess at C2-T3 as well as discitis and osteomyelitis, +GPCs  -Repeat MRI thoracic spine on 4/27 notes posterior epidural fluid  collection/abscess in lower cervical spine extending to T6 level, also noted abnormal signal within the cervical and upper thoracic cord  -Patient went to the OR with neurosurgery on 4/28 for see 4-T2 laminectomy, decompression of thecal sac and nerve roots as well as cervical thoracic extradural spinal mass/epidural abscess removal, linda purulence during OR, no CSF leak per op note  -Operative cultures from cervical thoracic epidural 1 and 2 both with GPC's  CVA, Right cerebellar stroke, possibly due to venous spasm associated with the epidural abscess.   Concern for quadriplegia  -MRA neck with and without on 4/28, possible moderate focal stenosis in mid left CVA.  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  MRI without on 4/28 with acute large right cerebellar infarct with small hemorrhage  Uncontrolled diabetes     Plan      ---  Continue ceftriaxone but will reduce dose to every 24 hours given no obvious septic emboli in the brain.    --- Agree with plan for CHRISTOPHER   --- Follow-up imaging  --- F/up repeat blood cultures  --- Monitor  labs  --- Monitor and control blood sugars        Plan of care discussed with Dr. Dumont.  Will continue to follow.

## 2021-04-29 NOTE — DISCHARGE PLANNING
[facility] Coordination  TCC continues to follow.  Vent Day #1.  PMR to consult when medically appropriate.

## 2021-04-29 NOTE — PROGRESS NOTES
At 1935hrs, writer contacted pt's mother Mary via phone as writer was informed that mother had called the unit and was looking for an update on pt. When writer called Mary, Mary expressed anger towards not being updated throughout the night and day on pt's status. Writer apologized multiple times for the miscommunication from staff overnight and into the day. Also, reiterated multiple times that writer cannot speak to care that was provided last night and during the day but could inform Mary how patient is doing now. Writer explained that pt is not moving limbs in neuro assessment but that his pupils are reactive and his reflexes are intact. Writer expressed that staff are concerned about pt not being able to move limbs and therefore q1h NVS are being done at this time. Also explained that pt is on medication to keep his BP up. Mary understandable with same. Mary requested to be called by staff if anything drastic changes overnight. Writer stated she would let Mary know. Writer also expressed that she would try to get an intensivist to phone Mary this evening but that she was not confident a neurosurgeon would call her. Mary agreeable to same. After phone call, Dr. Gannon made aware and agreeable to call Mary. Total phone call approximately 25 minutes.

## 2021-04-29 NOTE — DIETARY
Nutrition Services: Update   Day 4 of admit.  Perry Davis is a 60 y.o. male with admitting DX of DKA, type 2, not at goal, Cerebellar cerebrovascular accident (CVA) without late effect    Pt is currently receiving TF. Propofol decreased from yesterday and currently running @ 15.2 mL/hr which provides 401 kcal/day; RD will adjust TF to better meet pt's estimated nutritional needs with propofol.  Levophed running @ 3 mcg/min    Recommendations/Plan:  1. With propofol: Impact Peptide 1.5 @ 40 ml/hr (goal rate with propofol) to provide 1440 kcal + kcal from propofol (25 kcal/kg), 90 grams protein (1.2 gm/kg), and 739 ml free water per day.   2. Without propofol: Impact Peptide 1.5 @ 50 ml/hr to provide 1800 kcal (25 kcal/kg), 113 grams protein (1.6 gm/kg), and 924 ml free water per day    RD following

## 2021-04-29 NOTE — PROGRESS NOTES
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     Exam:  intubated   Not following   Slight movement to arm  Very slight movement to right leg  Grossly weak   Pupils equal     BP  Min: 98/54  Max: 182/83  Pulse  Av  Min: 72  Max: 95  Resp  Av.1  Min: 8  Max: 55  Temp  Av.3 °C (97.3 °F)  Min: 36.2 °C (97.1 °F)  Max: 36.4 °C (97.5 °F)  Monitored Temp 2  Av.9 °C (98.5 °F)  Min: 36 °C (96.8 °F)  Max: 37.4 °C (99.3 °F)  SpO2  Av.4 %  Min: 87 %  Max: 100 %    No data recorded    Recent Labs     21  0256 21  0310 21  0324   WBC 16.4* 15.2* 14.3*   RBC 5.07 4.56* 4.06*   HEMOGLOBIN 15.4 13.9* 12.1*   HEMATOCRIT 44.0 40.7* 36.8*   MCV 86.8 89.3 90.6   MCH 30.4 30.5 29.8   MCHC 35.0 34.2 32.9*   RDW 43.7 45.8 47.8   PLATELETCT 77* 105* 194   MPV 12.4 12.5 12.4     Recent Labs     21  1152 21  0310 21  0324   SODIUM 123* 125* 129*   POTASSIUM 3.6 4.0 4.0   CHLORIDE 91* 92* 98   CO2 21 19* 23   GLUCOSE 164* 173* 214*   BUN 16 20 28*   CREATININE 0.44* 0.49* 0.53   CALCIUM 7.8* 7.7* 7.7*     Recent Labs     21  0324   INR 1.09     Recent Labs     21  2155   REACTMIN 5.8   CLOTKINET 2.1   CLOTANGL 61.9   MAXCLOTS 63.5   UGI64RIG 0.0   PRCINADP 93.0   PRCINAA 50.6       Intake/Output       21 0700 - 21 - 21 0659       Total  Total       Intake    I.V.  6774.5  275.8 7050.3  --  -- --    Magnesium Sulfate Volume 100 -- 100 -- -- --    Precedex Volume 24.6 -- 24.6 -- -- --    Phenylephrine Volume 2.1 -- 2.1 -- -- --    Propofol Volume 176.3 171 347.3 -- -- --    Norepinephrine Volume 183.2 104.8 287.9 -- -- --    Volume (mL) (NS infusion) 0 -- 0 -- -- --    Volume (mL) (NS infusion)  6263.3 -- 6263.3 -- -- --    Volume (mL) (lactated ringers infusion) 25 -- 25 -- -- --    Other  --  60 60  --  -- --    Medications (PO/Enteral Liquids) -- 60 60 -- -- --    NG/GT  --  290 290  --  -- --    Intake (mL) (Enteral Tube 04/28/21 Cortrak - Gastric Left nare) -- 290 290 -- -- --    IV Piggyback  600  -- 600  --  -- --    Volume (mL) (ceFAZolin in dextrose (ANCEF) IVPB premix 2 g) 0 -- 0 -- -- --    Volume (mL) (levETIRAcetam (Keppra) 1000 mg in 100 mL NaCl IV premix) 200 -- 200 -- -- --    Volume (mL) (cefTRIAXone (ROCEPHIN) 2 g in  mL IVPB) 200 -- 200 -- -- --    Volume (mL) (cefTRIAXone (ROCEPHIN) 2 g in  mL IVPB) 200 -- 200 -- -- --    Enteral  210  90 300  --  -- --    Free Water / Tube Flush 210 90 300 -- -- --    Total Intake 7584.5 715.8 8300.3 -- -- --       Output    Urine  550  175 725  --  -- --    Number of Times Voided 7 x -- 7 x -- -- --    Output (mL) (Urethral Catheter Other (Comment) 16 Fr.) 550 175 725 -- -- --    Drains  82  100 182  --  -- --    Output (mL) (Closed/Suction Drain 1 Midline;Superior Back Ld Knowles 7 Fr.) 82 100 182 -- -- --    Stool  --  -- --  --  -- --    Number of Times Stooled -- 0 x 0 x -- -- --    Blood  125  -- 125  --  -- --    Est. Blood Loss 125 -- 125 -- -- --    Total Output  -- -- --       Net I/O     6827.5 440.8 7268.3 -- -- --            Intake/Output Summary (Last 24 hours) at 4/29/2021 0711  Last data filed at 4/29/2021 0600  Gross per 24 hour   Intake 8300.25 ml   Output 1032 ml   Net 7268.25 ml       $ Bladder Scan Results (mL): 1    • Pharmacy Consult Request  1 Each PHARMACY TO DOSE   • MD ALERT...DO NOT ADMINISTER NSAIDS or ASPIRIN unless ORDERED By Neurosurgery  1 Each PRN   • senna-docusate  1 tablet Q24HRS PRN   • polyethylene glycol/lytes  1 Packet BID PRN   • magnesium hydroxide  30 mL QDAY PRN   • bisacodyl  10 mg Q24HRS PRN   • fleet  1 Each Once PRN   • Respiratory Therapy Consult   Continuous RT   • ondansetron   4 mg Q4HRS PRN   • ondansetron  4 mg Q4HRS PRN   • promethazine  12.5-25 mg Q4HRS PRN   • promethazine  12.5-25 mg Q4HRS PRN   • prochlorperazine  5-10 mg Q4HRS PRN   • levETIRAcetam (Keppra) IV  1,000 mg Q12HRS   • norepinephrine (Levophed) infusion  0-30 mcg/min Continuous   • ipratropium-albuterol  3 mL Q4H PRN (RT)   • labetalol  10 mg Q4HRS PRN   • cefTRIAXone (ROCEPHIN) IV  2 g BID   • Pharmacy  1 Each PHARMACY TO DOSE   • acetaminophen  1,000 mg Q6HRS   • ARIPiprazole  15 mg BID   • atorvastatin  80 mg Nightly   • carBAMazepine  200 mg TID   • cyanocobalamin  1,000 mcg DAILY   • senna-docusate  1 tablet Nightly   • vitamin D  5,000 Units DAILY   • docusate sodium  100 mg BID   • famotidine  20 mg BID    Or   • famotidine  20 mg BID   • fentaNYL  50 mcg Q15 MIN PRN    And   • fentaNYL  100 mcg Q15 MIN PRN    And   • fentaNYL   Continuous    And   • propofol  0-80 mcg/kg/min Continuous   • acetylcysteine  3-4 mL Q4HRS (RT)   • albuterol  2.5 mg Q4HRS (RT)   • oxyCODONE immediate-release  10 mg Q4HRS PRN   • insulin glargine  15 Units Q EVENING   • insulin regular  2-9 Units 4X/DAY ACHS    And   • dextrose 50%  50 mL Q15 MIN PRN   • lidocaine  3 Patch Q24HR       Assessment and Plan:  Hospital day #5  POD #1  Sedation as needed  Keep drain in  Abx per ID  Will follow closely       Prophylactic anticoagulation: no         Start date/time:

## 2021-04-29 NOTE — PROGRESS NOTES
Neurology Interval    Case discussed in multidisciplinary coordinated care with neurosurgeon Dr. Hazel. Agreement to hold aspirin in the short interval that he is post surgical. Initiation and timing of ASA 81mg qd for secondary prevention to be guided by neurosurgery.     RIYA Montero MD  Neurology

## 2021-04-29 NOTE — PROGRESS NOTES
At 1930hrs, writer made Dr. Borden aware of excessive bleeding from suprapubic catheter site. Dr. Borden updated on most recent hct and VS. Dr. Borden recommended direct pressure for 10 minutes. 20 minutes of direct pressure applied by SARAH Pimentel and SARAH Rutledge. Large sandbag then applied to site. Dr. Borden aware of same. Will monitor.   40.3

## 2021-04-29 NOTE — PROGRESS NOTES
Noticed lack of drainage in suprapubic catheter bag at 2200 on 4/28. Emptied bag and found 125 mL of bloody urine containing blood clots. No drainage over next 6 hours. Ordered bladder scan; no retention noted. Turned patient and found drainage out of urethral ostomy. Notified urologist on call, Dr Felder. Received verbal instructions to irrigate catheter with sterile water. JEWEL Francisco assisted with irrigation, all sterile water draining through ostomy and not returning through catheter. Irrigated with approximately 400 mL of sterile water. Received verbal order from JEWEL Parra to attach catheter to standard drainage bag.

## 2021-04-29 NOTE — ASSESSMENT & PLAN NOTE
History of perineal urethrostomy 2018  Acute urinary retention post surgical intervention 4/28  Unable to place Gomez catheter through urethrostomy.    Suprapubic catheter placement by interventional radiology.

## 2021-04-29 NOTE — PROGRESS NOTES
: patient seen and examined    Pt on vent Day #2    SPT In place draining bloody clear urine without clot.    I hand irrigated SPT at bedside and got bloody return without clot    PLAN:   Keep accurate output from perineal ostomy and SPT output  Hand irrigate decreased UOP and q shift.    SPT to gravity

## 2021-04-29 NOTE — CARE PLAN
Problem: Communication  Goal: The ability to communicate needs accurately and effectively will improve  Outcome: PROGRESSING SLOWER THAN EXPECTED     Problem: Infection  Goal: Will remain free from infection  Outcome: PROGRESSING SLOWER THAN EXPECTED

## 2021-04-29 NOTE — CARE PLAN
Ventilator Daily Summary    Vent Day # 2  8.0 @ 27   22/430/10+/30%    Ventilator settings changed this shift: weaned peep to 10+ and fio2 to 60%     Weaning trials: none due to agitation     Respiratory Procedures: none     Plan: Continue current ventilator settings and wean mechanical ventilation as tolerated per physician orders.

## 2021-04-29 NOTE — DISCHARGE PLANNING
Hospital Care Management Discharge Planning       Anticipated Discharge Disposition:   · To be determined      Action:   · Patient discussed during interdisciplinary ICU rounds   · Pt requiring ventilator support, sedation, and vasopressors   · No discharge planning needs discussed at this time      Barriers to Discharge:   · Medical clearance      Plan:    · Hospital Care Management Team to continue to provide support services and assistance with discharge planning as needed

## 2021-04-29 NOTE — PROGRESS NOTES
Urology PA Corinne updated on bladder scan results. Assisted with irrigation of suprapubic catheter. Orders to irrigate with decreased urine output. After irrigation, blood output noted from suprapubic catheter

## 2021-04-30 ENCOUNTER — APPOINTMENT (OUTPATIENT)
Dept: RADIOLOGY | Facility: MEDICAL CENTER | Age: 61
DRG: 003 | End: 2021-04-30
Attending: NEUROLOGICAL SURGERY
Payer: MEDICARE

## 2021-04-30 ENCOUNTER — APPOINTMENT (OUTPATIENT)
Dept: RADIOLOGY | Facility: MEDICAL CENTER | Age: 61
DRG: 003 | End: 2021-04-30
Attending: INTERNAL MEDICINE
Payer: MEDICARE

## 2021-04-30 ENCOUNTER — APPOINTMENT (OUTPATIENT)
Dept: CARDIOLOGY | Facility: MEDICAL CENTER | Age: 61
DRG: 003 | End: 2021-04-30
Attending: NURSE PRACTITIONER
Payer: MEDICARE

## 2021-04-30 ENCOUNTER — APPOINTMENT (OUTPATIENT)
Dept: RADIOLOGY | Facility: MEDICAL CENTER | Age: 61
DRG: 003 | End: 2021-04-30
Attending: NURSE PRACTITIONER
Payer: MEDICARE

## 2021-04-30 LAB
ALBUMIN SERPL BCP-MCNC: 1.8 G/DL (ref 3.2–4.9)
ALBUMIN/GLOB SERPL: 0.5 G/DL
ALP SERPL-CCNC: 104 U/L (ref 30–99)
ALT SERPL-CCNC: 21 U/L (ref 2–50)
ANION GAP SERPL CALC-SCNC: 7 MMOL/L (ref 7–16)
AST SERPL-CCNC: 21 U/L (ref 12–45)
BACTERIA BLD CULT: ABNORMAL
BACTERIA WND AEROBE CULT: ABNORMAL
BASE EXCESS BLDA CALC-SCNC: 2 MMOL/L (ref -4–3)
BASOPHILS # BLD AUTO: 0.2 % (ref 0–1.8)
BASOPHILS # BLD: 0.02 K/UL (ref 0–0.12)
BILIRUB SERPL-MCNC: 0.3 MG/DL (ref 0.1–1.5)
BODY TEMPERATURE: ABNORMAL DEGREES
BREATHS SETTING VENT: 22
BUN SERPL-MCNC: 29 MG/DL (ref 8–22)
CALCIUM SERPL-MCNC: 7.8 MG/DL (ref 8.5–10.5)
CHLORIDE SERPL-SCNC: 101 MMOL/L (ref 96–112)
CO2 BLDA-SCNC: 27 MMOL/L (ref 20–33)
CO2 SERPL-SCNC: 26 MMOL/L (ref 20–33)
CREAT SERPL-MCNC: 0.5 MG/DL (ref 0.5–1.4)
DELSYS IDSYS: ABNORMAL
END TIDAL CARBON DIOXIDE IECO2: 27 MMHG
EOSINOPHIL # BLD AUTO: 0.05 K/UL (ref 0–0.51)
EOSINOPHIL NFR BLD: 0.4 % (ref 0–6.9)
ERYTHROCYTE [DISTWIDTH] IN BLOOD BY AUTOMATED COUNT: 47.8 FL (ref 35.9–50)
GLOBULIN SER CALC-MCNC: 3.3 G/DL (ref 1.9–3.5)
GLUCOSE BLD-MCNC: 177 MG/DL (ref 65–99)
GLUCOSE BLD-MCNC: 191 MG/DL (ref 65–99)
GLUCOSE BLD-MCNC: 255 MG/DL (ref 65–99)
GLUCOSE BLD-MCNC: 270 MG/DL (ref 65–99)
GLUCOSE SERPL-MCNC: 292 MG/DL (ref 65–99)
GRAM STN SPEC: ABNORMAL
GRAM STN SPEC: ABNORMAL
HCO3 BLDA-SCNC: 26 MMOL/L (ref 17–25)
HCT VFR BLD AUTO: 31.8 % (ref 42–52)
HGB BLD-MCNC: 10.6 G/DL (ref 14–18)
HOROWITZ INDEX BLDA+IHG-RTO: 185 MM[HG]
IMM GRANULOCYTES # BLD AUTO: 0.15 K/UL (ref 0–0.11)
IMM GRANULOCYTES NFR BLD AUTO: 1.2 % (ref 0–0.9)
INR PPP: 1.07 (ref 0.87–1.13)
LYMPHOCYTES # BLD AUTO: 1.65 K/UL (ref 1–4.8)
LYMPHOCYTES NFR BLD: 12.7 % (ref 22–41)
MAGNESIUM SERPL-MCNC: 2.2 MG/DL (ref 1.5–2.5)
MCH RBC QN AUTO: 30.3 PG (ref 27–33)
MCHC RBC AUTO-ENTMCNC: 33.3 G/DL (ref 33.7–35.3)
MCV RBC AUTO: 90.9 FL (ref 81.4–97.8)
MODE IMODE: ABNORMAL
MONOCYTES # BLD AUTO: 0.97 K/UL (ref 0–0.85)
MONOCYTES NFR BLD AUTO: 7.5 % (ref 0–13.4)
NEUTROPHILS # BLD AUTO: 10.16 K/UL (ref 1.82–7.42)
NEUTROPHILS NFR BLD: 78 % (ref 44–72)
NRBC # BLD AUTO: 0 K/UL
NRBC BLD-RTO: 0 /100 WBC
O2/TOTAL GAS SETTING VFR VENT: 40 %
PCO2 BLDA: 36.9 MMHG (ref 26–37)
PCO2 TEMP ADJ BLDA: 36.9 MMHG (ref 26–37)
PEEP END EXPIRATORY PRESSURE IPEEP: 10 CMH20
PH BLDA: 7.46 [PH] (ref 7.4–7.5)
PH TEMP ADJ BLDA: 7.46 [PH] (ref 7.4–7.5)
PHOSPHATE SERPL-MCNC: 2.2 MG/DL (ref 2.5–4.5)
PLATELET # BLD AUTO: 195 K/UL (ref 164–446)
PMV BLD AUTO: 12.4 FL (ref 9–12.9)
PO2 BLDA: 74 MMHG (ref 64–87)
PO2 TEMP ADJ BLDA: 74 MMHG (ref 64–87)
POTASSIUM SERPL-SCNC: 3.8 MMOL/L (ref 3.6–5.5)
PROT SERPL-MCNC: 5.1 G/DL (ref 6–8.2)
PROTHROMBIN TIME: 14.2 SEC (ref 12–14.6)
RBC # BLD AUTO: 3.5 M/UL (ref 4.7–6.1)
SAO2 % BLDA: 96 % (ref 93–99)
SIGNIFICANT IND 70042: ABNORMAL
SITE SITE: ABNORMAL
SODIUM SERPL-SCNC: 134 MMOL/L (ref 135–145)
SOURCE SOURCE: ABNORMAL
SPECIMEN DRAWN FROM PATIENT: ABNORMAL
TIDAL VOLUME IVT: 430 ML
WBC # BLD AUTO: 13 K/UL (ref 4.8–10.8)

## 2021-04-30 PROCEDURE — A9270 NON-COVERED ITEM OR SERVICE: HCPCS | Performed by: PHYSICIAN ASSISTANT

## 2021-04-30 PROCEDURE — 700102 HCHG RX REV CODE 250 W/ 637 OVERRIDE(OP): Performed by: STUDENT IN AN ORGANIZED HEALTH CARE EDUCATION/TRAINING PROGRAM

## 2021-04-30 PROCEDURE — 85610 PROTHROMBIN TIME: CPT

## 2021-04-30 PROCEDURE — 83735 ASSAY OF MAGNESIUM: CPT

## 2021-04-30 PROCEDURE — 302214 INTUBATION BOX: Performed by: INTERNAL MEDICINE

## 2021-04-30 PROCEDURE — 80053 COMPREHEN METABOLIC PANEL: CPT

## 2021-04-30 PROCEDURE — 93325 DOPPLER ECHO COLOR FLOW MAPG: CPT

## 2021-04-30 PROCEDURE — 700111 HCHG RX REV CODE 636 W/ 250 OVERRIDE (IP)

## 2021-04-30 PROCEDURE — 94003 VENT MGMT INPAT SUBQ DAY: CPT

## 2021-04-30 PROCEDURE — 700101 HCHG RX REV CODE 250: Performed by: INTERNAL MEDICINE

## 2021-04-30 PROCEDURE — 700101 HCHG RX REV CODE 250: Performed by: STUDENT IN AN ORGANIZED HEALTH CARE EDUCATION/TRAINING PROGRAM

## 2021-04-30 PROCEDURE — A9270 NON-COVERED ITEM OR SERVICE: HCPCS | Performed by: INTERNAL MEDICINE

## 2021-04-30 PROCEDURE — 82962 GLUCOSE BLOOD TEST: CPT | Mod: 91

## 2021-04-30 PROCEDURE — 71045 X-RAY EXAM CHEST 1 VIEW: CPT

## 2021-04-30 PROCEDURE — 82803 BLOOD GASES ANY COMBINATION: CPT

## 2021-04-30 PROCEDURE — 700105 HCHG RX REV CODE 258: Performed by: INTERNAL MEDICINE

## 2021-04-30 PROCEDURE — 700102 HCHG RX REV CODE 250 W/ 637 OVERRIDE(OP): Performed by: PHYSICIAN ASSISTANT

## 2021-04-30 PROCEDURE — A9270 NON-COVERED ITEM OR SERVICE: HCPCS | Performed by: STUDENT IN AN ORGANIZED HEALTH CARE EDUCATION/TRAINING PROGRAM

## 2021-04-30 PROCEDURE — 93312 ECHO TRANSESOPHAGEAL: CPT | Mod: 26 | Performed by: INTERNAL MEDICINE

## 2021-04-30 PROCEDURE — 700111 HCHG RX REV CODE 636 W/ 250 OVERRIDE (IP): Performed by: PHYSICIAN ASSISTANT

## 2021-04-30 PROCEDURE — 94799 UNLISTED PULMONARY SVC/PX: CPT

## 2021-04-30 PROCEDURE — 770022 HCHG ROOM/CARE - ICU (200)

## 2021-04-30 PROCEDURE — 99232 SBSQ HOSP IP/OBS MODERATE 35: CPT | Performed by: NURSE PRACTITIONER

## 2021-04-30 PROCEDURE — 700111 HCHG RX REV CODE 636 W/ 250 OVERRIDE (IP): Performed by: NURSE PRACTITIONER

## 2021-04-30 PROCEDURE — 99291 CRITICAL CARE FIRST HOUR: CPT | Mod: GC | Performed by: INTERNAL MEDICINE

## 2021-04-30 PROCEDURE — 700111 HCHG RX REV CODE 636 W/ 250 OVERRIDE (IP): Performed by: INTERNAL MEDICINE

## 2021-04-30 PROCEDURE — 37799 UNLISTED PX VASCULAR SURGERY: CPT

## 2021-04-30 PROCEDURE — 85025 COMPLETE CBC W/AUTO DIFF WBC: CPT

## 2021-04-30 PROCEDURE — 99233 SBSQ HOSP IP/OBS HIGH 50: CPT | Performed by: INTERNAL MEDICINE

## 2021-04-30 PROCEDURE — 84100 ASSAY OF PHOSPHORUS: CPT

## 2021-04-30 PROCEDURE — 700102 HCHG RX REV CODE 250 W/ 637 OVERRIDE(OP): Performed by: INTERNAL MEDICINE

## 2021-04-30 PROCEDURE — 70450 CT HEAD/BRAIN W/O DYE: CPT | Mod: MG

## 2021-04-30 RX ORDER — ASPIRIN 81 MG/1
81 TABLET, CHEWABLE ORAL DAILY
Status: DISCONTINUED | OUTPATIENT
Start: 2021-04-30 | End: 2021-05-01

## 2021-04-30 RX ORDER — MIDAZOLAM HYDROCHLORIDE 1 MG/ML
INJECTION INTRAMUSCULAR; INTRAVENOUS
Status: COMPLETED
Start: 2021-04-30 | End: 2021-04-30

## 2021-04-30 RX ORDER — INSULIN GLARGINE 100 [IU]/ML
18 INJECTION, SOLUTION SUBCUTANEOUS EVERY EVENING
Status: DISCONTINUED | OUTPATIENT
Start: 2021-04-30 | End: 2021-05-01

## 2021-04-30 RX ORDER — ACETYLCYSTEINE 200 MG/ML
3-4 SOLUTION ORAL; RESPIRATORY (INHALATION)
Status: DISCONTINUED | OUTPATIENT
Start: 2021-04-30 | End: 2021-05-14 | Stop reason: HOSPADM

## 2021-04-30 RX ORDER — MIDAZOLAM HYDROCHLORIDE 1 MG/ML
4 INJECTION INTRAMUSCULAR; INTRAVENOUS ONCE
Status: COMPLETED | OUTPATIENT
Start: 2021-04-30 | End: 2021-04-30

## 2021-04-30 RX ORDER — LEVETIRACETAM 500 MG/1
1000 TABLET ORAL 2 TIMES DAILY
Status: DISCONTINUED | OUTPATIENT
Start: 2021-04-30 | End: 2021-05-01

## 2021-04-30 RX ADMIN — CARBAMAZEPINE 200 MG: 200 TABLET ORAL at 00:22

## 2021-04-30 RX ADMIN — FAMOTIDINE 20 MG: 20 TABLET ORAL at 17:10

## 2021-04-30 RX ADMIN — FENTANYL CITRATE 100 MCG: 50 INJECTION, SOLUTION INTRAMUSCULAR; INTRAVENOUS at 17:05

## 2021-04-30 RX ADMIN — DOCUSATE SODIUM 100 MG: 50 LIQUID ORAL at 05:14

## 2021-04-30 RX ADMIN — FENTANYL CITRATE 100 MCG: 50 INJECTION, SOLUTION INTRAMUSCULAR; INTRAVENOUS at 04:59

## 2021-04-30 RX ADMIN — ATORVASTATIN CALCIUM 80 MG: 40 TABLET, FILM COATED ORAL at 21:57

## 2021-04-30 RX ADMIN — LEVETIRACETAM INJECTION 1000 MG: 10 INJECTION INTRAVENOUS at 05:07

## 2021-04-30 RX ADMIN — POTASSIUM PHOSPHATE, MONOBASIC AND POTASSIUM PHOSPHATE, DIBASIC 15 MMOL: 224; 236 INJECTION, SOLUTION, CONCENTRATE INTRAVENOUS at 09:32

## 2021-04-30 RX ADMIN — ALBUTEROL SULFATE 2.5 MG: 2.5 SOLUTION RESPIRATORY (INHALATION) at 06:20

## 2021-04-30 RX ADMIN — INSULIN GLARGINE 18 UNITS: 100 INJECTION, SOLUTION SUBCUTANEOUS at 17:29

## 2021-04-30 RX ADMIN — PROPOFOL 20 MCG/KG/MIN: 10 INJECTION, EMULSION INTRAVENOUS at 06:31

## 2021-04-30 RX ADMIN — DOCUSATE SODIUM 50 MG AND SENNOSIDES 8.6 MG 1 TABLET: 8.6; 5 TABLET, FILM COATED ORAL at 21:57

## 2021-04-30 RX ADMIN — ACETAMINOPHEN 1000 MG: 500 TABLET ORAL at 05:15

## 2021-04-30 RX ADMIN — ARIPIPRAZOLE 15 MG: 10 TABLET ORAL at 17:10

## 2021-04-30 RX ADMIN — ACETAMINOPHEN 1000 MG: 500 TABLET ORAL at 12:32

## 2021-04-30 RX ADMIN — CARBAMAZEPINE 200 MG: 200 TABLET ORAL at 08:08

## 2021-04-30 RX ADMIN — MIDAZOLAM HYDROCHLORIDE 4 MG: 1 INJECTION, SOLUTION INTRAMUSCULAR; INTRAVENOUS at 11:38

## 2021-04-30 RX ADMIN — CARBAMAZEPINE 200 MG: 200 TABLET ORAL at 23:46

## 2021-04-30 RX ADMIN — LIDOCAINE 3 PATCH: 50 PATCH TOPICAL at 17:10

## 2021-04-30 RX ADMIN — ACETAMINOPHEN 1000 MG: 500 TABLET ORAL at 23:46

## 2021-04-30 RX ADMIN — PROPOFOL 15 MCG/KG/MIN: 10 INJECTION, EMULSION INTRAVENOUS at 19:12

## 2021-04-30 RX ADMIN — ACETAMINOPHEN 1000 MG: 500 TABLET ORAL at 17:10

## 2021-04-30 RX ADMIN — CYANOCOBALAMIN TAB 500 MCG 1000 MCG: 500 TAB at 05:13

## 2021-04-30 RX ADMIN — LEVETIRACETAM 1000 MG: 500 TABLET ORAL at 17:10

## 2021-04-30 RX ADMIN — FAMOTIDINE 20 MG: 20 TABLET ORAL at 05:13

## 2021-04-30 RX ADMIN — MIDAZOLAM HYDROCHLORIDE 4 MG: 1 INJECTION INTRAMUSCULAR; INTRAVENOUS at 11:38

## 2021-04-30 RX ADMIN — ALBUTEROL SULFATE 2.5 MG: 2.5 SOLUTION RESPIRATORY (INHALATION) at 02:19

## 2021-04-30 RX ADMIN — CARBAMAZEPINE 200 MG: 200 TABLET ORAL at 17:11

## 2021-04-30 RX ADMIN — DOCUSATE SODIUM 100 MG: 50 LIQUID ORAL at 17:10

## 2021-04-30 RX ADMIN — ASPIRIN 81 MG: 81 TABLET, CHEWABLE ORAL at 10:17

## 2021-04-30 RX ADMIN — Medication 5000 UNITS: at 05:13

## 2021-04-30 RX ADMIN — CEFTRIAXONE SODIUM 2 G: 2 INJECTION, POWDER, FOR SOLUTION INTRAMUSCULAR; INTRAVENOUS at 05:14

## 2021-04-30 RX ADMIN — ACETYLCYSTEINE 4 ML: 200 INHALANT RESPIRATORY (INHALATION) at 06:20

## 2021-04-30 RX ADMIN — FENTANYL CITRATE 100 MCG: 50 INJECTION, SOLUTION INTRAMUSCULAR; INTRAVENOUS at 14:53

## 2021-04-30 RX ADMIN — NOREPINEPHRINE BITARTRATE 2 MCG/MIN: 1 INJECTION, SOLUTION, CONCENTRATE INTRAVENOUS at 11:18

## 2021-04-30 RX ADMIN — ACETAMINOPHEN 1000 MG: 500 TABLET ORAL at 00:22

## 2021-04-30 RX ADMIN — PROPOFOL 40 MCG/KG/MIN: 10 INJECTION, EMULSION INTRAVENOUS at 00:01

## 2021-04-30 RX ADMIN — POLYETHYLENE GLYCOL 3350 1 PACKET: 17 POWDER, FOR SOLUTION ORAL at 06:43

## 2021-04-30 RX ADMIN — ARIPIPRAZOLE 15 MG: 10 TABLET ORAL at 05:13

## 2021-04-30 RX ADMIN — FENTANYL CITRATE 100 MCG: 50 INJECTION, SOLUTION INTRAMUSCULAR; INTRAVENOUS at 03:19

## 2021-04-30 ASSESSMENT — COGNITIVE AND FUNCTIONAL STATUS - GENERAL
DRESSING REGULAR LOWER BODY CLOTHING: TOTAL
SUGGESTED CMS G CODE MODIFIER DAILY ACTIVITY: CN
DAILY ACTIVITIY SCORE: 6
DRESSING REGULAR UPPER BODY CLOTHING: TOTAL
TURNING FROM BACK TO SIDE WHILE IN FLAT BAD: UNABLE
TOILETING: TOTAL
MOVING TO AND FROM BED TO CHAIR: UNABLE
TURNING FROM BACK TO SIDE WHILE IN FLAT BAD: UNABLE
MOVING FROM LYING ON BACK TO SITTING ON SIDE OF FLAT BED: UNABLE
STANDING UP FROM CHAIR USING ARMS: TOTAL
SUGGESTED CMS G CODE MODIFIER MOBILITY: CN
DRESSING REGULAR LOWER BODY CLOTHING: TOTAL
WALKING IN HOSPITAL ROOM: TOTAL
DAILY ACTIVITIY SCORE: 6
MOBILITY SCORE: 6
PERSONAL GROOMING: TOTAL
MOVING TO AND FROM BED TO CHAIR: UNABLE
STANDING UP FROM CHAIR USING ARMS: TOTAL
EATING MEALS: TOTAL
CLIMB 3 TO 5 STEPS WITH RAILING: TOTAL
PERSONAL GROOMING: TOTAL
HELP NEEDED FOR BATHING: TOTAL
HELP NEEDED FOR BATHING: TOTAL
TOILETING: TOTAL
SUGGESTED CMS G CODE MODIFIER DAILY ACTIVITY: CN
EATING MEALS: TOTAL
DRESSING REGULAR UPPER BODY CLOTHING: TOTAL
CLIMB 3 TO 5 STEPS WITH RAILING: TOTAL
MOVING FROM LYING ON BACK TO SITTING ON SIDE OF FLAT BED: UNABLE

## 2021-04-30 NOTE — CARE PLAN
Problem: Nutritional:  Goal: Nutrition support tolerated and meeting greater than 85% of estimated needs  4/30/2021 1353 by Sofia Valencia R.D.  Outcome: PROGRESSING AS EXPECTED    Impact Peptide running @40 ml/hr (goal ON propofol). Pt remains on propofol, TF has not been advanced to full goal of Impact Peptide @50 ml/hr yet.

## 2021-04-30 NOTE — CARE PLAN
Problem: Ventilation Defect:  Goal: Ability to achieve and maintain unassisted ventilation or tolerate decreased levels of ventilator support  Note:    Ventilator Daily Summary    Vent Day # 3  8.0 @ 27   20/430/8+/40%    Ventilator settings changed this shift: PEEP to 8 per PEEP ladder    Weaning trials: none due to failed SATs    Respiratory Procedures: none     Plan: Continue current ventilator settings and wean mechanical ventilation as tolerated per physician orders.

## 2021-04-30 NOTE — PROGRESS NOTES
[facility] Progress Note      Admit Date: 4/25/2021    Resident(s): Delroy Fulton M.D.   Attending:  BREANNA ALCARAZ/ Dr. Dumont     Patient ID:    Name:  Perry Davis   YOB: 1960  Age:  60 y.o.  male   MRN:  [id number]    Hospital Course:  Perry Davis is a 60 y.o. male with a previous history of uncontrolled Diabetes -Hb1C 13 , HLD, CAD s/p CABGx4  2015, tobacco use ,OA, chronic low pain admitted  4/25 with worsening neck , upper back pain after a GLF , mild DKA. Found to have epidural abscess w/ cord compression, discitis involving cervical and thoracic spine, underwent emergent laminectomy and decompression by Dr. Hazel 4/28. Blood cultures from 4/25 2/2 positive strep anginosus on Ceftriaxone, followed by ID. Acute mental state change noted 4/27 , CT head with right cerebellar CVA, neurology consulting. Intubated for airway protection and transferred to ICU on 4/28/21 for post surgical intervention.     Consultants:  Critical Care  Neurosurgery   Neurology  Infectious Disease    Interval Events:    4/28 Neuro- obtunded and on ventilator support.  Prior to sedation was noted to have purposeful movements of all his extremities.    4/29: POD #1. obtunded, on ventilator support .  Pressor support with Levophed.  CHRISTOPHER pending, repeat blood culture 4/28/2021 remains negative    4/30/21: POD #2, obtunded, on vent support. Blood c/s 4/27 1/2 + for Strep. Viridans, likely contaminant. 4/28 1/2 + CoNS, likely contaminant, currently on C3. CHRISTOPHER done today, results pending. Okay for ASA per NS. SP catheter draining urine after manual irrigation. BP stable, off levophed.    Vitals Range last 24h:  Temp:  [35 °C (95 °F)-36.4 °C (97.5 °F)] 36.4 °C (97.5 °F)  Pulse:  [61-87] 75  Resp:  [19-89] 22  BP: ()/(50-72) 117/57  SpO2:  [94 %-99 %] 96 %      Intake/Output Summary (Last 24 hours) at 4/30/2021 1446  Last data filed at 4/30/2021 1400  Gross per 24 hour   Intake 1815.9 ml   Output 2640 ml    Net -824.1 ml        Review of Systems   Unable to perform ROS: Intubated       PHYSICAL EXAM:  Vitals:    04/30/21 1200 04/30/21 1300 04/30/21 1400 04/30/21 1428   BP: 117/57      Pulse: 72 73 74 75   Resp: (!) 22 (!) 22 (!) 22 (!) 22   Temp: 36 °C (96.8 °F)  36.4 °C (97.5 °F)    TempSrc: Temporal  Temporal    SpO2: 98% 97% 98% 96%   Weight:       Height:        Body mass index is 29.16 kg/m².    O2 therapy: Pulse Oximetry: 96 %, O2 Delivery Device: Ventilator    Date 04/30/21 0700 - 05/01/21 0659   Shift 7603-5210 3782-5104 5376-0005 24 Hour Total   INTAKE   I.V. 67.3   67.3     Propofol Volume 66.4   66.4     Norepinephrine Volume 0.9   0.9   Other 60   60     Medications (PO/Enteral Liquids) 60   60   NG/GT 80   80     Intake (mL) (Enteral Tube 04/28/21 Cortrak - Gastric Left nare) 80   80   IV Piggyback 250   250     Volume (mL) (potassium phosphate 15 mmol in  mL ivpb) 250   250   Enteral 60   60     Free Water / Tube Flush 60   60   Shift Total 517.3   517.3   OUTPUT   Urine 425   425     Output (mL) (Urethral Catheter Other (Comment) 16 Fr.) 425   425   Shift Total 425   425   NET 92.3   92.3        Physical Exam   Constitutional: He is well-developed, well-nourished, and in no distress.   HENT:   Head: Normocephalic and atraumatic.   Nose: Nose normal.   Eyes: Pupils are equal, round, and reactive to light. Conjunctivae are normal.   Cardiovascular: Normal rate, regular rhythm and normal heart sounds.   Pulmonary/Chest: Effort normal.   B/l rhonchi   Abdominal: Soft. Bowel sounds are normal. He exhibits no distension.   Musculoskeletal:         General: No edema.      Cervical back: Neck supple.   Neurological:   Sedated and intubated    Skin: Skin is warm and dry.       Recent Labs     04/28/21  1046 04/29/21  0632 04/30/21  0221   ISTATAPH 7.314* 7.336* 7.456   ISTATAPCO2 36.1 42.4* 36.9   ISTATAPO2 78 74 74   ISTATATCO2 19* 24 27   [id number] 94 94 96   ISTATARTHCO3 18.3 22.6 26.0*   ISTATARTBE  -7* -3 2   ISTATTEMP see below see below 37.0 C   ISTATFIO2 100 70 40   ISTATSPEC Arterial Arterial Arterial   ISTATAPHTC  --   --  7.456   [id number]  --   --  74     Recent Labs     04/28/21 0310 04/29/21 0324 04/30/21  0244   SODIUM 125* 129* 134*   POTASSIUM 4.0 4.0 3.8   CHLORIDE 92* 98 101   CO2 19* 23 26   BUN 20 28* 29*   CREATININE 0.49* 0.53 0.50   MAGNESIUM 2.3 2.2 2.2   PHOSPHORUS 3.3  --  2.2*   CALCIUM 7.7* 7.7* 7.8*     Recent Labs     04/28/21 0310 04/29/21 0324 04/30/21  0244   ALTSGPT 22 22 21   ASTSGOT 18 26 21   ALKPHOSPHAT 109* 103* 104*   TBILIRUBIN 0.8 0.3 0.3   PREALBUMIN  --  7.3*  --    GLUCOSE 173* 214* 292*     Recent Labs     04/28/21 0310 04/28/21 0310 04/29/21 0324 04/29/21  1823 04/30/21  0244   RBC 4.56*  --  4.06*  --  3.50*   HEMOGLOBIN 13.9*   < > 12.1* 10.8* 10.6*   HEMATOCRIT 40.7*   < > 36.8* 32.2* 31.8*   PLATELETCT 105*  --  194  --  195   PROTHROMBTM  --   --  14.5  --  14.2   INR  --   --  1.09  --  1.07    < > = values in this interval not displayed.     Recent Labs     04/28/21 0310 04/29/21 0324 04/30/21  0244   WBC 15.2* 14.3* 13.0*   NEUTSPOLYS  --   --  78.00*   LYMPHOCYTES  --   --  12.70*   MONOCYTES  --   --  7.50   EOSINOPHILS  --   --  0.40   BASOPHILS  --   --  0.20   ASTSGOT 18 26 21   ALTSGPT 22 22 21   ALKPHOSPHAT 109* 103* 104*   TBILIRUBIN 0.8 0.3 0.3       Meds:  • insulin glargine  18 Units     • acetylcysteine  3-4 mL     • albuterol  2.5 mg     • aspirin  81 mg     • levETIRAcetam  1,000 mg     • MD Alert...Adult ICU Electrolyte Replacement per Pharmacy       • lidocaine  1-2 mL     • insulin regular  2-9 Units      And   • dextrose 50%  50 mL     • cefTRIAXone (ROCEPHIN) IV  2 g Stopped (04/30/21 0544)   • magnesium hydroxide  30 mL     • ondansetron  4 mg     • oxyCODONE immediate-release  10 mg     • polyethylene glycol/lytes  1 Packet     • promethazine  12.5-25 mg     • senna-docusate  1 tablet     • Pharmacy Consult Request  1 Each     •  MD ALERT...DO NOT ADMINISTER NSAIDS or ASPIRIN unless ORDERED By Neurosurgery  1 Each     • bisacodyl  10 mg     • fleet  1 Each     • Respiratory Therapy Consult       • ondansetron  4 mg     • promethazine  12.5-25 mg     • prochlorperazine  5-10 mg     • norepinephrine (Levophed) infusion  0-30 mcg/min Stopped (04/30/21 1129)   • ipratropium-albuterol  3 mL     • labetalol  10 mg     • Pharmacy  1 Each     • acetaminophen  1,000 mg     • ARIPiprazole  15 mg     • atorvastatin  80 mg     • carBAMazepine  200 mg     • cyanocobalamin  1,000 mcg     • senna-docusate  1 tablet     • vitamin D  5,000 Units     • docusate sodium  100 mg     • famotidine  20 mg      Or   • famotidine  20 mg     • fentaNYL  50 mcg      And   • fentaNYL  100 mcg      And   • fentaNYL   Stopped (04/28/21 2118)    And   • propofol  0-80 mcg/kg/min 10 mcg/kg/min (04/30/21 1431)   • lidocaine  3 Patch          Procedures:  Laminectomy, decompression by neurosurgery 4/28/2021    Imaging:  DX-ABDOMEN FOR TUBE PLACEMENT   Final Result      Feeding tube placement with the tip projecting over the distal stomach or duodenal bulb      EC-CHRISTOPHER W/O CONT         CT-HEAD W/O   Final Result         1.  Low-density changes in the right cerebellar hemisphere, compatible with evolving infarct, streak artifacts minimally limits evaluation of the posterior fossa for subtle subarachnoid hemorrhage, no intracranial hemorrhage is readily identified.   2.  Atherosclerosis.      DX-CHEST-PORTABLE (1 VIEW)   Final Result         1.  No acute cardiopulmonary disease.      DX-CHEST-PORTABLE (1 VIEW)   Final Result      Mild left basilar opacity may represent atelectasis. Infection not excluded.      Improved aeration of the left lung.      Atherosclerotic plaque.         EC-ECHOCARDIOGRAM COMPLETE W/O CONT   Final Result      IR-DRAIN-BLADDER SUPRAPUB W/CATH   Final Result      1.     Ultrasound and fluoroscopic guided placement of a 16 [place] tip silicone  suprapubic bladder catheter.      2.     Cystogram documenting catheter placement.         MR-BRAIN-W/O   Final Result      Acute large right cerebellar posterior inferior cerebellar artery territory infarct with possible small amount of petechial hemorrhage.      MR-MRA HEAD-W/O   Final Result      Findings suggesting right vertebral artery occlusion.      MR-MRA NECK-WITH & W/O AND SEQUENCES   Final Result      1.  Small caliber right vertebral artery which is not visualized on the noncontrast time-of-flight technique could be related to retrograde flow or diminutive caliber and sluggish flow.   2.  Possible moderate focal stenosis in the mid cervical left vertebral artery.   3.  No significant carotid stenosis.      DX-ABDOMEN FOR TUBE PLACEMENT   Final Result         Feeding tube with tip projecting over the expected area of the distal stomach.      DX-CHEST-PORTABLE (1 VIEW)   Final Result      1.  Worsening consolidation of LEFT hemithorax with shift of mediastinal structures to the LEFT suggesting atelectasis, possibly due to endobronchial process.   2.  Supportive tubing as described above.   3.  No pneumothorax.      DX-CHEST-PORTABLE (1 VIEW)   Final Result         Endotracheal tube with tip projecting over the mid thoracic trachea.      Layering left pleural effusion with left lower lung opacity.      DX-SPINE-ANY ONE VIEW   Final Result      Digitized intraoperative radiograph is submitted for review.  This examination is not for diagnostic purpose but for guidance during a surgical procedure.      DX-PORTABLE FLUOROSCOPY < 1 HOUR   Final Result      Portable fluoroscopy utilized for 2 seconds.         INTERPRETING LOCATION: [address] [place] [address], [id number]      CT-HEAD W/O   Final Result         1.  Low-density in the region of the right cerebellar hemisphere, appearance compatible with evolving infarct.      These findings were discussed with the patient's on-call clinician, Deniz, on 4/28/2021 6:14 AM.       MR-THORACIC SPINE-WITH & W/O   Final Result      1.  Posterior epidural fluid collection/abscess within the lower cervical spine extending inferiorly to about the T6 level which could represent epidural abscess and/or hematoma. This measures 8 mm in maximal thickness at T2-T3 with at least moderate    thecal sac stenosis.   2.  Abnormal signal within the cervical cord and upper thoracic cord suggesting cord edema or infectious/inflammatory etiology.   3.  Lower cervical spine findings are detailed on earlier report.   4.  Prominent enhancement along the surface of the mid and lower thoracic cord is of uncertain etiology and as detailed above, possibly representing leptomeningeal disease/infection. See details above.   These findings were discussed with Dr. Cano on 4/28/2021 10:01 AM.         MR-LUMBAR SPINE-WITH & W/O   Final Result      No evidence of lumbar spine infection or epidural abscess.      Mild spondylosis as detailed above without spinal stenosis.      DX-CHEST-PORTABLE (1 VIEW)   Final Result      1.  Hypoinflation with left basilar atelectasis.   2.  Mild perihilar interstitial prominence may be related to hypoinflation or edema.      MR-CERVICAL SPINE-WITH & W/O   Final Result      1.  There is multiseptated abnormal peripherally enhancing epidural collection noted extending from C2 to the visualized lower thoracic level. Largest collection is noted in the upper thoracic posterior epidural space at the levels of T2 and T3. The    lower extent of the collection is not imaged. This is causing multilevel effacement of the thecal sac and cord compression. The thoracic spinal cord is anteriorly displaced and compressed at the levels of T2 and T3. Pre and postcontrast MR examination of    the thoracic spine is recommended for further evaluation.   2.  There is effacement of the cervical thecal sac due to the multiseptated epidural fluid collection.   3.  The cervical spinal cord is mildly enlarged  with minimal increased T2 signal intensity. The possibility of cord inflammation cannot be entirely excluded.   4.  Abnormal T2 hyperintensity at C5-6 disc space likely representing discitis.   5.  Abnormal bone marrow edema of C5, C6 and C7 vertebral bodies with edema concerning for osteomyelitis.   6.  There is also focal enhancement of C6 vertebral body likely secondary to the osteomyelitis.   7.  Prevertebral edema/fluid collection.   8.  Nonvisualization of flow void of bilateral vertebral arteries concerning for age-indeterminate bilateral vertebral occlusions.         CT-CHEST (THORAX) WITH   Final Result      No displaced rib fracture is identified.      No acute cardiopulmonary process is seen.      Atherosclerotic plaque including coronary artery calcification.      CT-TSPINE W/O PLUS RECONS   Final Result      No CT evidence of acute traumatic abnormality.      Mild T6/7 anterior wedging compatible with healed mild chronic compression fracture and there is interbody fusion.      CT-LSPINE W/O PLUS RECONS   Final Result      No CT evidence of acute traumatic injury.      CT-CSPINE WITHOUT PLUS RECONS   Final Result      Multilevel degenerative changes.      Prevertebral edema. Further evaluation with MRI is recommended as this can be seen in the setting of ligamentous injury.      Bilateral carotid atherosclerotic plaque.             ASSESSEMENT and PLAN:    * Spinal epidural abscess- (present on admission)  Assessment & Plan  CT C-spine 4/25 with prevertebral edema.    MRI C/T-spine with abnormal peripherally enhancing epidural collection extending from C2 to visualize lower thoracic 11.  Largest collection in the upper thoracic posterior epidural space at the level of T2-T3.  Lower extent of the collection is causing multilevel effacement of the thecal sac and cord compression.  Thoracic spinal cord anteriorly displaced and compressed at levels T2 and T3.  T2 hyperintensity at C5-C6 tests space likely  representing discitis.  Focal enhancement of C6 vertebral body likely secondary to osteomyelitis.  Prevertebral edema/fluid collection.  Concern of age-indeterminate bilateral vertebral occlusions.  Underwent emergent laminectomy of C3-C4 C5-C6, C6/C7, T1-T2 laminectomy, decompression of thecal sac and nerves by Dr. Hazel 4/28/2021.   Neurosurgery following, recommending goal BP <160  Sedation, pain management as appropriate        History of ischemic stroke  Assessment & Plan  Found to have acute change in mental status 4/27/2021.   CT head w/o concerning for evolving right cerebellar infarct.   Intubated prior to neurosurgical intervention.  High intensity statin, Dr. Hazel recommending holding aspirin for now due to risk of hemorrhagic conversion, thrombocytopenia.  MRA brain, MRA neck: Findings suggesting right vertebral artery occlusion. Possible moderate focal stenosis in the mid cervical left vertebral artery.  TTE with bubble study: EF 60%, no evidence of right-to-left shunt, no valve lesions.  CHRISTOPHER pending   Goal euthermia, normotension, eunatremia  PT, OT, SLP evaluation as able  Neurology following  4/30: okay to start ASA per NS      Sepsis due to Streptococcus species (HCC)- (present on admission)  Assessment & Plan  This is Sepsis Present on admission  SIRS criteria identified on my evaluation include: Tachycardia, with heart rate greater than 90 BPM, Tachypnea, with respirations greater than 20 per minute and Leukocytosis, with WBC greater than 12,000  Source is possible retropharyngeal/pharyngeal  Suspected onset of infection (date and time) Unknown  Sepsis protocol initiated  Fluid resuscitation ordered per protocol  IV antibiotics as appropriate for source of sepsis  While organ dysfunction may be noted elsewhere in this problem list or in the chart, degree of organ dysfunction does not meet CMS criteria for severe sepsis    MRI C-T-spine with evidence of epidural abscess, discitis, vertebral  osteomyelitis.  Underwent emergent laminectomy, decompression 4/28.  Blood culture 4/24 and cervical thoracic 4/28 : Growth of Streptococcus anginosus. Blood c/s 4/27 1/2 + for Strep. Viridans, likely contaminant. 4/28 1/2 + CONS, likely contaminant, currently on C3.    4/25 evening: Started on vancomycin  4/26 AM: d/c'ed vanc, started IV ceftriaxone  -IVF resuscitation  -Infectious disease following  -CHRISTOPHER done, results pending    Thrombocytopenia (HCC)- (present on admission)  Assessment & Plan  IMPROVING  Presented with a platelet count of 51.  This is unknown for reasons why, the patient does not drink alcohol per history which is corroborated by mother.  There is no known history of any blood clot/clotting disorder in the family.    We will not start any chemical VTE prophylaxis for now, will use SCDs  Plt 47 on 4/26. This is likely due to sepsis, though lower on the differential includes previously undiagnosed infection.  Hep panel negative. HIV non reactive  -Continue C3, narrow antibiotic pending sensitivity results    Acute bilateral back pain- (present on admission)  Assessment & Plan  Presented with worsening neck and upper back pain after ground-level fall.   CT C-spine concerning for prevertebral edema, ligamental damage.  MRI recommended    MRI C/T-spine with extensive epidural abscess, discitis, osteomyelitis s/p laminectomy and decompression  See plan for spinal epidural abscess  As needed pain medications  PT/OT as appropriate      Type 2 diabetes mellitus without complication, without long-term current use of insulin (HCC)- (present on admission)  Assessment & Plan  Presented with elevated blood glucose and mild DKA.  Apparently issues with noncompliance.   HbA1c done on admission 13  Continue insulin sliding scale, Lantus  Accu-Cheks, hypoglycemia protocols  Diabetic diet months allowed p.o.  Diabetes education    Essential hypertension, benign- (present on admission)  Assessment &  Plan  Noncompliance issues  In the hospital, presented with elevated blood pressure with systolic blood pressure up to the 180s.  This is likely with a component of acute pain.  Hold blood pressure medications due to hypotension.  Resume as appropriate    Coronary artery disease due to calcified coronary lesion: CABG x4, July 2015- (present on admission)  Assessment & Plan  History of    -Continue home atorvastatin, ASA  -Hold antihypertensives       Status post urethrostomy (HCC)- (present on admission)  Assessment & Plan  History of perineal urethrostomy 2018  Acute urinary retention post surgical intervention 4/28  Unable to place Gomez catheter through urethrostomy.    Suprapubic catheter placement by interventional radiology.     Hyponatremia- (present on admission)  Assessment & Plan  In 123-125 since admission , likely chronic   Likely SIADH.  Serum Osm 264, urine osm 745, urine Na 70  Follow daily Na  Will consider fluid restriction as appropriate if worsens    Hypomagnesemia- (present on admission)  Assessment & Plan  Replete with goal of Mg of 2    Hypokalemia- (present on admission)  Assessment & Plan  Replete as needed with goal of K of 4    Difficulty swallowing- (present on admission)  Assessment & Plan  Presented with difficulty swallowing, is coughing and vomiting when swallowing large/hard foods    -SLP evaluation post extubation  -Aspiration and seizure precautions  -Soft GI diet    Tobacco abuse- (present on admission)  Assessment & Plan  Encouraged tobacco cessation    High anion gap metabolic acidosis- (present on admission)  Assessment & Plan  Resolved  Also see problem of diabetic ketoacidosis.    Marijuana abuse- (present on admission)  Assessment & Plan  Chronic issue  Encourage marijuana cessation    Diabetic ketoacidosis (HCC)- (present on admission)  Assessment & Plan  Resolved  -Insulin sliding scale, hypoglycemia protocol, Accu-Cheks  -Diabetic education  -Diabetes  education        Dyslipidemia- (present on admission)  Assessment & Plan  Continue home atorvastatin 80      DISPO: ICU    CODE STATUS: Full code    Quality Measures:  Feeding: Tube feeds  Analgesia: Fentanyl  Sedation: propofol  Thromboprophylaxis: SCDs  Head of bed: >30 degrees  Ulcer prophylaxis: Pepcid  Glycemic control: Insulin sliding scale, Lantus  Bowel care: bowel regimen  Indwelling lines: PIV, art line, right IJ CVC  Deescalation of antibiotics: On ceftriaxone      Delroy Fulton M.D.

## 2021-04-30 NOTE — DIETARY
Nutrition Services: Update   Day 5 of admit.  Perry Davis is a 60 y.o. male with admitting DX of DKA, type 2; Cerebellar cerebrovascular accident (CVA) without late effect.     Propofol has been 15-25 mcg/kg/min this morning. Per RN expected propofol will continue at this rate. This provides pt an additional 172 - 285 kcal/day.     RD will adjust TF to better meet pt's estimated nutritional needs with propofol.     Recommendations/Plan:  1. With propofol: Impact Peptide 1.5 @ 45 ml/hr to provide 1620 kcal + kcal from propofol, 102 grams protein, and 832 ml free water per day.   2. Without propofol: Impact Peptide 1.5 @ 50 ml/hr to provide 1800 kcal, 113 grams protein, and 924 ml free water per day

## 2021-04-30 NOTE — PROGRESS NOTES
"1 of 2 sets of blood cultures from 4/28/21 growing Gram positive cocci as of 21:41 today, 4/29/21. Per culture result \"4/29/2021 21:41 Gram stain: Gram positive cocci: Possible Staphylococcus sp. Negative for Staphylococcus aureus and MRSA by PCR.\"     Suspect this is a contaminant; previous blood cultures and intra-op epidural wound cultures growing Strep anginosus and the the patient has been afebrile with down trending leukocytosis. ID is on-board and the patient is on C3 for Gram positive bacteremia; will continue C3 with close monitoring and wait for cultures to result further. Repeat blood cultures ordered.   "

## 2021-04-30 NOTE — DISCHARGE PLANNING
[facility] Transitional [facility] Coordination    Initial Physiatry consult complete.  Rehab will follow.  Please see Dr. Puga's consult note for current recommendations.

## 2021-04-30 NOTE — PROGRESS NOTES
Neurology Progress Note  Neurohospitalist Service, [facility]    Referring Physician: Dax Garcia M.D.    Referral reason: R cerebellar stroke.    HPI: Perry Davis is a 60 y.o. man, history per chart review of anginal syndrome, MI with CABG x4 in 2015, DM, HLD, HTN, CKD, seizure, sleep apnea, tobacco use who presented to to [facility] ED by private vehicle on 4/25/21 with complaint of acute on chronic neck pain s/p slip and fall in shower. Additionally, the patient complained of back pain for 1 week for which he sought care at urgent care on two occassions and was treated with Toradol injection, as well as Tylenol with codeine, which subsequently made him feel dizzy and lose his balance. While in the ER he reportedly stated he has not been on his medications for over a year. Per chart review, upon presentation to ER he was axox4, no neurological deficits identified. There is no report of drug use, however, unable to confirm with patient at this time given AMS.    Hospital Course, interval note 4/28:  Leukocytosis ongoing since arrival. Blood cultures drawn 4/25 growing Streptococcus species.  MRI C-spine on 4/26 with epidural collection noted from C2-T3 with largest collection noted in upper thoracic posterior epidural space at T2-T3 causing multilevel cord compression.  Also with likely discitis at C5-6 and osteomyelitis at C5-C7. He went for emergent surgery: C3, C4, C5, C6. C7. T1, T2 laminectomy; decompression of thecal sac and nerve roots. 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 A non hemorrhagic right cerebellar stroke was diagnosed intraoperatively on CT. He was admitted to ICU. He is being treated with ceftriaxone currently.    Interval Note 4/29: Remains in ICU, intubated and sedated on propofol, POD 1. Strep anginosus bacteremia secondary to epidural abscess, being treated per ID. Had episode of agitation with self extubation and reintubation this  morning.    Interval Note 4/30: Remains in ICU, intubated and sedated on propofol    Review of systems: Unable to perform due to patient just having received sedation and paralytic for airway management.    Past Medical History:    has a past medical history of Anginal syndrome (HCC) (05/21/2018), Bronchitis, Diabetes (HCC), Heart burn, High cholesterol, Hypertension, Indigestion, Infectious disease (1989 or 1+990), Marijuana use (05/21/2018), Myocardial infarct (HCC) (04/05/2014), Pain, Pain (05/21/2018), Psychiatric problem, Renal disorder, Seizure (HCC), Sleep apnea, Snoring, Urinary bladder disorder, and Urinary incontinence.    FHx:  family history includes Heart Attack (age of onset: 75) in his father; Heart Disease (age of onset: 55) in his sister; Heart Disease (age of onset: 59) in his father.    SHx:   reports that he has been smoking cigarettes. He has a 20.00 pack-year smoking history. He has never used smokeless tobacco. He reports current drug use. Drug: Inhaled. He reports that he does not drink alcohol.    Allergies:  No Known Allergies    Medications:    Current Facility-Administered Medications:   •  insulin glargine (Lantus) injection, 18 Units, Subcutaneous, Q EVENING, Delroy Fulton M.D.  •  potassium bicarbonate (KLYTE) effervescent tablet 25 mEq, 25 mEq, Enteral Tube, Once, Delroy Fulton M.D.  •  MD Alert...ICU Electrolyte Replacement per Pharmacy, , Other, PHARMACY TO DOSE, Robyn Tejeda A.P.R.N.  •  lidocaine (XYLOCAINE) 1 % injection 1-2 mL, 1-2 mL, Tracheal Tube, Q30 MIN PRN, FREDERICK Maier.P.R.N.  •  insulin regular (HumuLIN R,NovoLIN R) injection, 2-9 Units, Subcutaneous, Q6HRS, 5 Units at 04/30/21 0527 **AND** POC blood glucose manual result, , , Q6H **AND** NOTIFY MD and PharmD, , , Once **AND** dextrose 50% (D50W) injection 50 mL, 50 mL, Intravenous, Q15 MIN PRN, Alexi Dumont M.D.  •  cefTRIAXone (ROCEPHIN) 2 g in  mL IVPB, 2 g, Intravenous, DAILY, Molly LINDA  MARICRUZ cMmahan, Stopped at 04/30/21 0544  •  magnesium hydroxide (MILK OF MAGNESIA) suspension 30 mL, 30 mL, Enteral Tube, QDAY PRN, Alexi Dumont M.D.  •  ondansetron (ZOFRAN ODT) dispertab 4 mg, 4 mg, Enteral Tube, Q4HRS PRN, Alexi Dumont M.D.  •  oxyCODONE immediate release (ROXICODONE) tablet 10 mg, 10 mg, Enteral Tube, Q4HRS PRN, Alexi Dumont M.D.  •  polyethylene glycol/lytes (MIRALAX) PACKET 1 Packet, 1 Packet, Enteral Tube, BID PRN, Alexi Dumont M.D., 1 Packet at 04/30/21 0643  •  promethazine (PHENERGAN) tablet 12.5-25 mg, 12.5-25 mg, Enteral Tube, Q4HRS PRN, Alexi Dumont M.D.  •  senna-docusate (PERICOLACE or SENOKOT S) 8.6-50 MG per tablet 1 tablet, 1 tablet, Enteral Tube, Q24HRS PRN, Alexi Dumont M.D.  •  Pharmacy Consult Request ...Pain Management Review 1 Each, 1 Each, Other, PHARMACY TO DOSE, Anil Langley, KALEIGH.A.-C.  •  MD ALERT...DO NOT ADMINISTER NSAIDS or ASPIRIN unless ORDERED By Neurosurgery 1 Each, 1 Each, Other, PRN, Anil Langley, P.A.-C.  •  bisacodyl (DULCOLAX) suppository 10 mg, 10 mg, Rectal, Q24HRS PRN, Anil Langley, P.A.-C.  •  fleet enema 133 mL, 1 Each, Rectal, Once PRN, Anil Langley, P.A.-C.  •  Respiratory Therapy Consult, , Inhalation, Continuous RT, Anil Langley, KALEIGH.A.-C.  •  ondansetron (ZOFRAN) syringe/vial injection 4 mg, 4 mg, Intravenous, Q4HRS PRN, Anil Langley, KALEIGH.A.-C.  •  promethazine (PHENERGAN) suppository 12.5-25 mg, 12.5-25 mg, Rectal, Q4HRS PRN, Anil Langley, P.A.-C.  •  prochlorperazine (COMPAZINE) injection 5-10 mg, 5-10 mg, Intravenous, Q4HRS PRN, Anil Langley P.A.-C.  •  levETIRAcetam (Keppra) 1000 mg in 100 mL NaCl IV premix, 1,000 mg, Intravenous, Q12HRS, Anil Langley, P.A.-C., Stopped at 04/30/21 0522  •  norepinephrine (Levophed) 8 mg in 250 mL NS infusion (premix), 0-30 mcg/min, Intravenous, Continuous, Gerard Alexander M.D., Stopped at 04/29/21 1656  •  ipratropium-albuterol (DUONEB)  nebulizer solution, 3 mL, Nebulization, Q4H PRN (RT), Alexi Dumont M.D., 3 mL at 04/29/21 1131  •  labetalol (NORMODYNE/TRANDATE) injection 10 mg, 10 mg, Intravenous, Q4HRS PRN, Alexi Dumont M.D.  •  Pharmacy Consult: Enteral tube insertion - review meds/change route/product selection, 1 Each, Other, PHARMACY TO DOSE, Gerard Alexander M.D.  •  acetaminophen (TYLENOL) tablet 1,000 mg, 1,000 mg, Enteral Tube, Q6HRS, Gerard Alexander M.D., 1,000 mg at 04/30/21 0515  •  ARIPiprazole (Abilify) tablet 15 mg, 15 mg, Enteral Tube, BID, Gerard Alexander M.D., 15 mg at 04/30/21 0513  •  atorvastatin (LIPITOR) tablet 80 mg, 80 mg, Enteral Tube, Nightly, Gerard Alexander M.D., 80 mg at 04/29/21 2101  •  carBAMazepine (TEGRETOL) tablet 200 mg, 200 mg, Enteral Tube, TID, Gerard Alexander M.D., 200 mg at 04/30/21 0022  •  cyanocobalamin (VITAMIN B-12) tablet 1,000 mcg, 1,000 mcg, Enteral Tube, DAILY, Gerard Alexander M.D., 1,000 mcg at 04/30/21 0513  •  senna-docusate (PERICOLACE or SENOKOT S) 8.6-50 MG per tablet 1 tablet, 1 tablet, Enteral Tube, Nightly, Gerard Alexander M.D., 1 tablet at 04/29/21 2101  •  vitamin D (cholecalciferol) tablet 5,000 Units, 5,000 Units, Enteral Tube, DAILY, Gerard Alexander M.D., 5,000 Units at 04/30/21 0513  •  docusate sodium (Colace) oral solution 100 mg, 100 mg, Enteral Tube, BID, Gerard Alexander M.D., 100 mg at 04/30/21 0514  •  famotidine (PEPCID) tablet 20 mg, 20 mg, Enteral Tube, BID, 20 mg at 04/30/21 0513 **OR** famotidine (PEPCID) injection 20 mg, 20 mg, Intravenous, BID, Gerard Alexander M.D., 20 mg at 04/28/21 1749  •  fentaNYL (SUBLIMAZE) injection 50 mcg, 50 mcg, Intravenous, Q15 MIN PRN, 50 mcg at 04/29/21 0024 **AND** fentaNYL (SUBLIMAZE) injection 100 mcg, 100 mcg, Intravenous, Q15 MIN PRN, 100 mcg at 04/30/21 0459 **AND** fentaNYL (SUBLIMAZE) 50 mcg/mL in 50mL (Continuous Infusion), , Intravenous, Continuous, Stopped at 04/28/21 2118 **AND** propofol (DIPRIVAN) injection, 0-80 mcg/kg/min,  Intravenous, Continuous, Last Rate: 10.8 mL/hr at 04/30/21 0632, 25 mcg/kg/min at 04/30/21 0632 **AND** [START ON 5/1/2021] Triglyceride, , , Every 3 Days (0300), JASWINDER Maier  •  acetylcysteine (MUCOMYST) 20 % solution 3-4 mL, 3-4 mL, Inhalation, Q4HRS (RT), Alexi Dumont M.D., 4 mL at 04/30/21 0620  •  albuterol (PROVENTIL) 2.5mg/0.5ml nebulizer solution 2.5 mg, 2.5 mg, Nebulization, Q4HRS (RT), Alexi Dumont M.D., 2.5 mg at 04/30/21 0620  •  lidocaine (LIDODERM) 5 % 3 Patch, 3 Patch, Transdermal, Q24HR, Miriam Cano M.D., 3 Patch at 04/29/21 1644    Physical Examination:     Vitals:    04/30/21 0500 04/30/21 0600 04/30/21 0622 04/30/21 0700   BP: 118/58 105/54  108/57   Pulse: 68 61 63 66   Resp: (!) 22 (!) 22 19 (!) 22   Temp:  (!) 35.3 °C (95.5 °F)     TempSrc:  Temporal     SpO2: 95% 96% 96% 95%   Weight:       Height:         *NOTE: The patient is intubated and sedated with propofol at time of exam.    General: Sedated and intubated  Eyes: examination of optic disks not indicated at this time given acuity of consult  CV: RRR    NEUROLOGICAL EXAM:     Mental status: Briefly and sluggishly opens eyes to sternal rub, does not follow commands  Speech and language: intubated  Cranial nerve exam: Pupils are equal, round and reactive to light bilaterally. Visual fields: does not blink to threat bilaterally. Gaze in neutral position. Corneal reflexes intact bilaterally. VOR absent. Face is symmetric. Unable to assess sensation in the face due to mental status. Cough and gag reflexes are absent.  Motor exam: Does not participate in formal strength testing. No spontaneous limb movements observed. Tone is decreased throughout. No abnormal movements were seen on exam.  Sensory exam: no response to noxious stimuli x4 extremities  Deep tendon reflexes:  Toes mute bilaterally  Coordination: not participatory due to mental status  Gait: deferred due to ICU status    Objective Data:    Labs:  Lab Results    Component Value Date/Time    PROTHROMBTM 14.2 04/30/2021 02:44 AM    INR 1.07 04/30/2021 02:44 AM      Lab Results   Component Value Date/Time    WBC 13.0 (H) 04/30/2021 02:44 AM    RBC 3.50 (L) 04/30/2021 02:44 AM    HEMOGLOBIN 10.6 (L) 04/30/2021 02:44 AM    HEMATOCRIT 31.8 (L) 04/30/2021 02:44 AM    MCV 90.9 04/30/2021 02:44 AM    MCH 30.3 04/30/2021 02:44 AM    MCHC 33.3 (L) 04/30/2021 02:44 AM    MPV 12.4 04/30/2021 02:44 AM    NEUTSPOLYS 78.00 (H) 04/30/2021 02:44 AM    LYMPHOCYTES 12.70 (L) 04/30/2021 02:44 AM    MONOCYTES 7.50 04/30/2021 02:44 AM    EOSINOPHILS 0.40 04/30/2021 02:44 AM    BASOPHILS 0.20 04/30/2021 02:44 AM      Lab Results   Component Value Date/Time    SODIUM 134 (L) 04/30/2021 02:44 AM    POTASSIUM 3.8 04/30/2021 02:44 AM    CHLORIDE 101 04/30/2021 02:44 AM    CO2 26 04/30/2021 02:44 AM    GLUCOSE 292 (H) 04/30/2021 02:44 AM    BUN 29 (H) 04/30/2021 02:44 AM    CREATININE 0.50 04/30/2021 02:44 AM      Lab Results   Component Value Date/Time    CHOLSTRLTOT 140 04/26/2021 01:56 AM    LDL 69 04/26/2021 01:56 AM    HDL 11 (A) 04/26/2021 01:56 AM    TRIGLYCERIDE 174 (H) 04/28/2021 12:27 PM       Lab Results   Component Value Date/Time    ALKPHOSPHAT 104 (H) 04/30/2021 02:44 AM    ASTSGOT 21 04/30/2021 02:44 AM    ALTSGPT 21 04/30/2021 02:44 AM    TBILIRUBIN 0.3 04/30/2021 02:44 AM        Imaging/Testing:    I interpreted and/or reviewed the patient's neuroimaging    CT-HEAD W/O   Final Result         1.  Low-density changes in the right cerebellar hemisphere, compatible with evolving infarct, streak artifacts minimally limits evaluation of the posterior fossa for subtle subarachnoid hemorrhage, no intracranial hemorrhage is readily identified.   2.  Atherosclerosis.      DX-CHEST-PORTABLE (1 VIEW)   Final Result         1.  No acute cardiopulmonary disease.      DX-CHEST-PORTABLE (1 VIEW)   Final Result      Mild left basilar opacity may represent atelectasis. Infection not excluded.       Improved aeration of the left lung.      Atherosclerotic plaque.         EC-ECHOCARDIOGRAM COMPLETE W/O CONT   Final Result      IR-DRAIN-BLADDER SUPRAPUB W/CATH   Final Result      1.     Ultrasound and fluoroscopic guided placement of a 16 [place] tip silicone suprapubic bladder catheter.      2.     Cystogram documenting catheter placement.         MR-BRAIN-W/O   Final Result      Acute large right cerebellar posterior inferior cerebellar artery territory infarct with possible small amount of petechial hemorrhage.      MR-MRA HEAD-W/O   Final Result      Findings suggesting right vertebral artery occlusion.      MR-MRA NECK-WITH & W/O AND SEQUENCES   Final Result      1.  Small caliber right vertebral artery which is not visualized on the noncontrast time-of-flight technique could be related to retrograde flow or diminutive caliber and sluggish flow.   2.  Possible moderate focal stenosis in the mid cervical left vertebral artery.   3.  No significant carotid stenosis.      DX-ABDOMEN FOR TUBE PLACEMENT   Final Result         Feeding tube with tip projecting over the expected area of the distal stomach.      DX-CHEST-PORTABLE (1 VIEW)   Final Result      1.  Worsening consolidation of LEFT hemithorax with shift of mediastinal structures to the LEFT suggesting atelectasis, possibly due to endobronchial process.   2.  Supportive tubing as described above.   3.  No pneumothorax.      DX-CHEST-PORTABLE (1 VIEW)   Final Result         Endotracheal tube with tip projecting over the mid thoracic trachea.      Layering left pleural effusion with left lower lung opacity.      DX-SPINE-ANY ONE VIEW   Final Result      Digitized intraoperative radiograph is submitted for review.  This examination is not for diagnostic purpose but for guidance during a surgical procedure.      DX-PORTABLE FLUOROSCOPY < 1 HOUR   Final Result      Portable fluoroscopy utilized for 2 seconds.         INTERPRETING LOCATION: [address] [place] [address]   [place], [id number]      CT-HEAD W/O   Final Result         1.  Low-density in the region of the right cerebellar hemisphere, appearance compatible with evolving infarct.      These findings were discussed with the patient's on-call clinician, Deniz, on 4/28/2021 6:14 AM.      MR-THORACIC SPINE-WITH & W/O   Final Result      1.  Posterior epidural fluid collection/abscess within the lower cervical spine extending inferiorly to about the T6 level which could represent epidural abscess and/or hematoma. This measures 8 mm in maximal thickness at T2-T3 with at least moderate    thecal sac stenosis.   2.  Abnormal signal within the cervical cord and upper thoracic cord suggesting cord edema or infectious/inflammatory etiology.   3.  Lower cervical spine findings are detailed on earlier report.   4.  Prominent enhancement along the surface of the mid and lower thoracic cord is of uncertain etiology and as detailed above, possibly representing leptomeningeal disease/infection. See details above.   These findings were discussed with Dr. Cano on 4/28/2021 10:01 AM.         MR-LUMBAR SPINE-WITH & W/O   Final Result      No evidence of lumbar spine infection or epidural abscess.      Mild spondylosis as detailed above without spinal stenosis.      DX-CHEST-PORTABLE (1 VIEW)   Final Result      1.  Hypoinflation with left basilar atelectasis.   2.  Mild perihilar interstitial prominence may be related to hypoinflation or edema.      MR-CERVICAL SPINE-WITH & W/O   Final Result      1.  There is multiseptated abnormal peripherally enhancing epidural collection noted extending from C2 to the visualized lower thoracic level. Largest collection is noted in the upper thoracic posterior epidural space at the levels of T2 and T3. The    lower extent of the collection is not imaged. This is causing multilevel effacement of the thecal sac and cord compression. The thoracic spinal cord is anteriorly displaced and compressed at the  levels of T2 and T3. Pre and postcontrast MR examination of    the thoracic spine is recommended for further evaluation.   2.  There is effacement of the cervical thecal sac due to the multiseptated epidural fluid collection.   3.  The cervical spinal cord is mildly enlarged with minimal increased T2 signal intensity. The possibility of cord inflammation cannot be entirely excluded.   4.  Abnormal T2 hyperintensity at C5-6 disc space likely representing discitis.   5.  Abnormal bone marrow edema of C5, C6 and C7 vertebral bodies with edema concerning for osteomyelitis.   6.  There is also focal enhancement of C6 vertebral body likely secondary to the osteomyelitis.   7.  Prevertebral edema/fluid collection.   8.  Nonvisualization of flow void of bilateral vertebral arteries concerning for age-indeterminate bilateral vertebral occlusions.         CT-CHEST (THORAX) WITH   Final Result      No displaced rib fracture is identified.      No acute cardiopulmonary process is seen.      Atherosclerotic plaque including coronary artery calcification.      CT-TSPINE W/O PLUS RECONS   Final Result      No CT evidence of acute traumatic abnormality.      Mild T6/7 anterior wedging compatible with healed mild chronic compression fracture and there is interbody fusion.      CT-LSPINE W/O PLUS RECONS   Final Result      No CT evidence of acute traumatic injury.      CT-CSPINE WITHOUT PLUS RECONS   Final Result      Multilevel degenerative changes.      Prevertebral edema. Further evaluation with MRI is recommended as this can be seen in the setting of ligamentous injury.      Bilateral carotid atherosclerotic plaque.         EC-CHRISTOPHER W/O CONT    (Results Pending)       Assessment and Plan:    Perry Davis is a 60 y.o. man who presented to to [facility] ED by private vehicle on 4/25/21 with complaint of acute on chronic neck pain s/p slip and fall in shower. He has had leukocytosis ongoing since arrival, with blood cultures drawn  4/25 growing Streptococcus species. An MRI C-spine on 4/26 showed epidural collection noted from C2-T3, thus went for emergent surgery with Dr. [place]. A non hemorrhagic right cerebellar stroke was diagnosed intraoperatively on CT. NIH Score: 33. He was admitted to ICU, and is being treated per ID for strep anginosus bacteremia secondary to epidural abscess. MRI Brain w/o shows acute large right cerebellar infarct with small amount of petechial hemorrhage. MRA head and neck 4/28 shows right vertebral artery occlusion and possible moderate focal stenosis in the mid cervical left vertebral artery. Etiology of stroke is most easily explained by large vessel disease of the right vertebral artery. However, there is concern for embolic stroke secondary to drug use given spinal epidural abscess and bacteremia. TTE shows EF 60%, no PFO, mild concentric left ventricular hypertrophy, normal left arial size. A CHRISTOPHER has been ordered to further evaluate concern for possible endocarditis which has a high potential to embolize. Primary team to follow. UDS is positive for benzos, THC, opiates and oxycodone. For secondary stroke prevention, recommend ASA 81mg and high intensity statin. Timing of ASA to be guided by Neurosurgery. He has been started on high intensity statin, atorvastatin 40 mg q HS, LDL is 69, at goal of less than 70. Hemoglobin A1C is elevated at 12.2. Goal is less than 7. In regards to his profound encephalopathy this is more likely explained by sepsis than stroke and hopefully will improve with antimicrobial therapy. No further recommendations or further studies from an acute neurological standpoint at this time. Please re-consult if you have further questions or there is a change in status.    Plan:    - BP goal is normotension  - obtain normoglycemia and avoid hypo- or hyper -natremia; aim for normothermia  - secondary stroke prevention therapy with ASA 325mg qd once cleared by neurosurgery  - high  intensity statin per SPARCL Trial  - CHRISTOPHER to evluate for endocarditis, ordered  - evaluate and treat with PT/OT/ST when appropriate.    The evaluation of the patient, and recommended management, was discussed with attending neurologist, Dr. Zac Montero, Intensivist, Dr. Alexi Dumont and the ICU team.    Corinne Canavero, APRN  Acute Care Neurohospitalist Service

## 2021-04-30 NOTE — PROGRESS NOTES
Neurosurgery Progress Note    Subjective:  Patient with sepsis, positive blood cultures  POD#2 C3-T2 lami  Wound cultures shows gram + cocci and step anginosus  Drain at 35cc overnight     Agitated  Intubated  sedated    Brain MRI shows large right cerebellar PICA infarct   Head CT 21 shows no reperfusion hemorrhage     Exam:  intubated   Not following   Slight movement to right arm  No movement to legs  Pupils equal   Will open eyes     BP  Min: 89/55  Max: 146/69  Pulse  Av.6  Min: 61  Max: 95  Resp  Av.1  Min: 18  Max: 28  Temp  Av.8 °C (96.5 °F)  Min: 35 °C (95 °F)  Max: 36.2 °C (97.2 °F)  SpO2  Av.3 %  Min: 93 %  Max: 99 %    No data recorded    Recent Labs     21  1823 21  0244   WBC 15.2*  --  14.3*  --  13.0*   RBC 4.56*  --  4.06*  --  3.50*   HEMOGLOBIN 13.9*   < > 12.1* 10.8* 10.6*   HEMATOCRIT 40.7*   < > 36.8* 32.2* 31.8*   MCV 89.3  --  90.6  --  90.9   MCH 30.5  --  29.8  --  30.3   MCHC 34.2  --  32.9*  --  33.3*   RDW 45.8  --  47.8  --  47.8   PLATELETCT 105*  --  194  --  195   MPV 12.5  --  12.4  --  12.4    < > = values in this interval not displayed.     Recent Labs     21  0244   SODIUM 125* 129* 134*   POTASSIUM 4.0 4.0 3.8   CHLORIDE 92* 98 101   CO2 19* 23 26   GLUCOSE 173* 214* 292*   BUN 20 28* 29*   CREATININE 0.49* 0.53 0.50   CALCIUM 7.7* 7.7* 7.8*     Recent Labs     21  0244   INR 1.09 1.07     Recent Labs     21  2155   REACTMIN 5.8   CLOTKINET 2.1   CLOTANGL 61.9   MAXCLOTS 63.5   [id number] 0.0   PRCINADP 93.0   PRCINAA 50.6       Intake/Output       21 - 21 - 21 Total  Total       Intake    I.V.  228.5  193.5 422  --  -- --    Propofol Volume 195.5 193.5 389 -- -- --    Norepinephrine Volume 33 0 33 -- -- --    Other  240  60 300  --  -- --     Medications (PO/Enteral Liquids) 240 60 300 -- -- --    NG/GT  450  480 930  --  -- --    Intake (mL) (Enteral Tube 04/28/21 Cortrak - Gastric Left nare) 450 480 930 -- -- --    Enteral  550  60 610  --  -- --    Free Water / Tube Flush 550 60 610 -- -- --    Total Intake 1468.5 793.5 2262 -- -- --       Output    Urine  1300  850 2150  --  -- --    Output (mL) (Urethral Catheter Other (Comment) 16 Fr.) 4532 533 2540 -- -- --    Drains  30  35 65  --  -- --    Output (mL) (Closed/Suction Drain 1 Midline;Superior Back Ld Knowles 7 Fr.) 30 35 65 -- -- --    Stool  --  -- --  --  -- --    Number of Times Stooled -- 0 x 0 x -- -- --    Total Output 5306 320 8291 -- -- --       Net I/O     138.5 -91.5 47 -- -- --            Intake/Output Summary (Last 24 hours) at 4/30/2021 0708  Last data filed at 4/30/2021 0600  Gross per 24 hour   Intake 2261.96 ml   Output 2215 ml   Net 46.96 ml       $ Bladder Scan Results (mL): 710    • MD Alert...Adult ICU Electrolyte Replacement per Pharmacy   PHARMACY TO DOSE   • lidocaine  1-2 mL Q30 MIN PRN   • insulin regular  2-9 Units Q6HRS    And   • dextrose 50%  50 mL Q15 MIN PRN   • cefTRIAXone (ROCEPHIN) IV  2 g DAILY   • magnesium hydroxide  30 mL QDAY PRN   • ondansetron  4 mg Q4HRS PRN   • oxyCODONE immediate-release  10 mg Q4HRS PRN   • polyethylene glycol/lytes  1 Packet BID PRN   • promethazine  12.5-25 mg Q4HRS PRN   • senna-docusate  1 tablet Q24HRS PRN   • Pharmacy Consult Request  1 Each PHARMACY TO DOSE   • MD ALERT...DO NOT ADMINISTER NSAIDS or ASPIRIN unless ORDERED By Neurosurgery  1 Each PRN   • bisacodyl  10 mg Q24HRS PRN   • fleet  1 Each Once PRN   • Respiratory Therapy Consult   Continuous RT   • ondansetron  4 mg Q4HRS PRN   • promethazine  12.5-25 mg Q4HRS PRN   • prochlorperazine  5-10 mg Q4HRS PRN   • levETIRAcetam (Keppra) IV  1,000 mg Q12HRS   • norepinephrine (Levophed) infusion  0-30 mcg/min Continuous   • ipratropium-albuterol  3 mL Q4H PRN (RT)   •  labetalol  10 mg Q4HRS PRN   • Pharmacy  1 Each PHARMACY TO DOSE   • acetaminophen  1,000 mg Q6HRS   • ARIPiprazole  15 mg BID   • atorvastatin  80 mg Nightly   • carBAMazepine  200 mg TID   • cyanocobalamin  1,000 mcg DAILY   • senna-docusate  1 tablet Nightly   • vitamin D  5,000 Units DAILY   • docusate sodium  100 mg BID   • famotidine  20 mg BID    Or   • famotidine  20 mg BID   • fentaNYL  50 mcg Q15 MIN PRN    And   • fentaNYL  100 mcg Q15 MIN PRN    And   • fentaNYL   Continuous    And   • propofol  0-80 mcg/kg/min Continuous   • acetylcysteine  3-4 mL Q4HRS (RT)   • albuterol  2.5 mg Q4HRS (RT)   • insulin glargine  15 Units Q EVENING   • lidocaine  3 Patch Q24HR       Assessment and Plan:  Hospital day #6  POD #2  Sedation as needed  Keep drain in  Abx per ID  Okay for ASA  Will follow closely       Prophylactic anticoagulation: no         Start date/time:

## 2021-04-30 NOTE — PROGRESS NOTES
Héctor from Lab called with critical result of aerobic positive B/C for coag negative staph at 2140. Critical lab result read back to Héctor.   Dr. Rudd notified of critical lab result at 2142 via voalte text. Message read immediately with no response.

## 2021-04-30 NOTE — RESPIRATORY CARE
Adult Ventilation Update    Total Vent Days: 3    Patient Lines/Drains/Airways Status    Active Airway     Name: Placement date: Placement time: Site: Days:    Airway ETT 8.0  no documentation   no documentation   no documentation  3              Plateau Pressure: 33 (04/29/21 0232)  Static Compliance (ml / cm H2O): 25.3 (04/30/21 0222)    Sputum/Suction  Cough: Weak (04/30/21 0400)  Sputum Amount: Moderate (04/30/21 0400)  Sputum Color: White;Yellow (04/30/21 0400)  Sputum Consistency: Thick (04/30/21 0400)    Mobility  Level of Mobility: Level I (04/29/21 2000)  Activity Performed: Unable to mobilize (04/29/21 0800)  Time Activity Tolerated: 4 min (04/27/21 1031)  Distance Per Occurrence (ft.): 15 feet (04/27/21 1031)  # of Times Distance was Traveled: 2 (04/27/21 1031)  Assistance: Standby Assist (04/27/21 1031)  Ambulation Tolerance: General Weakness (04/27/21 1031)  Pt Calls for Assistance: No (04/28/21 2000)  Staff Present for Mobilization: RN;CNA (04/27/21 1031)  Gait: Unsteady (04/27/21 1031)  Assistive Devices: Hand held assist (04/27/21 1031)  Reason Not Mobilized: Unstable condition (04/28/21 0815)  Mobilization Comments: (Bleeding from suprapubic site, FiO2 70, titrating Levo.) (04/28/21 2000)

## 2021-04-30 NOTE — CARE PLAN
Problem: Infection  Goal: Will remain free from infection  Outcome: PROGRESSING SLOWER THAN EXPECTED     Problem: Venous Thromboembolism (VTW)/Deep Vein Thrombosis (DVT) Prevention:  Goal: Patient will participate in Venous Thrombosis (VTE)/Deep Vein Thrombosis (DVT)Prevention Measures  Outcome: PROGRESSING SLOWER THAN EXPECTED

## 2021-04-30 NOTE — PROGRESS NOTES
Note to reader: this note follows the APSO format rather than the historical SOAP format. Assessment and plan located at the top of the note for ease of use.    Chief Complaint  60 y.o. year old male here with Back Pain and Neck Pain      Assessment/Plan  Interval History   Active Hospital Problems    Diagnosis    • Spinal epidural abscess [G06.1]      Priority: High   • History of ischemic stroke [Z86.73]      Priority: High   • Sepsis due to Streptococcus species (HCC) [A40.9]      Priority: High   • Acute bilateral back pain [M54.9]      Priority: Medium   • Thrombocytopenia (HCC) [D69.6]      Priority: Medium   • Type 2 diabetes mellitus without complication, without long-term current use of insulin (HCC) [E11.9]      Priority: Medium   • Coronary artery disease due to calcified coronary lesion: CABG x4, July 2015 [I25.10, I25.84]      Priority: Medium   • Essential hypertension, benign [I10]      Priority: Medium   • Hypokalemia [E87.6]      Priority: Low   • Hypomagnesemia [E83.42]      Priority: Low   • Hyponatremia [E87.1]      Priority: Low   • Difficulty swallowing [R13.10]      Priority: Low   • Diabetic ketoacidosis (HCC) [E11.10]      Priority: Low   • Marijuana abuse [F12.10]      Priority: Low   • High anion gap metabolic acidosis [E87.2]      Priority: Low   • Tobacco abuse [Z72.0]      Priority: Low   • Dyslipidemia [E78.5]      Priority: Low   • Status post urethrostomy (HCC) [Z93.6]      Patient seen and examined.    4/30.  On Vent support.  Mom at bedside.  SPT in place drain bloody clear urine without clot    Disposition  guarded   PLAN:  - continue SPT to gravity.  Hand irrigate prn clot and qshift  - Urology will sign off.  Call with questions.       Review of Systems  Physical Exam   Review of Systems   Unable to perform ROS: Acuity of condition     Vitals:    04/30/21 1020 04/30/21 1100 04/30/21 1200 04/30/21 1300   BP:   117/57    Pulse: 72 72 72 73   Resp: (!) 89 (!) 27 (!) 22 (!) 22    Temp:   36 °C (96.8 °F)    TempSrc:   Temporal    SpO2: 97% 97% 98% 97%   Weight:       Height:         Physical Exam   Constitutional: He has a sickly appearance.   Sedated and Ventilated     Nursing note and vitals reviewed.       Hematology Chemistry   Lab Results   Component Value Date/Time    WBC 13.0 (H) 04/30/2021 02:44 AM    HEMOGLOBIN 10.6 (L) 04/30/2021 02:44 AM    HEMATOCRIT 31.8 (L) 04/30/2021 02:44 AM    PLATELETCT 195 04/30/2021 02:44 AM     Lab Results   Component Value Date/Time    SODIUM 134 (L) 04/30/2021 02:44 AM    POTASSIUM 3.8 04/30/2021 02:44 AM    CHLORIDE 101 04/30/2021 02:44 AM    CO2 26 04/30/2021 02:44 AM    GLUCOSE 292 (H) 04/30/2021 02:44 AM    BUN 29 (H) 04/30/2021 02:44 AM    CREATININE 0.50 04/30/2021 02:44 AM         Labs not explicitly included in this progress note were reviewed by the author.   Radiology/imaging not explicitly included in this progress note was reviewed by the author.     Core Measures

## 2021-04-30 NOTE — PROGRESS NOTES
Ct ordered for the morning, if no hemorrhagic conversion of cerebellar stroke, OK for neurology to start aspirin

## 2021-04-30 NOTE — PROGRESS NOTES
At 2250hrs, writer contacted Dr. Rudd to clarify order for blood cultures as blood cultures just drawn on the 28th. Dr. Rudd instructed RN to wait to draw blood cultures at this time and wait for further instruction from ID tomorrow.

## 2021-04-30 NOTE — PROGRESS NOTES
Infectious Disease Progress Note    Author: oMlly Mcmahan M.D. Date & Time of service: 2021  9:17 AM    Chief Complaint:  Bacteremia and cervical abscess     Interval History:   AF, O2 Vent 12/70%, pressors still on the trending down, pt continues to be agitated, not moving any extremities and concern for quadriplegia due to CVA.     Review of Systems:  Review of Systems   Unable to perform ROS: Intubated       Hemodynamics:  Temp (24hrs), Av.8 °C (96.5 °F), Min:35 °C (95 °F), Max:36.2 °C (97.2 °F)  Temperature: (!) 35.3 °C (95.5 °F)  Pulse  Av.5  Min: 61  Max: 121   Blood Pressure: 108/57, Arterial BP: 113/51       Physical Exam:  Physical Exam  Constitutional:       Appearance: Normal appearance.   Cardiovascular:      Rate and Rhythm: Normal rate and regular rhythm.      Heart sounds: Normal heart sounds.   Pulmonary:      Effort: Pulmonary effort is normal.      Comments: Coarse breath sounds bilaterally  Abdominal:      General: Abdomen is flat. There is no distension.      Palpations: Abdomen is soft.   Musculoskeletal:      Right lower leg: No edema.      Left lower leg: No edema.   Neurological:      Comments: Intubated, not moving extremities or following commands   Psychiatric:      Comments: Shaking head and agitated         Meds:    Current Facility-Administered Medications:   •  insulin glargine  •  potassium phosphate IVPB (CUSTOM)  •  MD Alert...Adult ICU Electrolyte Replacement per Pharmacy  •  lidocaine  •  insulin regular **AND** POC blood glucose manual result **AND** NOTIFY MD and PharmD **AND** dextrose 50%  •  cefTRIAXone (ROCEPHIN) IV  •  magnesium hydroxide  •  ondansetron  •  oxyCODONE immediate-release  •  polyethylene glycol/lytes  •  promethazine  •  senna-docusate  •  Pharmacy Consult Request  •  MD ALERT...DO NOT ADMINISTER NSAIDS or ASPIRIN unless ORDERED By Neurosurgery  •  bisacodyl  •  fleet  •  Respiratory Therapy Consult  •  ondansetron  •  promethazine  •   prochlorperazine  •  levETIRAcetam (Keppra) IV  •  norepinephrine (Levophed) infusion  •  ipratropium-albuterol  •  labetalol  •  Pharmacy  •  acetaminophen  •  ARIPiprazole  •  atorvastatin  •  carBAMazepine  •  cyanocobalamin  •  senna-docusate  •  vitamin D  •  docusate sodium  •  famotidine **OR** famotidine  •  fentaNYL **AND** fentaNYL **AND** fentaNYL **AND** propofol **AND** [START ON 5/1/2021] Triglyceride  •  acetylcysteine  •  albuterol  •  lidocaine    Labs:  Recent Labs     04/28/21 0310 04/28/21 0310 04/29/21 0324 04/29/21  1823 04/30/21  0244   WBC 15.2*  --  14.3*  --  13.0*   RBC 4.56*  --  4.06*  --  3.50*   HEMOGLOBIN 13.9*   < > 12.1* 10.8* 10.6*   HEMATOCRIT 40.7*   < > 36.8* 32.2* 31.8*   MCV 89.3  --  90.6  --  90.9   MCH 30.5  --  29.8  --  30.3   RDW 45.8  --  47.8  --  47.8   PLATELETCT 105*  --  194  --  195   MPV 12.5  --  12.4  --  12.4   NEUTSPOLYS  --   --   --   --  78.00*   LYMPHOCYTES  --   --   --   --  12.70*   MONOCYTES  --   --   --   --  7.50   EOSINOPHILS  --   --   --   --  0.40   BASOPHILS  --   --   --   --  0.20    < > = values in this interval not displayed.     Recent Labs     04/28/21 0310 04/29/21 0324 04/30/21  0244   SODIUM 125* 129* 134*   POTASSIUM 4.0 4.0 3.8   CHLORIDE 92* 98 101   CO2 19* 23 26   GLUCOSE 173* 214* 292*   BUN 20 28* 29*     Recent Labs     04/28/21 0310 04/29/21 0324 04/30/21  0244   ALBUMIN 2.0* 2.0* 1.8*   TBILIRUBIN 0.8 0.3 0.3   ALKPHOSPHAT 109* 103* 104*   TOTPROTEIN 5.4* 5.4* 5.1*   ALTSGPT 22 22 21   ASTSGOT 18 26 21   CREATININE 0.49* 0.53 0.50       Imaging:  CT-CSPINE WITHOUT PLUS RECONS    Result Date: 4/25/2021 4/25/2021 1:29 PM HISTORY/REASON FOR EXAM: History of back and neck pain. Fall. TECHNIQUE/EXAM DESCRIPTION: CT cervical spine without contrast, with reconstructions. Helical scanning was performed from the skull base through T1.  Sagittal and coronal multiplanar reconstructions were generated from the axial images. Low  dose optimization technique was utilized for this CT exam including automated exposure control and adjustment of the mA and/or kV according to patient size. COMPARISON:  None available. FINDINGS: No compression fracture is identified. There is an ununited fracture of the spinous process of T1. Intervertebral disc space narrowing and endplate spurring is most prominent at C6/C7. There are degenerative changes of the facet joints. C1/C2 alignment is maintained. There is multilevel uncovertebral spurring and neural foraminal narrowing. There is cervical prevertebral edema. There is carotid atherosclerotic plaque bilaterally. Visualized lung apices are clear.     Multilevel degenerative changes. Prevertebral edema. Further evaluation with MRI is recommended as this can be seen in the setting of ligamentous injury. Bilateral carotid atherosclerotic plaque.     CT-CHEST (THORAX) WITH    Result Date: 4/25/2021 4/25/2021 1:29 PM HISTORY/REASON FOR EXAM:  Rib fracture suspected, traumatic. TECHNIQUE/EXAM DESCRIPTION: CT scan of the chest with contrast. Helical images were obtained from the lung apices through the adrenal glands following the bolus administration of  80 mL of Omnipaque 350 nonionic contrast. Sagittal and coronal reconstructions were performed. Low dose optimization technique was utilized for this CT exam including automated exposure control and adjustment of the mA and/or kV according to patient size. COMPARISON:  None. FINDINGS: There is atherosclerotic plaque. There is coronary artery calcification. The heart is not enlarged. No pericardial or pleural effusion is seen. There are small mediastinal lymph nodes. There is no hilar or axillary lymphadenopathy. No pneumothorax or pulmonary contusion is seen. Central airways are patent. Limited views were obtained of the upper abdomen. Hypodensity along the falciform ligament likely represents focal fat. Patient is status post median sternotomy. Degenerative  changes are seen in the spine. No displaced rib fracture is identified.     No displaced rib fracture is identified. No acute cardiopulmonary process is seen. Atherosclerotic plaque including coronary artery calcification.    CT-HEAD W/O    Result Date: 4/30/2021 4/30/2021 4:27 AM HISTORY/REASON FOR EXAM: Altered mental status; evaluate cerebellar stroke, if no blood present OK for neurology to start aspirin TECHNIQUE/EXAM DESCRIPTION:  CT of the head without contrast. Sequential axial images were obtained from the vertex to the skull base without contrast. Up to date radiation dose reduction adjustments have been utilized to meet ALARA standards for radiation dose reduction. COMPARISON: April 28, 2021 FINDINGS: There is mild diffuse parenchymal volume loss observed. Periventricular and subcortical white matter low-attenuation changes are seen, most commonly associated with small vessel ischemic disease. The ventricles are normal in caliber and configuration. Low-density changes in the right cerebellar hemisphere seen.. There are no abnormal extra axial fluid collections or extra axial hemorrhage identified. The visualized paranasal sinuses and mastoid air cells are well aerated bilaterally. No depressed calvarial fractures are identified. The visualized globes and retrobulbar soft tissues appear within normal limits.  Atherosclerotic intracranial calcifications are seen.     1.  Low-density changes in the right cerebellar hemisphere, compatible with evolving infarct, streak artifacts minimally limits evaluation of the posterior fossa for subtle subarachnoid hemorrhage, no intracranial hemorrhage is readily identified. 2.  Atherosclerosis.    CT-HEAD W/O    Result Date: 4/28/2021 4/28/2021 5:20 AM HISTORY/REASON FOR EXAM: Altered mental status TECHNIQUE/EXAM DESCRIPTION:  CT of the head without contrast. Sequential axial images were obtained from the vertex to the skull base without contrast. Up to date radiation  dose reduction adjustments have been utilized to meet ALARA standards for radiation dose reduction. COMPARISON: None FINDINGS: There is moderate diffuse parenchymal volume loss observed. Periventricular and subcortical white matter low-attenuation changes are seen, most commonly associated with small vessel ischemic disease. The ventricles are normal in caliber and configuration. Area of low-density within the right cerebellar hemisphere is seen. There are no abnormal extra axial fluid collections or extra axial hemorrhage identified. The visualized paranasal sinuses and mastoid air cells are well aerated bilaterally. No depressed calvarial fractures are identified. The visualized globes and retrobulbar soft tissues appear within normal limits.     1.  Low-density in the region of the right cerebellar hemisphere, appearance compatible with evolving infarct. These findings were discussed with the patient's on-call clinician, Deniz, on 4/28/2021 6:14 AM.    CT-LSPINE W/O PLUS RECONS    Result Date: 4/25/2021 4/25/2021 1:29 PM HISTORY/REASON FOR EXAM:  Back pain after injury. TECHNIQUE/EXAM DESCRIPTION AND NUMBER OF VIEWS: CT lumbar spine without contrast, with reconstructions. The study was performed on a GE CT scanner. Thin-section helical scanning was performed from T12-L1 to the sacrum. Sagittal and coronal multiplanar reconstructions were generated from the axial images. Low dose optimization technique was utilized for this CT exam including automated exposure control and adjustment of the mA and/or kV according to patient size. COMPARISON: None. FINDINGS: Alignment of the lumbar spine is normal. There is no acute fracture or subluxation. The prevertebral and paraspinous soft tissues show no acute abnormality. There is severe atherosclerosis with a mixture of soft and calcified plaque. There is lumbosacral junction vacuum disc phenomenon with endplate spurring, disc height loss The visualized sacrum and  sacroiliac joints show no acute abnormality.     No CT evidence of acute traumatic injury.    CT-TSPINE W/O PLUS RECONS    Result Date: 4/25/2021 4/25/2021 1:29 PM HISTORY/REASON FOR EXAM:  Mid-back trauma Pain following injury. TECHNIQUE/EXAM DESCRIPTION AND NUMBER OF VIEWS: CT thoracic spine without contrast, with reconstructions. The study was performed on a [employer] CT scanner. Thin-section helical scanning was performed from C7-T1 through T12-L1. Sagittal and coronal multiplanar reconstructions were generated from the axial images. Low dose optimization technique was utilized for this CT exam including automated exposure control and adjustment of the mA and/or kV according to patient size. COMPARISON: None. FINDINGS: Alignment of the thoracic spine is normal. There is no acute displaced fracture. There is T6/7 interbody fusion with mild anterior wedging likely indicating remote mild compression fracture that has healed There is bulky endplate spurring with some bridging syndesmophytes in the mid thoracic spine Sternotomy wires The cervicothoracic junction appears intact. The prevertebral and paraspinous soft tissues show no acute abnormality.     No CT evidence of acute traumatic abnormality. Mild T6/7 anterior wedging compatible with healed mild chronic compression fracture and there is interbody fusion.    DX-CERVICAL SPINE-2 OR 3 VIEWS    Result Date: 4/22/2021 4/22/2021 6:16 PM HISTORY/REASON FOR EXAM:  Atraumatic Pain. Neck pain for 4 days. TECHNIQUE/EXAM DESCRIPTION AND NUMBER OF VIEWS: Cervical spine series, 2 views. COMPARISON:  None. FINDINGS: Mild reversal of curvature centered at the C5 level. Vertebral body heights are preserved. Multilevel loss of disc height and osteophyte formation. Cervicothoracic junction is obscured. Prevertebral soft tissues are within normal limits. Odontoid is grossly intact.  Lateral masses of C1 are grossly intact.     1.  Limited exam.  Cervicothoracic junction is obscured.  2.  No gross cervical spine fracture or subluxation. 3.  Moderate multilevel degenerative changes.    DX-CHEST-PORTABLE (1 VIEW)    Result Date: 4/30/2021 4/30/2021 1:07 AM HISTORY/REASON FOR EXAM: For indication of respiratory failure; For indication of respiratory failure TECHNIQUE/EXAM DESCRIPTION:  Single AP view of the chest. COMPARISON: Yesterday FINDINGS: Position of medical devices appears stable. The cardiac silhouette appears within normal limits.  Postsurgical changes of sternotomy are noted. The mediastinal contour appears within normal limits.  The central pulmonary vasculature appears normal. Bilateral lung volumes are diminished.  Bilateral lungs are clear. No significant pleural effusions are identified. The bony structures appear age-appropriate.     1.  No acute cardiopulmonary disease.    DX-CHEST-PORTABLE (1 VIEW)    Result Date: 4/29/2021 4/29/2021 10:06 AM HISTORY/REASON FOR EXAM:  For indication of respiratory failure; For indication of respiratory failure. TECHNIQUE/EXAM DESCRIPTION AND NUMBER OF VIEWS: Single portable view of the chest. COMPARISON: 4/28/2021 FINDINGS: Endotracheal tube projects at the level the clavicular heads. Right central line projects over the SVC. Enteric tube passes below the level of the diaphragm. The heart is not enlarged. Atherosclerotic calcification is seen. There is mild left basilar opacity likely representing atelectasis. No pleural effusion or pneumothorax is seen. Skin staples and a surgical drain project over the cervical spine.     Mild left basilar opacity may represent atelectasis. Infection not excluded. Improved aeration of the left lung. Atherosclerotic plaque.     DX-CHEST-PORTABLE (1 VIEW)    Result Date: 4/28/2021 4/28/2021 12:10 PM HISTORY/REASON FOR EXAM:  CENTRAL LINE PLACEMENT; CENTRAL LINE PLACEMENT. TECHNIQUE/EXAM DESCRIPTION AND NUMBER OF VIEWS: Single portable view of the chest. COMPARISON: 4/28/2021 11:17 AM FINDINGS: Mediastinal  structures are shifted to the LEFT.  Increasing opacification of LEFT hemithorax. RIGHT lung is clear. Endotracheal tube in place with tip approximately 5 cm above the you. Feeding tube tip in place however tip is off the image. RIGHT internal jugular catheter tip at the lower SVC. No pneumothorax. Postoperative change from prior open heart surgery. Postoperative change of the neck with surgical drain present.     1.  Worsening consolidation of LEFT hemithorax with shift of mediastinal structures to the LEFT suggesting atelectasis, possibly due to endobronchial process. 2.  Supportive tubing as described above. 3.  No pneumothorax.    DX-CHEST-PORTABLE (1 VIEW)    Result Date: 4/28/2021 4/28/2021 11:16 AM HISTORY/REASON FOR EXAM:  replaced ETT; replaced ETT TECHNIQUE/EXAM DESCRIPTION AND NUMBER OF VIEWS: Single portable view of the chest. COMPARISON: 4/27/2021 FINDINGS: Endotracheal tube with tip projecting over the mid thoracic trachea. Hazy opacity in the left lower lobe Layering left pleural effusion. No pneumothorax. Stable cardiopericardial silhouette.     Endotracheal tube with tip projecting over the mid thoracic trachea. Layering left pleural effusion with left lower lung opacity.    DX-CHEST-PORTABLE (1 VIEW)    Result Date: 4/27/2021 4/27/2021 5:30 PM HISTORY/REASON FOR EXAM:  Shortness of Breath. TECHNIQUE/EXAM DESCRIPTION AND NUMBER OF VIEWS: Single portable view of the chest. COMPARISON: None. FINDINGS: LUNGS: Hypoinflation with left basilar atelectasis. Mild perihilar interstitial prominence. No effusions. PNEUMOTHORAX: None. LINES AND TUBES: None. MEDIASTINUM: No cardiomegaly. MUSCULOSKELETAL STRUCTURES: No acute displaced fracture. Median sternotomy.     1.  Hypoinflation with left basilar atelectasis. 2.  Mild perihilar interstitial prominence may be related to hypoinflation or edema.    DX-SPINE-ANY ONE VIEW    Result Date: 4/28/2021 4/28/2021 5:30 AM HISTORY/REASON FOR EXAM:  Cervical  laminectomy TECHNIQUE/EXAM DESCRIPTION AND NUMBER OF VIEWS:  Single view of the spine. Digitized Intraoperative Radiograph FINDINGS: Fixation hardware projecting over the cervical spine Fluoroscopic time:2 seconds     Digitized intraoperative radiograph is submitted for review.  This examination is not for diagnostic purpose but for guidance during a surgical procedure.    MR-BRAIN-W/O    Result Date: 4/28/2021 4/28/2021 3:31 PM HISTORY/REASON FOR EXAM:  Altered mental status; cerebellar stroke. TECHNIQUE/EXAM DESCRIPTION: MRI of the brain without contrast. T1 sagittal, T2 fast spin-echo axial, T1 coronal, FLAIR coronal, diffusion-weighted and apparent diffusion coefficient (ADC map) axial images were obtained of the whole brain. The study was performed on a [employer] 1.5 Britt MRI scanner. COMPARISON:  Head CT earlier in the day FINDINGS: Large acute right cerebellar infarct suggesting right posterior inferior cerebellar artery territory. There is prominent T2 hyperintensity consistent with cytotoxic edema. A small amount of blooming artifact in the medial aspect of the right cerebellar hemisphere on GRE images could represent a small amount of petechial hemorrhage. There is mild localized mass effect with some mild mass effect on the fourth ventricle. No supratentorial infarct or hemorrhage. No extra-axial fluid collection. No midline shift or hydrocephalus. There is a nasogastric tube and fluid within the nasopharynx. Endotracheal tube partially visualized. Mild posterior ethmoid sinus mucosal thickening.     Acute large right cerebellar posterior inferior cerebellar artery territory infarct with possible small amount of petechial hemorrhage.    MR-CERVICAL SPINE-WITH & W/O    Result Date: 4/27/2021 4/27/2021 1:02 PM HISTORY/REASON FOR EXAM:  Neck pain, abnormal neuro exam; Neck pain, initial exam; sepsis 2/2 strep bacteremia  -unknown source. rule out possible ?? retropharyngeal abscess, ??prevertebral  abscess. TECHNIQUE/EXAM DESCRIPTION: MRI of the cervical spine without and with contrast. The study was performed on a [employer] Signa 1.5 Britt MRI scanner. T1 sagittal, T2 fast spin-echo sagittal, and gradient echo axial images were obtained of the cervical spine. T1 post-contrast fat suppressed sagittal images were obtained of the cervical spine. Optional T1 post-contrast axial images may be obtained. 15 mL ProHance contrast was administered intravenously. COMPARISON: None. FINDINGS: There is abnormal disc T2 hyperintensity at C5-6. Mild amount of bone marrow edema is noted at C5, C6 and C7. There is also focal bone marrow enhancement at C6. There is multiseptated abnormal peripherally enhancing epidural collection noted extending from C2 to the visualized lower thoracic level. Largest collection is noted in the upper thoracic posterior epidural space at the levels of T2 and T3. The lower extent of the collection is not imaged. This is causing multilevel effacement of the thecal sac and cord compression. The thoracic spinal cord is anteriorly displaced and compressed at the levels of T2 and T3. At the level of C2-3,there is small left anterior epidural fluid collection. At the level of C3-4,there is small left anterior epidural fluid collection causing indentation of the thecal sac. At the level of C4-5,there is minimal epidural fluid collection. At the level of C5-6,there is diffuse disc bulge along with right posterior lateral epidural fluid collection causing severe canal compromise. At the level of C6-7,there is mild diffuse disc bulge. There is epidural fluid collection causing severe canal compromise. At the level of C7-T1,there is epidural fluid collection causing severe canal compromise. The visualized brain parenchyma is unremarkable. The cervical spinal cord is mildly enlarged which demonstrates mild increased intramedullary T2 signal intensity. There is abnormal T2 hyperintensity in the visualized bilateral  vertebral arteries concerning for age indeterminate occlusion. There is mild amount of edema in the pre and retropharyngeal soft tissue.     1.  There is multiseptated abnormal peripherally enhancing epidural collection noted extending from C2 to the visualized lower thoracic level. Largest collection is noted in the upper thoracic posterior epidural space at the levels of T2 and T3. The lower extent of the collection is not imaged. This is causing multilevel effacement of the thecal sac and cord compression. The thoracic spinal cord is anteriorly displaced and compressed at the levels of T2 and T3. Pre and postcontrast MR examination of  the thoracic spine is recommended for further evaluation. 2.  There is effacement of the cervical thecal sac due to the multiseptated epidural fluid collection. 3.  The cervical spinal cord is mildly enlarged with minimal increased T2 signal intensity. The possibility of cord inflammation cannot be entirely excluded. 4.  Abnormal T2 hyperintensity at C5-6 disc space likely representing discitis. 5.  Abnormal bone marrow edema of C5, C6 and C7 vertebral bodies with edema concerning for osteomyelitis. 6.  There is also focal enhancement of C6 vertebral body likely secondary to the osteomyelitis. 7.  Prevertebral edema/fluid collection. 8.  Nonvisualization of flow void of bilateral vertebral arteries concerning for age-indeterminate bilateral vertebral occlusions.     MR-LUMBAR SPINE-WITH & W/O    Result Date: 4/28/2021 4/27/2021 11:23 PM HISTORY/REASON FOR EXAM:  Back pain or radiculopathy, cancer or infection suspected; Strep bacteremia, back pain, bilateral leg numbness TECHNIQUE/EXAM DESCRIPTION: MRI of the lumbar spine without and with contrast. The study was performed on a [employer] 1.5 Britt MRI scanner. T1 sagittal, T2 fast spin-echo sagittal, and T2 axial images were obtained of the lumbar spine. T1 postcontrast fat-suppressed sagittal images were obtained. 14 mL ProHance  contrast was administered intravenously. COMPARISON:  None. FINDINGS: Normal lumbar lordosis. Preservation of vertebral body heights, alignment and bone marrow signal. No evidence of discitis osteomyelitis. Conus medullaris terminates at T12-L1 and is normal in signal. No mass or fluid collection is seen within the lumbar spinal canal. T12-L1: Canal and foramina are patent. L1-L2: Mild disc bulge. Canal and foramina are patent. L2-L3: Minimal disc bulge and facet degeneration. Canal and foramina are patent. L3-L4: Minimal disc bulge and facet degeneration. Canal and foramina are patent.. L4-L5: Mild disc bulge and facet degeneration. No significant spinal stenosis. Mild foraminal narrowing. L5-S1: Disc narrowing, mild disc osteophyte. No significant spinal stenosis. Moderate right and mild-to-moderate left foraminal narrowing. Distended urinary bladder.     No evidence of lumbar spine infection or epidural abscess. Mild spondylosis as detailed above without spinal stenosis.    MR-MRA HEAD-W/O    Result Date: 4/28/2021 4/28/2021 3:31 PM HISTORY/REASON FOR EXAM:  Emergency Medical Condition ? Stroke. Cerebellar infarct TECHNIQUE/EXAM DESCRIPTION: MRA of the head ([place]) without contrast. The study was performed on a [employer] Signa 1.5 Britt MRI scanner. MRA of the [place] was performed with 3D time-of-flight technique with axial acquisition. Additional MRA of the internal carotid and vertebrobasilar arteries at the level of the skull base and craniocervical junction was also performed with 3D time-of-flight technique with axial acquisition. Images are reviewed at the PACS workstation as axial source images as well as maximum intensity ray projection (MIP) multiplanar 3D reconstructions. COMPARISON:  None FINDINGS: Nicole cavernous and supraclinoid ICA segments are patent. Patent left posterior communicating artery. MCA and ARA branches are patent. There is no significant flow within the intradural  right vertebral artery. The intradural left vertebral artery, basilar artery and posterior cerebral arteries are patent. No significant aneurysm or high flow vascular malformation is seen.     Findings suggesting right vertebral artery occlusion.    MR-MRA NECK-WITH & W/O AND SEQUENCES    Result Date: 4/28/2021 4/28/2021 3:31 PM HISTORY/REASON FOR EXAM:  Emergency Medical Condition ? Stroke Cerebellar stroke TECHNIQUE/EXAM DESCRIPTION: MRA of the cervical carotid and vertebral arteries without and with contrast. The study was performed on a [employer] Signa 1.5 Britt MRI scanner. Precontrast MRA of the cervical carotid and vertebral arteries was performed with 2D time-of-flight (TOF) technique with acquisition in the axial plane. MRA of the arch origins of the great vessels, and cervical carotid and vertebral arteries was performed with 2D time-of-flight (TOF) technique with coronal slab acquisition from the level of the aortic arch to the carotid siphons during dynamic intravenous infusion of 15 mL of ProHance contrast. Images are reviewed at the PACS workstation as source images as well as maximum intensity ray projection (MIP) multiplanar 3D reconstructions. Cervical internal carotid artery percent stenosis is calculated using the standard method according to the NASCET criteria wherein a segment of uniform caliber distal cervical internal carotid is used as the reference denominator. COMPARISON:  None available. FINDINGS: The arch origins of the great vessels are intact. The cervical right vertebral artery is not well seen on the time-of-flight images. A very small caliber cervical right vertebral artery is seen on the postcontrast images with some mildly increased caliber of the artery at about the C1-C2 level. The fact  that it is not seen on the time-of-flight images and is visualized on contrast enhanced sequence could be related to small nondominant caliber with sluggish flow or could represent retrograde flow  within the right vertebral artery. There may be a focal moderate stenosis in the mid cervical left internal carotid artery and mild narrowing at its origin. Mild narrowing of the proximal left internal carotid artery with less than 50% stenosis. No significant right carotid stenosis is seen.     1.  Small caliber right vertebral artery which is not visualized on the noncontrast time-of-flight technique could be related to retrograde flow or diminutive caliber and sluggish flow. 2.  Possible moderate focal stenosis in the mid cervical left vertebral artery. 3.  No significant carotid stenosis.    MR-THORACIC SPINE-WITH & W/O    Result Date: 4/28/2021 4/27/2021 11:23 PM HISTORY/REASON FOR EXAM:  Mid-back pain, compression fracture suspected; possible epidural abscess/fluid collection TECHNIQUE/EXAM DESCRIPTION: MRI of the thoracic spine without and with contrast. The study was performed on a [employer] Signa 1.5 Britt MRI scanner. T1 sagittal, T2 fast spin-echo sagittal, and T2 axial images were obtained of the thoracic spine. T1 post-contrast fat suppressed sagittal images were obtained. Optional T1 post-contrast axial images may be obtained. 14 mL ProHance contrast was administered intravenously. COMPARISON:  MRI of the cervical spine one-day prior. FINDINGS: There is abnormal dorsal epidural fluid collection seen within the partially visualized lower cervical spine and which extends inferiorly to about the T6 level. The maximum thickness is seen at about the T2-T3 level measuring 8 mm. This raises concern for epidural abscess, although epidural hematoma could also have this appearance. From T1 through T3 there is at least moderate thecal sac stenosis due to the epidural fluid collection. There is significant abnormal signal within the partially visualized  lower cervical and upper thoracic cord which could be infectious/inflammatory or other nonspecific cord edema. There is also suggestion of a small ventral epidural  fluid collection at C6-C7. There is also partially visualized abnormal marrow edema in the C6 and C7 vertebral bodies as detailed on earlier MRI report. There is suggestion of some prominent enhancement around the mid and lower thoracic cord seen on the sagittal postcontrast images however limited evaluation on axial images due to motion artifact. This is of uncertain etiology but could represent some leptomeningeal disease/infection. On the sagittal T2 images there is a questionable slight nodular appearance on the surface of lower thoracic cord. A differential would be a subtle spinal dural aVF. There is no abnormal signal seen within the mid/lower thoracic cord. There is no evidence of discitis osteomyelitis within the thoracic spine. There is T6-T7 interbody ankylosis. There is mild disc degeneration and some prominent anterolateral bridging osteophytes in the mid and lower thoracic spine. No significant posterior disc herniation is seen. Within the mid and lower thoracic spine there  is no significant spinal stenosis.     1.  Posterior epidural fluid collection/abscess within the lower cervical spine extending inferiorly to about the T6 level which could represent epidural abscess and/or hematoma. This measures 8 mm in maximal thickness at T2-T3 with at least moderate thecal sac stenosis. 2.  Abnormal signal within the cervical cord and upper thoracic cord suggesting cord edema or infectious/inflammatory etiology. 3.  Lower cervical spine findings are detailed on earlier report. 4.  Prominent enhancement along the surface of the mid and lower thoracic cord is of uncertain etiology and as detailed above, possibly representing leptomeningeal disease/infection. See details above. These findings were discussed with Dr. Cano on 4/28/2021 10:01 AM.     IR-DRAIN-BLADDER SUPRAPUB W/CATH    Result Date: 4/28/2021  HISTORY/REASON FOR EXAM:  60-year-old man with urinary retention. TECHNIQUE/EXAM DESCRIPTION AND NUMBER  OF VIEWS:  Ultrasound and fluoroscopic guided suprapubic bladder catheter placement, Cystogram. MEDICATIONS: The patient remained on his ICU sedation regimen. FLUOROSCOPY TIME: 0.3 minutes; 5fluoroscopic images obtained CONTRAST: 40mL Omnipaque 300, intraurinary PROCEDURE:  Informed consent was obtained. The suprapubic region was prepped and draped in sterile manner. Following local anesthesia with copious 1% lidocaine, under ultrasound and fluoroscopic monitoring, an 18-gauge needle was advanced into the urinary bladder from a paramedian suprapubic approach. An Amplatz guidewire was placed in the urinary bladder. Serial tract dilatation was performed with a 22 Ukrainian telescoping dilator. A 16 [place] tip silicone Gomez type catheter was then placed in the balloon inflated with 10 mL of sterile saline. A cystogram was performed with AP and both oblique views demonstrating satisfactory position of the catheter with all side holes within the urinary bladder. The catheter was secured to the skin with 2-0 nylon suture and connected to gravity drainage. The Gomez catheter was removed. The patient tolerated the procedure well with no evidence of complication. COMPARISON: None FINDINGS:  The cystogram shows satisfactory catheter position with all sideholes within the urinary bladder. There is no extravasation.     1.     Ultrasound and fluoroscopic guided placement of a 16 [place] tip silicone suprapubic bladder catheter. 2.     Cystogram documenting catheter placement.     DX-PORTABLE FLUOROSCOPY < 1 HOUR    Result Date: 4/28/2021 4/28/2021 5:30 AM HISTORY/REASON FOR EXAM:  Cervical laminectomy TECHNIQUE/EXAM DESCRIPTION AND NUMBER OF VIEWS: Portable fluoroscopy for less than one hour in OR. FINDINGS: The portable fluoroscopy unit was obligated to the procedure for less than one hour. Actual fluoro time was 2 seconds.     Portable fluoroscopy utilized for 2 seconds. INTERPRETING LOCATION: [address] [place] [address]  [place], [address]    EC-ECHOCARDIOGRAM COMPLETE W/O CONT    Result Date: 2021  Transthoracic Echo Report Echocardiography Laboratory CONCLUSIONS No prior study is available for comparison. Normal left ventricular systolic function.  Left ventricular ejection fraction is visually estimated to be 60%. Normal diastolic function. Agitated saline (bubble) study was performed. No evidence of right to left shunt by agitated saline challenge. Normal inferior vena cava size and inspiratory collapse. GILDARDO MAGANA Exam Date:         2021                    19:42 Exam Location:     Inpatient Priority:          Routine Ordering Physician:        YESICA SULLIVAN Referring Physician:       [id number] Marie Sonographer:               Faye Moya [facility] Age:    60     Gender:    M MRN:    [id number] :    1960 BSA:    1.87   Ht (in):    69     Wt (lb):    159 Exam Type:     Complete Indications:     CVA ICD Codes:       436 CPT Codes:       24633 BP:   140    /   60     HR:   74 Technical Quality:       Fair MEASUREMENTS  (Male / Female) Normal Values 2D ECHO LV Diastolic Diameter PLAX        3.8 cm                4.2 - 5.9 / 3.9 - 5.3 cm LV Systolic Diameter PLAX         2.4 cm                2.1 - 4.0 cm IVS Diastolic Thickness           1.4 cm                LVPW Diastolic Thickness          1.4 cm                LVOT Diameter                     2 cm                  Estimated LV Ejection Fraction    60 %                  LV Ejection Fraction MOD BP       62.7 %                >= 55  % LV Ejection Fraction MOD 4C       46.6 %                LV Ejection Fraction MOD 2C       55 %                  DOPPLER AV Peak Velocity                  1.7 m/s               AV Peak Gradient                  10.9 mmHg             AV Mean Gradient                  5.5 mmHg              LVOT Peak Velocity                1.2 m/s               AV Area Cont Eq vti               2.3 cm2               Mitral E Point Velocity           0.74 m/s               Mitral E to A Ratio               0.92                  MV Pressure Half Time             69.4 ms               MV Area PHT                       3.2 cm2               MV Deceleration Time              239 ms                PV Peak Velocity                  1.1 m/s               PV Peak Gradient                  4.6 mmHg              RVOT Peak Velocity                0.66 m/s              * Indicates values subject to auto-interpretation LV EF:  60    % FINDINGS Left Ventricle Normal left ventricular chamber size. Mild concentric left ventricular hypertrophy. Normal left ventricular systolic function.  Left ventricular ejection fraction is visually estimated to be 60%. Normal diastolic function. Right Ventricle Normal right ventricular size and systolic function. Right Atrium Normal right atrial size. Normal inferior vena cava size and inspiratory collapse. Left Atrium Normal left atrial size. Agitated saline (bubble) study was performed. With Valsalva maneuver. No evidence of right to left shunt by agitated saline challenge. Existing IV was used. Mitral Valve Structurally normal mitral valve without significant stenosis or regurgitation. Aortic Valve Aortic sclerosis without stenosis. No aortic insufficiency. Tricuspid Valve Structurally normal tricuspid valve without significant stenosis or regurgitation. Pulmonic Valve The pulmonic valve is not well visualized. No pulmonic insufficiency. Pericardium Normal pericardium without effusion. Aorta Normal aortic root for body surface area. Ascending aorta not well visualized. Zakiya Rebollar MD (Electronically Signed) Final Date:     28 April 2021                 21:30    DX-ABDOMEN FOR TUBE PLACEMENT    Result Date: 4/28/2021 4/28/2021 12:24 PM HISTORY/REASON FOR EXAM:  Tube evaluation. TECHNIQUE/EXAM DESCRIPTION AND NUMBER OF VIEWS:  1 view(s) of the abdomen. COMPARISON:  None. FINDINGS: Limited single view of the abdomen performed primarily to evaluate  "enteric tube position. The tip projects over the expected area of the distal stomach. The bowel gas pattern is within normal limits.     Feeding tube with tip projecting over the expected area of the distal stomach.      Micro:  Results     Procedure Component Value Units Date/Time    BLOOD CULTURE [[id number]]     Order Status: No result Specimen: Blood from Peripheral     BLOOD CULTURE [[id number]]     Order Status: No result Specimen: Blood from Peripheral     BLOOD CULTURE [[id number]]  (Abnormal) Collected: 04/28/21 1134    Order Status: Completed Specimen: Blood from Peripheral Updated: 04/29/21 2142     Significant Indicator POS     Source BLD     Site PERIPHERAL     Culture Result Growth detected by Bactec instrument. 04/29/2021  21:41  Gram Stain: Gram positive cocci: Possible Staphylococcus sp.  Negative for Staphylococcus aureus and MRSA by PCR. Correlate  ongoing need for antibiotics with clinical condition.  Further report to follow.      Narrative:      CALL  [facility]  [facility] tel. [phone],  CALLED  [facility] tel. [phone] 04/29/2021, 21:41, RB PERF. RESULTS CALLED  TO:RN:[id number]  Collected By:[id number] SAILAJA BAILEY  Per Hospital Policy: Only change Specimen Src: to \"Line\" if  specified by physician order.  Collected By:[id number] SAILAJA BAILEY    CULTURE WOUND W/ GRAM STAIN [[id number]]  (Abnormal) Collected: 04/28/21 0655    Order Status: Completed Specimen: Wound Updated: 04/29/21 1804     Significant Indicator POS     Source WND     Site Cerival Thoric Epidural 2     Culture Result Growth noted after further incubation, see below for  organism identification.       Gram Stain Result Few WBCs.  Few Gram positive cocci.       Culture Result Streptococcus anginosus  Moderate growth      Narrative:      Surgery - swabs received    Anaerobic Culture [[id number]] Collected: 04/28/21 0655    Order Status: Completed Specimen: Wound Updated: 04/29/21 1804     Significant Indicator NEG     Source WND     Site " "Cerival Thoric Epidural 2     Culture Result Culture in progress.    Narrative:      Surgery - swabs received    CULTURE WOUND W/ GRAM STAIN [[id number]]  (Abnormal) Collected: 04/28/21 0642    Order Status: Completed Specimen: Wound Updated: 04/29/21 1804     Significant Indicator POS     Source WND     Site Cerival Thoracic Epidural 1     Culture Result Growth noted after further incubation, see below for  organism identification.       Gram Stain Result Few WBCs.  Rare Gram positive cocci.       Culture Result Streptococcus anginosus  Light growth      Narrative:      Surgery - swabs received    Anaerobic Culture [[id number]] Collected: 04/28/21 0642    Order Status: Completed Specimen: Wound Updated: 04/29/21 1804     Significant Indicator NEG     Source WND     Site Cerival Thoracic Epidural 1     Culture Result Culture in progress.    Narrative:      Surgery - swabs received    BLOOD CULTURE [[id number]] Collected: 04/28/21 1134    Order Status: Completed Specimen: Blood from Peripheral Updated: 04/29/21 0640     Significant Indicator NEG     Source BLD     Site PERIPHERAL     Culture Result No Growth  Note: Blood cultures are incubated for 5 days and  are monitored continuously.Positive blood cultures  are called to the RN and reported as soon as  they are identified.      Narrative:      Collected By:[id number] SAILAJA BAILEY  Per Hospital Policy: Only change Specimen Src: to \"Line\" if  specified by physician order.  No site indicated    BLOOD CULTURE [[id number]]  (Abnormal) Collected: 04/27/21 0248    Order Status: Completed Specimen: Blood from Peripheral Updated: 04/29/21 0600     Significant Indicator POS     Source BLD     Site PERIPHERAL     Culture Result Growth detected by Bactec instrument. 04/29/2021  05:59  Gram Stain: Gram positive cocci: Possible Streptococcus sp.      Narrative:      Per Hospital Policy: Only change Specimen Src: to \"Line\" if  specified by physician order.  Right AC    GRAM " "STAIN [[id number]] Collected: 04/28/21 0642    Order Status: Completed Specimen: Wound Updated: 04/28/21 1854     Significant Indicator .     Source WND     Site Cerival Thoracic Epidural 1     Gram Stain Result Few WBCs.  Rare Gram positive cocci.      Narrative:      Surgery - swabs received    GRAM STAIN [[id number]] Collected: 04/28/21 0655    Order Status: Completed Specimen: Wound Updated: 04/28/21 1854     Significant Indicator .     Source WND     Site Cerival Thoric Epidural 2     Gram Stain Result Few WBCs.  Few Gram positive cocci.      Narrative:      Surgery - swabs received    BLOOD CULTURE [[id number]]  (Abnormal) Collected: 04/25/21 1433    Order Status: Completed Specimen: Blood from Peripheral Updated: 04/28/21 1122     Significant Indicator POS     Source BLD     Site PERIPHERAL     Culture Result Growth detected by Bactec instrument. 04/26/2021  04:57      Streptococcus anginosus  See previous culture for sensitivity report.      Narrative:      CALL  [facility]  171 tel. [phone],  CALLED  171 tel. [phone] 04/26/2021, 04:59, RB PERF. RESULTS CALLED TO: RN  [id number]  1 of 2 for Blood Culture x 2 sites order. Per Hospital  Policy: Only change Specimen Src: to \"Line\" if specified by  physician order.  No site indicated    BLOOD CULTURE [[id number]]  (Abnormal)  (Susceptibility) Collected: 04/25/21 1505    Order Status: Completed Specimen: Blood from Peripheral Updated: 04/28/21 1122     Significant Indicator POS     Source BLD     Site PERIPHERAL     Culture Result Growth detected by Bactec instrument. 04/26/2021  04:06      Streptococcus anginosus    Narrative:      CALL  [facility]  171 tel. [phone],  CALLED  171 tel. [phone] 04/26/2021, 04:09, RB PERF. RESULTS CALLED TO: RN  [phone]  2 of 2 blood culture x2  Sites order. Per Hospital Policy:  Only change Specimen Src: to \"Line\" if specified by physician  order.  No site indicated    Susceptibility     Streptococcus anginosus (1)     Antibiotic " "Interpretation Microscan Method Status    Penicillin Sensitive 0.064 mcg/mL E-TEST Final    Cefotaxime Sensitive 0.125 mcg/mL E-TEST Final                   BLOOD CULTURE [[id number]] Collected: 04/27/21 0248    Order Status: Completed Specimen: Blood from Peripheral Updated: 04/28/21 1048     Significant Indicator NEG     Source BLD     Site PERIPHERAL     Culture Result No Growth  Note: Blood cultures are incubated for 5 days and  are monitored continuously.Positive blood cultures  are called to the RN and reported as soon as  they are identified.      Narrative:      Per Hospital Policy: Only change Specimen Src: to \"Line\" if  specified by physician order.  Left AC    E-Test [[id number]] Collected: 04/25/21 1505    Order Status: Completed Specimen: Other Updated: 04/27/21 1117     ETEST Sensitivity INTERIM    Narrative:      171 tel. [phone] 04/26/2021, 04:09, RB PERF. RESULTS CALLED TO: RN [id number]  2 of 2 blood culture x2  Sites order. Per Hospital Policy:  Only change Specimen Src: to \"Line\" if specified by physician  order.    MRSA By PCR (Amp) [[id number]] Collected: 04/26/21 0950    Order Status: Completed Specimen: Respirate from Nares Updated: 04/26/21 1343     Significant Indicator NEG     Source RESP     Site NARES     MRSA PCR Negative for MRSA by PCR.    Narrative:      Collected By:[id number] EMMA ODONNELL.  Collected By:[id number] EMMA BAEZ    SARS-CoV-2 PCR (24 hour In-House): Collect NP swab in [facility] [[id number]] Collected: 04/25/21 1448    Order Status: Completed Specimen: Respirate from Nasopharyngeal Updated: 04/26/21 1341     SARS-CoV-2 Source NP Swab     SARS-CoV-2 by PCR NotDetected     Comment: PATIENTS: Important information regarding your results and instructions can  be found at [email or web address]   \"After your  Covid-19 Test\"  [facility] providers: PLEASE REFER TO DE-ESCALATION AND RETESTING PROTOCOL  on [email or web address]  **The TaqPath COVID-19 SARS-CoV-2 RT-PCR test " has been made available for  use under the Emergency Use Authorization (EUA) only.         Narrative:      Have you been in close contact with a person who is suspected  or known to be positive for COVID-19 within the last 30 days  (e.g. last seen that person < 30 days ago)->Unknown          Assessment:  Active Hospital Problems    Diagnosis    • *Spinal epidural abscess [G06.1]    • History of ischemic stroke [Z86.73]    • Sepsis due to Streptococcus species ([facility]) [A40.9]    • Acute bilateral back pain [M54.9]    • Thrombocytopenia (HCC) [D69.6]    • Type 2 diabetes mellitus without complication, without long-term current use of insulin (HCC) [E11.9]    • Coronary artery disease due to calcified coronary lesion: CABG x4, July 2015 [I25.10, I25.84]    • Essential hypertension, benign [I10]    • Hypokalemia [E87.6]    • Hypomagnesemia [E83.42]    • Hyponatremia [E87.1]    • Difficulty swallowing [R13.10]    • Diabetic ketoacidosis (HCC) [E11.10]    • Marijuana abuse [F12.10]    • High anion gap metabolic acidosis [E87.2]    • Tobacco abuse [Z72.0]    • Dyslipidemia [E78.5]    • Status post urethrostomy ([facility]) [Z93.6]      Interval 24 hours:      AF, O2 Vent 8/40%, pressors off   Labs reviewed  Imaging personally reviewed both images and report.   Studies reviewed  Micro reviewed    Patient continues to be agitated in without meaningful limb movement.  Decreasing vent requirements and no longer in shock. Patient continued on antibiotics as below.    Assessment:  60 y.o.  admitted 4/25/2021. Pt has a past medical history of uncontrolled diabetes, CAD status post CABG times 4/2015, marijuana use and to arthritis.  Presented complaining of neck and back pain ongoing for approximately 1 week and fall getting out of the bathtub.  He had been seen at urgent care for back pain approximately 1 week prior to admission and given Toradol injections.       Hospital Course:   Afebrile and vitals unremarkable other than  hypertension.  Leukocytosis ongoing since arrival.  MRI C-spine on 4/26 with epidural collection noted from C2-T3.  Largest collection noted in upper thoracic posterior epidural space at T2-T3.  This is causing multilevel cord compression.  Also with likely discitis at C5-6 and osteomyelitis at C5-C7.  Blood cultures on 4/25+ for Streptococcus species.       Problem List     Sepsis/shock, secondary to below, improvind   VDRF, improving but remains vent dependent  Bacteremia  -Blood cultures on 4/25 2/2 + Streptococcus anginosus, penicillin and cephalosporin susceptible  -Blood cultures on 4/27 1/2 + viridans Streptococcus -likely contaminant  -Blood cultures on 4/28  1/2 + CoNS -likely contaminant  -TTE with no vegetations  -CHRISTOPHER on 4/30, awaiting read  Leukocytosis, ongoing  Cervical thoracic infection, epidural abscess at C2-T3 as well as discitis and osteomyelitis, +GPCs-Streptococcus anginosus   -Repeat MRI thoracic spine on 4/27 notes posterior epidural fluid  collection/abscess in lower cervical spine extending to T6 level, also noted abnormal signal within the cervical and upper thoracic cord  -Patient went to the OR with neurosurgery on 4/28 for see 4-T2 laminectomy, decompression of thecal sac and nerve roots as well as cervical thoracic extradural spinal mass/epidural abscess removal, linda purulence during OR, no CSF leak per op note  -Operative cultures from cervical thoracic epidural 1 and 2 both with GPC's  CVA, Right cerebellar stroke, possibly due to venous spasm associated with the epidural abscess.   Concern for quadriplegia  -MRA neck with and without on 4/28, possible moderate focal stenosis in mid left CVA.  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  MRI without on 4/28 with acute large right cerebellar infarct with small hemorrhage  Uncontrolled diabetes     Plan      ---  Continue ceftriaxone 2 grams q24 hours,  no obvious septic emboli in the  brain on imaging   --- Agree with plan for CHRISTOPHER -awaiting report  --- Follow-up imaging  --- Monitor labs  --- Monitor and control blood sugars        Plan of care discussed with Dr. Dumont.  Will continue to follow.

## 2021-05-01 LAB
ALBUMIN SERPL BCP-MCNC: 1.9 G/DL (ref 3.2–4.9)
ALBUMIN/GLOB SERPL: 0.5 G/DL
ALP SERPL-CCNC: 126 U/L (ref 30–99)
ALT SERPL-CCNC: 24 U/L (ref 2–50)
ANION GAP SERPL CALC-SCNC: 5 MMOL/L (ref 7–16)
AST SERPL-CCNC: 34 U/L (ref 12–45)
BACTERIA SPEC ANAEROBE CULT: NORMAL
BACTERIA SPEC ANAEROBE CULT: NORMAL
BASE EXCESS BLDA CALC-SCNC: 6 MMOL/L (ref -4–3)
BASOPHILS # BLD AUTO: 0.1 % (ref 0–1.8)
BASOPHILS # BLD: 0.02 K/UL (ref 0–0.12)
BILIRUB SERPL-MCNC: 0.3 MG/DL (ref 0.1–1.5)
BODY TEMPERATURE: ABNORMAL DEGREES
BREATHS SETTING VENT: 20
BUN SERPL-MCNC: 37 MG/DL (ref 8–22)
CALCIUM SERPL-MCNC: 7.9 MG/DL (ref 8.5–10.5)
CHLORIDE SERPL-SCNC: 99 MMOL/L (ref 96–112)
CO2 BLDA-SCNC: 30 MMOL/L (ref 20–33)
CO2 SERPL-SCNC: 26 MMOL/L (ref 20–33)
CREAT SERPL-MCNC: 0.58 MG/DL (ref 0.5–1.4)
DELSYS IDSYS: ABNORMAL
END TIDAL CARBON DIOXIDE IECO2: 32 MMHG
EOSINOPHIL # BLD AUTO: 0.01 K/UL (ref 0–0.51)
EOSINOPHIL NFR BLD: 0.1 % (ref 0–6.9)
ERYTHROCYTE [DISTWIDTH] IN BLOOD BY AUTOMATED COUNT: 47 FL (ref 35.9–50)
GLOBULIN SER CALC-MCNC: 3.8 G/DL (ref 1.9–3.5)
GLUCOSE BLD-MCNC: 219 MG/DL (ref 65–99)
GLUCOSE BLD-MCNC: 225 MG/DL (ref 65–99)
GLUCOSE BLD-MCNC: 249 MG/DL (ref 65–99)
GLUCOSE SERPL-MCNC: 238 MG/DL (ref 65–99)
HCO3 BLDA-SCNC: 28.6 MMOL/L (ref 17–25)
HCT VFR BLD AUTO: 30.8 % (ref 42–52)
HGB BLD-MCNC: 10.5 G/DL (ref 14–18)
HOROWITZ INDEX BLDA+IHG-RTO: 190 MM[HG]
IMM GRANULOCYTES # BLD AUTO: 0.26 K/UL (ref 0–0.11)
IMM GRANULOCYTES NFR BLD AUTO: 1.7 % (ref 0–0.9)
LYMPHOCYTES # BLD AUTO: 1.75 K/UL (ref 1–4.8)
LYMPHOCYTES NFR BLD: 11.1 % (ref 22–41)
MAGNESIUM SERPL-MCNC: 2.2 MG/DL (ref 1.5–2.5)
MCH RBC QN AUTO: 30.5 PG (ref 27–33)
MCHC RBC AUTO-ENTMCNC: 34.1 G/DL (ref 33.7–35.3)
MCV RBC AUTO: 89.5 FL (ref 81.4–97.8)
MODE IMODE: ABNORMAL
MONOCYTES # BLD AUTO: 1.21 K/UL (ref 0–0.85)
MONOCYTES NFR BLD AUTO: 7.7 % (ref 0–13.4)
NEUTROPHILS # BLD AUTO: 12.49 K/UL (ref 1.82–7.42)
NEUTROPHILS NFR BLD: 79.3 % (ref 44–72)
NRBC # BLD AUTO: 0 K/UL
NRBC BLD-RTO: 0 /100 WBC
O2/TOTAL GAS SETTING VFR VENT: 40 %
PCO2 BLDA: 34.5 MMHG (ref 26–37)
PCO2 TEMP ADJ BLDA: 34.3 MMHG (ref 26–37)
PEEP END EXPIRATORY PRESSURE IPEEP: 8 CMH20
PH BLDA: 7.53 [PH] (ref 7.4–7.5)
PH TEMP ADJ BLDA: 7.53 [PH] (ref 7.4–7.5)
PHOSPHATE SERPL-MCNC: 2.3 MG/DL (ref 2.5–4.5)
PLATELET # BLD AUTO: 276 K/UL (ref 164–446)
PMV BLD AUTO: 12.1 FL (ref 9–12.9)
PO2 BLDA: 76 MMHG (ref 64–87)
PO2 TEMP ADJ BLDA: 75 MMHG (ref 64–87)
POTASSIUM SERPL-SCNC: 4.3 MMOL/L (ref 3.6–5.5)
PROT SERPL-MCNC: 5.7 G/DL (ref 6–8.2)
RBC # BLD AUTO: 3.44 M/UL (ref 4.7–6.1)
SAO2 % BLDA: 97 % (ref 93–99)
SIGNIFICANT IND 70042: NORMAL
SIGNIFICANT IND 70042: NORMAL
SITE SITE: NORMAL
SITE SITE: NORMAL
SODIUM SERPL-SCNC: 130 MMOL/L (ref 135–145)
SOURCE SOURCE: NORMAL
SOURCE SOURCE: NORMAL
SPECIMEN DRAWN FROM PATIENT: ABNORMAL
TIDAL VOLUME IVT: 430 ML
TRIGL SERPL-MCNC: 172 MG/DL (ref 0–149)
WBC # BLD AUTO: 15.7 K/UL (ref 4.8–10.8)

## 2021-05-01 PROCEDURE — A9270 NON-COVERED ITEM OR SERVICE: HCPCS | Performed by: STUDENT IN AN ORGANIZED HEALTH CARE EDUCATION/TRAINING PROGRAM

## 2021-05-01 PROCEDURE — 85025 COMPLETE CBC W/AUTO DIFF WBC: CPT

## 2021-05-01 PROCEDURE — 700102 HCHG RX REV CODE 250 W/ 637 OVERRIDE(OP): Performed by: STUDENT IN AN ORGANIZED HEALTH CARE EDUCATION/TRAINING PROGRAM

## 2021-05-01 PROCEDURE — 770022 HCHG ROOM/CARE - ICU (200)

## 2021-05-01 PROCEDURE — 700105 HCHG RX REV CODE 258: Performed by: INTERNAL MEDICINE

## 2021-05-01 PROCEDURE — 37799 UNLISTED PX VASCULAR SURGERY: CPT

## 2021-05-01 PROCEDURE — 80053 COMPREHEN METABOLIC PANEL: CPT

## 2021-05-01 PROCEDURE — 99233 SBSQ HOSP IP/OBS HIGH 50: CPT | Performed by: INTERNAL MEDICINE

## 2021-05-01 PROCEDURE — 94003 VENT MGMT INPAT SUBQ DAY: CPT

## 2021-05-01 PROCEDURE — 700102 HCHG RX REV CODE 250 W/ 637 OVERRIDE(OP): Performed by: INTERNAL MEDICINE

## 2021-05-01 PROCEDURE — A9270 NON-COVERED ITEM OR SERVICE: HCPCS | Performed by: PHYSICIAN ASSISTANT

## 2021-05-01 PROCEDURE — 82803 BLOOD GASES ANY COMBINATION: CPT

## 2021-05-01 PROCEDURE — 700111 HCHG RX REV CODE 636 W/ 250 OVERRIDE (IP): Performed by: STUDENT IN AN ORGANIZED HEALTH CARE EDUCATION/TRAINING PROGRAM

## 2021-05-01 PROCEDURE — 82962 GLUCOSE BLOOD TEST: CPT

## 2021-05-01 PROCEDURE — 84100 ASSAY OF PHOSPHORUS: CPT

## 2021-05-01 PROCEDURE — 700102 HCHG RX REV CODE 250 W/ 637 OVERRIDE(OP): Performed by: PHYSICIAN ASSISTANT

## 2021-05-01 PROCEDURE — 99291 CRITICAL CARE FIRST HOUR: CPT | Mod: GC | Performed by: INTERNAL MEDICINE

## 2021-05-01 PROCEDURE — A9270 NON-COVERED ITEM OR SERVICE: HCPCS | Performed by: INTERNAL MEDICINE

## 2021-05-01 PROCEDURE — 700101 HCHG RX REV CODE 250: Performed by: STUDENT IN AN ORGANIZED HEALTH CARE EDUCATION/TRAINING PROGRAM

## 2021-05-01 PROCEDURE — 700111 HCHG RX REV CODE 636 W/ 250 OVERRIDE (IP): Performed by: NURSE PRACTITIONER

## 2021-05-01 PROCEDURE — 700111 HCHG RX REV CODE 636 W/ 250 OVERRIDE (IP): Performed by: INTERNAL MEDICINE

## 2021-05-01 PROCEDURE — 84478 ASSAY OF TRIGLYCERIDES: CPT

## 2021-05-01 PROCEDURE — 83735 ASSAY OF MAGNESIUM: CPT

## 2021-05-01 PROCEDURE — 87040 BLOOD CULTURE FOR BACTERIA: CPT | Mod: 91

## 2021-05-01 PROCEDURE — 700101 HCHG RX REV CODE 250: Performed by: INTERNAL MEDICINE

## 2021-05-01 PROCEDURE — 94799 UNLISTED PULMONARY SVC/PX: CPT

## 2021-05-01 RX ORDER — ASPIRIN 81 MG/1
162 TABLET, CHEWABLE ORAL ONCE
Status: COMPLETED | OUTPATIENT
Start: 2021-05-01 | End: 2021-05-01

## 2021-05-01 RX ORDER — INSULIN GLARGINE 100 [IU]/ML
20 INJECTION, SOLUTION SUBCUTANEOUS EVERY EVENING
Status: DISCONTINUED | OUTPATIENT
Start: 2021-05-01 | End: 2021-05-02

## 2021-05-01 RX ORDER — ASPIRIN 81 MG/1
324 TABLET, CHEWABLE ORAL DAILY
Status: DISCONTINUED | OUTPATIENT
Start: 2021-05-02 | End: 2021-05-11

## 2021-05-01 RX ADMIN — ASPIRIN 81 MG: 81 TABLET, CHEWABLE ORAL at 06:11

## 2021-05-01 RX ADMIN — ARIPIPRAZOLE 15 MG: 10 TABLET ORAL at 18:27

## 2021-05-01 RX ADMIN — CYANOCOBALAMIN TAB 500 MCG 1000 MCG: 500 TAB at 05:35

## 2021-05-01 RX ADMIN — CEFTRIAXONE SODIUM 2 G: 2 INJECTION, POWDER, FOR SOLUTION INTRAMUSCULAR; INTRAVENOUS at 05:41

## 2021-05-01 RX ADMIN — FAMOTIDINE 20 MG: 10 INJECTION INTRAVENOUS at 05:42

## 2021-05-01 RX ADMIN — ACETAMINOPHEN 1000 MG: 500 TABLET ORAL at 05:36

## 2021-05-01 RX ADMIN — ACETAMINOPHEN 1000 MG: 500 TABLET ORAL at 11:40

## 2021-05-01 RX ADMIN — LEVETIRACETAM 1000 MG: 500 TABLET ORAL at 05:40

## 2021-05-01 RX ADMIN — DOCUSATE SODIUM 50 MG AND SENNOSIDES 8.6 MG 1 TABLET: 8.6; 5 TABLET, FILM COATED ORAL at 21:23

## 2021-05-01 RX ADMIN — DOCUSATE SODIUM 100 MG: 50 LIQUID ORAL at 18:26

## 2021-05-01 RX ADMIN — ASPIRIN 162 MG: 81 TABLET, CHEWABLE ORAL at 10:21

## 2021-05-01 RX ADMIN — INSULIN GLARGINE 20 UNITS: 100 INJECTION, SOLUTION SUBCUTANEOUS at 18:31

## 2021-05-01 RX ADMIN — POTASSIUM PHOSPHATE, MONOBASIC AND POTASSIUM PHOSPHATE, DIBASIC 15 MMOL: 224; 236 INJECTION, SOLUTION, CONCENTRATE INTRAVENOUS at 11:40

## 2021-05-01 RX ADMIN — ARIPIPRAZOLE 15 MG: 10 TABLET ORAL at 05:36

## 2021-05-01 RX ADMIN — ACETAMINOPHEN 1000 MG: 500 TABLET ORAL at 18:27

## 2021-05-01 RX ADMIN — MAGNESIUM CITRATE 296 ML: 1.75 LIQUID ORAL at 11:41

## 2021-05-01 RX ADMIN — LIDOCAINE 3 PATCH: 50 PATCH TOPICAL at 18:28

## 2021-05-01 RX ADMIN — PROPOFOL 15 MCG/KG/MIN: 10 INJECTION, EMULSION INTRAVENOUS at 10:16

## 2021-05-01 RX ADMIN — DOCUSATE SODIUM 100 MG: 50 LIQUID ORAL at 05:41

## 2021-05-01 RX ADMIN — CARBAMAZEPINE 200 MG: 200 TABLET ORAL at 10:20

## 2021-05-01 RX ADMIN — ATORVASTATIN CALCIUM 80 MG: 40 TABLET, FILM COATED ORAL at 21:23

## 2021-05-01 RX ADMIN — FENTANYL CITRATE 50 MCG: 50 INJECTION, SOLUTION INTRAMUSCULAR; INTRAVENOUS at 21:36

## 2021-05-01 RX ADMIN — CARBAMAZEPINE 200 MG: 200 TABLET ORAL at 18:26

## 2021-05-01 RX ADMIN — PROPOFOL 15 MCG/KG/MIN: 10 INJECTION, EMULSION INTRAVENOUS at 22:43

## 2021-05-01 RX ADMIN — FAMOTIDINE 20 MG: 20 TABLET ORAL at 18:00

## 2021-05-01 RX ADMIN — MAGNESIUM HYDROXIDE 30 ML: 400 SUSPENSION ORAL at 06:11

## 2021-05-01 RX ADMIN — Medication 5000 UNITS: at 05:38

## 2021-05-01 NOTE — PROGRESS NOTES
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   Head CT 21 shows no reperfusion hemorrhage     Exam:  intubated   Not following   Slight movement to right arm spontaneous with shoulder shrug to painful stimuli   No movement to legs  Pupils equal   Will open eyes     BP  Min: 115/76  Max: 139/63  Pulse  Av.3  Min: 70  Max: 94  Resp  Av.4  Min: 19  Max: 35  Temp  Av.6 °C (97.8 °F)  Min: 36 °C (96.8 °F)  Max: 37.2 °C (99 °F)  SpO2  Av.4 %  Min: 90 %  Max: 98 %    No data recorded    Recent Labs     21  0615   WBC 14.3*  --   --  13.0* 15.7*   RBC 4.06*  --   --  3.50* 3.44*   HEMOGLOBIN 12.1*   < > 10.8* 10.6* 10.5*   HEMATOCRIT 36.8*   < > 32.2* 31.8* 30.8*   MCV 90.6  --   --  90.9 89.5   MCH 29.8  --   --  30.3 30.5   MCHC 32.9*  --   --  33.3* 34.1   RDW 47.8  --   --  47.8 47.0   PLATELETCT 194  --   --  195 276   MPV 12.4  --   --  12.4 12.1    < > = values in this interval not displayed.     Recent Labs     2115   SODIUM 129* 134* 130*   POTASSIUM 4.0 3.8 4.3   CHLORIDE 98 101 99   CO2 23 26 26   GLUCOSE 214* 292* 238*   BUN 28* 29* 37*   CREATININE 0.53 0.50 0.58   CALCIUM 7.7* 7.8* 7.9*     Recent Labs     21   INR 1.09 1.07           Intake/Output       21 - 2159 21 - 2159       Total  Total       Intake    I.V.  87.5  78 165.5  --  -- --    Propofol Volume 86.7 78 164.7 -- -- --    Norepinephrine Volume 0.9 -- 0.9 -- -- --    Other  210  130 340  --  -- --    Medications (PO/Enteral Liquids) 210 130 340 -- -- --    NG/GT  260  498 268   --  -- --    Intake (mL) (Enteral Tube 04/28/21 Cortrak - Gastric Left nare) 260 540 800 -- -- --    IV Piggyback  250  -- 250  --  -- --    Volume (mL) (potassium phosphate 15 mmol in  mL ivpb) 250 -- 250 -- -- --    Enteral  120  90 210  --  -- --    Free Water / Tube Flush 120 90 210 -- -- --    Total Intake 927.5 838 1765.5 -- -- --       Output    Urine  725  650 1375  250  -- 250    Output (mL) (Urethral Catheter Other (Comment) 16 Fr.)  250 -- 250    Drains  10  0 10  --  -- --    Output (mL) (Closed/Suction Drain 1 Midline;Superior Back Ld Knowles 7 Fr.) 10 0 10 -- -- --    Total Output  250 -- 250       Net I/O     192.5 188 380.5 -250 -- -250            Intake/Output Summary (Last 24 hours) at 5/1/2021 1044  Last data filed at 5/1/2021 1000  Gross per 24 hour   Intake 1607.5 ml   Output 1435 ml   Net 172.5 ml            • [START ON 5/2/2021] aspirin  324 mg DAILY   • insulin glargine  20 Units Q EVENING   • potassium phosphate IVPB (CUSTOM)  15 mmol Once   • magnesium citrate  296 mL Once   • acetylcysteine  3-4 mL Q4H PRN (RT)   • albuterol  2.5 mg Q4H PRN (RT)   • levETIRAcetam  1,000 mg BID   • MD Alert...Adult ICU Electrolyte Replacement per Pharmacy   PHARMACY TO DOSE   • lidocaine  1-2 mL Q30 MIN PRN   • insulin regular  2-9 Units Q6HRS    And   • dextrose 50%  50 mL Q15 MIN PRN   • cefTRIAXone (ROCEPHIN) IV  2 g DAILY   • magnesium hydroxide  30 mL QDAY PRN   • ondansetron  4 mg Q4HRS PRN   • oxyCODONE immediate-release  10 mg Q4HRS PRN   • polyethylene glycol/lytes  1 Packet BID PRN   • promethazine  12.5-25 mg Q4HRS PRN   • senna-docusate  1 tablet Q24HRS PRN   • MD ALERT...DO NOT ADMINISTER NSAIDS or ASPIRIN unless ORDERED By Neurosurgery  1 Each PRN   • bisacodyl  10 mg Q24HRS PRN   • fleet  1 Each Once PRN   • Respiratory Therapy Consult   Continuous RT   • ondansetron  4 mg Q4HRS PRN   • promethazine  12.5-25 mg Q4HRS PRN   • prochlorperazine  5-10 mg Q4HRS  PRN   • norepinephrine (Levophed) infusion  0-30 mcg/min Continuous   • ipratropium-albuterol  3 mL Q4H PRN (RT)   • labetalol  10 mg Q4HRS PRN   • Pharmacy  1 Each PHARMACY TO DOSE   • acetaminophen  1,000 mg Q6HRS   • ARIPiprazole  15 mg BID   • atorvastatin  80 mg Nightly   • carBAMazepine  200 mg TID   • cyanocobalamin  1,000 mcg DAILY   • senna-docusate  1 tablet Nightly   • vitamin D  5,000 Units DAILY   • docusate sodium  100 mg BID   • famotidine  20 mg BID    Or   • famotidine  20 mg BID   • fentaNYL  50 mcg Q15 MIN PRN    And   • fentaNYL  100 mcg Q15 MIN PRN    And   • fentaNYL   Continuous    And   • propofol  0-80 mcg/kg/min Continuous   • lidocaine  3 Patch Q24HR       Assessment and Plan:  Hospital day #6  POD #3  Sedation as needed  Keep drain in on more day  Abx per ID  Lilibeth for ASA  Will follow closely       Prophylactic anticoagulation: no         Start date/time:

## 2021-05-01 NOTE — CARE PLAN
Problem: Communication  Goal: The ability to communicate needs accurately and effectively will improve  Outcome: PROGRESSING SLOWER THAN EXPECTED  Intervention: Reorient patient to environment as needed  Flowsheets (Taken 4/30/2021 2223)  Oriented to:: All of the Following : Location of Bathroom, Visiting Policy, Unit Routine, Call Light and Bedside Controls, Bedside Rail Policy, Smoking Policy, Rights and Responsibilities, Bedside Report, and Patient Education Notebook  Note: Intermittently shaking head back and forth, especially with any stimulation. Emotional support provided.       Problem: Mobility  Goal: Risk for activity intolerance will decrease  Outcome: PROGRESSING SLOWER THAN EXPECTED  Intervention: Assess and monitor signs of activity intolerance  Note: RASS +2. Unable to calm or redirect patient. Unsafe to mobilize as risk of extubation is too high.

## 2021-05-01 NOTE — PROGRESS NOTES
Infectious Disease Progress Note    Author: Molly Mcmahan M.D. Date & Time of service: 2021  9:24 AM    Chief Complaint:  Bacteremia and cervical abscess     Interval History:   AF, O2 Vent 12/70%, pressors still on the trending down, pt continues to be agitated, not moving any extremities and concern for quadriplegia due to CVA.    AF, O2 Vent 8/40%, pressors off, agitated, without meaningful limb movement.  Decreasing vent requirements and no longer in shock.    Review of Systems:  Review of Systems   Unable to perform ROS: Intubated       Hemodynamics:  Temp (24hrs), Av.6 °C (97.8 °F), Min:35.9 °C (96.6 °F), Max:37.2 °C (99 °F)  Temperature: 36.7 °C (98 °F)  Pulse  Av  Min: 61  Max: 121   Blood Pressure: 133/61, Arterial BP: 119/46       Physical Exam:  Physical Exam  Cardiovascular:      Rate and Rhythm: Normal rate and regular rhythm.      Heart sounds: Normal heart sounds.   Pulmonary:      Effort: Pulmonary effort is normal.      Comments: Crackles bilaterally  Abdominal:      General: Abdomen is flat. Bowel sounds are normal.      Palpations: Abdomen is soft.      Comments: Hypoactive bowel sounds   Skin:     General: Skin is warm and dry.   Neurological:      Comments: Intubated and sedated, no meaningful limb movement   Psychiatric:      Comments: Agitated         Meds:    Current Facility-Administered Medications:   •  [START ON 2021] aspirin  •  aspirin  •  insulin glargine  •  potassium phosphate IVPB (CUSTOM)  •  acetylcysteine  •  albuterol  •  levETIRAcetam  •  MD Alert...Adult ICU Electrolyte Replacement per Pharmacy  •  lidocaine  •  insulin regular **AND** POC blood glucose manual result **AND** NOTIFY MD and PharmD **AND** dextrose 50%  •  cefTRIAXone (ROCEPHIN) IV  •  magnesium hydroxide  •  ondansetron  •  oxyCODONE immediate-release  •  polyethylene glycol/lytes  •  promethazine  •  senna-docusate  •  MD ALERT...DO NOT ADMINISTER NSAIDS or ASPIRIN unless ORDERED  By Neurosurgery  •  bisacodyl  •  fleet  •  Respiratory Therapy Consult  •  ondansetron  •  promethazine  •  prochlorperazine  •  norepinephrine (Levophed) infusion  •  ipratropium-albuterol  •  labetalol  •  Pharmacy  •  acetaminophen  •  ARIPiprazole  •  atorvastatin  •  carBAMazepine  •  cyanocobalamin  •  senna-docusate  •  vitamin D  •  docusate sodium  •  famotidine **OR** famotidine  •  fentaNYL **AND** fentaNYL **AND** fentaNYL **AND** propofol **AND** Triglyceride  •  lidocaine    Labs:  Recent Labs     04/29/21 0324 04/29/21 0324 04/29/21  1823 04/30/21 0244 05/01/21  0615   WBC 14.3*  --   --  13.0* 15.7*   RBC 4.06*  --   --  3.50* 3.44*   HEMOGLOBIN 12.1*   < > 10.8* 10.6* 10.5*   HEMATOCRIT 36.8*   < > 32.2* 31.8* 30.8*   MCV 90.6  --   --  90.9 89.5   MCH 29.8  --   --  30.3 30.5   RDW 47.8  --   --  47.8 47.0   PLATELETCT 194  --   --  195 276   MPV 12.4  --   --  12.4 12.1   NEUTSPOLYS  --   --   --  78.00* 79.30*   LYMPHOCYTES  --   --   --  12.70* 11.10*   MONOCYTES  --   --   --  7.50 7.70   EOSINOPHILS  --   --   --  0.40 0.10   BASOPHILS  --   --   --  0.20 0.10    < > = values in this interval not displayed.     Recent Labs     04/29/21 0324 04/30/21 0244 05/01/21  0615   SODIUM 129* 134* 130*   POTASSIUM 4.0 3.8 4.3   CHLORIDE 98 101 99   CO2 23 26 26   GLUCOSE 214* 292* 238*   BUN 28* 29* 37*     Recent Labs     04/29/21 0324 04/30/21 0244 05/01/21  0615   ALBUMIN 2.0* 1.8* 1.9*   TBILIRUBIN 0.3 0.3 0.3   ALKPHOSPHAT 103* 104* 126*   TOTPROTEIN 5.4* 5.1* 5.7*   ALTSGPT 22 21 24   ASTSGOT 26 21 34   CREATININE 0.53 0.50 0.58       Imaging:  CT-CSPINE WITHOUT PLUS RECONS    Result Date: 4/25/2021 4/25/2021 1:29 PM HISTORY/REASON FOR EXAM: History of back and neck pain. Fall. TECHNIQUE/EXAM DESCRIPTION: CT cervical spine without contrast, with reconstructions. Helical scanning was performed from the skull base through T1.  Sagittal and coronal multiplanar reconstructions were generated  from the axial images. Low dose optimization technique was utilized for this CT exam including automated exposure control and adjustment of the mA and/or kV according to patient size. COMPARISON:  None available. FINDINGS: No compression fracture is identified. There is an ununited fracture of the spinous process of T1. Intervertebral disc space narrowing and endplate spurring is most prominent at C6/C7. There are degenerative changes of the facet joints. C1/C2 alignment is maintained. There is multilevel uncovertebral spurring and neural foraminal narrowing. There is cervical prevertebral edema. There is carotid atherosclerotic plaque bilaterally. Visualized lung apices are clear.     Multilevel degenerative changes. Prevertebral edema. Further evaluation with MRI is recommended as this can be seen in the setting of ligamentous injury. Bilateral carotid atherosclerotic plaque.     CT-CHEST (THORAX) WITH    Result Date: 4/25/2021 4/25/2021 1:29 PM HISTORY/REASON FOR EXAM:  Rib fracture suspected, traumatic. TECHNIQUE/EXAM DESCRIPTION: CT scan of the chest with contrast. Helical images were obtained from the lung apices through the adrenal glands following the bolus administration of  80 mL of Omnipaque 350 nonionic contrast. Sagittal and coronal reconstructions were performed. Low dose optimization technique was utilized for this CT exam including automated exposure control and adjustment of the mA and/or kV according to patient size. COMPARISON:  None. FINDINGS: There is atherosclerotic plaque. There is coronary artery calcification. The heart is not enlarged. No pericardial or pleural effusion is seen. There are small mediastinal lymph nodes. There is no hilar or axillary lymphadenopathy. No pneumothorax or pulmonary contusion is seen. Central airways are patent. Limited views were obtained of the upper abdomen. Hypodensity along the falciform ligament likely represents focal fat. Patient is status post median  sternotomy. Degenerative changes are seen in the spine. No displaced rib fracture is identified.     No displaced rib fracture is identified. No acute cardiopulmonary process is seen. Atherosclerotic plaque including coronary artery calcification.    CT-HEAD W/O    Result Date: 4/30/2021 4/30/2021 4:27 AM HISTORY/REASON FOR EXAM: Altered mental status; evaluate cerebellar stroke, if no blood present OK for neurology to start aspirin TECHNIQUE/EXAM DESCRIPTION:  CT of the head without contrast. Sequential axial images were obtained from the vertex to the skull base without contrast. Up to date radiation dose reduction adjustments have been utilized to meet ALARA standards for radiation dose reduction. COMPARISON: April 28, 2021 FINDINGS: There is mild diffuse parenchymal volume loss observed. Periventricular and subcortical white matter low-attenuation changes are seen, most commonly associated with small vessel ischemic disease. The ventricles are normal in caliber and configuration. Low-density changes in the right cerebellar hemisphere seen.. There are no abnormal extra axial fluid collections or extra axial hemorrhage identified. The visualized paranasal sinuses and mastoid air cells are well aerated bilaterally. No depressed calvarial fractures are identified. The visualized globes and retrobulbar soft tissues appear within normal limits.  Atherosclerotic intracranial calcifications are seen.     1.  Low-density changes in the right cerebellar hemisphere, compatible with evolving infarct, streak artifacts minimally limits evaluation of the posterior fossa for subtle subarachnoid hemorrhage, no intracranial hemorrhage is readily identified. 2.  Atherosclerosis.    CT-HEAD W/O    Result Date: 4/28/2021 4/28/2021 5:20 AM HISTORY/REASON FOR EXAM: Altered mental status TECHNIQUE/EXAM DESCRIPTION:  CT of the head without contrast. Sequential axial images were obtained from the vertex to the skull base without  contrast. Up to date radiation dose reduction adjustments have been utilized to meet ALARA standards for radiation dose reduction. COMPARISON: None FINDINGS: There is moderate diffuse parenchymal volume loss observed. Periventricular and subcortical white matter low-attenuation changes are seen, most commonly associated with small vessel ischemic disease. The ventricles are normal in caliber and configuration. Area of low-density within the right cerebellar hemisphere is seen. There are no abnormal extra axial fluid collections or extra axial hemorrhage identified. The visualized paranasal sinuses and mastoid air cells are well aerated bilaterally. No depressed calvarial fractures are identified. The visualized globes and retrobulbar soft tissues appear within normal limits.     1.  Low-density in the region of the right cerebellar hemisphere, appearance compatible with evolving infarct. These findings were discussed with the patient's on-call clinician, Deniz, on 4/28/2021 6:14 AM.    CT-LSPINE W/O PLUS RECONS    Result Date: 4/25/2021 4/25/2021 1:29 PM HISTORY/REASON FOR EXAM:  Back pain after injury. TECHNIQUE/EXAM DESCRIPTION AND NUMBER OF VIEWS: CT lumbar spine without contrast, with reconstructions. The study was performed on a GE CT scanner. Thin-section helical scanning was performed from T12-L1 to the sacrum. Sagittal and coronal multiplanar reconstructions were generated from the axial images. Low dose optimization technique was utilized for this CT exam including automated exposure control and adjustment of the mA and/or kV according to patient size. COMPARISON: None. FINDINGS: Alignment of the lumbar spine is normal. There is no acute fracture or subluxation. The prevertebral and paraspinous soft tissues show no acute abnormality. There is severe atherosclerosis with a mixture of soft and calcified plaque. There is lumbosacral junction vacuum disc phenomenon with endplate spurring, disc height loss The  visualized sacrum and sacroiliac joints show no acute abnormality.     No CT evidence of acute traumatic injury.    CT-TSPINE W/O PLUS RECONS    Result Date: 4/25/2021 4/25/2021 1:29 PM HISTORY/REASON FOR EXAM:  Mid-back trauma Pain following injury. TECHNIQUE/EXAM DESCRIPTION AND NUMBER OF VIEWS: CT thoracic spine without contrast, with reconstructions. The study was performed on a "[employer] CT scanner. Thin-section helical scanning was performed from C7-T1 through T12-L1. Sagittal and coronal multiplanar reconstructions were generated from the axial images. Low dose optimization technique was utilized for this CT exam including automated exposure control and adjustment of the mA and/or kV according to patient size. COMPARISON: None. FINDINGS: Alignment of the thoracic spine is normal. There is no acute displaced fracture. There is T6/7 interbody fusion with mild anterior wedging likely indicating remote mild compression fracture that has healed There is bulky endplate spurring with some bridging syndesmophytes in the mid thoracic spine Sternotomy wires The cervicothoracic junction appears intact. The prevertebral and paraspinous soft tissues show no acute abnormality.     No CT evidence of acute traumatic abnormality. Mild T6/7 anterior wedging compatible with healed mild chronic compression fracture and there is interbody fusion.    DX-CERVICAL SPINE-2 OR 3 VIEWS    Result Date: 4/22/2021 4/22/2021 6:16 PM HISTORY/REASON FOR EXAM:  Atraumatic Pain. Neck pain for 4 days. TECHNIQUE/EXAM DESCRIPTION AND NUMBER OF VIEWS: Cervical spine series, 2 views. COMPARISON:  None. FINDINGS: Mild reversal of curvature centered at the C5 level. Vertebral body heights are preserved. Multilevel loss of disc height and osteophyte formation. Cervicothoracic junction is obscured. Prevertebral soft tissues are within normal limits. Odontoid is grossly intact.  Lateral masses of C1 are grossly intact.     1.  Limited exam.  Cervicothoracic  junction is obscured. 2.  No gross cervical spine fracture or subluxation. 3.  Moderate multilevel degenerative changes.    DX-CHEST-PORTABLE (1 VIEW)    Result Date: 4/30/2021 4/30/2021 1:07 AM HISTORY/REASON FOR EXAM: For indication of respiratory failure; For indication of respiratory failure TECHNIQUE/EXAM DESCRIPTION:  Single AP view of the chest. COMPARISON: Yesterday FINDINGS: Position of medical devices appears stable. The cardiac silhouette appears within normal limits.  Postsurgical changes of sternotomy are noted. The mediastinal contour appears within normal limits.  The central pulmonary vasculature appears normal. Bilateral lung volumes are diminished.  Bilateral lungs are clear. No significant pleural effusions are identified. The bony structures appear age-appropriate.     1.  No acute cardiopulmonary disease.    DX-CHEST-PORTABLE (1 VIEW)    Result Date: 4/29/2021 4/29/2021 10:06 AM HISTORY/REASON FOR EXAM:  For indication of respiratory failure; For indication of respiratory failure. TECHNIQUE/EXAM DESCRIPTION AND NUMBER OF VIEWS: Single portable view of the chest. COMPARISON: 4/28/2021 FINDINGS: Endotracheal tube projects at the level the clavicular heads. Right central line projects over the SVC. Enteric tube passes below the level of the diaphragm. The heart is not enlarged. Atherosclerotic calcification is seen. There is mild left basilar opacity likely representing atelectasis. No pleural effusion or pneumothorax is seen. Skin staples and a surgical drain project over the cervical spine.     Mild left basilar opacity may represent atelectasis. Infection not excluded. Improved aeration of the left lung. Atherosclerotic plaque.     DX-CHEST-PORTABLE (1 VIEW)    Result Date: 4/28/2021 4/28/2021 12:10 PM HISTORY/REASON FOR EXAM:  CENTRAL LINE PLACEMENT; CENTRAL LINE PLACEMENT. TECHNIQUE/EXAM DESCRIPTION AND NUMBER OF VIEWS: Single portable view of the chest. COMPARISON: 4/28/2021 11:17 AM  FINDINGS: Mediastinal structures are shifted to the LEFT.  Increasing opacification of LEFT hemithorax. RIGHT lung is clear. Endotracheal tube in place with tip approximately 5 cm above the you. Feeding tube tip in place however tip is off the image. RIGHT internal jugular catheter tip at the lower SVC. No pneumothorax. Postoperative change from prior open heart surgery. Postoperative change of the neck with surgical drain present.     1.  Worsening consolidation of LEFT hemithorax with shift of mediastinal structures to the LEFT suggesting atelectasis, possibly due to endobronchial process. 2.  Supportive tubing as described above. 3.  No pneumothorax.    DX-CHEST-PORTABLE (1 VIEW)    Result Date: 4/28/2021 4/28/2021 11:16 AM HISTORY/REASON FOR EXAM:  replaced ETT; replaced ETT TECHNIQUE/EXAM DESCRIPTION AND NUMBER OF VIEWS: Single portable view of the chest. COMPARISON: 4/27/2021 FINDINGS: Endotracheal tube with tip projecting over the mid thoracic trachea. Hazy opacity in the left lower lobe Layering left pleural effusion. No pneumothorax. Stable cardiopericardial silhouette.     Endotracheal tube with tip projecting over the mid thoracic trachea. Layering left pleural effusion with left lower lung opacity.    DX-CHEST-PORTABLE (1 VIEW)    Result Date: 4/27/2021 4/27/2021 5:30 PM HISTORY/REASON FOR EXAM:  Shortness of Breath. TECHNIQUE/EXAM DESCRIPTION AND NUMBER OF VIEWS: Single portable view of the chest. COMPARISON: None. FINDINGS: LUNGS: Hypoinflation with left basilar atelectasis. Mild perihilar interstitial prominence. No effusions. PNEUMOTHORAX: None. LINES AND TUBES: None. MEDIASTINUM: No cardiomegaly. MUSCULOSKELETAL STRUCTURES: No acute displaced fracture. Median sternotomy.     1.  Hypoinflation with left basilar atelectasis. 2.  Mild perihilar interstitial prominence may be related to hypoinflation or edema.    DX-SPINE-ANY ONE VIEW    Result Date: 4/28/2021 4/28/2021 5:30 AM HISTORY/REASON FOR  EXAM:  Cervical laminectomy TECHNIQUE/EXAM DESCRIPTION AND NUMBER OF VIEWS:  Single view of the spine. Digitized Intraoperative Radiograph FINDINGS: Fixation hardware projecting over the cervical spine Fluoroscopic time:2 seconds     Digitized intraoperative radiograph is submitted for review.  This examination is not for diagnostic purpose but for guidance during a surgical procedure.    MR-BRAIN-W/O    Result Date: 4/28/2021 4/28/2021 3:31 PM HISTORY/REASON FOR EXAM:  Altered mental status; cerebellar stroke. TECHNIQUE/EXAM DESCRIPTION: MRI of the brain without contrast. T1 sagittal, T2 fast spin-echo axial, T1 coronal, FLAIR coronal, diffusion-weighted and apparent diffusion coefficient (ADC map) axial images were obtained of the whole brain. The study was performed on a [employer] 1.5 Britt MRI scanner. COMPARISON:  Head CT earlier in the day FINDINGS: Large acute right cerebellar infarct suggesting right posterior inferior cerebellar artery territory. There is prominent T2 hyperintensity consistent with cytotoxic edema. A small amount of blooming artifact in the medial aspect of the right cerebellar hemisphere on GRE images could represent a small amount of petechial hemorrhage. There is mild localized mass effect with some mild mass effect on the fourth ventricle. No supratentorial infarct or hemorrhage. No extra-axial fluid collection. No midline shift or hydrocephalus. There is a nasogastric tube and fluid within the nasopharynx. Endotracheal tube partially visualized. Mild posterior ethmoid sinus mucosal thickening.     Acute large right cerebellar posterior inferior cerebellar artery territory infarct with possible small amount of petechial hemorrhage.    MR-CERVICAL SPINE-WITH & W/O    Result Date: 4/27/2021 4/27/2021 1:02 PM HISTORY/REASON FOR EXAM:  Neck pain, abnormal neuro exam; Neck pain, initial exam; sepsis 2/2 strep bacteremia  -unknown source. rule out possible ?? retropharyngeal abscess,  ??prevertebral abscess. TECHNIQUE/EXAM DESCRIPTION: MRI of the cervical spine without and with contrast. The study was performed on a [employer] Sign[employer] 1.5 Britt MRI scanner. T1 sagittal, T2 fast spin-echo sagittal, and gradient echo axial images were obtained of the cervical spine. T1 post-contrast fat suppressed sagittal images were obtained of the cervical spine. Optional T1 post-contrast axial images may be obtained. 15 mL ProHance contrast was administered intravenously. COMPARISON: None. FINDINGS: There is abnormal disc T2 hyperintensity at C5-6. Mild amount of bone marrow edema is noted at C5, C6 and C7. There is also focal bone marrow enhancement at C6. There is multiseptated abnormal peripherally enhancing epidural collection noted extending from C2 to the visualized lower thoracic level. Largest collection is noted in the upper thoracic posterior epidural space at the levels of T2 and T3. The lower extent of the collection is not imaged. This is causing multilevel effacement of the thecal sac and cord compression. The thoracic spinal cord is anteriorly displaced and compressed at the levels of T2 and T3. At the level of C2-3,there is small left anterior epidural fluid collection. At the level of C3-4,there is small left anterior epidural fluid collection causing indentation of the thecal sac. At the level of C4-5,there is minimal epidural fluid collection. At the level of C5-6,there is diffuse disc bulge along with right posterior lateral epidural fluid collection causing severe canal compromise. At the level of C6-7,there is mild diffuse disc bulge. There is epidural fluid collection causing severe canal compromise. At the level of C7-T1,there is epidural fluid collection causing severe canal compromise. The visualized brain parenchyma is unremarkable. The cervical spinal cord is mildly enlarged which demonstrates mild increased intramedullary T2 signal intensity. There is abnormal T2 hyperintensity in the  visualized bilateral vertebral arteries concerning for age indeterminate occlusion. There is mild amount of edema in the pre and retropharyngeal soft tissue.     1.  There is multiseptated abnormal peripherally enhancing epidural collection noted extending from C2 to the visualized lower thoracic level. Largest collection is noted in the upper thoracic posterior epidural space at the levels of T2 and T3. The lower extent of the collection is not imaged. This is causing multilevel effacement of the thecal sac and cord compression. The thoracic spinal cord is anteriorly displaced and compressed at the levels of T2 and T3. Pre and postcontrast MR examination of  the thoracic spine is recommended for further evaluation. 2.  There is effacement of the cervical thecal sac due to the multiseptated epidural fluid collection. 3.  The cervical spinal cord is mildly enlarged with minimal increased T2 signal intensity. The possibility of cord inflammation cannot be entirely excluded. 4.  Abnormal T2 hyperintensity at C5-6 disc space likely representing discitis. 5.  Abnormal bone marrow edema of C5, C6 and C7 vertebral bodies with edema concerning for osteomyelitis. 6.  There is also focal enhancement of C6 vertebral body likely secondary to the osteomyelitis. 7.  Prevertebral edema/fluid collection. 8.  Nonvisualization of flow void of bilateral vertebral arteries concerning for age-indeterminate bilateral vertebral occlusions.     MR-LUMBAR SPINE-WITH & W/O    Result Date: 4/28/2021 4/27/2021 11:23 PM HISTORY/REASON FOR EXAM:  Back pain or radiculopathy, cancer or infection suspected; Strep bacteremia, back pain, bilateral leg numbness TECHNIQUE/EXAM DESCRIPTION: MRI of the lumbar spine without and with contrast. The study was performed on a [employer] 1.5 Britt MRI scanner. T1 sagittal, T2 fast spin-echo sagittal, and T2 axial images were obtained of the lumbar spine. T1 postcontrast fat-suppressed sagittal images were  obtained. 14 mL ProHance contrast was administered intravenously. COMPARISON:  None. FINDINGS: Normal lumbar lordosis. Preservation of vertebral body heights, alignment and bone marrow signal. No evidence of discitis osteomyelitis. Conus medullaris terminates at T12-L1 and is normal in signal. No mass or fluid collection is seen within the lumbar spinal canal. T12-L1: Canal and foramina are patent. L1-L2: Mild disc bulge. Canal and foramina are patent. L2-L3: Minimal disc bulge and facet degeneration. Canal and foramina are patent. L3-L4: Minimal disc bulge and facet degeneration. Canal and foramina are patent.. L4-L5: Mild disc bulge and facet degeneration. No significant spinal stenosis. Mild foraminal narrowing. L5-S1: Disc narrowing, mild disc osteophyte. No significant spinal stenosis. Moderate right and mild-to-moderate left foraminal narrowing. Distended urinary bladder.     No evidence of lumbar spine infection or epidural abscess. Mild spondylosis as detailed above without spinal stenosis.    MR-MRA HEAD-W/O    Result Date: 4/28/2021 4/28/2021 3:31 PM HISTORY/REASON FOR EXAM:  Emergency Medical Condition ? Stroke. Cerebellar infarct TECHNIQUE/EXAM DESCRIPTION: MRA of the head ([place]) without contrast. The study was performed on a [employer] Sign[employer] 1.5 Britt MRI scanner. MRA of the [place] was performed with 3D time-of-flight technique with axial acquisition. Additional MRA of the internal carotid and vertebrobasilar arteries at the level of the skull base and craniocervical junction was also performed with 3D time-of-flight technique with axial acquisition. Images are reviewed at the PACS workstation as axial source images as well as maximum intensity ray projection (MIP) multiplanar 3D reconstructions. COMPARISON:  None FINDINGS: Nicole cavernous and supraclinoid ICA segments are patent. Patent left posterior communicating artery. MCA and ARA branches are patent. There is no significant flow  within the intradural right vertebral artery. The intradural left vertebral artery, basilar artery and posterior cerebral arteries are patent. No significant aneurysm or high flow vascular malformation is seen.     Findings suggesting right vertebral artery occlusion.    MR-MRA NECK-WITH & W/O AND SEQUENCES    Result Date: 4/28/2021 4/28/2021 3:31 PM HISTORY/REASON FOR EXAM:  Emergency Medical Condition ? Stroke Cerebellar stroke TECHNIQUE/EXAM DESCRIPTION: MRA of the cervical carotid and vertebral arteries without and with contrast. The study was performed on a [employer] Sign[employer] 1.5 Britt MRI scanner. Precontrast MRA of the cervical carotid and vertebral arteries was performed with 2D time-of-flight (TOF) technique with acquisition in the axial plane. MRA of the arch origins of the great vessels, and cervical carotid and vertebral arteries was performed with 2D time-of-flight (TOF) technique with coronal slab acquisition from the level of the aortic arch to the carotid siphons during dynamic intravenous infusion of 15 mL of ProHance contrast. Images are reviewed at the PACS workstation as source images as well as maximum intensity ray projection (MIP) multiplanar 3D reconstructions. Cervical internal carotid artery percent stenosis is calculated using the standard method according to the NASCET criteria wherein a segment of uniform caliber distal cervical internal carotid is used as the reference denominator. COMPARISON:  None available. FINDINGS: The arch origins of the great vessels are intact. The cervical right vertebral artery is not well seen on the time-of-flight images. A very small caliber cervical right vertebral artery is seen on the postcontrast images with some mildly increased caliber of the artery at about the C1-C2 level. The fact  that it is not seen on the time-of-flight images and is visualized on contrast enhanced sequence could be related to small nondominant caliber with sluggish flow or could  represent retrograde flow within the right vertebral artery. There may be a focal moderate stenosis in the mid cervical left internal carotid artery and mild narrowing at its origin. Mild narrowing of the proximal left internal carotid artery with less than 50% stenosis. No significant right carotid stenosis is seen.     1.  Small caliber right vertebral artery which is not visualized on the noncontrast time-of-flight technique could be related to retrograde flow or diminutive caliber and sluggish flow. 2.  Possible moderate focal stenosis in the mid cervical left vertebral artery. 3.  No significant carotid stenosis.    MR-THORACIC SPINE-WITH & W/O    Result Date: 4/28/2021 4/27/2021 11:23 PM HISTORY/REASON FOR EXAM:  Mid-back pain, compression fracture suspected; possible epidural abscess/fluid collection TECHNIQUE/EXAM DESCRIPTION: MRI of the thoracic spine without and with contrast. The study was performed on a [employer] Signa 1.5 Britt MRI scanner. T1 sagittal, T2 fast spin-echo sagittal, and T2 axial images were obtained of the thoracic spine. T1 post-contrast fat suppressed sagittal images were obtained. Optional T1 post-contrast axial images may be obtained. 14 mL ProHance contrast was administered intravenously. COMPARISON:  MRI of the cervical spine one-day prior. FINDINGS: There is abnormal dorsal epidural fluid collection seen within the partially visualized lower cervical spine and which extends inferiorly to about the T6 level. The maximum thickness is seen at about the T2-T3 level measuring 8 mm. This raises concern for epidural abscess, although epidural hematoma could also have this appearance. From T1 through T3 there is at least moderate thecal sac stenosis due to the epidural fluid collection. There is significant abnormal signal within the partially visualized  lower cervical and upper thoracic cord which could be infectious/inflammatory or other nonspecific cord edema. There is also suggestion of a  small ventral epidural fluid collection at C6-C7. There is also partially visualized abnormal marrow edema in the C6 and C7 vertebral bodies as detailed on earlier MRI report. There is suggestion of some prominent enhancement around the mid and lower thoracic cord seen on the sagittal postcontrast images however limited evaluation on axial images due to motion artifact. This is of uncertain etiology but could represent some leptomeningeal disease/infection. On the sagittal T2 images there is a questionable slight nodular appearance on the surface of lower thoracic cord. A differential would be a subtle spinal dural aVF. There is no abnormal signal seen within the mid/lower thoracic cord. There is no evidence of discitis osteomyelitis within the thoracic spine. There is T6-T7 interbody ankylosis. There is mild disc degeneration and some prominent anterolateral bridging osteophytes in the mid and lower thoracic spine. No significant posterior disc herniation is seen. Within the mid and lower thoracic spine there  is no significant spinal stenosis.     1.  Posterior epidural fluid collection/abscess within the lower cervical spine extending inferiorly to about the T6 level which could represent epidural abscess and/or hematoma. This measures 8 mm in maximal thickness at T2-T3 with at least moderate thecal sac stenosis. 2.  Abnormal signal within the cervical cord and upper thoracic cord suggesting cord edema or infectious/inflammatory etiology. 3.  Lower cervical spine findings are detailed on earlier report. 4.  Prominent enhancement along the surface of the mid and lower thoracic cord is of uncertain etiology and as detailed above, possibly representing leptomeningeal disease/infection. See details above. These findings were discussed with Dr. Cano on 4/28/2021 10:01 AM.     IR-DRAIN-BLADDER SUPRAPUB W/CATH    Result Date: 4/28/2021  HISTORY/REASON FOR EXAM:  60-year-old man with urinary retention. TECHNIQUE/EXAM  DESCRIPTION AND NUMBER OF VIEWS:  Ultrasound and fluoroscopic guided suprapubic bladder catheter placement, Cystogram. MEDICATIONS: The patient remained on his ICU sedation regimen. FLUOROSCOPY TIME: 0.3 minutes; 5fluoroscopic images obtained CONTRAST: 40mL Omnipaque 300, intraurinary PROCEDURE:  Informed consent was obtained. The suprapubic region was prepped and draped in sterile manner. Following local anesthesia with copious 1% lidocaine, under ultrasound and fluoroscopic monitoring, an 18-gauge needle was advanced into the urinary bladder from a paramedian suprapubic approach. An Amplatz guidewire was placed in the urinary bladder. Serial tract dilatation was performed with a 22 Indonesian telescoping dilator. A 16 Indonesian [place] tip silicone Gomez type catheter was then placed in the balloon inflated with 10 mL of sterile saline. A cystogram was performed with AP and both oblique views demonstrating satisfactory position of the catheter with all side holes within the urinary bladder. The catheter was secured to the skin with 2-0 nylon suture and connected to gravity drainage. The Gomez catheter was removed. The patient tolerated the procedure well with no evidence of complication. COMPARISON: None FINDINGS:  The cystogram shows satisfactory catheter position with all sideholes within the urinary bladder. There is no extravasation.     1.     Ultrasound and fluoroscopic guided placement of a 16 Indonesian [place] tip silicone suprapubic bladder catheter. 2.     Cystogram documenting catheter placement.     DX-PORTABLE FLUOROSCOPY < 1 HOUR    Result Date: 4/28/2021 4/28/2021 5:30 AM HISTORY/REASON FOR EXAM:  Cervical laminectomy TECHNIQUE/EXAM DESCRIPTION AND NUMBER OF VIEWS: Portable fluoroscopy for less than one hour in OR. FINDINGS: The portable fluoroscopy unit was obligated to the procedure for less than one hour. Actual fluoro time was 2 seconds.     Portable fluoroscopy utilized for 2 seconds. INTERPRETING  LOCATION: [address] [place] [address] [place], [address]    EC-ECHOCARDIOGRAM COMPLETE W/O CONT    Result Date: 2021  Transthoracic Echo Report Echocardiography Laboratory CONCLUSIONS No prior study is available for comparison. Normal left ventricular systolic function.  Left ventricular ejection fraction is visually estimated to be 60%. Normal diastolic function. Agitated saline (bubble) study was performed. No evidence of right to left shunt by agitated saline challenge. Normal inferior vena cava size and inspiratory collapse. GILDARDO MAGANA Exam Date:         2021                    19:42 Exam Location:     Inpatient Priority:          Routine Ordering Physician:        YESICA SULLIVAN Referring Physician:       [id number] Marie Sonographer:               Faye Moya HERBER Age:    60     Gender:    M MRN:    [id number] :    1960 BSA:    1.87   Ht (in):    69     Wt (lb):    159 Exam Type:     Complete Indications:     CVA ICD Codes:       436 CPT Codes:       10668 BP:   140    /   60     HR:   74 Technical Quality:       Fair MEASUREMENTS  (Male / Female) Normal Values 2D ECHO LV Diastolic Diameter PLAX        3.8 cm                4.2 - 5.9 / 3.9 - 5.3 cm LV Systolic Diameter PLAX         2.4 cm                2.1 - 4.0 cm IVS Diastolic Thickness           1.4 cm                LVPW Diastolic Thickness          1.4 cm                LVOT Diameter                     2 cm                  Estimated LV Ejection Fraction    60 %                  LV Ejection Fraction MOD BP       62.7 %                >= 55  % LV Ejection Fraction MOD 4C       46.6 %                LV Ejection Fraction MOD 2C       55 %                  DOPPLER AV Peak Velocity                  1.7 m/s               AV Peak Gradient                  10.9 mmHg             AV Mean Gradient                  5.5 mmHg              LVOT Peak Velocity                1.2 m/s               AV Area Cont Eq vti               2.3 cm2               Mitral E Point  Velocity           0.74 m/s              Mitral E to A Ratio               0.92                  MV Pressure Half Time             69.4 ms               MV Area PHT                       3.2 cm2               MV Deceleration Time              239 ms                PV Peak Velocity                  1.1 m/s               PV Peak Gradient                  4.6 mmHg              RVOT Peak Velocity                0.66 m/s              * Indicates values subject to auto-interpretation LV EF:  60    % FINDINGS Left Ventricle Normal left ventricular chamber size. Mild concentric left ventricular hypertrophy. Normal left ventricular systolic function.  Left ventricular ejection fraction is visually estimated to be 60%. Normal diastolic function. Right Ventricle Normal right ventricular size and systolic function. Right Atrium Normal right atrial size. Normal inferior vena cava size and inspiratory collapse. Left Atrium Normal left atrial size. Agitated saline (bubble) study was performed. With Valsalva maneuver. No evidence of right to left shunt by agitated saline challenge. Existing IV was used. Mitral Valve Structurally normal mitral valve without significant stenosis or regurgitation. Aortic Valve Aortic sclerosis without stenosis. No aortic insufficiency. Tricuspid Valve Structurally normal tricuspid valve without significant stenosis or regurgitation. Pulmonic Valve The pulmonic valve is not well visualized. No pulmonic insufficiency. Pericardium Normal pericardium without effusion. Aorta Normal aortic root for body surface area. Ascending aorta not well visualized. Zakiya Rebollar MD (Electronically Signed) Final Date:     28 April 2021                 21:30    EC-CHRISTOPHER W/O CONT    Result Date: 4/30/2021  Results Will be Available after Interpretation by Cardiologist.    DX-ABDOMEN FOR TUBE PLACEMENT    Result Date: 4/30/2021 4/30/2021 12:44 PM HISTORY/REASON FOR EXAM:  Line evaluation. TECHNIQUE/EXAM DESCRIPTION AND NUMBER  "OF VIEWS:  1 view(s) of the abdomen. COMPARISON:  4/28/2021 FINDINGS: Enteric tube has been placed. The tip projects over the distal stomach or duodenal bulb. There are scattered loops of air-filled bowel.     Feeding tube placement with the tip projecting over the distal stomach or duodenal bulb    DX-ABDOMEN FOR TUBE PLACEMENT    Result Date: 4/28/2021 4/28/2021 12:24 PM HISTORY/REASON FOR EXAM:  Tube evaluation. TECHNIQUE/EXAM DESCRIPTION AND NUMBER OF VIEWS:  1 view(s) of the abdomen. COMPARISON:  None. FINDINGS: Limited single view of the abdomen performed primarily to evaluate enteric tube position. The tip projects over the expected area of the distal stomach. The bowel gas pattern is within normal limits.     Feeding tube with tip projecting over the expected area of the distal stomach.      Micro:  Results     Procedure Component Value Units Date/Time    BLOOD CULTURE [[id number]]  (Abnormal) Collected: 04/27/21 0248    Order Status: Completed Specimen: Blood from Peripheral Updated: 04/30/21 1134     Significant Indicator POS     Source BLD     Site PERIPHERAL     Culture Result Growth detected by Bactec instrument. 04/29/2021  05:59      Viridans Streptococcus  Possible Contaminant  Isolated from one set only, please correlate with clinical  condition. Contact the Microbiology department within 48 hr  if identification and susceptibility are needed.      Narrative:      CALL  [facility]  [facility] tel. [phone],  CALLED  ICC tel. [phone] 04/29/2021, 06:05, RB PERF. RESULTS CALLED TO: RN  [phone]  Per Hospital Policy: Only change Specimen Src: to \"Line\" if  specified by physician order.  Right AC    BLOOD CULTURE [[id number]]  (Abnormal) Collected: 04/28/21 1134    Order Status: Completed Specimen: Blood from Peripheral Updated: 04/30/21 0957     Significant Indicator POS     Source BLD     Site PERIPHERAL     Culture Result Growth detected by Bactec instrument. 04/29/2021  21:41  Negative for Staphylococcus aureus " "and MRSA by PCR. Correlate  ongoing need for antibiotics with clinical condition.        Coagulase-negative Staphylococcus species  Possible Contaminant  Isolated from one set only, please correlate with clinical  condition. Contact the Microbiology department within 48 hr  if identification and susceptibility are needed.      Narrative:      CALL  [facility]  [facility] tel. [phone],  CALLED  [facility] tel. [phone] 04/29/2021, 21:41, RB PERF. RESULTS CALLED  TO:RN:[id number]  Collected By:[id number] SAILAJA BAILEY  Per Hospital Policy: Only change Specimen Src: to \"Line\" if  specified by physician order.  Collected By:[id number] SAILAJA BAILEY    CULTURE WOUND W/ GRAM STAIN [[id number]]  (Abnormal) Collected: 04/28/21 0655    Order Status: Completed Specimen: Wound Updated: 04/30/21 0936     Significant Indicator POS     Source WND     Site Cerival Thoric Epidural 2     Culture Result Growth noted after further incubation, see below for  organism identification.       Gram Stain Result Few WBCs.  Few Gram positive cocci.       Culture Result Streptococcus anginosus  Moderate growth      Narrative:      Surgery - swabs received    Anaerobic Culture [[id number]] Collected: 04/28/21 0655    Order Status: Completed Specimen: Wound Updated: 04/30/21 0936     Significant Indicator NEG     Source WND     Site Cerival Thoric Epidural 2     Culture Result Culture in progress.    Narrative:      Surgery - swabs received    CULTURE WOUND W/ GRAM STAIN [[id number]]  (Abnormal) Collected: 04/28/21 0642    Order Status: Completed Specimen: Wound Updated: 04/30/21 0934     Significant Indicator POS     Source WND     Site Cerival Thoracic Epidural 1     Culture Result Growth noted after further incubation, see below for  organism identification.       Gram Stain Result Few WBCs.  Rare Gram positive cocci.       Culture Result Streptococcus anginosus  Light growth      Narrative:      Surgery - swabs received    Anaerobic Culture [[id number]] " "Collected: 04/28/21 0642    Order Status: Completed Specimen: Wound Updated: 04/30/21 0934     Significant Indicator NEG     Source WND     Site Cerival Thoracic Epidural 1     Culture Result Culture in progress.    Narrative:      Surgery - swabs received    BLOOD CULTURE [[id number]]     Order Status: No result Specimen: Blood from Peripheral     BLOOD CULTURE [[id number]]     Order Status: No result Specimen: Blood from Peripheral     BLOOD CULTURE [[id number]] Collected: 04/28/21 1134    Order Status: Completed Specimen: Blood from Peripheral Updated: 04/29/21 0640     Significant Indicator NEG     Source BLD     Site PERIPHERAL     Culture Result No Growth  Note: Blood cultures are incubated for 5 days and  are monitored continuously.Positive blood cultures  are called to the RN and reported as soon as  they are identified.      Narrative:      Collected By:[id number] SAILAJA BAILEY  Per Hospital Policy: Only change Specimen Src: to \"Line\" if  specified by physician order.  No site indicated    GRAM STAIN [[id number]] Collected: 04/28/21 0642    Order Status: Completed Specimen: Wound Updated: 04/28/21 1854     Significant Indicator .     Source WND     Site Cerival Thoracic Epidural 1     Gram Stain Result Few WBCs.  Rare Gram positive cocci.      Narrative:      Surgery - swabs received    GRAM STAIN [[id number]] Collected: 04/28/21 0655    Order Status: Completed Specimen: Wound Updated: 04/28/21 1854     Significant Indicator .     Source WND     Site Cerival Thoric Epidural 2     Gram Stain Result Few WBCs.  Few Gram positive cocci.      Narrative:      Surgery - swabs received    BLOOD CULTURE [[id number]]  (Abnormal) Collected: 04/25/21 1433    Order Status: Completed Specimen: Blood from Peripheral Updated: 04/28/21 1122     Significant Indicator POS     Source BLD     Site PERIPHERAL     Culture Result Growth detected by Bactec instrument. 04/26/2021  04:57      Streptococcus anginosus  See previous " "culture for sensitivity report.      Narrative:      CALL  [facility]  171 tel. [phone],  CALLED  171 tel. [phone] 04/26/2021, 04:59, RB PERF. RESULTS CALLED TO: RN  [id number]  1 of 2 for Blood Culture x 2 sites order. Per Hospital  Policy: Only change Specimen Src: to \"Line\" if specified by  physician order.  No site indicated    BLOOD CULTURE [[id number]]  (Abnormal)  (Susceptibility) Collected: 04/25/21 1505    Order Status: Completed Specimen: Blood from Peripheral Updated: 04/28/21 1122     Significant Indicator POS     Source BLD     Site PERIPHERAL     Culture Result Growth detected by Bactec instrument. 04/26/2021  04:06      Streptococcus anginosus    Narrative:      CALL  [facility]  171 tel. [phone],  CALLED  171 tel. [phone] 04/26/2021, 04:09, RB PERF. RESULTS CALLED TO: RN  [phone]  2 of 2 blood culture x2  Sites order. Per Hospital Policy:  Only change Specimen Src: to \"Line\" if specified by physician  order.  No site indicated    Susceptibility     Streptococcus anginosus (1)     Antibiotic Interpretation Microscan Method Status    Penicillin Sensitive 0.064 mcg/mL E-TEST Final    Cefotaxime Sensitive 0.125 mcg/mL E-TEST Final                   BLOOD CULTURE [[id number]] Collected: 04/27/21 0248    Order Status: Completed Specimen: Blood from Peripheral Updated: 04/28/21 1048     Significant Indicator NEG     Source BLD     Site PERIPHERAL     Culture Result No Growth  Note: Blood cultures are incubated for 5 days and  are monitored continuously.Positive blood cultures  are called to the RN and reported as soon as  they are identified.      Narrative:      Per Hospital Policy: Only change Specimen Src: to \"Line\" if  specified by physician order.  Left AC    E-Test [[id number]] Collected: 04/25/21 1505    Order Status: Completed Specimen: Other Updated: 04/27/21 1117     ETEST Sensitivity INTERIM    Narrative:      171 tel. [phone] 04/26/2021, 04:09, RB PERF. RESULTS CALLED TO: RN [id number]  2 of 2 blood culture " "x2  Sites order. Per Hospital Policy:  Only change Specimen Src: to \"Line\" if specified by physician  order.    MRSA By PCR (Amp) [[id number]] Collected: 04/26/21 0950    Order Status: Completed Specimen: Respirate from Nares Updated: 04/26/21 1343     Significant Indicator NEG     Source RESP     Site NARES     MRSA PCR Negative for MRSA by PCR.    Narrative:      Collected By:[id number] EMMA BAEZ  Collected By:[id number] EMMA BAEZ    SARS-CoV-2 PCR (24 hour In-House): Collect NP swab in [facility] [[id number]] Collected: 04/25/21 1448    Order Status: Completed Specimen: Respirate from Nasopharyngeal Updated: 04/26/21 1341     SARS-CoV-2 Source NP Swab     SARS-CoV-2 by PCR NotDetected     Comment: PATIENTS: Important information regarding your results and instructions can  be found at [email or web address]   \"After your  Covid-19 Test\"  [facility] providers: PLEASE REFER TO DE-ESCALATION AND RETESTING PROTOCOL  on [email or web address]  **The TaqPath COVID-19 SARS-CoV-2 RT-PCR test has been made available for  use under the Emergency Use Authorization (EUA) only.         Narrative:      Have you been in close contact with a person who is suspected  or known to be positive for COVID-19 within the last 30 days  (e.g. last seen that person < 30 days ago)->Unknown          Assessment:  Active Hospital Problems    Diagnosis    • *Spinal epidural abscess [G06.1]    • History of ischemic stroke [Z86.73]    • Sepsis due to Streptococcus species (HCC) [A40.9]    • Acute bilateral back pain [M54.9]    • Thrombocytopenia (HCC) [D69.6]    • Type 2 diabetes mellitus without complication, without long-term current use of insulin (HCC) [E11.9]    • Coronary artery disease due to calcified coronary lesion: CABG x4, July 2015 [I25.10, I25.84]    • Essential hypertension, benign [I10]    • Hypokalemia [E87.6]    • Hypomagnesemia [E83.42]    • Hyponatremia [E87.1]    • Difficulty swallowing [R13.10]    • Diabetic " ketoacidosis (HCC) [E11.10]    • Marijuana abuse [F12.10]    • High anion gap metabolic acidosis [E87.2]    • Tobacco abuse [Z72.0]    • Dyslipidemia [E78.5]    • Status post urethrostomy (HCC) [Z93.6]      Interval 24 hours:      AF, O2 Vent 8/40%  Labs reviewed  Imaging personally reviewed both images and report.   Studies reviewed  Micro reviewed    Patient stable on low vent settings no significant secretions per nursing.  He continues to be agitated and with no upper or lower extremity movement.  Patient continued on ceftriaxone as below.    Assessment:  60 y.o.  admitted 4/25/2021. Pt has a past medical history of uncontrolled diabetes, CAD status post CABG times 4/2015, marijuana use and to arthritis.  Presented complaining of neck and back pain ongoing for approximately 1 week and fall getting out of the bathtub.  He had been seen at urgent care for back pain approximately 1 week prior to admission and given Toradol injections.       Hospital Course:   Afebrile and vitals unremarkable other than hypertension.  Leukocytosis ongoing since arrival.  MRI C-spine on 4/26 with epidural collection noted from C2-T3.  Largest collection noted in upper thoracic posterior epidural space at T2-T3.  This is causing multilevel cord compression.  Also with likely discitis at C5-6 and osteomyelitis at C5-C7.  Blood cultures on 4/25+ for Streptococcus species.       Problem List     Sepsis/shock, secondary to below, improved  VDRF, improving but remains vent dependent  -Bronchoscopy on 4/28 with thick secretions  Bacteremia  -Blood cultures on 4/25 2/2 + Streptococcus anginosus, penicillin and cephalosporin susceptible  -Blood cultures on 4/27 1/2 + viridans Streptococcus -likely contaminant  -Blood cultures on 4/28  1/2 + CoNS -likely contaminant  -TTE with no vegetations  -CHRISTOPHER on 4/30, awaiting read  Leukocytosis, ongoing, worse today   Cervical thoracic infection, epidural abscess at C2-T3 as well as discitis and  osteomyelitis, +GPCs-Streptococcus anginosus   -Repeat MRI thoracic spine on 4/27 notes posterior epidural fluid  collection/abscess in lower cervical spine extending to T6 level, also noted abnormal signal within the cervical and upper thoracic cord  -Patient went to the OR with neurosurgery on 4/28 for see 4-T2 laminectomy, decompression of thecal sac and nerve roots as well as cervical thoracic extradural spinal mass/epidural abscess removal, linda purulence during OR, no CSF leak per op note  -Operative cultures from cervical thoracic epidural 1 and 2 both with GPC's  CVA, Right cerebellar stroke, possibly due to venous spasm associated with the epidural abscess.   Concern for quadriplegia  -MRA neck with and without on 4/28, possible moderate focal stenosis in mid left CVA.  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  MRI without on 4/28 with acute large right cerebellar infarct with small hemorrhage  Uncontrolled diabetes     Plan      ---  Continue ceftriaxone 2 grams q24 hours,  no obvious septic emboli in the brain on imaging, anticipate a 6-week IV antibiotic course for the bacteremia and spinal infection  --- We will plan on repeat MRI spine prior to stopping antibiotics  --- Agree with plan for CHRISTOPHER -awaiting report  --- Follow-up blood cultures   --- Monitor labs  --- Monitor and control blood sugars  --- Recommend palliative care consult and goals of care discussion        Plan of care discussed with Dr. Dumont.   Will continue to follow.

## 2021-05-01 NOTE — PROGRESS NOTES
[facility] Progress Note      Admit Date: 4/25/2021    Resident(s): Delroy Fulton M.D.   Attending:  BREANNA ALCARAZ/ Dr. Dumont     Patient ID:    Name:  Perry Davis   YOB: 1960  Age:  60 y.o.  male   MRN:  [id number]    Hospital Course:  Perry Davis is a 60 y.o. male with a previous history of uncontrolled Diabetes -Hb1C 13 , HLD, CAD s/p CABGx4  2015, tobacco use ,OA, chronic low pain admitted  4/25 with worsening neck , upper back pain after a GLF , mild DKA. Found to have epidural abscess w/ cord compression, discitis involving cervical and thoracic spine, underwent emergent laminectomy and decompression by Dr. Hazel 4/28. Blood cultures from 4/25 2/2 positive strep anginosus on Ceftriaxone, followed by ID. Acute mental state change noted 4/27 , CT head with right cerebellar CVA, neurology consulting. Intubated for airway protection and transferred to ICU on 4/28/21 for post surgical intervention.     Consultants:  Critical Care  Neurosurgery   Neurology  Infectious Disease    Interval Events:    4/28 Neuro- obtunded and on ventilator support.  Prior to sedation was noted to have purposeful movements of all his extremities.    4/29: POD #1. obtunded, on ventilator support .  Pressor support with Levophed.  CHRISTOPHER pending, repeat blood culture 4/28/2021 remains negative    4/30/21: POD #2, obtunded, on vent support. Blood c/s 4/27 1/2 + for Strep. Viridans, likely contaminant. 4/28 1/2 + CoNS, likely contaminant, currently on C3. CHRISTOPHER done today, results pending. Okay for ASA per NS. SP catheter draining urine after manual irrigation. BP stable, off levophed.    5/1/21: POD#3, on vent support. ASA started yesterday. On ceftriaxone per ID. CHRISTOPHER results pending.    Vitals Range last 24h:  Temp:  [36 °C (96.8 °F)-37.2 °C (99 °F)] 36.3 °C (97.3 °F)  Pulse:  [70-94] 84  Resp:  [19-35] 20  BP: (115-139)/(57-76) 115/76  SpO2:  [90 %-98 %] 93 %      Intake/Output Summary (Last 24 hours) at  5/1/2021 1123  Last data filed at 5/1/2021 1000  Gross per 24 hour   Intake 1607.5 ml   Output 1435 ml   Net 172.5 ml        Review of Systems   Unable to perform ROS: Intubated       PHYSICAL EXAM:  Vitals:    05/01/21 0800 05/01/21 0900 05/01/21 0958 05/01/21 1000   BP: 115/76      Pulse: 84 85 85 84   Resp: 20 20 (!) 33 20   Temp: 36.3 °C (97.3 °F)   36.3 °C (97.3 °F)   TempSrc: Temporal   Temporal   SpO2: 93% 93% 93% 93%   Weight:       Height:        Body mass index is 29.16 kg/m².    O2 therapy: Pulse Oximetry: 93 %, O2 Delivery Device: Ventilator    Date 05/01/21 0700 - 05/02/21 0659   Shift 1607-2598 7103-5027 9945-9617 24 Hour Total   INTAKE   Shift Total       OUTPUT   Urine 250   250     Output (mL) (Urethral Catheter Other (Comment) 16 Fr.) 250   250   Shift Total 250   250   NET -250   -250        Physical Exam   Constitutional: He is well-developed, well-nourished, and in no distress.   HENT:   Head: Normocephalic and atraumatic.   Nose: Nose normal.   Eyes: Pupils are equal, round, and reactive to light. Conjunctivae are normal.   Cardiovascular: Normal rate, regular rhythm and normal heart sounds.   Pulmonary/Chest: Effort normal.   B/l rhonchi   Abdominal: Soft. Bowel sounds are normal. He exhibits no distension.   Musculoskeletal:         General: No edema.      Cervical back: Neck supple.   Neurological:   Sedated and intubated    Skin: Skin is warm and dry.       Recent Labs     04/29/21  0632 04/30/21  0221 05/01/21  0226   ISTATAPH 7.336* 7.456 7.526*   ISTATAPCO2 42.4* 36.9 34.5   ISTATAPO2 74 74 76   ISTATATCO2 24 27 30   TSURIZD7DUC 94 96 97   ISTATARTHCO3 22.6 26.0* 28.6*   ISTATARTBE -3 2 6*   ISTATTEMP see below 37.0 C 36.9 C   ISTATFIO2 70 40 40   ISTATSPEC Arterial Arterial Arterial   ISTATAPHTC  --  7.456 7.528*   IIVVOVFY4SD  --  74 75     Recent Labs     04/29/21  0324 04/30/21  0244 05/01/21  0615   SODIUM 129* 134* 130*   POTASSIUM 4.0 3.8 4.3   CHLORIDE 98 101 99   CO2 23 26 26    BUN 28* 29* 37*   CREATININE 0.53 0.50 0.58   MAGNESIUM 2.2 2.2 2.2   PHOSPHORUS  --  2.2* 2.3*   CALCIUM 7.7* 7.8* 7.9*     Recent Labs     04/29/21 0324 04/30/21  0244 05/01/21  0615   ALTSGPT 22 21 24   ASTSGOT 26 21 34   ALKPHOSPHAT 103* 104* 126*   TBILIRUBIN 0.3 0.3 0.3   PREALBUMIN 7.3*  --   --    GLUCOSE 214* 292* 238*     Recent Labs     04/29/21 0324 04/29/21 0324 04/29/21  1823 04/30/21 0244 05/01/21  0615   RBC 4.06*  --   --  3.50* 3.44*   HEMOGLOBIN 12.1*   < > 10.8* 10.6* 10.5*   HEMATOCRIT 36.8*   < > 32.2* 31.8* 30.8*   PLATELETCT 194  --   --  195 276   PROTHROMBTM 14.5  --   --  14.2  --    INR 1.09  --   --  1.07  --     < > = values in this interval not displayed.     Recent Labs     04/29/21 0324 04/30/21 0244 05/01/21  0615   WBC 14.3* 13.0* 15.7*   NEUTSPOLYS  --  78.00* 79.30*   LYMPHOCYTES  --  12.70* 11.10*   MONOCYTES  --  7.50 7.70   EOSINOPHILS  --  0.40 0.10   BASOPHILS  --  0.20 0.10   ASTSGOT 26 21 34   ALTSGPT 22 21 24   ALKPHOSPHAT 103* 104* 126*   TBILIRUBIN 0.3 0.3 0.3       Meds:  • [START ON 5/2/2021] aspirin  324 mg     • insulin glargine  20 Units     • potassium phosphate IVPB (CUSTOM)  15 mmol     • magnesium citrate  296 mL     • acetylcysteine  3-4 mL     • albuterol  2.5 mg     • levETIRAcetam  1,000 mg     • MD Alert...Adult ICU Electrolyte Replacement per Pharmacy       • lidocaine  1-2 mL     • insulin regular  2-9 Units      And   • dextrose 50%  50 mL     • cefTRIAXone (ROCEPHIN) IV  2 g Stopped (05/01/21 0611)   • magnesium hydroxide  30 mL     • ondansetron  4 mg     • oxyCODONE immediate-release  10 mg     • polyethylene glycol/lytes  1 Packet     • promethazine  12.5-25 mg     • senna-docusate  1 tablet     • MD ALERT...DO NOT ADMINISTER NSAIDS or ASPIRIN unless ORDERED By Neurosurgery  1 Each     • bisacodyl  10 mg     • fleet  1 Each     • Respiratory Therapy Consult       • ondansetron  4 mg     • promethazine  12.5-25 mg     • prochlorperazine  5-10  mg     • norepinephrine (Levophed) infusion  0-30 mcg/min Stopped (04/30/21 1129)   • ipratropium-albuterol  3 mL     • labetalol  10 mg     • Pharmacy  1 Each     • acetaminophen  1,000 mg     • ARIPiprazole  15 mg     • atorvastatin  80 mg     • carBAMazepine  200 mg     • cyanocobalamin  1,000 mcg     • senna-docusate  1 tablet     • vitamin D  5,000 Units     • docusate sodium  100 mg     • famotidine  20 mg      Or   • famotidine  20 mg     • fentaNYL  50 mcg      And   • fentaNYL  100 mcg      And   • fentaNYL   Stopped (04/28/21 2118)    And   • propofol  0-80 mcg/kg/min 15 mcg/kg/min (05/01/21 1016)   • lidocaine  3 Patch          Procedures:  Laminectomy, decompression by neurosurgery 4/28/2021    Imaging:  DX-ABDOMEN FOR TUBE PLACEMENT   Final Result      Feeding tube placement with the tip projecting over the distal stomach or duodenal bulb      EC-CHRISTOPHER W/O CONT         CT-HEAD W/O   Final Result         1.  Low-density changes in the right cerebellar hemisphere, compatible with evolving infarct, streak artifacts minimally limits evaluation of the posterior fossa for subtle subarachnoid hemorrhage, no intracranial hemorrhage is readily identified.   2.  Atherosclerosis.      DX-CHEST-PORTABLE (1 VIEW)   Final Result         1.  No acute cardiopulmonary disease.      DX-CHEST-PORTABLE (1 VIEW)   Final Result      Mild left basilar opacity may represent atelectasis. Infection not excluded.      Improved aeration of the left lung.      Atherosclerotic plaque.         EC-ECHOCARDIOGRAM COMPLETE W/O CONT   Final Result      IR-DRAIN-BLADDER SUPRAPUB W/CATH   Final Result      1.     Ultrasound and fluoroscopic guided placement of a 16 [employer] tip silicone suprapubic bladder catheter.      2.     Cystogram documenting catheter placement.         MR-BRAIN-W/O   Final Result      Acute large right cerebellar posterior inferior cerebellar artery territory infarct with possible small amount of petechial  hemorrhage.      MR-MRA HEAD-W/O   Final Result      Findings suggesting right vertebral artery occlusion.      MR-MRA NECK-WITH & W/O AND SEQUENCES   Final Result      1.  Small caliber right vertebral artery which is not visualized on the noncontrast time-of-flight technique could be related to retrograde flow or diminutive caliber and sluggish flow.   2.  Possible moderate focal stenosis in the mid cervical left vertebral artery.   3.  No significant carotid stenosis.      DX-ABDOMEN FOR TUBE PLACEMENT   Final Result         Feeding tube with tip projecting over the expected area of the distal stomach.      DX-CHEST-PORTABLE (1 VIEW)   Final Result      1.  Worsening consolidation of LEFT hemithorax with shift of mediastinal structures to the LEFT suggesting atelectasis, possibly due to endobronchial process.   2.  Supportive tubing as described above.   3.  No pneumothorax.      DX-CHEST-PORTABLE (1 VIEW)   Final Result         Endotracheal tube with tip projecting over the mid thoracic trachea.      Layering left pleural effusion with left lower lung opacity.      DX-SPINE-ANY ONE VIEW   Final Result      Digitized intraoperative radiograph is submitted for review.  This examination is not for diagnostic purpose but for guidance during a surgical procedure.      DX-PORTABLE FLUOROSCOPY < 1 HOUR   Final Result      Portable fluoroscopy utilized for 2 seconds.         INTERPRETING LOCATION: [address] [place] [address], [id number]      CT-HEAD W/O   Final Result         1.  Low-density in the region of the right cerebellar hemisphere, appearance compatible with evolving infarct.      These findings were discussed with the patient's on-call clinician, Deniz, on 4/28/2021 6:14 AM.      MR-THORACIC SPINE-WITH & W/O   Final Result      1.  Posterior epidural fluid collection/abscess within the lower cervical spine extending inferiorly to about the T6 level which could represent epidural abscess and/or hematoma. This measures 8  mm in maximal thickness at T2-T3 with at least moderate    thecal sac stenosis.   2.  Abnormal signal within the cervical cord and upper thoracic cord suggesting cord edema or infectious/inflammatory etiology.   3.  Lower cervical spine findings are detailed on earlier report.   4.  Prominent enhancement along the surface of the mid and lower thoracic cord is of uncertain etiology and as detailed above, possibly representing leptomeningeal disease/infection. See details above.   These findings were discussed with Dr. Cano on 4/28/2021 10:01 AM.         MR-LUMBAR SPINE-WITH & W/O   Final Result      No evidence of lumbar spine infection or epidural abscess.      Mild spondylosis as detailed above without spinal stenosis.      DX-CHEST-PORTABLE (1 VIEW)   Final Result      1.  Hypoinflation with left basilar atelectasis.   2.  Mild perihilar interstitial prominence may be related to hypoinflation or edema.      MR-CERVICAL SPINE-WITH & W/O   Final Result      1.  There is multiseptated abnormal peripherally enhancing epidural collection noted extending from C2 to the visualized lower thoracic level. Largest collection is noted in the upper thoracic posterior epidural space at the levels of T2 and T3. The    lower extent of the collection is not imaged. This is causing multilevel effacement of the thecal sac and cord compression. The thoracic spinal cord is anteriorly displaced and compressed at the levels of T2 and T3. Pre and postcontrast MR examination of    the thoracic spine is recommended for further evaluation.   2.  There is effacement of the cervical thecal sac due to the multiseptated epidural fluid collection.   3.  The cervical spinal cord is mildly enlarged with minimal increased T2 signal intensity. The possibility of cord inflammation cannot be entirely excluded.   4.  Abnormal T2 hyperintensity at C5-6 disc space likely representing discitis.   5.  Abnormal bone marrow edema of C5, C6 and C7 vertebral  bodies with edema concerning for osteomyelitis.   6.  There is also focal enhancement of C6 vertebral body likely secondary to the osteomyelitis.   7.  Prevertebral edema/fluid collection.   8.  Nonvisualization of flow void of bilateral vertebral arteries concerning for age-indeterminate bilateral vertebral occlusions.         CT-CHEST (THORAX) WITH   Final Result      No displaced rib fracture is identified.      No acute cardiopulmonary process is seen.      Atherosclerotic plaque including coronary artery calcification.      CT-TSPINE W/O PLUS RECONS   Final Result      No CT evidence of acute traumatic abnormality.      Mild T6/7 anterior wedging compatible with healed mild chronic compression fracture and there is interbody fusion.      CT-LSPINE W/O PLUS RECONS   Final Result      No CT evidence of acute traumatic injury.      CT-CSPINE WITHOUT PLUS RECONS   Final Result      Multilevel degenerative changes.      Prevertebral edema. Further evaluation with MRI is recommended as this can be seen in the setting of ligamentous injury.      Bilateral carotid atherosclerotic plaque.             ASSESSEMENT and PLAN:    * Spinal epidural abscess- (present on admission)  Assessment & Plan  CT C-spine 4/25 with prevertebral edema.    MRI C/T-spine with abnormal peripherally enhancing epidural collection extending from C2 to visualize lower thoracic 11.  Largest collection in the upper thoracic posterior epidural space at the level of T2-T3.  Lower extent of the collection is causing multilevel effacement of the thecal sac and cord compression.  Thoracic spinal cord anteriorly displaced and compressed at levels T2 and T3.  T2 hyperintensity at C5-C6 tests space likely representing discitis.  Focal enhancement of C6 vertebral body likely secondary to osteomyelitis.  Prevertebral edema/fluid collection.  Concern of age-indeterminate bilateral vertebral occlusions.  Underwent emergent laminectomy of C3-C4 C5-C6, C6/C7,  T1-T2 laminectomy, decompression of thecal sac and nerves by Dr. Hazel 4/28/2021.   Neurosurgery following, recommending goal BP <160  Sedation, pain management as appropriate        History of ischemic stroke  Assessment & Plan  Found to have acute change in mental status 4/27/2021.   CT head w/o concerning for evolving right cerebellar infarct.   Intubated prior to neurosurgical intervention.  High intensity statin, Dr. Hazel recommending holding aspirin for now due to risk of hemorrhagic conversion, thrombocytopenia.  MRA brain, MRA neck: Findings suggesting right vertebral artery occlusion. Possible moderate focal stenosis in the mid cervical left vertebral artery.  TTE with bubble study: EF 60%, no evidence of right-to-left shunt, no valve lesions.  CHRISTOPHER pending   Goal euthermia, normotension, eunatremia  PT, OT, SLP evaluation as able  Neurology following  4/30: okay to start ASA per NS      Sepsis due to Streptococcus species (HCC)- (present on admission)  Assessment & Plan  This is Sepsis Present on admission  SIRS criteria identified on my evaluation include: Tachycardia, with heart rate greater than 90 BPM, Tachypnea, with respirations greater than 20 per minute and Leukocytosis, with WBC greater than 12,000  Source is possible retropharyngeal/pharyngeal  Suspected onset of infection (date and time) Unknown  Sepsis protocol initiated  Fluid resuscitation ordered per protocol  IV antibiotics as appropriate for source of sepsis  While organ dysfunction may be noted elsewhere in this problem list or in the chart, degree of organ dysfunction does not meet CMS criteria for severe sepsis    MRI C-T-spine with evidence of epidural abscess, discitis, vertebral osteomyelitis.  Underwent emergent laminectomy, decompression 4/28.  Blood culture 4/24 and cervical thoracic 4/28 : Growth of Streptococcus anginosus. Blood c/s 4/27 1/2 + for Strep. Viridans, likely contaminant. 4/28 1/2 + CONS, likely contaminant,  currently on C3.    4/25 evening: Started on vancomycin  4/26 AM: d/c'ed vanc, started IV ceftriaxone  -IVF resuscitation  -Infectious disease following  -CHRISTOPHER done, results pending    Thrombocytopenia (HCC)- (present on admission)  Assessment & Plan  IMPROVING  Presented with a platelet count of 51.  This is unknown for reasons why, the patient does not drink alcohol per history which is corroborated by mother.  There is no known history of any blood clot/clotting disorder in the family.    We will not start any chemical VTE prophylaxis for now, will use SCDs  Plt 47 on 4/26. This is likely due to sepsis, though lower on the differential includes previously undiagnosed infection.  Hep panel negative. HIV non reactive  -Continue C3, narrow antibiotic pending sensitivity results    Acute bilateral back pain- (present on admission)  Assessment & Plan  Presented with worsening neck and upper back pain after ground-level fall.   CT C-spine concerning for prevertebral edema, ligamental damage.  MRI recommended    MRI C/T-spine with extensive epidural abscess, discitis, osteomyelitis s/p laminectomy and decompression  See plan for spinal epidural abscess  As needed pain medications  PT/OT as appropriate      Type 2 diabetes mellitus without complication, without long-term current use of insulin (HCC)- (present on admission)  Assessment & Plan  Presented with elevated blood glucose and mild DKA.  Apparently issues with noncompliance.   HbA1c done on admission 13  Continue insulin sliding scale, Lantus  Accu-Cheks, hypoglycemia protocols  Diabetic diet months allowed p.o.  Diabetes education    Essential hypertension, benign- (present on admission)  Assessment & Plan  Noncompliance issues  In the hospital, presented with elevated blood pressure with systolic blood pressure up to the 180s.  This is likely with a component of acute pain.  Hold blood pressure medications due to hypotension.  Resume as appropriate    Coronary  artery disease due to calcified coronary lesion: CABG x4, July 2015- (present on admission)  Assessment & Plan  History of    -Continue home atorvastatin, ASA  -Hold antihypertensives       Status post urethrostomy (HCC)- (present on admission)  Assessment & Plan  History of perineal urethrostomy 2018  Acute urinary retention post surgical intervention 4/28  Unable to place Gomez catheter through urethrostomy.    Suprapubic catheter placement by interventional radiology.     Hyponatremia- (present on admission)  Assessment & Plan  In 123-125 since admission , likely chronic   Likely SIADH.  Serum Osm 264, urine osm 745, urine Na 70  Follow daily Na  Will consider fluid restriction as appropriate if worsens    Hypomagnesemia- (present on admission)  Assessment & Plan  Replete with goal of Mg of 2    Hypokalemia- (present on admission)  Assessment & Plan  Replete as needed with goal of K of 4    Difficulty swallowing- (present on admission)  Assessment & Plan  Presented with difficulty swallowing, is coughing and vomiting when swallowing large/hard foods    -SLP evaluation post extubation  -Aspiration and seizure precautions  -Soft GI diet    Tobacco abuse- (present on admission)  Assessment & Plan  Encouraged tobacco cessation    High anion gap metabolic acidosis- (present on admission)  Assessment & Plan  Resolved  Also see problem of diabetic ketoacidosis.    Marijuana abuse- (present on admission)  Assessment & Plan  Chronic issue  Encourage marijuana cessation    Diabetic ketoacidosis (HCC)- (present on admission)  Assessment & Plan  Resolved  -Insulin sliding scale, hypoglycemia protocol, Accu-Cheks  -Diabetic education  -Diabetes education        Dyslipidemia- (present on admission)  Assessment & Plan  Continue home atorvastatin 80      DISPO: ICU    CODE STATUS: Full code    Quality Measures:  Feeding: Tube feeds  Analgesia: Fentanyl  Sedation: propofol  Thromboprophylaxis: SCDs  Head of bed: >30  degrees  Ulcer prophylaxis: Pepcid  Glycemic control: Insulin sliding scale, Lantus  Bowel care: bowel regimen  Indwelling lines: PIV, art line, right IJ CVC  Deescalation of antibiotics: On ceftriaxone      Delroy Fulton M.D.

## 2021-05-01 NOTE — CARE PLAN
Adult Ventilation Update    Total Vent Days: 4    Patient Lines/Drains/Airways Status      Active Airway       Name: Placement date: Placement time: Site: Days:    Airway ETT 8.0  --   --   --                    In the last 24 hours, the patient has not tolerated SBT.     Plateau Pressure: 32     Static Compliance (ml / cm H2O): 19     Patient failed trials because of Safety screen spontaneous breathing trial (SBT): Failed SAT - Do NOT begin SBT (04/30/21 4028)  Barriers to SBT    Length of Weaning Trial          Sputum/Suction     Cough: Moist;Productive     Sputum Amount: Large     Sputum Color: White;Yellow     Sputum Consistency: Thin;Thick       Events/Summary/Plan: PT is resting well at this time.

## 2021-05-01 NOTE — CARE PLAN
Problem: Safety  Goal: Will remain free from injury  Outcome: PROGRESSING AS EXPECTED   Bed alarm in place    Problem: Infection  Goal: Will remain free from infection  Outcome: PROGRESSING AS EXPECTED   Hand hygiene practiced by all staff

## 2021-05-02 ENCOUNTER — APPOINTMENT (OUTPATIENT)
Dept: RADIOLOGY | Facility: MEDICAL CENTER | Age: 61
DRG: 003 | End: 2021-05-02
Attending: STUDENT IN AN ORGANIZED HEALTH CARE EDUCATION/TRAINING PROGRAM
Payer: MEDICARE

## 2021-05-02 PROBLEM — K59.00 CONSTIPATION: Status: ACTIVE | Noted: 2021-05-02

## 2021-05-02 LAB
ALBUMIN SERPL BCP-MCNC: 1.8 G/DL (ref 3.2–4.9)
ALBUMIN/GLOB SERPL: 0.4 G/DL
ALP SERPL-CCNC: 172 U/L (ref 30–99)
ALT SERPL-CCNC: 40 U/L (ref 2–50)
ANION GAP SERPL CALC-SCNC: 5 MMOL/L (ref 7–16)
AST SERPL-CCNC: 64 U/L (ref 12–45)
BACTERIA BLD CULT: NORMAL
BASOPHILS # BLD AUTO: 0.1 % (ref 0–1.8)
BASOPHILS # BLD: 0.02 K/UL (ref 0–0.12)
BILIRUB SERPL-MCNC: 0.3 MG/DL (ref 0.1–1.5)
BUN SERPL-MCNC: 37 MG/DL (ref 8–22)
CALCIUM SERPL-MCNC: 7.9 MG/DL (ref 8.5–10.5)
CHLORIDE SERPL-SCNC: 100 MMOL/L (ref 96–112)
CO2 SERPL-SCNC: 28 MMOL/L (ref 20–33)
CREAT SERPL-MCNC: 0.58 MG/DL (ref 0.5–1.4)
EOSINOPHIL # BLD AUTO: 0.01 K/UL (ref 0–0.51)
EOSINOPHIL NFR BLD: 0.1 % (ref 0–6.9)
ERYTHROCYTE [DISTWIDTH] IN BLOOD BY AUTOMATED COUNT: 48.7 FL (ref 35.9–50)
GLOBULIN SER CALC-MCNC: 4.2 G/DL (ref 1.9–3.5)
GLUCOSE BLD-MCNC: 214 MG/DL (ref 65–99)
GLUCOSE BLD-MCNC: 219 MG/DL (ref 65–99)
GLUCOSE BLD-MCNC: 226 MG/DL (ref 65–99)
GLUCOSE BLD-MCNC: 252 MG/DL (ref 65–99)
GLUCOSE BLD-MCNC: 286 MG/DL (ref 65–99)
GLUCOSE BLD-MCNC: 293 MG/DL (ref 65–99)
GLUCOSE SERPL-MCNC: 250 MG/DL (ref 65–99)
HCT VFR BLD AUTO: 31.1 % (ref 42–52)
HGB BLD-MCNC: 10.3 G/DL (ref 14–18)
IMM GRANULOCYTES # BLD AUTO: 0.22 K/UL (ref 0–0.11)
IMM GRANULOCYTES NFR BLD AUTO: 1.6 % (ref 0–0.9)
LYMPHOCYTES # BLD AUTO: 1.79 K/UL (ref 1–4.8)
LYMPHOCYTES NFR BLD: 12.9 % (ref 22–41)
MAGNESIUM SERPL-MCNC: 2.3 MG/DL (ref 1.5–2.5)
MCH RBC QN AUTO: 30.5 PG (ref 27–33)
MCHC RBC AUTO-ENTMCNC: 33.1 G/DL (ref 33.7–35.3)
MCV RBC AUTO: 92 FL (ref 81.4–97.8)
MONOCYTES # BLD AUTO: 1.13 K/UL (ref 0–0.85)
MONOCYTES NFR BLD AUTO: 8.2 % (ref 0–13.4)
NEUTROPHILS # BLD AUTO: 10.68 K/UL (ref 1.82–7.42)
NEUTROPHILS NFR BLD: 77.1 % (ref 44–72)
NRBC # BLD AUTO: 0 K/UL
NRBC BLD-RTO: 0 /100 WBC
PHOSPHATE SERPL-MCNC: 2.6 MG/DL (ref 2.5–4.5)
PLATELET # BLD AUTO: 328 K/UL (ref 164–446)
PMV BLD AUTO: 12.4 FL (ref 9–12.9)
POTASSIUM SERPL-SCNC: 4.9 MMOL/L (ref 3.6–5.5)
PROT SERPL-MCNC: 6 G/DL (ref 6–8.2)
RBC # BLD AUTO: 3.38 M/UL (ref 4.7–6.1)
SIGNIFICANT IND 70042: NORMAL
SITE SITE: NORMAL
SODIUM SERPL-SCNC: 133 MMOL/L (ref 135–145)
SOURCE SOURCE: NORMAL
WBC # BLD AUTO: 13.9 K/UL (ref 4.8–10.8)

## 2021-05-02 PROCEDURE — 700105 HCHG RX REV CODE 258: Performed by: STUDENT IN AN ORGANIZED HEALTH CARE EDUCATION/TRAINING PROGRAM

## 2021-05-02 PROCEDURE — 770022 HCHG ROOM/CARE - ICU (200)

## 2021-05-02 PROCEDURE — 74018 RADEX ABDOMEN 1 VIEW: CPT

## 2021-05-02 PROCEDURE — 99232 SBSQ HOSP IP/OBS MODERATE 35: CPT | Performed by: INTERNAL MEDICINE

## 2021-05-02 PROCEDURE — 700102 HCHG RX REV CODE 250 W/ 637 OVERRIDE(OP): Performed by: STUDENT IN AN ORGANIZED HEALTH CARE EDUCATION/TRAINING PROGRAM

## 2021-05-02 PROCEDURE — 700101 HCHG RX REV CODE 250: Performed by: STUDENT IN AN ORGANIZED HEALTH CARE EDUCATION/TRAINING PROGRAM

## 2021-05-02 PROCEDURE — 700111 HCHG RX REV CODE 636 W/ 250 OVERRIDE (IP): Performed by: INTERNAL MEDICINE

## 2021-05-02 PROCEDURE — 82962 GLUCOSE BLOOD TEST: CPT

## 2021-05-02 PROCEDURE — 85025 COMPLETE CBC W/AUTO DIFF WBC: CPT

## 2021-05-02 PROCEDURE — A9270 NON-COVERED ITEM OR SERVICE: HCPCS | Performed by: STUDENT IN AN ORGANIZED HEALTH CARE EDUCATION/TRAINING PROGRAM

## 2021-05-02 PROCEDURE — 80053 COMPREHEN METABOLIC PANEL: CPT

## 2021-05-02 PROCEDURE — 94799 UNLISTED PULMONARY SVC/PX: CPT

## 2021-05-02 PROCEDURE — 84100 ASSAY OF PHOSPHORUS: CPT

## 2021-05-02 PROCEDURE — 700111 HCHG RX REV CODE 636 W/ 250 OVERRIDE (IP): Performed by: NURSE PRACTITIONER

## 2021-05-02 PROCEDURE — 700105 HCHG RX REV CODE 258: Performed by: INTERNAL MEDICINE

## 2021-05-02 PROCEDURE — 83735 ASSAY OF MAGNESIUM: CPT

## 2021-05-02 PROCEDURE — 94003 VENT MGMT INPAT SUBQ DAY: CPT

## 2021-05-02 PROCEDURE — 99291 CRITICAL CARE FIRST HOUR: CPT | Mod: GC | Performed by: INTERNAL MEDICINE

## 2021-05-02 RX ORDER — DEXMEDETOMIDINE HYDROCHLORIDE 4 UG/ML
.1-1.5 INJECTION, SOLUTION INTRAVENOUS CONTINUOUS
Status: DISCONTINUED | OUTPATIENT
Start: 2021-05-02 | End: 2021-05-04

## 2021-05-02 RX ORDER — INSULIN GLARGINE 100 [IU]/ML
24 INJECTION, SOLUTION SUBCUTANEOUS EVERY EVENING
Status: DISCONTINUED | OUTPATIENT
Start: 2021-05-02 | End: 2021-05-03

## 2021-05-02 RX ADMIN — ASPIRIN 324 MG: 81 TABLET, CHEWABLE ORAL at 05:32

## 2021-05-02 RX ADMIN — Medication 5000 UNITS: at 05:29

## 2021-05-02 RX ADMIN — ACETAMINOPHEN 1000 MG: 500 TABLET ORAL at 05:29

## 2021-05-02 RX ADMIN — FAMOTIDINE 20 MG: 20 TABLET ORAL at 05:29

## 2021-05-02 RX ADMIN — PROPOFOL 15 MCG/KG/MIN: 10 INJECTION, EMULSION INTRAVENOUS at 09:03

## 2021-05-02 RX ADMIN — ARIPIPRAZOLE 15 MG: 10 TABLET ORAL at 17:38

## 2021-05-02 RX ADMIN — CARBAMAZEPINE 200 MG: 200 TABLET ORAL at 01:09

## 2021-05-02 RX ADMIN — INSULIN GLARGINE 24 UNITS: 100 INJECTION, SOLUTION SUBCUTANEOUS at 21:35

## 2021-05-02 RX ADMIN — ARIPIPRAZOLE 15 MG: 10 TABLET ORAL at 05:29

## 2021-05-02 RX ADMIN — ACETAMINOPHEN 1000 MG: 500 TABLET ORAL at 01:09

## 2021-05-02 RX ADMIN — CYANOCOBALAMIN TAB 500 MCG 1000 MCG: 500 TAB at 05:29

## 2021-05-02 RX ADMIN — DOCUSATE SODIUM 100 MG: 50 LIQUID ORAL at 05:30

## 2021-05-02 RX ADMIN — FAMOTIDINE 20 MG: 20 TABLET ORAL at 17:39

## 2021-05-02 RX ADMIN — DEXMEDETOMIDINE HYDROCHLORIDE 0.6 MCG/KG/HR: 100 INJECTION, SOLUTION INTRAVENOUS at 18:19

## 2021-05-02 RX ADMIN — DOCUSATE SODIUM 100 MG: 50 LIQUID ORAL at 18:00

## 2021-05-02 RX ADMIN — ACETAMINOPHEN 1000 MG: 500 TABLET ORAL at 17:38

## 2021-05-02 RX ADMIN — ACETAMINOPHEN 1000 MG: 500 TABLET ORAL at 11:52

## 2021-05-02 RX ADMIN — ATORVASTATIN CALCIUM 80 MG: 40 TABLET, FILM COATED ORAL at 21:42

## 2021-05-02 RX ADMIN — DEXMEDETOMIDINE HYDROCHLORIDE 0.2 MCG/KG/HR: 100 INJECTION, SOLUTION INTRAVENOUS at 10:27

## 2021-05-02 RX ADMIN — CEFTRIAXONE SODIUM 2 G: 2 INJECTION, POWDER, FOR SOLUTION INTRAMUSCULAR; INTRAVENOUS at 05:29

## 2021-05-02 RX ADMIN — CARBAMAZEPINE 200 MG: 200 TABLET ORAL at 17:38

## 2021-05-02 ASSESSMENT — FIBROSIS 4 INDEX: FIB4 SCORE: 1.79

## 2021-05-02 NOTE — PROGRESS NOTES
Infectious Disease Progress Note    Author: Molly Mcmahan M.D. Date & Time of service: 2021  8:51 AM    Chief Complaint:  Bacteremia and cervical abscess     Interval History:   AF, O2 Vent 12/70%, pressors still on the trending down, pt continues to be agitated, not moving any extremities and concern for quadriplegia due to CVA.    AF, O2 Vent 8/40%, pressors off, agitated, without meaningful limb movement.  Decreasing vent requirements and no longer in shock.   AF, O2 Vent 8/40%, stable on low vent settings no significant secretions per nursing.  He continues to be agitated and with no upper or lower extremity movement.      Review of Systems:  Review of Systems   Unable to perform ROS: Intubated       Hemodynamics:  Temp (24hrs), Av.3 °C (97.4 °F), Min:36.2 °C (97.1 °F), Max:36.5 °C (97.7 °F)  Temperature: 36.2 °C (97.2 °F)  Pulse  Av.5  Min: 61  Max: 121   Blood Pressure: 136/64, Arterial BP: 112/71       Physical Exam:  Physical Exam  Cardiovascular:      Rate and Rhythm: Normal rate and regular rhythm.      Heart sounds: Normal heart sounds.   Pulmonary:      Effort: Pulmonary effort is normal.      Breath sounds: Normal breath sounds.   Abdominal:      General: Abdomen is flat. Bowel sounds are normal.      Palpations: Abdomen is soft.   Musculoskeletal:         General: Normal range of motion.   Skin:     General: Skin is warm and dry.   Neurological:      Mental Status: He is alert.      Comments: Alert but not following commands.  No meaningful limb movement.   Psychiatric:      Comments: Agitated         Meds:    Current Facility-Administered Medications:   •  insulin glargine  •  aspirin  •  acetylcysteine  •  albuterol  •  MD Alert...Adult ICU Electrolyte Replacement per Pharmacy  •  lidocaine  •  insulin regular **AND** POC blood glucose manual result **AND** NOTIFY MD and PharmD **AND** dextrose 50%  •  cefTRIAXone (ROCEPHIN) IV  •  magnesium hydroxide  •  ondansetron  •   oxyCODONE immediate-release  •  polyethylene glycol/lytes  •  promethazine  •  senna-docusate  •  MD ALERT...DO NOT ADMINISTER NSAIDS or ASPIRIN unless ORDERED By Neurosurgery  •  bisacodyl  •  fleet  •  Respiratory Therapy Consult  •  ondansetron  •  promethazine  •  prochlorperazine  •  norepinephrine (Levophed) infusion  •  ipratropium-albuterol  •  labetalol  •  Pharmacy  •  acetaminophen  •  ARIPiprazole  •  atorvastatin  •  carBAMazepine  •  cyanocobalamin  •  senna-docusate  •  vitamin D  •  docusate sodium  •  famotidine **OR** famotidine  •  fentaNYL **AND** fentaNYL **AND** fentaNYL **AND** propofol **AND** Triglyceride  •  lidocaine    Labs:  Recent Labs     04/30/21 0244 05/01/21 0615 05/02/21  0253   WBC 13.0* 15.7* 13.9*   RBC 3.50* 3.44* 3.38*   HEMOGLOBIN 10.6* 10.5* 10.3*   HEMATOCRIT 31.8* 30.8* 31.1*   MCV 90.9 89.5 92.0   MCH 30.3 30.5 30.5   RDW 47.8 47.0 48.7   PLATELETCT 195 276 328   MPV 12.4 12.1 12.4   NEUTSPOLYS 78.00* 79.30* 77.10*   LYMPHOCYTES 12.70* 11.10* 12.90*   MONOCYTES 7.50 7.70 8.20   EOSINOPHILS 0.40 0.10 0.10   BASOPHILS 0.20 0.10 0.10     Recent Labs     04/30/21 0244 05/01/21  0615 05/02/21  0253   SODIUM 134* 130* 133*   POTASSIUM 3.8 4.3 4.9   CHLORIDE 101 99 100   CO2 26 26 28   GLUCOSE 292* 238* 250*   BUN 29* 37* 37*     Recent Labs     04/30/21 0244 05/01/21 0615 05/02/21  0253   ALBUMIN 1.8* 1.9* 1.8*   TBILIRUBIN 0.3 0.3 0.3   ALKPHOSPHAT 104* 126* 172*   TOTPROTEIN 5.1* 5.7* 6.0   ALTSGPT 21 24 40   ASTSGOT 21 34 64*   CREATININE 0.50 0.58 0.58       Imaging:  CT-CSPINE WITHOUT PLUS RECONS    Result Date: 4/25/2021 4/25/2021 1:29 PM HISTORY/REASON FOR EXAM: History of back and neck pain. Fall. TECHNIQUE/EXAM DESCRIPTION: CT cervical spine without contrast, with reconstructions. Helical scanning was performed from the skull base through T1.  Sagittal and coronal multiplanar reconstructions were generated from the axial images. Low dose optimization technique  was utilized for this CT exam including automated exposure control and adjustment of the mA and/or kV according to patient size. COMPARISON:  None available. FINDINGS: No compression fracture is identified. There is an ununited fracture of the spinous process of T1. Intervertebral disc space narrowing and endplate spurring is most prominent at C6/C7. There are degenerative changes of the facet joints. C1/C2 alignment is maintained. There is multilevel uncovertebral spurring and neural foraminal narrowing. There is cervical prevertebral edema. There is carotid atherosclerotic plaque bilaterally. Visualized lung apices are clear.     Multilevel degenerative changes. Prevertebral edema. Further evaluation with MRI is recommended as this can be seen in the setting of ligamentous injury. Bilateral carotid atherosclerotic plaque.     CT-CHEST (THORAX) WITH    Result Date: 4/25/2021 4/25/2021 1:29 PM HISTORY/REASON FOR EXAM:  Rib fracture suspected, traumatic. TECHNIQUE/EXAM DESCRIPTION: CT scan of the chest with contrast. Helical images were obtained from the lung apices through the adrenal glands following the bolus administration of  80 mL of Omnipaque 350 nonionic contrast. Sagittal and coronal reconstructions were performed. Low dose optimization technique was utilized for this CT exam including automated exposure control and adjustment of the mA and/or kV according to patient size. COMPARISON:  None. FINDINGS: There is atherosclerotic plaque. There is coronary artery calcification. The heart is not enlarged. No pericardial or pleural effusion is seen. There are small mediastinal lymph nodes. There is no hilar or axillary lymphadenopathy. No pneumothorax or pulmonary contusion is seen. Central airways are patent. Limited views were obtained of the upper abdomen. Hypodensity along the falciform ligament likely represents focal fat. Patient is status post median sternotomy. Degenerative changes are seen in the spine.  No displaced rib fracture is identified.     No displaced rib fracture is identified. No acute cardiopulmonary process is seen. Atherosclerotic plaque including coronary artery calcification.    CT-HEAD W/O    Result Date: 4/30/2021 4/30/2021 4:27 AM HISTORY/REASON FOR EXAM: Altered mental status; evaluate cerebellar stroke, if no blood present OK for neurology to start aspirin TECHNIQUE/EXAM DESCRIPTION:  CT of the head without contrast. Sequential axial images were obtained from the vertex to the skull base without contrast. Up to date radiation dose reduction adjustments have been utilized to meet ALARA standards for radiation dose reduction. COMPARISON: April 28, 2021 FINDINGS: There is mild diffuse parenchymal volume loss observed. Periventricular and subcortical white matter low-attenuation changes are seen, most commonly associated with small vessel ischemic disease. The ventricles are normal in caliber and configuration. Low-density changes in the right cerebellar hemisphere seen.. There are no abnormal extra axial fluid collections or extra axial hemorrhage identified. The visualized paranasal sinuses and mastoid air cells are well aerated bilaterally. No depressed calvarial fractures are identified. The visualized globes and retrobulbar soft tissues appear within normal limits.  Atherosclerotic intracranial calcifications are seen.     1.  Low-density changes in the right cerebellar hemisphere, compatible with evolving infarct, streak artifacts minimally limits evaluation of the posterior fossa for subtle subarachnoid hemorrhage, no intracranial hemorrhage is readily identified. 2.  Atherosclerosis.    CT-HEAD W/O    Result Date: 4/28/2021 4/28/2021 5:20 AM HISTORY/REASON FOR EXAM: Altered mental status TECHNIQUE/EXAM DESCRIPTION:  CT of the head without contrast. Sequential axial images were obtained from the vertex to the skull base without contrast. Up to date radiation dose reduction adjustments have  been utilized to meet ALARA standards for radiation dose reduction. COMPARISON: None FINDINGS: There is moderate diffuse parenchymal volume loss observed. Periventricular and subcortical white matter low-attenuation changes are seen, most commonly associated with small vessel ischemic disease. The ventricles are normal in caliber and configuration. Area of low-density within the right cerebellar hemisphere is seen. There are no abnormal extra axial fluid collections or extra axial hemorrhage identified. The visualized paranasal sinuses and mastoid air cells are well aerated bilaterally. No depressed calvarial fractures are identified. The visualized globes and retrobulbar soft tissues appear within normal limits.     1.  Low-density in the region of the right cerebellar hemisphere, appearance compatible with evolving infarct. These findings were discussed with the patient's on-call clinician, Deniz, on 4/28/2021 6:14 AM.    CT-LSPINE W/O PLUS RECONS    Result Date: 4/25/2021 4/25/2021 1:29 PM HISTORY/REASON FOR EXAM:  Back pain after injury. TECHNIQUE/EXAM DESCRIPTION AND NUMBER OF VIEWS: CT lumbar spine without contrast, with reconstructions. The study was performed on a GE CT scanner. Thin-section helical scanning was performed from T12-L1 to the sacrum. Sagittal and coronal multiplanar reconstructions were generated from the axial images. Low dose optimization technique was utilized for this CT exam including automated exposure control and adjustment of the mA and/or kV according to patient size. COMPARISON: None. FINDINGS: Alignment of the lumbar spine is normal. There is no acute fracture or subluxation. The prevertebral and paraspinous soft tissues show no acute abnormality. There is severe atherosclerosis with a mixture of soft and calcified plaque. There is lumbosacral junction vacuum disc phenomenon with endplate spurring, disc height loss The visualized sacrum and sacroiliac joints show no acute  abnormality.     No CT evidence of acute traumatic injury.    CT-TSPINE W/O PLUS RECONS    Result Date: 4/25/2021 4/25/2021 1:29 PM HISTORY/REASON FOR EXAM:  Mid-back trauma Pain following injury. TECHNIQUE/EXAM DESCRIPTION AND NUMBER OF VIEWS: CT thoracic spine without contrast, with reconstructions. The study was performed on a [employer] CT scanner. Thin-section helical scanning was performed from C7-T1 through T12-L1. Sagittal and coronal multiplanar reconstructions were generated from the axial images. Low dose optimization technique was utilized for this CT exam including automated exposure control and adjustment of the mA and/or kV according to patient size. COMPARISON: None. FINDINGS: Alignment of the thoracic spine is normal. There is no acute displaced fracture. There is T6/7 interbody fusion with mild anterior wedging likely indicating remote mild compression fracture that has healed There is bulky endplate spurring with some bridging syndesmophytes in the mid thoracic spine Sternotomy wires The cervicothoracic junction appears intact. The prevertebral and paraspinous soft tissues show no acute abnormality.     No CT evidence of acute traumatic abnormality. Mild T6/7 anterior wedging compatible with healed mild chronic compression fracture and there is interbody fusion.    DX-CERVICAL SPINE-2 OR 3 VIEWS    Result Date: 4/22/2021 4/22/2021 6:16 PM HISTORY/REASON FOR EXAM:  Atraumatic Pain. Neck pain for 4 days. TECHNIQUE/EXAM DESCRIPTION AND NUMBER OF VIEWS: Cervical spine series, 2 views. COMPARISON:  None. FINDINGS: Mild reversal of curvature centered at the C5 level. Vertebral body heights are preserved. Multilevel loss of disc height and osteophyte formation. Cervicothoracic junction is obscured. Prevertebral soft tissues are within normal limits. Odontoid is grossly intact.  Lateral masses of C1 are grossly intact.     1.  Limited exam.  Cervicothoracic junction is obscured. 2.  No gross cervical spine  fracture or subluxation. 3.  Moderate multilevel degenerative changes.    DX-CHEST-PORTABLE (1 VIEW)    Result Date: 4/30/2021 4/30/2021 1:07 AM HISTORY/REASON FOR EXAM: For indication of respiratory failure; For indication of respiratory failure TECHNIQUE/EXAM DESCRIPTION:  Single AP view of the chest. COMPARISON: Yesterday FINDINGS: Position of medical devices appears stable. The cardiac silhouette appears within normal limits.  Postsurgical changes of sternotomy are noted. The mediastinal contour appears within normal limits.  The central pulmonary vasculature appears normal. Bilateral lung volumes are diminished.  Bilateral lungs are clear. No significant pleural effusions are identified. The bony structures appear age-appropriate.     1.  No acute cardiopulmonary disease.    DX-CHEST-PORTABLE (1 VIEW)    Result Date: 4/29/2021 4/29/2021 10:06 AM HISTORY/REASON FOR EXAM:  For indication of respiratory failure; For indication of respiratory failure. TECHNIQUE/EXAM DESCRIPTION AND NUMBER OF VIEWS: Single portable view of the chest. COMPARISON: 4/28/2021 FINDINGS: Endotracheal tube projects at the level the clavicular heads. Right central line projects over the SVC. Enteric tube passes below the level of the diaphragm. The heart is not enlarged. Atherosclerotic calcification is seen. There is mild left basilar opacity likely representing atelectasis. No pleural effusion or pneumothorax is seen. Skin staples and a surgical drain project over the cervical spine.     Mild left basilar opacity may represent atelectasis. Infection not excluded. Improved aeration of the left lung. Atherosclerotic plaque.     DX-CHEST-PORTABLE (1 VIEW)    Result Date: 4/28/2021 4/28/2021 12:10 PM HISTORY/REASON FOR EXAM:  CENTRAL LINE PLACEMENT; CENTRAL LINE PLACEMENT. TECHNIQUE/EXAM DESCRIPTION AND NUMBER OF VIEWS: Single portable view of the chest. COMPARISON: 4/28/2021 11:17 AM FINDINGS: Mediastinal structures are shifted to the  LEFT.  Increasing opacification of LEFT hemithorax. RIGHT lung is clear. Endotracheal tube in place with tip approximately 5 cm above the you. Feeding tube tip in place however tip is off the image. RIGHT internal jugular catheter tip at the lower SVC. No pneumothorax. Postoperative change from prior open heart surgery. Postoperative change of the neck with surgical drain present.     1.  Worsening consolidation of LEFT hemithorax with shift of mediastinal structures to the LEFT suggesting atelectasis, possibly due to endobronchial process. 2.  Supportive tubing as described above. 3.  No pneumothorax.    DX-CHEST-PORTABLE (1 VIEW)    Result Date: 4/28/2021 4/28/2021 11:16 AM HISTORY/REASON FOR EXAM:  replaced ETT; replaced ETT TECHNIQUE/EXAM DESCRIPTION AND NUMBER OF VIEWS: Single portable view of the chest. COMPARISON: 4/27/2021 FINDINGS: Endotracheal tube with tip projecting over the mid thoracic trachea. Hazy opacity in the left lower lobe Layering left pleural effusion. No pneumothorax. Stable cardiopericardial silhouette.     Endotracheal tube with tip projecting over the mid thoracic trachea. Layering left pleural effusion with left lower lung opacity.    DX-CHEST-PORTABLE (1 VIEW)    Result Date: 4/27/2021 4/27/2021 5:30 PM HISTORY/REASON FOR EXAM:  Shortness of Breath. TECHNIQUE/EXAM DESCRIPTION AND NUMBER OF VIEWS: Single portable view of the chest. COMPARISON: None. FINDINGS: LUNGS: Hypoinflation with left basilar atelectasis. Mild perihilar interstitial prominence. No effusions. PNEUMOTHORAX: None. LINES AND TUBES: None. MEDIASTINUM: No cardiomegaly. MUSCULOSKELETAL STRUCTURES: No acute displaced fracture. Median sternotomy.     1.  Hypoinflation with left basilar atelectasis. 2.  Mild perihilar interstitial prominence may be related to hypoinflation or edema.    DX-SPINE-ANY ONE VIEW    Result Date: 4/28/2021 4/28/2021 5:30 AM HISTORY/REASON FOR EXAM:  Cervical laminectomy TECHNIQUE/EXAM  DESCRIPTION AND NUMBER OF VIEWS:  Single view of the spine. Digitized Intraoperative Radiograph FINDINGS: Fixation hardware projecting over the cervical spine Fluoroscopic time:2 seconds     Digitized intraoperative radiograph is submitted for review.  This examination is not for diagnostic purpose but for guidance during a surgical procedure.    MR-BRAIN-W/O    Result Date: 4/28/2021 4/28/2021 3:31 PM HISTORY/REASON FOR EXAM:  Altered mental status; cerebellar stroke. TECHNIQUE/EXAM DESCRIPTION: MRI of the brain without contrast. T1 sagittal, T2 fast spin-echo axial, T1 coronal, FLAIR coronal, diffusion-weighted and apparent diffusion coefficient (ADC map) axial images were obtained of the whole brain. The study was performed on a [employer] 1.5 Britt MRI scanner. COMPARISON:  Head CT earlier in the day FINDINGS: Large acute right cerebellar infarct suggesting right posterior inferior cerebellar artery territory. There is prominent T2 hyperintensity consistent with cytotoxic edema. A small amount of blooming artifact in the medial aspect of the right cerebellar hemisphere on GRE images could represent a small amount of petechial hemorrhage. There is mild localized mass effect with some mild mass effect on the fourth ventricle. No supratentorial infarct or hemorrhage. No extra-axial fluid collection. No midline shift or hydrocephalus. There is a nasogastric tube and fluid within the nasopharynx. Endotracheal tube partially visualized. Mild posterior ethmoid sinus mucosal thickening.     Acute large right cerebellar posterior inferior cerebellar artery territory infarct with possible small amount of petechial hemorrhage.    MR-CERVICAL SPINE-WITH & W/O    Result Date: 4/27/2021 4/27/2021 1:02 PM HISTORY/REASON FOR EXAM:  Neck pain, abnormal neuro exam; Neck pain, initial exam; sepsis 2/2 strep bacteremia  -unknown source. rule out possible ?? retropharyngeal abscess, ??prevertebral abscess. TECHNIQUE/EXAM  DESCRIPTION: MRI of the cervical spine without and with contrast. The study was performed on a [employer] 1.5 Britt MRI scanner. T1 sagittal, T2 fast spin-echo sagittal, and gradient echo axial images were obtained of the cervical spine. T1 post-contrast fat suppressed sagittal images were obtained of the cervical spine. Optional T1 post-contrast axial images may be obtained. 15 mL ProHance contrast was administered intravenously. COMPARISON: None. FINDINGS: There is abnormal disc T2 hyperintensity at C5-6. Mild amount of bone marrow edema is noted at C5, C6 and C7. There is also focal bone marrow enhancement at C6. There is multiseptated abnormal peripherally enhancing epidural collection noted extending from C2 to the visualized lower thoracic level. Largest collection is noted in the upper thoracic posterior epidural space at the levels of T2 and T3. The lower extent of the collection is not imaged. This is causing multilevel effacement of the thecal sac and cord compression. The thoracic spinal cord is anteriorly displaced and compressed at the levels of T2 and T3. At the level of C2-3,there is small left anterior epidural fluid collection. At the level of C3-4,there is small left anterior epidural fluid collection causing indentation of the thecal sac. At the level of C4-5,there is minimal epidural fluid collection. At the level of C5-6,there is diffuse disc bulge along with right posterior lateral epidural fluid collection causing severe canal compromise. At the level of C6-7,there is mild diffuse disc bulge. There is epidural fluid collection causing severe canal compromise. At the level of C7-T1,there is epidural fluid collection causing severe canal compromise. The visualized brain parenchyma is unremarkable. The cervical spinal cord is mildly enlarged which demonstrates mild increased intramedullary T2 signal intensity. There is abnormal T2 hyperintensity in the visualized bilateral vertebral arteries  concerning for age indeterminate occlusion. There is mild amount of edema in the pre and retropharyngeal soft tissue.     1.  There is multiseptated abnormal peripherally enhancing epidural collection noted extending from C2 to the visualized lower thoracic level. Largest collection is noted in the upper thoracic posterior epidural space at the levels of T2 and T3. The lower extent of the collection is not imaged. This is causing multilevel effacement of the thecal sac and cord compression. The thoracic spinal cord is anteriorly displaced and compressed at the levels of T2 and T3. Pre and postcontrast MR examination of  the thoracic spine is recommended for further evaluation. 2.  There is effacement of the cervical thecal sac due to the multiseptated epidural fluid collection. 3.  The cervical spinal cord is mildly enlarged with minimal increased T2 signal intensity. The possibility of cord inflammation cannot be entirely excluded. 4.  Abnormal T2 hyperintensity at C5-6 disc space likely representing discitis. 5.  Abnormal bone marrow edema of C5, C6 and C7 vertebral bodies with edema concerning for osteomyelitis. 6.  There is also focal enhancement of C6 vertebral body likely secondary to the osteomyelitis. 7.  Prevertebral edema/fluid collection. 8.  Nonvisualization of flow void of bilateral vertebral arteries concerning for age-indeterminate bilateral vertebral occlusions.     MR-LUMBAR SPINE-WITH & W/O    Result Date: 4/28/2021 4/27/2021 11:23 PM HISTORY/REASON FOR EXAM:  Back pain or radiculopathy, cancer or infection suspected; Strep bacteremia, back pain, bilateral leg numbness TECHNIQUE/EXAM DESCRIPTION: MRI of the lumbar spine without and with contrast. The study was performed on a [employer] 1.5 Britt MRI scanner. T1 sagittal, T2 fast spin-echo sagittal, and T2 axial images were obtained of the lumbar spine. T1 postcontrast fat-suppressed sagittal images were obtained. 14 mL ProHance contrast was  administered intravenously. COMPARISON:  None. FINDINGS: Normal lumbar lordosis. Preservation of vertebral body heights, alignment and bone marrow signal. No evidence of discitis osteomyelitis. Conus medullaris terminates at T12-L1 and is normal in signal. No mass or fluid collection is seen within the lumbar spinal canal. T12-L1: Canal and foramina are patent. L1-L2: Mild disc bulge. Canal and foramina are patent. L2-L3: Minimal disc bulge and facet degeneration. Canal and foramina are patent. L3-L4: Minimal disc bulge and facet degeneration. Canal and foramina are patent.. L4-L5: Mild disc bulge and facet degeneration. No significant spinal stenosis. Mild foraminal narrowing. L5-S1: Disc narrowing, mild disc osteophyte. No significant spinal stenosis. Moderate right and mild-to-moderate left foraminal narrowing. Distended urinary bladder.     No evidence of lumbar spine infection or epidural abscess. Mild spondylosis as detailed above without spinal stenosis.    MR-MRA HEAD-W/O    Result Date: 4/28/2021 4/28/2021 3:31 PM HISTORY/REASON FOR EXAM:  Emergency Medical Condition ? Stroke. Cerebellar infarct TECHNIQUE/EXAM DESCRIPTION: MRA of the head ([place]) without contrast. The study was performed on a [employer] 1.5 Britt MRI scanner. MRA of the [place] was performed with 3D time-of-flight technique with axial acquisition. Additional MRA of the internal carotid and vertebrobasilar arteries at the level of the skull base and craniocervical junction was also performed with 3D time-of-flight technique with axial acquisition. Images are reviewed at the PACS workstation as axial source images as well as maximum intensity ray projection (MIP) multiplanar 3D reconstructions. COMPARISON:  None FINDINGS: Nicole cavernous and supraclinoid ICA segments are patent. Patent left posterior communicating artery. MCA and ARA branches are patent. There is no significant flow within the intradural right vertebral  artery. The intradural left vertebral artery, basilar artery and posterior cerebral arteries are patent. No significant aneurysm or high flow vascular malformation is seen.     Findings suggesting right vertebral artery occlusion.    MR-MRA NECK-WITH & W/O AND SEQUENCES    Result Date: 4/28/2021 4/28/2021 3:31 PM HISTORY/REASON FOR EXAM:  Emergency Medical Condition ? Stroke Cerebellar stroke TECHNIQUE/EXAM DESCRIPTION: MRA of the cervical carotid and vertebral arteries without and with contrast. The study was performed on a [employer] 1.5 Britt MRI scanner. Precontrast MRA of the cervical carotid and vertebral arteries was performed with 2D time-of-flight (TOF) technique with acquisition in the axial plane. MRA of the arch origins of the great vessels, and cervical carotid and vertebral arteries was performed with 2D time-of-flight (TOF) technique with coronal slab acquisition from the level of the aortic arch to the carotid siphons during dynamic intravenous infusion of 15 mL of ProHance contrast. Images are reviewed at the PACS workstation as source images as well as maximum intensity ray projection (MIP) multiplanar 3D reconstructions. Cervical internal carotid artery percent stenosis is calculated using the standard method according to the NASCET criteria wherein a segment of uniform caliber distal cervical internal carotid is used as the reference denominator. COMPARISON:  None available. FINDINGS: The arch origins of the great vessels are intact. The cervical right vertebral artery is not well seen on the time-of-flight images. A very small caliber cervical right vertebral artery is seen on the postcontrast images with some mildly increased caliber of the artery at about the C1-C2 level. The fact  that it is not seen on the time-of-flight images and is visualized on contrast enhanced sequence could be related to small nondominant caliber with sluggish flow or could represent retrograde flow within the right  vertebral artery. There may be a focal moderate stenosis in the mid cervical left internal carotid artery and mild narrowing at its origin. Mild narrowing of the proximal left internal carotid artery with less than 50% stenosis. No significant right carotid stenosis is seen.     1.  Small caliber right vertebral artery which is not visualized on the noncontrast time-of-flight technique could be related to retrograde flow or diminutive caliber and sluggish flow. 2.  Possible moderate focal stenosis in the mid cervical left vertebral artery. 3.  No significant carotid stenosis.    MR-THORACIC SPINE-WITH & W/O    Result Date: 4/28/2021 4/27/2021 11:23 PM HISTORY/REASON FOR EXAM:  Mid-back pain, compression fracture suspected; possible epidural abscess/fluid collection TECHNIQUE/EXAM DESCRIPTION: MRI of the thoracic spine without and with contrast. The study was performed on a [employer] 1.5 Britt MRI scanner. T1 sagittal, T2 fast spin-echo sagittal, and T2 axial images were obtained of the thoracic spine. T1 post-contrast fat suppressed sagittal images were obtained. Optional T1 post-contrast axial images may be obtained. 14 mL ProHance contrast was administered intravenously. COMPARISON:  MRI of the cervical spine one-day prior. FINDINGS: There is abnormal dorsal epidural fluid collection seen within the partially visualized lower cervical spine and which extends inferiorly to about the T6 level. The maximum thickness is seen at about the T2-T3 level measuring 8 mm. This raises concern for epidural abscess, although epidural hematoma could also have this appearance. From T1 through T3 there is at least moderate thecal sac stenosis due to the epidural fluid collection. There is significant abnormal signal within the partially visualized  lower cervical and upper thoracic cord which could be infectious/inflammatory or other nonspecific cord edema. There is also suggestion of a small ventral epidural fluid collection at  C6-C7. There is also partially visualized abnormal marrow edema in the C6 and C7 vertebral bodies as detailed on earlier MRI report. There is suggestion of some prominent enhancement around the mid and lower thoracic cord seen on the sagittal postcontrast images however limited evaluation on axial images due to motion artifact. This is of uncertain etiology but could represent some leptomeningeal disease/infection. On the sagittal T2 images there is a questionable slight nodular appearance on the surface of lower thoracic cord. A differential would be a subtle spinal dural aVF. There is no abnormal signal seen within the mid/lower thoracic cord. There is no evidence of discitis osteomyelitis within the thoracic spine. There is T6-T7 interbody ankylosis. There is mild disc degeneration and some prominent anterolateral bridging osteophytes in the mid and lower thoracic spine. No significant posterior disc herniation is seen. Within the mid and lower thoracic spine there  is no significant spinal stenosis.     1.  Posterior epidural fluid collection/abscess within the lower cervical spine extending inferiorly to about the T6 level which could represent epidural abscess and/or hematoma. This measures 8 mm in maximal thickness at T2-T3 with at least moderate thecal sac stenosis. 2.  Abnormal signal within the cervical cord and upper thoracic cord suggesting cord edema or infectious/inflammatory etiology. 3.  Lower cervical spine findings are detailed on earlier report. 4.  Prominent enhancement along the surface of the mid and lower thoracic cord is of uncertain etiology and as detailed above, possibly representing leptomeningeal disease/infection. See details above. These findings were discussed with Dr. Cano on 4/28/2021 10:01 AM.     IR-DRAIN-BLADDER SUPRAPUB W/CATH    Result Date: 4/28/2021  HISTORY/REASON FOR EXAM:  60-year-old man with urinary retention. TECHNIQUE/EXAM DESCRIPTION AND NUMBER OF VIEWS:  Ultrasound  and fluoroscopic guided suprapubic bladder catheter placement, Cystogram. MEDICATIONS: The patient remained on his ICU sedation regimen. FLUOROSCOPY TIME: 0.3 minutes; 5fluoroscopic images obtained CONTRAST: 40mL Omnipaque 300, intraurinary PROCEDURE:  Informed consent was obtained. The suprapubic region was prepped and draped in sterile manner. Following local anesthesia with copious 1% lidocaine, under ultrasound and fluoroscopic monitoring, an 18-gauge needle was advanced into the urinary bladder from a paramedian suprapubic approach. An Amplatz guidewire was placed in the urinary bladder. Serial tract dilatation was performed with a 22 Romanian telescoping dilator. A 16 Romanian [place] tip silicone Gomez type catheter was then placed in the balloon inflated with 10 mL of sterile saline. A cystogram was performed with AP and both oblique views demonstrating satisfactory position of the catheter with all side holes within the urinary bladder. The catheter was secured to the skin with 2-0 nylon suture and connected to gravity drainage. The Gomez catheter was removed. The patient tolerated the procedure well with no evidence of complication. COMPARISON: None FINDINGS:  The cystogram shows satisfactory catheter position with all sideholes within the urinary bladder. There is no extravasation.     1.     Ultrasound and fluoroscopic guided placement of a 16 Romanian [place] tip silicone suprapubic bladder catheter. 2.     Cystogram documenting catheter placement.     DX-PORTABLE FLUOROSCOPY < 1 HOUR    Result Date: 4/28/2021 4/28/2021 5:30 AM HISTORY/REASON FOR EXAM:  Cervical laminectomy TECHNIQUE/EXAM DESCRIPTION AND NUMBER OF VIEWS: Portable fluoroscopy for less than one hour in OR. FINDINGS: The portable fluoroscopy unit was obligated to the procedure for less than one hour. Actual fluoro time was 2 seconds.     Portable fluoroscopy utilized for 2 seconds. INTERPRETING LOCATION: [address] [place] [address],  [id number]    EC-ECHOCARDIOGRAM COMPLETE W/O CONT    Result Date: 2021  Transthoracic Echo Report Echocardiography Laboratory CONCLUSIONS No prior study is available for comparison. Normal left ventricular systolic function.  Left ventricular ejection fraction is visually estimated to be 60%. Normal diastolic function. Agitated saline (bubble) study was performed. No evidence of right to left shunt by agitated saline challenge. Normal inferior vena cava size and inspiratory collapse. MAGANAGILDARDO Exam Date:         2021                    19:42 Exam Location:     Inpatient Priority:          Routine Ordering Physician:        YESICA SULLIVAN Referring Physician:       [id number] Marie Sonographer:               Faye Moya [facility] Age:    60     Gender:    M MRN:    [id number] :    1960 BSA:    1.87   Ht (in):    69     Wt (lb):    159 Exam Type:     Complete Indications:     CVA ICD Codes:       436 CPT Codes:       20881 BP:   140    /   60     HR:   74 Technical Quality:       Fair MEASUREMENTS  (Male / Female) Normal Values 2D ECHO LV Diastolic Diameter PLAX        3.8 cm                4.2 - 5.9 / 3.9 - 5.3 cm LV Systolic Diameter PLAX         2.4 cm                2.1 - 4.0 cm IVS Diastolic Thickness           1.4 cm                LVPW Diastolic Thickness          1.4 cm                LVOT Diameter                     2 cm                  Estimated LV Ejection Fraction    60 %                  LV Ejection Fraction MOD BP       62.7 %                >= 55  % LV Ejection Fraction MOD 4C       46.6 %                LV Ejection Fraction MOD 2C       55 %                  DOPPLER AV Peak Velocity                  1.7 m/s               AV Peak Gradient                  10.9 mmHg             AV Mean Gradient                  5.5 mmHg              LVOT Peak Velocity                1.2 m/s               AV Area Cont Eq vti               2.3 cm2               Mitral E Point Velocity           0.74 m/s               Mitral E to A Ratio               0.92                  MV Pressure Half Time             69.4 ms               MV Area PHT                       3.2 cm2               MV Deceleration Time              239 ms                PV Peak Velocity                  1.1 m/s               PV Peak Gradient                  4.6 mmHg              RVOT Peak Velocity                0.66 m/s              * Indicates values subject to auto-interpretation LV EF:  60    % FINDINGS Left Ventricle Normal left ventricular chamber size. Mild concentric left ventricular hypertrophy. Normal left ventricular systolic function.  Left ventricular ejection fraction is visually estimated to be 60%. Normal diastolic function. Right Ventricle Normal right ventricular size and systolic function. Right Atrium Normal right atrial size. Normal inferior vena cava size and inspiratory collapse. Left Atrium Normal left atrial size. Agitated saline (bubble) study was performed. With Valsalva maneuver. No evidence of right to left shunt by agitated saline challenge. Existing IV was used. Mitral Valve Structurally normal mitral valve without significant stenosis or regurgitation. Aortic Valve Aortic sclerosis without stenosis. No aortic insufficiency. Tricuspid Valve Structurally normal tricuspid valve without significant stenosis or regurgitation. Pulmonic Valve The pulmonic valve is not well visualized. No pulmonic insufficiency. Pericardium Normal pericardium without effusion. Aorta Normal aortic root for body surface area. Ascending aorta not well visualized. Zakiya Rebollar MD (Electronically Signed) Final Date:     28 April 2021                 21:30    EC-CHRISTOPHER W/O CONT    Result Date: 4/30/2021  Results Will be Available after Interpretation by Cardiologist.    DX-ABDOMEN FOR TUBE PLACEMENT    Result Date: 4/30/2021 4/30/2021 12:44 PM HISTORY/REASON FOR EXAM:  Line evaluation. TECHNIQUE/EXAM DESCRIPTION AND NUMBER OF VIEWS:  1 view(s) of the  "abdomen. COMPARISON:  4/28/2021 FINDINGS: Enteric tube has been placed. The tip projects over the distal stomach or duodenal bulb. There are scattered loops of air-filled bowel.     Feeding tube placement with the tip projecting over the distal stomach or duodenal bulb    DX-ABDOMEN FOR TUBE PLACEMENT    Result Date: 4/28/2021 4/28/2021 12:24 PM HISTORY/REASON FOR EXAM:  Tube evaluation. TECHNIQUE/EXAM DESCRIPTION AND NUMBER OF VIEWS:  1 view(s) of the abdomen. COMPARISON:  None. FINDINGS: Limited single view of the abdomen performed primarily to evaluate enteric tube position. The tip projects over the expected area of the distal stomach. The bowel gas pattern is within normal limits.     Feeding tube with tip projecting over the expected area of the distal stomach.      Micro:  Results     Procedure Component Value Units Date/Time    BLOOD CULTURE [[id number]] Collected: 05/01/21 1305    Order Status: Completed Specimen: Blood from Peripheral Updated: 05/02/21 0738     Significant Indicator NEG     Source BLD     Site PERIPHERAL     Culture Result No Growth  Note: Blood cultures are incubated for 5 days and  are monitored continuously.Positive blood cultures  are called to the RN and reported as soon as  they are identified.      Narrative:      Collected By:[id number] LESA VIZCARRA  Per Hospital Policy: Only change Specimen Src: to \"Line\" if  specified by physician order.  Left Forearm/Arm    BLOOD CULTURE [[id number]] Collected: 05/01/21 1300    Order Status: Completed Specimen: Blood from Peripheral Updated: 05/02/21 0738     Significant Indicator NEG     Source BLD     Site PERIPHERAL     Culture Result No Growth  Note: Blood cultures are incubated for 5 days and  are monitored continuously.Positive blood cultures  are called to the RN and reported as soon as  they are identified.      Narrative:      Collected By:[id number] LESA VIZCARRA  Per Hospital Policy: Only change Specimen Src: to \"Line\" if  specified " by physician order.  Right Forearm/Arm    CULTURE WOUND W/ GRAM STAIN [[id number]]  (Abnormal) Collected: 04/28/21 0642    Order Status: Completed Specimen: Wound Updated: 05/01/21 1117     Significant Indicator POS     Source WND     Site Cerival Thoracic Epidural 1     Culture Result Growth noted after further incubation, see below for  organism identification.       Gram Stain Result Few WBCs.  Rare Gram positive cocci.       Culture Result Streptococcus anginosus  Light growth      Narrative:      Surgery - swabs received    Anaerobic Culture [[id number]] Collected: 04/28/21 0642    Order Status: Completed Specimen: Wound Updated: 05/01/21 1117     Significant Indicator NEG     Source WND     Site Cerival Thoracic Epidural 1     Culture Result No Anaerobes isolated.    Narrative:      Surgery - swabs received    CULTURE WOUND W/ GRAM STAIN [[id number]]  (Abnormal) Collected: 04/28/21 0655    Order Status: Completed Specimen: Wound Updated: 05/01/21 1117     Significant Indicator POS     Source WND     Site Cerival Thoric Epidural 2     Culture Result Growth noted after further incubation, see below for  organism identification.       Gram Stain Result Few WBCs.  Few Gram positive cocci.       Culture Result Streptococcus anginosus  Moderate growth      Narrative:      Surgery - swabs received    Anaerobic Culture [[id number]] Collected: 04/28/21 0655    Order Status: Completed Specimen: Wound Updated: 05/01/21 1117     Significant Indicator NEG     Source WND     Site Cerival Thoric Epidural 2     Culture Result No Anaerobes isolated.    Narrative:      Surgery - swabs received    BLOOD CULTURE [[id number]]  (Abnormal) Collected: 04/27/21 0248    Order Status: Completed Specimen: Blood from Peripheral Updated: 04/30/21 1134     Significant Indicator POS     Source BLD     Site PERIPHERAL     Culture Result Growth detected by Bactec instrument. 04/29/2021  05:59      Viridans Streptococcus  Possible  "Contaminant  Isolated from one set only, please correlate with clinical  condition. Contact the Microbiology department within 48 hr  if identification and susceptibility are needed.      Narrative:      CALL  Bonilla  [facility] tel. [phone],  CALLED  [facility] tel. [phone] 04/29/2021, 06:05, RB PERF. RESULTS CALLED TO: RN  [id number]  Per Hospital Policy: Only change Specimen Src: to \"Line\" if  specified by physician order.  Right AC    BLOOD CULTURE [[id number]]  (Abnormal) Collected: 04/28/21 1134    Order Status: Completed Specimen: Blood from Peripheral Updated: 04/30/21 0957     Significant Indicator POS     Source BLD     Site PERIPHERAL     Culture Result Growth detected by Bactec instrument. 04/29/2021  21:41  Negative for Staphylococcus aureus and MRSA by PCR. Correlate  ongoing need for antibiotics with clinical condition.        Coagulase-negative Staphylococcus species  Possible Contaminant  Isolated from one set only, please correlate with clinical  condition. Contact the Microbiology department within 48 hr  if identification and susceptibility are needed.      Narrative:      CALL  Boinlla  [facility] tel. [phone],  CALLED  [facility] tel. [phone] 04/29/2021, 21:41, RB PERF. RESULTS CALLED  TO:RN:[id number]  Collected By:[id number] SAILAJA BAILEY  Per Hospital Policy: Only change Specimen Src: to \"Line\" if  specified by physician order.  Collected By:[id number] SAILAJA BAILEY    BLOOD CULTURE [[id number]] Collected: 04/28/21 1134    Order Status: Completed Specimen: Blood from Peripheral Updated: 04/29/21 0640     Significant Indicator NEG     Source BLD     Site PERIPHERAL     Culture Result No Growth  Note: Blood cultures are incubated for 5 days and  are monitored continuously.Positive blood cultures  are called to the RN and reported as soon as  they are identified.      Narrative:      Collected By:[id number] SAILAJA BAILEY  Per Hospital Policy: Only change Specimen Src: to \"Line\" if  specified by physician " "order.  No site indicated    GRAM STAIN [[id number]] Collected: 04/28/21 0642    Order Status: Completed Specimen: Wound Updated: 04/28/21 1854     Significant Indicator .     Source WND     Site Cerival Thoracic Epidural 1     Gram Stain Result Few WBCs.  Rare Gram positive cocci.      Narrative:      Surgery - swabs received    GRAM STAIN [[id number]] Collected: 04/28/21 0655    Order Status: Completed Specimen: Wound Updated: 04/28/21 1854     Significant Indicator .     Source WND     Site Cerival Thoric Epidural 2     Gram Stain Result Few WBCs.  Few Gram positive cocci.      Narrative:      Surgery - swabs received    BLOOD CULTURE [[id number]]  (Abnormal) Collected: 04/25/21 1433    Order Status: Completed Specimen: Blood from Peripheral Updated: 04/28/21 1122     Significant Indicator POS     Source BLD     Site PERIPHERAL     Culture Result Growth detected by Bactec instrument. 04/26/2021  04:57      Streptococcus anginosus  See previous culture for sensitivity report.      Narrative:      CALL  Bonilla  [facility] tel. [phone],  CALLED  [address] tel. [phone] 04/26/2021, 04:59, RB PERF. RESULTS CALLED TO: RN  [id number]  1 of 2 for Blood Culture x 2 sites order. Per Hospital  Policy: Only change Specimen Src: to \"Line\" if specified by  physician order.  No site indicated    BLOOD CULTURE [[id number]]  (Abnormal)  (Susceptibility) Collected: 04/25/21 1505    Order Status: Completed Specimen: Blood from Peripheral Updated: 04/28/21 1122     Significant Indicator POS     Source BLD     Site PERIPHERAL     Culture Result Growth detected by Bactec instrument. 04/26/2021  04:06      Streptococcus anginosus    Narrative:      CALL  Bonilla  [facility] tel. [phone],  CALLED  [facility] tel. [phone] 04/26/2021, 04:09, RB PERF. RESULTS CALLED TO: RN  [id number]  2 of 2 blood culture x2  Sites order. Per Hospital Policy:  Only change Specimen Src: to \"Line\" if specified by physician  order.  No site indicated    Susceptibility     Streptococcus " "anginosus (1)     Antibiotic Interpretation Microscan Method Status    Penicillin Sensitive 0.064 mcg/mL E-TEST Final    Cefotaxime Sensitive 0.125 mcg/mL E-TEST Final                   BLOOD CULTURE [[id number]] Collected: 04/27/21 0248    Order Status: Completed Specimen: Blood from Peripheral Updated: 04/28/21 1048     Significant Indicator NEG     Source BLD     Site PERIPHERAL     Culture Result No Growth  Note: Blood cultures are incubated for 5 days and  are monitored continuously.Positive blood cultures  are called to the RN and reported as soon as  they are identified.      Narrative:      Per Hospital Policy: Only change Specimen Src: to \"Line\" if  specified by physician order.  Left AC    E-Test [[id number]] Collected: 04/25/21 1505    Order Status: Completed Specimen: Other Updated: 04/27/21 1117     ETEST Sensitivity INTERIM    Narrative:      171 tel. [phone] 04/26/2021, 04:09, RB PERF. RESULTS CALLED TO: RN [id number]  2 of 2 blood culture x2  Sites order. Per Hospital Policy:  Only change Specimen Src: to \"Line\" if specified by physician  order.    MRSA By PCR (Amp) [[id number]] Collected: 04/26/21 0950    Order Status: Completed Specimen: Respirate from Nares Updated: 04/26/21 1343     Significant Indicator NEG     Source RESP     Site NARES     MRSA PCR Negative for MRSA by PCR.    Narrative:      Collected By:[id number] EMMA ODONNELL.  Collected By:[id number] EMMA BAEZ    SARS-CoV-2 PCR (24 hour In-House): Collect NP swab in [facility] [[id number]] Collected: 04/25/21 1448    Order Status: Completed Specimen: Respirate from Nasopharyngeal Updated: 04/26/21 1341     SARS-CoV-2 Source NP Swab     SARS-CoV-2 by PCR NotDetected     Comment: PATIENTS: Important information regarding your results and instructions can  be found at [email or web address]   \"After your  Covid-19 Test\"  [facility] providers: PLEASE REFER TO DE-ESCALATION AND RETESTING PROTOCOL  on [email or web address]  **The TaqPath " COVID-19 SARS-CoV-2 RT-PCR test has been made available for  use under the Emergency Use Authorization (EUA) only.         Narrative:      Have you been in close contact with a person who is suspected  or known to be positive for COVID-19 within the last 30 days  (e.g. last seen that person < 30 days ago)->Unknown          Assessment:  Active Hospital Problems    Diagnosis    • *Spinal epidural abscess [G06.1]    • History of ischemic stroke [Z86.73]    • Sepsis due to Streptococcus species (HCC) [A40.9]    • Acute bilateral back pain [M54.9]    • Thrombocytopenia (HCC) [D69.6]    • Type 2 diabetes mellitus without complication, without long-term current use of insulin (HCC) [E11.9]    • Coronary artery disease due to calcified coronary lesion: CABG x4, July 2015 [I25.10, I25.84]    • Essential hypertension, benign [I10]    • Hypokalemia [E87.6]    • Hypomagnesemia [E83.42]    • Hyponatremia [E87.1]    • Difficulty swallowing [R13.10]    • Diabetic ketoacidosis (HCC) [E11.10]    • Marijuana abuse [F12.10]    • High anion gap metabolic acidosis [E87.2]    • Tobacco abuse [Z72.0]    • Dyslipidemia [E78.5]    • Status post urethrostomy (HCC) [Z93.6]      Interval 24 hours:      AF, O2 Vent 8/40% stable   Labs reviewed  Micro reviewed    Patient more alert today continues to be agitated and without meaningful limb movement.  Patient continued on ceftriaxone as below.    Assessment:  60 y.o.  admitted 4/25/2021. Pt has a past medical history of uncontrolled diabetes, CAD status post CABG times 4/2015, marijuana use and to arthritis.  Presented complaining of neck and back pain ongoing for approximately 1 week and fall getting out of the bathtub.  He had been seen at urgent care for back pain approximately 1 week prior to admission and given Toradol injections.       Hospital Course:   Afebrile and vitals unremarkable other than hypertension.  Leukocytosis ongoing since arrival.  MRI C-spine on 4/26 with epidural  collection noted from C2-T3.  Largest collection noted in upper thoracic posterior epidural space at T2-T3.  This is causing multilevel cord compression.  Also with likely discitis at C5-6 and osteomyelitis at C5-C7.  Blood cultures on 4/25+ for Streptococcus species.       Problem List     Sepsis/shock, secondary to below, improved  VDRF, improving but remains vent dependent  -Bronchoscopy on 4/28 with thick secretions  Bacteremia  -Blood cultures on 4/25 2/2 + Streptococcus anginosus, penicillin and cephalosporin susceptible  -Blood cultures on 4/27 1/2 + viridans Streptococcus -likely contaminant  -Blood cultures on 4/28  1/2 + CoNS -likely contaminant  -Blood cultures on 5/1 are no growth to date  -TTE with no vegetations  -CHRISTOPHER on 4/30, awaiting read  Leukocytosis, ongoing, improved today   Cervical thoracic infection, epidural abscess at C2-T3 as well as discitis and osteomyelitis, +GPCs-Streptococcus anginosus   -Repeat MRI thoracic spine on 4/27 notes posterior epidural fluid  collection/abscess in lower cervical spine extending to T6 level, also noted abnormal signal within the cervical and upper thoracic cord  -Patient went to the OR with neurosurgery on 4/28 for see 4-T2 laminectomy, decompression of thecal sac and nerve roots as well as 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   Concern for quadriplegia  -MRA neck with and without on 4/28, possible moderate focal stenosis in mid left CVA.  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  MRI without on 4/28 with acute large right cerebellar infarct with small hemorrhage  Uncontrolled diabetes     Plan      ---  Continue ceftriaxone 2 grams q24 hours,  no obvious septic  emboli in the brain on imaging, anticipate a 6-week IV antibiotic course for the bacteremia and spinal infection  --- Repeat MRI spine prior to stopping antibiotics  --- Agree with plan for CHRISTOPHER -awaiting report  --- Follow-up blood cultures - NGTD   --- Monitor labs  --- Monitor and control blood sugars  --- Recommend palliative care consult and goals of care discussion        Plan of care discussed with Dr. Dumont.   Will continue to follow.

## 2021-05-02 NOTE — CARE PLAN
Problem: Knowledge Deficit  Goal: Patient/Significant other demonstrates understanding of disease process, treatment plan, medications and discharge instructions  Outcome: PROGRESSING AS EXPECTED   Patient specific education: Teaching regarding disease process, treatment plan, medications- including new medications provided to family.     Problem: Risk for injury related to physical restraint use  Goal: Safe and appropriate use of physical restraints. Restraints discontinued at the earliest possibility while ensuring patient safety.  Outcome: MET   Physical restraints removed at this time while ensuring patient safety.

## 2021-05-02 NOTE — CARE PLAN
Problem: Ventilation Defect:  Goal: Ability to achieve and maintain unassisted ventilation or tolerate decreased levels of ventilator support  Outcome: PROGRESSING AS EXPECTED   Adult Ventilation Update    Total Vent Days: 5    Patient Lines/Drains/Airways Status      Active Airway       Name: Placement date: Placement time: Site: Days:    Airway ETT 8.0  --   --   --                       Plateau Pressure: 21 (05/02/21 1451)  Static Compliance (ml / cm H2O): 27.9 (05/02/21 1451)    Patient failed trials because of Safety screen spontaneous breathing trial (SBT): Failed SAT - Do NOT begin SBT (05/01/21 1000)  Barriers to SBT    Length of Weaning Trial       The patient is currently in no wean according to protocol.    Sputum/Suction ETT  Cough: Non Productive (05/02/21 1451)  Sputum Amount: Scant(with ET suctioning/oropharyneal suctioning) (05/02/21 1200)  Sputum Color: White;Clear (05/02/21 1200)  Sputum Consistency: Thick (05/02/21 1200)    Mobility  Level of Mobility: Level I (05/02/21 0800)  Activity Performed: Unable to mobilize (05/02/21 0800)  Reason Not Mobilized: Bed rest;Other (comment)(Orthopedic brace not present for mobilization) (05/02/21 1200)  Mobilization Comments: Engagement: RASS -3 (05/02/21 0800)    Events/Summary/Plan: no SBT per poor SAT.  When patient lightened up he begins trashing.  No vent setting changes

## 2021-05-02 NOTE — PROGRESS NOTES
Neurosurgery Progress Note    Subjective:  Hospital day#8  POD#4 C3-T2 lami for epidural abscess  Patient with sepsis, positive blood cultures  Wound cultures shows gram + cocci and step anginosus  Drain at 0cc overnight x 2 nights--Will d/c drain and suture.  Intubated.    Cooperative with exam with Right arm giving thumbs up on command.  Left arm weaker but moves appropriately.    Legs - motor seen, but nurses have seen Thigh twitch   Off sedated  Cervical and upper T spine were well decompressed.   Brain MRI shows large right cerebellar PICA infarct   Head CT 21 shows no reperfusion hemorrhage     Exam:  intubated   Following with good thumbs up on right  Left arm 3+l stimuli   No movement to legs  Pupils equal   Will open eyes     BP  Min: 108/55  Max: 136/64  Pulse  Av.8  Min: 61  Max: 80  Resp  Av  Min: 19  Max: 33  Temp  Av.3 °C (97.4 °F)  Min: 36.1 °C (97 °F)  Max: 36.5 °C (97.7 °F)  SpO2  Av.6 %  Min: 90 %  Max: 99 %    No data recorded    Recent Labs     21  0244 21  0615 21  0253   WBC 13.0* 15.7* 13.9*   RBC 3.50* 3.44* 3.38*   HEMOGLOBIN 10.6* 10.5* 10.3*   HEMATOCRIT 31.8* 30.8* 31.1*   MCV 90.9 89.5 92.0   MCH 30.3 30.5 30.5   MCHC 33.3* 34.1 33.1*   RDW 47.8 47.0 48.7   PLATELETCT 195 276 328   MPV 12.4 12.1 12.4     Recent Labs     21  0244 21  0615 21  0253   SODIUM 134* 130* 133*   POTASSIUM 3.8 4.3 4.9   CHLORIDE 101 99 100   CO2 26 26 28   GLUCOSE 292* 238* 250*   BUN 29* 37* 37*   CREATININE 0.50 0.58 0.58   CALCIUM 7.8* 7.9* 7.9*     Recent Labs     21  0244   INR 1.07           Intake/Output       21 0700 - 21 0659 05700 - 21 Total  Total       Intake    I.V.  78  39 117  79.5  -- 79.5    Precedex Volume -- -- -- 12.7 -- 12.7    Propofol Volume 78 39 117 66.8 -- 66.8    Norepinephrine Volume 0 -- 0 -- -- --    Other  470  -- 470  60  -- 60     Medications (PO/Enteral Liquids) 420 -- 420 60 -- 60    Flush / Irrigation Volume (Catheter Flush) 50 -- 50 -- -- --    NG/GT  540  540 1080  270  -- 270    Intake (mL) (Enteral Tube 04/28/21 Cortrak - Gastric Left nare)  270 -- 270    IV Piggyback  250  -- 250  --  -- --    Volume (mL) (potassium phosphate 15 mmol in  mL ivpb) 250 -- 250 -- -- --    Total Intake 9700 089 7312 409.5 -- 409.5       Output    Urine  975  -- 975  225  -- 225    Output (mL) (Urethral Catheter Other (Comment) 16 Fr.) 975 -- 975 225 -- 225    Drains  --  0 0  --  -- --    Output (mL) (Closed/Suction Drain 1 Midline;Superior Back Ld Knowles 7 Fr.) -- 0 0 -- -- --    Residual Amount (mL) (Discarded) (Enteral Tube 04/28/21 Cortrak - Gastric Left nare) -- 0 0 -- -- --    Stool  --  -- --  --  -- --    Number of Times Stooled 0 x 0 x 0 x 0 x -- 0 x    Total Output 975 0 975 225 -- 225       Net I/O     363 579 942 184.5 -- 184.5            Intake/Output Summary (Last 24 hours) at 5/2/2021 1232  Last data filed at 5/2/2021 1200  Gross per 24 hour   Intake 1676.66 ml   Output 825 ml   Net 851.66 ml            • insulin glargine  24 Units Q EVENING   • dexmedetomidine (PRECEDEX) infusion  0.1-1.5 mcg/kg/hr Continuous   • aspirin  324 mg DAILY   • acetylcysteine  3-4 mL Q4H PRN (RT)   • albuterol  2.5 mg Q4H PRN (RT)   • MD Alert...Adult ICU Electrolyte Replacement per Pharmacy   PHARMACY TO DOSE   • lidocaine  1-2 mL Q30 MIN PRN   • insulin regular  2-9 Units Q6HRS    And   • dextrose 50%  50 mL Q15 MIN PRN   • cefTRIAXone (ROCEPHIN) IV  2 g DAILY   • magnesium hydroxide  30 mL QDAY PRN   • ondansetron  4 mg Q4HRS PRN   • oxyCODONE immediate-release  10 mg Q4HRS PRN   • polyethylene glycol/lytes  1 Packet BID PRN   • promethazine  12.5-25 mg Q4HRS PRN   • senna-docusate  1 tablet Q24HRS PRN   • MD ALERT...DO NOT ADMINISTER NSAIDS or ASPIRIN unless ORDERED By Neurosurgery  1 Each PRN   • bisacodyl  10 mg Q24HRS PRN   • fleet   1 Each Once PRN   • Respiratory Therapy Consult   Continuous RT   • ondansetron  4 mg Q4HRS PRN   • promethazine  12.5-25 mg Q4HRS PRN   • prochlorperazine  5-10 mg Q4HRS PRN   • norepinephrine (Levophed) infusion  0-30 mcg/min Continuous   • ipratropium-albuterol  3 mL Q4H PRN (RT)   • labetalol  10 mg Q4HRS PRN   • Pharmacy  1 Each PHARMACY TO DOSE   • acetaminophen  1,000 mg Q6HRS   • ARIPiprazole  15 mg BID   • atorvastatin  80 mg Nightly   • carBAMazepine  200 mg TID   • cyanocobalamin  1,000 mcg DAILY   • senna-docusate  1 tablet Nightly   • vitamin D  5,000 Units DAILY   • docusate sodium  100 mg BID   • famotidine  20 mg BID    Or   • famotidine  20 mg BID   • fentaNYL  50 mcg Q15 MIN PRN    And   • fentaNYL  100 mcg Q15 MIN PRN    And   • fentaNYL   Continuous   • lidocaine  3 Patch Q24HR       Assessment and Plan:  Hospital day #8  POD #43  Sedation as needed  Will d/c drain today.  CT  Abx per ID  Lilibeth for ASA  Will discuss Prophy with Dr. Hazel today.    Prophylactic anticoagulation: no         Start date/time:

## 2021-05-02 NOTE — PROGRESS NOTES
[facility] Progress Note      Admit Date: 4/25/2021    Resident(s): Delroy Fulton M.D.   Attending:  BREANNA ALCARAZ/ Dr. Dumont     Patient ID:    Name:  Perry Davis   YOB: 1960  Age:  60 y.o.  male   MRN:  [id number]    Hospital Course:  Perry Davis is a 60 y.o. male with a previous history of uncontrolled Diabetes -Hb1C 13 , HLD, CAD s/p CABGx4  2015, tobacco use ,OA, chronic low pain admitted  4/25 with worsening neck , upper back pain after a GLF , mild DKA. Found to have epidural abscess w/ cord compression, discitis involving cervical and thoracic spine, underwent emergent laminectomy and decompression by Dr. Hazel 4/28. Blood cultures from 4/25 2/2 positive strep anginosus on Ceftriaxone, followed by ID. Acute mental state change noted 4/27 , CT head with right cerebellar CVA, neurology consulting. Intubated for airway protection and transferred to ICU on 4/28/21 for post surgical intervention.     Consultants:  Critical Care  Neurosurgery   Neurology  Infectious Disease    Interval Events:    4/28 Neuro- obtunded and on ventilator support.  Prior to sedation was noted to have purposeful movements of all his extremities.  4/29: POD #1. obtunded, on ventilator support .  Pressor support with Levophed.  CHRISTOPHER pending, repeat blood culture 4/28/2021 remains negative  4/30/21: POD #2, obtunded, on vent support. Blood c/s 4/27 1/2 + for Strep. Viridans, likely contaminant. 4/28 1/2 + CoNS, likely contaminant, currently on C3. CHRISTOPHER done today, results pending. Okay for ASA per NS. SP catheter draining urine after manual irrigation. BP stable, off levophed.  5/1/21: POD#3, on vent support. ASA started yesterday. On ceftriaxone per ID. CHRISTOPHER results pending.  5/2/21: POD#4, intubated, Vent D#5 SAT failed. On C3- anticipating 6 weeks course of treatment, CHRISTOPHER results pending. No BM since 4/26/21, on bowel regimen. XR Abd today    Vitals Range last 24h:  Temp:  [36.1 °C (97 °F)-36.5 °C (97.7  "°F)] 36.1 °C (97 °F)  Pulse:  [68-86] 68  Resp:  [19-28] 28  BP: (108-136)/(55-64) 136/64  SpO2:  [90 %-99 %] 92 %      Intake/Output Summary (Last 24 hours) at 5/2/2021 1039  Last data filed at 5/2/2021 1000  Gross per 24 hour   Intake 1926 ml   Output 725 ml   Net 1201 ml        Review of Systems   Unable to perform ROS: Intubated       PHYSICAL EXAM:  Vitals:    05/02/21 0800 05/02/21 0900 05/02/21 1000 05/02/21 1017   BP: 136/64      Pulse: 73 68 70 68   Resp: (!) 28 20 (!) 22 (!) 28   Temp: 36.2 °C (97.2 °F)  36.1 °C (97 °F)    TempSrc: Temporal  Temporal    SpO2: 99% 95% 93% 92%   Weight: 89.9 kg (198 lb 3.1 oz)      Height: 1.753 m (5' 9.02\")       Body mass index is 29.25 kg/m².    O2 therapy: Pulse Oximetry: 92 %, O2 Delivery Device: Ventilator    Date 05/02/21 0700 - 05/03/21 0659   Shift 7300-3328 1761-8873 6651-5608 24 Hour Total   INTAKE   I.V. 65   65     Propofol Volume 65   65   NG/   180     Intake (mL) (Enteral Tube 04/28/21 Cortrak - Gastric Left nare) 180   180   Shift Total 245   245   OUTPUT   Stool         Number of Times Stooled 0 x   0 x   Shift Total          245        Physical Exam   Constitutional: He is well-developed, well-nourished, and in no distress.   HENT:   Head: Normocephalic and atraumatic.   Nose: Nose normal.   Eyes: Pupils are equal, round, and reactive to light. Conjunctivae are normal.   Cardiovascular: Normal rate, regular rhythm and normal heart sounds.   Pulmonary/Chest: Breath sounds normal.   Abdominal: Soft. He exhibits no distension.   Bowel sounds sluggish   Musculoskeletal:         General: No edema.      Cervical back: Neck supple.   Neurological:   Sedated and intubated   Thrashes head sideways intermittently  No response to noxious stimuli   Skin: Skin is warm and dry.       Recent Labs     04/30/21 0221 05/01/21 0226   ISTATAPH 7.456 7.526*   ISTATAPCO2 36.9 34.5   ISTATAPO2 74 76   ISTATATCO2 27 30   TVCXSDD3OQX 96 97   ISTATARTHCO3 26.0* 28.6* "   ISTATARTBE 2 6*   ISTATTEMP 37.0 C 36.9 C   ISTATFIO2 40 40   ISTATSPEC Arterial Arterial   ISTATAPHTC 7.456 7.528*   [id number] 74 75     Recent Labs     04/30/21 0244 05/01/21 0615 05/02/21  0253   SODIUM 134* 130* 133*   POTASSIUM 3.8 4.3 4.9   CHLORIDE 101 99 100   CO2 26 26 28   BUN 29* 37* 37*   CREATININE 0.50 0.58 0.58   MAGNESIUM 2.2 2.2 2.3   PHOSPHORUS 2.2* 2.3* 2.6   CALCIUM 7.8* 7.9* 7.9*     Recent Labs     04/30/21 0244 05/01/21 0615 05/02/21  0253   ALTSGPT 21 24 40   ASTSGOT 21 34 64*   ALKPHOSPHAT 104* 126* 172*   TBILIRUBIN 0.3 0.3 0.3   GLUCOSE 292* 238* 250*     Recent Labs     04/30/21 0244 05/01/21 0615 05/02/21  0253   RBC 3.50* 3.44* 3.38*   HEMOGLOBIN 10.6* 10.5* 10.3*   HEMATOCRIT 31.8* 30.8* 31.1*   PLATELETCT 195 276 328   PROTHROMBTM 14.2  --   --    INR 1.07  --   --      Recent Labs     04/30/21 0244 05/01/21 0615 05/02/21 0253   WBC 13.0* 15.7* 13.9*   NEUTSPOLYS 78.00* 79.30* 77.10*   LYMPHOCYTES 12.70* 11.10* 12.90*   MONOCYTES 7.50 7.70 8.20   EOSINOPHILS 0.40 0.10 0.10   BASOPHILS 0.20 0.10 0.10   ASTSGOT 21 34 64*   ALTSGPT 21 24 40   ALKPHOSPHAT 104* 126* 172*   TBILIRUBIN 0.3 0.3 0.3       Meds:  • insulin glargine  24 Units     • dexmedetomidine (PRECEDEX) infusion  0.1-1.5 mcg/kg/hr 0.2 mcg/kg/hr (05/02/21 1027)   • aspirin  324 mg     • acetylcysteine  3-4 mL     • albuterol  2.5 mg     • MD Alert...Adult ICU Electrolyte Replacement per Pharmacy       • lidocaine  1-2 mL     • insulin regular  2-9 Units      And   • dextrose 50%  50 mL     • cefTRIAXone (ROCEPHIN) IV  2 g Stopped (05/02/21 0559)   • magnesium hydroxide  30 mL     • ondansetron  4 mg     • oxyCODONE immediate-release  10 mg     • polyethylene glycol/lytes  1 Packet     • promethazine  12.5-25 mg     • senna-docusate  1 tablet     • MD ALERT...DO NOT ADMINISTER NSAIDS or ASPIRIN unless ORDERED By Neurosurgery  1 Each     • bisacodyl  10 mg     • fleet  1 Each     • Respiratory Therapy Consult        • ondansetron  4 mg     • promethazine  12.5-25 mg     • prochlorperazine  5-10 mg     • norepinephrine (Levophed) infusion  0-30 mcg/min Stopped (04/30/21 1129)   • ipratropium-albuterol  3 mL     • labetalol  10 mg     • Pharmacy  1 Each     • acetaminophen  1,000 mg     • ARIPiprazole  15 mg     • atorvastatin  80 mg     • carBAMazepine  200 mg     • cyanocobalamin  1,000 mcg     • senna-docusate  1 tablet     • vitamin D  5,000 Units     • docusate sodium  100 mg     • famotidine  20 mg      Or   • famotidine  20 mg     • fentaNYL  50 mcg      And   • fentaNYL  100 mcg      And   • fentaNYL   Stopped (04/28/21 2118)   • lidocaine  3 Patch          Procedures:  Laminectomy, decompression by neurosurgery 4/28/2021    Imaging:  DX-ABDOMEN FOR TUBE PLACEMENT   Final Result      Feeding tube placement with the tip projecting over the distal stomach or duodenal bulb      EC-CHRISTOPHER W/O CONT         CT-HEAD W/O   Final Result         1.  Low-density changes in the right cerebellar hemisphere, compatible with evolving infarct, streak artifacts minimally limits evaluation of the posterior fossa for subtle subarachnoid hemorrhage, no intracranial hemorrhage is readily identified.   2.  Atherosclerosis.      DX-CHEST-PORTABLE (1 VIEW)   Final Result         1.  No acute cardiopulmonary disease.      DX-CHEST-PORTABLE (1 VIEW)   Final Result      Mild left basilar opacity may represent atelectasis. Infection not excluded.      Improved aeration of the left lung.      Atherosclerotic plaque.         EC-ECHOCARDIOGRAM COMPLETE W/O CONT   Final Result      IR-DRAIN-BLADDER SUPRAPUB W/CATH   Final Result      1.     Ultrasound and fluoroscopic guided placement of a 16 [place] tip silicone suprapubic bladder catheter.      2.     Cystogram documenting catheter placement.         MR-BRAIN-W/O   Final Result      Acute large right cerebellar posterior inferior cerebellar artery territory infarct with possible small amount of  petechial hemorrhage.      MR-MRA HEAD-W/O   Final Result      Findings suggesting right vertebral artery occlusion.      MR-MRA NECK-WITH & W/O AND SEQUENCES   Final Result      1.  Small caliber right vertebral artery which is not visualized on the noncontrast time-of-flight technique could be related to retrograde flow or diminutive caliber and sluggish flow.   2.  Possible moderate focal stenosis in the mid cervical left vertebral artery.   3.  No significant carotid stenosis.      DX-ABDOMEN FOR TUBE PLACEMENT   Final Result         Feeding tube with tip projecting over the expected area of the distal stomach.      DX-CHEST-PORTABLE (1 VIEW)   Final Result      1.  Worsening consolidation of LEFT hemithorax with shift of mediastinal structures to the LEFT suggesting atelectasis, possibly due to endobronchial process.   2.  Supportive tubing as described above.   3.  No pneumothorax.      DX-CHEST-PORTABLE (1 VIEW)   Final Result         Endotracheal tube with tip projecting over the mid thoracic trachea.      Layering left pleural effusion with left lower lung opacity.      DX-SPINE-ANY ONE VIEW   Final Result      Digitized intraoperative radiograph is submitted for review.  This examination is not for diagnostic purpose but for guidance during a surgical procedure.      DX-PORTABLE FLUOROSCOPY < 1 HOUR   Final Result      Portable fluoroscopy utilized for 2 seconds.         INTERPRETING LOCATION: [address] [place] [address], [address]      CT-HEAD W/O   Final Result         1.  Low-density in the region of the right cerebellar hemisphere, appearance compatible with evolving infarct.      These findings were discussed with the patient's on-call clinician, Deniz, on 4/28/2021 6:14 AM.      MR-THORACIC SPINE-WITH & W/O   Final Result      1.  Posterior epidural fluid collection/abscess within the lower cervical spine extending inferiorly to about the T6 level which could represent epidural abscess and/or hematoma. This  measures 8 mm in maximal thickness at T2-T3 with at least moderate    thecal sac stenosis.   2.  Abnormal signal within the cervical cord and upper thoracic cord suggesting cord edema or infectious/inflammatory etiology.   3.  Lower cervical spine findings are detailed on earlier report.   4.  Prominent enhancement along the surface of the mid and lower thoracic cord is of uncertain etiology and as detailed above, possibly representing leptomeningeal disease/infection. See details above.   These findings were discussed with Dr. Cano on 4/28/2021 10:01 AM.         MR-LUMBAR SPINE-WITH & W/O   Final Result      No evidence of lumbar spine infection or epidural abscess.      Mild spondylosis as detailed above without spinal stenosis.      DX-CHEST-PORTABLE (1 VIEW)   Final Result      1.  Hypoinflation with left basilar atelectasis.   2.  Mild perihilar interstitial prominence may be related to hypoinflation or edema.      MR-CERVICAL SPINE-WITH & W/O   Final Result      1.  There is multiseptated abnormal peripherally enhancing epidural collection noted extending from C2 to the visualized lower thoracic level. Largest collection is noted in the upper thoracic posterior epidural space at the levels of T2 and T3. The    lower extent of the collection is not imaged. This is causing multilevel effacement of the thecal sac and cord compression. The thoracic spinal cord is anteriorly displaced and compressed at the levels of T2 and T3. Pre and postcontrast MR examination of    the thoracic spine is recommended for further evaluation.   2.  There is effacement of the cervical thecal sac due to the multiseptated epidural fluid collection.   3.  The cervical spinal cord is mildly enlarged with minimal increased T2 signal intensity. The possibility of cord inflammation cannot be entirely excluded.   4.  Abnormal T2 hyperintensity at C5-6 disc space likely representing discitis.   5.  Abnormal bone marrow edema of C5, C6 and  C7 vertebral bodies with edema concerning for osteomyelitis.   6.  There is also focal enhancement of C6 vertebral body likely secondary to the osteomyelitis.   7.  Prevertebral edema/fluid collection.   8.  Nonvisualization of flow void of bilateral vertebral arteries concerning for age-indeterminate bilateral vertebral occlusions.         CT-CHEST (THORAX) WITH   Final Result      No displaced rib fracture is identified.      No acute cardiopulmonary process is seen.      Atherosclerotic plaque including coronary artery calcification.      CT-TSPINE W/O PLUS RECONS   Final Result      No CT evidence of acute traumatic abnormality.      Mild T6/7 anterior wedging compatible with healed mild chronic compression fracture and there is interbody fusion.      CT-LSPINE W/O PLUS RECONS   Final Result      No CT evidence of acute traumatic injury.      CT-CSPINE WITHOUT PLUS RECONS   Final Result      Multilevel degenerative changes.      Prevertebral edema. Further evaluation with MRI is recommended as this can be seen in the setting of ligamentous injury.      Bilateral carotid atherosclerotic plaque.         [id number] VIEW    (Results Pending)       ASSESSEMENT and PLAN:    * Spinal epidural abscess- (present on admission)  Assessment & Plan  CT C-spine 4/25 with prevertebral edema.    MRI C/T-spine with abnormal peripherally enhancing epidural collection extending from C2 to visualize lower thoracic 11.  Largest collection in the upper thoracic posterior epidural space at the level of T2-T3.  Lower extent of the collection is causing multilevel effacement of the thecal sac and cord compression.  Thoracic spinal cord anteriorly displaced and compressed at levels T2 and T3.  T2 hyperintensity at C5-C6 tests space likely representing discitis.  Focal enhancement of C6 vertebral body likely secondary to osteomyelitis.  Prevertebral edema/fluid collection.  Concern of age-indeterminate bilateral vertebral  occlusions.  Underwent emergent laminectomy of C3-C4 C5-C6, C6/C7, T1-T2 laminectomy, decompression of thecal sac and nerves by Dr. Hazel 4/28/2021.   Neurosurgery following, recommending goal BP <160  Sedation, pain management as appropriate        History of ischemic stroke  Assessment & Plan  Found to have acute change in mental status 4/27/2021.   CT head w/o concerning for evolving right cerebellar infarct.   Intubated prior to neurosurgical intervention.  High intensity statin, Dr. Hazel recommending holding aspirin for now due to risk of hemorrhagic conversion, thrombocytopenia.  MRA brain, MRA neck: Findings suggesting right vertebral artery occlusion. Possible moderate focal stenosis in the mid cervical left vertebral artery.  TTE with bubble study: EF 60%, no evidence of right-to-left shunt, no valve lesions.  CHRISTOPHER pending   Goal euthermia, normotension, eunatremia  PT, OT, SLP evaluation as able  Neurology following  4/30: okay to start ASA per NS      Sepsis due to Streptococcus species (HCC)- (present on admission)  Assessment & Plan  This is Sepsis Present on admission  SIRS criteria identified on my evaluation include: Tachycardia, with heart rate greater than 90 BPM, Tachypnea, with respirations greater than 20 per minute and Leukocytosis, with WBC greater than 12,000  Source is possible retropharyngeal/pharyngeal  Suspected onset of infection (date and time) Unknown  Sepsis protocol initiated  Fluid resuscitation ordered per protocol  IV antibiotics as appropriate for source of sepsis  While organ dysfunction may be noted elsewhere in this problem list or in the chart, degree of organ dysfunction does not meet CMS criteria for severe sepsis    MRI C-T-spine with evidence of epidural abscess, discitis, vertebral osteomyelitis.  Underwent emergent laminectomy, decompression 4/28.  Blood culture 4/24 and cervical thoracic 4/28 : Growth of Streptococcus anginosus. Blood c/s 4/27 1/2 + for Strep.  Viridans, likely contaminant. 4/28 1/2 + CONS, likely contaminant, currently on C3.    4/25 evening: Started on vancomycin  4/26 AM: d/c'ed vanc, started IV ceftriaxone, anticipate 6 weeks course of treatment  -IVF resuscitation  -Infectious disease following  -CHRISTOPHER done, results pending    Thrombocytopenia (HCC)- (present on admission)  Assessment & Plan  Resolved  Plt 47 on 4/26. This is likely due to sepsis, though lower on the differential includes previously undiagnosed infection.  Hep panel negative. HIV non reactive      Acute bilateral back pain- (present on admission)  Assessment & Plan  Presented with worsening neck and upper back pain after ground-level fall.   CT C-spine concerning for prevertebral edema, ligamental damage.  MRI recommended    MRI C/T-spine with extensive epidural abscess, discitis, osteomyelitis s/p laminectomy and decompression  See plan for spinal epidural abscess  As needed pain medications  PT/OT as appropriate      Type 2 diabetes mellitus without complication, without long-term current use of insulin (HCC)- (present on admission)  Assessment & Plan  Presented with elevated blood glucose and mild DKA.  Apparently issues with noncompliance.   HbA1c done on admission 13  Continue insulin sliding scale, Lantus  Accu-Cheks, hypoglycemia protocols  Diabetic diet months allowed p.o.  Diabetes education    Essential hypertension, benign- (present on admission)  Assessment & Plan  Noncompliance issues  In the hospital, presented with elevated blood pressure with systolic blood pressure up to the 180s.  This is likely with a component of acute pain.  Hold blood pressure medications due to hypotension.  Resume as appropriate    Coronary artery disease due to calcified coronary lesion: CABG x4, July 2015- (present on admission)  Assessment & Plan  History of    -Continue home atorvastatin, ASA  -Hold antihypertensives       Constipation  Assessment & Plan  No BM since 4/26/21. On bowel  protocol. XR KUB today    Status post urethrostomy (HCC)- (present on admission)  Assessment & Plan  History of perineal urethrostomy 2018  Acute urinary retention post surgical intervention 4/28  Unable to place Gomez catheter through urethrostomy.    Suprapubic catheter placement by interventional radiology.     Hyponatremia- (present on admission)  Assessment & Plan  Mild hyponatremia , likely chronic   Likely SIADH.  Serum Osm 264, urine osm 745, urine Na 70  Follow daily Na  Will consider fluid restriction as appropriate if worsens    Hypomagnesemia- (present on admission)  Assessment & Plan  Replete with goal of Mg of 2    Hypokalemia- (present on admission)  Assessment & Plan  Replete as needed with goal of K of 4    Difficulty swallowing- (present on admission)  Assessment & Plan  Presented with difficulty swallowing, is coughing and vomiting when swallowing large/hard foods    -SLP evaluation post extubation  -Aspiration and seizure precautions  -Soft GI diet    Tobacco abuse- (present on admission)  Assessment & Plan  Encouraged tobacco cessation    High anion gap metabolic acidosis- (present on admission)  Assessment & Plan  Resolved  Also see problem of diabetic ketoacidosis.    Marijuana abuse- (present on admission)  Assessment & Plan  Chronic issue  Encourage marijuana cessation    Diabetic ketoacidosis (HCC)- (present on admission)  Assessment & Plan  Resolved  -Insulin sliding scale, hypoglycemia protocol, Accu-Cheks  -Diabetic education  -Diabetes education        Dyslipidemia- (present on admission)  Assessment & Plan  Continue home atorvastatin 80      DISPO: ICU    CODE STATUS: Full code    Quality Measures:  Feeding: Tube feeds  Analgesia: Fentanyl  Sedation: propofol  Thromboprophylaxis: SCDs  Head of bed: >30 degrees  Ulcer prophylaxis: Pepcid  Glycemic control: Insulin sliding scale, Lantus  Bowel care: bowel regimen  Indwelling lines: PIV, art line, right IJ CVC  Deescalation of  antibiotics: On ceftriaxone      Delroy Fulton M.D.

## 2021-05-03 ENCOUNTER — APPOINTMENT (OUTPATIENT)
Dept: RADIOLOGY | Facility: MEDICAL CENTER | Age: 61
DRG: 003 | End: 2021-05-03
Attending: STUDENT IN AN ORGANIZED HEALTH CARE EDUCATION/TRAINING PROGRAM
Payer: MEDICARE

## 2021-05-03 ENCOUNTER — APPOINTMENT (OUTPATIENT)
Dept: RADIOLOGY | Facility: MEDICAL CENTER | Age: 61
DRG: 003 | End: 2021-05-03
Attending: PSYCHIATRY & NEUROLOGY
Payer: MEDICARE

## 2021-05-03 LAB
ALBUMIN SERPL BCP-MCNC: 2.1 G/DL (ref 3.2–4.9)
ALBUMIN SERPL BCP-MCNC: 2.2 G/DL (ref 3.2–4.9)
ALBUMIN SERPL BCP-MCNC: 2.2 G/DL (ref 3.2–4.9)
ALBUMIN/GLOB SERPL: 0.5 G/DL
ALP SERPL-CCNC: 233 U/L (ref 30–99)
ALP SERPL-CCNC: 243 U/L (ref 30–99)
ALP SERPL-CCNC: 282 U/L (ref 30–99)
ALT SERPL-CCNC: 125 U/L (ref 2–50)
ALT SERPL-CCNC: 88 U/L (ref 2–50)
ALT SERPL-CCNC: 88 U/L (ref 2–50)
ANION GAP SERPL CALC-SCNC: 4 MMOL/L (ref 7–16)
ANION GAP SERPL CALC-SCNC: 6 MMOL/L (ref 7–16)
ANION GAP SERPL CALC-SCNC: 9 MMOL/L (ref 7–16)
ANISOCYTOSIS BLD QL SMEAR: ABNORMAL
AST SERPL-CCNC: 171 U/L (ref 12–45)
AST SERPL-CCNC: 97 U/L (ref 12–45)
AST SERPL-CCNC: 99 U/L (ref 12–45)
BACTERIA BLD CULT: NORMAL
BASE EXCESS BLDA CALC-SCNC: -1 MMOL/L (ref -4–3)
BASOPHILS # BLD AUTO: 0 % (ref 0–1.8)
BASOPHILS # BLD AUTO: 0.1 % (ref 0–1.8)
BASOPHILS # BLD: 0 K/UL (ref 0–0.12)
BASOPHILS # BLD: 0.02 K/UL (ref 0–0.12)
BILIRUB SERPL-MCNC: 0.3 MG/DL (ref 0.1–1.5)
BILIRUB SERPL-MCNC: 0.3 MG/DL (ref 0.1–1.5)
BILIRUB SERPL-MCNC: 0.4 MG/DL (ref 0.1–1.5)
BODY TEMPERATURE: ABNORMAL DEGREES
BREATHS SETTING VENT: 30
BUN SERPL-MCNC: 41 MG/DL (ref 8–22)
BUN SERPL-MCNC: 44 MG/DL (ref 8–22)
BUN SERPL-MCNC: 44 MG/DL (ref 8–22)
CALCIUM SERPL-MCNC: 7.9 MG/DL (ref 8.5–10.5)
CALCIUM SERPL-MCNC: 8 MG/DL (ref 8.5–10.5)
CALCIUM SERPL-MCNC: 9.1 MG/DL (ref 8.5–10.5)
CHLORIDE SERPL-SCNC: 100 MMOL/L (ref 96–112)
CHLORIDE SERPL-SCNC: 103 MMOL/L (ref 96–112)
CHLORIDE SERPL-SCNC: 97 MMOL/L (ref 96–112)
CO2 BLDA-SCNC: 29 MMOL/L (ref 20–33)
CO2 SERPL-SCNC: 24 MMOL/L (ref 20–33)
CO2 SERPL-SCNC: 26 MMOL/L (ref 20–33)
CO2 SERPL-SCNC: 27 MMOL/L (ref 20–33)
CREAT SERPL-MCNC: 0.59 MG/DL (ref 0.5–1.4)
CREAT SERPL-MCNC: 0.64 MG/DL (ref 0.5–1.4)
CREAT SERPL-MCNC: 0.67 MG/DL (ref 0.5–1.4)
CRP SERPL HS-MCNC: 20.59 MG/DL (ref 0–0.75)
DELSYS IDSYS: ABNORMAL
EKG IMPRESSION: NORMAL
END TIDAL CARBON DIOXIDE IECO2: 32 MMHG
EOSINOPHIL # BLD AUTO: 0 K/UL (ref 0–0.51)
EOSINOPHIL # BLD AUTO: 0.01 K/UL (ref 0–0.51)
EOSINOPHIL NFR BLD: 0 % (ref 0–6.9)
EOSINOPHIL NFR BLD: 0.1 % (ref 0–6.9)
ERYTHROCYTE [DISTWIDTH] IN BLOOD BY AUTOMATED COUNT: 49 FL (ref 35.9–50)
ERYTHROCYTE [DISTWIDTH] IN BLOOD BY AUTOMATED COUNT: 52 FL (ref 35.9–50)
GLOBULIN SER CALC-MCNC: 4 G/DL (ref 1.9–3.5)
GLOBULIN SER CALC-MCNC: 4.1 G/DL (ref 1.9–3.5)
GLOBULIN SER CALC-MCNC: 4.3 G/DL (ref 1.9–3.5)
GLUCOSE BLD-MCNC: 179 MG/DL (ref 65–99)
GLUCOSE BLD-MCNC: 266 MG/DL (ref 65–99)
GLUCOSE BLD-MCNC: 287 MG/DL (ref 65–99)
GLUCOSE BLD-MCNC: 288 MG/DL (ref 65–99)
GLUCOSE BLD-MCNC: 326 MG/DL (ref 65–99)
GLUCOSE SERPL-MCNC: 313 MG/DL (ref 65–99)
GLUCOSE SERPL-MCNC: 325 MG/DL (ref 65–99)
GLUCOSE SERPL-MCNC: 329 MG/DL (ref 65–99)
HCO3 BLDA-SCNC: 27.1 MMOL/L (ref 17–25)
HCT VFR BLD AUTO: 32.5 % (ref 42–52)
HCT VFR BLD AUTO: 38.8 % (ref 42–52)
HGB BLD-MCNC: 10.6 G/DL (ref 14–18)
HGB BLD-MCNC: 12 G/DL (ref 14–18)
HOROWITZ INDEX BLDA+IHG-RTO: 55 MM[HG]
IMM GRANULOCYTES # BLD AUTO: 0.18 K/UL (ref 0–0.11)
IMM GRANULOCYTES NFR BLD AUTO: 1.3 % (ref 0–0.9)
LACTATE BLD-SCNC: 2 MMOL/L (ref 0.5–2)
LYMPHOCYTES # BLD AUTO: 1.72 K/UL (ref 1–4.8)
LYMPHOCYTES # BLD AUTO: 5.58 K/UL (ref 1–4.8)
LYMPHOCYTES NFR BLD: 12.3 % (ref 22–41)
LYMPHOCYTES NFR BLD: 26.3 % (ref 22–41)
MACROCYTES BLD QL SMEAR: ABNORMAL
MAGNESIUM SERPL-MCNC: 2.4 MG/DL (ref 1.5–2.5)
MANUAL DIFF BLD: NORMAL
MCH RBC QN AUTO: 30 PG (ref 27–33)
MCH RBC QN AUTO: 30.2 PG (ref 27–33)
MCHC RBC AUTO-ENTMCNC: 30.9 G/DL (ref 33.7–35.3)
MCHC RBC AUTO-ENTMCNC: 32.6 G/DL (ref 33.7–35.3)
MCV RBC AUTO: 92.1 FL (ref 81.4–97.8)
MCV RBC AUTO: 97.7 FL (ref 81.4–97.8)
MODE IMODE: ABNORMAL
MONOCYTES # BLD AUTO: 1.06 K/UL (ref 0–0.85)
MONOCYTES # BLD AUTO: 2.04 K/UL (ref 0–0.85)
MONOCYTES NFR BLD AUTO: 7.6 % (ref 0–13.4)
MONOCYTES NFR BLD AUTO: 9.6 % (ref 0–13.4)
MORPHOLOGY BLD-IMP: NORMAL
MYELOCYTES NFR BLD MANUAL: 0.9 %
NEUTROPHILS # BLD AUTO: 11.02 K/UL (ref 1.82–7.42)
NEUTROPHILS # BLD AUTO: 13.4 K/UL (ref 1.82–7.42)
NEUTROPHILS NFR BLD: 63.2 % (ref 44–72)
NEUTROPHILS NFR BLD: 78.6 % (ref 44–72)
NRBC # BLD AUTO: 0.06 K/UL
NRBC # BLD AUTO: 0.13 K/UL
NRBC BLD-RTO: 0.4 /100 WBC
NRBC BLD-RTO: 0.6 /100 WBC
O2/TOTAL GAS SETTING VFR VENT: 100 %
PCO2 BLDA: 60.8 MMHG (ref 26–37)
PEEP END EXPIRATORY PRESSURE IPEEP: 14 CMH20
PH BLDA: 7.26 [PH] (ref 7.4–7.5)
PLATELET # BLD AUTO: 322 K/UL (ref 164–446)
PLATELET # BLD AUTO: 352 K/UL (ref 164–446)
PLATELET BLD QL SMEAR: NORMAL
PMV BLD AUTO: 12.5 FL (ref 9–12.9)
PMV BLD AUTO: 12.7 FL (ref 9–12.9)
PO2 BLDA: 55 MMHG (ref 64–87)
POLYCHROMASIA BLD QL SMEAR: NORMAL
POTASSIUM SERPL-SCNC: 5 MMOL/L (ref 3.6–5.5)
POTASSIUM SERPL-SCNC: 5.4 MMOL/L (ref 3.6–5.5)
POTASSIUM SERPL-SCNC: 5.6 MMOL/L (ref 3.6–5.5)
PREALB SERPL-MCNC: 15.2 MG/DL (ref 18–38)
PROT SERPL-MCNC: 6.1 G/DL (ref 6–8.2)
PROT SERPL-MCNC: 6.3 G/DL (ref 6–8.2)
PROT SERPL-MCNC: 6.5 G/DL (ref 6–8.2)
RBC # BLD AUTO: 3.53 M/UL (ref 4.7–6.1)
RBC # BLD AUTO: 3.97 M/UL (ref 4.7–6.1)
RBC BLD AUTO: PRESENT
SAO2 % BLDA: 82 % (ref 93–99)
SIGNIFICANT IND 70042: NORMAL
SITE SITE: NORMAL
SODIUM SERPL-SCNC: 128 MMOL/L (ref 135–145)
SODIUM SERPL-SCNC: 132 MMOL/L (ref 135–145)
SODIUM SERPL-SCNC: 136 MMOL/L (ref 135–145)
SOURCE SOURCE: NORMAL
SPECIMEN DRAWN FROM PATIENT: ABNORMAL
TIDAL VOLUME IVT: 380 ML
TROPONIN T SERPL-MCNC: 17 NG/L (ref 6–19)
WBC # BLD AUTO: 14 K/UL (ref 4.8–10.8)
WBC # BLD AUTO: 21.2 K/UL (ref 4.8–10.8)

## 2021-05-03 PROCEDURE — 85007 BL SMEAR W/DIFF WBC COUNT: CPT

## 2021-05-03 PROCEDURE — 94003 VENT MGMT INPAT SUBQ DAY: CPT

## 2021-05-03 PROCEDURE — 700101 HCHG RX REV CODE 250: Performed by: STUDENT IN AN ORGANIZED HEALTH CARE EDUCATION/TRAINING PROGRAM

## 2021-05-03 PROCEDURE — 94640 AIRWAY INHALATION TREATMENT: CPT

## 2021-05-03 PROCEDURE — 700105 HCHG RX REV CODE 258: Performed by: STUDENT IN AN ORGANIZED HEALTH CARE EDUCATION/TRAINING PROGRAM

## 2021-05-03 PROCEDURE — 83735 ASSAY OF MAGNESIUM: CPT

## 2021-05-03 PROCEDURE — 80053 COMPREHEN METABOLIC PANEL: CPT

## 2021-05-03 PROCEDURE — 71045 X-RAY EXAM CHEST 1 VIEW: CPT

## 2021-05-03 PROCEDURE — 700117 HCHG RX CONTRAST REV CODE 255: Performed by: STUDENT IN AN ORGANIZED HEALTH CARE EDUCATION/TRAINING PROGRAM

## 2021-05-03 PROCEDURE — 31500 INSERT EMERGENCY AIRWAY: CPT | Performed by: PSYCHIATRY & NEUROLOGY

## 2021-05-03 PROCEDURE — 84484 ASSAY OF TROPONIN QUANT: CPT

## 2021-05-03 PROCEDURE — 0BH18EZ INSERTION OF ENDOTRACHEAL AIRWAY INTO TRACHEA, VIA NATURAL OR ARTIFICIAL OPENING ENDOSCOPIC: ICD-10-PCS | Performed by: PSYCHIATRY & NEUROLOGY

## 2021-05-03 PROCEDURE — 0B9F8ZZ DRAINAGE OF RIGHT LOWER LUNG LOBE, VIA NATURAL OR ARTIFICIAL OPENING ENDOSCOPIC: ICD-10-PCS | Performed by: PSYCHIATRY & NEUROLOGY

## 2021-05-03 PROCEDURE — 71275 CT ANGIOGRAPHY CHEST: CPT | Mod: ME

## 2021-05-03 PROCEDURE — 83605 ASSAY OF LACTIC ACID: CPT

## 2021-05-03 PROCEDURE — 700105 HCHG RX REV CODE 258: Performed by: INTERNAL MEDICINE

## 2021-05-03 PROCEDURE — 0B9D8ZZ DRAINAGE OF RIGHT MIDDLE LUNG LOBE, VIA NATURAL OR ARTIFICIAL OPENING ENDOSCOPIC: ICD-10-PCS | Performed by: PSYCHIATRY & NEUROLOGY

## 2021-05-03 PROCEDURE — 700111 HCHG RX REV CODE 636 W/ 250 OVERRIDE (IP): Performed by: INTERNAL MEDICINE

## 2021-05-03 PROCEDURE — 0B9C8ZZ DRAINAGE OF RIGHT UPPER LUNG LOBE, VIA NATURAL OR ARTIFICIAL OPENING ENDOSCOPIC: ICD-10-PCS | Performed by: PSYCHIATRY & NEUROLOGY

## 2021-05-03 PROCEDURE — 31500 INSERT EMERGENCY AIRWAY: CPT

## 2021-05-03 PROCEDURE — 85027 COMPLETE CBC AUTOMATED: CPT

## 2021-05-03 PROCEDURE — 84134 ASSAY OF PREALBUMIN: CPT

## 2021-05-03 PROCEDURE — 5A12012 PERFORMANCE OF CARDIAC OUTPUT, SINGLE, MANUAL: ICD-10-PCS | Performed by: PSYCHIATRY & NEUROLOGY

## 2021-05-03 PROCEDURE — 302977 HCHG BRONCHOSCOPY PROC-THERAPEUTIC

## 2021-05-03 PROCEDURE — 700111 HCHG RX REV CODE 636 W/ 250 OVERRIDE (IP): Performed by: PSYCHIATRY & NEUROLOGY

## 2021-05-03 PROCEDURE — L0200 CERV COL SUPP ADJ BAR & THOR: HCPCS

## 2021-05-03 PROCEDURE — 93005 ELECTROCARDIOGRAM TRACING: CPT | Performed by: STUDENT IN AN ORGANIZED HEALTH CARE EDUCATION/TRAINING PROGRAM

## 2021-05-03 PROCEDURE — 92950 HEART/LUNG RESUSCITATION CPR: CPT | Performed by: PSYCHIATRY & NEUROLOGY

## 2021-05-03 PROCEDURE — 770022 HCHG ROOM/CARE - ICU (200)

## 2021-05-03 PROCEDURE — A9270 NON-COVERED ITEM OR SERVICE: HCPCS | Performed by: STUDENT IN AN ORGANIZED HEALTH CARE EDUCATION/TRAINING PROGRAM

## 2021-05-03 PROCEDURE — 82803 BLOOD GASES ANY COMBINATION: CPT

## 2021-05-03 PROCEDURE — 99233 SBSQ HOSP IP/OBS HIGH 50: CPT | Performed by: INTERNAL MEDICINE

## 2021-05-03 PROCEDURE — 31645 BRNCHSC W/THER ASPIR 1ST: CPT | Performed by: PSYCHIATRY & NEUROLOGY

## 2021-05-03 PROCEDURE — 700101 HCHG RX REV CODE 250

## 2021-05-03 PROCEDURE — 94799 UNLISTED PULMONARY SVC/PX: CPT

## 2021-05-03 PROCEDURE — 700102 HCHG RX REV CODE 250 W/ 637 OVERRIDE(OP): Performed by: STUDENT IN AN ORGANIZED HEALTH CARE EDUCATION/TRAINING PROGRAM

## 2021-05-03 PROCEDURE — 5A1955Z RESPIRATORY VENTILATION, GREATER THAN 96 CONSECUTIVE HOURS: ICD-10-PCS | Performed by: PSYCHIATRY & NEUROLOGY

## 2021-05-03 PROCEDURE — 306637 HCHG MISC ORTHO ITEM RC 0274

## 2021-05-03 PROCEDURE — 86140 C-REACTIVE PROTEIN: CPT

## 2021-05-03 PROCEDURE — 700111 HCHG RX REV CODE 636 W/ 250 OVERRIDE (IP): Performed by: NURSE PRACTITIONER

## 2021-05-03 PROCEDURE — 72125 CT NECK SPINE W/O DYE: CPT | Mod: MF

## 2021-05-03 PROCEDURE — 85025 COMPLETE CBC W/AUTO DIFF WBC: CPT

## 2021-05-03 PROCEDURE — 93010 ELECTROCARDIOGRAM REPORT: CPT | Performed by: INTERNAL MEDICINE

## 2021-05-03 PROCEDURE — 82962 GLUCOSE BLOOD TEST: CPT | Mod: 91

## 2021-05-03 PROCEDURE — 99291 CRITICAL CARE FIRST HOUR: CPT | Mod: 25,GC | Performed by: PSYCHIATRY & NEUROLOGY

## 2021-05-03 PROCEDURE — 700101 HCHG RX REV CODE 250: Performed by: INTERNAL MEDICINE

## 2021-05-03 PROCEDURE — 0B9J8ZZ DRAINAGE OF LEFT LOWER LUNG LOBE, VIA NATURAL OR ARTIFICIAL OPENING ENDOSCOPIC: ICD-10-PCS | Performed by: PSYCHIATRY & NEUROLOGY

## 2021-05-03 PROCEDURE — 700101 HCHG RX REV CODE 250: Performed by: PSYCHIATRY & NEUROLOGY

## 2021-05-03 RX ORDER — ETOMIDATE 2 MG/ML
20 INJECTION INTRAVENOUS ONCE
Status: COMPLETED | OUTPATIENT
Start: 2021-05-03 | End: 2021-05-03

## 2021-05-03 RX ORDER — NOREPINEPHRINE BITARTRATE 0.03 MG/ML
INJECTION, SOLUTION INTRAVENOUS
Status: COMPLETED
Start: 2021-05-03 | End: 2021-05-03

## 2021-05-03 RX ORDER — INSULIN GLARGINE 100 [IU]/ML
30 INJECTION, SOLUTION SUBCUTANEOUS EVERY EVENING
Status: DISCONTINUED | OUTPATIENT
Start: 2021-05-03 | End: 2021-05-05

## 2021-05-03 RX ORDER — CALCIUM CHLORIDE 100 MG/ML
1 INJECTION INTRAVENOUS; INTRAVENTRICULAR ONCE
Status: COMPLETED | OUTPATIENT
Start: 2021-05-03 | End: 2021-05-03

## 2021-05-03 RX ORDER — NOREPINEPHRINE BITARTRATE 0.03 MG/ML
0-30 INJECTION, SOLUTION INTRAVENOUS CONTINUOUS
Status: DISCONTINUED | OUTPATIENT
Start: 2021-05-03 | End: 2021-05-08

## 2021-05-03 RX ORDER — ROCURONIUM BROMIDE 10 MG/ML
50 INJECTION, SOLUTION INTRAVENOUS ONCE
Status: COMPLETED | OUTPATIENT
Start: 2021-05-03 | End: 2021-05-03

## 2021-05-03 RX ORDER — DEXTROSE MONOHYDRATE 25 G/50ML
50 INJECTION, SOLUTION INTRAVENOUS
Status: DISCONTINUED | OUTPATIENT
Start: 2021-05-03 | End: 2021-05-04

## 2021-05-03 RX ORDER — EPINEPHRINE IN SOD CHLOR,ISO 1 MG/10 ML
SYRINGE (ML) INTRAVENOUS
Status: COMPLETED | OUTPATIENT
Start: 2021-05-03 | End: 2021-05-03

## 2021-05-03 RX ADMIN — LIDOCAINE 3 PATCH: 50 PATCH TOPICAL at 17:15

## 2021-05-03 RX ADMIN — FAMOTIDINE 20 MG: 20 TABLET ORAL at 05:11

## 2021-05-03 RX ADMIN — CARBAMAZEPINE 200 MG: 200 TABLET ORAL at 09:41

## 2021-05-03 RX ADMIN — CALCIUM CHLORIDE 1 G: 100 INJECTION, SOLUTION INTRAVENOUS at 11:57

## 2021-05-03 RX ADMIN — ETOMIDATE 20 MG: 2 INJECTION INTRAVENOUS at 12:00

## 2021-05-03 RX ADMIN — PROPOFOL 20 MCG/KG/MIN: 10 INJECTION, EMULSION INTRAVENOUS at 13:12

## 2021-05-03 RX ADMIN — ROCURONIUM BROMIDE 50 MG: 10 INJECTION, SOLUTION INTRAVENOUS at 12:01

## 2021-05-03 RX ADMIN — EPINEPHRINE 1 MG: 0.1 INJECTION, SOLUTION ENDOTRACHEAL; INTRACARDIAC; INTRAVENOUS at 11:51

## 2021-05-03 RX ADMIN — CARBAMAZEPINE 200 MG: 200 TABLET ORAL at 17:14

## 2021-05-03 RX ADMIN — FAMOTIDINE 20 MG: 20 TABLET ORAL at 17:14

## 2021-05-03 RX ADMIN — PROPOFOL 20 MCG/KG/MIN: 10 INJECTION, EMULSION INTRAVENOUS at 21:04

## 2021-05-03 RX ADMIN — ACETAMINOPHEN 1000 MG: 500 TABLET ORAL at 11:03

## 2021-05-03 RX ADMIN — CYANOCOBALAMIN TAB 500 MCG 1000 MCG: 500 TAB at 05:10

## 2021-05-03 RX ADMIN — ACETAMINOPHEN 1000 MG: 500 TABLET ORAL at 17:14

## 2021-05-03 RX ADMIN — DEXMEDETOMIDINE HYDROCHLORIDE 1.5 MCG/KG/HR: 100 INJECTION, SOLUTION INTRAVENOUS at 09:24

## 2021-05-03 RX ADMIN — FENTANYL CITRATE 100 MCG: 50 INJECTION, SOLUTION INTRAMUSCULAR; INTRAVENOUS at 09:41

## 2021-05-03 RX ADMIN — DEXMEDETOMIDINE HYDROCHLORIDE 1.5 MCG/KG/HR: 100 INJECTION, SOLUTION INTRAVENOUS at 05:11

## 2021-05-03 RX ADMIN — DEXMEDETOMIDINE HYDROCHLORIDE 0.7 MCG/KG/HR: 100 INJECTION, SOLUTION INTRAVENOUS at 01:20

## 2021-05-03 RX ADMIN — CARBAMAZEPINE 200 MG: 200 TABLET ORAL at 01:27

## 2021-05-03 RX ADMIN — FENTANYL CITRATE 50 MCG: 50 INJECTION, SOLUTION INTRAMUSCULAR; INTRAVENOUS at 03:07

## 2021-05-03 RX ADMIN — DOCUSATE SODIUM 100 MG: 50 LIQUID ORAL at 17:14

## 2021-05-03 RX ADMIN — CEFTRIAXONE SODIUM 2 G: 2 INJECTION, POWDER, FOR SOLUTION INTRAMUSCULAR; INTRAVENOUS at 05:11

## 2021-05-03 RX ADMIN — ASPIRIN 324 MG: 81 TABLET, CHEWABLE ORAL at 05:10

## 2021-05-03 RX ADMIN — IOHEXOL 65 ML: 350 INJECTION, SOLUTION INTRAVENOUS at 14:15

## 2021-05-03 RX ADMIN — NOREPINEPHRINE BITARTRATE 2 MCG/MIN: 1 INJECTION INTRAVENOUS at 13:00

## 2021-05-03 RX ADMIN — ARIPIPRAZOLE 15 MG: 10 TABLET ORAL at 17:14

## 2021-05-03 RX ADMIN — Medication 5000 UNITS: at 05:10

## 2021-05-03 RX ADMIN — IPRATROPIUM BROMIDE AND ALBUTEROL SULFATE 3 ML: .5; 2.5 SOLUTION RESPIRATORY (INHALATION) at 22:01

## 2021-05-03 RX ADMIN — ACETAMINOPHEN 1000 MG: 500 TABLET ORAL at 01:27

## 2021-05-03 RX ADMIN — ACETYLCYSTEINE 4 ML: 200 INHALANT RESPIRATORY (INHALATION) at 22:00

## 2021-05-03 RX ADMIN — ARIPIPRAZOLE 15 MG: 10 TABLET ORAL at 05:10

## 2021-05-03 RX ADMIN — ACETAMINOPHEN 1000 MG: 500 TABLET ORAL at 05:11

## 2021-05-03 RX ADMIN — FENTANYL CITRATE 100 MCG: 50 INJECTION, SOLUTION INTRAMUSCULAR; INTRAVENOUS at 23:39

## 2021-05-03 RX ADMIN — FENTANYL CITRATE 100 MCG: 50 INJECTION, SOLUTION INTRAMUSCULAR; INTRAVENOUS at 12:45

## 2021-05-03 RX ADMIN — FENTANYL CITRATE 100 MCG: 50 INJECTION, SOLUTION INTRAMUSCULAR; INTRAVENOUS at 05:21

## 2021-05-03 ASSESSMENT — ENCOUNTER SYMPTOMS: FEVER: 0

## 2021-05-03 NOTE — DISCHARGE PLANNING
This RN CM responded to code blue at 1151. RN CM called Mary Davis (Mother) at [phone] and [phone] [phone]. No answer, urgent voicemail left requesting a call back. RN CM called Maris Davis (Sister) at [phone]. No answer, urgent voicemail left requesting a call back.     RN CM received call back from Maris at 1202. Maris updated briefly on Pt's critical condition. Maris states she will  Mary [facility] RN CM met with Maris, Mary, and Bennett (brother) and escorted family to the conference room. Dr. Singh provided detailed medical update to the family. All questions answered.

## 2021-05-03 NOTE — PROCEDURES
Procedures    Cardiac arrest note    Called by bedside nurse for respiratory arrest followed by PEA arrest. High quality CPR was immediately begun. 2 Rounds of CPR and 2mg of epi given with ROSC. Likely 2/2 to aspiration. PE to be ruled out with CTA. Full details of the code blue timeline can be found in the EMR run sheet and Nursing CPR note.     colostrum

## 2021-05-03 NOTE — ASSESSMENT & PLAN NOTE
-Chronic history of diabetes  -Last HbA1c is 12.2 from 4/2021  -Lantus with sliding scale   -Hypoglycemia protocol

## 2021-05-03 NOTE — ANESTHESIA POSTPROCEDURE EVALUATION
Patient: Perry Davis    Procedure Summary     Date: 04/28/21 Room / Location: [facility] INTERVENTIONAL RADIOLOGY 01 / SURGERY [facility]    Anesthesia Start: 0001 Anesthesia Stop: 0119    Procedure: RECOVERY ONLY Diagnosis:     Surgeons: Recoveryonly Surgery Responsible Provider: Fidencio Swain M.D.    Anesthesia Type: MAC ASA Status: 3          Final Anesthesia Type: MAC  Last vitals  BP   Blood Pressure: 133/60, Arterial BP: 154/59    Temp   36.1 °C (97 °F)    Pulse   (!) 57   Resp   (!) 23    SpO2   96 %      Anesthesia Post Evaluation    There were no known complications for this encounter.     Nurse Pain Score: 0 (NPRS)

## 2021-05-03 NOTE — PROGRESS NOTES
Infectious Disease Progress Note    Author: La Nena Khan M.D. Date & Time of service: 5/3/2021  12:09 PM    Chief Complaint:  Sepsis and cervical abscess  CVA     Interval History:  60 y.o.  admitted 2021. Pt has a past medical history of uncontrolled diabetes, CAD status post CABG times 2015, marijuana use and to arthritis.  Presented complaining of neck and back pain ongoing for approximately 1 week and fall getting out of the bathtub.  He had been seen at urgent care for back pain approximately 1 week prior to admission and given Toradol injections.     AF, O2 Vent 12/70%, pressors still on the trending down, pt continues to be agitated, not moving any extremities and concern for quadriplegia due to CVA.    AF, O2 Vent 8/40%, pressors off, agitated, without meaningful limb movement.  Decreasing vent requirements and no longer in shock.   AF, O2 Vent 8/40%, stable on low vent settings no significant secretions per nursing.  He continues to be agitated and with no upper or lower extremity movement.    5/3 AF WBC 14 remains intubated and sedated Agitation with decrease sedation. Epidural drain removed FiO2 0.4    Review of Systems:  Review of Systems   Unable to perform ROS: Intubated   Constitutional: Negative for fever.       Hemodynamics:  Temp (24hrs), Av.2 °C (97.2 °F), Min:36.1 °C (97 °F), Max:36.4 °C (97.6 °F)  Temperature: 36.1 °C (97 °F)  Pulse  Av.6  Min: 57  Max: 121   Blood Pressure: 130/63, Arterial BP: 134/54       Physical Exam:  Physical Exam  Vitals and nursing note reviewed.   Constitutional:       Appearance: He is ill-appearing. He is not toxic-appearing or diaphoretic.   Eyes:      General: No scleral icterus.  Neck:      Comments: Right IJ  Cardiovascular:      Rate and Rhythm: Normal rate and regular rhythm.   Pulmonary:      Effort: Pulmonary effort is normal. No respiratory distress.      Comments: Coarse breath sounds  Abdominal:      General: There is no  distension.      Palpations: Abdomen is soft.   Musculoskeletal:      Right lower leg: No edema.      Left lower leg: No edema.   Lymphadenopathy:      Cervical: No cervical adenopathy.   Skin:     General: Skin is warm and dry.      Coloration: Skin is not jaundiced.      Findings: Bruising present.   Neurological:      Comments: sedated         Meds:    Current Facility-Administered Medications:   •  enoxaparin (LOVENOX) injection  •  insulin glargine  •  insulin regular **AND** POC blood glucose manual result **AND** NOTIFY MD and PharmD **AND** dextrose 50%  •  etomidate  •  rocuronium  •  calcium CHLORIDE  •  dexmedetomidine (PRECEDEX) infusion  •  aspirin  •  acetylcysteine  •  albuterol  •  MD Alert...Adult ICU Electrolyte Replacement per Pharmacy  •  lidocaine  •  cefTRIAXone (ROCEPHIN) IV  •  magnesium hydroxide  •  ondansetron  •  oxyCODONE immediate-release  •  polyethylene glycol/lytes  •  promethazine  •  senna-docusate  •  MD ALERT...DO NOT ADMINISTER NSAIDS or ASPIRIN unless ORDERED By Neurosurgery  •  bisacodyl  •  fleet  •  Respiratory Therapy Consult  •  ondansetron  •  promethazine  •  prochlorperazine  •  ipratropium-albuterol  •  labetalol  •  Pharmacy  •  acetaminophen  •  ARIPiprazole  •  carBAMazepine  •  cyanocobalamin  •  senna-docusate  •  vitamin D  •  docusate sodium  •  famotidine **OR** famotidine  •  fentaNYL **AND** fentaNYL **AND** fentaNYL **AND** [DISCONTINUED] propofol **AND** Triglyceride  •  lidocaine    Labs:  Recent Labs     05/01/21  0615 05/02/21  0253 05/03/21  0314   WBC 15.7* 13.9* 14.0*   RBC 3.44* 3.38* 3.53*   HEMOGLOBIN 10.5* 10.3* 10.6*   HEMATOCRIT 30.8* 31.1* 32.5*   MCV 89.5 92.0 92.1   MCH 30.5 30.5 30.0   RDW 47.0 48.7 49.0   PLATELETCT 276 328 322   MPV 12.1 12.4 12.5   NEUTSPOLYS 79.30* 77.10* 78.60*   LYMPHOCYTES 11.10* 12.90* 12.30*   MONOCYTES 7.70 8.20 7.60   EOSINOPHILS 0.10 0.10 0.10   BASOPHILS 0.10 0.10 0.10     Recent Labs     05/02/21  0253  05/03/21  0314 05/03/21  0800   SODIUM 133* 132* 128*   POTASSIUM 4.9 5.6* 5.4   CHLORIDE 100 100 97   CO2 28 26 27   GLUCOSE 250* 313* 329*   BUN 37* 41* 44*     Recent Labs     05/02/21  0253 05/03/21  0314 05/03/21  0800   ALBUMIN 1.8* 2.1* 2.2*   TBILIRUBIN 0.3 0.4 0.3   ALKPHOSPHAT 172* 233* 243*   TOTPROTEIN 6.0 6.1 6.3   ALTSGPT 40 88* 88*   ASTSGOT 64* 99* 97*   CREATININE 0.58 0.59 0.64       Imaging:  CT-CSPINE WITHOUT PLUS RECONS    Result Date: 4/25/2021 4/25/2021 1:29 PM HISTORY/REASON FOR EXAM: History of back and neck pain. Fall. TECHNIQUE/EXAM DESCRIPTION: CT cervical spine without contrast, with reconstructions. Helical scanning was performed from the skull base through T1.  Sagittal and coronal multiplanar reconstructions were generated from the axial images. Low dose optimization technique was utilized for this CT exam including automated exposure control and adjustment of the mA and/or kV according to patient size. COMPARISON:  None available. FINDINGS: No compression fracture is identified. There is an ununited fracture of the spinous process of T1. Intervertebral disc space narrowing and endplate spurring is most prominent at C6/C7. There are degenerative changes of the facet joints. C1/C2 alignment is maintained. There is multilevel uncovertebral spurring and neural foraminal narrowing. There is cervical prevertebral edema. There is carotid atherosclerotic plaque bilaterally. Visualized lung apices are clear.     Multilevel degenerative changes. Prevertebral edema. Further evaluation with MRI is recommended as this can be seen in the setting of ligamentous injury. Bilateral carotid atherosclerotic plaque.     CT-CHEST (THORAX) WITH    Result Date: 4/25/2021 4/25/2021 1:29 PM HISTORY/REASON FOR EXAM:  Rib fracture suspected, traumatic. TECHNIQUE/EXAM DESCRIPTION: CT scan of the chest with contrast. Helical images were obtained from the lung apices through the adrenal glands following the  bolus administration of  80 mL of Omnipaque 350 nonionic contrast. Sagittal and coronal reconstructions were performed. Low dose optimization technique was utilized for this CT exam including automated exposure control and adjustment of the mA and/or kV according to patient size. COMPARISON:  None. FINDINGS: There is atherosclerotic plaque. There is coronary artery calcification. The heart is not enlarged. No pericardial or pleural effusion is seen. There are small mediastinal lymph nodes. There is no hilar or axillary lymphadenopathy. No pneumothorax or pulmonary contusion is seen. Central airways are patent. Limited views were obtained of the upper abdomen. Hypodensity along the falciform ligament likely represents focal fat. Patient is status post median sternotomy. Degenerative changes are seen in the spine. No displaced rib fracture is identified.     No displaced rib fracture is identified. No acute cardiopulmonary process is seen. Atherosclerotic plaque including coronary artery calcification.    CT-HEAD W/O    Result Date: 4/30/2021 4/30/2021 4:27 AM HISTORY/REASON FOR EXAM: Altered mental status; evaluate cerebellar stroke, if no blood present OK for neurology to start aspirin TECHNIQUE/EXAM DESCRIPTION:  CT of the head without contrast. Sequential axial images were obtained from the vertex to the skull base without contrast. Up to date radiation dose reduction adjustments have been utilized to meet ALARA standards for radiation dose reduction. COMPARISON: April 28, 2021 FINDINGS: There is mild diffuse parenchymal volume loss observed. Periventricular and subcortical white matter low-attenuation changes are seen, most commonly associated with small vessel ischemic disease. The ventricles are normal in caliber and configuration. Low-density changes in the right cerebellar hemisphere seen.. There are no abnormal extra axial fluid collections or extra axial hemorrhage identified. The visualized paranasal  sinuses and mastoid air cells are well aerated bilaterally. No depressed calvarial fractures are identified. The visualized globes and retrobulbar soft tissues appear within normal limits.  Atherosclerotic intracranial calcifications are seen.     1.  Low-density changes in the right cerebellar hemisphere, compatible with evolving infarct, streak artifacts minimally limits evaluation of the posterior fossa for subtle subarachnoid hemorrhage, no intracranial hemorrhage is readily identified. 2.  Atherosclerosis.    CT-HEAD W/O    Result Date: 4/28/2021 4/28/2021 5:20 AM HISTORY/REASON FOR EXAM: Altered mental status TECHNIQUE/EXAM DESCRIPTION:  CT of the head without contrast. Sequential axial images were obtained from the vertex to the skull base without contrast. Up to date radiation dose reduction adjustments have been utilized to meet ALARA standards for radiation dose reduction. COMPARISON: None FINDINGS: There is moderate diffuse parenchymal volume loss observed. Periventricular and subcortical white matter low-attenuation changes are seen, most commonly associated with small vessel ischemic disease. The ventricles are normal in caliber and configuration. Area of low-density within the right cerebellar hemisphere is seen. There are no abnormal extra axial fluid collections or extra axial hemorrhage identified. The visualized paranasal sinuses and mastoid air cells are well aerated bilaterally. No depressed calvarial fractures are identified. The visualized globes and retrobulbar soft tissues appear within normal limits.     1.  Low-density in the region of the right cerebellar hemisphere, appearance compatible with evolving infarct. These findings were discussed with the patient's on-call clinician, Deniz, on 4/28/2021 6:14 AM.    CT-LSPINE W/O PLUS RECONS    Result Date: 4/25/2021 4/25/2021 1:29 PM HISTORY/REASON FOR EXAM:  Back pain after injury. TECHNIQUE/EXAM DESCRIPTION AND NUMBER OF VIEWS: CT lumbar  spine without contrast, with reconstructions. The study was performed on a [facility] CT scanner. Thin-section helical scanning was performed from T12-L1 to the sacrum. Sagittal and coronal multiplanar reconstructions were generated from the axial images. Low dose optimization technique was utilized for this CT exam including automated exposure control and adjustment of the mA and/or kV according to patient size. COMPARISON: None. FINDINGS: Alignment of the lumbar spine is normal. There is no acute fracture or subluxation. The prevertebral and paraspinous soft tissues show no acute abnormality. There is severe atherosclerosis with a mixture of soft and calcified plaque. There is lumbosacral junction vacuum disc phenomenon with endplate spurring, disc height loss The visualized sacrum and sacroiliac joints show no acute abnormality.     No CT evidence of acute traumatic injury.    CT-TSPINE W/O PLUS RECONS    Result Date: 4/25/2021 4/25/2021 1:29 PM HISTORY/REASON FOR EXAM:  Mid-back trauma Pain following injury. TECHNIQUE/EXAM DESCRIPTION AND NUMBER OF VIEWS: CT thoracic spine without contrast, with reconstructions. The study was performed on a [facility] CT scanner. Thin-section helical scanning was performed from C7-T1 through T12-L1. Sagittal and coronal multiplanar reconstructions were generated from the axial images. Low dose optimization technique was utilized for this CT exam including automated exposure control and adjustment of the mA and/or kV according to patient size. COMPARISON: None. FINDINGS: Alignment of the thoracic spine is normal. There is no acute displaced fracture. There is T6/7 interbody fusion with mild anterior wedging likely indicating remote mild compression fracture that has healed There is bulky endplate spurring with some bridging syndesmophytes in the mid thoracic spine Sternotomy wires The cervicothoracic junction appears intact. The prevertebral and paraspinous soft tissues show no acute  abnormality.     No CT evidence of acute traumatic abnormality. Mild T6/7 anterior wedging compatible with healed mild chronic compression fracture and there is interbody fusion.    DX-CERVICAL SPINE-2 OR 3 VIEWS    Result Date: 4/22/2021 4/22/2021 6:16 PM HISTORY/REASON FOR EXAM:  Atraumatic Pain. Neck pain for 4 days. TECHNIQUE/EXAM DESCRIPTION AND NUMBER OF VIEWS: Cervical spine series, 2 views. COMPARISON:  None. FINDINGS: Mild reversal of curvature centered at the C5 level. Vertebral body heights are preserved. Multilevel loss of disc height and osteophyte formation. Cervicothoracic junction is obscured. Prevertebral soft tissues are within normal limits. Odontoid is grossly intact.  Lateral masses of C1 are grossly intact.     1.  Limited exam.  Cervicothoracic junction is obscured. 2.  No gross cervical spine fracture or subluxation. 3.  Moderate multilevel degenerative changes.    DX-CHEST-PORTABLE (1 VIEW)    Result Date: 4/30/2021 4/30/2021 1:07 AM HISTORY/REASON FOR EXAM: For indication of respiratory failure; For indication of respiratory failure TECHNIQUE/EXAM DESCRIPTION:  Single AP view of the chest. COMPARISON: Yesterday FINDINGS: Position of medical devices appears stable. The cardiac silhouette appears within normal limits.  Postsurgical changes of sternotomy are noted. The mediastinal contour appears within normal limits.  The central pulmonary vasculature appears normal. Bilateral lung volumes are diminished.  Bilateral lungs are clear. No significant pleural effusions are identified. The bony structures appear age-appropriate.     1.  No acute cardiopulmonary disease.    DX-CHEST-PORTABLE (1 VIEW)    Result Date: 4/29/2021 4/29/2021 10:06 AM HISTORY/REASON FOR EXAM:  For indication of respiratory failure; For indication of respiratory failure. TECHNIQUE/EXAM DESCRIPTION AND NUMBER OF VIEWS: Single portable view of the chest. COMPARISON: 4/28/2021 FINDINGS: Endotracheal tube projects at the  level the clavicular heads. Right central line projects over the SVC. Enteric tube passes below the level of the diaphragm. The heart is not enlarged. Atherosclerotic calcification is seen. There is mild left basilar opacity likely representing atelectasis. No pleural effusion or pneumothorax is seen. Skin staples and a surgical drain project over the cervical spine.     Mild left basilar opacity may represent atelectasis. Infection not excluded. Improved aeration of the left lung. Atherosclerotic plaque.     DX-CHEST-PORTABLE (1 VIEW)    Result Date: 4/28/2021 4/28/2021 12:10 PM HISTORY/REASON FOR EXAM:  CENTRAL LINE PLACEMENT; CENTRAL LINE PLACEMENT. TECHNIQUE/EXAM DESCRIPTION AND NUMBER OF VIEWS: Single portable view of the chest. COMPARISON: 4/28/2021 11:17 AM FINDINGS: Mediastinal structures are shifted to the LEFT.  Increasing opacification of LEFT hemithorax. RIGHT lung is clear. Endotracheal tube in place with tip approximately 5 cm above the you. Feeding tube tip in place however tip is off the image. RIGHT internal jugular catheter tip at the lower SVC. No pneumothorax. Postoperative change from prior open heart surgery. Postoperative change of the neck with surgical drain present.     1.  Worsening consolidation of LEFT hemithorax with shift of mediastinal structures to the LEFT suggesting atelectasis, possibly due to endobronchial process. 2.  Supportive tubing as described above. 3.  No pneumothorax.    DX-CHEST-PORTABLE (1 VIEW)    Result Date: 4/28/2021 4/28/2021 11:16 AM HISTORY/REASON FOR EXAM:  replaced ETT; replaced ETT TECHNIQUE/EXAM DESCRIPTION AND NUMBER OF VIEWS: Single portable view of the chest. COMPARISON: 4/27/2021 FINDINGS: Endotracheal tube with tip projecting over the mid thoracic trachea. Hazy opacity in the left lower lobe Layering left pleural effusion. No pneumothorax. Stable cardiopericardial silhouette.     Endotracheal tube with tip projecting over the mid thoracic trachea.  Layering left pleural effusion with left lower lung opacity.    DX-CHEST-PORTABLE (1 VIEW)    Result Date: 4/27/2021 4/27/2021 5:30 PM HISTORY/REASON FOR EXAM:  Shortness of Breath. TECHNIQUE/EXAM DESCRIPTION AND NUMBER OF VIEWS: Single portable view of the chest. COMPARISON: None. FINDINGS: LUNGS: Hypoinflation with left basilar atelectasis. Mild perihilar interstitial prominence. No effusions. PNEUMOTHORAX: None. LINES AND TUBES: None. MEDIASTINUM: No cardiomegaly. MUSCULOSKELETAL STRUCTURES: No acute displaced fracture. Median sternotomy.     1.  Hypoinflation with left basilar atelectasis. 2.  Mild perihilar interstitial prominence may be related to hypoinflation or edema.    DX-SPINE-ANY ONE VIEW    Result Date: 4/28/2021 4/28/2021 5:30 AM HISTORY/REASON FOR EXAM:  Cervical laminectomy TECHNIQUE/EXAM DESCRIPTION AND NUMBER OF VIEWS:  Single view of the spine. Digitized Intraoperative Radiograph FINDINGS: Fixation hardware projecting over the cervical spine Fluoroscopic time:2 seconds     Digitized intraoperative radiograph is submitted for review.  This examination is not for diagnostic purpose but for guidance during a surgical procedure.    MR-BRAIN-W/O    Result Date: 4/28/2021 4/28/2021 3:31 PM HISTORY/REASON FOR EXAM:  Altered mental status; cerebellar stroke. TECHNIQUE/EXAM DESCRIPTION: MRI of the brain without contrast. T1 sagittal, T2 fast spin-echo axial, T1 coronal, FLAIR coronal, diffusion-weighted and apparent diffusion coefficient (ADC map) axial images were obtained of the whole brain. The study was performed on a [employer] 1.5 Britt MRI scanner. COMPARISON:  Head CT earlier in the day FINDINGS: Large acute right cerebellar infarct suggesting right posterior inferior cerebellar artery territory. There is prominent T2 hyperintensity consistent with cytotoxic edema. A small amount of blooming artifact in the medial aspect of the right cerebellar hemisphere on GRE images could represent a small  amount of petechial hemorrhage. There is mild localized mass effect with some mild mass effect on the fourth ventricle. No supratentorial infarct or hemorrhage. No extra-axial fluid collection. No midline shift or hydrocephalus. There is a nasogastric tube and fluid within the nasopharynx. Endotracheal tube partially visualized. Mild posterior ethmoid sinus mucosal thickening.     Acute large right cerebellar posterior inferior cerebellar artery territory infarct with possible small amount of petechial hemorrhage.    MR-CERVICAL SPINE-WITH & W/O    Result Date: 4/27/2021 4/27/2021 1:02 PM HISTORY/REASON FOR EXAM:  Neck pain, abnormal neuro exam; Neck pain, initial exam; sepsis 2/2 strep bacteremia  -unknown source. rule out possible ?? retropharyngeal abscess, ??prevertebral abscess. TECHNIQUE/EXAM DESCRIPTION: MRI of the cervical spine without and with contrast. The study was performed on a [employer] 1.5 Britt MRI scanner. T1 sagittal, T2 fast spin-echo sagittal, and gradient echo axial images were obtained of the cervical spine. T1 post-contrast fat suppressed sagittal images were obtained of the cervical spine. Optional T1 post-contrast axial images may be obtained. 15 mL ProHance contrast was administered intravenously. COMPARISON: None. FINDINGS: There is abnormal disc T2 hyperintensity at C5-6. Mild amount of bone marrow edema is noted at C5, C6 and C7. There is also focal bone marrow enhancement at C6. There is multiseptated abnormal peripherally enhancing epidural collection noted extending from C2 to the visualized lower thoracic level. Largest collection is noted in the upper thoracic posterior epidural space at the levels of T2 and T3. The lower extent of the collection is not imaged. This is causing multilevel effacement of the thecal sac and cord compression. The thoracic spinal cord is anteriorly displaced and compressed at the levels of T2 and T3. At the level of C2-3,there is small left anterior  epidural fluid collection. At the level of C3-4,there is small left anterior epidural fluid collection causing indentation of the thecal sac. At the level of C4-5,there is minimal epidural fluid collection. At the level of C5-6,there is diffuse disc bulge along with right posterior lateral epidural fluid collection causing severe canal compromise. At the level of C6-7,there is mild diffuse disc bulge. There is epidural fluid collection causing severe canal compromise. At the level of C7-T1,there is epidural fluid collection causing severe canal compromise. The visualized brain parenchyma is unremarkable. The cervical spinal cord is mildly enlarged which demonstrates mild increased intramedullary T2 signal intensity. There is abnormal T2 hyperintensity in the visualized bilateral vertebral arteries concerning for age indeterminate occlusion. There is mild amount of edema in the pre and retropharyngeal soft tissue.     1.  There is multiseptated abnormal peripherally enhancing epidural collection noted extending from C2 to the visualized lower thoracic level. Largest collection is noted in the upper thoracic posterior epidural space at the levels of T2 and T3. The lower extent of the collection is not imaged. This is causing multilevel effacement of the thecal sac and cord compression. The thoracic spinal cord is anteriorly displaced and compressed at the levels of T2 and T3. Pre and postcontrast MR examination of  the thoracic spine is recommended for further evaluation. 2.  There is effacement of the cervical thecal sac due to the multiseptated epidural fluid collection. 3.  The cervical spinal cord is mildly enlarged with minimal increased T2 signal intensity. The possibility of cord inflammation cannot be entirely excluded. 4.  Abnormal T2 hyperintensity at C5-6 disc space likely representing discitis. 5.  Abnormal bone marrow edema of C5, C6 and C7 vertebral bodies with edema concerning for osteomyelitis. 6.   There is also focal enhancement of C6 vertebral body likely secondary to the osteomyelitis. 7.  Prevertebral edema/fluid collection. 8.  Nonvisualization of flow void of bilateral vertebral arteries concerning for age-indeterminate bilateral vertebral occlusions.     MR-LUMBAR SPINE-WITH & W/O    Result Date: 4/28/2021 4/27/2021 11:23 PM HISTORY/REASON FOR EXAM:  Back pain or radiculopathy, cancer or infection suspected; Strep bacteremia, back pain, bilateral leg numbness TECHNIQUE/EXAM DESCRIPTION: MRI of the lumbar spine without and with contrast. The study was performed on a [employer] 1.5 Britt MRI scanner. T1 sagittal, T2 fast spin-echo sagittal, and T2 axial images were obtained of the lumbar spine. T1 postcontrast fat-suppressed sagittal images were obtained. 14 mL ProHance contrast was administered intravenously. COMPARISON:  None. FINDINGS: Normal lumbar lordosis. Preservation of vertebral body heights, alignment and bone marrow signal. No evidence of discitis osteomyelitis. Conus medullaris terminates at T12-L1 and is normal in signal. No mass or fluid collection is seen within the lumbar spinal canal. T12-L1: Canal and foramina are patent. L1-L2: Mild disc bulge. Canal and foramina are patent. L2-L3: Minimal disc bulge and facet degeneration. Canal and foramina are patent. L3-L4: Minimal disc bulge and facet degeneration. Canal and foramina are patent.. L4-L5: Mild disc bulge and facet degeneration. No significant spinal stenosis. Mild foraminal narrowing. L5-S1: Disc narrowing, mild disc osteophyte. No significant spinal stenosis. Moderate right and mild-to-moderate left foraminal narrowing. Distended urinary bladder.     No evidence of lumbar spine infection or epidural abscess. Mild spondylosis as detailed above without spinal stenosis.    MR-MRA HEAD-W/O    Result Date: 4/28/2021 4/28/2021 3:31 PM HISTORY/REASON FOR EXAM:  Emergency Medical Condition ? Stroke. Cerebellar infarct TECHNIQUE/EXAM  DESCRIPTION: MRA of the head ([place]) without contrast. The study was performed on a [employer] Signa 1.5 Britt MRI scanner. MRA of the [place] was performed with 3D time-of-flight technique with axial acquisition. Additional MRA of the internal carotid and vertebrobasilar arteries at the level of the skull base and craniocervical junction was also performed with 3D time-of-flight technique with axial acquisition. Images are reviewed at the PACS workstation as axial source images as well as maximum intensity ray projection (MIP) multiplanar 3D reconstructions. COMPARISON:  None FINDINGS: Nicole cavernous and supraclinoid ICA segments are patent. Patent left posterior communicating artery. MCA and ARA branches are patent. There is no significant flow within the intradural right vertebral artery. The intradural left vertebral artery, basilar artery and posterior cerebral arteries are patent. No significant aneurysm or high flow vascular malformation is seen.     Findings suggesting right vertebral artery occlusion.    MR-MRA NECK-WITH & W/O AND SEQUENCES    Result Date: 4/28/2021 4/28/2021 3:31 PM HISTORY/REASON FOR EXAM:  Emergency Medical Condition ? Stroke Cerebellar stroke TECHNIQUE/EXAM DESCRIPTION: MRA of the cervical carotid and vertebral arteries without and with contrast. The study was performed on a [employer] Signa 1.5 Britt MRI scanner. Precontrast MRA of the cervical carotid and vertebral arteries was performed with 2D time-of-flight (TOF) technique with acquisition in the axial plane. MRA of the arch origins of the great vessels, and cervical carotid and vertebral arteries was performed with 2D time-of-flight (TOF) technique with coronal slab acquisition from the level of the aortic arch to the carotid siphons during dynamic intravenous infusion of 15 mL of ProHance contrast. Images are reviewed at the PACS workstation as source images as well as maximum intensity ray projection (MIP) multiplanar  3D reconstructions. Cervical internal carotid artery percent stenosis is calculated using the standard method according to the NASCET criteria wherein a segment of uniform caliber distal cervical internal carotid is used as the reference denominator. COMPARISON:  None available. FINDINGS: The arch origins of the great vessels are intact. The cervical right vertebral artery is not well seen on the time-of-flight images. A very small caliber cervical right vertebral artery is seen on the postcontrast images with some mildly increased caliber of the artery at about the C1-C2 level. The fact  that it is not seen on the time-of-flight images and is visualized on contrast enhanced sequence could be related to small nondominant caliber with sluggish flow or could represent retrograde flow within the right vertebral artery. There may be a focal moderate stenosis in the mid cervical left internal carotid artery and mild narrowing at its origin. Mild narrowing of the proximal left internal carotid artery with less than 50% stenosis. No significant right carotid stenosis is seen.     1.  Small caliber right vertebral artery which is not visualized on the noncontrast time-of-flight technique could be related to retrograde flow or diminutive caliber and sluggish flow. 2.  Possible moderate focal stenosis in the mid cervical left vertebral artery. 3.  No significant carotid stenosis.    MR-THORACIC SPINE-WITH & W/O    Result Date: 4/28/2021 4/27/2021 11:23 PM HISTORY/REASON FOR EXAM:  Mid-back pain, compression fracture suspected; possible epidural abscess/fluid collection TECHNIQUE/EXAM DESCRIPTION: MRI of the thoracic spine without and with contrast. The study was performed on a [employer] Signa 1.5 Britt MRI scanner. T1 sagittal, T2 fast spin-echo sagittal, and T2 axial images were obtained of the thoracic spine. T1 post-contrast fat suppressed sagittal images were obtained. Optional T1 post-contrast axial images may be obtained.  14 mL ProHance contrast was administered intravenously. COMPARISON:  MRI of the cervical spine one-day prior. FINDINGS: There is abnormal dorsal epidural fluid collection seen within the partially visualized lower cervical spine and which extends inferiorly to about the T6 level. The maximum thickness is seen at about the T2-T3 level measuring 8 mm. This raises concern for epidural abscess, although epidural hematoma could also have this appearance. From T1 through T3 there is at least moderate thecal sac stenosis due to the epidural fluid collection. There is significant abnormal signal within the partially visualized  lower cervical and upper thoracic cord which could be infectious/inflammatory or other nonspecific cord edema. There is also suggestion of a small ventral epidural fluid collection at C6-C7. There is also partially visualized abnormal marrow edema in the C6 and C7 vertebral bodies as detailed on earlier MRI report. There is suggestion of some prominent enhancement around the mid and lower thoracic cord seen on the sagittal postcontrast images however limited evaluation on axial images due to motion artifact. This is of uncertain etiology but could represent some leptomeningeal disease/infection. On the sagittal T2 images there is a questionable slight nodular appearance on the surface of lower thoracic cord. A differential would be a subtle spinal dural aVF. There is no abnormal signal seen within the mid/lower thoracic cord. There is no evidence of discitis osteomyelitis within the thoracic spine. There is T6-T7 interbody ankylosis. There is mild disc degeneration and some prominent anterolateral bridging osteophytes in the mid and lower thoracic spine. No significant posterior disc herniation is seen. Within the mid and lower thoracic spine there  is no significant spinal stenosis.     1.  Posterior epidural fluid collection/abscess within the lower cervical spine extending inferiorly to about the  T6 level which could represent epidural abscess and/or hematoma. This measures 8 mm in maximal thickness at T2-T3 with at least moderate thecal sac stenosis. 2.  Abnormal signal within the cervical cord and upper thoracic cord suggesting cord edema or infectious/inflammatory etiology. 3.  Lower cervical spine findings are detailed on earlier report. 4.  Prominent enhancement along the surface of the mid and lower thoracic cord is of uncertain etiology and as detailed above, possibly representing leptomeningeal disease/infection. See details above. These findings were discussed with Dr. Cano on 4/28/2021 10:01 AM.     IR-DRAIN-BLADDER SUPRAPUB W/CATH    Result Date: 4/28/2021  HISTORY/REASON FOR EXAM:  60-year-old man with urinary retention. TECHNIQUE/EXAM DESCRIPTION AND NUMBER OF VIEWS:  Ultrasound and fluoroscopic guided suprapubic bladder catheter placement, Cystogram. MEDICATIONS: The patient remained on his ICU sedation regimen. FLUOROSCOPY TIME: 0.3 minutes; 5fluoroscopic images obtained CONTRAST: 40mL Omnipaque 300, intraurinary PROCEDURE:  Informed consent was obtained. The suprapubic region was prepped and draped in sterile manner. Following local anesthesia with copious 1% lidocaine, under ultrasound and fluoroscopic monitoring, an 18-gauge needle was advanced into the urinary bladder from a paramedian suprapubic approach. An Amplatz guidewire was placed in the urinary bladder. Serial tract dilatation was performed with a 22 Chinese telescoping dilator. A 16 Chinese [place] tip silicone Gomez type catheter was then placed in the balloon inflated with 10 mL of sterile saline. A cystogram was performed with AP and both oblique views demonstrating satisfactory position of the catheter with all side holes within the urinary bladder. The catheter was secured to the skin with 2-0 nylon suture and connected to gravity drainage. The Gomez catheter was removed. The patient tolerated the procedure well with no  evidence of complication. COMPARISON: None FINDINGS:  The cystogram shows satisfactory catheter position with all sideholes within the urinary bladder. There is no extravasation.     1.     Ultrasound and fluoroscopic guided placement of a 16 Swedish [place] tip silicone suprapubic bladder catheter. 2.     Cystogram documenting catheter placement.     DX-PORTABLE FLUOROSCOPY < 1 HOUR    Result Date: 2021 5:30 AM HISTORY/REASON FOR EXAM:  Cervical laminectomy TECHNIQUE/EXAM DESCRIPTION AND NUMBER OF VIEWS: Portable fluoroscopy for less than one hour in OR. FINDINGS: The portable fluoroscopy unit was obligated to the procedure for less than one hour. Actual fluoro time was 2 seconds.     Portable fluoroscopy utilized for 2 seconds. INTERPRETING LOCATION: [address] [place] [address], [id number]    EC-ECHOCARDIOGRAM COMPLETE W/O CONT    Result Date: 2021  Transthoracic Echo Report Echocardiography Laboratory CONCLUSIONS No prior study is available for comparison. Normal left ventricular systolic function.  Left ventricular ejection fraction is visually estimated to be 60%. Normal diastolic function. Agitated saline (bubble) study was performed. No evidence of right to left shunt by agitated saline challenge. Normal inferior vena cava size and inspiratory collapse. GILDARDO MAGANA Exam Date:         2021                    19:42 Exam Location:     Inpatient Priority:          Routine Ordering Physician:        YESICA SULLIVAN Referring Physician:       [id number] Marie Sonographer:               Faye Moya RDCS Age:    60     Gender:    M MRN:    [id number] :    1960 BSA:    1.87   Ht (in):    69     Wt (lb):    159 Exam Type:     Complete Indications:     CVA ICD Codes:       436 CPT Codes:       98014 BP:   140    /   60     HR:   74 Technical Quality:       Fair MEASUREMENTS  (Male / Female) Normal Values 2D ECHO LV Diastolic Diameter PLAX        3.8 cm                4.2 - 5.9 / 3.9 - 5.3 cm LV  Systolic Diameter PLAX         2.4 cm                2.1 - 4.0 cm IVS Diastolic Thickness           1.4 cm                LVPW Diastolic Thickness          1.4 cm                LVOT Diameter                     2 cm                  Estimated LV Ejection Fraction    60 %                  LV Ejection Fraction MOD BP       62.7 %                >= 55  % LV Ejection Fraction MOD 4C       46.6 %                LV Ejection Fraction MOD 2C       55 %                  DOPPLER AV Peak Velocity                  1.7 m/s               AV Peak Gradient                  10.9 mmHg             AV Mean Gradient                  5.5 mmHg              LVOT Peak Velocity                1.2 m/s               AV Area Cont Eq vti               2.3 cm2               Mitral E Point Velocity           0.74 m/s              Mitral E to A Ratio               0.92                  MV Pressure Half Time             69.4 ms               MV Area PHT                       3.2 cm2               MV Deceleration Time              239 ms                PV Peak Velocity                  1.1 m/s               PV Peak Gradient                  4.6 mmHg              RVOT Peak Velocity                0.66 m/s              * Indicates values subject to auto-interpretation LV EF:  60    % FINDINGS Left Ventricle Normal left ventricular chamber size. Mild concentric left ventricular hypertrophy. Normal left ventricular systolic function.  Left ventricular ejection fraction is visually estimated to be 60%. Normal diastolic function. Right Ventricle Normal right ventricular size and systolic function. Right Atrium Normal right atrial size. Normal inferior vena cava size and inspiratory collapse. Left Atrium Normal left atrial size. Agitated saline (bubble) study was performed. With Valsalva maneuver. No evidence of right to left shunt by agitated saline challenge. Existing IV was used. Mitral Valve Structurally normal mitral valve without significant stenosis  or regurgitation. Aortic Valve Aortic sclerosis without stenosis. No aortic insufficiency. Tricuspid Valve Structurally normal tricuspid valve without significant stenosis or regurgitation. Pulmonic Valve The pulmonic valve is not well visualized. No pulmonic insufficiency. Pericardium Normal pericardium without effusion. Aorta Normal aortic root for body surface area. Ascending aorta not well visualized. Zakiya Rebollar MD (Electronically Signed) Final Date:     28 April 2021                 21:30    EC-CHRISTOPHER W/O CONT    Result Date: 4/30/2021  Results Will be Available after Interpretation by Cardiologist.    DX-ABDOMEN FOR TUBE PLACEMENT    Result Date: 4/30/2021 4/30/2021 12:44 PM HISTORY/REASON FOR EXAM:  Line evaluation. TECHNIQUE/EXAM DESCRIPTION AND NUMBER OF VIEWS:  1 view(s) of the abdomen. COMPARISON:  4/28/2021 FINDINGS: Enteric tube has been placed. The tip projects over the distal stomach or duodenal bulb. There are scattered loops of air-filled bowel.     Feeding tube placement with the tip projecting over the distal stomach or duodenal bulb    DX-ABDOMEN FOR TUBE PLACEMENT    Result Date: 4/28/2021 4/28/2021 12:24 PM HISTORY/REASON FOR EXAM:  Tube evaluation. TECHNIQUE/EXAM DESCRIPTION AND NUMBER OF VIEWS:  1 view(s) of the abdomen. COMPARISON:  None. FINDINGS: Limited single view of the abdomen performed primarily to evaluate enteric tube position. The tip projects over the expected area of the distal stomach. The bowel gas pattern is within normal limits.     Feeding tube with tip projecting over the expected area of the distal stomach.      Micro:  Results     Procedure Component Value Units Date/Time    BLOOD CULTURE [[id number]] Collected: 04/27/21 0248    Order Status: Completed Specimen: Blood from Peripheral Updated: 05/02/21 1100     Significant Indicator NEG     Source BLD     Site PERIPHERAL     Culture Result No growth after 5 days of incubation.    Narrative:      Per Hospital Policy: Only  "change Specimen Src: to \"Line\" if  specified by physician order.  Left AC    BLOOD CULTURE [[id number]] Collected: 05/01/21 1305    Order Status: Completed Specimen: Blood from Peripheral Updated: 05/02/21 0738     Significant Indicator NEG     Source BLD     Site PERIPHERAL     Culture Result No Growth  Note: Blood cultures are incubated for 5 days and  are monitored continuously.Positive blood cultures  are called to the RN and reported as soon as  they are identified.      Narrative:      Collected By:[id number] LESA VIZCARRA  Per Hospital Policy: Only change Specimen Src: to \"Line\" if  specified by physician order.  Left Forearm/Arm    BLOOD CULTURE [[id number]] Collected: 05/01/21 1300    Order Status: Completed Specimen: Blood from Peripheral Updated: 05/02/21 0738     Significant Indicator NEG     Source BLD     Site PERIPHERAL     Culture Result No Growth  Note: Blood cultures are incubated for 5 days and  are monitored continuously.Positive blood cultures  are called to the RN and reported as soon as  they are identified.      Narrative:      Collected By:[id number] LESA VIZCARRA  Per Hospital Policy: Only change Specimen Src: to \"Line\" if  specified by physician order.  Right Forearm/Arm    CULTURE WOUND W/ GRAM STAIN [[id number]]  (Abnormal) Collected: 04/28/21 0642    Order Status: Completed Specimen: Wound Updated: 05/01/21 1117     Significant Indicator POS     Source WND     Site Cerival Thoracic Epidural 1     Culture Result Growth noted after further incubation, see below for  organism identification.       Gram Stain Result Few WBCs.  Rare Gram positive cocci.       Culture Result Streptococcus anginosus  Light growth      Narrative:      Surgery - swabs received    Anaerobic Culture [[id number]] Collected: 04/28/21 0642    Order Status: Completed Specimen: Wound Updated: 05/01/21 1117     Significant Indicator NEG     Source WND     Site Cerival Thoracic Epidural 1     Culture Result No Anaerobes " "isolated.    Narrative:      Surgery - swabs received    CULTURE WOUND W/ GRAM STAIN [[id number]]  (Abnormal) Collected: 04/28/21 0655    Order Status: Completed Specimen: Wound Updated: 05/01/21 1117     Significant Indicator POS     Source WND     Site Cerival Thoric Epidural 2     Culture Result Growth noted after further incubation, see below for  organism identification.       Gram Stain Result Few WBCs.  Few Gram positive cocci.       Culture Result Streptococcus anginosus  Moderate growth      Narrative:      Surgery - swabs received    Anaerobic Culture [[id number]] Collected: 04/28/21 0655    Order Status: Completed Specimen: Wound Updated: 05/01/21 1117     Significant Indicator NEG     Source WND     Site Cerival Thoric Epidural 2     Culture Result No Anaerobes isolated.    Narrative:      Surgery - swabs received    BLOOD CULTURE [[id number]]  (Abnormal) Collected: 04/27/21 0248    Order Status: Completed Specimen: Blood from Peripheral Updated: 04/30/21 1134     Significant Indicator POS     Source BLD     Site PERIPHERAL     Culture Result Growth detected by Bactec instrument. 04/29/2021  05:59      Viridans Streptococcus  Possible Contaminant  Isolated from one set only, please correlate with clinical  condition. Contact the Microbiology department within 48 hr  if identification and susceptibility are needed.      Narrative:      CALL  [facility]  [facility] tel. [phone],  CALLED  ICC tel. [phone] 04/29/2021, 06:05, RB PERF. RESULTS CALLED TO: RN  [id number]  Per Hospital Policy: Only change Specimen Src: to \"Line\" if  specified by physician order.  Right AC    BLOOD CULTURE [[id number]]  (Abnormal) Collected: 04/28/21 1134    Order Status: Completed Specimen: Blood from Peripheral Updated: 04/30/21 0957     Significant Indicator POS     Source BLD     Site PERIPHERAL     Culture Result Growth detected by Bactec instrument. 04/29/2021  21:41  Negative for Staphylococcus aureus and MRSA by PCR. Correlate  ongoing " "need for antibiotics with clinical condition.        Coagulase-negative Staphylococcus species  Possible Contaminant  Isolated from one set only, please correlate with clinical  condition. Contact the Microbiology department within 48 hr  if identification and susceptibility are needed.      Narrative:      CALL  [facility]  [facility] tel. [phone],  CALLED  ICC tel. [phone] 04/29/2021, 21:41, RB PERF. RESULTS CALLED  TO:RN:[id number]  Collected By:[id number] SAILAJA BAILEY  Per Hospital Policy: Only change Specimen Src: to \"Line\" if  specified by physician order.  Collected By:[id number] SAILAJA BAILEY    BLOOD CULTURE [[id number]] Collected: 04/28/21 1134    Order Status: Completed Specimen: Blood from Peripheral Updated: 04/29/21 0640     Significant Indicator NEG     Source BLD     Site PERIPHERAL     Culture Result No Growth  Note: Blood cultures are incubated for 5 days and  are monitored continuously.Positive blood cultures  are called to the RN and reported as soon as  they are identified.      Narrative:      Collected By:[id number] SAILAJA BAILEY  Per Hospital Policy: Only change Specimen Src: to \"Line\" if  specified by physician order.  No site indicated    GRAM STAIN [[id number]] Collected: 04/28/21 0642    Order Status: Completed Specimen: Wound Updated: 04/28/21 1854     Significant Indicator .     Source WND     Site Cerival Thoracic Epidural 1     Gram Stain Result Few WBCs.  Rare Gram positive cocci.      Narrative:      Surgery - swabs received    GRAM STAIN [[id number]] Collected: 04/28/21 0655    Order Status: Completed Specimen: Wound Updated: 04/28/21 1854     Significant Indicator .     Source WND     Site Cerival Thoric Epidural 2     Gram Stain Result Few WBCs.  Few Gram positive cocci.      Narrative:      Surgery - swabs received    BLOOD CULTURE [[id number]]  (Abnormal) Collected: 04/25/21 1433    Order Status: Completed Specimen: Blood from Peripheral Updated: 04/28/21 1122     Significant " "Indicator POS     Source BLD     Site PERIPHERAL     Culture Result Growth detected by Bactec instrument. 04/26/2021  04:57      Streptococcus anginosus  See previous culture for sensitivity report.      Narrative:      CALL  [facility]  171 tel. [phone],  CALLED  171 tel. [phone] 04/26/2021, 04:59, RB PERF. RESULTS CALLED TO: RN  [phone]  1 of 2 for Blood Culture x 2 sites order. Per Hospital  Policy: Only change Specimen Src: to \"Line\" if specified by  physician order.  No site indicated    BLOOD CULTURE [[id number]]  (Abnormal)  (Susceptibility) Collected: 04/25/21 1505    Order Status: Completed Specimen: Blood from Peripheral Updated: 04/28/21 1122     Significant Indicator POS     Source BLD     Site PERIPHERAL     Culture Result Growth detected by Bactec instrument. 04/26/2021  04:06      Streptococcus anginosus    Narrative:      CALL  [facility]  171 tel. [phone],  CALLED  171 tel. [phone] 04/26/2021, 04:09, RB PERF. RESULTS CALLED TO: RN  [id number]  2 of 2 blood culture x2  Sites order. Per Hospital Policy:  Only change Specimen Src: to \"Line\" if specified by physician  order.  No site indicated    Susceptibility     Streptococcus anginosus (1)     Antibiotic Interpretation Microscan Method Status    Penicillin Sensitive 0.064 mcg/mL E-TEST Final    Cefotaxime Sensitive 0.125 mcg/mL E-TEST Final                   E-Test [[id number]] Collected: 04/25/21 1505    Order Status: Completed Specimen: Other Updated: 04/27/21 1117     ETEST Sensitivity INTERIM    Narrative:      171 tel. [phone] 04/26/2021, 04:09, RB PERF. RESULTS CALLED TO: RN [id number]  2 of 2 blood culture x2  Sites order. Per Hospital Policy:  Only change Specimen Src: to \"Line\" if specified by physician  order.    MRSA By PCR (Amp) [[id number]] Collected: 04/26/21 0950    Order Status: Completed Specimen: Respirate from Nares Updated: 04/26/21 1343     Significant Indicator NEG     Source RESP     Site NARES     MRSA PCR Negative for MRSA by PCR.    " "Narrative:      Collected By:[id number] EMMA BAEZ  Collected By:[id number] EMMA BAEZ    SARS-CoV-2 PCR (24 hour In-House): Collect NP swab in [facility] [[id number]] Collected: 04/25/21 1448    Order Status: Completed Specimen: Respirate from Nasopharyngeal Updated: 04/26/21 1341     SARS-CoV-2 Source NP Swab     SARS-CoV-2 by PCR NotDetected     Comment: PATIENTS: Important information regarding your results and instructions can  be found at [email or web address]   \"After your  Covid-19 Test\"  [facility] providers: PLEASE REFER TO DE-ESCALATION AND RETESTING PROTOCOL  on [email or web address]  **The [employer] COVID-19 SARS-CoV-2 RT-PCR test has been made available for  use under the Emergency Use Authorization (EUA) only.         Narrative:      Have you been in close contact with a person who is suspected  or known to be positive for COVID-19 within the last 30 days  (e.g. last seen that person < 30 days ago)->Unknown          Assessment:  Active Hospital Problems    Diagnosis    • *Spinal epidural abscess [G06.1]    • History of ischemic stroke [Z86.73]    • Sepsis due to Streptococcus species (HCC) [A40.9]    • Acute bilateral back pain [M54.9]    • Thrombocytopenia (HCC) [D69.6]    • Type 2 diabetes mellitus without complication, without long-term current use of insulin (HCC) [E11.9]    • Coronary artery disease due to calcified coronary lesion: CABG x4, July 2015 [I25.10, I25.84]    • Essential hypertension, benign [I10]    • Hypokalemia [E87.6]    • Hypomagnesemia [E83.42]    • Hyponatremia [E87.1]    • Difficulty swallowing [R13.10]    • Diabetic ketoacidosis (HCC) [E11.10]    • Marijuana abuse [F12.10]    • High anion gap metabolic acidosis [E87.2]    • Tobacco abuse [Z72.0]    • Dyslipidemia [E78.5]    • Status post urethrostomy (HCC) [Z93.6]    .    Assessment:  60 y.o.  admitted 4/25/2021. Pt has a past medical history of uncontrolled diabetes, CAD status post CABG times 4/2015, marijuana use " and to arthritis.  Presented complaining of neck and back pain ongoing for approximately 1 week and fall getting out of the bathtub.  He had been seen at urgent care for back pain approximately 1 week prior to admission and given Toradol injections.       Hospital Course:   Afebrile and vitals unremarkable other than hypertension.  Leukocytosis ongoing since arrival.  MRI C-spine on 4/26 with epidural collection noted from C2-T3.  Largest collection noted in upper thoracic posterior epidural space at T2-T3.  This is causing multilevel cord compression.  Also with likely discitis at C5-6 and osteomyelitis at C5-C7.  Blood cultures on 4/25+ for Streptococcus species.       Problem List  Sepsis/shock, secondary to below, improved  -Blood cultures on 4/25 2/2 + Streptococcus anginosus, penicillin and cephalosporin susceptible  -Blood cultures on 4/27 1/2 + viridans Streptococcus   -Blood cultures on 4/28  1/2 + CoNS -likely contaminant  -Blood cultures on 5/1 are no growth to date  -TTE with no vegetations  -CHRISTOPHER on 4/30, awaiting read  VDRF-Bronchoscopy on 4/28 with thick secretions  Leukocytosis,persistent  Cervical thoracic infection, epidural abscess at C2-T3 as well as discitis and osteomyelitis, +GPCs-Streptococcus anginosus   -Repeat MRI thoracic spine on 4/27 notes posterior epidural fluid  collection/abscess in lower cervical spine extending to T6 level, also noted abnormal signal within the cervical and upper thoracic cord  -s/p OR 4/28 for see 4-T2 laminectomy, decompression of thecal sac and nerve roots as well as 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   -MRA neck with and without on 4/28, possible moderate focal stenosis in mid left CVA.  MRI head without on  4/28 with findings consistent with right vertebral artery occlusion.  MRI without on 4/28 with acute large right cerebellar infarct with small hemorrhage  Uncontrolled diabetes     Plan   Continue ceftriaxone 2 grams q24 hours,  no obvious septic emboli in the brain on imaging, anticipate a 6-week IV antibiotic course for the bacteremia and spinal infection  Repeat MRI spine prior to stopping antibiotics  FU CHRISTOPHER result-still pending  Blood cultures - NGTD    Monitor labs   Monitor and control blood sugars  Palliative care consult and goals of care discussion      Prognosis guarded

## 2021-05-03 NOTE — ASSESSMENT & PLAN NOTE
-Presented with worsening neck and upper back pain  -Secondary to epidural abscess and cord compression  -S/p laminectomy and currently on IV antibiotics  -Needs long term facility

## 2021-05-03 NOTE — ASSESSMENT & PLAN NOTE
MRI C-T-spine with evidence of epidural abscess, discitis, vertebral osteomyelitis.  Underwent emergent laminectomy, decompression 4/28.  Blood culture 4/24 and cervical thoracic 4/28 : Growth of Streptococcus anginosus.   -Repeat blood cultures has been negative  -TTE did not show any concern of infective endocarditis  -CHRISTOPHER negative  -Currently on ceftriaxone,  total duration of 6 weeks last date would be 06/11  -Bronchoscopy with BAL 05/06/21, c/s NGTD, Blood c/s 5/7/21: NGTD  -ID on board

## 2021-05-03 NOTE — ASSESSMENT & PLAN NOTE
-Resolved  -Plt 47 on 4/26. This is likely due to sepsis, though lower on the differential includes previously undiagnosed infection.  Hep panel negative. HIV non reactive

## 2021-05-03 NOTE — PROCEDURES
Intubation    Date/Time: 5/3/2021 12:54 PM  Performed by: Arsen Sarmiento M.D.  Authorized by: Arsen Sarmiento M.D.     Consent:     Consent obtained:  Emergent situation  Pre-procedure details:     Patient status:  Unresponsive    Paralytics:  Rocuronium  Procedure details:     Preoxygenation:  Bag valve mask    CPR in progress: no      Intubation method:  Oral    Laryngoscope type:  GlideScope    Laryngoscope blade:  Mac 3    Tube size (mm):  8.0    Tube type:  Cuffed    Number of attempts:  1    Tube visualized through cords: yes    Placement assessment:     ETT to lip:  27    Tube secured with:  ETT oquendo    Breath sounds:  Equal    Placement verification: chest rise, CXR verification, equal breath sounds and ETCO2 detector    Post-procedure details:     Patient tolerance of procedure:  Tolerated well, no immediate complications

## 2021-05-03 NOTE — ASSESSMENT & PLAN NOTE
-MRI C/T-spine with abnormal peripherally enhancing epidural collection extending from C2 to visualize lower thoracic 11.  Largest collection in the upper thoracic posterior epidural space at the level of T2-T3.  Lower extent of the collection is causing multilevel effacement of the thecal sac and cord compression.    -Underwent emergent laminectomy of C3-C4 C5-C6, C6/C7, T1-T2 laminectomy, decompression of thecal sac and nerves by Dr. Hazel 4/28/2021.   -Epidural drain removed on 5/03  -Goal blood pressure systolic of less than 160  -Palliative care consulted for goals of care discussion: Family wants full treatment for now

## 2021-05-03 NOTE — ASSESSMENT & PLAN NOTE
-History of perineal urethrostomy 2018  -Acute urinary retention post surgical intervention 4/28  -Unable to place Gomez catheter through urethrostomy.    -Suprapubic catheter placement by interventional radiology

## 2021-05-03 NOTE — ASSESSMENT & PLAN NOTE
-Found to have acute change in mental status 4/27/2021.   -CT head w/o concerning for evolving right cerebellar infarct.   -MRA brain, MRA neck: Findings suggesting right vertebral artery occlusion. Possible moderate focal stenosis in the mid cervical left vertebral artery.  TTE with bubble study: EF 60%, no evidence of right-to-left shunt, no valve lesions.  CHRISTOPHER negative  Goal euthermia, normotension, eunatremia  -Might need Long term care facility  - on aspirin and DVT prophylaxis  -Hold statin currently given elevated LFTs

## 2021-05-03 NOTE — PROCEDURES
"Procedures      Date of Service: 5/3/2021    Procedure:  Diagnostic and therapeutic bronchoscopy.    Indication: Aspiration, acute hypoxemic respiratroy failure    Physician:  Dr. Arsen Sarmiento MD    Post Procedure Diagnosis:  1.  Aspiration   2.  Multiple mucus plugs      Narrative:  Appropriate consent was obtained and \"time out\" was performed.  A flexible fiberoptic bronchoscope was then inserted through the ETT without difficulty.  All airways were evaluated to the sub-segmental level.  The airway mucosa was normal.  No endobronchial lesions were seen.  Thick mucopurulent secretions were therapeutically aspirated from the RUL, RLL, RML, and LLL .   No immediate complications.  EBL = 0.      Arsen Sarmiento M.D.        "

## 2021-05-03 NOTE — THERAPY
Occupational Therapy Contact Note    Attempted OT tx, pt without  in room. Will attempt once  is here and cleared for OOB activity.    [place] Orlando, OTR?L

## 2021-05-03 NOTE — PROGRESS NOTES
Neurosurgery Progress Note    Subjective:  Hospital day#9  POD#5 C3-T2 lami for epidural abscess  Patient with sepsis, positive blood cultures  No neuro changes. Will follow minimally on right upper  Brain MRI shows large right cerebellar PICA infarct   Head CT 21 shows no reperfusion hemorrhage     Exam:  intubated   Following simple commands on right upper.  Minimal on left upper.  No movement to legs  Pupils equal   Will open eyes to voice.    BP  Min: 133/60  Max: 136/64  Pulse  Av.9  Min: 57  Max: 73  Resp  Av.3  Min: 20  Max: 55  Temp  Av.2 °C (97.2 °F)  Min: 36.1 °C (97 °F)  Max: 36.4 °C (97.6 °F)  SpO2  Av.2 %  Min: 92 %  Max: 100 %    No data recorded    Recent Labs     21   WBC 15.7* 13.9* 14.0*   RBC 3.44* 3.38* 3.53*   HEMOGLOBIN 10.5* 10.3* 10.6*   HEMATOCRIT 30.8* 31.1* 32.5*   MCV 89.5 92.0 92.1   MCH 30.5 30.5 30.0   MCHC 34.1 33.1* 32.6*   RDW 47.0 48.7 49.0   PLATELETCT 276 328 322   MPV 12.1 12.4 12.5     Recent Labs     21  031   SODIUM 130* 133* 132*   POTASSIUM 4.3 4.9 5.6*   CHLORIDE 99 100 100   CO2 26 28 26   GLUCOSE 238* 250* 313*   BUN 37* 37* 41*   CREATININE 0.58 0.58 0.59   CALCIUM 7.9* 7.9* 7.9*               Intake/Output       21 - 21 - 21 Total  Total       Intake    I.V.  146.7  232.7 379.4  --  -- --    Precedex Volume 79.9 232.7 312.6 -- -- --    Propofol Volume 66.8 -- 66.8 -- -- --    Other  390  -- 390  --  -- --    Medications (PO/Enteral Liquids) 240 -- 240 -- -- --    Flush / Irrigation Volume (Catheter Flush) 150 -- 150 -- -- --    NG/GT  540  630 1170  --  -- --    Intake (mL) (Enteral Tube 21 Cortrak - Gastric Left nare)  -- -- --    Total Intake 1076.7 862.7 1939.4 -- -- --       Output    Urine  500  750 1250  --  -- --    Output (mL) (Urethral Catheter Other  (Comment) 16 Fr.)  -- -- --    Drains  10  30 40  --  -- --    Output (mL) (Closed/Suction Drain 1 Midline;Superior Back Ld Knowles 7 Fr.) 10 30 40 -- -- --    Residual Amount (mL) (Discarded) (Enteral Tube 04/28/21 Cortrak - Gastric Left nare) -- 0 0 -- -- --    Stool  --  -- --  --  -- --    Number of Times Stooled 1 x 1 x 2 x -- -- --    Total Output  -- -- --       Net I/O     566.7 82.7 649.4 -- -- --            Intake/Output Summary (Last 24 hours) at 5/3/2021 0752  Last data filed at 5/3/2021 0600  Gross per 24 hour   Intake 1939.4 ml   Output 1290 ml   Net 649.4 ml            • insulin glargine  24 Units Q EVENING   • dexmedetomidine (PRECEDEX) infusion  0.1-1.5 mcg/kg/hr Continuous   • aspirin  324 mg DAILY   • acetylcysteine  3-4 mL Q4H PRN (RT)   • albuterol  2.5 mg Q4H PRN (RT)   • MD Alert...Adult ICU Electrolyte Replacement per Pharmacy   PHARMACY TO DOSE   • lidocaine  1-2 mL Q30 MIN PRN   • insulin regular  2-9 Units Q6HRS    And   • dextrose 50%  50 mL Q15 MIN PRN   • cefTRIAXone (ROCEPHIN) IV  2 g DAILY   • magnesium hydroxide  30 mL QDAY PRN   • ondansetron  4 mg Q4HRS PRN   • oxyCODONE immediate-release  10 mg Q4HRS PRN   • polyethylene glycol/lytes  1 Packet BID PRN   • promethazine  12.5-25 mg Q4HRS PRN   • senna-docusate  1 tablet Q24HRS PRN   • MD ALERT...DO NOT ADMINISTER NSAIDS or ASPIRIN unless ORDERED By Neurosurgery  1 Each PRN   • bisacodyl  10 mg Q24HRS PRN   • fleet  1 Each Once PRN   • Respiratory Therapy Consult   Continuous RT   • ondansetron  4 mg Q4HRS PRN   • promethazine  12.5-25 mg Q4HRS PRN   • prochlorperazine  5-10 mg Q4HRS PRN   • norepinephrine (Levophed) infusion  0-30 mcg/min Continuous   • ipratropium-albuterol  3 mL Q4H PRN (RT)   • labetalol  10 mg Q4HRS PRN   • Pharmacy  1 Each PHARMACY TO DOSE   • acetaminophen  1,000 mg Q6HRS   • ARIPiprazole  15 mg BID   • atorvastatin  80 mg Nightly   • carBAMazepine  200 mg TID   • cyanocobalamin   1,000 mcg DAILY   • senna-docusate  1 tablet Nightly   • vitamin D  5,000 Units DAILY   • docusate sodium  100 mg BID   • famotidine  20 mg BID    Or   • famotidine  20 mg BID   • fentaNYL  50 mcg Q15 MIN PRN    And   • fentaNYL  100 mcg Q15 MIN PRN    And   • fentaNYL   Continuous   • lidocaine  3 Patch Q24HR       Assessment and Plan:  Hospital day #9  POD #5  No further neurosurgical interventions planned.  Stroke care per neurology.   Abx per ID  Okay for ASA  Neurosurgery signing off. Contact [facility] prn    Prophylactic anticoagulation: Yes        Start date/time: Now

## 2021-05-03 NOTE — THERAPY
Missed Therapy     Patient Name: Perry Davis  Age:  60 y.o., Sex:  male  Medical Record #: [id number]  Today's Date: 5/3/2021           05/03/21 0916   Interdisciplinary Plan of Care Collaboration   Collaboration Comments Defer PT, awaiting    Session Information   Date / Session Number  5/3/2021 (H)

## 2021-05-03 NOTE — PROGRESS NOTES
[facility] Progress Note      Admit Date: 4/25/2021    Resident(s): Lissette Singh M.D.   Attending:  BREANNA ALCARAZ/ Dr. Sarmiento    Patient ID:    Name:  Perry Davis   YOB: 1960  Age:  60 y.o.  male   MRN:  [id number]    Hospital Course (carried forward and updated):  Perry Davis is a 60 y.o. male with a previous history of uncontrolled Diabetes -Hb1C 13 , HLD, CAD s/p CABGx4  2015, tobacco use ,OA, chronic low pain admitted  4/25 with epidural abscess w/ cord compression, discitis involving cervical and thoracic spine--> underwent emergent laminectomy and decompression by Dr. Hazel 4/28.  Hospital course complicated by strep anginosus bacteremia on Ceftriaxone, followed by ID and acute right cerebellar stroke.  Patient currently intubated    Consultants:  Critical Care  Neurosurgery  Urology  Infectious disease    Interval Events:  No acute events overnight  -Patient is currently intubated but moves his head and extremities.   -Failed SAT on 05/02  -Currently on IV ceftriaxone, anticipating 6-week course of treatment as per ID  -Potassium this morning was 5.6, trended down to 5.4 on repeat BMP  -LFTs are trending up, will discontinue statin for now and watch.   -Adjusting insulin regimen for controlling blood sugar levels  -Epidural drain removed by neurosurgery today    Vitals Range last 24h:  Temp:  [36.1 °C (97 °F)-36.4 °C (97.6 °F)] 36.1 °C (97 °F)  Pulse:  [57-68] 60  Resp:  [20-55] 23  BP: (130-133)/(60-63) 130/63  SpO2:  [92 %-100 %] 92 %      Intake/Output Summary (Last 24 hours) at 5/3/2021 1014  Last data filed at 5/3/2021 1000  Gross per 24 hour   Intake 2008.4 ml   Output 1115 ml   Net 893.4 ml        ROS  Unable to obtain as the patient is intubated    PHYSICAL EXAM:  Vitals:    05/03/21 0700 05/03/21 0800 05/03/21 0900 05/03/21 1000   BP:  130/63     Pulse:  (!) 58 61 60   Resp:  (!) 26 20 (!) 23   Temp:  36.1 °C (97 °F)     TempSrc:  Temporal     SpO2: 96% 97% 98% 92%    Weight:       Height:        Body mass index is 29.25 kg/m².    O2 therapy: Pulse Oximetry: 92 %, O2 Delivery Device: Ventilator    Date 05/03/21 0700 - 05/04/21 0659   Shift 1649-1495 3122-7241 5324-6222 24 Hour Total   INTAKE   I.V. 134   134     Precedex Volume 134   134   NG/   180     Intake (mL) (Enteral Tube 04/28/21 Cortrak - Gastric Left nare) 180   180   Shift Total 314   314   OUTPUT   Shift Total          314        Physical Exam   HENT:   Head: Normocephalic and atraumatic.   Eyes: Pupils are equal, round, and reactive to light. Conjunctivae are normal.   Cardiovascular: Normal rate and regular rhythm.   Pulmonary/Chest: Effort normal and breath sounds normal.   Abdominal: Soft. He exhibits no distension. There is no abdominal tenderness.   Musculoskeletal:      Cervical back: Normal range of motion.   Neurological:   Moves head sideways spontaneously  Unable to assess full neuro exam   No response to noxious stimuli     Skin: Skin is warm.       Recent Labs     05/01/21  0226   ISTATAPH 7.526*   ISTATAPCO2 34.5   ISTATAPO2 76   ISTATATCO2 30   FYBZFPO1ICO 97   ISTATARTHCO3 28.6*   ISTATARTBE 6*   ISTATTEMP 36.9 C   ISTATFIO2 40   ISTATSPEC Arterial   ISTATAPHTC 7.528*   ECKULAAH9QN 75     Recent Labs     05/01/21  0615 05/01/21  0615 05/02/21 0253 05/03/21 0314 05/03/21  0800   SODIUM 130*   < > 133* 132* 128*   POTASSIUM 4.3   < > 4.9 5.6* 5.4   CHLORIDE 99   < > 100 100 97   CO2 26   < > 28 26 27   BUN 37*   < > 37* 41* 44*   CREATININE 0.58   < > 0.58 0.59 0.64   MAGNESIUM 2.2  --  2.3 2.4  --    PHOSPHORUS 2.3*  --  2.6  --   --    CALCIUM 7.9*   < > 7.9* 7.9* 8.0*    < > = values in this interval not displayed.     Recent Labs     05/02/21  0253 05/03/21  0314 05/03/21  0800   ALTSGPT 40 88* 88*   ASTSGOT 64* 99* 97*   ALKPHOSPHAT 172* 233* 243*   TBILIRUBIN 0.3 0.4 0.3   GLUCOSE 250* 313* 329*     Recent Labs     05/01/21  0615 05/02/21  0253 05/03/21  0314   RBC 3.44* 3.38*  3.53*   HEMOGLOBIN 10.5* 10.3* 10.6*   HEMATOCRIT 30.8* 31.1* 32.5*   PLATELETCT 276 328 322     Recent Labs     05/01/21  0615 05/01/21  0615 05/02/21  0253 05/03/21  0314 05/03/21  0800   WBC 15.7*  --  13.9* 14.0*  --    NEUTSPOLYS 79.30*  --  77.10* 78.60*  --    LYMPHOCYTES 11.10*  --  12.90* 12.30*  --    MONOCYTES 7.70  --  8.20 7.60  --    EOSINOPHILS 0.10  --  0.10 0.10  --    BASOPHILS 0.10  --  0.10 0.10  --    ASTSGOT 34   < > 64* 99* 97*   ALTSGPT 24   < > 40 88* 88*   ALKPHOSPHAT 126*   < > 172* 233* 243*   TBILIRUBIN 0.3   < > 0.3 0.4 0.3    < > = values in this interval not displayed.       Meds:  • enoxaparin (LOVENOX) injection  40 mg     • insulin glargine  30 Units     • insulin regular  2-9 Units      And   • dextrose 50%  50 mL     • dexmedetomidine (PRECEDEX) infusion  0.1-1.5 mcg/kg/hr 1.5 mcg/kg/hr (05/03/21 0924)   • aspirin  324 mg     • acetylcysteine  3-4 mL     • albuterol  2.5 mg     • MD Alert...Adult ICU Electrolyte Replacement per Pharmacy       • lidocaine  1-2 mL     • cefTRIAXone (ROCEPHIN) IV  2 g Stopped (05/03/21 0541)   • magnesium hydroxide  30 mL     • ondansetron  4 mg     • oxyCODONE immediate-release  10 mg     • polyethylene glycol/lytes  1 Packet     • promethazine  12.5-25 mg     • senna-docusate  1 tablet     • MD ALERT...DO NOT ADMINISTER NSAIDS or ASPIRIN unless ORDERED By Neurosurgery  1 Each     • bisacodyl  10 mg     • fleet  1 Each     • Respiratory Therapy Consult       • ondansetron  4 mg     • promethazine  12.5-25 mg     • prochlorperazine  5-10 mg     • ipratropium-albuterol  3 mL     • labetalol  10 mg     • Pharmacy  1 Each     • acetaminophen  1,000 mg     • ARIPiprazole  15 mg     • carBAMazepine  200 mg     • cyanocobalamin  1,000 mcg     • senna-docusate  1 tablet     • vitamin D  5,000 Units     • docusate sodium  100 mg     • famotidine  20 mg      Or   • famotidine  20 mg     • fentaNYL  50 mcg      And   • fentaNYL  100 mcg      And   • fentaNYL    Stopped (04/28/21 2118)   • lidocaine  3 Patch          Procedures:  -Decompressive laminectomy on 4/28  -Bronchoalveolar lavage on 428  -Central line placement on 5/01      Imaging:  [id number] VIEW   Final Result      No dilated bowel      DX-ABDOMEN FOR TUBE PLACEMENT   Final Result      Feeding tube placement with the tip projecting over the distal stomach or duodenal bulb      EC-CHRISTOPHER W/O CONT         CT-HEAD W/O   Final Result         1.  Low-density changes in the right cerebellar hemisphere, compatible with evolving infarct, streak artifacts minimally limits evaluation of the posterior fossa for subtle subarachnoid hemorrhage, no intracranial hemorrhage is readily identified.   2.  Atherosclerosis.      DX-CHEST-PORTABLE (1 VIEW)   Final Result         1.  No acute cardiopulmonary disease.      DX-CHEST-PORTABLE (1 VIEW)   Final Result      Mild left basilar opacity may represent atelectasis. Infection not excluded.      Improved aeration of the left lung.      Atherosclerotic plaque.         EC-ECHOCARDIOGRAM COMPLETE W/O CONT   Final Result      IR-DRAIN-BLADDER SUPRAPUB W/CATH   Final Result      1.     Ultrasound and fluoroscopic guided placement of a 16 [place] tip silicone suprapubic bladder catheter.      2.     Cystogram documenting catheter placement.         MR-BRAIN-W/O   Final Result      Acute large right cerebellar posterior inferior cerebellar artery territory infarct with possible small amount of petechial hemorrhage.      MR-MRA HEAD-W/O   Final Result      Findings suggesting right vertebral artery occlusion.      MR-MRA NECK-WITH & W/O AND SEQUENCES   Final Result      1.  Small caliber right vertebral artery which is not visualized on the noncontrast time-of-flight technique could be related to retrograde flow or diminutive caliber and sluggish flow.   2.  Possible moderate focal stenosis in the mid cervical left vertebral artery.   3.  No significant carotid stenosis.       DX-ABDOMEN FOR TUBE PLACEMENT   Final Result         Feeding tube with tip projecting over the expected area of the distal stomach.      DX-CHEST-PORTABLE (1 VIEW)   Final Result      1.  Worsening consolidation of LEFT hemithorax with shift of mediastinal structures to the LEFT suggesting atelectasis, possibly due to endobronchial process.   2.  Supportive tubing as described above.   3.  No pneumothorax.      DX-CHEST-PORTABLE (1 VIEW)   Final Result         Endotracheal tube with tip projecting over the mid thoracic trachea.      Layering left pleural effusion with left lower lung opacity.      DX-SPINE-ANY ONE VIEW   Final Result      Digitized intraoperative radiograph is submitted for review.  This examination is not for diagnostic purpose but for guidance during a surgical procedure.      DX-PORTABLE FLUOROSCOPY < 1 HOUR   Final Result      Portable fluoroscopy utilized for 2 seconds.         INTERPRETING LOCATION: [address] [place] [address], [facility], [facility]      CT-HEAD W/O   Final Result         1.  Low-density in the region of the right cerebellar hemisphere, appearance compatible with evolving infarct.      These findings were discussed with the patient's on-call clinician, Deniz, on 4/28/2021 6:14 AM.      MR-THORACIC SPINE-WITH & W/O   Final Result      1.  Posterior epidural fluid collection/abscess within the lower cervical spine extending inferiorly to about the T6 level which could represent epidural abscess and/or hematoma. This measures 8 mm in maximal thickness at T2-T3 with at least moderate    thecal sac stenosis.   2.  Abnormal signal within the cervical cord and upper thoracic cord suggesting cord edema or infectious/inflammatory etiology.   3.  Lower cervical spine findings are detailed on earlier report.   4.  Prominent enhancement along the surface of the mid and lower thoracic cord is of uncertain etiology and as detailed above, possibly representing leptomeningeal disease/infection. See details  above.   These findings were discussed with Dr. Cano on 4/28/2021 10:01 AM.         MR-LUMBAR SPINE-WITH & W/O   Final Result      No evidence of lumbar spine infection or epidural abscess.      Mild spondylosis as detailed above without spinal stenosis.      DX-CHEST-PORTABLE (1 VIEW)   Final Result      1.  Hypoinflation with left basilar atelectasis.   2.  Mild perihilar interstitial prominence may be related to hypoinflation or edema.      MR-CERVICAL SPINE-WITH & W/O   Final Result      1.  There is multiseptated abnormal peripherally enhancing epidural collection noted extending from C2 to the visualized lower thoracic level. Largest collection is noted in the upper thoracic posterior epidural space at the levels of T2 and T3. The    lower extent of the collection is not imaged. This is causing multilevel effacement of the thecal sac and cord compression. The thoracic spinal cord is anteriorly displaced and compressed at the levels of T2 and T3. Pre and postcontrast MR examination of    the thoracic spine is recommended for further evaluation.   2.  There is effacement of the cervical thecal sac due to the multiseptated epidural fluid collection.   3.  The cervical spinal cord is mildly enlarged with minimal increased T2 signal intensity. The possibility of cord inflammation cannot be entirely excluded.   4.  Abnormal T2 hyperintensity at C5-6 disc space likely representing discitis.   5.  Abnormal bone marrow edema of C5, C6 and C7 vertebral bodies with edema concerning for osteomyelitis.   6.  There is also focal enhancement of C6 vertebral body likely secondary to the osteomyelitis.   7.  Prevertebral edema/fluid collection.   8.  Nonvisualization of flow void of bilateral vertebral arteries concerning for age-indeterminate bilateral vertebral occlusions.         CT-CHEST (THORAX) WITH   Final Result      No displaced rib fracture is identified.      No acute cardiopulmonary process is seen.       Atherosclerotic plaque including coronary artery calcification.      CT-TSPINE W/O PLUS RECONS   Final Result      No CT evidence of acute traumatic abnormality.      Mild T6/7 anterior wedging compatible with healed mild chronic compression fracture and there is interbody fusion.      CT-LSPINE W/O PLUS RECONS   Final Result      No CT evidence of acute traumatic injury.      CT-CSPINE WITHOUT PLUS RECONS   Final Result      Multilevel degenerative changes.      Prevertebral edema. Further evaluation with MRI is recommended as this can be seen in the setting of ligamentous injury.      Bilateral carotid atherosclerotic plaque.             ASSESSEMENT and PLAN:    * Spinal epidural abscess- (present on admission)  Assessment & Plan  -MRI C/T-spine with abnormal peripherally enhancing epidural collection extending from C2 to visualize lower thoracic 11.  Largest collection in the upper thoracic posterior epidural space at the level of T2-T3.  Lower extent of the collection is causing multilevel effacement of the thecal sac and cord compression.    -Underwent emergent laminectomy of C3-C4 C5-C6, C6/C7, T1-T2 laminectomy, decompression of thecal sac and nerves by Dr. Hazel 4/28/2021.   -Epidural drain removed on 5/03  -Goal blood pressure systolic of less than 160       History of ischemic stroke  Assessment & Plan  -Found to have acute change in mental status 4/27/2021.   -CT head w/o concerning for evolving right cerebellar infarct.   -MRA brain, MRA neck: Findings suggesting right vertebral artery occlusion. Possible moderate focal stenosis in the mid cervical left vertebral artery.  TTE with bubble study: EF 60%, no evidence of right-to-left shunt, no valve lesions.  CHRISTOPHER report pending   Goal euthermia, normotension, eunatremia  PT, OT, SLP evaluation as able  Neurology following  -Started on aspirin and DVT prophylaxis  -Hold statin currently given elevated LFTs    Sepsis due to Streptococcus species (HCC)-  (present on admission)  Assessment & Plan  MRI C-T-spine with evidence of epidural abscess, discitis, vertebral osteomyelitis.  Underwent emergent laminectomy, decompression 4/28.  Blood culture 4/24 and cervical thoracic 4/28 : Growth of Streptococcus anginosus.   -Repeat blood cultures has been negative  -TTE did not show any concern of infective endocarditis  -CHRISTOPHER done, report pending  -Currently on IV ceftriaxone, anticipating total of 6 weeks antibiotic course from the date of first negative blood culture  -ID on board     Thrombocytopenia (HCC)- (present on admission)  Assessment & Plan  -Resolved  -Plt 47 on 4/26. This is likely due to sepsis, though lower on the differential includes previously undiagnosed infection.  Hep panel negative. HIV non reactive    Acute bilateral back pain- (present on admission)  Assessment & Plan  -Presented with worsening neck and upper back pain  -Secondary to epidural abscess and cord compression  -S/p laminectomy and currently on IV antibiotics  -PT/ OT once stable    Type 2 diabetes mellitus without complication, without long-term current use of insulin ([facility])- (present on admission)  Assessment & Plan  -Chronic history of diabetes  -Last HbA1c is 12.2 from 4/2021  -Continue Lantus 30 units along with high intensity sliding scale, will adjust insulin regimen based on blood glucose levels  -Hypoglycemia protocol     Essential hypertension, benign- (present on admission)  Assessment & Plan  -Noncompliance issues  -Blood pressure meds on hold given hypotension    Coronary artery disease due to calcified coronary lesion: CABG x4, July 2015- (present on admission)  Assessment & Plan  -Chronic   -Continue aspirin, statin on hold given elevated LFTs     Status post urethrostomy ([facility])- (present on admission)  Assessment & Plan  -History of perineal urethrostomy 2018  -Acute urinary retention post surgical intervention 4/28  -Unable to place Gomez catheter through urethrostomy.     -Suprapubic catheter placement by interventional radiology    Hyponatremia- (present on admission)  Assessment & Plan  -Mild hyponatremia , likely chronic   Likely SIADH.  Serum Osm 264, urine osm 745, urine Na 70  -Currently pseudohyponatremia given elevated blood glucose level    Difficulty swallowing- (present on admission)  Assessment & Plan  -Presented with difficulty swallowing, is coughing and vomiting when swallowing large/hard foods     -SLP evaluation post extubation  -Aspiration and seizure precautions  -Soft GI diet      DISPO: ICU    CODE STATUS: Full code    Quality Measures:  Feeding: Tube feedings  Analgesia: None  Sedation: Fentanyl and Precedex  Thromboprophylaxis: Lovenox  Head of bed: >30 degrees  Ulcer prophylaxis: Famotidine  Glycemic control: Correctional: High sliding scale/ Basal: 30 units  Bowel care: bowel regimen  Indwelling lines: Central line and peripheral line  Deescalation of antibiotics: IV ceftriaxone      Lissette Singh M.D.

## 2021-05-03 NOTE — CARE PLAN
Vent Day # 6  8.0 @ 27   20/430/8+/40%     Ventilator settings changed this shift: None     Weaning trials: none due to failed SATs     Respiratory Procedures: none      Plan: Continue current ventilator settings and wean mechanical ventilation as tolerated per physician orders.

## 2021-05-03 NOTE — RESPIRATORY CARE
Extubation    Cuff leak noted: Yes  Stridor present: None     Patient toleration: Poor; Coded and promptly reintubated

## 2021-05-03 NOTE — ASSESSMENT & PLAN NOTE
-Presented with difficulty swallowing, is coughing and vomiting when swallowing large/hard foods  -Complicated by stroke   -Currently on coretrak for tube feeds, consulted GI for PEG tube

## 2021-05-04 ENCOUNTER — APPOINTMENT (OUTPATIENT)
Dept: RADIOLOGY | Facility: MEDICAL CENTER | Age: 61
DRG: 003 | End: 2021-05-04
Attending: STUDENT IN AN ORGANIZED HEALTH CARE EDUCATION/TRAINING PROGRAM
Payer: MEDICARE

## 2021-05-04 PROBLEM — I46.9 CARDIAC ARREST (HCC): Status: ACTIVE | Noted: 2021-05-04

## 2021-05-04 LAB
ALBUMIN SERPL BCP-MCNC: 1.8 G/DL (ref 3.2–4.9)
ALBUMIN/GLOB SERPL: 0.5 G/DL
ALP SERPL-CCNC: 237 U/L (ref 30–99)
ALT SERPL-CCNC: 199 U/L (ref 2–50)
ANION GAP SERPL CALC-SCNC: 6 MMOL/L (ref 7–16)
AST SERPL-CCNC: 287 U/L (ref 12–45)
BASE EXCESS BLDA CALC-SCNC: 6 MMOL/L (ref -4–3)
BASOPHILS # BLD AUTO: 0.1 % (ref 0–1.8)
BASOPHILS # BLD: 0.01 K/UL (ref 0–0.12)
BILIRUB SERPL-MCNC: 0.3 MG/DL (ref 0.1–1.5)
BODY TEMPERATURE: ABNORMAL DEGREES
BREATHS SETTING VENT: 24
BUN SERPL-MCNC: 38 MG/DL (ref 8–22)
CALCIUM SERPL-MCNC: 7.8 MG/DL (ref 8.5–10.5)
CHLORIDE SERPL-SCNC: 105 MMOL/L (ref 96–112)
CO2 BLDA-SCNC: 35 MMOL/L (ref 20–33)
CO2 SERPL-SCNC: 28 MMOL/L (ref 20–33)
CREAT SERPL-MCNC: 0.58 MG/DL (ref 0.5–1.4)
DELSYS IDSYS: ABNORMAL
END TIDAL CARBON DIOXIDE IECO2: 30 MMHG
EOSINOPHIL # BLD AUTO: 0.01 K/UL (ref 0–0.51)
EOSINOPHIL NFR BLD: 0.1 % (ref 0–6.9)
ERYTHROCYTE [DISTWIDTH] IN BLOOD BY AUTOMATED COUNT: 49.8 FL (ref 35.9–50)
GLOBULIN SER CALC-MCNC: 3.9 G/DL (ref 1.9–3.5)
GLUCOSE BLD-MCNC: 189 MG/DL (ref 65–99)
GLUCOSE BLD-MCNC: 224 MG/DL (ref 65–99)
GLUCOSE BLD-MCNC: 231 MG/DL (ref 65–99)
GLUCOSE SERPL-MCNC: 247 MG/DL (ref 65–99)
HCO3 BLDA-SCNC: 32.8 MMOL/L (ref 17–25)
HCT VFR BLD AUTO: 34.3 % (ref 42–52)
HGB BLD-MCNC: 10.8 G/DL (ref 14–18)
HOROWITZ INDEX BLDA+IHG-RTO: 65 MM[HG]
IMM GRANULOCYTES # BLD AUTO: 0.22 K/UL (ref 0–0.11)
IMM GRANULOCYTES NFR BLD AUTO: 1.2 % (ref 0–0.9)
LYMPHOCYTES # BLD AUTO: 1.71 K/UL (ref 1–4.8)
LYMPHOCYTES NFR BLD: 9 % (ref 22–41)
MAGNESIUM SERPL-MCNC: 2.1 MG/DL (ref 1.5–2.5)
MCH RBC QN AUTO: 30.1 PG (ref 27–33)
MCHC RBC AUTO-ENTMCNC: 31.5 G/DL (ref 33.7–35.3)
MCV RBC AUTO: 95.5 FL (ref 81.4–97.8)
MODE IMODE: ABNORMAL
MONOCYTES # BLD AUTO: 1.26 K/UL (ref 0–0.85)
MONOCYTES NFR BLD AUTO: 6.6 % (ref 0–13.4)
NEUTROPHILS # BLD AUTO: 15.81 K/UL (ref 1.82–7.42)
NEUTROPHILS NFR BLD: 83 % (ref 44–72)
NRBC # BLD AUTO: 0 K/UL
NRBC BLD-RTO: 0 /100 WBC
O2/TOTAL GAS SETTING VFR VENT: 100 %
PCO2 BLDA: 58.6 MMHG (ref 26–37)
PCO2 TEMP ADJ BLDA: 57.3 MMHG (ref 26–37)
PEEP END EXPIRATORY PRESSURE IPEEP: 14 CMH20
PH BLDA: 7.36 [PH] (ref 7.4–7.5)
PH TEMP ADJ BLDA: 7.36 [PH] (ref 7.4–7.5)
PLATELET # BLD AUTO: 378 K/UL (ref 164–446)
PMV BLD AUTO: 12.5 FL (ref 9–12.9)
PO2 BLDA: 65 MMHG (ref 64–87)
PO2 TEMP ADJ BLDA: 63 MMHG (ref 64–87)
POTASSIUM SERPL-SCNC: 5.3 MMOL/L (ref 3.6–5.5)
PROT SERPL-MCNC: 5.7 G/DL (ref 6–8.2)
RBC # BLD AUTO: 3.59 M/UL (ref 4.7–6.1)
SAO2 % BLDA: 91 % (ref 93–99)
SODIUM SERPL-SCNC: 139 MMOL/L (ref 135–145)
SPECIMEN DRAWN FROM PATIENT: ABNORMAL
TIDAL VOLUME IVT: 430 ML
TRIGL SERPL-MCNC: 191 MG/DL (ref 0–149)
WBC # BLD AUTO: 19 K/UL (ref 4.8–10.8)

## 2021-05-04 PROCEDURE — 85025 COMPLETE CBC W/AUTO DIFF WBC: CPT

## 2021-05-04 PROCEDURE — 94799 UNLISTED PULMONARY SVC/PX: CPT

## 2021-05-04 PROCEDURE — 84478 ASSAY OF TRIGLYCERIDES: CPT

## 2021-05-04 PROCEDURE — 700111 HCHG RX REV CODE 636 W/ 250 OVERRIDE (IP): Performed by: NURSE PRACTITIONER

## 2021-05-04 PROCEDURE — A9270 NON-COVERED ITEM OR SERVICE: HCPCS | Performed by: STUDENT IN AN ORGANIZED HEALTH CARE EDUCATION/TRAINING PROGRAM

## 2021-05-04 PROCEDURE — 82803 BLOOD GASES ANY COMBINATION: CPT

## 2021-05-04 PROCEDURE — 37799 UNLISTED PX VASCULAR SURGERY: CPT

## 2021-05-04 PROCEDURE — 302977 HCHG BRONCHOSCOPY PROC-THERAPEUTIC

## 2021-05-04 PROCEDURE — 83735 ASSAY OF MAGNESIUM: CPT

## 2021-05-04 PROCEDURE — 700105 HCHG RX REV CODE 258: Performed by: INTERNAL MEDICINE

## 2021-05-04 PROCEDURE — 700111 HCHG RX REV CODE 636 W/ 250 OVERRIDE (IP): Performed by: INTERNAL MEDICINE

## 2021-05-04 PROCEDURE — 0BJ08ZZ INSPECTION OF TRACHEOBRONCHIAL TREE, VIA NATURAL OR ARTIFICIAL OPENING ENDOSCOPIC: ICD-10-PCS | Performed by: PSYCHIATRY & NEUROLOGY

## 2021-05-04 PROCEDURE — 94003 VENT MGMT INPAT SUBQ DAY: CPT

## 2021-05-04 PROCEDURE — 82962 GLUCOSE BLOOD TEST: CPT | Mod: 91

## 2021-05-04 PROCEDURE — 700102 HCHG RX REV CODE 250 W/ 637 OVERRIDE(OP): Performed by: STUDENT IN AN ORGANIZED HEALTH CARE EDUCATION/TRAINING PROGRAM

## 2021-05-04 PROCEDURE — 93926 LOWER EXTREMITY STUDY: CPT | Mod: RT

## 2021-05-04 PROCEDURE — 770022 HCHG ROOM/CARE - ICU (200)

## 2021-05-04 PROCEDURE — 31645 BRNCHSC W/THER ASPIR 1ST: CPT | Performed by: PSYCHIATRY & NEUROLOGY

## 2021-05-04 PROCEDURE — 99291 CRITICAL CARE FIRST HOUR: CPT | Mod: 25,GC | Performed by: PSYCHIATRY & NEUROLOGY

## 2021-05-04 PROCEDURE — 700111 HCHG RX REV CODE 636 W/ 250 OVERRIDE (IP): Performed by: PSYCHIATRY & NEUROLOGY

## 2021-05-04 PROCEDURE — 80053 COMPREHEN METABOLIC PANEL: CPT

## 2021-05-04 PROCEDURE — 99233 SBSQ HOSP IP/OBS HIGH 50: CPT | Performed by: INTERNAL MEDICINE

## 2021-05-04 RX ORDER — DEXTROSE MONOHYDRATE 25 G/50ML
50 INJECTION, SOLUTION INTRAVENOUS
Status: DISCONTINUED | OUTPATIENT
Start: 2021-05-04 | End: 2021-05-14 | Stop reason: HOSPADM

## 2021-05-04 RX ADMIN — INSULIN HUMAN 4 UNITS: 100 INJECTION, SOLUTION PARENTERAL at 17:27

## 2021-05-04 RX ADMIN — ARIPIPRAZOLE 15 MG: 10 TABLET ORAL at 17:54

## 2021-05-04 RX ADMIN — CYANOCOBALAMIN TAB 500 MCG 1000 MCG: 500 TAB at 05:30

## 2021-05-04 RX ADMIN — FENTANYL CITRATE 100 MCG: 50 INJECTION, SOLUTION INTRAMUSCULAR; INTRAVENOUS at 08:50

## 2021-05-04 RX ADMIN — FENTANYL CITRATE 100 MCG: 50 INJECTION, SOLUTION INTRAMUSCULAR; INTRAVENOUS at 22:07

## 2021-05-04 RX ADMIN — ACETAMINOPHEN 1000 MG: 500 TABLET ORAL at 05:32

## 2021-05-04 RX ADMIN — FAMOTIDINE 20 MG: 20 TABLET ORAL at 17:54

## 2021-05-04 RX ADMIN — FENTANYL CITRATE 100 MCG: 50 INJECTION, SOLUTION INTRAMUSCULAR; INTRAVENOUS at 19:19

## 2021-05-04 RX ADMIN — CARBAMAZEPINE 200 MG: 200 TABLET ORAL at 01:33

## 2021-05-04 RX ADMIN — CARBAMAZEPINE 200 MG: 200 TABLET ORAL at 09:27

## 2021-05-04 RX ADMIN — CEFTRIAXONE SODIUM 2 G: 2 INJECTION, POWDER, FOR SOLUTION INTRAMUSCULAR; INTRAVENOUS at 05:34

## 2021-05-04 RX ADMIN — PROPOFOL 20 MCG/KG/MIN: 10 INJECTION, EMULSION INTRAVENOUS at 05:26

## 2021-05-04 RX ADMIN — Medication 5000 UNITS: at 05:32

## 2021-05-04 RX ADMIN — ARIPIPRAZOLE 15 MG: 10 TABLET ORAL at 05:30

## 2021-05-04 RX ADMIN — FAMOTIDINE 20 MG: 20 TABLET ORAL at 05:30

## 2021-05-04 RX ADMIN — CARBAMAZEPINE 200 MG: 200 TABLET ORAL at 17:54

## 2021-05-04 RX ADMIN — FENTANYL CITRATE 100 MCG: 50 INJECTION, SOLUTION INTRAMUSCULAR; INTRAVENOUS at 01:33

## 2021-05-04 RX ADMIN — INSULIN GLARGINE 30 UNITS: 100 INJECTION, SOLUTION SUBCUTANEOUS at 17:27

## 2021-05-04 RX ADMIN — PROPOFOL 20 MCG/KG/MIN: 10 INJECTION, EMULSION INTRAVENOUS at 12:55

## 2021-05-04 RX ADMIN — ASPIRIN 324 MG: 81 TABLET, CHEWABLE ORAL at 05:32

## 2021-05-04 RX ADMIN — INSULIN HUMAN 4 UNITS: 100 INJECTION, SOLUTION PARENTERAL at 11:18

## 2021-05-04 RX ADMIN — ACETAMINOPHEN 1000 MG: 500 TABLET ORAL at 01:33

## 2021-05-04 ASSESSMENT — ENCOUNTER SYMPTOMS: FEVER: 0

## 2021-05-04 NOTE — PROCEDURES
"Procedures    Date of Service: 5/4/2021    Procedure:  Diagnostic and therapeutic bronchoscopy .    Indication: Acute hypoxemic resp failure, atelectasis    Physician:  Dr. Arsen Sarmiento MD    Post Procedure Diagnosis:  1.  Acute hypoxemic resp failure, atelectasis    Narrative:  Appropriate consent was obtained and \"time out\" was performed.  A flexible fiberoptic bronchoscope was then inserted through the ETT without difficulty.  All airways were evaluated to the sub-segmental level.  The airway mucosa was normal appearing.  No endobronchial lesions were seen. No evidence of secretions.    No immediate complications.  EBL = 0.      Arsen Sarmiento M.D.          "

## 2021-05-04 NOTE — PROGRESS NOTES
[facility] Progress Note      Admit Date: 4/25/2021    Resident(s): Lissette Singh M.D.   Attending:  BREANNA ALCARAZ/ Dr. Sarmiento    Patient ID:    Name:  Perry Davis   YOB: 1960  Age:  60 y.o.  male   MRN:  [id number]    Hospital Course (carried forward and updated):  Perry Davis is a 60 y.o. male with a previous history of uncontrolled Diabetes -Hb1C 13 , HLD, CAD s/p CABGx4  2015, tobacco use ,OA, chronic low pain admitted  4/25 with epidural abscess w/ cord compression, discitis involving cervical and thoracic spine--> underwent emergent laminectomy and decompression by Dr. Hazel 4/28.  Hospital course complicated by strep anginosus bacteremia on Ceftriaxone, followed by ID and acute right cerebellar stroke.  Patient currently intubated    Consultants:  Critical Care  Neurosurgery  Urology  Infectious disease    Interval Events:  Patient had cardiac arrest yesterday around afternoon, ROSC achieved in 4 minutes. Most likely secondary to aspiration, bronchoscopy at bed side showed multiple mucous plugs. CTA chest showed no PE.   -Patient re intubated again, on appropriate vent settings   -On iv ceftriaxone, day 9, needs 6 weeks antibiotic course as per ID   -Avoiding hepatotoxic meds given trans aminitis  -Palliative care consulted for goals of care     Vitals Range last 24h:  Temp:  [36.1 °C (97 °F)-37.2 °C (99 °F)] 36.2 °C (97.2 °F)  Pulse:  [61-91] 72  Resp:  [9-54] 24  BP: ()/(51-75) 110/55  SpO2:  [56 %-99 %] 97 %      Intake/Output Summary (Last 24 hours) at 5/4/2021 1000  Last data filed at 5/4/2021 0800  Gross per 24 hour   Intake 1208.67 ml   Output 2025 ml   Net -816.33 ml        ROS  Unable to obtain as intubated     PHYSICAL EXAM:  Vitals:    05/04/21 0700 05/04/21 0800 05/04/21 0855 05/04/21 0958   BP:  110/55     Pulse: 73 67 71 72   Resp: (!) 26 (!) 24 (!) 24 (!) 24   Temp:  36.2 °C (97.2 °F)     TempSrc:  Temporal     SpO2: 97% 90% 92% 97%   Weight:       Height:         Body mass index is 29.25 kg/m².    O2 therapy: Pulse Oximetry: 97 %, O2 Delivery Device: Ventilator    Date 05/04/21 0700 - 05/05/21 0659   Shift 4849-0292 4341-2499 0152-1310 24 Hour Total   INTAKE   I.V. 28.7   28.7     Propofol Volume 21.6   21.6     Norepinephrine Volume 7.1   7.1   Other 30   30     Medications (PO/Enteral Liquids) 30   30   NG/GT 90   90     Intake (mL) (Enteral Tube 05/03/21 Cortrak - Gastric) 90   90   Shift Total 148.7   148.7   OUTPUT   Urine 450   450     Output (mL) (Urethral Catheter Other (Comment) 16 Fr.) 450   450   Stool         Number of Times Stooled 1 x   1 x   Shift Total 450   450   NET -301.3   -301.3        Physical Exam   Constitutional:   Intubated     HENT:   Head: Normocephalic.   Eyes: Pupils are equal, round, and reactive to light. Conjunctivae are normal.   Cardiovascular: Normal rate, regular rhythm and normal heart sounds.   Pulmonary/Chest: Effort normal and breath sounds normal.   Abdominal: Soft. He exhibits no distension.   Neurological:   Flaccid in all extremities  Unable to assess full neuro exam    Skin: Skin is warm.       Recent Labs     05/03/21  1219 05/04/21  0611   ISTATAPH 7.258* 7.355*   ISTATAPCO2 60.8* 58.6*   ISTATAPO2 55* 65   ISTATATCO2 29 35*   MKKFSTU5RHX 82* 91*   ISTATARTHCO3 27.1* 32.8*   ISTATARTBE -1 6*   ISTATTEMP see below 97.7 F   ISTATFIO2 100 100   ISTATSPEC Arterial Arterial   ISTATAPHTC  --  7.363*   YGHILAYH3HF  --  63*     Recent Labs     05/02/21  0253 05/02/21  0253 05/03/21  0314 05/03/21  0314 05/03/21  0800 05/03/21  1229 05/04/21  0230   SODIUM 133*   < > 132*   < > 128* 136 139   POTASSIUM 4.9   < > 5.6*   < > 5.4 5.0 5.3   CHLORIDE 100   < > 100   < > 97 103 105   CO2 28   < > 26   < > 27 24 28   BUN 37*   < > 41*   < > 44* 44* 38*   CREATININE 0.58   < > 0.59   < > 0.64 0.67 0.58   MAGNESIUM 2.3  --  2.4  --   --   --  2.1   PHOSPHORUS 2.6  --   --   --   --   --   --    CALCIUM 7.9*   < > 7.9*   < > 8.0* 9.1 7.8*     < > = values in this interval not displayed.     Recent Labs     05/03/21 0800 05/03/21 1156 05/03/21 1229 05/04/21  0230   ALTSGPT 88*  --  125* 199*   ASTSGOT 97*  --  171* 287*   ALKPHOSPHAT 243*  --  282* 237*   TBILIRUBIN 0.3  --  0.3 0.3   PREALBUMIN  --  15.2*  --   --    GLUCOSE 329*  --  325* 247*     Recent Labs     05/03/21 0314 05/03/21 1156 05/04/21  0230   RBC 3.53* 3.97* 3.59*   HEMOGLOBIN 10.6* 12.0* 10.8*   HEMATOCRIT 32.5* 38.8* 34.3*   PLATELETCT 322 352 378     Recent Labs     05/03/21 0314 05/03/21 0314 05/03/21 0800 05/03/21 1156 05/03/21 1229 05/04/21  0230   WBC 14.0*  --   --  21.2*  --  19.0*   NEUTSPOLYS 78.60*  --   --  63.20  --  83.00*   LYMPHOCYTES 12.30*  --   --  26.30  --  9.00*   MONOCYTES 7.60  --   --  9.60  --  6.60   EOSINOPHILS 0.10  --   --  0.00  --  0.10   BASOPHILS 0.10  --   --  0.00  --  0.10   ASTSGOT 99*   < > 97*  --  171* 287*   ALTSGPT 88*   < > 88*  --  125* 199*   ALKPHOSPHAT 233*   < > 243*  --  282* 237*   TBILIRUBIN 0.4   < > 0.3  --  0.3 0.3    < > = values in this interval not displayed.       Meds:  • insulin regular  3-14 Units      And   • dextrose 50%  50 mL     • enoxaparin (LOVENOX) injection  40 mg     • insulin glargine  30 Units     • norepinephrine (Levophed) infusion  0-30 mcg/min 1 mcg/min (05/04/21 0830)   • propofol  0-80 mcg/kg/min 20 mcg/kg/min (05/04/21 0526)   • aspirin  324 mg     • acetylcysteine  3-4 mL     • albuterol  2.5 mg     • MD Alert...Adult ICU Electrolyte Replacement per Pharmacy       • lidocaine  1-2 mL     • cefTRIAXone (ROCEPHIN) IV  2 g Stopped (05/04/21 0604)   • magnesium hydroxide  30 mL     • ondansetron  4 mg     • oxyCODONE immediate-release  10 mg     • polyethylene glycol/lytes  1 Packet     • promethazine  12.5-25 mg     • senna-docusate  1 tablet     • MD ALERT...DO NOT ADMINISTER NSAIDS or ASPIRIN unless ORDERED By Neurosurgery  1 Each     • bisacodyl  10 mg     • fleet  1 Each     • Respiratory  Therapy Consult       • ondansetron  4 mg     • promethazine  12.5-25 mg     • prochlorperazine  5-10 mg     • ipratropium-albuterol  3 mL     • labetalol  10 mg     • Pharmacy  1 Each     • [Held by provider] acetaminophen  1,000 mg     • ARIPiprazole  15 mg     • carBAMazepine  200 mg     • cyanocobalamin  1,000 mcg     • senna-docusate  1 tablet     • vitamin D  5,000 Units     • docusate sodium  100 mg     • famotidine  20 mg      Or   • famotidine  20 mg     • fentaNYL  50 mcg      And   • fentaNYL  100 mcg      And   • fentaNYL   Stopped (04/28/21 2118)   • lidocaine  3 Patch          Procedures:  -Bronchoscopy on 05/03  -Decompressive laminectomy on 4/28  -Central line placement on 5/01      Imaging:  DX-ABDOMEN FOR TUBE PLACEMENT   Final Result      Feeding tube tip projects over the distal stomach or first portion of the duodenum.      CT-CSPINE WITHOUT PLUS RECONS   Final Result      1.  Postsurgical changes C3-T1 laminectomies.   2.  New irregular linear and mottled lucency in the anterior C5 vertebral body which is likely related to osteomyelitis.   3.  Prevertebral soft tissue swelling.   4.  Extensive known extensive epidural abscess seen on prior MRI is not adequately evaluated on CT.      CT-CTA CHEST PULMONARY ARTERY W/ RECONS   Final Result      1.  No evidence of pulmonary embolism.      2.  Bilateral lower lobe atelectasis or pneumonia, left greater than right.      3.  Small left pleural effusion.      4.  CABG changes.            DX-CHEST-PORTABLE (1 VIEW)   Final Result      Endotracheal tube terminates approximately 2.5 cm above the you.      Atelectasis with left lung aeration. No pleural effusion or pneumothorax.      [id number] VIEW   Final Result      No dilated bowel      DX-ABDOMEN FOR TUBE PLACEMENT   Final Result      Feeding tube placement with the tip projecting over the distal stomach or duodenal bulb      EC-CHRISTOPHER W/O CONT         CT-HEAD W/O   Final Result         1.   Low-density changes in the right cerebellar hemisphere, compatible with evolving infarct, streak artifacts minimally limits evaluation of the posterior fossa for subtle subarachnoid hemorrhage, no intracranial hemorrhage is readily identified.   2.  Atherosclerosis.      DX-CHEST-PORTABLE (1 VIEW)   Final Result         1.  No acute cardiopulmonary disease.      DX-CHEST-PORTABLE (1 VIEW)   Final Result      Mild left basilar opacity may represent atelectasis. Infection not excluded.      Improved aeration of the left lung.      Atherosclerotic plaque.         EC-ECHOCARDIOGRAM COMPLETE W/O CONT   Final Result      IR-DRAIN-BLADDER SUPRAPUB W/CATH   Final Result      1.     Ultrasound and fluoroscopic guided placement of a 16 [place] tip silicone suprapubic bladder catheter.      2.     Cystogram documenting catheter placement.         MR-BRAIN-W/O   Final Result      Acute large right cerebellar posterior inferior cerebellar artery territory infarct with possible small amount of petechial hemorrhage.      MR-MRA HEAD-W/O   Final Result      Findings suggesting right vertebral artery occlusion.      MR-MRA NECK-WITH & W/O AND SEQUENCES   Final Result      1.  Small caliber right vertebral artery which is not visualized on the noncontrast time-of-flight technique could be related to retrograde flow or diminutive caliber and sluggish flow.   2.  Possible moderate focal stenosis in the mid cervical left vertebral artery.   3.  No significant carotid stenosis.      DX-ABDOMEN FOR TUBE PLACEMENT   Final Result         Feeding tube with tip projecting over the expected area of the distal stomach.      DX-CHEST-PORTABLE (1 VIEW)   Final Result      1.  Worsening consolidation of LEFT hemithorax with shift of mediastinal structures to the LEFT suggesting atelectasis, possibly due to endobronchial process.   2.  Supportive tubing as described above.   3.  No pneumothorax.      DX-CHEST-PORTABLE (1 VIEW)   Final Result          Endotracheal tube with tip projecting over the mid thoracic trachea.      Layering left pleural effusion with left lower lung opacity.      DX-SPINE-ANY ONE VIEW   Final Result      Digitized intraoperative radiograph is submitted for review.  This examination is not for diagnostic purpose but for guidance during a surgical procedure.      DX-PORTABLE FLUOROSCOPY < 1 HOUR   Final Result      Portable fluoroscopy utilized for 2 seconds.         INTERPRETING LOCATION: [facility], [place], [address]      CT-HEAD W/O   Final Result         1.  Low-density in the region of the right cerebellar hemisphere, appearance compatible with evolving infarct.      These findings were discussed with the patient's on-call clinician, Deniz, on 4/28/2021 6:14 AM.      MR-THORACIC SPINE-WITH & W/O   Final Result      1.  Posterior epidural fluid collection/abscess within the lower cervical spine extending inferiorly to about the T6 level which could represent epidural abscess and/or hematoma. This measures 8 mm in maximal thickness at T2-T3 with at least moderate    thecal sac stenosis.   2.  Abnormal signal within the cervical cord and upper thoracic cord suggesting cord edema or infectious/inflammatory etiology.   3.  Lower cervical spine findings are detailed on earlier report.   4.  Prominent enhancement along the surface of the mid and lower thoracic cord is of uncertain etiology and as detailed above, possibly representing leptomeningeal disease/infection. See details above.   These findings were discussed with Dr. Cano on 4/28/2021 10:01 AM.         MR-LUMBAR SPINE-WITH & W/O   Final Result      No evidence of lumbar spine infection or epidural abscess.      Mild spondylosis as detailed above without spinal stenosis.      DX-CHEST-PORTABLE (1 VIEW)   Final Result      1.  Hypoinflation with left basilar atelectasis.   2.  Mild perihilar interstitial prominence may be related to hypoinflation or edema.      MR-CERVICAL  SPINE-WITH & W/O   Final Result      1.  There is multiseptated abnormal peripherally enhancing epidural collection noted extending from C2 to the visualized lower thoracic level. Largest collection is noted in the upper thoracic posterior epidural space at the levels of T2 and T3. The    lower extent of the collection is not imaged. This is causing multilevel effacement of the thecal sac and cord compression. The thoracic spinal cord is anteriorly displaced and compressed at the levels of T2 and T3. Pre and postcontrast MR examination of    the thoracic spine is recommended for further evaluation.   2.  There is effacement of the cervical thecal sac due to the multiseptated epidural fluid collection.   3.  The cervical spinal cord is mildly enlarged with minimal increased T2 signal intensity. The possibility of cord inflammation cannot be entirely excluded.   4.  Abnormal T2 hyperintensity at C5-6 disc space likely representing discitis.   5.  Abnormal bone marrow edema of C5, C6 and C7 vertebral bodies with edema concerning for osteomyelitis.   6.  There is also focal enhancement of C6 vertebral body likely secondary to the osteomyelitis.   7.  Prevertebral edema/fluid collection.   8.  Nonvisualization of flow void of bilateral vertebral arteries concerning for age-indeterminate bilateral vertebral occlusions.         CT-CHEST (THORAX) WITH   Final Result      No displaced rib fracture is identified.      No acute cardiopulmonary process is seen.      Atherosclerotic plaque including coronary artery calcification.      CT-TSPINE W/O PLUS RECONS   Final Result      No CT evidence of acute traumatic abnormality.      Mild T6/7 anterior wedging compatible with healed mild chronic compression fracture and there is interbody fusion.      CT-LSPINE W/O PLUS RECONS   Final Result      No CT evidence of acute traumatic injury.      CT-CSPINE WITHOUT PLUS RECONS   Final Result      Multilevel degenerative changes.       Prevertebral edema. Further evaluation with MRI is recommended as this can be seen in the setting of ligamentous injury.      Bilateral carotid atherosclerotic plaque.         US-EXTREMITY ARTERY LOWER UNILAT W/DAVID (COMBO) RIGHT    (Results Pending)       ASSESSEMENT and PLAN:    * Spinal epidural abscess- (present on admission)  Assessment & Plan  -MRI C/T-spine with abnormal peripherally enhancing epidural collection extending from C2 to visualize lower thoracic 11.  Largest collection in the upper thoracic posterior epidural space at the level of T2-T3.  Lower extent of the collection is causing multilevel effacement of the thecal sac and cord compression.    -Underwent emergent laminectomy of C3-C4 C5-C6, C6/C7, T1-T2 laminectomy, decompression of thecal sac and nerves by Dr. Hazel 4/28/2021.   -Epidural drain removed on 5/03  -Goal blood pressure systolic of less than 160  -Palliative care consulted for goals of care discussion        History of ischemic stroke  Assessment & Plan  -Found to have acute change in mental status 4/27/2021.   -CT head w/o concerning for evolving right cerebellar infarct.   -MRA brain, MRA neck: Findings suggesting right vertebral artery occlusion. Possible moderate focal stenosis in the mid cervical left vertebral artery.  TTE with bubble study: EF 60%, no evidence of right-to-left shunt, no valve lesions.  CHRISTOPHER report pending   Goal euthermia, normotension, eunatremia  PT, OT, SLP evaluation as able  Neurology following  -Started on aspirin and DVT prophylaxis  -Hold statin currently given elevated LFTs    Sepsis due to Streptococcus species (HCC)- (present on admission)  Assessment & Plan  MRI C-T-spine with evidence of epidural abscess, discitis, vertebral osteomyelitis.  Underwent emergent laminectomy, decompression 4/28.  Blood culture 4/24 and cervical thoracic 4/28 : Growth of Streptococcus anginosus.   -Repeat blood cultures has been negative  -TTE did not show any concern  of infective endocarditis  -CHRISTOPHER done, report pending  -Currently on IV ceftriaxone, anticipating total of 6 weeks antibiotic course from the date of first negative blood culture  -ID on board     Cardiac arrest ([facility])  Assessment & Plan  -Patient had cardiac arrest on 05/03, ROSC achieved in 4 mins after CPR and  Epinephrine  -Most likely secondary to aspiration, bed side bronchoscopy showed multiple mucous plugs   -Re intubated again     Thrombocytopenia ([facility])- (present on admission)  Assessment & Plan  -Resolved  -Plt 47 on 4/26. This is likely due to sepsis, though lower on the differential includes previously undiagnosed infection.  Hep panel negative. HIV non reactive    Acute bilateral back pain- (present on admission)  Assessment & Plan  -Presented with worsening neck and upper back pain  -Secondary to epidural abscess and cord compression  -S/p laminectomy and currently on IV antibiotics  -PT/ OT once stable    Type 2 diabetes mellitus without complication, without long-term current use of insulin ([facility])- (present on admission)  Assessment & Plan  -Chronic history of diabetes  -Last HbA1c is 12.2 from 4/2021  -Continue Lantus 30 units along with high intensity sliding scale, will adjust insulin regimen based on blood glucose levels  -Hypoglycemia protocol     Essential hypertension, benign- (present on admission)  Assessment & Plan  -Noncompliance issues  -Blood pressure meds on hold given hypotension    Coronary artery disease due to calcified coronary lesion: CABG x4, July 2015- (present on admission)  Assessment & Plan  -Chronic   -Continue aspirin, statin on hold given elevated LFTs     Status post urethrostomy ([facility])- (present on admission)  Assessment & Plan  -History of perineal urethrostomy 2018  -Acute urinary retention post surgical intervention 4/28  -Unable to place Gomez catheter through urethrostomy.    -Suprapubic catheter placement by interventional radiology    Hyponatremia- (present on  admission)  Assessment & Plan  -Mild hyponatremia , likely chronic   Likely SIADH.  Serum Osm 264, urine osm 745, urine Na 70  -Currently pseudohyponatremia given elevated blood glucose level    Difficulty swallowing- (present on admission)  Assessment & Plan  -Presented with difficulty swallowing, is coughing and vomiting when swallowing large/hard foods     -SLP evaluation post extubation  -Aspiration and seizure precautions  -Currently on tube feeds      DISPO: ICU    CODE STATUS: Full code    Quality Measures:  Feeding: tube feeds  Analgesia: none   Sedation: propafol  Thromboprophylaxis: lovenox   Head of bed: >30 degrees  Ulcer prophylaxis: famotidine   Glycemic control: lantus and SSI  Bowel care: bowel regimen  Indwelling lines: central and peripheral   Deescalation of antibiotics: iv ceftriaxone       Lissette Singh M.D.

## 2021-05-04 NOTE — PROGRESS NOTES
Infectious Disease Progress Note    Author: La Nena Khan M.D. Date & Time of service: 2021  11:25 AM    Chief Complaint:  Sepsis and cervical abscess  CVA     Interval History:  60 y.o.  admitted 2021. Pt has a past medical history of uncontrolled diabetes, CAD status post CABG times 2015, marijuana use and to arthritis.  Presented complaining of neck and back pain ongoing for approximately 1 week and fall getting out of the bathtub.  He had been seen at urgent care for back pain approximately 1 week prior to admission and given Toradol injections.     AF, O2 Vent 12/70%, pressors still on the trending down, pt continues to be agitated, not moving any extremities and concern for quadriplegia due to CVA.    AF, O2 Vent 8/40%, pressors off, agitated, without meaningful limb movement.  Decreasing vent requirements and no longer in shock.   AF, O2 Vent 8/40%, stable on low vent settings no significant secretions per nursing.  He continues to be agitated and with no upper or lower extremity movement.    5/3 AF WBC 14 remains intubated and sedated Agitation with decrease sedation. Epidural drain removed FiO2 0.4   AF WBC 19 code blue yesterday-mucus plugs found. S/p bronch this am-on 100%FiO2 On pressors    Review of Systems:  Review of Systems   Unable to perform ROS: Intubated   Constitutional: Negative for fever.       Hemodynamics:  Temp (24hrs), Av.6 °C (97.8 °F), Min:36.1 °C (97 °F), Max:37.2 °C (99 °F)  Temperature: 36.2 °C (97.1 °F)  Pulse  Av.5  Min: 57  Max: 121   Blood Pressure: 102/54, Arterial BP: 120/61       Physical Exam:  Physical Exam  Vitals and nursing note reviewed.   Constitutional:       Appearance: He is ill-appearing. He is not toxic-appearing or diaphoretic.   HENT:      Mouth/Throat:      Pharynx: No oropharyngeal exudate.   Eyes:      General: No scleral icterus.  Neck:      Comments: Right IJ  Cardiovascular:      Rate and Rhythm: Normal rate and  regular rhythm.   Pulmonary:      Effort: Pulmonary effort is normal. No respiratory distress.      Breath sounds: No stridor.      Comments: Coarse breath sounds  Abdominal:      General: There is no distension.      Palpations: Abdomen is soft.   Musculoskeletal:      Right lower leg: No edema.      Left lower leg: No edema.   Lymphadenopathy:      Cervical: No cervical adenopathy.   Skin:     General: Skin is warm and dry.      Coloration: Skin is not jaundiced.      Findings: Bruising present.   Neurological:      Comments: Sedated  quadriplegic         Meds:    Current Facility-Administered Medications:   •  insulin regular **AND** POC blood glucose manual result **AND** NOTIFY MD and PharmD **AND** dextrose 50%  •  enoxaparin (LOVENOX) injection  •  insulin glargine  •  norepinephrine (Levophed) infusion  •  propofol **AND** Triglycerides Starting now and then Every 3 Days  •  aspirin  •  acetylcysteine  •  albuterol  •  MD Alert...Adult ICU Electrolyte Replacement per Pharmacy  •  lidocaine  •  cefTRIAXone (ROCEPHIN) IV  •  magnesium hydroxide  •  ondansetron  •  oxyCODONE immediate-release  •  polyethylene glycol/lytes  •  promethazine  •  senna-docusate  •  MD ALERT...DO NOT ADMINISTER NSAIDS or ASPIRIN unless ORDERED By Neurosurgery  •  bisacodyl  •  fleet  •  Respiratory Therapy Consult  •  ondansetron  •  promethazine  •  prochlorperazine  •  ipratropium-albuterol  •  labetalol  •  Pharmacy  •  [Held by provider] acetaminophen  •  ARIPiprazole  •  carBAMazepine  •  cyanocobalamin  •  senna-docusate  •  vitamin D  •  docusate sodium  •  famotidine **OR** famotidine  •  fentaNYL **AND** fentaNYL **AND** fentaNYL **AND** [DISCONTINUED] propofol **AND** Triglyceride  •  lidocaine    Labs:  Recent Labs     05/03/21  0314 05/03/21  1156 05/04/21  0230   WBC 14.0* 21.2* 19.0*   RBC 3.53* 3.97* 3.59*   HEMOGLOBIN 10.6* 12.0* 10.8*   HEMATOCRIT 32.5* 38.8* 34.3*   MCV 92.1 97.7 95.5   MCH 30.0 30.2 30.1   RDW  49.0 52.0* 49.8   PLATELETCT 322 352 378   MPV 12.5 12.7 12.5   NEUTSPOLYS 78.60* 63.20 83.00*   LYMPHOCYTES 12.30* 26.30 9.00*   MONOCYTES 7.60 9.60 6.60   EOSINOPHILS 0.10 0.00 0.10   BASOPHILS 0.10 0.00 0.10   RBCMORPHOLO  --  Present  --      Recent Labs     05/03/21  0800 05/03/21  1229 05/04/21  0230   SODIUM 128* 136 139   POTASSIUM 5.4 5.0 5.3   CHLORIDE 97 103 105   CO2 27 24 28   GLUCOSE 329* 325* 247*   BUN 44* 44* 38*     Recent Labs     05/03/21  0800 05/03/21  1229 05/04/21  0230   ALBUMIN 2.2* 2.2* 1.8*   TBILIRUBIN 0.3 0.3 0.3   ALKPHOSPHAT 243* 282* 237*   TOTPROTEIN 6.3 6.5 5.7*   ALTSGPT 88* 125* 199*   ASTSGOT 97* 171* 287*   CREATININE 0.64 0.67 0.58       Imaging:  CT-CSPINE WITHOUT PLUS RECONS    Result Date: 4/25/2021 4/25/2021 1:29 PM HISTORY/REASON FOR EXAM: History of back and neck pain. Fall. TECHNIQUE/EXAM DESCRIPTION: CT cervical spine without contrast, with reconstructions. Helical scanning was performed from the skull base through T1.  Sagittal and coronal multiplanar reconstructions were generated from the axial images. Low dose optimization technique was utilized for this CT exam including automated exposure control and adjustment of the mA and/or kV according to patient size. COMPARISON:  None available. FINDINGS: No compression fracture is identified. There is an ununited fracture of the spinous process of T1. Intervertebral disc space narrowing and endplate spurring is most prominent at C6/C7. There are degenerative changes of the facet joints. C1/C2 alignment is maintained. There is multilevel uncovertebral spurring and neural foraminal narrowing. There is cervical prevertebral edema. There is carotid atherosclerotic plaque bilaterally. Visualized lung apices are clear.     Multilevel degenerative changes. Prevertebral edema. Further evaluation with MRI is recommended as this can be seen in the setting of ligamentous injury. Bilateral carotid atherosclerotic plaque.          CT-CHEST (THORAX) WITH    Result Date: 4/25/2021 4/25/2021 1:29 PM HISTORY/REASON FOR EXAM:  Rib fracture suspected, traumatic. TECHNIQUE/EXAM DESCRIPTION: CT scan of the chest with contrast. Helical images were obtained from the lung apices through the adrenal glands following the bolus administration of  80 mL of Omnipaque 350 nonionic contrast. Sagittal and coronal reconstructions were performed. Low dose optimization technique was utilized for this CT exam including automated exposure control and adjustment of the mA and/or kV according to patient size. COMPARISON:  None. FINDINGS: There is atherosclerotic plaque. There is coronary artery calcification. The heart is not enlarged. No pericardial or pleural effusion is seen. There are small mediastinal lymph nodes. There is no hilar or axillary lymphadenopathy. No pneumothorax or pulmonary contusion is seen. Central airways are patent. Limited views were obtained of the upper abdomen. Hypodensity along the falciform ligament likely represents focal fat. Patient is status post median sternotomy. Degenerative changes are seen in the spine. No displaced rib fracture is identified.     No displaced rib fracture is identified. No acute cardiopulmonary process is seen. Atherosclerotic plaque including coronary artery calcification.    CT-HEAD W/O    Result Date: 4/30/2021 4/30/2021 4:27 AM HISTORY/REASON FOR EXAM: Altered mental status; evaluate cerebellar stroke, if no blood present OK for neurology to start aspirin TECHNIQUE/EXAM DESCRIPTION:  CT of the head without contrast. Sequential axial images were obtained from the vertex to the skull base without contrast. Up to date radiation dose reduction adjustments have been utilized to meet ALARA standards for radiation dose reduction. COMPARISON: April 28, 2021 FINDINGS: There is mild diffuse parenchymal volume loss observed. Periventricular and subcortical white matter low-attenuation changes are seen, most  commonly associated with small vessel ischemic disease. The ventricles are normal in caliber and configuration. Low-density changes in the right cerebellar hemisphere seen.. There are no abnormal extra axial fluid collections or extra axial hemorrhage identified. The visualized paranasal sinuses and mastoid air cells are well aerated bilaterally. No depressed calvarial fractures are identified. The visualized globes and retrobulbar soft tissues appear within normal limits.  Atherosclerotic intracranial calcifications are seen.     1.  Low-density changes in the right cerebellar hemisphere, compatible with evolving infarct, streak artifacts minimally limits evaluation of the posterior fossa for subtle subarachnoid hemorrhage, no intracranial hemorrhage is readily identified. 2.  Atherosclerosis.    CT-HEAD W/O    Result Date: 4/28/2021 4/28/2021 5:20 AM HISTORY/REASON FOR EXAM: Altered mental status TECHNIQUE/EXAM DESCRIPTION:  CT of the head without contrast. Sequential axial images were obtained from the vertex to the skull base without contrast. Up to date radiation dose reduction adjustments have been utilized to meet ALARA standards for radiation dose reduction. COMPARISON: None FINDINGS: There is moderate diffuse parenchymal volume loss observed. Periventricular and subcortical white matter low-attenuation changes are seen, most commonly associated with small vessel ischemic disease. The ventricles are normal in caliber and configuration. Area of low-density within the right cerebellar hemisphere is seen. There are no abnormal extra axial fluid collections or extra axial hemorrhage identified. The visualized paranasal sinuses and mastoid air cells are well aerated bilaterally. No depressed calvarial fractures are identified. The visualized globes and retrobulbar soft tissues appear within normal limits.     1.  Low-density in the region of the right cerebellar hemisphere, appearance compatible with evolving  infarct. These findings were discussed with the patient's on-call clinician, Deniz, on 4/28/2021 6:14 AM.    CT-LSPINE W/O PLUS RECONS    Result Date: 4/25/2021 4/25/2021 1:29 PM HISTORY/REASON FOR EXAM:  Back pain after injury. TECHNIQUE/EXAM DESCRIPTION AND NUMBER OF VIEWS: CT lumbar spine without contrast, with reconstructions. The study was performed on a [employer] CT scanner. Thin-section helical scanning was performed from T12-L1 to the sacrum. Sagittal and coronal multiplanar reconstructions were generated from the axial images. Low dose optimization technique was utilized for this CT exam including automated exposure control and adjustment of the mA and/or kV according to patient size. COMPARISON: None. FINDINGS: Alignment of the lumbar spine is normal. There is no acute fracture or subluxation. The prevertebral and paraspinous soft tissues show no acute abnormality. There is severe atherosclerosis with a mixture of soft and calcified plaque. There is lumbosacral junction vacuum disc phenomenon with endplate spurring, disc height loss The visualized sacrum and sacroiliac joints show no acute abnormality.     No CT evidence of acute traumatic injury.    CT-TSPINE W/O PLUS RECONS    Result Date: 4/25/2021 4/25/2021 1:29 PM HISTORY/REASON FOR EXAM:  Mid-back trauma Pain following injury. TECHNIQUE/EXAM DESCRIPTION AND NUMBER OF VIEWS: CT thoracic spine without contrast, with reconstructions. The study was performed on a [employer] CT scanner. Thin-section helical scanning was performed from C7-T1 through T12-L1. Sagittal and coronal multiplanar reconstructions were generated from the axial images. Low dose optimization technique was utilized for this CT exam including automated exposure control and adjustment of the mA and/or kV according to patient size. COMPARISON: None. FINDINGS: Alignment of the thoracic spine is normal. There is no acute displaced fracture. There is T6/7 interbody fusion with mild anterior wedging  likely indicating remote mild compression fracture that has healed There is bulky endplate spurring with some bridging syndesmophytes in the mid thoracic spine Sternotomy wires The cervicothoracic junction appears intact. The prevertebral and paraspinous soft tissues show no acute abnormality.     No CT evidence of acute traumatic abnormality. Mild T6/7 anterior wedging compatible with healed mild chronic compression fracture and there is interbody fusion.    DX-CERVICAL SPINE-2 OR 3 VIEWS    Result Date: 4/22/2021 4/22/2021 6:16 PM HISTORY/REASON FOR EXAM:  Atraumatic Pain. Neck pain for 4 days. TECHNIQUE/EXAM DESCRIPTION AND NUMBER OF VIEWS: Cervical spine series, 2 views. COMPARISON:  None. FINDINGS: Mild reversal of curvature centered at the C5 level. Vertebral body heights are preserved. Multilevel loss of disc height and osteophyte formation. Cervicothoracic junction is obscured. Prevertebral soft tissues are within normal limits. Odontoid is grossly intact.  Lateral masses of C1 are grossly intact.     1.  Limited exam.  Cervicothoracic junction is obscured. 2.  No gross cervical spine fracture or subluxation. 3.  Moderate multilevel degenerative changes.    DX-CHEST-PORTABLE (1 VIEW)    Result Date: 4/30/2021 4/30/2021 1:07 AM HISTORY/REASON FOR EXAM: For indication of respiratory failure; For indication of respiratory failure TECHNIQUE/EXAM DESCRIPTION:  Single AP view of the chest. COMPARISON: Yesterday FINDINGS: Position of medical devices appears stable. The cardiac silhouette appears within normal limits.  Postsurgical changes of sternotomy are noted. The mediastinal contour appears within normal limits.  The central pulmonary vasculature appears normal. Bilateral lung volumes are diminished.  Bilateral lungs are clear. No significant pleural effusions are identified. The bony structures appear age-appropriate.     1.  No acute cardiopulmonary disease.    DX-CHEST-PORTABLE (1 VIEW)    Result Date:  4/29/2021 4/29/2021 10:06 AM HISTORY/REASON FOR EXAM:  For indication of respiratory failure; For indication of respiratory failure. TECHNIQUE/EXAM DESCRIPTION AND NUMBER OF VIEWS: Single portable view of the chest. COMPARISON: 4/28/2021 FINDINGS: Endotracheal tube projects at the level the clavicular heads. Right central line projects over the SVC. Enteric tube passes below the level of the diaphragm. The heart is not enlarged. Atherosclerotic calcification is seen. There is mild left basilar opacity likely representing atelectasis. No pleural effusion or pneumothorax is seen. Skin staples and a surgical drain project over the cervical spine.     Mild left basilar opacity may represent atelectasis. Infection not excluded. Improved aeration of the left lung. Atherosclerotic plaque.     DX-CHEST-PORTABLE (1 VIEW)    Result Date: 4/28/2021 4/28/2021 12:10 PM HISTORY/REASON FOR EXAM:  CENTRAL LINE PLACEMENT; CENTRAL LINE PLACEMENT. TECHNIQUE/EXAM DESCRIPTION AND NUMBER OF VIEWS: Single portable view of the chest. COMPARISON: 4/28/2021 11:17 AM FINDINGS: Mediastinal structures are shifted to the LEFT.  Increasing opacification of LEFT hemithorax. RIGHT lung is clear. Endotracheal tube in place with tip approximately 5 cm above the you. Feeding tube tip in place however tip is off the image. RIGHT internal jugular catheter tip at the lower SVC. No pneumothorax. Postoperative change from prior open heart surgery. Postoperative change of the neck with surgical drain present.     1.  Worsening consolidation of LEFT hemithorax with shift of mediastinal structures to the LEFT suggesting atelectasis, possibly due to endobronchial process. 2.  Supportive tubing as described above. 3.  No pneumothorax.    DX-CHEST-PORTABLE (1 VIEW)    Result Date: 4/28/2021 4/28/2021 11:16 AM HISTORY/REASON FOR EXAM:  replaced ETT; replaced ETT TECHNIQUE/EXAM DESCRIPTION AND NUMBER OF VIEWS: Single portable view of the chest. COMPARISON:  4/27/2021 FINDINGS: Endotracheal tube with tip projecting over the mid thoracic trachea. Hazy opacity in the left lower lobe Layering left pleural effusion. No pneumothorax. Stable cardiopericardial silhouette.     Endotracheal tube with tip projecting over the mid thoracic trachea. Layering left pleural effusion with left lower lung opacity.    DX-CHEST-PORTABLE (1 VIEW)    Result Date: 4/27/2021 4/27/2021 5:30 PM HISTORY/REASON FOR EXAM:  Shortness of Breath. TECHNIQUE/EXAM DESCRIPTION AND NUMBER OF VIEWS: Single portable view of the chest. COMPARISON: None. FINDINGS: LUNGS: Hypoinflation with left basilar atelectasis. Mild perihilar interstitial prominence. No effusions. PNEUMOTHORAX: None. LINES AND TUBES: None. MEDIASTINUM: No cardiomegaly. MUSCULOSKELETAL STRUCTURES: No acute displaced fracture. Median sternotomy.     1.  Hypoinflation with left basilar atelectasis. 2.  Mild perihilar interstitial prominence may be related to hypoinflation or edema.    DX-SPINE-ANY ONE VIEW    Result Date: 4/28/2021 4/28/2021 5:30 AM HISTORY/REASON FOR EXAM:  Cervical laminectomy TECHNIQUE/EXAM DESCRIPTION AND NUMBER OF VIEWS:  Single view of the spine. Digitized Intraoperative Radiograph FINDINGS: Fixation hardware projecting over the cervical spine Fluoroscopic time:2 seconds     Digitized intraoperative radiograph is submitted for review.  This examination is not for diagnostic purpose but for guidance during a surgical procedure.    MR-BRAIN-W/O    Result Date: 4/28/2021 4/28/2021 3:31 PM HISTORY/REASON FOR EXAM:  Altered mental status; cerebellar stroke. TECHNIQUE/EXAM DESCRIPTION: MRI of the brain without contrast. T1 sagittal, T2 fast spin-echo axial, T1 coronal, FLAIR coronal, diffusion-weighted and apparent diffusion coefficient (ADC map) axial images were obtained of the whole brain. The study was performed on a [employer] 1.5 Britt MRI scanner. COMPARISON:  Head CT earlier in the day FINDINGS: Large acute right  cerebellar infarct suggesting right posterior inferior cerebellar artery territory. There is prominent T2 hyperintensity consistent with cytotoxic edema. A small amount of blooming artifact in the medial aspect of the right cerebellar hemisphere on GRE images could represent a small amount of petechial hemorrhage. There is mild localized mass effect with some mild mass effect on the fourth ventricle. No supratentorial infarct or hemorrhage. No extra-axial fluid collection. No midline shift or hydrocephalus. There is a nasogastric tube and fluid within the nasopharynx. Endotracheal tube partially visualized. Mild posterior ethmoid sinus mucosal thickening.     Acute large right cerebellar posterior inferior cerebellar artery territory infarct with possible small amount of petechial hemorrhage.    MR-CERVICAL SPINE-WITH & W/O    Result Date: 4/27/2021 4/27/2021 1:02 PM HISTORY/REASON FOR EXAM:  Neck pain, abnormal neuro exam; Neck pain, initial exam; sepsis 2/2 strep bacteremia  -unknown source. rule out possible ?? retropharyngeal abscess, ??prevertebral abscess. TECHNIQUE/EXAM DESCRIPTION: MRI of the cervical spine without and with contrast. The study was performed on a [employer] 1.5 Britt MRI scanner. T1 sagittal, T2 fast spin-echo sagittal, and gradient echo axial images were obtained of the cervical spine. T1 post-contrast fat suppressed sagittal images were obtained of the cervical spine. Optional T1 post-contrast axial images may be obtained. 15 mL ProHance contrast was administered intravenously. COMPARISON: None. FINDINGS: There is abnormal disc T2 hyperintensity at C5-6. Mild amount of bone marrow edema is noted at C5, C6 and C7. There is also focal bone marrow enhancement at C6. There is multiseptated abnormal peripherally enhancing epidural collection noted extending from C2 to the visualized lower thoracic level. Largest collection is noted in the upper thoracic posterior epidural space at the levels of  T2 and T3. The lower extent of the collection is not imaged. This is causing multilevel effacement of the thecal sac and cord compression. The thoracic spinal cord is anteriorly displaced and compressed at the levels of T2 and T3. At the level of C2-3,there is small left anterior epidural fluid collection. At the level of C3-4,there is small left anterior epidural fluid collection causing indentation of the thecal sac. At the level of C4-5,there is minimal epidural fluid collection. At the level of C5-6,there is diffuse disc bulge along with right posterior lateral epidural fluid collection causing severe canal compromise. At the level of C6-7,there is mild diffuse disc bulge. There is epidural fluid collection causing severe canal compromise. At the level of C7-T1,there is epidural fluid collection causing severe canal compromise. The visualized brain parenchyma is unremarkable. The cervical spinal cord is mildly enlarged which demonstrates mild increased intramedullary T2 signal intensity. There is abnormal T2 hyperintensity in the visualized bilateral vertebral arteries concerning for age indeterminate occlusion. There is mild amount of edema in the pre and retropharyngeal soft tissue.     1.  There is multiseptated abnormal peripherally enhancing epidural collection noted extending from C2 to the visualized lower thoracic level. Largest collection is noted in the upper thoracic posterior epidural space at the levels of T2 and T3. The lower extent of the collection is not imaged. This is causing multilevel effacement of the thecal sac and cord compression. The thoracic spinal cord is anteriorly displaced and compressed at the levels of T2 and T3. Pre and postcontrast MR examination of  the thoracic spine is recommended for further evaluation. 2.  There is effacement of the cervical thecal sac due to the multiseptated epidural fluid collection. 3.  The cervical spinal cord is mildly enlarged with minimal increased  T2 signal intensity. The possibility of cord inflammation cannot be entirely excluded. 4.  Abnormal T2 hyperintensity at C5-6 disc space likely representing discitis. 5.  Abnormal bone marrow edema of C5, C6 and C7 vertebral bodies with edema concerning for osteomyelitis. 6.  There is also focal enhancement of C6 vertebral body likely secondary to the osteomyelitis. 7.  Prevertebral edema/fluid collection. 8.  Nonvisualization of flow void of bilateral vertebral arteries concerning for age-indeterminate bilateral vertebral occlusions.     MR-LUMBAR SPINE-WITH & W/O    Result Date: 4/28/2021 4/27/2021 11:23 PM HISTORY/REASON FOR EXAM:  Back pain or radiculopathy, cancer or infection suspected; Strep bacteremia, back pain, bilateral leg numbness TECHNIQUE/EXAM DESCRIPTION: MRI of the lumbar spine without and with contrast. The study was performed on a [employer] 1.5 Britt MRI scanner. T1 sagittal, T2 fast spin-echo sagittal, and T2 axial images were obtained of the lumbar spine. T1 postcontrast fat-suppressed sagittal images were obtained. 14 mL ProHance contrast was administered intravenously. COMPARISON:  None. FINDINGS: Normal lumbar lordosis. Preservation of vertebral body heights, alignment and bone marrow signal. No evidence of discitis osteomyelitis. Conus medullaris terminates at T12-L1 and is normal in signal. No mass or fluid collection is seen within the lumbar spinal canal. T12-L1: Canal and foramina are patent. L1-L2: Mild disc bulge. Canal and foramina are patent. L2-L3: Minimal disc bulge and facet degeneration. Canal and foramina are patent. L3-L4: Minimal disc bulge and facet degeneration. Canal and foramina are patent.. L4-L5: Mild disc bulge and facet degeneration. No significant spinal stenosis. Mild foraminal narrowing. L5-S1: Disc narrowing, mild disc osteophyte. No significant spinal stenosis. Moderate right and mild-to-moderate left foraminal narrowing. Distended urinary bladder.     No  evidence of lumbar spine infection or epidural abscess. Mild spondylosis as detailed above without spinal stenosis.    MR-MRA HEAD-W/O    Result Date: 4/28/2021 4/28/2021 3:31 PM HISTORY/REASON FOR EXAM:  Emergency Medical Condition ? Stroke. Cerebellar infarct TECHNIQUE/EXAM DESCRIPTION: MRA of the head ([place]) without contrast. The study was performed on a [employer] Signa 1.5 Britt MRI scanner. MRA of the [place] was performed with 3D time-of-flight technique with axial acquisition. Additional MRA of the internal carotid and vertebrobasilar arteries at the level of the skull base and craniocervical junction was also performed with 3D time-of-flight technique with axial acquisition. Images are reviewed at the PACS workstation as axial source images as well as maximum intensity ray projection (MIP) multiplanar 3D reconstructions. COMPARISON:  None FINDINGS: Nicole cavernous and supraclinoid ICA segments are patent. Patent left posterior communicating artery. MCA and ARA branches are patent. There is no significant flow within the intradural right vertebral artery. The intradural left vertebral artery, basilar artery and posterior cerebral arteries are patent. No significant aneurysm or high flow vascular malformation is seen.     Findings suggesting right vertebral artery occlusion.    MR-MRA NECK-WITH & W/O AND SEQUENCES    Result Date: 4/28/2021 4/28/2021 3:31 PM HISTORY/REASON FOR EXAM:  Emergency Medical Condition ? Stroke Cerebellar stroke TECHNIQUE/EXAM DESCRIPTION: MRA of the cervical carotid and vertebral arteries without and with contrast. The study was performed on a [employer]E"1,2,3 Listo" Signa 1.5 Britt MRI scanner. Precontrast MRA of the cervical carotid and vertebral arteries was performed with 2D time-of-flight (TOF) technique with acquisition in the axial plane. MRA of the arch origins of the great vessels, and cervical carotid and vertebral arteries was performed with 2D time-of-flight (TOF) technique  with coronal slab acquisition from the level of the aortic arch to the carotid siphons during dynamic intravenous infusion of 15 mL of ProHance contrast. Images are reviewed at the PACS workstation as source images as well as maximum intensity ray projection (MIP) multiplanar 3D reconstructions. Cervical internal carotid artery percent stenosis is calculated using the standard method according to the NASCET criteria wherein a segment of uniform caliber distal cervical internal carotid is used as the reference denominator. COMPARISON:  None available. FINDINGS: The arch origins of the great vessels are intact. The cervical right vertebral artery is not well seen on the time-of-flight images. A very small caliber cervical right vertebral artery is seen on the postcontrast images with some mildly increased caliber of the artery at about the C1-C2 level. The fact  that it is not seen on the time-of-flight images and is visualized on contrast enhanced sequence could be related to small nondominant caliber with sluggish flow or could represent retrograde flow within the right vertebral artery. There may be a focal moderate stenosis in the mid cervical left internal carotid artery and mild narrowing at its origin. Mild narrowing of the proximal left internal carotid artery with less than 50% stenosis. No significant right carotid stenosis is seen.     1.  Small caliber right vertebral artery which is not visualized on the noncontrast time-of-flight technique could be related to retrograde flow or diminutive caliber and sluggish flow. 2.  Possible moderate focal stenosis in the mid cervical left vertebral artery. 3.  No significant carotid stenosis.    MR-THORACIC SPINE-WITH & W/O    Result Date: 4/28/2021 4/27/2021 11:23 PM HISTORY/REASON FOR EXAM:  Mid-back pain, compression fracture suspected; possible epidural abscess/fluid collection TECHNIQUE/EXAM DESCRIPTION: MRI of the thoracic spine without and with contrast. The  study was performed on a [employer] 1.5 Britt MRI scanner. T1 sagittal, T2 fast spin-echo sagittal, and T2 axial images were obtained of the thoracic spine. T1 post-contrast fat suppressed sagittal images were obtained. Optional T1 post-contrast axial images may be obtained. 14 mL ProHance contrast was administered intravenously. COMPARISON:  MRI of the cervical spine one-day prior. FINDINGS: There is abnormal dorsal epidural fluid collection seen within the partially visualized lower cervical spine and which extends inferiorly to about the T6 level. The maximum thickness is seen at about the T2-T3 level measuring 8 mm. This raises concern for epidural abscess, although epidural hematoma could also have this appearance. From T1 through T3 there is at least moderate thecal sac stenosis due to the epidural fluid collection. There is significant abnormal signal within the partially visualized  lower cervical and upper thoracic cord which could be infectious/inflammatory or other nonspecific cord edema. There is also suggestion of a small ventral epidural fluid collection at C6-C7. There is also partially visualized abnormal marrow edema in the C6 and C7 vertebral bodies as detailed on earlier MRI report. There is suggestion of some prominent enhancement around the mid and lower thoracic cord seen on the sagittal postcontrast images however limited evaluation on axial images due to motion artifact. This is of uncertain etiology but could represent some leptomeningeal disease/infection. On the sagittal T2 images there is a questionable slight nodular appearance on the surface of lower thoracic cord. A differential would be a subtle spinal dural aVF. There is no abnormal signal seen within the mid/lower thoracic cord. There is no evidence of discitis osteomyelitis within the thoracic spine. There is T6-T7 interbody ankylosis. There is mild disc degeneration and some prominent anterolateral bridging osteophytes in the mid  and lower thoracic spine. No significant posterior disc herniation is seen. Within the mid and lower thoracic spine there  is no significant spinal stenosis.     1.  Posterior epidural fluid collection/abscess within the lower cervical spine extending inferiorly to about the T6 level which could represent epidural abscess and/or hematoma. This measures 8 mm in maximal thickness at T2-T3 with at least moderate thecal sac stenosis. 2.  Abnormal signal within the cervical cord and upper thoracic cord suggesting cord edema or infectious/inflammatory etiology. 3.  Lower cervical spine findings are detailed on earlier report. 4.  Prominent enhancement along the surface of the mid and lower thoracic cord is of uncertain etiology and as detailed above, possibly representing leptomeningeal disease/infection. See details above. These findings were discussed with Dr. Cano on 4/28/2021 10:01 AM.     IR-DRAIN-BLADDER SUPRAPUB W/CATH    Result Date: 4/28/2021  HISTORY/REASON FOR EXAM:  60-year-old man with urinary retention. TECHNIQUE/EXAM DESCRIPTION AND NUMBER OF VIEWS:  Ultrasound and fluoroscopic guided suprapubic bladder catheter placement, Cystogram. MEDICATIONS: The patient remained on his ICU sedation regimen. FLUOROSCOPY TIME: 0.3 minutes; 5fluoroscopic images obtained CONTRAST: 40mL Omnipaque 300, intraurinary PROCEDURE:  Informed consent was obtained. The suprapubic region was prepped and draped in sterile manner. Following local anesthesia with copious 1% lidocaine, under ultrasound and fluoroscopic monitoring, an 18-gauge needle was advanced into the urinary bladder from a paramedian suprapubic approach. An Amplatz guidewire was placed in the urinary bladder. Serial tract dilatation was performed with a 22 [place] telescoping dilator. A 16 [place] tip silicone Gomez type catheter was then placed in the balloon inflated with 10 mL of sterile saline. A cystogram was performed with AP and both oblique views  demonstrating satisfactory position of the catheter with all side holes within the urinary bladder. The catheter was secured to the skin with 2-0 nylon suture and connected to gravity drainage. The Gomez catheter was removed. The patient tolerated the procedure well with no evidence of complication. COMPARISON: None FINDINGS:  The cystogram shows satisfactory catheter position with all sideholes within the urinary bladder. There is no extravasation.     1.     Ultrasound and fluoroscopic guided placement of a 16 [place] tip silicone suprapubic bladder catheter. 2.     Cystogram documenting catheter placement.     DX-PORTABLE FLUOROSCOPY < 1 HOUR    Result Date: 2021 5:30 AM HISTORY/REASON FOR EXAM:  Cervical laminectomy TECHNIQUE/EXAM DESCRIPTION AND NUMBER OF VIEWS: Portable fluoroscopy for less than one hour in OR. FINDINGS: The portable fluoroscopy unit was obligated to the procedure for less than one hour. Actual fluoro time was 2 seconds.     Portable fluoroscopy utilized for 2 seconds. INTERPRETING LOCATION: [address] [place] [address], [facility]    EC-ECHOCARDIOGRAM COMPLETE W/O CONT    Result Date: 2021  Transthoracic Echo Report Echocardiography Laboratory CONCLUSIONS No prior study is available for comparison. Normal left ventricular systolic function.  Left ventricular ejection fraction is visually estimated to be 60%. Normal diastolic function. Agitated saline (bubble) study was performed. No evidence of right to left shunt by agitated saline challenge. Normal inferior vena cava size and inspiratory collapse. GILDARDO MAGANA Exam Date:         2021                    19:42 Exam Location:     Inpatient Priority:          Routine Ordering Physician:        YESICA SULLIVAN Referring Physician:       [id number] Marie Sonographer:               Faye Moya RDCS Age:    60     Gender:    M MRN:    [id number] :    1960 BSA:    1.87   Ht (in):    69     Wt (lb):    159 Exam Type:      Complete Indications:     CVA ICD Codes:       436 CPT Codes:       37798 BP:   140    /   60     HR:   74 Technical Quality:       Fair MEASUREMENTS  (Male / Female) Normal Values 2D ECHO LV Diastolic Diameter PLAX        3.8 cm                4.2 - 5.9 / 3.9 - 5.3 cm LV Systolic Diameter PLAX         2.4 cm                2.1 - 4.0 cm IVS Diastolic Thickness           1.4 cm                LVPW Diastolic Thickness          1.4 cm                LVOT Diameter                     2 cm                  Estimated LV Ejection Fraction    60 %                  LV Ejection Fraction MOD BP       62.7 %                >= 55  % LV Ejection Fraction MOD 4C       46.6 %                LV Ejection Fraction MOD 2C       55 %                  DOPPLER AV Peak Velocity                  1.7 m/s               AV Peak Gradient                  10.9 mmHg             AV Mean Gradient                  5.5 mmHg              LVOT Peak Velocity                1.2 m/s               AV Area Cont Eq vti               2.3 cm2               Mitral E Point Velocity           0.74 m/s              Mitral E to A Ratio               0.92                  MV Pressure Half Time             69.4 ms               MV Area PHT                       3.2 cm2               MV Deceleration Time              239 ms                PV Peak Velocity                  1.1 m/s               PV Peak Gradient                  4.6 mmHg              RVOT Peak Velocity                0.66 m/s              * Indicates values subject to auto-interpretation LV EF:  60    % FINDINGS Left Ventricle Normal left ventricular chamber size. Mild concentric left ventricular hypertrophy. Normal left ventricular systolic function.  Left ventricular ejection fraction is visually estimated to be 60%. Normal diastolic function. Right Ventricle Normal right ventricular size and systolic function. Right Atrium Normal right atrial size. Normal inferior vena cava size and inspiratory  collapse. Left Atrium Normal left atrial size. Agitated saline (bubble) study was performed. With Valsalva maneuver. No evidence of right to left shunt by agitated saline challenge. Existing IV was used. Mitral Valve Structurally normal mitral valve without significant stenosis or regurgitation. Aortic Valve Aortic sclerosis without stenosis. No aortic insufficiency. Tricuspid Valve Structurally normal tricuspid valve without significant stenosis or regurgitation. Pulmonic Valve The pulmonic valve is not well visualized. No pulmonic insufficiency. Pericardium Normal pericardium without effusion. Aorta Normal aortic root for body surface area. Ascending aorta not well visualized. Zakiya Rebollar MD (Electronically Signed) Final Date:     28 April 2021                 21:30    EC-CHRISTOPHER W/O CONT    Result Date: 4/30/2021  Results Will be Available after Interpretation by Cardiologist.    DX-ABDOMEN FOR TUBE PLACEMENT    Result Date: 4/30/2021 4/30/2021 12:44 PM HISTORY/REASON FOR EXAM:  Line evaluation. TECHNIQUE/EXAM DESCRIPTION AND NUMBER OF VIEWS:  1 view(s) of the abdomen. COMPARISON:  4/28/2021 FINDINGS: Enteric tube has been placed. The tip projects over the distal stomach or duodenal bulb. There are scattered loops of air-filled bowel.     Feeding tube placement with the tip projecting over the distal stomach or duodenal bulb    DX-ABDOMEN FOR TUBE PLACEMENT    Result Date: 4/28/2021 4/28/2021 12:24 PM HISTORY/REASON FOR EXAM:  Tube evaluation. TECHNIQUE/EXAM DESCRIPTION AND NUMBER OF VIEWS:  1 view(s) of the abdomen. COMPARISON:  None. FINDINGS: Limited single view of the abdomen performed primarily to evaluate enteric tube position. The tip projects over the expected area of the distal stomach. The bowel gas pattern is within normal limits.     Feeding tube with tip projecting over the expected area of the distal stomach.      Micro:  Results     Procedure Component Value Units Date/Time    BLOOD CULTURE  "[[id number]] Collected: 04/28/21 1134    Order Status: Completed Specimen: Blood from Peripheral Updated: 05/03/21 1300     Significant Indicator NEG     Source BLD     Site PERIPHERAL     Culture Result No growth after 5 days of incubation.    Narrative:      Collected By:[id number] SAILAJA BAILEY  Per Hospital Policy: Only change Specimen Src: to \"Line\" if  specified by physician order.  No site indicated    BLOOD CULTURE [[id number]] Collected: 04/27/21 0248    Order Status: Completed Specimen: Blood from Peripheral Updated: 05/02/21 1100     Significant Indicator NEG     Source BLD     Site PERIPHERAL     Culture Result No growth after 5 days of incubation.    Narrative:      Per Hospital Policy: Only change Specimen Src: to \"Line\" if  specified by physician order.  Left AC    BLOOD CULTURE [[id number]] Collected: 05/01/21 1305    Order Status: Completed Specimen: Blood from Peripheral Updated: 05/02/21 0738     Significant Indicator NEG     Source BLD     Site PERIPHERAL     Culture Result No Growth  Note: Blood cultures are incubated for 5 days and  are monitored continuously.Positive blood cultures  are called to the RN and reported as soon as  they are identified.      Narrative:      Collected By:[id number] LESA VIZCARAR  Per Hospital Policy: Only change Specimen Src: to \"Line\" if  specified by physician order.  Left Forearm/Arm    BLOOD CULTURE [[id number]] Collected: 05/01/21 1300    Order Status: Completed Specimen: Blood from Peripheral Updated: 05/02/21 0738     Significant Indicator NEG     Source BLD     Site PERIPHERAL     Culture Result No Growth  Note: Blood cultures are incubated for 5 days and  are monitored continuously.Positive blood cultures  are called to the RN and reported as soon as  they are identified.      Narrative:      Collected By:[id number] LESA VIZCARRA  Per Hospital Policy: Only change Specimen Src: to \"Line\" if  specified by physician order.  Right Forearm/Arm    CULTURE WOUND " W/ GRAM STAIN [[id number]]  (Abnormal) Collected: 04/28/21 0642    Order Status: Completed Specimen: Wound Updated: 05/01/21 1117     Significant Indicator POS     Source WND     Site Cerival Thoracic Epidural 1     Culture Result Growth noted after further incubation, see below for  organism identification.       Gram Stain Result Few WBCs.  Rare Gram positive cocci.       Culture Result Streptococcus anginosus  Light growth      Narrative:      Surgery - swabs received    Anaerobic Culture [[id number]] Collected: 04/28/21 0642    Order Status: Completed Specimen: Wound Updated: 05/01/21 1117     Significant Indicator NEG     Source WND     Site Cerival Thoracic Epidural 1     Culture Result No Anaerobes isolated.    Narrative:      Surgery - swabs received    CULTURE WOUND W/ GRAM STAIN [[id number]]  (Abnormal) Collected: 04/28/21 0655    Order Status: Completed Specimen: Wound Updated: 05/01/21 1117     Significant Indicator POS     Source WND     Site Cerival Thoric Epidural 2     Culture Result Growth noted after further incubation, see below for  organism identification.       Gram Stain Result Few WBCs.  Few Gram positive cocci.       Culture Result Streptococcus anginosus  Moderate growth      Narrative:      Surgery - swabs received    Anaerobic Culture [[id number]] Collected: 04/28/21 0655    Order Status: Completed Specimen: Wound Updated: 05/01/21 1117     Significant Indicator NEG     Source WND     Site Cerival Thoric Epidural 2     Culture Result No Anaerobes isolated.    Narrative:      Surgery - swabs received    BLOOD CULTURE [[id number]]  (Abnormal) Collected: 04/27/21 0248    Order Status: Completed Specimen: Blood from Peripheral Updated: 04/30/21 1134     Significant Indicator POS     Source BLD     Site PERIPHERAL     Culture Result Growth detected by Bactec instrument. 04/29/2021  05:59      Viridans Streptococcus  Possible Contaminant  Isolated from one set only, please correlate with  "clinical  condition. Contact the Microbiology department within 48 hr  if identification and susceptibility are needed.      Narrative:      CALL  Bonilla  [facility] tel. [phone],  CALLED  [facility] tel. [phone] 04/29/2021, 06:05, RB PERF. RESULTS CALLED TO: RN  [phone]  Per Hospital Policy: Only change Specimen Src: to \"Line\" if  specified by physician order.  Right AC    BLOOD CULTURE [[id number]]  (Abnormal) Collected: 04/28/21 1134    Order Status: Completed Specimen: Blood from Peripheral Updated: 04/30/21 0957     Significant Indicator POS     Source BLD     Site PERIPHERAL     Culture Result Growth detected by Bactec instrument. 04/29/2021  21:41  Negative for Staphylococcus aureus and MRSA by PCR. Correlate  ongoing need for antibiotics with clinical condition.        Coagulase-negative Staphylococcus species  Possible Contaminant  Isolated from one set only, please correlate with clinical  condition. Contact the Microbiology department within 48 hr  if identification and susceptibility are needed.      Narrative:      CALL  Bonilla  [facility] tel. [phone],  CALLED  [facility] tel. [phone] 04/29/2021, 21:41, RB PERF. RESULTS CALLED  TO:RN:[id number]  Collected By:[id number] SAILAJA BAILEY  Per Hospital Policy: Only change Specimen Src: to \"Line\" if  specified by physician order.  Collected By:[id number] SAILAJA BAILEY    GRAM STAIN [[id number]] Collected: 04/28/21 0642    Order Status: Completed Specimen: Wound Updated: 04/28/21 1854     Significant Indicator .     Source WND     Site Cerival Thoracic Epidural 1     Gram Stain Result Few WBCs.  Rare Gram positive cocci.      Narrative:      Surgery - swabs received    GRAM STAIN [[id number]] Collected: 04/28/21 0655    Order Status: Completed Specimen: Wound Updated: 04/28/21 1854     Significant Indicator .     Source WND     Site Cerival Thoric Epidural 2     Gram Stain Result Few WBCs.  Few Gram positive cocci.      Narrative:      Surgery - swabs received    BLOOD CULTURE " "[[id number]]  (Abnormal) Collected: 04/25/21 1433    Order Status: Completed Specimen: Blood from Peripheral Updated: 04/28/21 1122     Significant Indicator POS     Source BLD     Site PERIPHERAL     Culture Result Growth detected by Bactec instrument. 04/26/2021  04:57      Streptococcus anginosus  See previous culture for sensitivity report.      Narrative:      CALL  Bonilla  [facility] tel. [phone],  CALLED  [facility] tel. [phone] 04/26/2021, 04:59, RB PERF. RESULTS CALLED TO: RN  [phone]  1 of 2 for Blood Culture x 2 sites order. Per Hospital  Policy: Only change Specimen Src: to \"Line\" if specified by  physician order.  No site indicated    BLOOD CULTURE [[id number]]  (Abnormal)  (Susceptibility) Collected: 04/25/21 1505    Order Status: Completed Specimen: Blood from Peripheral Updated: 04/28/21 1122     Significant Indicator POS     Source BLD     Site PERIPHERAL     Culture Result Growth detected by Bactec instrument. 04/26/2021  04:06      Streptococcus anginosus    Narrative:      CALL  Bonilla  [facility] tel. [phone],  CALLED  [facility] tel. [phone] 04/26/2021, 04:09, RB PERF. RESULTS CALLED TO: RN  [id number]  2 of 2 blood culture x2  Sites order. Per Hospital Policy:  Only change Specimen Src: to \"Line\" if specified by physician  order.  No site indicated    Susceptibility     Streptococcus anginosus (1)     Antibiotic Interpretation Microscan Method Status    Penicillin Sensitive 0.064 mcg/mL E-TEST Final    Cefotaxime Sensitive 0.125 mcg/mL E-TEST Final                         Assessment:  Active Hospital Problems    Diagnosis    • *Spinal epidural abscess [G06.1]    • History of ischemic stroke [Z86.73]    • Sepsis due to Streptococcus species ([facility]) [A40.9]    • Acute bilateral back pain [M54.9]    • Thrombocytopenia ([facility]) [D69.6]    • Type 2 diabetes mellitus without complication, without long-term current use of insulin ([facility]) [E11.9]    • Coronary artery disease due to calcified coronary lesion: CABG x4, July 2015 [I25.10, " I25.84]    • Essential hypertension, benign [I10]    • Hypokalemia [E87.6]    • Hypomagnesemia [E83.42]    • Hyponatremia [E87.1]    • Difficulty swallowing [R13.10]    • Diabetic ketoacidosis ([facility]) [E11.10]    • Marijuana abuse [F12.10]    • High anion gap metabolic acidosis [E87.2]    • Tobacco abuse [Z72.0]    • Dyslipidemia [E78.5]    • Status post urethrostomy ([facility]CC) [Z93.6]    .    Assessment:  60 y.o.  admitted 4/25/2021. Pt has a past medical history of uncontrolled diabetes, CAD status post CABG times 4/2015, marijuana use and to arthritis.  Presented complaining of neck and back pain ongoing for approximately 1 week and fall getting out of the bathtub.  He had been seen at urgent care for back pain approximately 1 week prior to admission and given Toradol injections.       Hospital Course:   Afebrile and vitals unremarkable other than hypertension.  Leukocytosis ongoing since arrival.  MRI C-spine on 4/26 with epidural collection noted from C2-T3.  Largest collection noted in upper thoracic posterior epidural space at T2-T3.  This is causing multilevel cord compression.  Also with likely discitis at C5-6 and osteomyelitis at C5-C7.  Blood cultures on 4/25+ for Streptococcus species.    Code blue 5/3-mucus plugs     Problem List  Sepsis/shock, strep  -Blood cultures on 4/25 2/2 + Streptococcus anginosus, penicillin and cephalosporin susceptible  -Blood cultures on 4/27 1/2 + viridans Streptococcus   -Blood cultures on 4/28  1/2 + CoNS -likely contaminant  -Blood cultures on 5/1 are no growth to date  -TTE with no vegetations  -CHRISTOPHER on 4/30, still awaiting read    VDRF-Bronchoscopy on 4/28 with thick secretions  Bronch 5/3 mucus plugs, thicvk secretions  Bronch 5/4 pending-no signif secretions noted    Leukocytosis,persistent  Cervical thoracic infection, epidural abscess at C2-T3 as well as discitis and osteomyelitis, +GPCs-Streptococcus anginosus   -Repeat MRI thoracic spine on 4/27 notes posterior epidural  fluid  collection/abscess in lower cervical spine extending to T6 level, also noted abnormal signal within the cervical and upper thoracic cord  -s/p OR 4/28 for see 4-T2 laminectomy, decompression of thecal sac and nerve roots as well as cervical thoracic extradural spinal mass/epidural abscess removal, linda purulence during OR, no CSF leak per op note  -Operative cultures from cervical thoracic epidural 1/ 2 +strep anginosis    CVA, Right cerebellar stroke, possibly due to venous spasm associated with the epidural abscess.   -MRA neck with and without on 4/28, possible moderate focal stenosis in mid left CVA.  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  MRI without on 4/28 with acute large right cerebellar infarct with small hemorrhage  Uncontrolled diabetes     Plan   Continue ceftriaxone 2 grams q24 hours  No septic emboli in the brain on imaging  Anticipate a 6-week IV antibiotic course for the bacteremia and spinal infection-stop date 6/11/2021  Repeat MRI spine prior to stopping antibiotics  FU CHRISTOPHER result-still pending  Blood cultures - NGTD    Monitor labs   Monitor and control blood sugars  Palliative care consult and goals of care discussion      Prognosis guarded

## 2021-05-04 NOTE — ASSESSMENT & PLAN NOTE
-Patient had cardiac arrest on 05/03, ROSC achieved in 4 mins after CPR and  Epinephrine  -Most likely secondary to aspiration, bed side bronchoscopy showed multiple mucous plugs   -Re intubated again

## 2021-05-04 NOTE — CARE PLAN
Problem: Venous Thromboembolism (VTW)/Deep Vein Thrombosis (DVT) Prevention:  Goal: Patient will participate in Venous Thrombosis (VTE)/Deep Vein Thrombosis (DVT)Prevention Measures  Outcome: PROGRESSING AS EXPECTED  SCDs in place.     Problem: Bowel/Gastric:  Goal: Normal bowel function is maintained or improved  Outcome: PROGRESSING AS EXPECTED  Rectal trumpet placed for loose stool.     Problem: Pain Management  Goal: Pain level will decrease to patient's comfort goal  Outcome: PROGRESSING AS EXPECTED  CPOT 0, frequent pain reassessments.     Problem: Skin Integrity  Goal: Risk for impaired skin integrity will decrease  Outcome: PROGRESSING AS EXPECTED  Z-flow pillow, turns, skin assessments.  Wound following for wound to sacrum.

## 2021-05-04 NOTE — PROGRESS NOTES
1153: Pt had respiratory/PEA arrest, see code blue narration.    After ROSC achieved pt intubated at 1207 with RSI meds.

## 2021-05-05 LAB
ALBUMIN SERPL BCP-MCNC: 2 G/DL (ref 3.2–4.9)
ALBUMIN/GLOB SERPL: 0.5 G/DL
ALP SERPL-CCNC: 205 U/L (ref 30–99)
ALT SERPL-CCNC: 169 U/L (ref 2–50)
ANION GAP SERPL CALC-SCNC: 4 MMOL/L (ref 7–16)
AST SERPL-CCNC: 112 U/L (ref 12–45)
BASE EXCESS BLDA CALC-SCNC: 7 MMOL/L (ref -4–3)
BASOPHILS # BLD AUTO: 0.1 % (ref 0–1.8)
BASOPHILS # BLD: 0.02 K/UL (ref 0–0.12)
BILIRUB SERPL-MCNC: 0.3 MG/DL (ref 0.1–1.5)
BODY TEMPERATURE: ABNORMAL DEGREES
BREATHS SETTING VENT: 21
BUN SERPL-MCNC: 38 MG/DL (ref 8–22)
CALCIUM SERPL-MCNC: 8 MG/DL (ref 8.5–10.5)
CHLORIDE SERPL-SCNC: 103 MMOL/L (ref 96–112)
CO2 BLDA-SCNC: 34 MMOL/L (ref 20–33)
CO2 SERPL-SCNC: 30 MMOL/L (ref 20–33)
CREAT SERPL-MCNC: 0.47 MG/DL (ref 0.5–1.4)
DELSYS IDSYS: ABNORMAL
END TIDAL CARBON DIOXIDE IECO2: 33 MMHG
EOSINOPHIL # BLD AUTO: 0.04 K/UL (ref 0–0.51)
EOSINOPHIL NFR BLD: 0.3 % (ref 0–6.9)
ERYTHROCYTE [DISTWIDTH] IN BLOOD BY AUTOMATED COUNT: 49.3 FL (ref 35.9–50)
GLOBULIN SER CALC-MCNC: 3.8 G/DL (ref 1.9–3.5)
GLUCOSE BLD-MCNC: 215 MG/DL (ref 65–99)
GLUCOSE BLD-MCNC: 221 MG/DL (ref 65–99)
GLUCOSE BLD-MCNC: 226 MG/DL (ref 65–99)
GLUCOSE BLD-MCNC: 239 MG/DL (ref 65–99)
GLUCOSE BLD-MCNC: 241 MG/DL (ref 65–99)
GLUCOSE SERPL-MCNC: 253 MG/DL (ref 65–99)
HCO3 BLDA-SCNC: 32.3 MMOL/L (ref 17–25)
HCT VFR BLD AUTO: 31.1 % (ref 42–52)
HGB BLD-MCNC: 10 G/DL (ref 14–18)
HOROWITZ INDEX BLDA+IHG-RTO: 82 MM[HG]
IMM GRANULOCYTES # BLD AUTO: 0.16 K/UL (ref 0–0.11)
IMM GRANULOCYTES NFR BLD AUTO: 1 % (ref 0–0.9)
LYMPHOCYTES # BLD AUTO: 1.61 K/UL (ref 1–4.8)
LYMPHOCYTES NFR BLD: 10.2 % (ref 22–41)
MAGNESIUM SERPL-MCNC: 2.2 MG/DL (ref 1.5–2.5)
MCH RBC QN AUTO: 30.6 PG (ref 27–33)
MCHC RBC AUTO-ENTMCNC: 32.2 G/DL (ref 33.7–35.3)
MCV RBC AUTO: 95.1 FL (ref 81.4–97.8)
MODE IMODE: ABNORMAL
MONOCYTES # BLD AUTO: 1.08 K/UL (ref 0–0.85)
MONOCYTES NFR BLD AUTO: 6.8 % (ref 0–13.4)
NEUTROPHILS # BLD AUTO: 12.86 K/UL (ref 1.82–7.42)
NEUTROPHILS NFR BLD: 81.6 % (ref 44–72)
NRBC # BLD AUTO: 0 K/UL
NRBC BLD-RTO: 0 /100 WBC
O2/TOTAL GAS SETTING VFR VENT: 90 %
PCO2 BLDA: 49.7 MMHG (ref 26–37)
PCO2 TEMP ADJ BLDA: 47.8 MMHG (ref 26–37)
PEEP END EXPIRATORY PRESSURE IPEEP: 14 CMH20
PH BLDA: 7.42 [PH] (ref 7.4–7.5)
PH TEMP ADJ BLDA: 7.43 [PH] (ref 7.4–7.5)
PLATELET # BLD AUTO: 355 K/UL (ref 164–446)
PMV BLD AUTO: 12.2 FL (ref 9–12.9)
PO2 BLDA: 74 MMHG (ref 64–87)
PO2 TEMP ADJ BLDA: 70 MMHG (ref 64–87)
POTASSIUM SERPL-SCNC: 4.8 MMOL/L (ref 3.6–5.5)
PROT SERPL-MCNC: 5.8 G/DL (ref 6–8.2)
RBC # BLD AUTO: 3.27 M/UL (ref 4.7–6.1)
SAO2 % BLDA: 95 % (ref 93–99)
SODIUM SERPL-SCNC: 137 MMOL/L (ref 135–145)
SPECIMEN DRAWN FROM PATIENT: ABNORMAL
TIDAL VOLUME IVT: 430 ML
TRIGL SERPL-MCNC: 136 MG/DL (ref 0–149)
WBC # BLD AUTO: 15.8 K/UL (ref 4.8–10.8)

## 2021-05-05 PROCEDURE — 700105 HCHG RX REV CODE 258: Performed by: INTERNAL MEDICINE

## 2021-05-05 PROCEDURE — 82962 GLUCOSE BLOOD TEST: CPT

## 2021-05-05 PROCEDURE — 94003 VENT MGMT INPAT SUBQ DAY: CPT

## 2021-05-05 PROCEDURE — 700102 HCHG RX REV CODE 250 W/ 637 OVERRIDE(OP): Performed by: STUDENT IN AN ORGANIZED HEALTH CARE EDUCATION/TRAINING PROGRAM

## 2021-05-05 PROCEDURE — 700111 HCHG RX REV CODE 636 W/ 250 OVERRIDE (IP): Performed by: PSYCHIATRY & NEUROLOGY

## 2021-05-05 PROCEDURE — 94799 UNLISTED PULMONARY SVC/PX: CPT

## 2021-05-05 PROCEDURE — 700101 HCHG RX REV CODE 250: Performed by: STUDENT IN AN ORGANIZED HEALTH CARE EDUCATION/TRAINING PROGRAM

## 2021-05-05 PROCEDURE — 84478 ASSAY OF TRIGLYCERIDES: CPT

## 2021-05-05 PROCEDURE — 700111 HCHG RX REV CODE 636 W/ 250 OVERRIDE (IP): Performed by: INTERNAL MEDICINE

## 2021-05-05 PROCEDURE — A9270 NON-COVERED ITEM OR SERVICE: HCPCS | Performed by: STUDENT IN AN ORGANIZED HEALTH CARE EDUCATION/TRAINING PROGRAM

## 2021-05-05 PROCEDURE — 82803 BLOOD GASES ANY COMBINATION: CPT

## 2021-05-05 PROCEDURE — 700101 HCHG RX REV CODE 250: Performed by: INTERNAL MEDICINE

## 2021-05-05 PROCEDURE — 99291 CRITICAL CARE FIRST HOUR: CPT | Mod: GC | Performed by: PSYCHIATRY & NEUROLOGY

## 2021-05-05 PROCEDURE — 94640 AIRWAY INHALATION TREATMENT: CPT

## 2021-05-05 PROCEDURE — 99233 SBSQ HOSP IP/OBS HIGH 50: CPT | Performed by: INTERNAL MEDICINE

## 2021-05-05 PROCEDURE — 83735 ASSAY OF MAGNESIUM: CPT

## 2021-05-05 PROCEDURE — 85025 COMPLETE CBC W/AUTO DIFF WBC: CPT

## 2021-05-05 PROCEDURE — 770022 HCHG ROOM/CARE - ICU (200)

## 2021-05-05 PROCEDURE — 80053 COMPREHEN METABOLIC PANEL: CPT

## 2021-05-05 PROCEDURE — 700111 HCHG RX REV CODE 636 W/ 250 OVERRIDE (IP): Performed by: NURSE PRACTITIONER

## 2021-05-05 RX ORDER — INSULIN GLARGINE 100 [IU]/ML
35 INJECTION, SOLUTION SUBCUTANEOUS EVERY EVENING
Status: DISCONTINUED | OUTPATIENT
Start: 2021-05-05 | End: 2021-05-05

## 2021-05-05 RX ORDER — INSULIN GLARGINE 100 [IU]/ML
40 INJECTION, SOLUTION SUBCUTANEOUS EVERY EVENING
Status: DISCONTINUED | OUTPATIENT
Start: 2021-05-05 | End: 2021-05-06

## 2021-05-05 RX ADMIN — FENTANYL CITRATE 100 MCG: 50 INJECTION, SOLUTION INTRAMUSCULAR; INTRAVENOUS at 10:16

## 2021-05-05 RX ADMIN — CARBAMAZEPINE 200 MG: 200 TABLET ORAL at 08:34

## 2021-05-05 RX ADMIN — CARBAMAZEPINE 200 MG: 200 TABLET ORAL at 00:45

## 2021-05-05 RX ADMIN — ASPIRIN 324 MG: 81 TABLET, CHEWABLE ORAL at 05:03

## 2021-05-05 RX ADMIN — INSULIN HUMAN 4 UNITS: 100 INJECTION, SOLUTION PARENTERAL at 17:15

## 2021-05-05 RX ADMIN — PROPOFOL 10 MCG/KG/MIN: 10 INJECTION, EMULSION INTRAVENOUS at 02:54

## 2021-05-05 RX ADMIN — IPRATROPIUM BROMIDE AND ALBUTEROL SULFATE 3 ML: .5; 2.5 SOLUTION RESPIRATORY (INHALATION) at 19:09

## 2021-05-05 RX ADMIN — LIDOCAINE 3 PATCH: 50 PATCH TOPICAL at 17:21

## 2021-05-05 RX ADMIN — CARBAMAZEPINE 200 MG: 200 TABLET ORAL at 17:18

## 2021-05-05 RX ADMIN — ARIPIPRAZOLE 15 MG: 10 TABLET ORAL at 17:20

## 2021-05-05 RX ADMIN — INSULIN HUMAN 4 UNITS: 100 INJECTION, SOLUTION PARENTERAL at 00:55

## 2021-05-05 RX ADMIN — Medication 5000 UNITS: at 05:03

## 2021-05-05 RX ADMIN — FENTANYL CITRATE 100 MCG: 50 INJECTION, SOLUTION INTRAMUSCULAR; INTRAVENOUS at 14:52

## 2021-05-05 RX ADMIN — FENTANYL CITRATE 50 MCG: 50 INJECTION, SOLUTION INTRAMUSCULAR; INTRAVENOUS at 00:50

## 2021-05-05 RX ADMIN — PROPOFOL 15 MCG/KG/MIN: 10 INJECTION, EMULSION INTRAVENOUS at 17:26

## 2021-05-05 RX ADMIN — ARIPIPRAZOLE 15 MG: 10 TABLET ORAL at 05:04

## 2021-05-05 RX ADMIN — FAMOTIDINE 20 MG: 20 TABLET ORAL at 05:03

## 2021-05-05 RX ADMIN — INSULIN GLARGINE 40 UNITS: 100 INJECTION, SOLUTION SUBCUTANEOUS at 17:14

## 2021-05-05 RX ADMIN — FAMOTIDINE 20 MG: 20 TABLET ORAL at 17:21

## 2021-05-05 RX ADMIN — CYANOCOBALAMIN TAB 500 MCG 1000 MCG: 500 TAB at 05:03

## 2021-05-05 RX ADMIN — CEFTRIAXONE SODIUM 2 G: 2 INJECTION, POWDER, FOR SOLUTION INTRAMUSCULAR; INTRAVENOUS at 05:31

## 2021-05-05 RX ADMIN — INSULIN HUMAN 4 UNITS: 100 INJECTION, SOLUTION PARENTERAL at 05:23

## 2021-05-05 RX ADMIN — INSULIN HUMAN 4 UNITS: 100 INJECTION, SOLUTION PARENTERAL at 11:28

## 2021-05-05 RX ADMIN — DOCUSATE SODIUM 100 MG: 50 LIQUID ORAL at 17:20

## 2021-05-05 ASSESSMENT — FIBROSIS 4 INDEX: FIB4 SCORE: 1.41

## 2021-05-05 ASSESSMENT — ENCOUNTER SYMPTOMS: FEVER: 0

## 2021-05-05 NOTE — CARE PLAN
Problem: Nutritional:  Goal: Nutrition support tolerated and meeting greater than 85% of estimated needs  Outcome: PROGRESSING SLOWER THAN EXPECTED   Tube feed with Impact Peptide 1.5 remains at 45 ml/hour with pt receiving propofol. Advance to goal rate of 50 ml/hour once off propofol.

## 2021-05-05 NOTE — PROGRESS NOTES
Infectious Disease Progress Note    Author: La Nena Khan M.D. Date & Time of service: 2021  11:21 AM    Chief Complaint:  Sepsis and cervical abscess  CVA     Interval History:  60 y.o.  admitted 2021. Pt has a past medical history of uncontrolled diabetes, CAD status post CABG times 2015, marijuana use and to arthritis.  Presented complaining of neck and back pain ongoing for approximately 1 week and fall getting out of the bathtub.  He had been seen at urgent care for back pain approximately 1 week prior to admission and given Toradol injections.     AF, O2 Vent 12/70%, pressors still on the trending down, pt continues to be agitated, not moving any extremities and concern for quadriplegia due to CVA.    AF, O2 Vent 8/40%, pressors off, agitated, without meaningful limb movement.  Decreasing vent requirements and no longer in shock.   AF, O2 Vent 8/40%, stable on low vent settings no significant secretions per nursing.  He continues to be agitated and with no upper or lower extremity movement.    5/3 AF WBC 14 remains intubated and sedated Agitation with decrease sedation. Epidural drain removed FiO2 0.4   AF WBC 19 code blue yesterday-mucus plugs found. S/p bronch this am-on 100%FiO2 On pressors   AF WBC 15.8 nurse noted some prox  movement in BUE (right greater than left) FiO2 0.9    Review of Systems:  Review of Systems   Unable to perform ROS: Intubated   Constitutional: Negative for fever.   Genitourinary: Positive for hematuria.       Hemodynamics:  Temp (24hrs), Av.1 °C (97 °F), Min:35.9 °C (96.6 °F), Max:36.5 °C (97.7 °F)  Temperature: 36 °C (96.8 °F)  Pulse  Av.8  Min: 57  Max: 121   Blood Pressure: 110/60, Arterial BP: 112/48       Physical Exam:  Physical Exam  Vitals and nursing note reviewed.   Constitutional:       Appearance: He is ill-appearing. He is not toxic-appearing or diaphoretic.   HENT:      Mouth/Throat:      Pharynx: No oropharyngeal exudate.      Eyes:      General: No scleral icterus.  Neck:      Comments: Right IJ  Cardiovascular:      Rate and Rhythm: Normal rate and regular rhythm.   Pulmonary:      Effort: Pulmonary effort is normal. No respiratory distress.      Breath sounds: No stridor.      Comments: Coarse breath sounds  Abdominal:      General: There is no distension.      Palpations: Abdomen is soft.   Genitourinary:     Comments: SP cath +bloody urine  Musculoskeletal:         General: No deformity.   Lymphadenopathy:      Cervical: No cervical adenopathy.   Skin:     General: Skin is warm.      Coloration: Skin is not jaundiced.      Findings: Bruising present.      Comments: tattoos   Neurological:      Comments: Sedated  quadriplegic         Meds:    Current Facility-Administered Medications:   •  [START ON 5/6/2021] ampicillin  •  insulin glargine  •  insulin regular **AND** POC blood glucose manual result **AND** NOTIFY MD and PharmD **AND** dextrose 50%  •  enoxaparin (LOVENOX) injection  •  norepinephrine (Levophed) infusion  •  propofol **AND** Triglycerides Starting now and then Every 3 Days  •  aspirin  •  acetylcysteine  •  albuterol  •  MD Alert...Adult ICU Electrolyte Replacement per Pharmacy  •  lidocaine  •  magnesium hydroxide  •  ondansetron  •  oxyCODONE immediate-release  •  polyethylene glycol/lytes  •  promethazine  •  senna-docusate  •  MD ALERT...DO NOT ADMINISTER NSAIDS or ASPIRIN unless ORDERED By Neurosurgery  •  bisacodyl  •  fleet  •  Respiratory Therapy Consult  •  ondansetron  •  promethazine  •  prochlorperazine  •  ipratropium-albuterol  •  labetalol  •  Pharmacy  •  [Held by provider] acetaminophen  •  ARIPiprazole  •  carBAMazepine  •  cyanocobalamin  •  senna-docusate  •  vitamin D  •  docusate sodium  •  famotidine **OR** famotidine  •  fentaNYL **AND** fentaNYL **AND** [DISCONTINUED] fentaNYL **AND** [DISCONTINUED] propofol **AND** Triglyceride  •  lidocaine    Labs:  Recent Labs     05/03/21  1156  05/04/21  0230 05/05/21  0517   WBC 21.2* 19.0* 15.8*   RBC 3.97* 3.59* 3.27*   HEMOGLOBIN 12.0* 10.8* 10.0*   HEMATOCRIT 38.8* 34.3* 31.1*   MCV 97.7 95.5 95.1   MCH 30.2 30.1 30.6   RDW 52.0* 49.8 49.3   PLATELETCT 352 378 355   MPV 12.7 12.5 12.2   NEUTSPOLYS 63.20 83.00* 81.60*   LYMPHOCYTES 26.30 9.00* 10.20*   MONOCYTES 9.60 6.60 6.80   EOSINOPHILS 0.00 0.10 0.30   BASOPHILS 0.00 0.10 0.10   RBCMORPHOLO Present  --   --      Recent Labs     05/03/21  1229 05/04/21  0230 05/05/21  0517   SODIUM 136 139 137   POTASSIUM 5.0 5.3 4.8   CHLORIDE 103 105 103   CO2 24 28 30   GLUCOSE 325* 247* 253*   BUN 44* 38* 38*     Recent Labs     05/03/21  1229 05/04/21  0230 05/05/21  0517   ALBUMIN 2.2* 1.8* 2.0*   TBILIRUBIN 0.3 0.3 0.3   ALKPHOSPHAT 282* 237* 205*   TOTPROTEIN 6.5 5.7* 5.8*   ALTSGPT 125* 199* 169*   ASTSGOT 171* 287* 112*   CREATININE 0.67 0.58 0.47*       Imaging:  CT-CSPINE WITHOUT PLUS RECONS    Result Date: 4/25/2021 4/25/2021 1:29 PM HISTORY/REASON FOR EXAM: History of back and neck pain. Fall. TECHNIQUE/EXAM DESCRIPTION: CT cervical spine without contrast, with reconstructions. Helical scanning was performed from the skull base through T1.  Sagittal and coronal multiplanar reconstructions were generated from the axial images. Low dose optimization technique was utilized for this CT exam including automated exposure control and adjustment of the mA and/or kV according to patient size. COMPARISON:  None available. FINDINGS: No compression fracture is identified. There is an ununited fracture of the spinous process of T1. Intervertebral disc space narrowing and endplate spurring is most prominent at C6/C7. There are degenerative changes of the facet joints. C1/C2 alignment is maintained. There is multilevel uncovertebral spurring and neural foraminal narrowing. There is cervical prevertebral edema. There is carotid atherosclerotic plaque bilaterally. Visualized lung apices are clear.     Multilevel  degenerative changes. Prevertebral edema. Further evaluation with MRI is recommended as this can be seen in the setting of ligamentous injury. Bilateral carotid atherosclerotic plaque.     CT-CHEST (THORAX) WITH    Result Date: 4/25/2021 4/25/2021 1:29 PM HISTORY/REASON FOR EXAM:  Rib fracture suspected, traumatic. TECHNIQUE/EXAM DESCRIPTION: CT scan of the chest with contrast. Helical images were obtained from the lung apices through the adrenal glands following the bolus administration of  80 mL of Omnipaque 350 nonionic contrast. Sagittal and coronal reconstructions were performed. Low dose optimization technique was utilized for this CT exam including automated exposure control and adjustment of the mA and/or kV according to patient size. COMPARISON:  None. FINDINGS: There is atherosclerotic plaque. There is coronary artery calcification. The heart is not enlarged. No pericardial or pleural effusion is seen. There are small mediastinal lymph nodes. There is no hilar or axillary lymphadenopathy. No pneumothorax or pulmonary contusion is seen. Central airways are patent. Limited views were obtained of the upper abdomen. Hypodensity along the falciform ligament likely represents focal fat. Patient is status post median sternotomy. Degenerative changes are seen in the spine. No displaced rib fracture is identified.     No displaced rib fracture is identified. No acute cardiopulmonary process is seen. Atherosclerotic plaque including coronary artery calcification.    CT-HEAD W/O    Result Date: 4/30/2021 4/30/2021 4:27 AM HISTORY/REASON FOR EXAM: Altered mental status; evaluate cerebellar stroke, if no blood present OK for neurology to start aspirin TECHNIQUE/EXAM DESCRIPTION:  CT of the head without contrast. Sequential axial images were obtained from the vertex to the skull base without contrast. Up to date radiation dose reduction adjustments have been utilized to meet ALARA standards for radiation dose  reduction. COMPARISON: April 28, 2021 FINDINGS: There is mild diffuse parenchymal volume loss observed. Periventricular and subcortical white matter low-attenuation changes are seen, most commonly associated with small vessel ischemic disease. The ventricles are normal in caliber and configuration. Low-density changes in the right cerebellar hemisphere seen.. There are no abnormal extra axial fluid collections or extra axial hemorrhage identified. The visualized paranasal sinuses and mastoid air cells are well aerated bilaterally. No depressed calvarial fractures are identified. The visualized globes and retrobulbar soft tissues appear within normal limits.  Atherosclerotic intracranial calcifications are seen.     1.  Low-density changes in the right cerebellar hemisphere, compatible with evolving infarct, streak artifacts minimally limits evaluation of the posterior fossa for subtle subarachnoid hemorrhage, no intracranial hemorrhage is readily identified. 2.  Atherosclerosis.    CT-HEAD W/O    Result Date: 4/28/2021 4/28/2021 5:20 AM HISTORY/REASON FOR EXAM: Altered mental status TECHNIQUE/EXAM DESCRIPTION:  CT of the head without contrast. Sequential axial images were obtained from the vertex to the skull base without contrast. Up to date radiation dose reduction adjustments have been utilized to meet ALARA standards for radiation dose reduction. COMPARISON: None FINDINGS: There is moderate diffuse parenchymal volume loss observed. Periventricular and subcortical white matter low-attenuation changes are seen, most commonly associated with small vessel ischemic disease. The ventricles are normal in caliber and configuration. Area of low-density within the right cerebellar hemisphere is seen. There are no abnormal extra axial fluid collections or extra axial hemorrhage identified. The visualized paranasal sinuses and mastoid air cells are well aerated bilaterally. No depressed calvarial fractures are identified.  The visualized globes and retrobulbar soft tissues appear within normal limits.     1.  Low-density in the region of the right cerebellar hemisphere, appearance compatible with evolving infarct. These findings were discussed with the patient's on-call clinician, Deniz, on 4/28/2021 6:14 AM.    CT-LSPINE W/O PLUS RECONS    Result Date: 4/25/2021 4/25/2021 1:29 PM HISTORY/REASON FOR EXAM:  Back pain after injury. TECHNIQUE/EXAM DESCRIPTION AND NUMBER OF VIEWS: CT lumbar spine without contrast, with reconstructions. The study was performed on a GE CT scanner. Thin-section helical scanning was performed from T12-L1 to the sacrum. Sagittal and coronal multiplanar reconstructions were generated from the axial images. Low dose optimization technique was utilized for this CT exam including automated exposure control and adjustment of the mA and/or kV according to patient size. COMPARISON: None. FINDINGS: Alignment of the lumbar spine is normal. There is no acute fracture or subluxation. The prevertebral and paraspinous soft tissues show no acute abnormality. There is severe atherosclerosis with a mixture of soft and calcified plaque. There is lumbosacral junction vacuum disc phenomenon with endplate spurring, disc height loss The visualized sacrum and sacroiliac joints show no acute abnormality.     No CT evidence of acute traumatic injury.    CT-TSPINE W/O PLUS RECONS    Result Date: 4/25/2021 4/25/2021 1:29 PM HISTORY/REASON FOR EXAM:  Mid-back trauma Pain following injury. TECHNIQUE/EXAM DESCRIPTION AND NUMBER OF VIEWS: CT thoracic spine without contrast, with reconstructions. The study was performed on a [email or web address] CT scanner. Thin-section helical scanning was performed from C7-T1 through T12-L1. Sagittal and coronal multiplanar reconstructions were generated from the axial images. Low dose optimization technique was utilized for this CT exam including automated exposure control and adjustment of the mA and/or kV according  to patient size. COMPARISON: None. FINDINGS: Alignment of the thoracic spine is normal. There is no acute displaced fracture. There is T6/7 interbody fusion with mild anterior wedging likely indicating remote mild compression fracture that has healed There is bulky endplate spurring with some bridging syndesmophytes in the mid thoracic spine Sternotomy wires The cervicothoracic junction appears intact. The prevertebral and paraspinous soft tissues show no acute abnormality.     No CT evidence of acute traumatic abnormality. Mild T6/7 anterior wedging compatible with healed mild chronic compression fracture and there is interbody fusion.    DX-CERVICAL SPINE-2 OR 3 VIEWS    Result Date: 4/22/2021 4/22/2021 6:16 PM HISTORY/REASON FOR EXAM:  Atraumatic Pain. Neck pain for 4 days. TECHNIQUE/EXAM DESCRIPTION AND NUMBER OF VIEWS: Cervical spine series, 2 views. COMPARISON:  None. FINDINGS: Mild reversal of curvature centered at the C5 level. Vertebral body heights are preserved. Multilevel loss of disc height and osteophyte formation. Cervicothoracic junction is obscured. Prevertebral soft tissues are within normal limits. Odontoid is grossly intact.  Lateral masses of C1 are grossly intact.     1.  Limited exam.  Cervicothoracic junction is obscured. 2.  No gross cervical spine fracture or subluxation. 3.  Moderate multilevel degenerative changes.    DX-CHEST-PORTABLE (1 VIEW)    Result Date: 4/30/2021 4/30/2021 1:07 AM HISTORY/REASON FOR EXAM: For indication of respiratory failure; For indication of respiratory failure TECHNIQUE/EXAM DESCRIPTION:  Single AP view of the chest. COMPARISON: Yesterday FINDINGS: Position of medical devices appears stable. The cardiac silhouette appears within normal limits.  Postsurgical changes of sternotomy are noted. The mediastinal contour appears within normal limits.  The central pulmonary vasculature appears normal. Bilateral lung volumes are diminished.  Bilateral lungs are  clear. No significant pleural effusions are identified. The bony structures appear age-appropriate.     1.  No acute cardiopulmonary disease.    DX-CHEST-PORTABLE (1 VIEW)    Result Date: 4/29/2021 4/29/2021 10:06 AM HISTORY/REASON FOR EXAM:  For indication of respiratory failure; For indication of respiratory failure. TECHNIQUE/EXAM DESCRIPTION AND NUMBER OF VIEWS: Single portable view of the chest. COMPARISON: 4/28/2021 FINDINGS: Endotracheal tube projects at the level the clavicular heads. Right central line projects over the SVC. Enteric tube passes below the level of the diaphragm. The heart is not enlarged. Atherosclerotic calcification is seen. There is mild left basilar opacity likely representing atelectasis. No pleural effusion or pneumothorax is seen. Skin staples and a surgical drain project over the cervical spine.     Mild left basilar opacity may represent atelectasis. Infection not excluded. Improved aeration of the left lung. Atherosclerotic plaque.     DX-CHEST-PORTABLE (1 VIEW)    Result Date: 4/28/2021 4/28/2021 12:10 PM HISTORY/REASON FOR EXAM:  CENTRAL LINE PLACEMENT; CENTRAL LINE PLACEMENT. TECHNIQUE/EXAM DESCRIPTION AND NUMBER OF VIEWS: Single portable view of the chest. COMPARISON: 4/28/2021 11:17 AM FINDINGS: Mediastinal structures are shifted to the LEFT.  Increasing opacification of LEFT hemithorax. RIGHT lung is clear. Endotracheal tube in place with tip approximately 5 cm above the you. Feeding tube tip in place however tip is off the image. RIGHT internal jugular catheter tip at the lower SVC. No pneumothorax. Postoperative change from prior open heart surgery. Postoperative change of the neck with surgical drain present.     1.  Worsening consolidation of LEFT hemithorax with shift of mediastinal structures to the LEFT suggesting atelectasis, possibly due to endobronchial process. 2.  Supportive tubing as described above. 3.  No pneumothorax.    DX-CHEST-PORTABLE (1  VIEW)    Result Date: 4/28/2021 4/28/2021 11:16 AM HISTORY/REASON FOR EXAM:  replaced ETT; replaced ETT TECHNIQUE/EXAM DESCRIPTION AND NUMBER OF VIEWS: Single portable view of the chest. COMPARISON: 4/27/2021 FINDINGS: Endotracheal tube with tip projecting over the mid thoracic trachea. Hazy opacity in the left lower lobe Layering left pleural effusion. No pneumothorax. Stable cardiopericardial silhouette.     Endotracheal tube with tip projecting over the mid thoracic trachea. Layering left pleural effusion with left lower lung opacity.    DX-CHEST-PORTABLE (1 VIEW)    Result Date: 4/27/2021 4/27/2021 5:30 PM HISTORY/REASON FOR EXAM:  Shortness of Breath. TECHNIQUE/EXAM DESCRIPTION AND NUMBER OF VIEWS: Single portable view of the chest. COMPARISON: None. FINDINGS: LUNGS: Hypoinflation with left basilar atelectasis. Mild perihilar interstitial prominence. No effusions. PNEUMOTHORAX: None. LINES AND TUBES: None. MEDIASTINUM: No cardiomegaly. MUSCULOSKELETAL STRUCTURES: No acute displaced fracture. Median sternotomy.     1.  Hypoinflation with left basilar atelectasis. 2.  Mild perihilar interstitial prominence may be related to hypoinflation or edema.    DX-SPINE-ANY ONE VIEW    Result Date: 4/28/2021 4/28/2021 5:30 AM HISTORY/REASON FOR EXAM:  Cervical laminectomy TECHNIQUE/EXAM DESCRIPTION AND NUMBER OF VIEWS:  Single view of the spine. Digitized Intraoperative Radiograph FINDINGS: Fixation hardware projecting over the cervical spine Fluoroscopic time:2 seconds     Digitized intraoperative radiograph is submitted for review.  This examination is not for diagnostic purpose but for guidance during a surgical procedure.    MR-BRAIN-W/O    Result Date: 4/28/2021 4/28/2021 3:31 PM HISTORY/REASON FOR EXAM:  Altered mental status; cerebellar stroke. TECHNIQUE/EXAM DESCRIPTION: MRI of the brain without contrast. T1 sagittal, T2 fast spin-echo axial, T1 coronal, FLAIR coronal, diffusion-weighted and apparent diffusion  coefficient (ADC map) axial images were obtained of the whole brain. The study was performed on a [employer] 1.5 Britt MRI scanner. COMPARISON:  Head CT earlier in the day FINDINGS: Large acute right cerebellar infarct suggesting right posterior inferior cerebellar artery territory. There is prominent T2 hyperintensity consistent with cytotoxic edema. A small amount of blooming artifact in the medial aspect of the right cerebellar hemisphere on GRE images could represent a small amount of petechial hemorrhage. There is mild localized mass effect with some mild mass effect on the fourth ventricle. No supratentorial infarct or hemorrhage. No extra-axial fluid collection. No midline shift or hydrocephalus. There is a nasogastric tube and fluid within the nasopharynx. Endotracheal tube partially visualized. Mild posterior ethmoid sinus mucosal thickening.     Acute large right cerebellar posterior inferior cerebellar artery territory infarct with possible small amount of petechial hemorrhage.    MR-CERVICAL SPINE-WITH & W/O    Result Date: 4/27/2021 4/27/2021 1:02 PM HISTORY/REASON FOR EXAM:  Neck pain, abnormal neuro exam; Neck pain, initial exam; sepsis 2/2 strep bacteremia  -unknown source. rule out possible ?? retropharyngeal abscess, ??prevertebral abscess. TECHNIQUE/EXAM DESCRIPTION: MRI of the cervical spine without and with contrast. The study was performed on a [employer] 1.5 Britt MRI scanner. T1 sagittal, T2 fast spin-echo sagittal, and gradient echo axial images were obtained of the cervical spine. T1 post-contrast fat suppressed sagittal images were obtained of the cervical spine. Optional T1 post-contrast axial images may be obtained. 15 mL ProHance contrast was administered intravenously. COMPARISON: None. FINDINGS: There is abnormal disc T2 hyperintensity at C5-6. Mild amount of bone marrow edema is noted at C5, C6 and C7. There is also focal bone marrow enhancement at C6. There is multiseptated  abnormal peripherally enhancing epidural collection noted extending from C2 to the visualized lower thoracic level. Largest collection is noted in the upper thoracic posterior epidural space at the levels of T2 and T3. The lower extent of the collection is not imaged. This is causing multilevel effacement of the thecal sac and cord compression. The thoracic spinal cord is anteriorly displaced and compressed at the levels of T2 and T3. At the level of C2-3,there is small left anterior epidural fluid collection. At the level of C3-4,there is small left anterior epidural fluid collection causing indentation of the thecal sac. At the level of C4-5,there is minimal epidural fluid collection. At the level of C5-6,there is diffuse disc bulge along with right posterior lateral epidural fluid collection causing severe canal compromise. At the level of C6-7,there is mild diffuse disc bulge. There is epidural fluid collection causing severe canal compromise. At the level of C7-T1,there is epidural fluid collection causing severe canal compromise. The visualized brain parenchyma is unremarkable. The cervical spinal cord is mildly enlarged which demonstrates mild increased intramedullary T2 signal intensity. There is abnormal T2 hyperintensity in the visualized bilateral vertebral arteries concerning for age indeterminate occlusion. There is mild amount of edema in the pre and retropharyngeal soft tissue.     1.  There is multiseptated abnormal peripherally enhancing epidural collection noted extending from C2 to the visualized lower thoracic level. Largest collection is noted in the upper thoracic posterior epidural space at the levels of T2 and T3. The lower extent of the collection is not imaged. This is causing multilevel effacement of the thecal sac and cord compression. The thoracic spinal cord is anteriorly displaced and compressed at the levels of T2 and T3. Pre and postcontrast MR examination of  the thoracic spine is  recommended for further evaluation. 2.  There is effacement of the cervical thecal sac due to the multiseptated epidural fluid collection. 3.  The cervical spinal cord is mildly enlarged with minimal increased T2 signal intensity. The possibility of cord inflammation cannot be entirely excluded. 4.  Abnormal T2 hyperintensity at C5-6 disc space likely representing discitis. 5.  Abnormal bone marrow edema of C5, C6 and C7 vertebral bodies with edema concerning for osteomyelitis. 6.  There is also focal enhancement of C6 vertebral body likely secondary to the osteomyelitis. 7.  Prevertebral edema/fluid collection. 8.  Nonvisualization of flow void of bilateral vertebral arteries concerning for age-indeterminate bilateral vertebral occlusions.     MR-LUMBAR SPINE-WITH & W/O    Result Date: 4/28/2021 4/27/2021 11:23 PM HISTORY/REASON FOR EXAM:  Back pain or radiculopathy, cancer or infection suspected; Strep bacteremia, back pain, bilateral leg numbness TECHNIQUE/EXAM DESCRIPTION: MRI of the lumbar spine without and with contrast. The study was performed on a [employer] 1.5 Britt MRI scanner. T1 sagittal, T2 fast spin-echo sagittal, and T2 axial images were obtained of the lumbar spine. T1 postcontrast fat-suppressed sagittal images were obtained. 14 mL ProHance contrast was administered intravenously. COMPARISON:  None. FINDINGS: Normal lumbar lordosis. Preservation of vertebral body heights, alignment and bone marrow signal. No evidence of discitis osteomyelitis. Conus medullaris terminates at T12-L1 and is normal in signal. No mass or fluid collection is seen within the lumbar spinal canal. T12-L1: Canal and foramina are patent. L1-L2: Mild disc bulge. Canal and foramina are patent. L2-L3: Minimal disc bulge and facet degeneration. Canal and foramina are patent. L3-L4: Minimal disc bulge and facet degeneration. Canal and foramina are patent.. L4-L5: Mild disc bulge and facet degeneration. No significant spinal  stenosis. Mild foraminal narrowing. L5-S1: Disc narrowing, mild disc osteophyte. No significant spinal stenosis. Moderate right and mild-to-moderate left foraminal narrowing. Distended urinary bladder.     No evidence of lumbar spine infection or epidural abscess. Mild spondylosis as detailed above without spinal stenosis.    MR-MRA HEAD-W/O    Result Date: 4/28/2021 4/28/2021 3:31 PM HISTORY/REASON FOR EXAM:  Emergency Medical Condition ? Stroke. Cerebellar infarct TECHNIQUE/EXAM DESCRIPTION: MRA of the head ([place]) without contrast. The study was performed on a [employer] 1.5 Britt MRI scanner. MRA of the [place] was performed with 3D time-of-flight technique with axial acquisition. Additional MRA of the internal carotid and vertebrobasilar arteries at the level of the skull base and craniocervical junction was also performed with 3D time-of-flight technique with axial acquisition. Images are reviewed at the PACS workstation as axial source images as well as maximum intensity ray projection (MIP) multiplanar 3D reconstructions. COMPARISON:  None FINDINGS: Nicole cavernous and supraclinoid ICA segments are patent. Patent left posterior communicating artery. MCA and ARA branches are patent. There is no significant flow within the intradural right vertebral artery. The intradural left vertebral artery, basilar artery and posterior cerebral arteries are patent. No significant aneurysm or high flow vascular malformation is seen.     Findings suggesting right vertebral artery occlusion.    MR-MRA NECK-WITH & W/O AND SEQUENCES    Result Date: 4/28/2021 4/28/2021 3:31 PM HISTORY/REASON FOR EXAM:  Emergency Medical Condition ? Stroke Cerebellar stroke TECHNIQUE/EXAM DESCRIPTION: MRA of the cervical carotid and vertebral arteries without and with contrast. The study was performed on a [employer] 1.5 Britt MRI scanner. Precontrast MRA of the cervical carotid and vertebral arteries was performed with 2D  time-of-flight (TOF) technique with acquisition in the axial plane. MRA of the arch origins of the great vessels, and cervical carotid and vertebral arteries was performed with 2D time-of-flight (TOF) technique with coronal slab acquisition from the level of the aortic arch to the carotid siphons during dynamic intravenous infusion of 15 mL of ProHance contrast. Images are reviewed at the PACS workstation as source images as well as maximum intensity ray projection (MIP) multiplanar 3D reconstructions. Cervical internal carotid artery percent stenosis is calculated using the standard method according to the NASCET criteria wherein a segment of uniform caliber distal cervical internal carotid is used as the reference denominator. COMPARISON:  None available. FINDINGS: The arch origins of the great vessels are intact. The cervical right vertebral artery is not well seen on the time-of-flight images. A very small caliber cervical right vertebral artery is seen on the postcontrast images with some mildly increased caliber of the artery at about the C1-C2 level. The fact  that it is not seen on the time-of-flight images and is visualized on contrast enhanced sequence could be related to small nondominant caliber with sluggish flow or could represent retrograde flow within the right vertebral artery. There may be a focal moderate stenosis in the mid cervical left internal carotid artery and mild narrowing at its origin. Mild narrowing of the proximal left internal carotid artery with less than 50% stenosis. No significant right carotid stenosis is seen.     1.  Small caliber right vertebral artery which is not visualized on the noncontrast time-of-flight technique could be related to retrograde flow or diminutive caliber and sluggish flow. 2.  Possible moderate focal stenosis in the mid cervical left vertebral artery. 3.  No significant carotid stenosis.    MR-THORACIC SPINE-WITH & W/O    Result Date: 4/28/2021 4/27/2021  11:23 PM HISTORY/REASON FOR EXAM:  Mid-back pain, compression fracture suspected; possible epidural abscess/fluid collection TECHNIQUE/EXAM DESCRIPTION: MRI of the thoracic spine without and with contrast. The study was performed on a [employer] 1.5 Britt MRI scanner. T1 sagittal, T2 fast spin-echo sagittal, and T2 axial images were obtained of the thoracic spine. T1 post-contrast fat suppressed sagittal images were obtained. Optional T1 post-contrast axial images may be obtained. 14 mL ProHance contrast was administered intravenously. COMPARISON:  MRI of the cervical spine one-day prior. FINDINGS: There is abnormal dorsal epidural fluid collection seen within the partially visualized lower cervical spine and which extends inferiorly to about the T6 level. The maximum thickness is seen at about the T2-T3 level measuring 8 mm. This raises concern for epidural abscess, although epidural hematoma could also have this appearance. From T1 through T3 there is at least moderate thecal sac stenosis due to the epidural fluid collection. There is significant abnormal signal within the partially visualized  lower cervical and upper thoracic cord which could be infectious/inflammatory or other nonspecific cord edema. There is also suggestion of a small ventral epidural fluid collection at C6-C7. There is also partially visualized abnormal marrow edema in the C6 and C7 vertebral bodies as detailed on earlier MRI report. There is suggestion of some prominent enhancement around the mid and lower thoracic cord seen on the sagittal postcontrast images however limited evaluation on axial images due to motion artifact. This is of uncertain etiology but could represent some leptomeningeal disease/infection. On the sagittal T2 images there is a questionable slight nodular appearance on the surface of lower thoracic cord. A differential would be a subtle spinal dural aVF. There is no abnormal signal seen within the mid/lower thoracic  cord. There is no evidence of discitis osteomyelitis within the thoracic spine. There is T6-T7 interbody ankylosis. There is mild disc degeneration and some prominent anterolateral bridging osteophytes in the mid and lower thoracic spine. No significant posterior disc herniation is seen. Within the mid and lower thoracic spine there  is no significant spinal stenosis.     1.  Posterior epidural fluid collection/abscess within the lower cervical spine extending inferiorly to about the T6 level which could represent epidural abscess and/or hematoma. This measures 8 mm in maximal thickness at T2-T3 with at least moderate thecal sac stenosis. 2.  Abnormal signal within the cervical cord and upper thoracic cord suggesting cord edema or infectious/inflammatory etiology. 3.  Lower cervical spine findings are detailed on earlier report. 4.  Prominent enhancement along the surface of the mid and lower thoracic cord is of uncertain etiology and as detailed above, possibly representing leptomeningeal disease/infection. See details above. These findings were discussed with Dr. Cano on 4/28/2021 10:01 AM.     IR-DRAIN-BLADDER SUPRAPUB W/CATH    Result Date: 4/28/2021  HISTORY/REASON FOR EXAM:  60-year-old man with urinary retention. TECHNIQUE/EXAM DESCRIPTION AND NUMBER OF VIEWS:  Ultrasound and fluoroscopic guided suprapubic bladder catheter placement, Cystogram. MEDICATIONS: The patient remained on his ICU sedation regimen. FLUOROSCOPY TIME: 0.3 minutes; 5fluoroscopic images obtained CONTRAST: 40mL Omnipaque 300, intraurinary PROCEDURE:  Informed consent was obtained. The suprapubic region was prepped and draped in sterile manner. Following local anesthesia with copious 1% lidocaine, under ultrasound and fluoroscopic monitoring, an 18-gauge needle was advanced into the urinary bladder from a paramedian suprapubic approach. An Amplatz guidewire was placed in the urinary bladder. Serial tract dilatation was performed with a 22  Albanian telescoping dilator. A 16 [place] tip silicone Gomez type catheter was then placed in the balloon inflated with 10 mL of sterile saline. A cystogram was performed with AP and both oblique views demonstrating satisfactory position of the catheter with all side holes within the urinary bladder. The catheter was secured to the skin with 2-0 nylon suture and connected to gravity drainage. The Gomez catheter was removed. The patient tolerated the procedure well with no evidence of complication. COMPARISON: None FINDINGS:  The cystogram shows satisfactory catheter position with all sideholes within the urinary bladder. There is no extravasation.     1.     Ultrasound and fluoroscopic guided placement of a 16 [place] tip silicone suprapubic bladder catheter. 2.     Cystogram documenting catheter placement.     DX-PORTABLE FLUOROSCOPY < 1 HOUR    Result Date: 4/28/2021 4/28/2021 5:30 AM HISTORY/REASON FOR EXAM:  Cervical laminectomy TECHNIQUE/EXAM DESCRIPTION AND NUMBER OF VIEWS: Portable fluoroscopy for less than one hour in OR. FINDINGS: The portable fluoroscopy unit was obligated to the procedure for less than one hour. Actual fluoro time was 2 seconds.     Portable fluoroscopy utilized for 2 seconds. INTERPRETING LOCATION: [address] [place] [address], [id number]    EC-ECHOCARDIOGRAM COMPLETE W/O CONT    Result Date: 4/28/2021  Transthoracic Echo Report Echocardiography Laboratory CONCLUSIONS No prior study is available for comparison. Normal left ventricular systolic function.  Left ventricular ejection fraction is visually estimated to be 60%. Normal diastolic function. Agitated saline (bubble) study was performed. No evidence of right to left shunt by agitated saline challenge. Normal inferior vena cava size and inspiratory collapse. GILDARDO MAGANA Exam Date:         04/28/2021                    19:42 Exam Location:     Inpatient Priority:          Routine Ordering Physician:        YESICA SULLIVAN  Referring Physician:       [id number] Marie Sonographer:               Faye Moya RDCS Age:    60     Gender:    M MRN:    [id number] :    1960 BSA:    1.87   Ht (in):    69     Wt (lb):    159 Exam Type:     Complete Indications:     CVA ICD Codes:       436 CPT Codes:       71224 BP:   140    /   60     HR:   74 Technical Quality:       Fair MEASUREMENTS  (Male / Female) Normal Values 2D ECHO LV Diastolic Diameter PLAX        3.8 cm                4.2 - 5.9 / 3.9 - 5.3 cm LV Systolic Diameter PLAX         2.4 cm                2.1 - 4.0 cm IVS Diastolic Thickness           1.4 cm                LVPW Diastolic Thickness          1.4 cm                LVOT Diameter                     2 cm                  Estimated LV Ejection Fraction    60 %                  LV Ejection Fraction MOD BP       62.7 %                >= 55  % LV Ejection Fraction MOD 4C       46.6 %                LV Ejection Fraction MOD 2C       55 %                  DOPPLER AV Peak Velocity                  1.7 m/s               AV Peak Gradient                  10.9 mmHg             AV Mean Gradient                  5.5 mmHg              LVOT Peak Velocity                1.2 m/s               AV Area Cont Eq vti               2.3 cm2               Mitral E Point Velocity           0.74 m/s              Mitral E to A Ratio               0.92                  MV Pressure Half Time             69.4 ms               MV Area PHT                       3.2 cm2               MV Deceleration Time              239 ms                PV Peak Velocity                  1.1 m/s               PV Peak Gradient                  4.6 mmHg              RVOT Peak Velocity                0.66 m/s              * Indicates values subject to auto-interpretation LV EF:  60    % FINDINGS Left Ventricle Normal left ventricular chamber size. Mild concentric left ventricular hypertrophy. Normal left ventricular systolic function.  Left ventricular ejection fraction is  visually estimated to be 60%. Normal diastolic function. Right Ventricle Normal right ventricular size and systolic function. Right Atrium Normal right atrial size. Normal inferior vena cava size and inspiratory collapse. Left Atrium Normal left atrial size. Agitated saline (bubble) study was performed. With Valsalva maneuver. No evidence of right to left shunt by agitated saline challenge. Existing IV was used. Mitral Valve Structurally normal mitral valve without significant stenosis or regurgitation. Aortic Valve Aortic sclerosis without stenosis. No aortic insufficiency. Tricuspid Valve Structurally normal tricuspid valve without significant stenosis or regurgitation. Pulmonic Valve The pulmonic valve is not well visualized. No pulmonic insufficiency. Pericardium Normal pericardium without effusion. Aorta Normal aortic root for body surface area. Ascending aorta not well visualized. Zakiya Rebollar MD (Electronically Signed) Final Date:     28 April 2021                 21:30    EC-CHRISTOPHER W/O CONT    Result Date: 4/30/2021  Results Will be Available after Interpretation by Cardiologist.    DX-ABDOMEN FOR TUBE PLACEMENT    Result Date: 4/30/2021 4/30/2021 12:44 PM HISTORY/REASON FOR EXAM:  Line evaluation. TECHNIQUE/EXAM DESCRIPTION AND NUMBER OF VIEWS:  1 view(s) of the abdomen. COMPARISON:  4/28/2021 FINDINGS: Enteric tube has been placed. The tip projects over the distal stomach or duodenal bulb. There are scattered loops of air-filled bowel.     Feeding tube placement with the tip projecting over the distal stomach or duodenal bulb    DX-ABDOMEN FOR TUBE PLACEMENT    Result Date: 4/28/2021 4/28/2021 12:24 PM HISTORY/REASON FOR EXAM:  Tube evaluation. TECHNIQUE/EXAM DESCRIPTION AND NUMBER OF VIEWS:  1 view(s) of the abdomen. COMPARISON:  None. FINDINGS: Limited single view of the abdomen performed primarily to evaluate enteric tube position. The tip projects over the expected area of the distal stomach. The bowel  "gas pattern is within normal limits.     Feeding tube with tip projecting over the expected area of the distal stomach.      Micro:  Results     Procedure Component Value Units Date/Time    BLOOD CULTURE [[id number]] Collected: 04/28/21 1134    Order Status: Completed Specimen: Blood from Peripheral Updated: 05/03/21 1300     Significant Indicator NEG     Source BLD     Site PERIPHERAL     Culture Result No growth after 5 days of incubation.    Narrative:      Collected By:[id number] SAILAJA BAILEY  Per Hospital Policy: Only change Specimen Src: to \"Line\" if  specified by physician order.  No site indicated    BLOOD CULTURE [[id number]] Collected: 04/27/21 0248    Order Status: Completed Specimen: Blood from Peripheral Updated: 05/02/21 1100     Significant Indicator NEG     Source BLD     Site PERIPHERAL     Culture Result No growth after 5 days of incubation.    Narrative:      Per Hospital Policy: Only change Specimen Src: to \"Line\" if  specified by physician order.  Left AC    BLOOD CULTURE [[id number]] Collected: 05/01/21 1305    Order Status: Completed Specimen: Blood from Peripheral Updated: 05/02/21 0738     Significant Indicator NEG     Source BLD     Site PERIPHERAL     Culture Result No Growth  Note: Blood cultures are incubated for 5 days and  are monitored continuously.Positive blood cultures  are called to the RN and reported as soon as  they are identified.      Narrative:      Collected By:[id number] LESA VIZCARRA  Per Hospital Policy: Only change Specimen Src: to \"Line\" if  specified by physician order.  Left Forearm/Arm    BLOOD CULTURE [[id number]] Collected: 05/01/21 1300    Order Status: Completed Specimen: Blood from Peripheral Updated: 05/02/21 0738     Significant Indicator NEG     Source BLD     Site PERIPHERAL     Culture Result No Growth  Note: Blood cultures are incubated for 5 days and  are monitored continuously.Positive blood cultures  are called to the RN and reported as soon as  they " "are identified.      Narrative:      Collected By:[id number] LESA VIZCARRA  Per Hospital Policy: Only change Specimen Src: to \"Line\" if  specified by physician order.  Right Forearm/Arm    CULTURE WOUND W/ GRAM STAIN [[id number]]  (Abnormal) Collected: 04/28/21 0642    Order Status: Completed Specimen: Wound Updated: 05/01/21 1117     Significant Indicator POS     Source WND     Site Cerival Thoracic Epidural 1     Culture Result Growth noted after further incubation, see below for  organism identification.       Gram Stain Result Few WBCs.  Rare Gram positive cocci.       Culture Result Streptococcus anginosus  Light growth      Narrative:      Surgery - swabs received    Anaerobic Culture [[id number]] Collected: 04/28/21 0642    Order Status: Completed Specimen: Wound Updated: 05/01/21 1117     Significant Indicator NEG     Source WND     Site Cerival Thoracic Epidural 1     Culture Result No Anaerobes isolated.    Narrative:      Surgery - swabs received    CULTURE WOUND W/ GRAM STAIN [[id number]]  (Abnormal) Collected: 04/28/21 0655    Order Status: Completed Specimen: Wound Updated: 05/01/21 1117     Significant Indicator POS     Source WND     Site Cerival Thoric Epidural 2     Culture Result Growth noted after further incubation, see below for  organism identification.       Gram Stain Result Few WBCs.  Few Gram positive cocci.       Culture Result Streptococcus anginosus  Moderate growth      Narrative:      Surgery - swabs received    Anaerobic Culture [[id number]] Collected: 04/28/21 0655    Order Status: Completed Specimen: Wound Updated: 05/01/21 1117     Significant Indicator NEG     Source WND     Site Cerival Thoric Epidural 2     Culture Result No Anaerobes isolated.    Narrative:      Surgery - swabs received    BLOOD CULTURE [[id number]]  (Abnormal) Collected: 04/27/21 0248    Order Status: Completed Specimen: Blood from Peripheral Updated: 04/30/21 1134     Significant Indicator POS     Source BLD     " "Site PERIPHERAL     Culture Result Growth detected by Bactec instrument. 04/29/2021  05:59      Viridans Streptococcus  Possible Contaminant  Isolated from one set only, please correlate with clinical  condition. Contact the Microbiology department within 48 hr  if identification and susceptibility are needed.      Narrative:      CALL  Bonilla  [facility] tel. [phone],  CALLED  [facility] tel. [phone] 04/29/2021, 06:05, RB PERF. RESULTS CALLED TO: RN  [id number]  Per Hospital Policy: Only change Specimen Src: to \"Line\" if  specified by physician order.  Right AC    BLOOD CULTURE [[id number]]  (Abnormal) Collected: 04/28/21 1134    Order Status: Completed Specimen: Blood from Peripheral Updated: 04/30/21 0957     Significant Indicator POS     Source BLD     Site PERIPHERAL     Culture Result Growth detected by Bactec instrument. 04/29/2021  21:41  Negative for Staphylococcus aureus and MRSA by PCR. Correlate  ongoing need for antibiotics with clinical condition.        Coagulase-negative Staphylococcus species  Possible Contaminant  Isolated from one set only, please correlate with clinical  condition. Contact the Microbiology department within 48 hr  if identification and susceptibility are needed.      Narrative:      CALL  Bonilla  [facility] tel. [phone],  CALLED  [facility] tel. [phone] 04/29/2021, 21:41, RB PERF. RESULTS CALLED  TO:RN:[id number]  Collected By:[id number] SAILAJA BAILEY  Per Hospital Policy: Only change Specimen Src: to \"Line\" if  specified by physician order.  Collected By:[id number] SAILAJA BAILEY    GRAM STAIN [[id number]] Collected: 04/28/21 0642    Order Status: Completed Specimen: Wound Updated: 04/28/21 1854     Significant Indicator .     Source WND     Site Cerival Thoracic Epidural 1     Gram Stain Result Few WBCs.  Rare Gram positive cocci.      Narrative:      Surgery - swabs received    GRAM STAIN [[id number]] Collected: 04/28/21 0655    Order Status: Completed Specimen: Wound Updated: 04/28/21 1854     " "Significant Indicator .     Source WND     Site Cerival Thoric Epidural 2     Gram Stain Result Few WBCs.  Few Gram positive cocci.      Narrative:      Surgery - swabs received    BLOOD CULTURE [[id number]]  (Abnormal) Collected: 04/25/21 1433    Order Status: Completed Specimen: Blood from Peripheral Updated: 04/28/21 1122     Significant Indicator POS     Source BLD     Site PERIPHERAL     Culture Result Growth detected by Bactec instrument. 04/26/2021  04:57      Streptococcus anginosus  See previous culture for sensitivity report.      Narrative:      CALL  Bonilla  [facility] tel. [phone],  CALLED  [facility] tel. [phone] 04/26/2021, 04:59, RB PERF. RESULTS CALLED TO: RN  [id number]  1 of 2 for Blood Culture x 2 sites order. Per Hospital  Policy: Only change Specimen Src: to \"Line\" if specified by  physician order.  No site indicated    BLOOD CULTURE [[id number]]  (Abnormal)  (Susceptibility) Collected: 04/25/21 1505    Order Status: Completed Specimen: Blood from Peripheral Updated: 04/28/21 1122     Significant Indicator POS     Source BLD     Site PERIPHERAL     Culture Result Growth detected by Bactec instrument. 04/26/2021  04:06      Streptococcus anginosus    Narrative:      CALL  Bonilla  [facility] tel. [phone],  CALLED  [facility] tel. [phone] 04/26/2021, 04:09, RB PERF. RESULTS CALLED TO: RN  [id number]  2 of 2 blood culture x2  Sites order. Per Hospital Policy:  Only change Specimen Src: to \"Line\" if specified by physician  order.  No site indicated    Susceptibility     Streptococcus anginosus (1)     Antibiotic Interpretation Microscan Method Status    Penicillin Sensitive 0.064 mcg/mL E-TEST Final    Cefotaxime Sensitive 0.125 mcg/mL E-TEST Final                         Assessment:  Active Hospital Problems    Diagnosis    • *Spinal epidural abscess [G06.1]    • History of ischemic stroke [Z86.73]    • Sepsis due to Streptococcus species (formerly [facility]) [A40.9]    • Acute bilateral back pain [M54.9]    • Thrombocytopenia (formerly [facility]) [D69.6]    "   • Type 2 diabetes mellitus without complication, without long-term current use of insulin (HCC) [E11.9]    • Coronary artery disease due to calcified coronary lesion: CABG x4, July 2015 [I25.10, I25.84]    • Essential hypertension, benign [I10]    • Hypokalemia [E87.6]    • Hypomagnesemia [E83.42]    • Hyponatremia [E87.1]    • Difficulty swallowing [R13.10]    • Diabetic ketoacidosis (HCC) [E11.10]    • Marijuana abuse [F12.10]    • High anion gap metabolic acidosis [E87.2]    • Tobacco abuse [Z72.0]    • Dyslipidemia [E78.5]    • Status post urethrostomy (HCC) [Z93.6]    .    Assessment:  60 y.o.  admitted 4/25/2021. Pt has a past medical history of uncontrolled diabetes, CAD status post CABG times 4/2015, marijuana use and to arthritis.  Presented complaining of neck and back pain ongoing for approximately 1 week and fall getting out of the bathtub.  He had been seen at urgent care for back pain approximately 1 week prior to admission and given Toradol injections.       Hospital Course:   Afebrile and vitals unremarkable other than hypertension.  Leukocytosis ongoing since arrival.  MRI C-spine on 4/26 with epidural collection noted from C2-T3.  Largest collection noted in upper thoracic posterior epidural space at T2-T3.  This is causing multilevel cord compression.  Also with likely discitis at C5-6 and osteomyelitis at C5-C7.  Blood cultures on 4/25+ for Streptococcus species.    Code blue 5/3-mucus plugs     Problem List  Sepsis/shock, strep  -Blood cultures on 4/25 2/2 + Streptococcus anginosus, penicillin and cephalosporin susceptible  -Blood cultures on 4/27 1/2 + viridans Streptococcus   -Blood cultures on 4/28  1/2 + CoNS -likely contaminant  -Blood cultures on 5/1 are no growth to date  -TTE with no vegetations  -CHRISTOPHER on 4/30, no official read    VDRF-Bronchoscopy on 4/28 with thick secretions  Bronch 5/3 mucus plugs, thicvk secretions  Bronch 5/4 pending-no signif secretions noted    Leukocytosis,  persistent  Cervical thoracic infection, epidural abscess at C2-T3 as well as discitis and osteomyelitis, +GPCs-Streptococcus anginosus   -Repeat MRI thoracic spine on 4/27 notes posterior epidural fluid  collection/abscess in lower cervical spine extending to T6 level, also noted abnormal signal within the cervical and upper thoracic cord  -s/p OR 4/28 for see 4-T2 laminectomy, decompression of thecal sac and nerve roots as well as cervical thoracic extradural spinal mass/epidural abscess removal, linda purulence during OR, no CSF leak per op note  -Operative cultures from cervical thoracic epidural 1/ 2 +strep anginosis    CVA, Right cerebellar stroke, possibly due to venous spasm associated with the epidural abscess.   -MRA neck with and without on 4/28, possible moderate focal stenosis in mid left CVA.  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  MRI without on 4/28 with acute large right cerebellar infarct with small hemorrhage  Uncontrolled diabetes     Plan   Change ceftriaxone to ampicillin due to theoretical decreased risk of Cdiff colitis  Anticipate a 6-week IV antibiotic course for the bacteremia and spinal infection-stop date 6/11/2021  Repeat MRI spine prior to stopping antibiotics  FU CHRISTOPHER result-still pending  Blood cultures - NGTD    Monitor labs  Keep BS under 150 to help control current infection    Palliative care consult and goals of care discussion   DW ICU nurse and Pharm   Prognosis guarded

## 2021-05-05 NOTE — CARE PLAN
Problem: Ventilation Defect:  Goal: Ability to achieve and maintain unassisted ventilation or tolerate decreased levels of ventilator support  Note:    Ventilator Daily Summary    Vent Day # 7  24/430/8+/90%    Ventilator settings changed this shift: titrated to 90%    Weaning trials: none due to peep and fio2 requirements    Respiratory Procedures: bronchoscopy     Plan: Continue current ventilator settings and wean mechanical ventilation as tolerated per physician orders.

## 2021-05-05 NOTE — PROGRESS NOTES
[facility] Progress Note      Admit Date: 4/25/2021    Resident(s): Lissette Singh M.D.   Attending:  BREANNA ALCARAZ/ Dr. Sarmiento    Patient ID:    Name:  Perry Davis   YOB: 1960  Age:  60 y.o.  male   MRN:  [id number]    Hospital Course (carried forward and updated):  Perry Davis is a 60 y.o. male with a previous history of uncontrolled Diabetes -Hb1C 13 , HLD, CAD s/p CABGx4  2015, tobacco use ,OA, chronic low pain admitted  4/25 with epidural abscess w/ cord compression, discitis involving cervical and thoracic spine--> underwent emergent laminectomy and decompression by Dr. Hazel 4/28.  Hospital course complicated by strep anginosus bacteremia on Ceftriaxone, followed by ID and acute right cerebellar stroke.  Patient currently intubated    Consultants:  Critical Care   Neurosurgery  Urology  Infectious disease    Interval Events:  No acute events overnight  Patient is more awake this morning, nods his head frequently although this has been chronic.   -Ceftriaxone changed to ampicillin today by ID, last day of antibiotic 06/13  -Transaminitis improving  -Palliative care and ICU team discussed with the family regarding goals of care.  Patient's mother who is the next of kin wants everything to be done and give him a chance    Vitals Range last 24h:  Temp:  [35.9 °C (96.6 °F)-36.5 °C (97.7 °F)] 36 °C (96.8 °F)  Pulse:  [67-82] 67  Resp:  [12-30] 28  BP: ()/(51-59) 102/58  SpO2:  [91 %-99 %] 96 %      Intake/Output Summary (Last 24 hours) at 5/5/2021 1002  Last data filed at 5/5/2021 1000  Gross per 24 hour   Intake 1768.46 ml   Output 1390 ml   Net 378.46 ml        ROS  Unable to obtain as the patient is intubated    PHYSICAL EXAM:  Vitals:    05/05/21 0800 05/05/21 0839 05/05/21 0900 05/05/21 1000   BP: 102/58      Pulse: 68  67    Resp: (!) 28  (!) 28    Temp: 35.9 °C (96.6 °F)   36 °C (96.8 °F)   TempSrc: Temporal   Temporal   SpO2: 96%  96%    Weight:  95.8 kg (211 lb 3.2 oz)      Height:        Body mass index is 31.17 kg/m².    O2 therapy: Pulse Oximetry: 96 %, O2 Delivery Device: Ventilator    Date 05/05/21 0700 - 05/06/21 0659   Shift 4182-7808 0075-9115 3956-8661 24 Hour Total   INTAKE   I.V. 21.6   21.6     Propofol Volume 21.6   21.6   Other 80   80     Medications (PO/Enteral Liquids) 30   30     Flush / Irrigation Volume (Catheter Flush) 50   50   NG/   180     Intake (mL) (Enteral Tube 05/03/21 Cortrak - Gastric) 180   180   Shift Total 281.6   281.6   OUTPUT   Urine 170   170     Output (mL) (Urethral Catheter Other (Comment) 16 Fr.) 170   170   Drains 0   0     Residual Amount (mL) (Discarded) (Enteral Tube 05/03/21 Cortrak - Gastric) 0   0   Stool 0   0     Rectal Tube Output (Rectal Tube) 0   0   Shift Total 170   170   .6   111.6        Physical Exam   Constitutional:   Intubated    HENT:   Head: Normocephalic and atraumatic.   Eyes: Pupils are equal, round, and reactive to light. Conjunctivae are normal.   Cardiovascular: Normal rate, regular rhythm and normal heart sounds.   Pulmonary/Chest: Effort normal and breath sounds normal.   Abdominal: Soft. He exhibits no distension.   Musculoskeletal:      Cervical back: Normal range of motion.   Neurological:   Flaccid in all extremities  Moves head spontaneously  Unable to assess full neuro exam   Skin: Skin is warm.       Recent Labs     05/03/21  1219 05/04/21  0611 05/05/21  0134   ISTATAPH 7.258* 7.355* 7.421   ISTATAPCO2 60.8* 58.6* 49.7*   ISTATAPO2 55* 65 74   ISTATATCO2 29 35* 34*   [id number] 82* 91* 95   ISTATARTHCO3 27.1* 32.8* 32.3*   ISTATARTBE -1 6* 7*   ISTATTEMP see below 97.7 F 97.0 F   ISTATFIO2 100 100 90   ISTATSPEC Arterial Arterial Arterial   ISTATAPHTC  --  7.363* 7.434   [id number]  --  63* 70     Recent Labs     05/03/21  0314 05/03/21  0800 05/03/21  1229 05/04/21  0230 05/05/21  0517   SODIUM 132*   < > 136 139 137   POTASSIUM 5.6*   < > 5.0 5.3 4.8   CHLORIDE 100   < > 103 105 103    CO2 26   < > 24 28 30   BUN 41*   < > 44* 38* 38*   CREATININE 0.59   < > 0.67 0.58 0.47*   MAGNESIUM 2.4  --   --  2.1 2.2   CALCIUM 7.9*   < > 9.1 7.8* 8.0*    < > = values in this interval not displayed.     Recent Labs     05/03/21  0800 05/03/21 1156 05/03/21 1229 05/04/21 0230 05/05/21  0517   ALTSGPT   < >  --  125* 199* 169*   ASTSGOT   < >  --  171* 287* 112*   ALKPHOSPHAT   < >  --  282* 237* 205*   TBILIRUBIN   < >  --  0.3 0.3 0.3   PREALBUMIN  --  15.2*  --   --   --    GLUCOSE   < >  --  325* 247* 253*    < > = values in this interval not displayed.     Recent Labs     05/03/21 1156 05/04/21 0230 05/05/21 0517   RBC 3.97* 3.59* 3.27*   HEMOGLOBIN 12.0* 10.8* 10.0*   HEMATOCRIT 38.8* 34.3* 31.1*   PLATELETCT 352 378 355     Recent Labs     05/03/21 0800 05/03/21 1156 05/03/21 1229 05/04/21 0230 05/05/21  0517   WBC  --  21.2*  --  19.0* 15.8*   NEUTSPOLYS  --  63.20  --  83.00* 81.60*   LYMPHOCYTES  --  26.30  --  9.00* 10.20*   MONOCYTES  --  9.60  --  6.60 6.80   EOSINOPHILS  --  0.00  --  0.10 0.30   BASOPHILS  --  0.00  --  0.10 0.10   ASTSGOT   < >  --  171* 287* 112*   ALTSGPT   < >  --  125* 199* 169*   ALKPHOSPHAT   < >  --  282* 237* 205*   TBILIRUBIN   < >  --  0.3 0.3 0.3    < > = values in this interval not displayed.       Meds:  • [START ON 5/6/2021] ampicillin  2,000 mg     • insulin glargine  40 Units     • insulin regular  3-14 Units      And   • dextrose 50%  50 mL     • enoxaparin (LOVENOX) injection  40 mg     • norepinephrine (Levophed) infusion  0-30 mcg/min Stopped (05/04/21 0302)   • propofol  0-80 mcg/kg/min 10 mcg/kg/min (05/05/21 2354)   • aspirin  324 mg     • acetylcysteine  3-4 mL     • albuterol  2.5 mg     • MD Alert...Adult ICU Electrolyte Replacement per Pharmacy       • lidocaine  1-2 mL     • magnesium hydroxide  30 mL     • ondansetron  4 mg     • oxyCODONE immediate-release  10 mg     • polyethylene glycol/lytes  1 Packet     • promethazine  12.5-25 mg      • senna-docusate  1 tablet     • MD ALERT...DO NOT ADMINISTER NSAIDS or ASPIRIN unless ORDERED By Neurosurgery  1 Each     • bisacodyl  10 mg     • fleet  1 Each     • Respiratory Therapy Consult       • ondansetron  4 mg     • promethazine  12.5-25 mg     • prochlorperazine  5-10 mg     • ipratropium-albuterol  3 mL     • labetalol  10 mg     • Pharmacy  1 Each     • [Held by provider] acetaminophen  1,000 mg     • ARIPiprazole  15 mg     • carBAMazepine  200 mg     • cyanocobalamin  1,000 mcg     • senna-docusate  1 tablet     • vitamin D  5,000 Units     • docusate sodium  100 mg     • famotidine  20 mg      Or   • famotidine  20 mg     • fentaNYL  50 mcg      And   • fentaNYL  100 mcg     • lidocaine  3 Patch          Procedures:  Bronchoscopy on 05/03  -Decompressive laminectomy on 4/28  -Central line placement on 5/01    Imaging:  US-EXTREMITY ARTERY LOWER UNILAT RIGHT   Final Result      DX-ABDOMEN FOR TUBE PLACEMENT   Final Result      Feeding tube tip projects over the distal stomach or first portion of the duodenum.      CT-CSPINE WITHOUT PLUS RECONS   Final Result      1.  Postsurgical changes C3-T1 laminectomies.   2.  New irregular linear and mottled lucency in the anterior C5 vertebral body which is likely related to osteomyelitis.   3.  Prevertebral soft tissue swelling.   4.  Extensive known extensive epidural abscess seen on prior MRI is not adequately evaluated on CT.      CT-CTA CHEST PULMONARY ARTERY W/ RECONS   Final Result      1.  No evidence of pulmonary embolism.      2.  Bilateral lower lobe atelectasis or pneumonia, left greater than right.      3.  Small left pleural effusion.      4.  CABG changes.            DX-CHEST-PORTABLE (1 VIEW)   Final Result      Endotracheal tube terminates approximately 2.5 cm above the you.      Atelectasis with left lung aeration. No pleural effusion or pneumothorax.      [id number] VIEW   Final Result      No dilated bowel      DX-ABDOMEN FOR TUBE  PLACEMENT   Final Result      Feeding tube placement with the tip projecting over the distal stomach or duodenal bulb      EC-CHRISTOPHER W/O CONT         CT-HEAD W/O   Final Result         1.  Low-density changes in the right cerebellar hemisphere, compatible with evolving infarct, streak artifacts minimally limits evaluation of the posterior fossa for subtle subarachnoid hemorrhage, no intracranial hemorrhage is readily identified.   2.  Atherosclerosis.      DX-CHEST-PORTABLE (1 VIEW)   Final Result         1.  No acute cardiopulmonary disease.      DX-CHEST-PORTABLE (1 VIEW)   Final Result      Mild left basilar opacity may represent atelectasis. Infection not excluded.      Improved aeration of the left lung.      Atherosclerotic plaque.         EC-ECHOCARDIOGRAM COMPLETE W/O CONT   Final Result      IR-DRAIN-BLADDER SUPRAPUB W/CATH   Final Result      1.     Ultrasound and fluoroscopic guided placement of a 16 [place] Sun[employer] tip silicone suprapubic bladder catheter.      2.     Cystogram documenting catheter placement.         MR-BRAIN-W/O   Final Result      Acute large right cerebellar posterior inferior cerebellar artery territory infarct with possible small amount of petechial hemorrhage.      MR-MRA HEAD-W/O   Final Result      Findings suggesting right vertebral artery occlusion.      MR-MRA NECK-WITH & W/O AND SEQUENCES   Final Result      1.  Small caliber right vertebral artery which is not visualized on the noncontrast time-of-flight technique could be related to retrograde flow or diminutive caliber and sluggish flow.   2.  Possible moderate focal stenosis in the mid cervical left vertebral artery.   3.  No significant carotid stenosis.      DX-ABDOMEN FOR TUBE PLACEMENT   Final Result         Feeding tube with tip projecting over the expected area of the distal stomach.      DX-CHEST-PORTABLE (1 VIEW)   Final Result      1.  Worsening consolidation of LEFT hemithorax with shift of mediastinal structures to  the LEFT suggesting atelectasis, possibly due to endobronchial process.   2.  Supportive tubing as described above.   3.  No pneumothorax.      DX-CHEST-PORTABLE (1 VIEW)   Final Result         Endotracheal tube with tip projecting over the mid thoracic trachea.      Layering left pleural effusion with left lower lung opacity.      DX-SPINE-ANY ONE VIEW   Final Result      Digitized intraoperative radiograph is submitted for review.  This examination is not for diagnostic purpose but for guidance during a surgical procedure.      DX-PORTABLE FLUOROSCOPY < 1 HOUR   Final Result      Portable fluoroscopy utilized for 2 seconds.         INTERPRETING LOCATION: [address] [place] [address], [place], [address]      CT-HEAD W/O   Final Result         1.  Low-density in the region of the right cerebellar hemisphere, appearance compatible with evolving infarct.      These findings were discussed with the patient's on-call clinician, Deniz, on 4/28/2021 6:14 AM.      MR-THORACIC SPINE-WITH & W/O   Final Result      1.  Posterior epidural fluid collection/abscess within the lower cervical spine extending inferiorly to about the T6 level which could represent epidural abscess and/or hematoma. This measures 8 mm in maximal thickness at T2-T3 with at least moderate    thecal sac stenosis.   2.  Abnormal signal within the cervical cord and upper thoracic cord suggesting cord edema or infectious/inflammatory etiology.   3.  Lower cervical spine findings are detailed on earlier report.   4.  Prominent enhancement along the surface of the mid and lower thoracic cord is of uncertain etiology and as detailed above, possibly representing leptomeningeal disease/infection. See details above.   These findings were discussed with Dr. Cano on 4/28/2021 10:01 AM.         MR-LUMBAR SPINE-WITH & W/O   Final Result      No evidence of lumbar spine infection or epidural abscess.      Mild spondylosis as detailed above without spinal stenosis.       DX-CHEST-PORTABLE (1 VIEW)   Final Result      1.  Hypoinflation with left basilar atelectasis.   2.  Mild perihilar interstitial prominence may be related to hypoinflation or edema.      MR-CERVICAL SPINE-WITH & W/O   Final Result      1.  There is multiseptated abnormal peripherally enhancing epidural collection noted extending from C2 to the visualized lower thoracic level. Largest collection is noted in the upper thoracic posterior epidural space at the levels of T2 and T3. The    lower extent of the collection is not imaged. This is causing multilevel effacement of the thecal sac and cord compression. The thoracic spinal cord is anteriorly displaced and compressed at the levels of T2 and T3. Pre and postcontrast MR examination of    the thoracic spine is recommended for further evaluation.   2.  There is effacement of the cervical thecal sac due to the multiseptated epidural fluid collection.   3.  The cervical spinal cord is mildly enlarged with minimal increased T2 signal intensity. The possibility of cord inflammation cannot be entirely excluded.   4.  Abnormal T2 hyperintensity at C5-6 disc space likely representing discitis.   5.  Abnormal bone marrow edema of C5, C6 and C7 vertebral bodies with edema concerning for osteomyelitis.   6.  There is also focal enhancement of C6 vertebral body likely secondary to the osteomyelitis.   7.  Prevertebral edema/fluid collection.   8.  Nonvisualization of flow void of bilateral vertebral arteries concerning for age-indeterminate bilateral vertebral occlusions.         CT-CHEST (THORAX) WITH   Final Result      No displaced rib fracture is identified.      No acute cardiopulmonary process is seen.      Atherosclerotic plaque including coronary artery calcification.      CT-TSPINE W/O PLUS RECONS   Final Result      No CT evidence of acute traumatic abnormality.      Mild T6/7 anterior wedging compatible with healed mild chronic compression fracture and there is  interbody fusion.      CT-LSPINE W/O PLUS RECONS   Final Result      No CT evidence of acute traumatic injury.      CT-CSPINE WITHOUT PLUS RECONS   Final Result      Multilevel degenerative changes.      Prevertebral edema. Further evaluation with MRI is recommended as this can be seen in the setting of ligamentous injury.      Bilateral carotid atherosclerotic plaque.             ASSESSEMENT and PLAN:    * Spinal epidural abscess- (present on admission)  Assessment & Plan  -MRI C/T-spine with abnormal peripherally enhancing epidural collection extending from C2 to visualize lower thoracic 11.  Largest collection in the upper thoracic posterior epidural space at the level of T2-T3.  Lower extent of the collection is causing multilevel effacement of the thecal sac and cord compression.    -Underwent emergent laminectomy of C3-C4 C5-C6, C6/C7, T1-T2 laminectomy, decompression of thecal sac and nerves by Dr. Hazel 4/28/2021.   -Epidural drain removed on 5/03  -Goal blood pressure systolic of less than 160  -Palliative care consulted for goals of care discussion        History of ischemic stroke  Assessment & Plan  -Found to have acute change in mental status 4/27/2021.   -CT head w/o concerning for evolving right cerebellar infarct.   -MRA brain, MRA neck: Findings suggesting right vertebral artery occlusion. Possible moderate focal stenosis in the mid cervical left vertebral artery.  TTE with bubble study: EF 60%, no evidence of right-to-left shunt, no valve lesions.  CHRISTOPHER negative  Goal euthermia, normotension, eunatremia  PT, OT, SLP evaluation as able  Neurology following  -Started on aspirin and DVT prophylaxis  -Hold statin currently given elevated LFTs    Sepsis due to Streptococcus species (HCC)- (present on admission)  Assessment & Plan  MRI C-T-spine with evidence of epidural abscess, discitis, vertebral osteomyelitis.  Underwent emergent laminectomy, decompression 4/28.  Blood culture 4/24 and cervical  thoracic 4/28 : Growth of Streptococcus anginosus.   -Repeat blood cultures has been negative  -TTE did not show any concern of infective endocarditis  -CHRISTOPHER negative  -Ceftriaxone changed to ampicillin on 5/06, total duration of 6 weeks last date would be 06/13  -ID on board    Cardiac arrest ([facility])  Assessment & Plan  -Patient had cardiac arrest on 05/03, ROSC achieved in 4 mins after CPR and  Epinephrine  -Most likely secondary to aspiration, bed side bronchoscopy showed multiple mucous plugs   -Re intubated again     Thrombocytopenia ([facility])- (present on admission)  Assessment & Plan  -Resolved  -Plt 47 on 4/26. This is likely due to sepsis, though lower on the differential includes previously undiagnosed infection.  Hep panel negative. HIV non reactive    Acute bilateral back pain- (present on admission)  Assessment & Plan  -Presented with worsening neck and upper back pain  -Secondary to epidural abscess and cord compression  -S/p laminectomy and currently on IV antibiotics  -PT/ OT once stable    Type 2 diabetes mellitus without complication, without long-term current use of insulin ([facility])- (present on admission)  Assessment & Plan  -Chronic history of diabetes  -Last HbA1c is 12.2 from 4/2021  -Continue Lantus 40 units along with high intensity sliding scale, will adjust insulin regimen based on blood glucose levels  -Hypoglycemia protocol     Essential hypertension, benign- (present on admission)  Assessment & Plan  -Noncompliance issues  -Blood pressure meds on hold given hypotension    Coronary artery disease due to calcified coronary lesion: CABG x4, July 2015- (present on admission)  Assessment & Plan  -Chronic   -Continue aspirin, statin on hold given elevated LFTs     Status post urethrostomy ([facility])- (present on admission)  Assessment & Plan  -History of perineal urethrostomy 2018  -Acute urinary retention post surgical intervention 4/28  -Unable to place Gomez catheter through urethrostomy.    -Suprapubic  catheter placement by interventional radiology    Hyponatremia- (present on admission)  Assessment & Plan  -Mild hyponatremia , likely chronic   Likely SIADH.  Serum Osm 264, urine osm 745, urine Na 70  -Currently pseudohyponatremia given elevated blood glucose level    Difficulty swallowing- (present on admission)  Assessment & Plan  -Presented with difficulty swallowing, is coughing and vomiting when swallowing large/hard foods     -SLP evaluation post extubation  -Aspiration and seizure precautions  -Currently on tube feeds      DISPO: ICU    CODE STATUS: Full code    Quality Measures:  Feeding: Tube feeds  Analgesia: None  Sedation: Propofol  Thromboprophylaxis: Lovenox  Head of bed: >30 degrees  Ulcer prophylaxis: Famotidine  Glycemic control: Lantus and sliding scale  Bowel care: bowel regimen  Indwelling lines: Right IJ and peripheral line  Deescalation of antibiotics: Ceftriaxone changed to ampicillin      Lissette Singh M.D.

## 2021-05-05 NOTE — DIETARY
Nutrition support weekly update:  Day 10 of admit.  Perry Davis is a 60 y.o. male with admitting DX of DKA, CVA.  Tube feeding initiated on 4/28. Current TF via gastric cortrak is Impact Peptide 1.5 goal rate of 45 ml/hour while on propofol providing 1440 kcal, 90 gm protein, 739 ml free water. Once off propofol, goal rate = 50 ml/hour to provide 1800 kcal, 113 gm protein, and 924 ml free water in 24 hours.    Assessment:  Weight 5/5: 95.8 kg via bed scale. Weight has increased from admit. I/O is +11,701 ml from admit.  Re-estimate of nutritional needs not indicated at this time.     Evaluation:   1. Tube feed with Impact Peptide 1.5 running at goal rate of 45 ml/hour as pt remains on Propofol. Propofol was turned off earlier this week, but resumed 5/3.  2. Propofol at 5.4 ml/hour providing 142 kcal per day. Other medications include Lantus 40 units, SSI, Pepcid, Vit B-12, Vit D.  3. Labs 5/5 include Na 137, K+ 4.8, Glu 253 (H), BUN 38 (H), Creat 0.47 (L), Alb 2 (L), Triglycerides 136. 5/3: Pre Alb 15.2 (L), CRP 20.59 (H)  4. LBM 5/5, loose  5. Fluid: standard 30 ml flushes with meds and q 4 hours.  6. Current feeding remains appropriate for pt sedated on propofol. Once propofol discontinued, advance to goal 50 ml/hour.    Malnutrition risk: Criteria not met.    Recommendations/Plan:  1. Continue Impact Peptide 1.5 goal rate of 45 ml/hour while on propofol.   2. Advance to goal rate of 50 ml/hour once off propofol.  3. Fluids per MD.   4. Monitor weight.    RD following

## 2021-05-05 NOTE — CARE PLAN
Problem: Communication  Goal: The ability to communicate needs accurately and effectively will improve  Outcome: PROGRESSING AS EXPECTED     Problem: Safety  Goal: Will remain free from injury  Outcome: PROGRESSING AS EXPECTED  Goal: Will remain free from falls  Outcome: PROGRESSING AS EXPECTED     Problem: Infection  Goal: Will remain free from infection  Outcome: PROGRESSING AS EXPECTED     Problem: Venous Thromboembolism (VTW)/Deep Vein Thrombosis (DVT) Prevention:  Goal: Patient will participate in Venous Thrombosis (VTE)/Deep Vein Thrombosis (DVT)Prevention Measures  Outcome: PROGRESSING AS EXPECTED     Problem: Bowel/Gastric:  Goal: Normal bowel function is maintained or improved  Outcome: PROGRESSING AS EXPECTED  Goal: Will not experience complications related to bowel motility  Outcome: PROGRESSING AS EXPECTED     Problem: Knowledge Deficit  Goal: Knowledge of disease process/condition, treatment plan, diagnostic tests, and medications will improve  Outcome: PROGRESSING AS EXPECTED  Goal: Knowledge of the prescribed therapeutic regimen will improve  Outcome: PROGRESSING AS EXPECTED     Problem: Discharge Barriers/Planning  Goal: Patient's continuum of care needs will be met  Outcome: PROGRESSING AS EXPECTED     Problem: Pain Management  Goal: Pain level will decrease to patient's comfort goal  Outcome: PROGRESSING AS EXPECTED     Problem: Skin Integrity  Goal: Risk for impaired skin integrity will decrease  Outcome: PROGRESSING AS EXPECTED     Problem: Psychosocial Needs:  Goal: Level of anxiety will decrease  Outcome: PROGRESSING AS EXPECTED     Problem: Safety  Goal: Free from accidental injury  Outcome: PROGRESSING AS EXPECTED  Goal: Free from intentional harm  Outcome: PROGRESSING AS EXPECTED  Goal: Free from self harm  Outcome: PROGRESSING AS EXPECTED  Goal: Isolation Precautions for patient and staff safety  Outcome: PROGRESSING AS EXPECTED     Problem: Knowledge Deficit  Goal: Patient/Significant  other demonstrates understanding of disease process, treatment plan, medications and discharge instructions  Outcome: PROGRESSING AS EXPECTED     Problem: Psychosocial needs  Goal: Spiritual needs incorporated in hospitalization  Outcome: PROGRESSING AS EXPECTED  Goal: Cultural needs incorporated in hospitalization  Outcome: PROGRESSING AS EXPECTED  Goal: Anxiety reduction  Outcome: PROGRESSING AS EXPECTED     Problem: Discharge Barriers/Planning  Goal: Patient's Continuum of care needs are met  Outcome: PROGRESSING AS EXPECTED     Problem: Skin Integrity  Goal: Skin Integrity is maintained or improved  Outcome: PROGRESSING AS EXPECTED     Problem: Risk for Infection, Impaired Wound Healing  Goal: Remain free from signs and symptoms of infection  Outcome: PROGRESSING AS EXPECTED  Goal: Promotion of Wound Healing and Drain Management  Outcome: PROGRESSING AS EXPECTED     Problem: Risk for Impaired Mobility--Activity Intolerance  Goal: Mobilize and/or Transfer Safely with Maximum Castro  Outcome: PROGRESSING AS EXPECTED  Goal: Activity Level appropriate for Discharge or Transfer  Outcome: PROGRESSING AS EXPECTED     Problem: Oxygenation/Respiratory Function  Goal: Patient will Achieve/Maintain Optimum Respiratory Rate/Effort  Outcome: PROGRESSING AS EXPECTED     Problem: Risk of Aspiration  Goal: Absence of aspiration  Outcome: PROGRESSING AS EXPECTED     Problem: Pain  Goal: Alleviation of Pain or a reduction in pain to the patient's comfort goal  Outcome: PROGRESSING AS EXPECTED     Problem: Hemodynamic Status  Goal: Stable Vital Signs and Fluid Balance  Outcome: PROGRESSING AS EXPECTED     Problem: Risk for Deep Vein Thrombosis/Venous Thromboembolism  Goal: DVT/VTE Prevention Measures in Place  Outcome: PROGRESSING AS EXPECTED  Goal: Patient participates in DVT/VTE Prevention Measures  Outcome: PROGRESSING AS EXPECTED     Problem: Nutrition Deficit  Goal: Adequate Food and Fluid Intake  Outcome: PROGRESSING  AS EXPECTED     Problem: Self Care Deficit  Goal: Ability to bathe, groom and dress independently or with assistance  Outcome: PROGRESSING AS EXPECTED  Goal: Ability to feed and maintain oral hygiene independently or with assistance  Outcome: PROGRESSING AS EXPECTED  Goal: Ability to toilet independently or with assistance  Outcome: PROGRESSING AS EXPECTED     Problem: Elimination  Goal: Establishment and Maintenance of regular bowel elimination  Outcome: PROGRESSING AS EXPECTED  Goal: Regular urinary elimination  Outcome: PROGRESSING AS EXPECTED     Problem: Mobility  Goal: Risk for activity intolerance will decrease  Outcome: PROGRESSING AS EXPECTED     Problem: Fluid Volume:  Goal: Will maintain balanced intake and output  Outcome: PROGRESSING AS EXPECTED     Problem: Urinary Elimination:  Goal: Ability to reestablish a normal urinary elimination pattern will improve  Outcome: PROGRESSING AS EXPECTED

## 2021-05-05 NOTE — THERAPY
Physical Therapy Contact Note    PT consult received; pt now with  but on 90% fi02 with agitation when not sedated, ongoing GOC discussion with family; pt has remained inappropriate for PT x 5 attempts; will be placed on hold; please reconsult should condition change/when pt is able to participate in PT evaluation/ mobility;     Marilynn ODONNELL, PT,  [phone]

## 2021-05-05 NOTE — CONSULTS
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     Pt has hx of chronic neck pain-recently went to Urgent Care approximately three days ago-had negative cervical x-ray and blood sugar >600. Pt refused to present to the ED at that time and [facility]   Pt then had GLF at home in the shower, which has increased his pain. Pt presented to the ED with complaints of pain along his entire spine, bilateral clavicles, which radiates to the right arm. He reports weakness and a decrease in balance.  He states he has been unable to eat or sleep due to the pain.   He states that he has been off of his diabetic and HTN medications for over a year.     Social: Per pts mother, Mary, her  passed away in 2016 and her son, Perry, moved in with her in 2017. She is unsure when he went onto disability but stated that he had his \"4-way bypass\" 4-5 years ago and says this prompted his disability. He enjoys fishing and being outside.     Consults:   Diabetes Health Educator  Neurosurgery  Infectious Disease  Neurology  Physical Medicine & Rehab  Palliative Care     Dyspnea: Yes- intubated  Last BM: 05/05/21-    Pain: Unable to determine-    Depression: Unable to determine-    Dementia: Unable to Determine;       Spiritual:  Is Druze or spirituality important for coping with this illness? No- Raised Evangelical but not practicing, Mary feels that he would not want a visit from a  or .   Has a  or spiritual provider visit been requested? " "No    Palliative Performance Scale: 20%    Advance Directive: None on file    DPOA: No, NOK Mary Davis [phone], mother  POLST: None on file       Code Status: Full-      Outcome:  Stopped at the pts bedside, pt intubated, opens eyes. Spoke with Dr Sarmiento on overview of pt.   Called and spoke with pts mother, Mary. Introduced self and the role of Palliative Care. Spoke with Mary about her sons previous level of independence.   We spoke about his current admission and complications. Discussed her understanding of her sons current condition and prognosis from her conversations with the Medical Team. Mary states her understanding is that it is \"to early to know\" what his level of debility will be and that what she wants is to \"give him a chance\".     She says that they never discussed any type of end of life wishes and she is hopefull that \"he can tell us\".   She verbalizes that she spoke with Dr Dumont about \"a trach, that this will make him more comfortable, and he will be able to talk\".   Discussed her thoughts about her son being able to speak once the trach was in place. Explained that he may still be vent dependant, and if so would be unable to speak. Explained air over the vocal cords, weaning off the vent, and speaking valve for pts with long term trach.   Explained the concern for possible life long trach and vent dependence due to level of cord injury.   Spoke about what brings her son jonathan, fishing and being outside and if this level of debility would acceptable to him and that we are looking to family to guide these decisions as we do not know Perry.     She again spoke about wanting to give him a chance, and that people can have \"good lives, even if they're in a wheelchair\". Validated her thoughts and reiterated that we wishing to explore the families thoughts and feelings about what Perry would want and how he would answer this question if he was able to.     Mary asked about PEG. Explained " "cortrac versus PEG and that Medical team feels he may be PEG dependant. Mary voiced that she did not know he currently had a cortrac. Explained that the cortrac is being used for oral medications and nutrition. Mary stated that she was unsure on need for decisions and that it could take a \"month or so\" for him to \"come out of it\". Explained time frames for trach are typically 10-14 days and that cortrac to PEG can be up to a month if the MD felt this was reasonable.   Explained that if the Medical team felt that he was not going to progress, they would recommend PEG earlier in order to provide a more comfortable way for the pt to receive nutrition and medications.     Mary voiced a better understanding of trach and PEG. She says at this time she is feeling that it is to early to make any decisions to withhold care and not proceed in order to give him the full chance of possible recovery. She takes hope from his blood pressure being stable again.    Discussed code status. She speaks about losing her  and that he \"had that several times at the end\" and that is was traumatic. She then states that she feels conflicted about changing her sons code status in light of his recent resuscitation.   We spoke about the repercussions of CPR. She says she is \"just not ready for that decision\".     Mary voiced wishing that her other son, Bennett, who is here from [place], would be able to see his brother and that currently the designated visitors are her and her daughter, Maris. Let her know that I will speak with the Unit Charge RN to see if a one time allowance can be made.   Provided Mary with the Palliative Team contact number for any needs as Perry's admission progresses.     After speaking with Charge RN Molly, permission received for pts brother Bennett, to be able to take turns to see his brother with his mother and sister. Still only two people at a time.   Called and updated Mary that the siblings, Maris and " Bennett could alternate to see Perry but that only two people are allowed at a time. Mary was very thankful.     Recommendations: I do not recommend an ethics or hospice consult at this time because pts mother, Mary, wishes to continue with curative care at this time.    Updated: Dr Sarmiento, Dr Singh, Bedside RN Mellissa, Unit CM SARAH Wright     Plan: TBD as pts admission progresses.     Thank you for allowing Palliative Care to participate in this patient's care. Please feel free to call [phone] with any questions or concerns.

## 2021-05-06 LAB
ALBUMIN SERPL BCP-MCNC: 2 G/DL (ref 3.2–4.9)
ALBUMIN/GLOB SERPL: 0.5 G/DL
ALP SERPL-CCNC: 216 U/L (ref 30–99)
ALT SERPL-CCNC: 178 U/L (ref 2–50)
ANION GAP SERPL CALC-SCNC: 4 MMOL/L (ref 7–16)
AST SERPL-CCNC: 135 U/L (ref 12–45)
BACTERIA BLD CULT: NORMAL
BACTERIA BLD CULT: NORMAL
BASE EXCESS BLDA CALC-SCNC: 6 MMOL/L (ref -4–3)
BASOPHILS # BLD AUTO: 0.1 % (ref 0–1.8)
BASOPHILS # BLD: 0.02 K/UL (ref 0–0.12)
BILIRUB SERPL-MCNC: 0.2 MG/DL (ref 0.1–1.5)
BODY TEMPERATURE: ABNORMAL DEGREES
BUN SERPL-MCNC: 37 MG/DL (ref 8–22)
CALCIUM SERPL-MCNC: 7.9 MG/DL (ref 8.5–10.5)
CHLORIDE SERPL-SCNC: 104 MMOL/L (ref 96–112)
CO2 BLDA-SCNC: 33 MMOL/L (ref 20–33)
CO2 SERPL-SCNC: 31 MMOL/L (ref 20–33)
CREAT SERPL-MCNC: 0.58 MG/DL (ref 0.5–1.4)
DELSYS IDSYS: ABNORMAL
END TIDAL CARBON DIOXIDE IECO2: 29 MMHG
EOSINOPHIL # BLD AUTO: 0.05 K/UL (ref 0–0.51)
EOSINOPHIL NFR BLD: 0.3 % (ref 0–6.9)
ERYTHROCYTE [DISTWIDTH] IN BLOOD BY AUTOMATED COUNT: 50.6 FL (ref 35.9–50)
GLOBULIN SER CALC-MCNC: 3.9 G/DL (ref 1.9–3.5)
GLUCOSE BLD-MCNC: 178 MG/DL (ref 65–99)
GLUCOSE BLD-MCNC: 201 MG/DL (ref 65–99)
GLUCOSE BLD-MCNC: 209 MG/DL (ref 65–99)
GLUCOSE BLD-MCNC: 219 MG/DL (ref 65–99)
GLUCOSE SERPL-MCNC: 239 MG/DL (ref 65–99)
GRAM STN SPEC: NORMAL
HCO3 BLDA-SCNC: 31.4 MMOL/L (ref 17–25)
HCT VFR BLD AUTO: 30.9 % (ref 42–52)
HGB BLD-MCNC: 9.8 G/DL (ref 14–18)
HOROWITZ INDEX BLDA+IHG-RTO: 72 MM[HG]
IMM GRANULOCYTES # BLD AUTO: 0.12 K/UL (ref 0–0.11)
IMM GRANULOCYTES NFR BLD AUTO: 0.8 % (ref 0–0.9)
LYMPHOCYTES # BLD AUTO: 1.53 K/UL (ref 1–4.8)
LYMPHOCYTES NFR BLD: 9.7 % (ref 22–41)
MAGNESIUM SERPL-MCNC: 2.2 MG/DL (ref 1.5–2.5)
MCH RBC QN AUTO: 30.5 PG (ref 27–33)
MCHC RBC AUTO-ENTMCNC: 31.7 G/DL (ref 33.7–35.3)
MCV RBC AUTO: 96.3 FL (ref 81.4–97.8)
MODE IMODE: ABNORMAL
MONOCYTES # BLD AUTO: 0.91 K/UL (ref 0–0.85)
MONOCYTES NFR BLD AUTO: 5.8 % (ref 0–13.4)
NEUTROPHILS # BLD AUTO: 13.11 K/UL (ref 1.82–7.42)
NEUTROPHILS NFR BLD: 83.3 % (ref 44–72)
NRBC # BLD AUTO: 0 K/UL
NRBC BLD-RTO: 0 /100 WBC
O2/TOTAL GAS SETTING VFR VENT: 90 %
PCO2 BLDA: 46.5 MMHG (ref 26–37)
PCO2 TEMP ADJ BLDA: 45.3 MMHG (ref 26–37)
PEEP END EXPIRATORY PRESSURE IPEEP: 14 CMH20
PERCENT MINUTE VOLUME IPMV: 160
PH BLDA: 7.44 [PH] (ref 7.4–7.5)
PH TEMP ADJ BLDA: 7.45 [PH] (ref 7.4–7.5)
PLATELET # BLD AUTO: 333 K/UL (ref 164–446)
PMV BLD AUTO: 12.4 FL (ref 9–12.9)
PO2 BLDA: 65 MMHG (ref 64–87)
PO2 TEMP ADJ BLDA: 62 MMHG (ref 64–87)
POTASSIUM SERPL-SCNC: 4.8 MMOL/L (ref 3.6–5.5)
PROT SERPL-MCNC: 5.9 G/DL (ref 6–8.2)
RBC # BLD AUTO: 3.21 M/UL (ref 4.7–6.1)
SAO2 % BLDA: 93 % (ref 93–99)
SIGNIFICANT IND 70042: NORMAL
SITE SITE: NORMAL
SODIUM SERPL-SCNC: 139 MMOL/L (ref 135–145)
SOURCE SOURCE: NORMAL
SPECIMEN DRAWN FROM PATIENT: ABNORMAL
WBC # BLD AUTO: 15.7 K/UL (ref 4.8–10.8)

## 2021-05-06 PROCEDURE — 700101 HCHG RX REV CODE 250: Performed by: STUDENT IN AN ORGANIZED HEALTH CARE EDUCATION/TRAINING PROGRAM

## 2021-05-06 PROCEDURE — 85025 COMPLETE CBC W/AUTO DIFF WBC: CPT

## 2021-05-06 PROCEDURE — 99291 CRITICAL CARE FIRST HOUR: CPT | Mod: 25,GC | Performed by: PSYCHIATRY & NEUROLOGY

## 2021-05-06 PROCEDURE — 31624 DX BRONCHOSCOPE/LAVAGE: CPT | Performed by: PSYCHIATRY & NEUROLOGY

## 2021-05-06 PROCEDURE — 700111 HCHG RX REV CODE 636 W/ 250 OVERRIDE (IP): Performed by: PSYCHIATRY & NEUROLOGY

## 2021-05-06 PROCEDURE — 302978 HCHG BRONCHOSCOPY-DIAGNOSTIC

## 2021-05-06 PROCEDURE — 94003 VENT MGMT INPAT SUBQ DAY: CPT

## 2021-05-06 PROCEDURE — 700111 HCHG RX REV CODE 636 W/ 250 OVERRIDE (IP)

## 2021-05-06 PROCEDURE — 700102 HCHG RX REV CODE 250 W/ 637 OVERRIDE(OP): Performed by: PSYCHIATRY & NEUROLOGY

## 2021-05-06 PROCEDURE — 770022 HCHG ROOM/CARE - ICU (200)

## 2021-05-06 PROCEDURE — 700111 HCHG RX REV CODE 636 W/ 250 OVERRIDE (IP): Performed by: NURSE PRACTITIONER

## 2021-05-06 PROCEDURE — 87070 CULTURE OTHR SPECIMN AEROBIC: CPT

## 2021-05-06 PROCEDURE — 82803 BLOOD GASES ANY COMBINATION: CPT

## 2021-05-06 PROCEDURE — 82962 GLUCOSE BLOOD TEST: CPT | Mod: 91

## 2021-05-06 PROCEDURE — 83735 ASSAY OF MAGNESIUM: CPT

## 2021-05-06 PROCEDURE — 700111 HCHG RX REV CODE 636 W/ 250 OVERRIDE (IP): Performed by: STUDENT IN AN ORGANIZED HEALTH CARE EDUCATION/TRAINING PROGRAM

## 2021-05-06 PROCEDURE — 0B9D8ZX DRAINAGE OF RIGHT MIDDLE LUNG LOBE, VIA NATURAL OR ARTIFICIAL OPENING ENDOSCOPIC, DIAGNOSTIC: ICD-10-PCS | Performed by: PSYCHIATRY & NEUROLOGY

## 2021-05-06 PROCEDURE — 700101 HCHG RX REV CODE 250: Performed by: INTERNAL MEDICINE

## 2021-05-06 PROCEDURE — A9270 NON-COVERED ITEM OR SERVICE: HCPCS | Performed by: INTERNAL MEDICINE

## 2021-05-06 PROCEDURE — 700102 HCHG RX REV CODE 250 W/ 637 OVERRIDE(OP): Performed by: INTERNAL MEDICINE

## 2021-05-06 PROCEDURE — 99233 SBSQ HOSP IP/OBS HIGH 50: CPT | Performed by: INTERNAL MEDICINE

## 2021-05-06 PROCEDURE — 97530 THERAPEUTIC ACTIVITIES: CPT

## 2021-05-06 PROCEDURE — 700111 HCHG RX REV CODE 636 W/ 250 OVERRIDE (IP): Performed by: INTERNAL MEDICINE

## 2021-05-06 PROCEDURE — 87205 SMEAR GRAM STAIN: CPT

## 2021-05-06 PROCEDURE — A9270 NON-COVERED ITEM OR SERVICE: HCPCS | Performed by: STUDENT IN AN ORGANIZED HEALTH CARE EDUCATION/TRAINING PROGRAM

## 2021-05-06 PROCEDURE — 700102 HCHG RX REV CODE 250 W/ 637 OVERRIDE(OP): Performed by: STUDENT IN AN ORGANIZED HEALTH CARE EDUCATION/TRAINING PROGRAM

## 2021-05-06 PROCEDURE — 94640 AIRWAY INHALATION TREATMENT: CPT

## 2021-05-06 PROCEDURE — 700105 HCHG RX REV CODE 258: Performed by: INTERNAL MEDICINE

## 2021-05-06 PROCEDURE — 94799 UNLISTED PULMONARY SVC/PX: CPT

## 2021-05-06 PROCEDURE — 80053 COMPREHEN METABOLIC PANEL: CPT

## 2021-05-06 PROCEDURE — A9270 NON-COVERED ITEM OR SERVICE: HCPCS | Performed by: PSYCHIATRY & NEUROLOGY

## 2021-05-06 RX ORDER — INSULIN GLARGINE 100 [IU]/ML
44 INJECTION, SOLUTION SUBCUTANEOUS EVERY EVENING
Status: DISCONTINUED | OUTPATIENT
Start: 2021-05-06 | End: 2021-05-06

## 2021-05-06 RX ORDER — MIDAZOLAM HYDROCHLORIDE 1 MG/ML
INJECTION INTRAMUSCULAR; INTRAVENOUS
Status: COMPLETED
Start: 2021-05-06 | End: 2021-05-06

## 2021-05-06 RX ORDER — CYCLOBENZAPRINE HCL 5 MG
5 TABLET ORAL 3 TIMES DAILY PRN
Status: DISCONTINUED | OUTPATIENT
Start: 2021-05-06 | End: 2021-05-14 | Stop reason: HOSPADM

## 2021-05-06 RX ORDER — MIDAZOLAM HYDROCHLORIDE 1 MG/ML
1 INJECTION INTRAMUSCULAR; INTRAVENOUS ONCE
Status: DISCONTINUED | OUTPATIENT
Start: 2021-05-06 | End: 2021-05-06

## 2021-05-06 RX ORDER — MIDAZOLAM HYDROCHLORIDE 1 MG/ML
2 INJECTION INTRAMUSCULAR; INTRAVENOUS ONCE
Status: COMPLETED | OUTPATIENT
Start: 2021-05-06 | End: 2021-05-06

## 2021-05-06 RX ORDER — INSULIN GLARGINE 100 [IU]/ML
46 INJECTION, SOLUTION SUBCUTANEOUS EVERY EVENING
Status: DISCONTINUED | OUTPATIENT
Start: 2021-05-06 | End: 2021-05-08

## 2021-05-06 RX ADMIN — AMPICILLIN SODIUM 2000 MG: 2 INJECTION, POWDER, FOR SOLUTION INTRAMUSCULAR; INTRAVENOUS at 10:00

## 2021-05-06 RX ADMIN — CYANOCOBALAMIN TAB 500 MCG 1000 MCG: 500 TAB at 05:34

## 2021-05-06 RX ADMIN — INSULIN GLARGINE 46 UNITS: 100 INJECTION, SOLUTION SUBCUTANEOUS at 17:43

## 2021-05-06 RX ADMIN — INSULIN HUMAN 4 UNITS: 100 INJECTION, SOLUTION PARENTERAL at 00:19

## 2021-05-06 RX ADMIN — OXYCODONE HYDROCHLORIDE 10 MG: 10 TABLET ORAL at 13:23

## 2021-05-06 RX ADMIN — CARBAMAZEPINE 200 MG: 200 TABLET ORAL at 00:10

## 2021-05-06 RX ADMIN — AMPICILLIN SODIUM 2000 MG: 2 INJECTION, POWDER, FOR SOLUTION INTRAMUSCULAR; INTRAVENOUS at 05:45

## 2021-05-06 RX ADMIN — ARIPIPRAZOLE 15 MG: 10 TABLET ORAL at 05:34

## 2021-05-06 RX ADMIN — Medication 5000 UNITS: at 05:35

## 2021-05-06 RX ADMIN — AMPICILLIN SODIUM 2000 MG: 2 INJECTION, POWDER, FOR SOLUTION INTRAMUSCULAR; INTRAVENOUS at 21:33

## 2021-05-06 RX ADMIN — CARBAMAZEPINE 200 MG: 200 TABLET ORAL at 16:22

## 2021-05-06 RX ADMIN — MIDAZOLAM HYDROCHLORIDE 2 MG: 1 INJECTION, SOLUTION INTRAMUSCULAR; INTRAVENOUS at 10:56

## 2021-05-06 RX ADMIN — DOCUSATE SODIUM 100 MG: 50 LIQUID ORAL at 17:37

## 2021-05-06 RX ADMIN — DOCUSATE SODIUM 50 MG AND SENNOSIDES 8.6 MG 1 TABLET: 8.6; 5 TABLET, FILM COATED ORAL at 21:33

## 2021-05-06 RX ADMIN — ARIPIPRAZOLE 15 MG: 10 TABLET ORAL at 17:37

## 2021-05-06 RX ADMIN — INSULIN HUMAN 3 UNITS: 100 INJECTION, SOLUTION PARENTERAL at 17:44

## 2021-05-06 RX ADMIN — FAMOTIDINE 20 MG: 20 TABLET ORAL at 05:35

## 2021-05-06 RX ADMIN — IPRATROPIUM BROMIDE AND ALBUTEROL SULFATE 3 ML: .5; 2.5 SOLUTION RESPIRATORY (INHALATION) at 01:07

## 2021-05-06 RX ADMIN — AMPICILLIN SODIUM 2000 MG: 2 INJECTION, POWDER, FOR SOLUTION INTRAMUSCULAR; INTRAVENOUS at 18:00

## 2021-05-06 RX ADMIN — DOCUSATE SODIUM 100 MG: 50 LIQUID ORAL at 05:47

## 2021-05-06 RX ADMIN — INSULIN HUMAN 4 UNITS: 100 INJECTION, SOLUTION PARENTERAL at 12:06

## 2021-05-06 RX ADMIN — INSULIN HUMAN 4 UNITS: 100 INJECTION, SOLUTION PARENTERAL at 05:51

## 2021-05-06 RX ADMIN — ASPIRIN 324 MG: 81 TABLET, CHEWABLE ORAL at 05:34

## 2021-05-06 RX ADMIN — CARBAMAZEPINE 200 MG: 200 TABLET ORAL at 08:01

## 2021-05-06 RX ADMIN — FAMOTIDINE 20 MG: 20 TABLET ORAL at 17:37

## 2021-05-06 RX ADMIN — AMPICILLIN SODIUM 2000 MG: 2 INJECTION, POWDER, FOR SOLUTION INTRAMUSCULAR; INTRAVENOUS at 13:30

## 2021-05-06 RX ADMIN — CYCLOBENZAPRINE HYDROCHLORIDE 5 MG: 5 TABLET, FILM COATED ORAL at 16:22

## 2021-05-06 RX ADMIN — FENTANYL CITRATE 50 MCG: 50 INJECTION, SOLUTION INTRAMUSCULAR; INTRAVENOUS at 00:50

## 2021-05-06 RX ADMIN — PROPOFOL 20 MCG/KG/MIN: 10 INJECTION, EMULSION INTRAVENOUS at 03:06

## 2021-05-06 RX ADMIN — ENOXAPARIN SODIUM 40 MG: 40 INJECTION SUBCUTANEOUS at 05:35

## 2021-05-06 RX ADMIN — FENTANYL CITRATE 100 MCG: 50 INJECTION, SOLUTION INTRAMUSCULAR; INTRAVENOUS at 10:55

## 2021-05-06 RX ADMIN — LIDOCAINE 3 PATCH: 50 PATCH TOPICAL at 17:37

## 2021-05-06 ASSESSMENT — ENCOUNTER SYMPTOMS: FEVER: 0

## 2021-05-06 NOTE — CARE PLAN
Problem: Skin Integrity  Goal: Risk for impaired skin integrity will decrease  Outcome: PROGRESSING SLOWER THAN EXPECTED  Note: Wound care consulted for possible pressure injury to sacrum.       Problem: Respiratory:  Goal: Respiratory status will improve  Outcome: PROGRESSING SLOWER THAN EXPECTED  Note: Patient placed on pressure control on vent.  Bronchoscopy performed by Dr. Sarmiento.

## 2021-05-06 NOTE — CARE PLAN
Vent Day # 8     Ventilator settings changed this shift: None     Weaning trials: Held per PEEP/FiO2    Respiratory Procedures: None     Plan: Continue current ventilator settings and wean mechanical ventilation as tolerated per physician orders.

## 2021-05-06 NOTE — CARE PLAN
Vent Day # 9     Ventilator settings changed this shift: Pt placed on PCMV 24/PC 20/+14/90%     Weaning trials: Held     Respiratory Procedures: Bronchoscopy     Plan: Continue current ventilator settings and wean mechanical ventilation as tolerated per physician orders.

## 2021-05-06 NOTE — PROGRESS NOTES
Infectious Disease Progress Note    Author: La Nena Khan M.D. Date & Time of service: 2021  12:00 PM    Chief Complaint:  Sepsis and cervical abscess  CVA     Interval History:  60 y.o.  admitted 2021. Pt has a past medical history of uncontrolled diabetes, CAD status post CABG times 2015, marijuana use and to arthritis.  Presented complaining of neck and back pain ongoing for approximately 1 week and fall getting out of the bathtub.  He had been seen at urgent care for back pain approximately 1 week prior to admission and given Toradol injections.     AF, O2 Vent 12/70%, pressors still on the trending down, pt continues to be agitated, not moving any extremities and concern for quadriplegia due to CVA.    AF, O2 Vent 8/40%, pressors off, agitated, without meaningful limb movement.  Decreasing vent requirements and no longer in shock.   AF, O2 Vent 8/40%, stable on low vent settings no significant secretions per nursing.  He continues to be agitated and with no upper or lower extremity movement.    5/3 AF WBC 14 remains intubated and sedated Agitation with decrease sedation. Epidural drain removed FiO2 0.4   AF WBC 19 code blue yesterday-mucus plugs found. S/p bronch this am-on 100%FiO2 On pressors   AF WBC 15.8 nurse noted some prox  movement in BUE (right greater than left) FiO2 0.9   AF WBC 15.7 s/p bronch again this am-thick secretion RML noted    Review of Systems:  Review of Systems   Unable to perform ROS: Intubated   Constitutional: Negative for fever.   Genitourinary: Positive for hematuria.   Skin: Negative for rash.       Hemodynamics:  Temp (24hrs), Av.4 °C (97.6 °F), Min:35.9 °C (96.6 °F), Max:37 °C (98.6 °F)  Temperature: 36.4 °C (97.6 °F)  Pulse  Av.7  Min: 57  Max: 121   Blood Pressure: 109/55, Arterial BP: 120/52       Physical Exam:  Physical Exam  Vitals and nursing note reviewed.   Constitutional:       Appearance: He is ill-appearing. He is not  toxic-appearing or diaphoretic.   HENT:      Nose: No rhinorrhea.      Mouth/Throat:      Pharynx: No oropharyngeal exudate.   Eyes:      General: No scleral icterus.  Neck:      Comments: Right IJ  Cardiovascular:      Rate and Rhythm: Normal rate and regular rhythm.   Pulmonary:      Effort: Pulmonary effort is normal. No respiratory distress.      Breath sounds: No stridor.      Comments: Coarse breath sounds  Abdominal:      General: There is no distension.      Palpations: Abdomen is soft.      Tenderness: There is no abdominal tenderness. There is no guarding.   Genitourinary:     Comments: SP cath +bloody urine  Musculoskeletal:         General: No deformity.   Lymphadenopathy:      Cervical: No cervical adenopathy.   Skin:     General: Skin is warm.      Coloration: Skin is not jaundiced.      Findings: Bruising present.      Comments: tattoos   Neurological:      Comments: Sedated  quadriplegic         Meds:    Current Facility-Administered Medications:   •  insulin glargine  •  midazolam  •  ampicillin  •  insulin regular **AND** POC blood glucose manual result **AND** NOTIFY MD and PharmD **AND** dextrose 50%  •  enoxaparin (LOVENOX) injection  •  norepinephrine (Levophed) infusion  •  propofol **AND** Triglycerides Starting now and then Every 3 Days  •  aspirin  •  acetylcysteine  •  albuterol  •  MD Alert...Adult ICU Electrolyte Replacement per Pharmacy  •  lidocaine  •  magnesium hydroxide  •  ondansetron  •  oxyCODONE immediate-release  •  polyethylene glycol/lytes  •  promethazine  •  senna-docusate  •  MD ALERT...DO NOT ADMINISTER NSAIDS or ASPIRIN unless ORDERED By Neurosurgery  •  bisacodyl  •  fleet  •  Respiratory Therapy Consult  •  ondansetron  •  promethazine  •  prochlorperazine  •  ipratropium-albuterol  •  labetalol  •  Pharmacy  •  [Held by provider] acetaminophen  •  ARIPiprazole  •  carBAMazepine  •  cyanocobalamin  •  senna-docusate  •  vitamin D  •  docusate sodium  •  famotidine  **OR** famotidine  •  fentaNYL **AND** fentaNYL **AND** [DISCONTINUED] fentaNYL **AND** [DISCONTINUED] propofol **AND** Triglyceride  •  lidocaine    Labs:  Recent Labs     05/04/21 0230 05/05/21 0517 05/06/21  0435   WBC 19.0* 15.8* 15.7*   RBC 3.59* 3.27* 3.21*   HEMOGLOBIN 10.8* 10.0* 9.8*   HEMATOCRIT 34.3* 31.1* 30.9*   MCV 95.5 95.1 96.3   MCH 30.1 30.6 30.5   RDW 49.8 49.3 50.6*   PLATELETCT 378 355 333   MPV 12.5 12.2 12.4   NEUTSPOLYS 83.00* 81.60* 83.30*   LYMPHOCYTES 9.00* 10.20* 9.70*   MONOCYTES 6.60 6.80 5.80   EOSINOPHILS 0.10 0.30 0.30   BASOPHILS 0.10 0.10 0.10     Recent Labs     05/04/21 0230 05/05/21 0517 05/06/21  0435   SODIUM 139 137 139   POTASSIUM 5.3 4.8 4.8   CHLORIDE 105 103 104   CO2 28 30 31   GLUCOSE 247* 253* 239*   BUN 38* 38* 37*     Recent Labs     05/04/21 0230 05/05/21 0517 05/06/21  0435   ALBUMIN 1.8* 2.0* 2.0*   TBILIRUBIN 0.3 0.3 0.2   ALKPHOSPHAT 237* 205* 216*   TOTPROTEIN 5.7* 5.8* 5.9*   ALTSGPT 199* 169* 178*   ASTSGOT 287* 112* 135*   CREATININE 0.58 0.47* 0.58       Imaging:  CT-CSPINE WITHOUT PLUS RECONS    Result Date: 4/25/2021 4/25/2021 1:29 PM HISTORY/REASON FOR EXAM: History of back and neck pain. Fall. TECHNIQUE/EXAM DESCRIPTION: CT cervical spine without contrast, with reconstructions. Helical scanning was performed from the skull base through T1.  Sagittal and coronal multiplanar reconstructions were generated from the axial images. Low dose optimization technique was utilized for this CT exam including automated exposure control and adjustment of the mA and/or kV according to patient size. COMPARISON:  None available. FINDINGS: No compression fracture is identified. There is an ununited fracture of the spinous process of T1. Intervertebral disc space narrowing and endplate spurring is most prominent at C6/C7. There are degenerative changes of the facet joints. C1/C2 alignment is maintained. There is multilevel uncovertebral spurring and neural  foraminal narrowing. There is cervical prevertebral edema. There is carotid atherosclerotic plaque bilaterally. Visualized lung apices are clear.     Multilevel degenerative changes. Prevertebral edema. Further evaluation with MRI is recommended as this can be seen in the setting of ligamentous injury. Bilateral carotid atherosclerotic plaque.     CT-CHEST (THORAX) WITH    Result Date: 4/25/2021 4/25/2021 1:29 PM HISTORY/REASON FOR EXAM:  Rib fracture suspected, traumatic. TECHNIQUE/EXAM DESCRIPTION: CT scan of the chest with contrast. Helical images were obtained from the lung apices through the adrenal glands following the bolus administration of  80 mL of Omnipaque 350 nonionic contrast. Sagittal and coronal reconstructions were performed. Low dose optimization technique was utilized for this CT exam including automated exposure control and adjustment of the mA and/or kV according to patient size. COMPARISON:  None. FINDINGS: There is atherosclerotic plaque. There is coronary artery calcification. The heart is not enlarged. No pericardial or pleural effusion is seen. There are small mediastinal lymph nodes. There is no hilar or axillary lymphadenopathy. No pneumothorax or pulmonary contusion is seen. Central airways are patent. Limited views were obtained of the upper abdomen. Hypodensity along the falciform ligament likely represents focal fat. Patient is status post median sternotomy. Degenerative changes are seen in the spine. No displaced rib fracture is identified.     No displaced rib fracture is identified. No acute cardiopulmonary process is seen. Atherosclerotic plaque including coronary artery calcification.    CT-HEAD W/O    Result Date: 4/30/2021 4/30/2021 4:27 AM HISTORY/REASON FOR EXAM: Altered mental status; evaluate cerebellar stroke, if no blood present OK for neurology to start aspirin TECHNIQUE/EXAM DESCRIPTION:  CT of the head without contrast. Sequential axial images were obtained from the  vertex to the skull base without contrast. Up to date radiation dose reduction adjustments have been utilized to meet ALARA standards for radiation dose reduction. COMPARISON: April 28, 2021 FINDINGS: There is mild diffuse parenchymal volume loss observed. Periventricular and subcortical white matter low-attenuation changes are seen, most commonly associated with small vessel ischemic disease. The ventricles are normal in caliber and configuration. Low-density changes in the right cerebellar hemisphere seen.. There are no abnormal extra axial fluid collections or extra axial hemorrhage identified. The visualized paranasal sinuses and mastoid air cells are well aerated bilaterally. No depressed calvarial fractures are identified. The visualized globes and retrobulbar soft tissues appear within normal limits.  Atherosclerotic intracranial calcifications are seen.     1.  Low-density changes in the right cerebellar hemisphere, compatible with evolving infarct, streak artifacts minimally limits evaluation of the posterior fossa for subtle subarachnoid hemorrhage, no intracranial hemorrhage is readily identified. 2.  Atherosclerosis.    CT-HEAD W/O    Result Date: 4/28/2021 4/28/2021 5:20 AM HISTORY/REASON FOR EXAM: Altered mental status TECHNIQUE/EXAM DESCRIPTION:  CT of the head without contrast. Sequential axial images were obtained from the vertex to the skull base without contrast. Up to date radiation dose reduction adjustments have been utilized to meet ALARA standards for radiation dose reduction. COMPARISON: None FINDINGS: There is moderate diffuse parenchymal volume loss observed. Periventricular and subcortical white matter low-attenuation changes are seen, most commonly associated with small vessel ischemic disease. The ventricles are normal in caliber and configuration. Area of low-density within the right cerebellar hemisphere is seen. There are no abnormal extra axial fluid collections or extra axial  hemorrhage identified. The visualized paranasal sinuses and mastoid air cells are well aerated bilaterally. No depressed calvarial fractures are identified. The visualized globes and retrobulbar soft tissues appear within normal limits.     1.  Low-density in the region of the right cerebellar hemisphere, appearance compatible with evolving infarct. These findings were discussed with the patient's on-call clinician, Deniz, on 4/28/2021 6:14 AM.    CT-LSPINE W/O PLUS RECONS    Result Date: 4/25/2021 4/25/2021 1:29 PM HISTORY/REASON FOR EXAM:  Back pain after injury. TECHNIQUE/EXAM DESCRIPTION AND NUMBER OF VIEWS: CT lumbar spine without contrast, with reconstructions. The study was performed on a [place] CT scanner. Thin-section helical scanning was performed from T12-L1 to the sacrum. Sagittal and coronal multiplanar reconstructions were generated from the axial images. Low dose optimization technique was utilized for this CT exam including automated exposure control and adjustment of the mA and/or kV according to patient size. COMPARISON: None. FINDINGS: Alignment of the lumbar spine is normal. There is no acute fracture or subluxation. The prevertebral and paraspinous soft tissues show no acute abnormality. There is severe atherosclerosis with a mixture of soft and calcified plaque. There is lumbosacral junction vacuum disc phenomenon with endplate spurring, disc height loss The visualized sacrum and sacroiliac joints show no acute abnormality.     No CT evidence of acute traumatic injury.    CT-TSPINE W/O PLUS RECONS    Result Date: 4/25/2021 4/25/2021 1:29 PM HISTORY/REASON FOR EXAM:  Mid-back trauma Pain following injury. TECHNIQUE/EXAM DESCRIPTION AND NUMBER OF VIEWS: CT thoracic spine without contrast, with reconstructions. The study was performed on a [place] CT scanner. Thin-section helical scanning was performed from C7-T1 through T12-L1. Sagittal and coronal multiplanar reconstructions were generated from the  axial images. Low dose optimization technique was utilized for this CT exam including automated exposure control and adjustment of the mA and/or kV according to patient size. COMPARISON: None. FINDINGS: Alignment of the thoracic spine is normal. There is no acute displaced fracture. There is T6/7 interbody fusion with mild anterior wedging likely indicating remote mild compression fracture that has healed There is bulky endplate spurring with some bridging syndesmophytes in the mid thoracic spine Sternotomy wires The cervicothoracic junction appears intact. The prevertebral and paraspinous soft tissues show no acute abnormality.     No CT evidence of acute traumatic abnormality. Mild T6/7 anterior wedging compatible with healed mild chronic compression fracture and there is interbody fusion.    DX-CERVICAL SPINE-2 OR 3 VIEWS    Result Date: 4/22/2021 4/22/2021 6:16 PM HISTORY/REASON FOR EXAM:  Atraumatic Pain. Neck pain for 4 days. TECHNIQUE/EXAM DESCRIPTION AND NUMBER OF VIEWS: Cervical spine series, 2 views. COMPARISON:  None. FINDINGS: Mild reversal of curvature centered at the C5 level. Vertebral body heights are preserved. Multilevel loss of disc height and osteophyte formation. Cervicothoracic junction is obscured. Prevertebral soft tissues are within normal limits. Odontoid is grossly intact.  Lateral masses of C1 are grossly intact.     1.  Limited exam.  Cervicothoracic junction is obscured. 2.  No gross cervical spine fracture or subluxation. 3.  Moderate multilevel degenerative changes.    DX-CHEST-PORTABLE (1 VIEW)    Result Date: 4/30/2021 4/30/2021 1:07 AM HISTORY/REASON FOR EXAM: For indication of respiratory failure; For indication of respiratory failure TECHNIQUE/EXAM DESCRIPTION:  Single AP view of the chest. COMPARISON: Yesterday FINDINGS: Position of medical devices appears stable. The cardiac silhouette appears within normal limits.  Postsurgical changes of sternotomy are noted. The  mediastinal contour appears within normal limits.  The central pulmonary vasculature appears normal. Bilateral lung volumes are diminished.  Bilateral lungs are clear. No significant pleural effusions are identified. The bony structures appear age-appropriate.     1.  No acute cardiopulmonary disease.    DX-CHEST-PORTABLE (1 VIEW)    Result Date: 4/29/2021 4/29/2021 10:06 AM HISTORY/REASON FOR EXAM:  For indication of respiratory failure; For indication of respiratory failure. TECHNIQUE/EXAM DESCRIPTION AND NUMBER OF VIEWS: Single portable view of the chest. COMPARISON: 4/28/2021 FINDINGS: Endotracheal tube projects at the level the clavicular heads. Right central line projects over the SVC. Enteric tube passes below the level of the diaphragm. The heart is not enlarged. Atherosclerotic calcification is seen. There is mild left basilar opacity likely representing atelectasis. No pleural effusion or pneumothorax is seen. Skin staples and a surgical drain project over the cervical spine.     Mild left basilar opacity may represent atelectasis. Infection not excluded. Improved aeration of the left lung. Atherosclerotic plaque.     DX-CHEST-PORTABLE (1 VIEW)    Result Date: 4/28/2021 4/28/2021 12:10 PM HISTORY/REASON FOR EXAM:  CENTRAL LINE PLACEMENT; CENTRAL LINE PLACEMENT. TECHNIQUE/EXAM DESCRIPTION AND NUMBER OF VIEWS: Single portable view of the chest. COMPARISON: 4/28/2021 11:17 AM FINDINGS: Mediastinal structures are shifted to the LEFT.  Increasing opacification of LEFT hemithorax. RIGHT lung is clear. Endotracheal tube in place with tip approximately 5 cm above the you. Feeding tube tip in place however tip is off the image. RIGHT internal jugular catheter tip at the lower SVC. No pneumothorax. Postoperative change from prior open heart surgery. Postoperative change of the neck with surgical drain present.     1.  Worsening consolidation of LEFT hemithorax with shift of mediastinal structures to the LEFT  suggesting atelectasis, possibly due to endobronchial process. 2.  Supportive tubing as described above. 3.  No pneumothorax.    DX-CHEST-PORTABLE (1 VIEW)    Result Date: 4/28/2021 4/28/2021 11:16 AM HISTORY/REASON FOR EXAM:  replaced ETT; replaced ETT TECHNIQUE/EXAM DESCRIPTION AND NUMBER OF VIEWS: Single portable view of the chest. COMPARISON: 4/27/2021 FINDINGS: Endotracheal tube with tip projecting over the mid thoracic trachea. Hazy opacity in the left lower lobe Layering left pleural effusion. No pneumothorax. Stable cardiopericardial silhouette.     Endotracheal tube with tip projecting over the mid thoracic trachea. Layering left pleural effusion with left lower lung opacity.    DX-CHEST-PORTABLE (1 VIEW)    Result Date: 4/27/2021 4/27/2021 5:30 PM HISTORY/REASON FOR EXAM:  Shortness of Breath. TECHNIQUE/EXAM DESCRIPTION AND NUMBER OF VIEWS: Single portable view of the chest. COMPARISON: None. FINDINGS: LUNGS: Hypoinflation with left basilar atelectasis. Mild perihilar interstitial prominence. No effusions. PNEUMOTHORAX: None. LINES AND TUBES: None. MEDIASTINUM: No cardiomegaly. MUSCULOSKELETAL STRUCTURES: No acute displaced fracture. Median sternotomy.     1.  Hypoinflation with left basilar atelectasis. 2.  Mild perihilar interstitial prominence may be related to hypoinflation or edema.    DX-SPINE-ANY ONE VIEW    Result Date: 4/28/2021 4/28/2021 5:30 AM HISTORY/REASON FOR EXAM:  Cervical laminectomy TECHNIQUE/EXAM DESCRIPTION AND NUMBER OF VIEWS:  Single view of the spine. Digitized Intraoperative Radiograph FINDINGS: Fixation hardware projecting over the cervical spine Fluoroscopic time:2 seconds     Digitized intraoperative radiograph is submitted for review.  This examination is not for diagnostic purpose but for guidance during a surgical procedure.    MR-BRAIN-W/O    Result Date: 4/28/2021 4/28/2021 3:31 PM HISTORY/REASON FOR EXAM:  Altered mental status; cerebellar stroke. TECHNIQUE/EXAM  DESCRIPTION: MRI of the brain without contrast. T1 sagittal, T2 fast spin-echo axial, T1 coronal, FLAIR coronal, diffusion-weighted and apparent diffusion coefficient (ADC map) axial images were obtained of the whole brain. The study was performed on a [employer] 1.5 Britt MRI scanner. COMPARISON:  Head CT earlier in the day FINDINGS: Large acute right cerebellar infarct suggesting right posterior inferior cerebellar artery territory. There is prominent T2 hyperintensity consistent with cytotoxic edema. A small amount of blooming artifact in the medial aspect of the right cerebellar hemisphere on GRE images could represent a small amount of petechial hemorrhage. There is mild localized mass effect with some mild mass effect on the fourth ventricle. No supratentorial infarct or hemorrhage. No extra-axial fluid collection. No midline shift or hydrocephalus. There is a nasogastric tube and fluid within the nasopharynx. Endotracheal tube partially visualized. Mild posterior ethmoid sinus mucosal thickening.     Acute large right cerebellar posterior inferior cerebellar artery territory infarct with possible small amount of petechial hemorrhage.    MR-CERVICAL SPINE-WITH & W/O    Result Date: 4/27/2021 4/27/2021 1:02 PM HISTORY/REASON FOR EXAM:  Neck pain, abnormal neuro exam; Neck pain, initial exam; sepsis 2/2 strep bacteremia  -unknown source. rule out possible ?? retropharyngeal abscess, ??prevertebral abscess. TECHNIQUE/EXAM DESCRIPTION: MRI of the cervical spine without and with contrast. The study was performed on a [employer] 1.5 Britt MRI scanner. T1 sagittal, T2 fast spin-echo sagittal, and gradient echo axial images were obtained of the cervical spine. T1 post-contrast fat suppressed sagittal images were obtained of the cervical spine. Optional T1 post-contrast axial images may be obtained. 15 mL ProHance contrast was administered intravenously. COMPARISON: None. FINDINGS: There is abnormal disc T2  hyperintensity at C5-6. Mild amount of bone marrow edema is noted at C5, C6 and C7. There is also focal bone marrow enhancement at C6. There is multiseptated abnormal peripherally enhancing epidural collection noted extending from C2 to the visualized lower thoracic level. Largest collection is noted in the upper thoracic posterior epidural space at the levels of T2 and T3. The lower extent of the collection is not imaged. This is causing multilevel effacement of the thecal sac and cord compression. The thoracic spinal cord is anteriorly displaced and compressed at the levels of T2 and T3. At the level of C2-3,there is small left anterior epidural fluid collection. At the level of C3-4,there is small left anterior epidural fluid collection causing indentation of the thecal sac. At the level of C4-5,there is minimal epidural fluid collection. At the level of C5-6,there is diffuse disc bulge along with right posterior lateral epidural fluid collection causing severe canal compromise. At the level of C6-7,there is mild diffuse disc bulge. There is epidural fluid collection causing severe canal compromise. At the level of C7-T1,there is epidural fluid collection causing severe canal compromise. The visualized brain parenchyma is unremarkable. The cervical spinal cord is mildly enlarged which demonstrates mild increased intramedullary T2 signal intensity. There is abnormal T2 hyperintensity in the visualized bilateral vertebral arteries concerning for age indeterminate occlusion. There is mild amount of edema in the pre and retropharyngeal soft tissue.     1.  There is multiseptated abnormal peripherally enhancing epidural collection noted extending from C2 to the visualized lower thoracic level. Largest collection is noted in the upper thoracic posterior epidural space at the levels of T2 and T3. The lower extent of the collection is not imaged. This is causing multilevel effacement of the thecal sac and cord  compression. The thoracic spinal cord is anteriorly displaced and compressed at the levels of T2 and T3. Pre and postcontrast MR examination of  the thoracic spine is recommended for further evaluation. 2.  There is effacement of the cervical thecal sac due to the multiseptated epidural fluid collection. 3.  The cervical spinal cord is mildly enlarged with minimal increased T2 signal intensity. The possibility of cord inflammation cannot be entirely excluded. 4.  Abnormal T2 hyperintensity at C5-6 disc space likely representing discitis. 5.  Abnormal bone marrow edema of C5, C6 and C7 vertebral bodies with edema concerning for osteomyelitis. 6.  There is also focal enhancement of C6 vertebral body likely secondary to the osteomyelitis. 7.  Prevertebral edema/fluid collection. 8.  Nonvisualization of flow void of bilateral vertebral arteries concerning for age-indeterminate bilateral vertebral occlusions.     MR-LUMBAR SPINE-WITH & W/O    Result Date: 4/28/2021 4/27/2021 11:23 PM HISTORY/REASON FOR EXAM:  Back pain or radiculopathy, cancer or infection suspected; Strep bacteremia, back pain, bilateral leg numbness TECHNIQUE/EXAM DESCRIPTION: MRI of the lumbar spine without and with contrast. The study was performed on a [employer] 1.5 Britt MRI scanner. T1 sagittal, T2 fast spin-echo sagittal, and T2 axial images were obtained of the lumbar spine. T1 postcontrast fat-suppressed sagittal images were obtained. 14 mL ProHance contrast was administered intravenously. COMPARISON:  None. FINDINGS: Normal lumbar lordosis. Preservation of vertebral body heights, alignment and bone marrow signal. No evidence of discitis osteomyelitis. Conus medullaris terminates at T12-L1 and is normal in signal. No mass or fluid collection is seen within the lumbar spinal canal. T12-L1: Canal and foramina are patent. L1-L2: Mild disc bulge. Canal and foramina are patent. L2-L3: Minimal disc bulge and facet degeneration. Canal and foramina  are patent. L3-L4: Minimal disc bulge and facet degeneration. Canal and foramina are patent.. L4-L5: Mild disc bulge and facet degeneration. No significant spinal stenosis. Mild foraminal narrowing. L5-S1: Disc narrowing, mild disc osteophyte. No significant spinal stenosis. Moderate right and mild-to-moderate left foraminal narrowing. Distended urinary bladder.     No evidence of lumbar spine infection or epidural abscess. Mild spondylosis as detailed above without spinal stenosis.    MR-MRA HEAD-W/O    Result Date: 4/28/2021 4/28/2021 3:31 PM HISTORY/REASON FOR EXAM:  Emergency Medical Condition ? Stroke. Cerebellar infarct TECHNIQUE/EXAM DESCRIPTION: MRA of the head ([place]) without contrast. The study was performed on a [employer] Signa 1.5 Britt MRI scanner. MRA of the [place] was performed with 3D time-of-flight technique with axial acquisition. Additional MRA of the internal carotid and vertebrobasilar arteries at the level of the skull base and craniocervical junction was also performed with 3D time-of-flight technique with axial acquisition. Images are reviewed at the PACS workstation as axial source images as well as maximum intensity ray projection (MIP) multiplanar 3D reconstructions. COMPARISON:  None FINDINGS: Nicole cavernous and supraclinoid ICA segments are patent. Patent left posterior communicating artery. MCA and ARA branches are patent. There is no significant flow within the intradural right vertebral artery. The intradural left vertebral artery, basilar artery and posterior cerebral arteries are patent. No significant aneurysm or high flow vascular malformation is seen.     Findings suggesting right vertebral artery occlusion.    MR-MRA NECK-WITH & W/O AND SEQUENCES    Result Date: 4/28/2021 4/28/2021 3:31 PM HISTORY/REASON FOR EXAM:  Emergency Medical Condition ? Stroke Cerebellar stroke TECHNIQUE/EXAM DESCRIPTION: MRA of the cervical carotid and vertebral arteries without and  with contrast. The study was performed on a [employer] Signa 1.5 Britt MRI scanner. Precontrast MRA of the cervical carotid and vertebral arteries was performed with 2D time-of-flight (TOF) technique with acquisition in the axial plane. MRA of the arch origins of the great vessels, and cervical carotid and vertebral arteries was performed with 2D time-of-flight (TOF) technique with coronal slab acquisition from the level of the aortic arch to the carotid siphons during dynamic intravenous infusion of 15 mL of ProHance contrast. Images are reviewed at the PACS workstation as source images as well as maximum intensity ray projection (MIP) multiplanar 3D reconstructions. Cervical internal carotid artery percent stenosis is calculated using the standard method according to the NASCET criteria wherein a segment of uniform caliber distal cervical internal carotid is used as the reference denominator. COMPARISON:  None available. FINDINGS: The arch origins of the great vessels are intact. The cervical right vertebral artery is not well seen on the time-of-flight images. A very small caliber cervical right vertebral artery is seen on the postcontrast images with some mildly increased caliber of the artery at about the C1-C2 level. The fact  that it is not seen on the time-of-flight images and is visualized on contrast enhanced sequence could be related to small nondominant caliber with sluggish flow or could represent retrograde flow within the right vertebral artery. There may be a focal moderate stenosis in the mid cervical left internal carotid artery and mild narrowing at its origin. Mild narrowing of the proximal left internal carotid artery with less than 50% stenosis. No significant right carotid stenosis is seen.     1.  Small caliber right vertebral artery which is not visualized on the noncontrast time-of-flight technique could be related to retrograde flow or diminutive caliber and sluggish flow. 2.  Possible moderate  focal stenosis in the mid cervical left vertebral artery. 3.  No significant carotid stenosis.    MR-THORACIC SPINE-WITH & W/O    Result Date: 4/28/2021 4/27/2021 11:23 PM HISTORY/REASON FOR EXAM:  Mid-back pain, compression fracture suspected; possible epidural abscess/fluid collection TECHNIQUE/EXAM DESCRIPTION: MRI of the thoracic spine without and with contrast. The study was performed on a [employer] Signa 1.5 Britt MRI scanner. T1 sagittal, T2 fast spin-echo sagittal, and T2 axial images were obtained of the thoracic spine. T1 post-contrast fat suppressed sagittal images were obtained. Optional T1 post-contrast axial images may be obtained. 14 mL ProHance contrast was administered intravenously. COMPARISON:  MRI of the cervical spine one-day prior. FINDINGS: There is abnormal dorsal epidural fluid collection seen within the partially visualized lower cervical spine and which extends inferiorly to about the T6 level. The maximum thickness is seen at about the T2-T3 level measuring 8 mm. This raises concern for epidural abscess, although epidural hematoma could also have this appearance. From T1 through T3 there is at least moderate thecal sac stenosis due to the epidural fluid collection. There is significant abnormal signal within the partially visualized  lower cervical and upper thoracic cord which could be infectious/inflammatory or other nonspecific cord edema. There is also suggestion of a small ventral epidural fluid collection at C6-C7. There is also partially visualized abnormal marrow edema in the C6 and C7 vertebral bodies as detailed on earlier MRI report. There is suggestion of some prominent enhancement around the mid and lower thoracic cord seen on the sagittal postcontrast images however limited evaluation on axial images due to motion artifact. This is of uncertain etiology but could represent some leptomeningeal disease/infection. On the sagittal T2 images there is a questionable slight nodular  appearance on the surface of lower thoracic cord. A differential would be a subtle spinal dural aVF. There is no abnormal signal seen within the mid/lower thoracic cord. There is no evidence of discitis osteomyelitis within the thoracic spine. There is T6-T7 interbody ankylosis. There is mild disc degeneration and some prominent anterolateral bridging osteophytes in the mid and lower thoracic spine. No significant posterior disc herniation is seen. Within the mid and lower thoracic spine there  is no significant spinal stenosis.     1.  Posterior epidural fluid collection/abscess within the lower cervical spine extending inferiorly to about the T6 level which could represent epidural abscess and/or hematoma. This measures 8 mm in maximal thickness at T2-T3 with at least moderate thecal sac stenosis. 2.  Abnormal signal within the cervical cord and upper thoracic cord suggesting cord edema or infectious/inflammatory etiology. 3.  Lower cervical spine findings are detailed on earlier report. 4.  Prominent enhancement along the surface of the mid and lower thoracic cord is of uncertain etiology and as detailed above, possibly representing leptomeningeal disease/infection. See details above. These findings were discussed with Dr. Cano on 4/28/2021 10:01 AM.     IR-DRAIN-BLADDER SUPRAPUB W/CATH    Result Date: 4/28/2021  HISTORY/REASON FOR EXAM:  60-year-old man with urinary retention. TECHNIQUE/EXAM DESCRIPTION AND NUMBER OF VIEWS:  Ultrasound and fluoroscopic guided suprapubic bladder catheter placement, Cystogram. MEDICATIONS: The patient remained on his ICU sedation regimen. FLUOROSCOPY TIME: 0.3 minutes; 5fluoroscopic images obtained CONTRAST: 40mL Omnipaque 300, intraurinary PROCEDURE:  Informed consent was obtained. The suprapubic region was prepped and draped in sterile manner. Following local anesthesia with copious 1% lidocaine, under ultrasound and fluoroscopic monitoring, an 18-gauge needle was advanced  into the urinary bladder from a paramedian suprapubic approach. An Amplatz guidewire was placed in the urinary bladder. Serial tract dilatation was performed with a 22 Bangladeshi telescoping dilator. A 16 Bangl[place]hi Hopi tip silicone Gomez type catheter was then placed in the balloon inflated with 10 mL of sterile saline. A cystogram was performed with AP and both oblique views demonstrating satisfactory position of the catheter with all side holes within the urinary bladder. The catheter was secured to the skin with 2-0 nylon suture and connected to gravity drainage. The Gomez catheter was removed. The patient tolerated the procedure well with no evidence of complication. COMPARISON: None FINDINGS:  The cystogram shows satisfactory catheter position with all sideholes within the urinary bladder. There is no extravasation.     1.     Ultrasound and fluoroscopic guided placement of a 16 [place] Hopi tip silicone suprapubic bladder catheter. 2.     Cystogram documenting catheter placement.     DX-PORTABLE FLUOROSCOPY < 1 HOUR    Result Date: 4/28/2021 4/28/2021 5:30 AM HISTORY/REASON FOR EXAM:  Cervical laminectomy TECHNIQUE/EXAM DESCRIPTION AND NUMBER OF VIEWS: Portable fluoroscopy for less than one hour in OR. FINDINGS: The portable fluoroscopy unit was obligated to the procedure for less than one hour. Actual fluoro time was 2 seconds.     Portable fluoroscopy utilized for 2 seconds. INTERPRETING LOCATION: [address] [place] [address], [id number]    EC-ECHOCARDIOGRAM COMPLETE W/O CONT    Result Date: 4/28/2021  Transthoracic Echo Report Echocardiography Laboratory CONCLUSIONS No prior study is available for comparison. Normal left ventricular systolic function.  Left ventricular ejection fraction is visually estimated to be 60%. Normal diastolic function. Agitated saline (bubble) study was performed. No evidence of right to left shunt by agitated saline challenge. Normal inferior vena cava size and inspiratory collapse. CHINO  GILDARDO Exam Date:         2021                    19:42 Exam Location:     Inpatient Priority:          Routine Ordering Physician:        YESICA SULLIVAN Referring Physician:       [id number] Marie Sonographer:               Faye Moya RDCS Age:    60     Gender:    M MRN:    [id number] :    1960 BSA:    1.87   Ht (in):    69     Wt (lb):    159 Exam Type:     Complete Indications:     CVA ICD Codes:       436 CPT Codes:       36166 BP:   140    /   60     HR:   74 Technical Quality:       Fair MEASUREMENTS  (Male / Female) Normal Values 2D ECHO LV Diastolic Diameter PLAX        3.8 cm                4.2 - 5.9 / 3.9 - 5.3 cm LV Systolic Diameter PLAX         2.4 cm                2.1 - 4.0 cm IVS Diastolic Thickness           1.4 cm                LVPW Diastolic Thickness          1.4 cm                LVOT Diameter                     2 cm                  Estimated LV Ejection Fraction    60 %                  LV Ejection Fraction MOD BP       62.7 %                >= 55  % LV Ejection Fraction MOD 4C       46.6 %                LV Ejection Fraction MOD 2C       55 %                  DOPPLER AV Peak Velocity                  1.7 m/s               AV Peak Gradient                  10.9 mmHg             AV Mean Gradient                  5.5 mmHg              LVOT Peak Velocity                1.2 m/s               AV Area Cont Eq vti               2.3 cm2               Mitral E Point Velocity           0.74 m/s              Mitral E to A Ratio               0.92                  MV Pressure Half Time             69.4 ms               MV Area PHT                       3.2 cm2               MV Deceleration Time              239 ms                PV Peak Velocity                  1.1 m/s               PV Peak Gradient                  4.6 mmHg              RVOT Peak Velocity                0.66 m/s              * Indicates values subject to auto-interpretation LV EF:  60    % FINDINGS Left Ventricle Normal  left ventricular chamber size. Mild concentric left ventricular hypertrophy. Normal left ventricular systolic function.  Left ventricular ejection fraction is visually estimated to be 60%. Normal diastolic function. Right Ventricle Normal right ventricular size and systolic function. Right Atrium Normal right atrial size. Normal inferior vena cava size and inspiratory collapse. Left Atrium Normal left atrial size. Agitated saline (bubble) study was performed. With Valsalva maneuver. No evidence of right to left shunt by agitated saline challenge. Existing IV was used. Mitral Valve Structurally normal mitral valve without significant stenosis or regurgitation. Aortic Valve Aortic sclerosis without stenosis. No aortic insufficiency. Tricuspid Valve Structurally normal tricuspid valve without significant stenosis or regurgitation. Pulmonic Valve The pulmonic valve is not well visualized. No pulmonic insufficiency. Pericardium Normal pericardium without effusion. Aorta Normal aortic root for body surface area. Ascending aorta not well visualized. Zakiya Rebollar MD (Electronically Signed) Final Date:     28 April 2021                 21:30    EC-CHRISTOPHER W/O CONT    Result Date: 4/30/2021  Results Will be Available after Interpretation by Cardiologist.    DX-ABDOMEN FOR TUBE PLACEMENT    Result Date: 4/30/2021 4/30/2021 12:44 PM HISTORY/REASON FOR EXAM:  Line evaluation. TECHNIQUE/EXAM DESCRIPTION AND NUMBER OF VIEWS:  1 view(s) of the abdomen. COMPARISON:  4/28/2021 FINDINGS: Enteric tube has been placed. The tip projects over the distal stomach or duodenal bulb. There are scattered loops of air-filled bowel.     Feeding tube placement with the tip projecting over the distal stomach or duodenal bulb    DX-ABDOMEN FOR TUBE PLACEMENT    Result Date: 4/28/2021 4/28/2021 12:24 PM HISTORY/REASON FOR EXAM:  Tube evaluation. TECHNIQUE/EXAM DESCRIPTION AND NUMBER OF VIEWS:  1 view(s) of the abdomen. COMPARISON:  None. FINDINGS:  "Limited single view of the abdomen performed primarily to evaluate enteric tube position. The tip projects over the expected area of the distal stomach. The bowel gas pattern is within normal limits.     Feeding tube with tip projecting over the expected area of the distal stomach.      Micro:  Results     Procedure Component Value Units Date/Time    Culture Respiratory W/ Grm Stn - BAL [[id number]] Collected: 05/06/21 1055    Order Status: Sent Specimen: Respirate from Bronchoalveolar Lavage     BLOOD CULTURE [[id number]] Collected: 04/28/21 1134    Order Status: Completed Specimen: Blood from Peripheral Updated: 05/03/21 1300     Significant Indicator NEG     Source BLD     Site PERIPHERAL     Culture Result No growth after 5 days of incubation.    Narrative:      Collected By:[id number] SAILAJA BAILEY  Per Hospital Policy: Only change Specimen Src: to \"Line\" if  specified by physician order.  No site indicated    BLOOD CULTURE [[id number]] Collected: 04/27/21 0248    Order Status: Completed Specimen: Blood from Peripheral Updated: 05/02/21 1100     Significant Indicator NEG     Source BLD     Site PERIPHERAL     Culture Result No growth after 5 days of incubation.    Narrative:      Per Hospital Policy: Only change Specimen Src: to \"Line\" if  specified by physician order.  Left AC    BLOOD CULTURE [[id number]] Collected: 05/01/21 1305    Order Status: Completed Specimen: Blood from Peripheral Updated: 05/02/21 0738     Significant Indicator NEG     Source BLD     Site PERIPHERAL     Culture Result No Growth  Note: Blood cultures are incubated for 5 days and  are monitored continuously.Positive blood cultures  are called to the RN and reported as soon as  they are identified.      Narrative:      Collected By:[id number] LESA VIZCARRA  Per Hospital Policy: Only change Specimen Src: to \"Line\" if  specified by physician order.  Left Forearm/Arm    BLOOD CULTURE [[id number]] Collected: 05/01/21 1300    Order Status: " "Completed Specimen: Blood from Peripheral Updated: 05/02/21 0738     Significant Indicator NEG     Source BLD     Site PERIPHERAL     Culture Result No Growth  Note: Blood cultures are incubated for 5 days and  are monitored continuously.Positive blood cultures  are called to the RN and reported as soon as  they are identified.      Narrative:      Collected By:[id number] LESA VIZCARRA  Per Hospital Policy: Only change Specimen Src: to \"Line\" if  specified by physician order.  Right Forearm/Arm    CULTURE WOUND W/ GRAM STAIN [[id number]]  (Abnormal) Collected: 04/28/21 0642    Order Status: Completed Specimen: Wound Updated: 05/01/21 1117     Significant Indicator POS     Source WND     Site Cerival Thoracic Epidural 1     Culture Result Growth noted after further incubation, see below for  organism identification.       Gram Stain Result Few WBCs.  Rare Gram positive cocci.       Culture Result Streptococcus anginosus  Light growth      Narrative:      Surgery - swabs received    Anaerobic Culture [[id number]] Collected: 04/28/21 0642    Order Status: Completed Specimen: Wound Updated: 05/01/21 1117     Significant Indicator NEG     Source WND     Site Cerival Thoracic Epidural 1     Culture Result No Anaerobes isolated.    Narrative:      Surgery - swabs received    CULTURE WOUND W/ GRAM STAIN [[id number]]  (Abnormal) Collected: 04/28/21 0655    Order Status: Completed Specimen: Wound Updated: 05/01/21 1117     Significant Indicator POS     Source WND     Site Cerival Thoric Epidural 2     Culture Result Growth noted after further incubation, see below for  organism identification.       Gram Stain Result Few WBCs.  Few Gram positive cocci.       Culture Result Streptococcus anginosus  Moderate growth      Narrative:      Surgery - swabs received    Anaerobic Culture [[id number]] Collected: 04/28/21 0655    Order Status: Completed Specimen: Wound Updated: 05/01/21 1117     Significant Indicator NEG     Source WND     " "Site Cerival Thoric Epidural 2     Culture Result No Anaerobes isolated.    Narrative:      Surgery - swabs received    BLOOD CULTURE [[id number]]  (Abnormal) Collected: 04/27/21 0248    Order Status: Completed Specimen: Blood from Peripheral Updated: 04/30/21 1134     Significant Indicator POS     Source BLD     Site PERIPHERAL     Culture Result Growth detected by Bactec instrument. 04/29/2021  05:59      Viridans Streptococcus  Possible Contaminant  Isolated from one set only, please correlate with clinical  condition. Contact the Microbiology department within 48 hr  if identification and susceptibility are needed.      Narrative:      CALL  Bonilla  [facility] tel. [phone],  CALLED  [facility] tel. [phone] 04/29/2021, 06:05, RB PERF. RESULTS CALLED TO: RN  [id number]  Per Hospital Policy: Only change Specimen Src: to \"Line\" if  specified by physician order.  Right AC    BLOOD CULTURE [[id number]]  (Abnormal) Collected: 04/28/21 1134    Order Status: Completed Specimen: Blood from Peripheral Updated: 04/30/21 0957     Significant Indicator POS     Source BLD     Site PERIPHERAL     Culture Result Growth detected by Bactec instrument. 04/29/2021  21:41  Negative for Staphylococcus aureus and MRSA by PCR. Correlate  ongoing need for antibiotics with clinical condition.        Coagulase-negative Staphylococcus species  Possible Contaminant  Isolated from one set only, please correlate with clinical  condition. Contact the Microbiology department within 48 hr  if identification and susceptibility are needed.      Narrative:      CALL  Bonilla  [facility] tel. [phone],  CALLED  [facility] tel. [phone] 04/29/2021, 21:41, RB PERF. RESULTS CALLED  TO:[id number]  Collected By:[id number] SAILAJA BAILEY  Per Hospital Policy: Only change Specimen Src: to \"Line\" if  specified by physician order.  Collected By:[id number] SAILAJA BAILEY          Assessment:  Active Hospital Problems    Diagnosis    • *Spinal epidural abscess [G06.1]    • History of " ischemic stroke [Z86.73]    • Sepsis due to Streptococcus species (HCC) [A40.9]    • Acute bilateral back pain [M54.9]    • Thrombocytopenia (HCC) [D69.6]    • Type 2 diabetes mellitus without complication, without long-term current use of insulin (HCC) [E11.9]    • Coronary artery disease due to calcified coronary lesion: CABG x4, July 2015 [I25.10, I25.84]    • Essential hypertension, benign [I10]    • Hypokalemia [E87.6]    • Hypomagnesemia [E83.42]    • Hyponatremia [E87.1]    • Difficulty swallowing [R13.10]    • Diabetic ketoacidosis (HCC) [E11.10]    • Marijuana abuse [F12.10]    • High anion gap metabolic acidosis [E87.2]    • Tobacco abuse [Z72.0]    • Dyslipidemia [E78.5]    • Status post urethrostomy (HCC) [Z93.6]    .    Assessment:  60 y.o.  admitted 4/25/2021. Pt has a past medical history of uncontrolled diabetes, CAD status post CABG times 4/2015, marijuana use and to arthritis.  Presented complaining of neck and back pain ongoing for approximately 1 week and fall getting out of the bathtub.  He had been seen at urgent care for back pain approximately 1 week prior to admission and given Toradol injections.       Hospital Course:   Afebrile and vitals unremarkable other than hypertension.  Leukocytosis ongoing since arrival.  MRI C-spine on 4/26 with epidural collection noted from C2-T3.  Largest collection noted in upper thoracic posterior epidural space at T2-T3.  This is causing multilevel cord compression.  Also with likely discitis at C5-6 and osteomyelitis at C5-C7.  Blood cultures on 4/25+ for Streptococcus species.    Code blue 5/3-mucus plugs     Problem List  Sepsis/shock, strep  -Blood cultures on 4/25 2/2 + Streptococcus anginosus, penicillin and cephalosporin susceptible  -Blood cultures on 4/27 1/2 + viridans Streptococcus   -Blood cultures on 4/28  1/2 + CoNS -likely contaminant  -Blood cultures on 5/1 are no growth to date  -TTE with no vegetations  -CHRISTOPHER on 4/30, no official read as  yet    VDRF-Bronchoscopy on 4/28 with thick secretions  Bronch 5/3 mucus plugs, thicvk secretions  Bronch 5/4 pending-no signif secretions noted  Bronch 5/6 with thick secretion RML    Leukocytosis, persistent  Cervical thoracic infection, epidural abscess at C2-T3 as well as discitis and osteomyelitis, +GPCs-Streptococcus anginosus   -Repeat MRI thoracic spine on 4/27 notes posterior epidural fluid  collection/abscess in lower cervical spine extending to T6 level, also noted abnormal signal within the cervical and upper thoracic cord  -s/p OR 4/28 for see 4-T2 laminectomy, decompression of thecal sac and nerve roots as well as cervical thoracic extradural spinal mass/epidural abscess removal, linda purulence during OR, no CSF leak per op note  -Operative cultures from cervical thoracic epidural 1/ 2 +strep anginosis    CVA, Right cerebellar stroke, possibly due to venous spasm associated with the epidural abscess.   -MRA neck with and without on 4/28, possible moderate focal stenosis in mid left CVA.  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  MRI without on 4/28 with acute large right cerebellar infarct with small hemorrhage  Uncontrolled diabetes     Plan   Continue ampicillin due to theoretical decreased risk of Cdiff colitis  Anticipate a 6-week IV antibiotic course for the bacteremia and spinal infection-stop date 6/11/2021  Repeat MRI spine prior to stopping antibiotics  FU CHRISTOPHER result-still pending  Blood cultures - NGTD    Monitor labs  Keep BS under 150 to help control current infection  Continue to FU Bronch results-last CXR no pneumonia    Palliative care consult and goals of care discussion   DW ICU nurse and Pharm   Prognosis guarded

## 2021-05-06 NOTE — PROCEDURES
"Procedures   Date of Service: 5/6/2021    Procedure:  Diagnostic and therapeutic bronchoscopy with BAL    Indication: Acute hypoxemic resp failure    Physician:  Dr. Arsen Sarmiento MD    Post Procedure Diagnosis:  1.  Purulent secretions    Narrative:  Appropriate consent was obtained and \"time out\" was performed.  A flexible fiberoptic bronchoscope was then inserted through the ETT without difficulty.  All airways were evaluated to the sub-segmental level.  The airway mucosa was normal.  No endobronchial lesions were seen.  The bronchoscope was then wedged in a segment of the RML bronchus.  40cc of saline was instilled with moderate return of 30cc BAL fluid. Thick purulent secretions were aspirated from the RML. The BAL specimen will be sent for appropriate culture.  No immediate complications.  EBL = 0.      Arsen Sarmiento M.D.        "

## 2021-05-06 NOTE — CARE PLAN
Ventilator daily summary:    Vent Day # 9     Changed to % this shift.     Unable to wean Fio2 or Peep due to low sats and tachypnea.     Respiratory Procedures: None     Plan: Continue current ventilator settings and wean mechanical ventilation as tolerated per physician orders.

## 2021-05-06 NOTE — THERAPY
Occupational Therapy Contact Note    Discussed pt's current medical status, he is inappropriate for any OOB activity at this time, fio2 @ 90% on vent. Educated nursing on how to position BUE to prevent shoulder subluxation and protect joints so that when the time comes he is able to participate in therapy appropriately. RN notified if they have any concerns re: positioning, ROM, etc. to get ahold of this therapist for education. RN in agreement. Please re-order when patient is medically stable to participate.    Jamaica Orlando, OTR/L

## 2021-05-06 NOTE — PROGRESS NOTES
[facility] Progress Note      Admit Date: 4/25/2021    Resident(s): Delroy Fulton M.D.   Attending:  BREANNA ALCARAZ/ Dr. Sarmiento    Patient ID:    Name:  Perry Davis   YOB: 1960  Age:  60 y.o.  male   MRN:  [id number]    Hospital Course (carried forward and updated):  Perry Davis is a 60 y.o. male with a previous history of uncontrolled Diabetes -Hb1C 13 , HLD, CAD s/p CABGx4  2015, tobacco use ,OA, chronic low pain admitted  4/25 with epidural abscess w/ cord compression, discitis involving cervical and thoracic spine--> underwent emergent laminectomy and decompression by Dr. Hazel 4/28.  Hospital course complicated by strep anginosus bacteremia on Ceftriaxone, followed by ID and acute right cerebellar stroke.  Patient currently intubated    Consultants:  Critical Care   Neurosurgery  Urology  Infectious disease    Interval Events:  No acute events overnight  -on ampicillin per ID  -Bronchoscopy with  BAL today, secretions cleared, specimen sent for culture.  -Palliative team on board    Vitals Range last 24h:  Temp:  [35.9 °C (96.6 °F)-37 °C (98.6 °F)] 36.4 °C (97.5 °F)  Pulse:  [66-86] 77  Resp:  [19-39] 27  BP: ()/(51-60) 107/51  SpO2:  [89 %-99 %] 98 %      Intake/Output Summary (Last 24 hours) at 5/6/2021 1359  Last data filed at 5/6/2021 1200  Gross per 24 hour   Intake 1847.26 ml   Output 1940 ml   Net -92.74 ml        Review of Systems   Unable to perform ROS: Intubated       PHYSICAL EXAM:  Vitals:    05/06/21 1055 05/06/21 1100 05/06/21 1200 05/06/21 1300   BP:   107/51    Pulse: 84 82 78 77   Resp: (!) 24 (!) 30 (!) 26 (!) 27   Temp:   36.4 °C (97.5 °F)    TempSrc:   Temporal    SpO2: 98% 95% 98% 98%   Weight:       Height:        Body mass index is 31.17 kg/m².    O2 therapy: Pulse Oximetry: 98 %, O2 Delivery Device: Ventilator    Date 05/06/21 0700 - 05/07/21 0659   Shift [id number] 9904-0591 5121-0659 24 Hour Total   INTAKE   I.V. 23.2   23.2     Propofol Volume 23.2    23.2   Other 180   180     Medications (PO/Enteral Liquids) 120   120     Flush / Irrigation Volume (Catheter Flush) 60   60   NG/   360     Intake (mL) (Enteral Tube 05/03/21 Cortrak - Gastric) 360   360   IV Piggyback 200   200     Volume (mL) (ampicillin (OMNIPEN) 2,000 mg in  mL IVPB) 200   200   Shift Total 763.2   763.2   OUTPUT   Urine 500   500     Output (mL) (Urethral Catheter Other (Comment) 16 Fr.) 500   500   Stool         Number of Times Stooled 1 x   1 x   Shift Total 500   500   .2   263.2        Physical Exam   Constitutional:   Intubated    HENT:   Head: Normocephalic and atraumatic.   Eyes: Pupils are equal, round, and reactive to light. Conjunctivae are normal.   Cardiovascular: Normal rate, regular rhythm and normal heart sounds.   Pulmonary/Chest: Effort normal and breath sounds normal.   Abdominal: Soft. He exhibits no distension.   Musculoskeletal:      Cervical back: Normal range of motion.   Neurological:   Moves head spontaneously  Unable to assess full neuro exam   Skin: Skin is warm.       Recent Labs     05/04/21  0611 05/05/21  0134 05/06/21  0409   ISTATAPH 7.355* 7.421 7.438   ISTATAPCO2 58.6* 49.7* 46.5*   ISTATAPO2 65 74 65   ISTATATCO2 35* 34* 33   [id number] 91* 95 93   ISTATARTHCO3 32.8* 32.3* 31.4*   ISTATARTBE 6* 7* 6*   ISTATTEMP 97.7 F 97.0 F 97.5 F   ISTATFIO2 100 90 90   ISTATSPEC Arterial Arterial Arterial   ISTATAPHTC 7.363* 7.434 7.447   ALOIJNWQ2PN 63* 70 62*     Recent Labs     05/04/21  0230 05/05/21  0517 05/06/21  0435   SODIUM 139 137 139   POTASSIUM 5.3 4.8 4.8   CHLORIDE 105 103 104   CO2 28 30 31   BUN 38* 38* 37*   CREATININE 0.58 0.47* 0.58   MAGNESIUM 2.1 2.2 2.2   CALCIUM 7.8* 8.0* 7.9*     Recent Labs     05/04/21  0230 05/05/21  0517 05/06/21  0435   ALTSGPT 199* 169* 178*   ASTSGOT 287* 112* 135*   ALKPHOSPHAT 237* 205* 216*   TBILIRUBIN 0.3 0.3 0.2   GLUCOSE 247* 253* 239*     Recent Labs     05/04/21  0230 05/05/21  0517  05/06/21  0435   RBC 3.59* 3.27* 3.21*   HEMOGLOBIN 10.8* 10.0* 9.8*   HEMATOCRIT 34.3* 31.1* 30.9*   PLATELETCT 378 355 333     Recent Labs     05/04/21  0230 05/05/21  0517 05/06/21  0435   WBC 19.0* 15.8* 15.7*   NEUTSPOLYS 83.00* 81.60* 83.30*   LYMPHOCYTES 9.00* 10.20* 9.70*   MONOCYTES 6.60 6.80 5.80   EOSINOPHILS 0.10 0.30 0.30   BASOPHILS 0.10 0.10 0.10   ASTSGOT 287* 112* 135*   ALTSGPT 199* 169* 178*   ALKPHOSPHAT 237* 205* 216*   TBILIRUBIN 0.3 0.3 0.2       Meds:  • insulin glargine  46 Units     • ampicillin  2,000 mg Stopped (05/06/21 1400)   • insulin regular  3-14 Units      And   • dextrose 50%  50 mL     • enoxaparin (LOVENOX) injection  40 mg     • norepinephrine (Levophed) infusion  0-30 mcg/min Stopped (05/04/21 1134)   • propofol  0-80 mcg/kg/min Stopped (05/06/21 0745)   • aspirin  324 mg     • acetylcysteine  3-4 mL     • albuterol  2.5 mg     • MD Alert...Adult ICU Electrolyte Replacement per Pharmacy       • lidocaine  1-2 mL     • magnesium hydroxide  30 mL     • ondansetron  4 mg     • oxyCODONE immediate-release  10 mg     • polyethylene glycol/lytes  1 Packet     • promethazine  12.5-25 mg     • senna-docusate  1 tablet     • MD ALERT...DO NOT ADMINISTER NSAIDS or ASPIRIN unless ORDERED By Neurosurgery  1 Each     • bisacodyl  10 mg     • fleet  1 Each     • Respiratory Therapy Consult       • ondansetron  4 mg     • promethazine  12.5-25 mg     • prochlorperazine  5-10 mg     • ipratropium-albuterol  3 mL     • labetalol  10 mg     • Pharmacy  1 Each     • [Held by provider] acetaminophen  1,000 mg     • ARIPiprazole  15 mg     • carBAMazepine  200 mg     • cyanocobalamin  1,000 mcg     • senna-docusate  1 tablet     • vitamin D  5,000 Units     • docusate sodium  100 mg     • famotidine  20 mg      Or   • famotidine  20 mg     • fentaNYL  50 mcg      And   • fentaNYL  100 mcg     • lidocaine  3 Patch          Procedures:  Bronchoscopy 05/06/21  Bronchoscopy on 05/03  -Decompressive  laminectomy on 4/28  -Central line placement on 5/01    Imaging:  US-EXTREMITY ARTERY LOWER UNILAT RIGHT   Final Result      DX-ABDOMEN FOR TUBE PLACEMENT   Final Result      Feeding tube tip projects over the distal stomach or first portion of the duodenum.      CT-CSPINE WITHOUT PLUS RECONS   Final Result      1.  Postsurgical changes C3-T1 laminectomies.   2.  New irregular linear and mottled lucency in the anterior C5 vertebral body which is likely related to osteomyelitis.   3.  Prevertebral soft tissue swelling.   4.  Extensive known extensive epidural abscess seen on prior MRI is not adequately evaluated on CT.      CT-CTA CHEST PULMONARY ARTERY W/ RECONS   Final Result      1.  No evidence of pulmonary embolism.      2.  Bilateral lower lobe atelectasis or pneumonia, left greater than right.      3.  Small left pleural effusion.      4.  CABG changes.            DX-CHEST-PORTABLE (1 VIEW)   Final Result      Endotracheal tube terminates approximately 2.5 cm above the you.      Atelectasis with left lung aeration. No pleural effusion or pneumothorax.      [id number] VIEW   Final Result      No dilated bowel      DX-ABDOMEN FOR TUBE PLACEMENT   Final Result      Feeding tube placement with the tip projecting over the distal stomach or duodenal bulb      EC-CHRISTOPHER W/O CONT         CT-HEAD W/O   Final Result         1.  Low-density changes in the right cerebellar hemisphere, compatible with evolving infarct, streak artifacts minimally limits evaluation of the posterior fossa for subtle subarachnoid hemorrhage, no intracranial hemorrhage is readily identified.   2.  Atherosclerosis.      DX-CHEST-PORTABLE (1 VIEW)   Final Result         1.  No acute cardiopulmonary disease.      DX-CHEST-PORTABLE (1 VIEW)   Final Result      Mild left basilar opacity may represent atelectasis. Infection not excluded.      Improved aeration of the left lung.      Atherosclerotic plaque.         EC-ECHOCARDIOGRAM COMPLETE W/O  CONT   Final Result      IR-DRAIN-BLADDER SUPRAPUB W/CATH   Final Result      1.     Ultrasound and fluoroscopic guided placement of a 16 [place] tip silicone suprapubic bladder catheter.      2.     Cystogram documenting catheter placement.         MR-BRAIN-W/O   Final Result      Acute large right cerebellar posterior inferior cerebellar artery territory infarct with possible small amount of petechial hemorrhage.      MR-MRA HEAD-W/O   Final Result      Findings suggesting right vertebral artery occlusion.      MR-MRA NECK-WITH & W/O AND SEQUENCES   Final Result      1.  Small caliber right vertebral artery which is not visualized on the noncontrast time-of-flight technique could be related to retrograde flow or diminutive caliber and sluggish flow.   2.  Possible moderate focal stenosis in the mid cervical left vertebral artery.   3.  No significant carotid stenosis.      DX-ABDOMEN FOR TUBE PLACEMENT   Final Result         Feeding tube with tip projecting over the expected area of the distal stomach.      DX-CHEST-PORTABLE (1 VIEW)   Final Result      1.  Worsening consolidation of LEFT hemithorax with shift of mediastinal structures to the LEFT suggesting atelectasis, possibly due to endobronchial process.   2.  Supportive tubing as described above.   3.  No pneumothorax.      DX-CHEST-PORTABLE (1 VIEW)   Final Result         Endotracheal tube with tip projecting over the mid thoracic trachea.      Layering left pleural effusion with left lower lung opacity.      DX-SPINE-ANY ONE VIEW   Final Result      Digitized intraoperative radiograph is submitted for review.  This examination is not for diagnostic purpose but for guidance during a surgical procedure.      DX-PORTABLE FLUOROSCOPY < 1 HOUR   Final Result      Portable fluoroscopy utilized for 2 seconds.         INTERPRETING LOCATION: [address] [place] [address], [facility]      CT-HEAD W/O   Final Result         1.  Low-density in the region of the right  cerebellar hemisphere, appearance compatible with evolving infarct.      These findings were discussed with the patient's on-call clinician, Deniz, on 4/28/2021 6:14 AM.      MR-THORACIC SPINE-WITH & W/O   Final Result      1.  Posterior epidural fluid collection/abscess within the lower cervical spine extending inferiorly to about the T6 level which could represent epidural abscess and/or hematoma. This measures 8 mm in maximal thickness at T2-T3 with at least moderate    thecal sac stenosis.   2.  Abnormal signal within the cervical cord and upper thoracic cord suggesting cord edema or infectious/inflammatory etiology.   3.  Lower cervical spine findings are detailed on earlier report.   4.  Prominent enhancement along the surface of the mid and lower thoracic cord is of uncertain etiology and as detailed above, possibly representing leptomeningeal disease/infection. See details above.   These findings were discussed with Dr. Cano on 4/28/2021 10:01 AM.         MR-LUMBAR SPINE-WITH & W/O   Final Result      No evidence of lumbar spine infection or epidural abscess.      Mild spondylosis as detailed above without spinal stenosis.      DX-CHEST-PORTABLE (1 VIEW)   Final Result      1.  Hypoinflation with left basilar atelectasis.   2.  Mild perihilar interstitial prominence may be related to hypoinflation or edema.      MR-CERVICAL SPINE-WITH & W/O   Final Result      1.  There is multiseptated abnormal peripherally enhancing epidural collection noted extending from C2 to the visualized lower thoracic level. Largest collection is noted in the upper thoracic posterior epidural space at the levels of T2 and T3. The    lower extent of the collection is not imaged. This is causing multilevel effacement of the thecal sac and cord compression. The thoracic spinal cord is anteriorly displaced and compressed at the levels of T2 and T3. Pre and postcontrast MR examination of    the thoracic spine is recommended for  further evaluation.   2.  There is effacement of the cervical thecal sac due to the multiseptated epidural fluid collection.   3.  The cervical spinal cord is mildly enlarged with minimal increased T2 signal intensity. The possibility of cord inflammation cannot be entirely excluded.   4.  Abnormal T2 hyperintensity at C5-6 disc space likely representing discitis.   5.  Abnormal bone marrow edema of C5, C6 and C7 vertebral bodies with edema concerning for osteomyelitis.   6.  There is also focal enhancement of C6 vertebral body likely secondary to the osteomyelitis.   7.  Prevertebral edema/fluid collection.   8.  Nonvisualization of flow void of bilateral vertebral arteries concerning for age-indeterminate bilateral vertebral occlusions.         CT-CHEST (THORAX) WITH   Final Result      No displaced rib fracture is identified.      No acute cardiopulmonary process is seen.      Atherosclerotic plaque including coronary artery calcification.      CT-TSPINE W/O PLUS RECONS   Final Result      No CT evidence of acute traumatic abnormality.      Mild T6/7 anterior wedging compatible with healed mild chronic compression fracture and there is interbody fusion.      CT-LSPINE W/O PLUS RECONS   Final Result      No CT evidence of acute traumatic injury.      CT-CSPINE WITHOUT PLUS RECONS   Final Result      Multilevel degenerative changes.      Prevertebral edema. Further evaluation with MRI is recommended as this can be seen in the setting of ligamentous injury.      Bilateral carotid atherosclerotic plaque.             ASSESSEMENT and PLAN:    * Spinal epidural abscess- (present on admission)  Assessment & Plan  -MRI C/T-spine with abnormal peripherally enhancing epidural collection extending from C2 to visualize lower thoracic 11.  Largest collection in the upper thoracic posterior epidural space at the level of T2-T3.  Lower extent of the collection is causing multilevel effacement of the thecal sac and cord  compression.    -Underwent emergent laminectomy of C3-C4 C5-C6, C6/C7, T1-T2 laminectomy, decompression of thecal sac and nerves by Dr. Hazel 4/28/2021.   -Epidural drain removed on 5/03  -Goal blood pressure systolic of less than 160  -Palliative care consulted for goals of care discussion        History of ischemic stroke  Assessment & Plan  -Found to have acute change in mental status 4/27/2021.   -CT head w/o concerning for evolving right cerebellar infarct.   -MRA brain, MRA neck: Findings suggesting right vertebral artery occlusion. Possible moderate focal stenosis in the mid cervical left vertebral artery.  TTE with bubble study: EF 60%, no evidence of right-to-left shunt, no valve lesions.  CHRISTOPHER negative  Goal euthermia, normotension, eunatremia  PT, OT, SLP evaluation as able  Neurology following  -Started on aspirin and DVT prophylaxis  -Hold statin currently given elevated LFTs    Sepsis due to Streptococcus species ([facility])- (present on admission)  Assessment & Plan  MRI C-T-spine with evidence of epidural abscess, discitis, vertebral osteomyelitis.  Underwent emergent laminectomy, decompression 4/28.  Blood culture 4/24 and cervical thoracic 4/28 : Growth of Streptococcus anginosus.   -Repeat blood cultures has been negative  -TTE did not show any concern of infective endocarditis  -CHRISTOPHER negative  -Ceftriaxone changed to ampicillin on 5/06, total duration of 6 weeks last date would be 06/13  -Bronchoscopy with BAL 05/06/21, c/s pending  -ID on board    Cardiac arrest ([facility])  Assessment & Plan  -Patient had cardiac arrest on 05/03, ROSC achieved in 4 mins after CPR and  Epinephrine  -Most likely secondary to aspiration, bed side bronchoscopy showed multiple mucous plugs   -Re intubated again     Thrombocytopenia (HCC)- (present on admission)  Assessment & Plan  -Resolved  -Plt 47 on 4/26. This is likely due to sepsis, though lower on the differential includes previously undiagnosed infection.  Hep panel  negative. HIV non reactive    Acute bilateral back pain- (present on admission)  Assessment & Plan  -Presented with worsening neck and upper back pain  -Secondary to epidural abscess and cord compression  -S/p laminectomy and currently on IV antibiotics  -PT/ OT once stable    Type 2 diabetes mellitus without complication, without long-term current use of insulin (HCC)- (present on admission)  Assessment & Plan  -Chronic history of diabetes  -Last HbA1c is 12.2 from 4/2021  -Continue Lantus  along with high intensity sliding scale, will adjust insulin regimen based on blood glucose levels  -Hypoglycemia protocol     Essential hypertension, benign- (present on admission)  Assessment & Plan  -Noncompliance issues  -Blood pressure meds on hold given hypotension    Coronary artery disease due to calcified coronary lesion: CABG x4, July 2015- (present on admission)  Assessment & Plan  -Chronic   -Continue aspirin, statin on hold given elevated LFTs     Status post urethrostomy (HCC)- (present on admission)  Assessment & Plan  -History of perineal urethrostomy 2018  -Acute urinary retention post surgical intervention 4/28  -Unable to place Gomez catheter through urethrostomy.    -Suprapubic catheter placement by interventional radiology    Hyponatremia- (present on admission)  Assessment & Plan  Resolved    Difficulty swallowing- (present on admission)  Assessment & Plan  -Presented with difficulty swallowing, is coughing and vomiting when swallowing large/hard foods     -SLP evaluation post extubation  -Aspiration and seizure precautions  -Currently on tube feeds      DISPO: ICU    CODE STATUS: Full code    Quality Measures:  Feeding: Tube feeds  Analgesia: None  Sedation: Propofol  Thromboprophylaxis: Lovenox  Head of bed: >30 degrees  Ulcer prophylaxis: Famotidine  Glycemic control: Lantus and sliding scale  Bowel care: bowel regimen  Indwelling lines: Right IJ and peripheral line  Deescalation of antibiotics:  Ceftriaxone -->ampicillin      Delroy Fulton M.D.

## 2021-05-06 NOTE — CARE PLAN
Problem: Communication  Goal: The ability to communicate needs accurately and effectively will improve  Outcome: PROGRESSING SLOWER THAN EXPECTED  Intervention: Reorient patient to environment as needed  Flowsheets (Taken 4/30/2021 2223)  Oriented to:: All of the Following : Location of Bathroom, Visiting Policy, Unit Routine, Call Light and Bedside Controls, Bedside Rail Policy, Smoking Policy, Rights and Responsibilities, Bedside Report, and Patient Education Notebook  Note: Interventions explained as they are being done. Pt seems anxious at times. Emotional support and reorientation provided     Problem: Respiratory:  Goal: Respiratory status will improve  Outcome: PROGRESSING SLOWER THAN EXPECTED  Intervention: Collaborate with respiratory therapist and Interdisciplinary Team on treatment measures to improve respiratory function  Note: RT attempted to titrate FiO2. Desats to the 80s. Vent setting changed per Dr. Kelley. Propofol increased due to tachypnea and noncompliance with ventilator. Lung sounds- crackles/dim. Minimum secretions produced from suction.

## 2021-05-07 ENCOUNTER — APPOINTMENT (OUTPATIENT)
Dept: RADIOLOGY | Facility: MEDICAL CENTER | Age: 61
DRG: 003 | End: 2021-05-07
Attending: PSYCHIATRY & NEUROLOGY
Payer: MEDICARE

## 2021-05-07 LAB
ALBUMIN SERPL BCP-MCNC: 1.7 G/DL (ref 3.2–4.9)
ALBUMIN/GLOB SERPL: 0.4 G/DL
ALP SERPL-CCNC: 351 U/L (ref 30–99)
ALT SERPL-CCNC: 272 U/L (ref 2–50)
ANION GAP SERPL CALC-SCNC: 4 MMOL/L (ref 7–16)
AST SERPL-CCNC: 325 U/L (ref 12–45)
BASE EXCESS BLDA CALC-SCNC: 9 MMOL/L (ref -4–3)
BASOPHILS # BLD AUTO: 0 % (ref 0–1.8)
BASOPHILS # BLD: 0 K/UL (ref 0–0.12)
BILIRUB SERPL-MCNC: 0.5 MG/DL (ref 0.1–1.5)
BODY TEMPERATURE: ABNORMAL DEGREES
BREATHS SETTING VENT: 24
BUN SERPL-MCNC: 37 MG/DL (ref 8–22)
CALCIUM SERPL-MCNC: 8.1 MG/DL (ref 8.5–10.5)
CHLORIDE SERPL-SCNC: 103 MMOL/L (ref 96–112)
CO2 BLDA-SCNC: 34 MMOL/L (ref 20–33)
CO2 SERPL-SCNC: 32 MMOL/L (ref 20–33)
CREAT SERPL-MCNC: 0.55 MG/DL (ref 0.5–1.4)
DELSYS IDSYS: ABNORMAL
END TIDAL CARBON DIOXIDE IECO2: 33 MMHG
EOSINOPHIL # BLD AUTO: 0.08 K/UL (ref 0–0.51)
EOSINOPHIL NFR BLD: 0.7 % (ref 0–6.9)
ERYTHROCYTE [DISTWIDTH] IN BLOOD BY AUTOMATED COUNT: 53.6 FL (ref 35.9–50)
GLOBULIN SER CALC-MCNC: 4.1 G/DL (ref 1.9–3.5)
GLUCOSE BLD-MCNC: 171 MG/DL (ref 65–99)
GLUCOSE BLD-MCNC: 173 MG/DL (ref 65–99)
GLUCOSE BLD-MCNC: 181 MG/DL (ref 65–99)
GLUCOSE BLD-MCNC: 210 MG/DL (ref 65–99)
GLUCOSE BLD-MCNC: 211 MG/DL (ref 65–99)
GLUCOSE SERPL-MCNC: 181 MG/DL (ref 65–99)
HCO3 BLDA-SCNC: 33.1 MMOL/L (ref 17–25)
HCT VFR BLD AUTO: 30.5 % (ref 42–52)
HGB BLD-MCNC: 9.4 G/DL (ref 14–18)
HOROWITZ INDEX BLDA+IHG-RTO: 91 MM[HG]
IMM GRANULOCYTES # BLD AUTO: 0.13 K/UL (ref 0–0.11)
IMM GRANULOCYTES NFR BLD AUTO: 1.1 % (ref 0–0.9)
LIPASE SERPL-CCNC: 18 U/L (ref 11–82)
LYMPHOCYTES # BLD AUTO: 1.46 K/UL (ref 1–4.8)
LYMPHOCYTES NFR BLD: 12 % (ref 22–41)
MAGNESIUM SERPL-MCNC: 2.2 MG/DL (ref 1.5–2.5)
MCH RBC QN AUTO: 30.6 PG (ref 27–33)
MCHC RBC AUTO-ENTMCNC: 30.8 G/DL (ref 33.7–35.3)
MCV RBC AUTO: 99.3 FL (ref 81.4–97.8)
MODE IMODE: ABNORMAL
MONOCYTES # BLD AUTO: 0.69 K/UL (ref 0–0.85)
MONOCYTES NFR BLD AUTO: 5.7 % (ref 0–13.4)
NEUTROPHILS # BLD AUTO: 9.82 K/UL (ref 1.82–7.42)
NEUTROPHILS NFR BLD: 80.5 % (ref 44–72)
NRBC # BLD AUTO: 0 K/UL
NRBC BLD-RTO: 0 /100 WBC
O2/TOTAL GAS SETTING VFR VENT: 70 %
PCO2 BLDA: 43.4 MMHG (ref 26–37)
PCO2 TEMP ADJ BLDA: 43.2 MMHG (ref 26–37)
PEAK INSPIRATORY PRESSURE IPIP: 20 CMH20
PEEP END EXPIRATORY PRESSURE IPEEP: 14 CMH20
PH BLDA: 7.49 [PH] (ref 7.4–7.5)
PH TEMP ADJ BLDA: 7.49 [PH] (ref 7.4–7.5)
PLATELET # BLD AUTO: 346 K/UL (ref 164–446)
PMV BLD AUTO: 11.9 FL (ref 9–12.9)
PO2 BLDA: 64 MMHG (ref 64–87)
PO2 TEMP ADJ BLDA: 63 MMHG (ref 64–87)
POTASSIUM SERPL-SCNC: 4.3 MMOL/L (ref 3.6–5.5)
PROT SERPL-MCNC: 5.8 G/DL (ref 6–8.2)
RBC # BLD AUTO: 3.07 M/UL (ref 4.7–6.1)
SAO2 % BLDA: 93 % (ref 93–99)
SODIUM SERPL-SCNC: 139 MMOL/L (ref 135–145)
SPECIMEN DRAWN FROM PATIENT: ABNORMAL
TRIGL SERPL-MCNC: 68 MG/DL (ref 0–149)
WBC # BLD AUTO: 12.2 K/UL (ref 4.8–10.8)

## 2021-05-07 PROCEDURE — 700105 HCHG RX REV CODE 258: Performed by: INTERNAL MEDICINE

## 2021-05-07 PROCEDURE — 700111 HCHG RX REV CODE 636 W/ 250 OVERRIDE (IP): Performed by: STUDENT IN AN ORGANIZED HEALTH CARE EDUCATION/TRAINING PROGRAM

## 2021-05-07 PROCEDURE — A9270 NON-COVERED ITEM OR SERVICE: HCPCS | Performed by: STUDENT IN AN ORGANIZED HEALTH CARE EDUCATION/TRAINING PROGRAM

## 2021-05-07 PROCEDURE — 700101 HCHG RX REV CODE 250: Performed by: STUDENT IN AN ORGANIZED HEALTH CARE EDUCATION/TRAINING PROGRAM

## 2021-05-07 PROCEDURE — 99233 SBSQ HOSP IP/OBS HIGH 50: CPT | Performed by: INTERNAL MEDICINE

## 2021-05-07 PROCEDURE — 84478 ASSAY OF TRIGLYCERIDES: CPT

## 2021-05-07 PROCEDURE — A9270 NON-COVERED ITEM OR SERVICE: HCPCS | Performed by: PSYCHIATRY & NEUROLOGY

## 2021-05-07 PROCEDURE — 83690 ASSAY OF LIPASE: CPT

## 2021-05-07 PROCEDURE — 700101 HCHG RX REV CODE 250: Performed by: INTERNAL MEDICINE

## 2021-05-07 PROCEDURE — 700102 HCHG RX REV CODE 250 W/ 637 OVERRIDE(OP): Performed by: INTERNAL MEDICINE

## 2021-05-07 PROCEDURE — 80053 COMPREHEN METABOLIC PANEL: CPT

## 2021-05-07 PROCEDURE — 770022 HCHG ROOM/CARE - ICU (200)

## 2021-05-07 PROCEDURE — 82803 BLOOD GASES ANY COMBINATION: CPT

## 2021-05-07 PROCEDURE — 85025 COMPLETE CBC W/AUTO DIFF WBC: CPT

## 2021-05-07 PROCEDURE — 82962 GLUCOSE BLOOD TEST: CPT

## 2021-05-07 PROCEDURE — 700102 HCHG RX REV CODE 250 W/ 637 OVERRIDE(OP): Performed by: STUDENT IN AN ORGANIZED HEALTH CARE EDUCATION/TRAINING PROGRAM

## 2021-05-07 PROCEDURE — L4398 FOOT DROP SPLINT PRE OTS: HCPCS

## 2021-05-07 PROCEDURE — 700102 HCHG RX REV CODE 250 W/ 637 OVERRIDE(OP): Performed by: PSYCHIATRY & NEUROLOGY

## 2021-05-07 PROCEDURE — 71045 X-RAY EXAM CHEST 1 VIEW: CPT

## 2021-05-07 PROCEDURE — 99291 CRITICAL CARE FIRST HOUR: CPT | Mod: GC | Performed by: EMERGENCY MEDICINE

## 2021-05-07 PROCEDURE — 83735 ASSAY OF MAGNESIUM: CPT

## 2021-05-07 PROCEDURE — 94003 VENT MGMT INPAT SUBQ DAY: CPT

## 2021-05-07 PROCEDURE — 94799 UNLISTED PULMONARY SVC/PX: CPT

## 2021-05-07 PROCEDURE — 87040 BLOOD CULTURE FOR BACTERIA: CPT

## 2021-05-07 PROCEDURE — 700111 HCHG RX REV CODE 636 W/ 250 OVERRIDE (IP): Performed by: INTERNAL MEDICINE

## 2021-05-07 PROCEDURE — A9270 NON-COVERED ITEM OR SERVICE: HCPCS | Performed by: INTERNAL MEDICINE

## 2021-05-07 RX ORDER — ACETAMINOPHEN 325 MG/1
650 TABLET ORAL EVERY 4 HOURS PRN
Status: DISCONTINUED | OUTPATIENT
Start: 2021-05-07 | End: 2021-05-07

## 2021-05-07 RX ADMIN — INSULIN HUMAN 4 UNITS: 100 INJECTION, SOLUTION PARENTERAL at 12:03

## 2021-05-07 RX ADMIN — CYANOCOBALAMIN TAB 500 MCG 1000 MCG: 500 TAB at 05:11

## 2021-05-07 RX ADMIN — FAMOTIDINE 20 MG: 20 TABLET ORAL at 17:56

## 2021-05-07 RX ADMIN — AMPICILLIN SODIUM 2000 MG: 2 INJECTION, POWDER, FOR SOLUTION INTRAMUSCULAR; INTRAVENOUS at 03:08

## 2021-05-07 RX ADMIN — CARBAMAZEPINE 200 MG: 200 TABLET ORAL at 08:48

## 2021-05-07 RX ADMIN — CYCLOBENZAPRINE HYDROCHLORIDE 5 MG: 5 TABLET, FILM COATED ORAL at 00:02

## 2021-05-07 RX ADMIN — DOCUSATE SODIUM 100 MG: 50 LIQUID ORAL at 05:11

## 2021-05-07 RX ADMIN — INSULIN HUMAN 4 UNITS: 100 INJECTION, SOLUTION PARENTERAL at 18:01

## 2021-05-07 RX ADMIN — AMPICILLIN SODIUM 2000 MG: 2 INJECTION, POWDER, FOR SOLUTION INTRAMUSCULAR; INTRAVENOUS at 10:05

## 2021-05-07 RX ADMIN — AMPICILLIN SODIUM 2000 MG: 2 INJECTION, POWDER, FOR SOLUTION INTRAMUSCULAR; INTRAVENOUS at 21:13

## 2021-05-07 RX ADMIN — OXYCODONE HYDROCHLORIDE 10 MG: 10 TABLET ORAL at 05:17

## 2021-05-07 RX ADMIN — LIDOCAINE 3 PATCH: 50 PATCH TOPICAL at 17:57

## 2021-05-07 RX ADMIN — LABETALOL HYDROCHLORIDE 10 MG: 5 INJECTION, SOLUTION INTRAVENOUS at 14:08

## 2021-05-07 RX ADMIN — INSULIN GLARGINE 46 UNITS: 100 INJECTION, SOLUTION SUBCUTANEOUS at 18:03

## 2021-05-07 RX ADMIN — CARBAMAZEPINE 200 MG: 200 TABLET ORAL at 00:02

## 2021-05-07 RX ADMIN — INSULIN HUMAN 3 UNITS: 100 INJECTION, SOLUTION PARENTERAL at 00:02

## 2021-05-07 RX ADMIN — FAMOTIDINE 20 MG: 10 INJECTION INTRAVENOUS at 05:10

## 2021-05-07 RX ADMIN — DOCUSATE SODIUM 50 MG AND SENNOSIDES 8.6 MG 1 TABLET: 8.6; 5 TABLET, FILM COATED ORAL at 21:13

## 2021-05-07 RX ADMIN — INSULIN HUMAN 3 UNITS: 100 INJECTION, SOLUTION PARENTERAL at 23:17

## 2021-05-07 RX ADMIN — DOCUSATE SODIUM 100 MG: 50 LIQUID ORAL at 17:57

## 2021-05-07 RX ADMIN — ARIPIPRAZOLE 15 MG: 10 TABLET ORAL at 17:56

## 2021-05-07 RX ADMIN — CARBAMAZEPINE 200 MG: 200 TABLET ORAL at 17:56

## 2021-05-07 RX ADMIN — AMPICILLIN SODIUM 2000 MG: 2 INJECTION, POWDER, FOR SOLUTION INTRAMUSCULAR; INTRAVENOUS at 05:12

## 2021-05-07 RX ADMIN — INSULIN HUMAN 3 UNITS: 100 INJECTION, SOLUTION PARENTERAL at 05:37

## 2021-05-07 RX ADMIN — Medication 5000 UNITS: at 05:10

## 2021-05-07 RX ADMIN — ARIPIPRAZOLE 15 MG: 10 TABLET ORAL at 05:10

## 2021-05-07 RX ADMIN — AMPICILLIN SODIUM 2000 MG: 2 INJECTION, POWDER, FOR SOLUTION INTRAMUSCULAR; INTRAVENOUS at 14:27

## 2021-05-07 RX ADMIN — ENOXAPARIN SODIUM 40 MG: 40 INJECTION SUBCUTANEOUS at 05:11

## 2021-05-07 RX ADMIN — ASPIRIN 324 MG: 81 TABLET, CHEWABLE ORAL at 05:10

## 2021-05-07 ASSESSMENT — ENCOUNTER SYMPTOMS: FEVER: 0

## 2021-05-07 NOTE — PROGRESS NOTES
Infectious Disease Progress Note    Author: La Nena Khan M.D. Date & Time of service: 2021  11:41 AM    Chief Complaint:  Sepsis and cervical abscess  CVA     Interval History:  60 y.o.  admitted 2021. Pt has a past medical history of uncontrolled diabetes, CAD status post CABG times 2015, marijuana use and to arthritis.  Presented complaining of neck and back pain ongoing for approximately 1 week and fall getting out of the bathtub.  He had been seen at urgent care for back pain approximately 1 week prior to admission and given Toradol injections.     AF, O2 Vent 12/70%, pressors still on the trending down, pt continues to be agitated, not moving any extremities and concern for quadriplegia due to CVA.    AF, O2 Vent 8/40%, pressors off, agitated, without meaningful limb movement.  Decreasing vent requirements and no longer in shock.   AF, O2 Vent 8/40%, stable on low vent settings no significant secretions per nursing.  He continues to be agitated and with no upper or lower extremity movement.    5/3 AF WBC 14 remains intubated and sedated Agitation with decrease sedation. Epidural drain removed FiO2 0.4   AF WBC 19 code blue yesterday-mucus plugs found. S/p bronch this am-on 100%FiO2 On pressors   AF WBC 15.8 nurse noted some prox  movement in BUE (right greater than left) FiO2 0.9   AF WBC 15.7 s/p bronch again this am-thick secretion RML noted   Febrile 101.1 WBC 12.2 No new positive cultures opens eyes-not following commands FiO2 0.6    Review of Systems:  Review of Systems   Unable to perform ROS: Intubated   Constitutional: Negative for fever.   Genitourinary: Positive for hematuria.   Skin: Negative for rash.       Hemodynamics:  Temp (24hrs), Av.2 °C (98.9 °F), Min:36.4 °C (97.5 °F), Max:38.4 °C (101.1 °F)  Temperature: 37.8 °C (100 °F), Monitored Temp: 37.6 °C (99.7 °F)  Pulse  Av.3  Min: 57  Max: 121   Blood Pressure: 111/55, Arterial BP: 125/44            Physical Exam:  Physical Exam  Vitals and nursing note reviewed.   Constitutional:       Appearance: He is ill-appearing. He is not toxic-appearing or diaphoretic.      Comments: Opens eyes-   HENT:      Nose: No rhinorrhea.      Mouth/Throat:      Pharynx: No oropharyngeal exudate.   Eyes:      General: No scleral icterus.     Extraocular Movements: Extraocular movements intact.      Pupils: Pupils are equal, round, and reactive to light.   Neck:      Comments: Right IJ-no erythema  Cardiovascular:      Rate and Rhythm: Normal rate and regular rhythm.   Pulmonary:      Effort: Pulmonary effort is normal. No respiratory distress.      Breath sounds: No stridor.      Comments: Coarse breath sounds  Abdominal:      General: There is no distension.      Palpations: Abdomen is soft.      Tenderness: There is no abdominal tenderness. There is no guarding.   Genitourinary:     Comments: SP cath +bloody urine  Musculoskeletal:         General: No deformity.   Lymphadenopathy:      Cervical: No cervical adenopathy.   Skin:     General: Skin is warm.      Coloration: Skin is not jaundiced.      Findings: Bruising present.      Comments: tattoos   Neurological:      Comments:   quadriplegic         Meds:    Current Facility-Administered Medications:   •  insulin glargine  •  cyclobenzaprine  •  ampicillin  •  insulin regular **AND** POC blood glucose manual result **AND** NOTIFY MD and PharmD **AND** dextrose 50%  •  enoxaparin (LOVENOX) injection  •  norepinephrine (Levophed) infusion  •  propofol **AND** Triglycerides Starting now and then Every 3 Days  •  aspirin  •  acetylcysteine  •  albuterol  •  MD Alert...Adult ICU Electrolyte Replacement per Pharmacy  •  lidocaine  •  magnesium hydroxide  •  ondansetron  •  oxyCODONE immediate-release  •  polyethylene glycol/lytes  •  promethazine  •  senna-docusate  •  MD ALERT...DO NOT ADMINISTER NSAIDS or ASPIRIN unless ORDERED By Neurosurgery  •  bisacodyl  •  fleet  •   Respiratory Therapy Consult  •  ondansetron  •  promethazine  •  prochlorperazine  •  ipratropium-albuterol  •  labetalol  •  Pharmacy  •  ARIPiprazole  •  carBAMazepine  •  cyanocobalamin  •  senna-docusate  •  vitamin D  •  docusate sodium  •  famotidine **OR** famotidine  •  fentaNYL **AND** fentaNYL **AND** [DISCONTINUED] fentaNYL **AND** [DISCONTINUED] propofol **AND** Triglyceride  •  lidocaine    Labs:  Recent Labs     05/05/21 0517 05/06/21 0435 05/07/21  0315   WBC 15.8* 15.7* 12.2*   RBC 3.27* 3.21* 3.07*   HEMOGLOBIN 10.0* 9.8* 9.4*   HEMATOCRIT 31.1* 30.9* 30.5*   MCV 95.1 96.3 99.3*   MCH 30.6 30.5 30.6   RDW 49.3 50.6* 53.6*   PLATELETCT 355 333 346   MPV 12.2 12.4 11.9   NEUTSPOLYS 81.60* 83.30* 80.50*   LYMPHOCYTES 10.20* 9.70* 12.00*   MONOCYTES 6.80 5.80 5.70   EOSINOPHILS 0.30 0.30 0.70   BASOPHILS 0.10 0.10 0.00     Recent Labs     05/05/21 0517 05/06/21  0435 05/07/21  0315   SODIUM 137 139 139   POTASSIUM 4.8 4.8 4.3   CHLORIDE 103 104 103   CO2 30 31 32   GLUCOSE 253* 239* 181*   BUN 38* 37* 37*     Recent Labs     05/05/21 0517 05/06/21 0435 05/07/21  0315   ALBUMIN 2.0* 2.0* 1.7*   TBILIRUBIN 0.3 0.2 0.5   ALKPHOSPHAT 205* 216* 351*   TOTPROTEIN 5.8* 5.9* 5.8*   ALTSGPT 169* 178* 272*   ASTSGOT 112* 135* 325*   CREATININE 0.47* 0.58 0.55       Imaging:  CT-CSPINE WITHOUT PLUS RECONS    Result Date: 4/25/2021 4/25/2021 1:29 PM HISTORY/REASON FOR EXAM: History of back and neck pain. Fall. TECHNIQUE/EXAM DESCRIPTION: CT cervical spine without contrast, with reconstructions. Helical scanning was performed from the skull base through T1.  Sagittal and coronal multiplanar reconstructions were generated from the axial images. Low dose optimization technique was utilized for this CT exam including automated exposure control and adjustment of the mA and/or kV according to patient size. COMPARISON:  None available. FINDINGS: No compression fracture is identified. There is an ununited fracture of  the spinous process of T1. Intervertebral disc space narrowing and endplate spurring is most prominent at C6/C7. There are degenerative changes of the facet joints. C1/C2 alignment is maintained. There is multilevel uncovertebral spurring and neural foraminal narrowing. There is cervical prevertebral edema. There is carotid atherosclerotic plaque bilaterally. Visualized lung apices are clear.     Multilevel degenerative changes. Prevertebral edema. Further evaluation with MRI is recommended as this can be seen in the setting of ligamentous injury. Bilateral carotid atherosclerotic plaque.     CT-CHEST (THORAX) WITH    Result Date: 4/25/2021 4/25/2021 1:29 PM HISTORY/REASON FOR EXAM:  Rib fracture suspected, traumatic. TECHNIQUE/EXAM DESCRIPTION: CT scan of the chest with contrast. Helical images were obtained from the lung apices through the adrenal glands following the bolus administration of  80 mL of Omnipaque 350 nonionic contrast. Sagittal and coronal reconstructions were performed. Low dose optimization technique was utilized for this CT exam including automated exposure control and adjustment of the mA and/or kV according to patient size. COMPARISON:  None. FINDINGS: There is atherosclerotic plaque. There is coronary artery calcification. The heart is not enlarged. No pericardial or pleural effusion is seen. There are small mediastinal lymph nodes. There is no hilar or axillary lymphadenopathy. No pneumothorax or pulmonary contusion is seen. Central airways are patent. Limited views were obtained of the upper abdomen. Hypodensity along the falciform ligament likely represents focal fat. Patient is status post median sternotomy. Degenerative changes are seen in the spine. No displaced rib fracture is identified.     No displaced rib fracture is identified. No acute cardiopulmonary process is seen. Atherosclerotic plaque including coronary artery calcification.    CT-HEAD W/O    Result Date:  4/30/2021 4/30/2021 4:27 AM HISTORY/REASON FOR EXAM: Altered mental status; evaluate cerebellar stroke, if no blood present OK for neurology to start aspirin TECHNIQUE/EXAM DESCRIPTION:  CT of the head without contrast. Sequential axial images were obtained from the vertex to the skull base without contrast. Up to date radiation dose reduction adjustments have been utilized to meet ALARA standards for radiation dose reduction. COMPARISON: April 28, 2021 FINDINGS: There is mild diffuse parenchymal volume loss observed. Periventricular and subcortical white matter low-attenuation changes are seen, most commonly associated with small vessel ischemic disease. The ventricles are normal in caliber and configuration. Low-density changes in the right cerebellar hemisphere seen.. There are no abnormal extra axial fluid collections or extra axial hemorrhage identified. The visualized paranasal sinuses and mastoid air cells are well aerated bilaterally. No depressed calvarial fractures are identified. The visualized globes and retrobulbar soft tissues appear within normal limits.  Atherosclerotic intracranial calcifications are seen.     1.  Low-density changes in the right cerebellar hemisphere, compatible with evolving infarct, streak artifacts minimally limits evaluation of the posterior fossa for subtle subarachnoid hemorrhage, no intracranial hemorrhage is readily identified. 2.  Atherosclerosis.    CT-HEAD W/O    Result Date: 4/28/2021 4/28/2021 5:20 AM HISTORY/REASON FOR EXAM: Altered mental status TECHNIQUE/EXAM DESCRIPTION:  CT of the head without contrast. Sequential axial images were obtained from the vertex to the skull base without contrast. Up to date radiation dose reduction adjustments have been utilized to meet ALARA standards for radiation dose reduction. COMPARISON: None FINDINGS: There is moderate diffuse parenchymal volume loss observed. Periventricular and subcortical white matter low-attenuation  changes are seen, most commonly associated with small vessel ischemic disease. The ventricles are normal in caliber and configuration. Area of low-density within the right cerebellar hemisphere is seen. There are no abnormal extra axial fluid collections or extra axial hemorrhage identified. The visualized paranasal sinuses and mastoid air cells are well aerated bilaterally. No depressed calvarial fractures are identified. The visualized globes and retrobulbar soft tissues appear within normal limits.     1.  Low-density in the region of the right cerebellar hemisphere, appearance compatible with evolving infarct. These findings were discussed with the patient's on-call clinician, Deniz, on 4/28/2021 6:14 AM.    CT-LSPINE W/O PLUS RECONS    Result Date: 4/25/2021 4/25/2021 1:29 PM HISTORY/REASON FOR EXAM:  Back pain after injury. TECHNIQUE/EXAM DESCRIPTION AND NUMBER OF VIEWS: CT lumbar spine without contrast, with reconstructions. The study was performed on a [employer] CT scanner. Thin-section helical scanning was performed from T12-L1 to the sacrum. Sagittal and coronal multiplanar reconstructions were generated from the axial images. Low dose optimization technique was utilized for this CT exam including automated exposure control and adjustment of the mA and/or kV according to patient size. COMPARISON: None. FINDINGS: Alignment of the lumbar spine is normal. There is no acute fracture or subluxation. The prevertebral and paraspinous soft tissues show no acute abnormality. There is severe atherosclerosis with a mixture of soft and calcified plaque. There is lumbosacral junction vacuum disc phenomenon with endplate spurring, disc height loss The visualized sacrum and sacroiliac joints show no acute abnormality.     No CT evidence of acute traumatic injury.    CT-TSPINE W/O PLUS RECONS    Result Date: 4/25/2021 4/25/2021 1:29 PM HISTORY/REASON FOR EXAM:  Mid-back trauma Pain following injury. TECHNIQUE/EXAM DESCRIPTION  AND NUMBER OF VIEWS: CT thoracic spine without contrast, with reconstructions. The study was performed on a GE CT scanner. Thin-section helical scanning was performed from C7-T1 through T12-L1. Sagittal and coronal multiplanar reconstructions were generated from the axial images. Low dose optimization technique was utilized for this CT exam including automated exposure control and adjustment of the mA and/or kV according to patient size. COMPARISON: None. FINDINGS: Alignment of the thoracic spine is normal. There is no acute displaced fracture. There is T6/7 interbody fusion with mild anterior wedging likely indicating remote mild compression fracture that has healed There is bulky endplate spurring with some bridging syndesmophytes in the mid thoracic spine Sternotomy wires The cervicothoracic junction appears intact. The prevertebral and paraspinous soft tissues show no acute abnormality.     No CT evidence of acute traumatic abnormality. Mild T6/7 anterior wedging compatible with healed mild chronic compression fracture and there is interbody fusion.    DX-CERVICAL SPINE-2 OR 3 VIEWS    Result Date: 4/22/2021 4/22/2021 6:16 PM HISTORY/REASON FOR EXAM:  Atraumatic Pain. Neck pain for 4 days. TECHNIQUE/EXAM DESCRIPTION AND NUMBER OF VIEWS: Cervical spine series, 2 views. COMPARISON:  None. FINDINGS: Mild reversal of curvature centered at the C5 level. Vertebral body heights are preserved. Multilevel loss of disc height and osteophyte formation. Cervicothoracic junction is obscured. Prevertebral soft tissues are within normal limits. Odontoid is grossly intact.  Lateral masses of C1 are grossly intact.     1.  Limited exam.  Cervicothoracic junction is obscured. 2.  No gross cervical spine fracture or subluxation. 3.  Moderate multilevel degenerative changes.    DX-CHEST-PORTABLE (1 VIEW)    Result Date: 4/30/2021 4/30/2021 1:07 AM HISTORY/REASON FOR EXAM: For indication of respiratory failure; For indication of  respiratory failure TECHNIQUE/EXAM DESCRIPTION:  Single AP view of the chest. COMPARISON: Yesterday FINDINGS: Position of medical devices appears stable. The cardiac silhouette appears within normal limits.  Postsurgical changes of sternotomy are noted. The mediastinal contour appears within normal limits.  The central pulmonary vasculature appears normal. Bilateral lung volumes are diminished.  Bilateral lungs are clear. No significant pleural effusions are identified. The bony structures appear age-appropriate.     1.  No acute cardiopulmonary disease.    DX-CHEST-PORTABLE (1 VIEW)    Result Date: 4/29/2021 4/29/2021 10:06 AM HISTORY/REASON FOR EXAM:  For indication of respiratory failure; For indication of respiratory failure. TECHNIQUE/EXAM DESCRIPTION AND NUMBER OF VIEWS: Single portable view of the chest. COMPARISON: 4/28/2021 FINDINGS: Endotracheal tube projects at the level the clavicular heads. Right central line projects over the SVC. Enteric tube passes below the level of the diaphragm. The heart is not enlarged. Atherosclerotic calcification is seen. There is mild left basilar opacity likely representing atelectasis. No pleural effusion or pneumothorax is seen. Skin staples and a surgical drain project over the cervical spine.     Mild left basilar opacity may represent atelectasis. Infection not excluded. Improved aeration of the left lung. Atherosclerotic plaque.     DX-CHEST-PORTABLE (1 VIEW)    Result Date: 4/28/2021 4/28/2021 12:10 PM HISTORY/REASON FOR EXAM:  CENTRAL LINE PLACEMENT; CENTRAL LINE PLACEMENT. TECHNIQUE/EXAM DESCRIPTION AND NUMBER OF VIEWS: Single portable view of the chest. COMPARISON: 4/28/2021 11:17 AM FINDINGS: Mediastinal structures are shifted to the LEFT.  Increasing opacification of LEFT hemithorax. RIGHT lung is clear. Endotracheal tube in place with tip approximately 5 cm above the you. Feeding tube tip in place however tip is off the image. RIGHT internal jugular  catheter tip at the lower SVC. No pneumothorax. Postoperative change from prior open heart surgery. Postoperative change of the neck with surgical drain present.     1.  Worsening consolidation of LEFT hemithorax with shift of mediastinal structures to the LEFT suggesting atelectasis, possibly due to endobronchial process. 2.  Supportive tubing as described above. 3.  No pneumothorax.    DX-CHEST-PORTABLE (1 VIEW)    Result Date: 4/28/2021 4/28/2021 11:16 AM HISTORY/REASON FOR EXAM:  replaced ETT; replaced ETT TECHNIQUE/EXAM DESCRIPTION AND NUMBER OF VIEWS: Single portable view of the chest. COMPARISON: 4/27/2021 FINDINGS: Endotracheal tube with tip projecting over the mid thoracic trachea. Hazy opacity in the left lower lobe Layering left pleural effusion. No pneumothorax. Stable cardiopericardial silhouette.     Endotracheal tube with tip projecting over the mid thoracic trachea. Layering left pleural effusion with left lower lung opacity.    DX-CHEST-PORTABLE (1 VIEW)    Result Date: 4/27/2021 4/27/2021 5:30 PM HISTORY/REASON FOR EXAM:  Shortness of Breath. TECHNIQUE/EXAM DESCRIPTION AND NUMBER OF VIEWS: Single portable view of the chest. COMPARISON: None. FINDINGS: LUNGS: Hypoinflation with left basilar atelectasis. Mild perihilar interstitial prominence. No effusions. PNEUMOTHORAX: None. LINES AND TUBES: None. MEDIASTINUM: No cardiomegaly. MUSCULOSKELETAL STRUCTURES: No acute displaced fracture. Median sternotomy.     1.  Hypoinflation with left basilar atelectasis. 2.  Mild perihilar interstitial prominence may be related to hypoinflation or edema.    DX-SPINE-ANY ONE VIEW    Result Date: 4/28/2021 4/28/2021 5:30 AM HISTORY/REASON FOR EXAM:  Cervical laminectomy TECHNIQUE/EXAM DESCRIPTION AND NUMBER OF VIEWS:  Single view of the spine. Digitized Intraoperative Radiograph FINDINGS: Fixation hardware projecting over the cervical spine Fluoroscopic time:2 seconds     Digitized intraoperative radiograph is  submitted for review.  This examination is not for diagnostic purpose but for guidance during a surgical procedure.    MR-BRAIN-W/O    Result Date: 4/28/2021 4/28/2021 3:31 PM HISTORY/REASON FOR EXAM:  Altered mental status; cerebellar stroke. TECHNIQUE/EXAM DESCRIPTION: MRI of the brain without contrast. T1 sagittal, T2 fast spin-echo axial, T1 coronal, FLAIR coronal, diffusion-weighted and apparent diffusion coefficient (ADC map) axial images were obtained of the whole brain. The study was performed on a [employer] 1.5 Britt MRI scanner. COMPARISON:  Head CT earlier in the day FINDINGS: Large acute right cerebellar infarct suggesting right posterior inferior cerebellar artery territory. There is prominent T2 hyperintensity consistent with cytotoxic edema. A small amount of blooming artifact in the medial aspect of the right cerebellar hemisphere on GRE images could represent a small amount of petechial hemorrhage. There is mild localized mass effect with some mild mass effect on the fourth ventricle. No supratentorial infarct or hemorrhage. No extra-axial fluid collection. No midline shift or hydrocephalus. There is a nasogastric tube and fluid within the nasopharynx. Endotracheal tube partially visualized. Mild posterior ethmoid sinus mucosal thickening.     Acute large right cerebellar posterior inferior cerebellar artery territory infarct with possible small amount of petechial hemorrhage.    MR-CERVICAL SPINE-WITH & W/O    Result Date: 4/27/2021 4/27/2021 1:02 PM HISTORY/REASON FOR EXAM:  Neck pain, abnormal neuro exam; Neck pain, initial exam; sepsis 2/2 strep bacteremia  -unknown source. rule out possible ?? retropharyngeal abscess, ??prevertebral abscess. TECHNIQUE/EXAM DESCRIPTION: MRI of the cervical spine without and with contrast. The study was performed on a [employer] 1.5 Britt MRI scanner. T1 sagittal, T2 fast spin-echo sagittal, and gradient echo axial images were obtained of the cervical spine.  T1 post-contrast fat suppressed sagittal images were obtained of the cervical spine. Optional T1 post-contrast axial images may be obtained. 15 mL ProHance contrast was administered intravenously. COMPARISON: None. FINDINGS: There is abnormal disc T2 hyperintensity at C5-6. Mild amount of bone marrow edema is noted at C5, C6 and C7. There is also focal bone marrow enhancement at C6. There is multiseptated abnormal peripherally enhancing epidural collection noted extending from C2 to the visualized lower thoracic level. Largest collection is noted in the upper thoracic posterior epidural space at the levels of T2 and T3. The lower extent of the collection is not imaged. This is causing multilevel effacement of the thecal sac and cord compression. The thoracic spinal cord is anteriorly displaced and compressed at the levels of T2 and T3. At the level of C2-3,there is small left anterior epidural fluid collection. At the level of C3-4,there is small left anterior epidural fluid collection causing indentation of the thecal sac. At the level of C4-5,there is minimal epidural fluid collection. At the level of C5-6,there is diffuse disc bulge along with right posterior lateral epidural fluid collection causing severe canal compromise. At the level of C6-7,there is mild diffuse disc bulge. There is epidural fluid collection causing severe canal compromise. At the level of C7-T1,there is epidural fluid collection causing severe canal compromise. The visualized brain parenchyma is unremarkable. The cervical spinal cord is mildly enlarged which demonstrates mild increased intramedullary T2 signal intensity. There is abnormal T2 hyperintensity in the visualized bilateral vertebral arteries concerning for age indeterminate occlusion. There is mild amount of edema in the pre and retropharyngeal soft tissue.     1.  There is multiseptated abnormal peripherally enhancing epidural collection noted extending from C2 to the visualized  lower thoracic level. Largest collection is noted in the upper thoracic posterior epidural space at the levels of T2 and T3. The lower extent of the collection is not imaged. This is causing multilevel effacement of the thecal sac and cord compression. The thoracic spinal cord is anteriorly displaced and compressed at the levels of T2 and T3. Pre and postcontrast MR examination of  the thoracic spine is recommended for further evaluation. 2.  There is effacement of the cervical thecal sac due to the multiseptated epidural fluid collection. 3.  The cervical spinal cord is mildly enlarged with minimal increased T2 signal intensity. The possibility of cord inflammation cannot be entirely excluded. 4.  Abnormal T2 hyperintensity at C5-6 disc space likely representing discitis. 5.  Abnormal bone marrow edema of C5, C6 and C7 vertebral bodies with edema concerning for osteomyelitis. 6.  There is also focal enhancement of C6 vertebral body likely secondary to the osteomyelitis. 7.  Prevertebral edema/fluid collection. 8.  Nonvisualization of flow void of bilateral vertebral arteries concerning for age-indeterminate bilateral vertebral occlusions.     MR-LUMBAR SPINE-WITH & W/O    Result Date: 4/28/2021 4/27/2021 11:23 PM HISTORY/REASON FOR EXAM:  Back pain or radiculopathy, cancer or infection suspected; Strep bacteremia, back pain, bilateral leg numbness TECHNIQUE/EXAM DESCRIPTION: MRI of the lumbar spine without and with contrast. The study was performed on a [employer] 1.5 Britt MRI scanner. T1 sagittal, T2 fast spin-echo sagittal, and T2 axial images were obtained of the lumbar spine. T1 postcontrast fat-suppressed sagittal images were obtained. 14 mL ProHance contrast was administered intravenously. COMPARISON:  None. FINDINGS: Normal lumbar lordosis. Preservation of vertebral body heights, alignment and bone marrow signal. No evidence of discitis osteomyelitis. Conus medullaris terminates at T12-L1 and is normal in  signal. No mass or fluid collection is seen within the lumbar spinal canal. T12-L1: Canal and foramina are patent. L1-L2: Mild disc bulge. Canal and foramina are patent. L2-L3: Minimal disc bulge and facet degeneration. Canal and foramina are patent. L3-L4: Minimal disc bulge and facet degeneration. Canal and foramina are patent.. L4-L5: Mild disc bulge and facet degeneration. No significant spinal stenosis. Mild foraminal narrowing. L5-S1: Disc narrowing, mild disc osteophyte. No significant spinal stenosis. Moderate right and mild-to-moderate left foraminal narrowing. Distended urinary bladder.     No evidence of lumbar spine infection or epidural abscess. Mild spondylosis as detailed above without spinal stenosis.    MR-MRA HEAD-W/O    Result Date: 4/28/2021 4/28/2021 3:31 PM HISTORY/REASON FOR EXAM:  Emergency Medical Condition ? Stroke. Cerebellar infarct TECHNIQUE/EXAM DESCRIPTION: MRA of the head ([place]) without contrast. The study was performed on a [employer] Signa 1.5 Britt MRI scanner. MRA of the [place] was performed with 3D time-of-flight technique with axial acquisition. Additional MRA of the internal carotid and vertebrobasilar arteries at the level of the skull base and craniocervical junction was also performed with 3D time-of-flight technique with axial acquisition. Images are reviewed at the PACS workstation as axial source images as well as maximum intensity ray projection (MIP) multiplanar 3D reconstructions. COMPARISON:  None FINDINGS: Nicole cavernous and supraclinoid ICA segments are patent. Patent left posterior communicating artery. MCA and ARA branches are patent. There is no significant flow within the intradural right vertebral artery. The intradural left vertebral artery, basilar artery and posterior cerebral arteries are patent. No significant aneurysm or high flow vascular malformation is seen.     Findings suggesting right vertebral artery occlusion.    MR-MRA NECK-WITH  & W/O AND SEQUENCES    Result Date: 4/28/2021 4/28/2021 3:31 PM HISTORY/REASON FOR EXAM:  Emergency Medical Condition ? Stroke Cerebellar stroke TECHNIQUE/EXAM DESCRIPTION: MRA of the cervical carotid and vertebral arteries without and with contrast. The study was performed on a [employer] Signa 1.5 Britt MRI scanner. Precontrast MRA of the cervical carotid and vertebral arteries was performed with 2D time-of-flight (TOF) technique with acquisition in the axial plane. MRA of the arch origins of the great vessels, and cervical carotid and vertebral arteries was performed with 2D time-of-flight (TOF) technique with coronal slab acquisition from the level of the aortic arch to the carotid siphons during dynamic intravenous infusion of 15 mL of ProHance contrast. Images are reviewed at the PACS workstation as source images as well as maximum intensity ray projection (MIP) multiplanar 3D reconstructions. Cervical internal carotid artery percent stenosis is calculated using the standard method according to the NASCET criteria wherein a segment of uniform caliber distal cervical internal carotid is used as the reference denominator. COMPARISON:  None available. FINDINGS: The arch origins of the great vessels are intact. The cervical right vertebral artery is not well seen on the time-of-flight images. A very small caliber cervical right vertebral artery is seen on the postcontrast images with some mildly increased caliber of the artery at about the C1-C2 level. The fact  that it is not seen on the time-of-flight images and is visualized on contrast enhanced sequence could be related to small nondominant caliber with sluggish flow or could represent retrograde flow within the right vertebral artery. There may be a focal moderate stenosis in the mid cervical left internal carotid artery and mild narrowing at its origin. Mild narrowing of the proximal left internal carotid artery with less than 50% stenosis. No significant right  carotid stenosis is seen.     1.  Small caliber right vertebral artery which is not visualized on the noncontrast time-of-flight technique could be related to retrograde flow or diminutive caliber and sluggish flow. 2.  Possible moderate focal stenosis in the mid cervical left vertebral artery. 3.  No significant carotid stenosis.    MR-THORACIC SPINE-WITH & W/O    Result Date: 4/28/2021 4/27/2021 11:23 PM HISTORY/REASON FOR EXAM:  Mid-back pain, compression fracture suspected; possible epidural abscess/fluid collection TECHNIQUE/EXAM DESCRIPTION: MRI of the thoracic spine without and with contrast. The study was performed on a [employer] Signa 1.5 Britt MRI scanner. T1 sagittal, T2 fast spin-echo sagittal, and T2 axial images were obtained of the thoracic spine. T1 post-contrast fat suppressed sagittal images were obtained. Optional T1 post-contrast axial images may be obtained. 14 mL ProHance contrast was administered intravenously. COMPARISON:  MRI of the cervical spine one-day prior. FINDINGS: There is abnormal dorsal epidural fluid collection seen within the partially visualized lower cervical spine and which extends inferiorly to about the T6 level. The maximum thickness is seen at about the T2-T3 level measuring 8 mm. This raises concern for epidural abscess, although epidural hematoma could also have this appearance. From T1 through T3 there is at least moderate thecal sac stenosis due to the epidural fluid collection. There is significant abnormal signal within the partially visualized  lower cervical and upper thoracic cord which could be infectious/inflammatory or other nonspecific cord edema. There is also suggestion of a small ventral epidural fluid collection at C6-C7. There is also partially visualized abnormal marrow edema in the C6 and C7 vertebral bodies as detailed on earlier MRI report. There is suggestion of some prominent enhancement around the mid and lower thoracic cord seen on the sagittal  postcontrast images however limited evaluation on axial images due to motion artifact. This is of uncertain etiology but could represent some leptomeningeal disease/infection. On the sagittal T2 images there is a questionable slight nodular appearance on the surface of lower thoracic cord. A differential would be a subtle spinal dural aVF. There is no abnormal signal seen within the mid/lower thoracic cord. There is no evidence of discitis osteomyelitis within the thoracic spine. There is T6-T7 interbody ankylosis. There is mild disc degeneration and some prominent anterolateral bridging osteophytes in the mid and lower thoracic spine. No significant posterior disc herniation is seen. Within the mid and lower thoracic spine there  is no significant spinal stenosis.     1.  Posterior epidural fluid collection/abscess within the lower cervical spine extending inferiorly to about the T6 level which could represent epidural abscess and/or hematoma. This measures 8 mm in maximal thickness at T2-T3 with at least moderate thecal sac stenosis. 2.  Abnormal signal within the cervical cord and upper thoracic cord suggesting cord edema or infectious/inflammatory etiology. 3.  Lower cervical spine findings are detailed on earlier report. 4.  Prominent enhancement along the surface of the mid and lower thoracic cord is of uncertain etiology and as detailed above, possibly representing leptomeningeal disease/infection. See details above. These findings were discussed with Dr. Cano on 4/28/2021 10:01 AM.     IR-DRAIN-BLADDER SUPRAPUB W/CATH    Result Date: 4/28/2021  HISTORY/REASON FOR EXAM:  60-year-old man with urinary retention. TECHNIQUE/EXAM DESCRIPTION AND NUMBER OF VIEWS:  Ultrasound and fluoroscopic guided suprapubic bladder catheter placement, Cystogram. MEDICATIONS: The patient remained on his ICU sedation regimen. FLUOROSCOPY TIME: 0.3 minutes; 5fluoroscopic images obtained CONTRAST: 40mL Omnipaque 300, intraurinary  PROCEDURE:  Informed consent was obtained. The suprapubic region was prepped and draped in sterile manner. Following local anesthesia with copious 1% lidocaine, under ultrasound and fluoroscopic monitoring, an 18-gauge needle was advanced into the urinary bladder from a paramedian suprapubic approach. An Amplatz guidewire was placed in the urinary bladder. Serial tract dilatation was performed with a 22 Ghanaian telescoping dilator. A 16 Ghanaian Mansura tip silicone Gomez type catheter was then placed in the balloon inflated with 10 mL of sterile saline. A cystogram was performed with AP and both oblique views demonstrating satisfactory position of the catheter with all side holes within the urinary bladder. The catheter was secured to the skin with 2-0 nylon suture and connected to gravity drainage. The Gomez catheter was removed. The patient tolerated the procedure well with no evidence of complication. COMPARISON: None FINDINGS:  The cystogram shows satisfactory catheter position with all sideholes within the urinary bladder. There is no extravasation.     1.     Ultrasound and fluoroscopic guided placement of a 16 [place]ian Mansura tip silicone suprapubic bladder catheter. 2.     Cystogram documenting catheter placement.     DX-PORTABLE FLUOROSCOPY < 1 HOUR    Result Date: 4/28/2021 4/28/2021 5:30 AM HISTORY/REASON FOR EXAM:  Cervical laminectomy TECHNIQUE/EXAM DESCRIPTION AND NUMBER OF VIEWS: Portable fluoroscopy for less than one hour in OR. FINDINGS: The portable fluoroscopy unit was obligated to the procedure for less than one hour. Actual fluoro time was 2 seconds.     Portable fluoroscopy utilized for 2 seconds. INTERPRETING LOCATION: [address] [place] [address], [id number]    EC-ECHOCARDIOGRAM COMPLETE W/O CONT    Result Date: 4/28/2021  Transthoracic Echo Report Echocardiography Laboratory CONCLUSIONS No prior study is available for comparison. Normal left ventricular systolic function.  Left ventricular ejection  fraction is visually estimated to be 60%. Normal diastolic function. Agitated saline (bubble) study was performed. No evidence of right to left shunt by agitated saline challenge. Normal inferior vena cava size and inspiratory collapse. GILDARDO MAGANA Exam Date:         2021                    19:42 Exam Location:     Inpatient Priority:          Routine Ordering Physician:        YESICA SULLIVAN Referring Physician:       [id number] Marie Sonographer:               Faye Moya [facility] Age:    60     Gender:    M MRN:    [id number] :    1960 BSA:    1.87   Ht (in):    69     Wt (lb):    159 Exam Type:     Complete Indications:     CVA ICD Codes:       436 CPT Codes:       37357 BP:   140    /   60     HR:   74 Technical Quality:       Fair MEASUREMENTS  (Male / Female) Normal Values 2D ECHO LV Diastolic Diameter PLAX        3.8 cm                4.2 - 5.9 / 3.9 - 5.3 cm LV Systolic Diameter PLAX         2.4 cm                2.1 - 4.0 cm IVS Diastolic Thickness           1.4 cm                LVPW Diastolic Thickness          1.4 cm                LVOT Diameter                     2 cm                  Estimated LV Ejection Fraction    60 %                  LV Ejection Fraction MOD BP       62.7 %                >= 55  % LV Ejection Fraction MOD 4C       46.6 %                LV Ejection Fraction MOD 2C       55 %                  DOPPLER AV Peak Velocity                  1.7 m/s               AV Peak Gradient                  10.9 mmHg             AV Mean Gradient                  5.5 mmHg              LVOT Peak Velocity                1.2 m/s               AV Area Cont Eq vti               2.3 cm2               Mitral E Point Velocity           0.74 m/s              Mitral E to A Ratio               0.92                  MV Pressure Half Time             69.4 ms               MV Area PHT                       3.2 cm2               MV Deceleration Time              239 ms                PV Peak Velocity                   1.1 m/s               PV Peak Gradient                  4.6 mmHg              RVOT Peak Velocity                0.66 m/s              * Indicates values subject to auto-interpretation LV EF:  60    % FINDINGS Left Ventricle Normal left ventricular chamber size. Mild concentric left ventricular hypertrophy. Normal left ventricular systolic function.  Left ventricular ejection fraction is visually estimated to be 60%. Normal diastolic function. Right Ventricle Normal right ventricular size and systolic function. Right Atrium Normal right atrial size. Normal inferior vena cava size and inspiratory collapse. Left Atrium Normal left atrial size. Agitated saline (bubble) study was performed. With Valsalva maneuver. No evidence of right to left shunt by agitated saline challenge. Existing IV was used. Mitral Valve Structurally normal mitral valve without significant stenosis or regurgitation. Aortic Valve Aortic sclerosis without stenosis. No aortic insufficiency. Tricuspid Valve Structurally normal tricuspid valve without significant stenosis or regurgitation. Pulmonic Valve The pulmonic valve is not well visualized. No pulmonic insufficiency. Pericardium Normal pericardium without effusion. Aorta Normal aortic root for body surface area. Ascending aorta not well visualized. Zakiya Rebollar MD (Electronically Signed) Final Date:     28 April 2021                 21:30    EC-CHRISTOPHER W/O CONT    Result Date: 4/30/2021  Results Will be Available after Interpretation by Cardiologist.    DX-ABDOMEN FOR TUBE PLACEMENT    Result Date: 4/30/2021 4/30/2021 12:44 PM HISTORY/REASON FOR EXAM:  Line evaluation. TECHNIQUE/EXAM DESCRIPTION AND NUMBER OF VIEWS:  1 view(s) of the abdomen. COMPARISON:  4/28/2021 FINDINGS: Enteric tube has been placed. The tip projects over the distal stomach or duodenal bulb. There are scattered loops of air-filled bowel.     Feeding tube placement with the tip projecting over the distal stomach or  "duodenal bulb    DX-ABDOMEN FOR TUBE PLACEMENT    Result Date: 4/28/2021 4/28/2021 12:24 PM HISTORY/REASON FOR EXAM:  Tube evaluation. TECHNIQUE/EXAM DESCRIPTION AND NUMBER OF VIEWS:  1 view(s) of the abdomen. COMPARISON:  None. FINDINGS: Limited single view of the abdomen performed primarily to evaluate enteric tube position. The tip projects over the expected area of the distal stomach. The bowel gas pattern is within normal limits.     Feeding tube with tip projecting over the expected area of the distal stomach.      Micro:  Results     Procedure Component Value Units Date/Time    BLOOD CULTURE [[id number]] Collected: 05/07/21 0758    Order Status: Completed Specimen: Blood from Peripheral Updated: 05/07/21 0849    Narrative:      Collected By:[id number] HARJIT HERRERA  Per Hospital Policy: Only change Specimen Src: to \"Line\" if  specified by physician order.    BLOOD CULTURE [[id number]]     Order Status: No result Specimen: Blood from Peripheral     GRAM STAIN [[id number]] Collected: 05/06/21 1055    Order Status: Completed Specimen: Respirate Updated: 05/06/21 2054     Significant Indicator .     Source RESP     Site BRONCHOALVEOLAR LAVAGE     Gram Stain Result Many WBCs.  No organisms seen.      Narrative:      Collected By:[id number] MOSES KAPLAN  Collected By:[id number] MOSES KAPLAN    Culture Respiratory W/ Grm Stn - BAL [[id number]] Collected: 05/06/21 1055    Order Status: Completed Specimen: Respirate from Bronchoalveolar Lavage Updated: 05/06/21 2053     Significant Indicator NEG     Source RESP     Site BRONCHOALVEOLAR LAVAGE     Culture Result -     Gram Stain Result Many WBCs.  No organisms seen.      Narrative:      Collected By:[id number] MOSES KAPLAN  Collected By:[id number] MOSES KAPLAN    BLOOD CULTURE [[id number]] Collected: 05/01/21 1300    Order Status: Completed Specimen: Blood from Peripheral Updated: 05/06/21 1500     Significant Indicator NEG     Source BLD     Site PERIPHERAL     Culture Result No " "growth after 5 days of incubation.    Narrative:      Collected By:[id number] LESA VIZCARRA  Per Hospital Policy: Only change Specimen Src: to \"Line\" if  specified by physician order.  Right Forearm/Arm    BLOOD CULTURE [[id number]] Collected: 05/01/21 1305    Order Status: Completed Specimen: Blood from Peripheral Updated: 05/06/21 1500     Significant Indicator NEG     Source BLD     Site PERIPHERAL     Culture Result No growth after 5 days of incubation.    Narrative:      Collected By:[id number] LESA VIZCARRA  Per Hospital Policy: Only change Specimen Src: to \"Line\" if  specified by physician order.  Left Forearm/Arm    BLOOD CULTURE [[id number]] Collected: 04/28/21 1134    Order Status: Completed Specimen: Blood from Peripheral Updated: 05/03/21 1300     Significant Indicator NEG     Source BLD     Site PERIPHERAL     Culture Result No growth after 5 days of incubation.    Narrative:      Collected By:[id number] SAILAJA BAILEY  Per Hospital Policy: Only change Specimen Src: to \"Line\" if  specified by physician order.  No site indicated    BLOOD CULTURE [[id number]] Collected: 04/27/21 0248    Order Status: Completed Specimen: Blood from Peripheral Updated: 05/02/21 1100     Significant Indicator NEG     Source BLD     Site PERIPHERAL     Culture Result No growth after 5 days of incubation.    Narrative:      Per Hospital Policy: Only change Specimen Src: to \"Line\" if  specified by physician order.  Left AC    CULTURE WOUND W/ GRAM STAIN [[id number]]  (Abnormal) Collected: 04/28/21 0642    Order Status: Completed Specimen: Wound Updated: 05/01/21 1117     Significant Indicator POS     Source WND     Site Cerival Thoracic Epidural 1     Culture Result Growth noted after further incubation, see below for  organism identification.       Gram Stain Result Few WBCs.  Rare Gram positive cocci.       Culture Result Streptococcus anginosus  Light growth      Narrative:      Surgery - swabs received    Anaerobic Culture " [[id number]] Collected: 04/28/21 0642    Order Status: Completed Specimen: Wound Updated: 05/01/21 1117     Significant Indicator NEG     Source WND     Site Cerival Thoracic Epidural 1     Culture Result No Anaerobes isolated.    Narrative:      Surgery - swabs received    CULTURE WOUND W/ GRAM STAIN [[id number]]  (Abnormal) Collected: 04/28/21 0655    Order Status: Completed Specimen: Wound Updated: 05/01/21 1117     Significant Indicator POS     Source WND     Site Cerival Thoric Epidural 2     Culture Result Growth noted after further incubation, see below for  organism identification.       Gram Stain Result Few WBCs.  Few Gram positive cocci.       Culture Result Streptococcus anginosus  Moderate growth      Narrative:      Surgery - swabs received    Anaerobic Culture [[id number]] Collected: 04/28/21 0655    Order Status: Completed Specimen: Wound Updated: 05/01/21 1117     Significant Indicator NEG     Source WND     Site Cerival Thoric Epidural 2     Culture Result No Anaerobes isolated.    Narrative:      Surgery - swabs received          Assessment:  Active Hospital Problems    Diagnosis    • *Spinal epidural abscess [G06.1]    • History of ischemic stroke [Z86.73]    • Sepsis due to Streptococcus species ([facility]) [A40.9]    • Acute bilateral back pain [M54.9]    • Thrombocytopenia ([facility]) [D69.6]    • Type 2 diabetes mellitus without complication, without long-term current use of insulin ([facility]) [E11.9]    • Coronary artery disease due to calcified coronary lesion: CABG x4, July 2015 [I25.10, I25.84]    • Essential hypertension, benign [I10]    • Hypokalemia [E87.6]    • Hypomagnesemia [E83.42]    • Hyponatremia [E87.1]    • Difficulty swallowing [R13.10]    • Diabetic ketoacidosis ([facility]) [E11.10]    • Marijuana abuse [F12.10]    • High anion gap metabolic acidosis [E87.2]    • Tobacco abuse [Z72.0]    • Dyslipidemia [E78.5]    • Status post urethrostomy ([facility]) [Z93.6]    .    Assessment:  60 y.o.  admitted  4/25/2021. Pt has a past medical history of uncontrolled diabetes, CAD status post CABG times 4/2015, marijuana use and to arthritis.  Presented complaining of neck and back pain ongoing for approximately 1 week and fall getting out of the bathtub.  He had been seen at urgent care for back pain approximately 1 week prior to admission and given Toradol injections.       Hospital Course:   Afebrile and vitals unremarkable other than hypertension.  Leukocytosis ongoing since arrival.  MRI C-spine on 4/26 with epidural collection noted from C2-T3.  Largest collection noted in upper thoracic posterior epidural space at T2-T3.  This is causing multilevel cord compression.  Also with likely discitis at C5-6 and osteomyelitis at C5-C7.  Blood cultures on 4/25+ for Streptococcus species.    Code blue 5/3-mucus plugs     Problem List  Sepsis/shock, strep  -Blood cultures on 4/25 2/2 + Streptococcus anginosus, penicillin and cephalosporin susceptible  -Blood cultures on 4/27 1/2 + viridans Streptococcus   -Blood cultures on 4/28  1/2 + CoNS -likely contaminant  -Blood cultures on 5/1 are no growth to date  -TTE with no vegetations  -CHRISTOPHER on 4/30, no official read as yet    VDRF-Bronchoscopy on 4/28 with thick secretions  Bronch 5/3 mucus plugs, thick secretions  Bronch 5/4 pending-no signif secretions noted  Bronch 5/6 with thick secretion RML    Leukocytosis, persistent. Decreasing  Cervical thoracic infection, epidural abscess at C2-T3 as well as discitis and osteomyelitis, +GPCs-Streptococcus anginosus   -Repeat MRI thoracic spine on 4/27 notes posterior epidural fluid  collection/abscess in lower cervical spine extending to T6 level, also noted abnormal signal within the cervical and upper thoracic cord  -s/p OR 4/28 for see 4-T2 laminectomy, decompression of thecal sac and nerve roots as well as cervical thoracic extradural spinal mass/epidural abscess removal, linda purulence during OR, no CSF leak per op note  -Operative  cultures from cervical thoracic epidural 1/ 2 +strep anginosis    CVA, Right cerebellar stroke, possibly due to venous spasm associated with the epidural abscess.   -MRA neck with and without on 4/28, possible moderate focal stenosis in mid left CVA.  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  MRI without on 4/28 with acute large right cerebellar infarct with small hemorrhage  Uncontrolled diabetes  Transaminitis, worse     Plan   Continue ampicillin  Anticipate a 6-week IV antibiotic course for the bacteremia and spinal infection-stop date 6/11/2021  Due to recurrent fever-repeat blood cxs, check lipase, recommend imaging liver/GB if feasible  (worsening LFTs)  Repeat MRI spine prior to stopping antibiotics  FU CHRISTOPHER result-still pending  Blood cultures - NGTD   Keep BS under 150 to help control current infection  Continue to FU Bronch results-last CXR LLL infiltrate. Cultures neg to date-continue to follow    Palliative care consult and goals of care discussion     Prognosis guarded

## 2021-05-07 NOTE — PROGRESS NOTES
[facility] Progress Note      Admit Date: 4/25/2021    Resident(s): Delroy Fulton M.D.   Attending:  BREANNA ALCARAZ/ Dr. Sarmiento    Patient ID:    Name:  Perry Davis   YOB: 1960  Age:  60 y.o.  male   MRN:  [id number]    Hospital Course (carried forward and updated):  Perry Davis is a 60 y.o. male with a previous history of uncontrolled Diabetes -Hb1C 13 , HLD, CAD s/p CABGx4  2015, tobacco use ,OA, chronic low pain admitted  4/25 with epidural abscess w/ cord compression, discitis involving cervical and thoracic spine--> underwent emergent laminectomy and decompression by Dr. Hazel 4/28.  Hospital course complicated by acute right cerebellar stroke ans strep anginosus bacteremia on Ceftriaxone, followed by ID.   Patient currently intubated    Consultants:  Critical Care   Neurosurgery  Urology  Infectious disease    Interval Events:  -pt had one episode of fever overnight, blood c/s ordered  -on ampicillin per ID, BAL c/s NGTD  -adequate urine o/p, urine bloody with clots  -intubated, guarded prognosis, Palliative following for GOC discussion     Vitals Range last 24h:  Temp:  [36.4 °C (97.5 °F)-38.4 °C (101.1 °F)] 37.8 °C (100 °F)  Pulse:  [62-84] 62  Resp:  [21-31] 24  BP: (103-129)/(51-61) 111/55  SpO2:  [93 %-99 %] 93 %      Intake/Output Summary (Last 24 hours) at 5/7/2021 1047  Last data filed at 5/7/2021 1000  Gross per 24 hour   Intake 1580 ml   Output 1600 ml   Net -20 ml        Review of Systems   Unable to perform ROS: Intubated       PHYSICAL EXAM:  Vitals:    05/07/21 0707 05/07/21 0800 05/07/21 0900 05/07/21 1000   BP:  124/61  111/55   Pulse: 71 73 67 62   Resp: (!) 24 (!) 24 (!) 24 (!) 24   Temp:  38 °C (100.4 °F)  37.8 °C (100 °F)   TempSrc:  Temporal  Rectal   SpO2: 95% 93%     Weight:       Height:        Body mass index is 31.17 kg/m².    O2 therapy: Pulse Oximetry: 93 %, O2 Delivery Device: Ventilator    Date 05/07/21 0700 - 05/08/21 0659   Shift 7767-1085 1263-6701  2518-9389 24 Hour Total   INTAKE   Other 30   30     Flush / Irrigation Volume (enteral tube) 30   30   NG/   180     Intake (mL) (Enteral Tube 05/03/21 Cortrak - Gastric) 180   180   Shift Total 210   210   OUTPUT   Urine 350   350     Output (mL) (Urethral Catheter Other (Comment) 16 Fr.) 350   350   Shift Total 350   350   NET -140   -140        Physical Exam   Constitutional:   Intubated    HENT:   Head: Normocephalic and atraumatic.   Eyes: Pupils are equal, round, and reactive to light. Conjunctivae are normal.   Cardiovascular: Normal rate, regular rhythm and normal heart sounds.   Pulmonary/Chest: Effort normal and breath sounds normal.   Abdominal: Soft. He exhibits no distension.   Musculoskeletal:      Cervical back: Normal range of motion.   Neurological:   Moves head spontaneously  Unable to assess full neuro exam   Skin: Skin is warm.       Recent Labs     05/05/21  0134 05/06/21  0409 05/07/21  0413   ISTATAPH 7.421 7.438 7.491   ISTATAPCO2 49.7* 46.5* 43.4*   ISTATAPO2 74 65 64   ISTATATCO2 34* 33 34*   EAYRFVE7FMB 95 93 93   ISTATARTHCO3 32.3* 31.4* 33.1*   ISTATARTBE 7* 6* 9*   ISTATTEMP 97.0 F 97.5 F 98.4 F   ISTATFIO2 90 90 70   ISTATSPEC Arterial Arterial Arterial   ISTATAPHTC 7.434 7.447 7.492   ALNFDGVD7UI 70 62* 63*     Recent Labs     05/05/21 0517 05/06/21  0435 05/07/21  0315   SODIUM 137 139 139   POTASSIUM 4.8 4.8 4.3   CHLORIDE 103 104 103   CO2 30 31 32   BUN 38* 37* 37*   CREATININE 0.47* 0.58 0.55   MAGNESIUM 2.2 2.2 2.2   CALCIUM 8.0* 7.9* 8.1*     Recent Labs     05/05/21  0517 05/06/21  0435 05/07/21  0315   ALTSGPT 169* 178* 272*   ASTSGOT 112* 135* 325*   ALKPHOSPHAT 205* 216* 351*   TBILIRUBIN 0.3 0.2 0.5   GLUCOSE 253* 239* 181*     Recent Labs     05/05/21  0517 05/06/21  0435 05/07/21  0315   RBC 3.27* 3.21* 3.07*   HEMOGLOBIN 10.0* 9.8* 9.4*   HEMATOCRIT 31.1* 30.9* 30.5*   PLATELETCT 355 333 346     Recent Labs     05/05/21  0517 05/06/21  0435 05/07/21  0315   WBC  15.8* 15.7* 12.2*   NEUTSPOLYS 81.60* 83.30* 80.50*   LYMPHOCYTES 10.20* 9.70* 12.00*   MONOCYTES 6.80 5.80 5.70   EOSINOPHILS 0.30 0.30 0.70   BASOPHILS 0.10 0.10 0.00   ASTSGOT 112* 135* 325*   ALTSGPT 169* 178* 272*   ALKPHOSPHAT 205* 216* 351*   TBILIRUBIN 0.3 0.2 0.5       Meds:  • insulin glargine  46 Units     • cyclobenzaprine  5 mg     • ampicillin  2,000 mg 2,000 mg (05/07/21 1005)   • insulin regular  3-14 Units      And   • dextrose 50%  50 mL     • enoxaparin (LOVENOX) injection  40 mg     • norepinephrine (Levophed) infusion  0-30 mcg/min Stopped (05/04/21 1134)   • propofol  0-80 mcg/kg/min Stopped (05/06/21 0745)   • aspirin  324 mg     • acetylcysteine  3-4 mL     • albuterol  2.5 mg     • MD Alert...Adult ICU Electrolyte Replacement per Pharmacy       • lidocaine  1-2 mL     • magnesium hydroxide  30 mL     • ondansetron  4 mg     • oxyCODONE immediate-release  10 mg     • polyethylene glycol/lytes  1 Packet     • promethazine  12.5-25 mg     • senna-docusate  1 tablet     • MD ALERT...DO NOT ADMINISTER NSAIDS or ASPIRIN unless ORDERED By Neurosurgery  1 Each     • bisacodyl  10 mg     • fleet  1 Each     • Respiratory Therapy Consult       • ondansetron  4 mg     • promethazine  12.5-25 mg     • prochlorperazine  5-10 mg     • ipratropium-albuterol  3 mL     • labetalol  10 mg     • Pharmacy  1 Each     • ARIPiprazole  15 mg     • carBAMazepine  200 mg     • cyanocobalamin  1,000 mcg     • senna-docusate  1 tablet     • vitamin D  5,000 Units     • docusate sodium  100 mg     • famotidine  20 mg      Or   • famotidine  20 mg     • fentaNYL  50 mcg      And   • fentaNYL  100 mcg     • lidocaine  3 Patch          Procedures:  Bronchoscopy 05/06/21  Bronchoscopy on 05/03  -Decompressive laminectomy on 4/28  -Central line placement on 5/01    Imaging:  DX-CHEST-PORTABLE (1 VIEW)   Final Result      Increasing left basilar airspace opacities. No significant cardiopulmonary change otherwise.  Repositioned endotracheal tube.      US-EXTREMITY ARTERY LOWER UNILAT RIGHT   Final Result      DX-ABDOMEN FOR TUBE PLACEMENT   Final Result      Feeding tube tip projects over the distal stomach or first portion of the duodenum.      CT-CSPINE WITHOUT PLUS RECONS   Final Result      1.  Postsurgical changes C3-T1 laminectomies.   2.  New irregular linear and mottled lucency in the anterior C5 vertebral body which is likely related to osteomyelitis.   3.  Prevertebral soft tissue swelling.   4.  Extensive known extensive epidural abscess seen on prior MRI is not adequately evaluated on CT.      CT-CTA CHEST PULMONARY ARTERY W/ RECONS   Final Result      1.  No evidence of pulmonary embolism.      2.  Bilateral lower lobe atelectasis or pneumonia, left greater than right.      3.  Small left pleural effusion.      4.  CABG changes.            DX-CHEST-PORTABLE (1 VIEW)   Final Result      Endotracheal tube terminates approximately 2.5 cm above the you.      Atelectasis with left lung aeration. No pleural effusion or pneumothorax.      BN-NFTCJLL-0 VIEW   Final Result      No dilated bowel      DX-ABDOMEN FOR TUBE PLACEMENT   Final Result      Feeding tube placement with the tip projecting over the distal stomach or duodenal bulb      EC-CHRISTOPHER W/O CONT         CT-HEAD W/O   Final Result         1.  Low-density changes in the right cerebellar hemisphere, compatible with evolving infarct, streak artifacts minimally limits evaluation of the posterior fossa for subtle subarachnoid hemorrhage, no intracranial hemorrhage is readily identified.   2.  Atherosclerosis.      DX-CHEST-PORTABLE (1 VIEW)   Final Result         1.  No acute cardiopulmonary disease.      DX-CHEST-PORTABLE (1 VIEW)   Final Result      Mild left basilar opacity may represent atelectasis. Infection not excluded.      Improved aeration of the left lung.      Atherosclerotic plaque.         EC-ECHOCARDIOGRAM COMPLETE W/O CONT   Final Result       IR-DRAIN-BLADDER SUPRAPUB W/CATH   Final Result      1.     Ultrasound and fluoroscopic guided placement of a 16 [place] tip silicone suprapubic bladder catheter.      2.     Cystogram documenting catheter placement.         MR-BRAIN-W/O   Final Result      Acute large right cerebellar posterior inferior cerebellar artery territory infarct with possible small amount of petechial hemorrhage.      MR-MRA HEAD-W/O   Final Result      Findings suggesting right vertebral artery occlusion.      MR-MRA NECK-WITH & W/O AND SEQUENCES   Final Result      1.  Small caliber right vertebral artery which is not visualized on the noncontrast time-of-flight technique could be related to retrograde flow or diminutive caliber and sluggish flow.   2.  Possible moderate focal stenosis in the mid cervical left vertebral artery.   3.  No significant carotid stenosis.      DX-ABDOMEN FOR TUBE PLACEMENT   Final Result         Feeding tube with tip projecting over the expected area of the distal stomach.      DX-CHEST-PORTABLE (1 VIEW)   Final Result      1.  Worsening consolidation of LEFT hemithorax with shift of mediastinal structures to the LEFT suggesting atelectasis, possibly due to endobronchial process.   2.  Supportive tubing as described above.   3.  No pneumothorax.      DX-CHEST-PORTABLE (1 VIEW)   Final Result         Endotracheal tube with tip projecting over the mid thoracic trachea.      Layering left pleural effusion with left lower lung opacity.      DX-SPINE-ANY ONE VIEW   Final Result      Digitized intraoperative radiograph is submitted for review.  This examination is not for diagnostic purpose but for guidance during a surgical procedure.      DX-PORTABLE FLUOROSCOPY < 1 HOUR   Final Result      Portable fluoroscopy utilized for 2 seconds.         INTERPRETING LOCATION: [address] [place] [address], [facility]      CT-HEAD W/O   Final Result         1.  Low-density in the region of the right cerebellar hemisphere,  appearance compatible with evolving infarct.      These findings were discussed with the patient's on-call clinician, Deniz, on 4/28/2021 6:14 AM.      MR-THORACIC SPINE-WITH & W/O   Final Result      1.  Posterior epidural fluid collection/abscess within the lower cervical spine extending inferiorly to about the T6 level which could represent epidural abscess and/or hematoma. This measures 8 mm in maximal thickness at T2-T3 with at least moderate    thecal sac stenosis.   2.  Abnormal signal within the cervical cord and upper thoracic cord suggesting cord edema or infectious/inflammatory etiology.   3.  Lower cervical spine findings are detailed on earlier report.   4.  Prominent enhancement along the surface of the mid and lower thoracic cord is of uncertain etiology and as detailed above, possibly representing leptomeningeal disease/infection. See details above.   These findings were discussed with Dr. Cano on 4/28/2021 10:01 AM.         MR-LUMBAR SPINE-WITH & W/O   Final Result      No evidence of lumbar spine infection or epidural abscess.      Mild spondylosis as detailed above without spinal stenosis.      DX-CHEST-PORTABLE (1 VIEW)   Final Result      1.  Hypoinflation with left basilar atelectasis.   2.  Mild perihilar interstitial prominence may be related to hypoinflation or edema.      MR-CERVICAL SPINE-WITH & W/O   Final Result      1.  There is multiseptated abnormal peripherally enhancing epidural collection noted extending from C2 to the visualized lower thoracic level. Largest collection is noted in the upper thoracic posterior epidural space at the levels of T2 and T3. The    lower extent of the collection is not imaged. This is causing multilevel effacement of the thecal sac and cord compression. The thoracic spinal cord is anteriorly displaced and compressed at the levels of T2 and T3. Pre and postcontrast MR examination of    the thoracic spine is recommended for further evaluation.   2.   There is effacement of the cervical thecal sac due to the multiseptated epidural fluid collection.   3.  The cervical spinal cord is mildly enlarged with minimal increased T2 signal intensity. The possibility of cord inflammation cannot be entirely excluded.   4.  Abnormal T2 hyperintensity at C5-6 disc space likely representing discitis.   5.  Abnormal bone marrow edema of C5, C6 and C7 vertebral bodies with edema concerning for osteomyelitis.   6.  There is also focal enhancement of C6 vertebral body likely secondary to the osteomyelitis.   7.  Prevertebral edema/fluid collection.   8.  Nonvisualization of flow void of bilateral vertebral arteries concerning for age-indeterminate bilateral vertebral occlusions.         CT-CHEST (THORAX) WITH   Final Result      No displaced rib fracture is identified.      No acute cardiopulmonary process is seen.      Atherosclerotic plaque including coronary artery calcification.      CT-TSPINE W/O PLUS RECONS   Final Result      No CT evidence of acute traumatic abnormality.      Mild T6/7 anterior wedging compatible with healed mild chronic compression fracture and there is interbody fusion.      CT-LSPINE W/O PLUS RECONS   Final Result      No CT evidence of acute traumatic injury.      CT-CSPINE WITHOUT PLUS RECONS   Final Result      Multilevel degenerative changes.      Prevertebral edema. Further evaluation with MRI is recommended as this can be seen in the setting of ligamentous injury.      Bilateral carotid atherosclerotic plaque.             ASSESSEMENT and PLAN:    * Spinal epidural abscess- (present on admission)  Assessment & Plan  -MRI C/T-spine with abnormal peripherally enhancing epidural collection extending from C2 to visualize lower thoracic 11.  Largest collection in the upper thoracic posterior epidural space at the level of T2-T3.  Lower extent of the collection is causing multilevel effacement of the thecal sac and cord compression.    -Underwent emergent  laminectomy of C3-C4 C5-C6, C6/C7, T1-T2 laminectomy, decompression of thecal sac and nerves by Dr. Hazel 4/28/2021.   -Epidural drain removed on 5/03  -Goal blood pressure systolic of less than 160  -Palliative care consulted for goals of care discussion        History of ischemic stroke  Assessment & Plan  -Found to have acute change in mental status 4/27/2021.   -CT head w/o concerning for evolving right cerebellar infarct.   -MRA brain, MRA neck: Findings suggesting right vertebral artery occlusion. Possible moderate focal stenosis in the mid cervical left vertebral artery.  TTE with bubble study: EF 60%, no evidence of right-to-left shunt, no valve lesions.  CHRISTOPHER negative  Goal euthermia, normotension, eunatremia  PT, OT, SLP evaluation as able  Neurology following  -Started on aspirin and DVT prophylaxis  -Hold statin & tylenol currently given elevated LFTs    Sepsis due to Streptococcus species ([facility])- (present on admission)  Assessment & Plan  MRI C-T-spine with evidence of epidural abscess, discitis, vertebral osteomyelitis.  Underwent emergent laminectomy, decompression 4/28.  Blood culture 4/24 and cervical thoracic 4/28 : Growth of Streptococcus anginosus.   -Repeat blood cultures has been negative  -TTE did not show any concern of infective endocarditis  -CHRISTOPHER negative  -Ceftriaxone changed to ampicillin on 5/06, total duration of 6 weeks last date would be 06/13  -Bronchoscopy with BAL 05/06/21, c/s pending  -fever 5/6/21, blood culture collected  -ID on board    Cardiac arrest ([facility])  Assessment & Plan  -Patient had cardiac arrest on 05/03, ROSC achieved in 4 mins after CPR and  Epinephrine  -Most likely secondary to aspiration, bed side bronchoscopy showed multiple mucous plugs   -Re intubated again     Thrombocytopenia ([facility])- (present on admission)  Assessment & Plan  -Resolved  -Plt 47 on 4/26. This is likely due to sepsis, though lower on the differential includes previously undiagnosed infection.   Hep panel negative. HIV non reactive    Acute bilateral back pain- (present on admission)  Assessment & Plan  -Presented with worsening neck and upper back pain  -Secondary to epidural abscess and cord compression  -S/p laminectomy and currently on IV antibiotics  -PT/ OT once stable    Type 2 diabetes mellitus without complication, without long-term current use of insulin (HCC)- (present on admission)  Assessment & Plan  -Chronic history of diabetes  -Last HbA1c is 12.2 from 4/2021  -Continue Lantus  along with high intensity sliding scale, will adjust insulin regimen based on blood glucose levels  -Hypoglycemia protocol     Essential hypertension, benign- (present on admission)  Assessment & Plan  -Noncompliance issues  -Blood pressure meds on hold given hypotension    Coronary artery disease due to calcified coronary lesion: CABG x4, July 2015- (present on admission)  Assessment & Plan  -Chronic   -Continue aspirin, statin on hold given elevated LFTs     Status post urethrostomy (HCC)- (present on admission)  Assessment & Plan  -History of perineal urethrostomy 2018  -Acute urinary retention post surgical intervention 4/28  -Unable to place Gomez catheter through urethrostomy.    -Suprapubic catheter placement by interventional radiology    Hyponatremia- (present on admission)  Assessment & Plan  Resolved    Difficulty swallowing- (present on admission)  Assessment & Plan  -Presented with difficulty swallowing, is coughing and vomiting when swallowing large/hard foods     -SLP evaluation post extubation  -Aspiration and seizure precautions  -Currently on tube feeds      DISPO: ICU    CODE STATUS: Full code    Quality Measures:  Feeding: Tube feeds  Analgesia: None  Sedation: Propofol  Thromboprophylaxis: Lovenox  Head of bed: >30 degrees  Ulcer prophylaxis: Famotidine  Glycemic control: Lantus and sliding scale  Bowel care: bowel regimen  Indwelling lines: Right IJ and peripheral line  Deescalation of  antibiotics: Ceftriaxone -->ampicillin      Delroy Fulton M.D.

## 2021-05-07 NOTE — DISCHARGE PLANNING
[facility]itional [facility] Coordination    Vent day #10.  ID recommending 6 weeks IV abx, stop date 6/11/21.  Anticipate LTAC at this time.  TCC will no longer follow.  If there are interval changes, please reach out to Rehab [facility] [phone].

## 2021-05-07 NOTE — CARE PLAN
Problem: Nutritional:  Goal: Nutrition support tolerated and meeting greater than 85% of estimated needs  Outcome: MET   Tube feed with Impact Peptide 1.5 at goal rate of 50 ml/hour.

## 2021-05-07 NOTE — CARE PLAN
Problem: Ventilation Defect:  Goal: Ability to achieve and maintain unassisted ventilation or tolerate decreased levels of ventilator support  Intervention: Support and monitor invasive and noninvasive mechanical ventilation  Note:    Ventilator Daily Summary    Vent Day #10  PC  24 RR  18  +14  70%    Plan: Continue current ventilator settings and wean mechanical ventilation as tolerated per physician orders.

## 2021-05-07 NOTE — CARE PLAN
Problem: Venous Thromboembolism (VTW)/Deep Vein Thrombosis (DVT) Prevention:  Goal: Patient will participate in Venous Thrombosis (VTE)/Deep Vein Thrombosis (DVT)Prevention Measures  Flowsheets (Taken 5/7/2021 0800)  SCDs, Sequential Compression Device: On     Problem: Pain Management  Goal: Pain level will decrease to patient's comfort goal  Note: Patient nodding in response to presence of pain. Patient has been calm, repositioned minimum of q2h.

## 2021-05-07 NOTE — CARE PLAN
Vent Day # 10     Patient on PCMV 24/PC 20/+14/70%     Plan: Continue current ventilator settings and wean mechanical ventilation as tolerated per physician orders.

## 2021-05-08 ENCOUNTER — APPOINTMENT (OUTPATIENT)
Dept: RADIOLOGY | Facility: MEDICAL CENTER | Age: 61
DRG: 003 | End: 2021-05-08
Attending: INTERNAL MEDICINE
Payer: MEDICARE

## 2021-05-08 ENCOUNTER — APPOINTMENT (OUTPATIENT)
Dept: RADIOLOGY | Facility: MEDICAL CENTER | Age: 61
DRG: 003 | End: 2021-05-08
Attending: PSYCHIATRY & NEUROLOGY
Payer: MEDICARE

## 2021-05-08 PROBLEM — R74.01 TRANSAMINITIS: Status: ACTIVE | Noted: 2021-05-08

## 2021-05-08 LAB
ALBUMIN SERPL BCP-MCNC: 1.9 G/DL (ref 3.2–4.9)
ALBUMIN/GLOB SERPL: 0.5 G/DL
ALP SERPL-CCNC: 365 U/L (ref 30–99)
ALT SERPL-CCNC: 413 U/L (ref 2–50)
ANION GAP SERPL CALC-SCNC: 5 MMOL/L (ref 7–16)
AST SERPL-CCNC: 509 U/L (ref 12–45)
BACTERIA SPEC RESP CULT: NORMAL
BASE EXCESS BLDA CALC-SCNC: 10 MMOL/L (ref -4–3)
BASE EXCESS BLDA CALC-SCNC: 8 MMOL/L (ref -4–3)
BASE EXCESS BLDA CALC-SCNC: 9 MMOL/L (ref -4–3)
BASOPHILS # BLD AUTO: 0.2 % (ref 0–1.8)
BASOPHILS # BLD: 0.02 K/UL (ref 0–0.12)
BILIRUB SERPL-MCNC: 0.3 MG/DL (ref 0.1–1.5)
BODY TEMPERATURE: ABNORMAL DEGREES
BREATHS SETTING VENT: 24
BREATHS SETTING VENT: 24
BREATHS SETTING VENT: 26
BUN SERPL-MCNC: 38 MG/DL (ref 8–22)
CALCIUM SERPL-MCNC: 8 MG/DL (ref 8.5–10.5)
CHLORIDE SERPL-SCNC: 105 MMOL/L (ref 96–112)
CO2 BLDA-SCNC: 35 MMOL/L (ref 20–33)
CO2 BLDA-SCNC: 36 MMOL/L (ref 20–33)
CO2 BLDA-SCNC: 37 MMOL/L (ref 20–33)
CO2 SERPL-SCNC: 33 MMOL/L (ref 20–33)
CREAT SERPL-MCNC: 0.42 MG/DL (ref 0.5–1.4)
DELSYS IDSYS: ABNORMAL
END TIDAL CARBON DIOXIDE IECO2: 29 MMHG
END TIDAL CARBON DIOXIDE IECO2: 29 MMHG
END TIDAL CARBON DIOXIDE IECO2: 30 MMHG
EOSINOPHIL # BLD AUTO: 0.05 K/UL (ref 0–0.51)
EOSINOPHIL NFR BLD: 0.4 % (ref 0–6.9)
ERYTHROCYTE [DISTWIDTH] IN BLOOD BY AUTOMATED COUNT: 55.9 FL (ref 35.9–50)
GLOBULIN SER CALC-MCNC: 4.1 G/DL (ref 1.9–3.5)
GLUCOSE BLD-MCNC: 113 MG/DL (ref 65–99)
GLUCOSE BLD-MCNC: 155 MG/DL (ref 65–99)
GLUCOSE BLD-MCNC: 93 MG/DL (ref 65–99)
GLUCOSE SERPL-MCNC: 183 MG/DL (ref 65–99)
GRAM STN SPEC: NORMAL
HCO3 BLDA-SCNC: 33.6 MMOL/L (ref 17–25)
HCO3 BLDA-SCNC: 34.7 MMOL/L (ref 17–25)
HCO3 BLDA-SCNC: 35.7 MMOL/L (ref 17–25)
HCT VFR BLD AUTO: 31.2 % (ref 42–52)
HGB BLD-MCNC: 9.4 G/DL (ref 14–18)
HOROWITZ INDEX BLDA+IHG-RTO: 116 MM[HG]
HOROWITZ INDEX BLDA+IHG-RTO: 65 MM[HG]
HOROWITZ INDEX BLDA+IHG-RTO: 89 MM[HG]
IMM GRANULOCYTES # BLD AUTO: 0.11 K/UL (ref 0–0.11)
IMM GRANULOCYTES NFR BLD AUTO: 0.9 % (ref 0–0.9)
INR PPP: 1.26 (ref 0.87–1.13)
LYMPHOCYTES # BLD AUTO: 1.19 K/UL (ref 1–4.8)
LYMPHOCYTES NFR BLD: 10 % (ref 22–41)
MAGNESIUM SERPL-MCNC: 2.4 MG/DL (ref 1.5–2.5)
MCH RBC QN AUTO: 30.3 PG (ref 27–33)
MCHC RBC AUTO-ENTMCNC: 30.1 G/DL (ref 33.7–35.3)
MCV RBC AUTO: 100.6 FL (ref 81.4–97.8)
MODE IMODE: ABNORMAL
MONOCYTES # BLD AUTO: 0.58 K/UL (ref 0–0.85)
MONOCYTES NFR BLD AUTO: 4.9 % (ref 0–13.4)
NEUTROPHILS # BLD AUTO: 9.99 K/UL (ref 1.82–7.42)
NEUTROPHILS NFR BLD: 83.6 % (ref 44–72)
NRBC # BLD AUTO: 0 K/UL
NRBC BLD-RTO: 0 /100 WBC
O2/TOTAL GAS SETTING VFR VENT: 100 %
O2/TOTAL GAS SETTING VFR VENT: 70 %
O2/TOTAL GAS SETTING VFR VENT: 80 %
PCO2 BLDA: 45.3 MMHG (ref 26–37)
PCO2 BLDA: 55.1 MMHG (ref 26–37)
PCO2 BLDA: 60.2 MMHG (ref 26–37)
PCO2 TEMP ADJ BLDA: 46.7 MMHG (ref 26–37)
PCO2 TEMP ADJ BLDA: 56.6 MMHG (ref 26–37)
PCO2 TEMP ADJ BLDA: 61.8 MMHG (ref 26–37)
PEAK INSPIRATORY PRESSURE IPIP: 22 CMH20
PEAK INSPIRATORY PRESSURE IPIP: 26 CMH20
PEEP END EXPIRATORY PRESSURE IPEEP: 10 CMH20
PEEP END EXPIRATORY PRESSURE IPEEP: 14 CMH20
PEEP END EXPIRATORY PRESSURE IPEEP: 8 CMH20
PH BLDA: 7.37 [PH] (ref 7.4–7.5)
PH BLDA: 7.42 [PH] (ref 7.4–7.5)
PH BLDA: 7.48 [PH] (ref 7.4–7.5)
PH TEMP ADJ BLDA: 7.36 [PH] (ref 7.4–7.5)
PH TEMP ADJ BLDA: 7.41 [PH] (ref 7.4–7.5)
PH TEMP ADJ BLDA: 7.47 [PH] (ref 7.4–7.5)
PLATELET # BLD AUTO: 318 K/UL (ref 164–446)
PMV BLD AUTO: 11.9 FL (ref 9–12.9)
PO2 BLDA: 116 MMHG (ref 64–87)
PO2 BLDA: 52 MMHG (ref 64–87)
PO2 BLDA: 62 MMHG (ref 64–87)
PO2 TEMP ADJ BLDA: 120 MMHG (ref 64–87)
PO2 TEMP ADJ BLDA: 54 MMHG (ref 64–87)
PO2 TEMP ADJ BLDA: 65 MMHG (ref 64–87)
POTASSIUM SERPL-SCNC: 4.7 MMOL/L (ref 3.6–5.5)
PROT SERPL-MCNC: 6 G/DL (ref 6–8.2)
PROTHROMBIN TIME: 16.2 SEC (ref 12–14.6)
RBC # BLD AUTO: 3.1 M/UL (ref 4.7–6.1)
SAO2 % BLDA: 84 % (ref 93–99)
SAO2 % BLDA: 93 % (ref 93–99)
SAO2 % BLDA: 99 % (ref 93–99)
SIGNIFICANT IND 70042: NORMAL
SITE SITE: NORMAL
SODIUM SERPL-SCNC: 143 MMOL/L (ref 135–145)
SOURCE SOURCE: NORMAL
SPECIMEN DRAWN FROM PATIENT: ABNORMAL
TIDAL VOLUME IVT: 430 ML
TRIGL SERPL-MCNC: 81 MG/DL (ref 0–149)
WBC # BLD AUTO: 11.9 K/UL (ref 4.8–10.8)

## 2021-05-08 PROCEDURE — 94640 AIRWAY INHALATION TREATMENT: CPT

## 2021-05-08 PROCEDURE — 80053 COMPREHEN METABOLIC PANEL: CPT

## 2021-05-08 PROCEDURE — 71045 X-RAY EXAM CHEST 1 VIEW: CPT

## 2021-05-08 PROCEDURE — 700101 HCHG RX REV CODE 250: Performed by: INTERNAL MEDICINE

## 2021-05-08 PROCEDURE — 37799 UNLISTED PX VASCULAR SURGERY: CPT

## 2021-05-08 PROCEDURE — 94003 VENT MGMT INPAT SUBQ DAY: CPT

## 2021-05-08 PROCEDURE — 700101 HCHG RX REV CODE 250: Performed by: PSYCHIATRY & NEUROLOGY

## 2021-05-08 PROCEDURE — 83735 ASSAY OF MAGNESIUM: CPT

## 2021-05-08 PROCEDURE — 82962 GLUCOSE BLOOD TEST: CPT

## 2021-05-08 PROCEDURE — 700111 HCHG RX REV CODE 636 W/ 250 OVERRIDE (IP): Performed by: INTERNAL MEDICINE

## 2021-05-08 PROCEDURE — 700111 HCHG RX REV CODE 636 W/ 250 OVERRIDE (IP): Performed by: NURSE PRACTITIONER

## 2021-05-08 PROCEDURE — A9270 NON-COVERED ITEM OR SERVICE: HCPCS | Performed by: STUDENT IN AN ORGANIZED HEALTH CARE EDUCATION/TRAINING PROGRAM

## 2021-05-08 PROCEDURE — 770022 HCHG ROOM/CARE - ICU (200)

## 2021-05-08 PROCEDURE — 99291 CRITICAL CARE FIRST HOUR: CPT | Mod: GC | Performed by: INTERNAL MEDICINE

## 2021-05-08 PROCEDURE — 94799 UNLISTED PULMONARY SVC/PX: CPT

## 2021-05-08 PROCEDURE — 99233 SBSQ HOSP IP/OBS HIGH 50: CPT | Performed by: INTERNAL MEDICINE

## 2021-05-08 PROCEDURE — 700101 HCHG RX REV CODE 250: Performed by: STUDENT IN AN ORGANIZED HEALTH CARE EDUCATION/TRAINING PROGRAM

## 2021-05-08 PROCEDURE — 76705 ECHO EXAM OF ABDOMEN: CPT

## 2021-05-08 PROCEDURE — 85025 COMPLETE CBC W/AUTO DIFF WBC: CPT

## 2021-05-08 PROCEDURE — 85610 PROTHROMBIN TIME: CPT

## 2021-05-08 PROCEDURE — 700102 HCHG RX REV CODE 250 W/ 637 OVERRIDE(OP): Performed by: STUDENT IN AN ORGANIZED HEALTH CARE EDUCATION/TRAINING PROGRAM

## 2021-05-08 PROCEDURE — 700111 HCHG RX REV CODE 636 W/ 250 OVERRIDE (IP): Performed by: STUDENT IN AN ORGANIZED HEALTH CARE EDUCATION/TRAINING PROGRAM

## 2021-05-08 PROCEDURE — 84478 ASSAY OF TRIGLYCERIDES: CPT

## 2021-05-08 PROCEDURE — 700105 HCHG RX REV CODE 258: Performed by: INTERNAL MEDICINE

## 2021-05-08 PROCEDURE — 82803 BLOOD GASES ANY COMBINATION: CPT

## 2021-05-08 RX ORDER — INSULIN GLARGINE 100 [IU]/ML
50 INJECTION, SOLUTION SUBCUTANEOUS EVERY EVENING
Status: DISCONTINUED | OUTPATIENT
Start: 2021-05-08 | End: 2021-05-10

## 2021-05-08 RX ORDER — OXYCODONE HYDROCHLORIDE 5 MG/1
5 TABLET ORAL EVERY 4 HOURS PRN
Status: DISCONTINUED | OUTPATIENT
Start: 2021-05-08 | End: 2021-05-14 | Stop reason: HOSPADM

## 2021-05-08 RX ORDER — OXYCODONE HYDROCHLORIDE 10 MG/1
10 TABLET ORAL EVERY 4 HOURS PRN
Status: DISCONTINUED | OUTPATIENT
Start: 2021-05-08 | End: 2021-05-14 | Stop reason: HOSPADM

## 2021-05-08 RX ADMIN — IPRATROPIUM BROMIDE AND ALBUTEROL SULFATE 3 ML: .5; 2.5 SOLUTION RESPIRATORY (INHALATION) at 21:24

## 2021-05-08 RX ADMIN — ASPIRIN 324 MG: 81 TABLET, CHEWABLE ORAL at 05:36

## 2021-05-08 RX ADMIN — AMPICILLIN SODIUM 2000 MG: 2 INJECTION, POWDER, FOR SOLUTION INTRAMUSCULAR; INTRAVENOUS at 01:54

## 2021-05-08 RX ADMIN — CYANOCOBALAMIN TAB 500 MCG 1000 MCG: 500 TAB at 05:22

## 2021-05-08 RX ADMIN — CARBAMAZEPINE 200 MG: 200 TABLET ORAL at 16:08

## 2021-05-08 RX ADMIN — ENOXAPARIN SODIUM 40 MG: 40 INJECTION SUBCUTANEOUS at 05:23

## 2021-05-08 RX ADMIN — ACETYLCYSTEINE 4 ML: 200 INHALANT RESPIRATORY (INHALATION) at 05:04

## 2021-05-08 RX ADMIN — FENTANYL CITRATE 50 MCG: 50 INJECTION, SOLUTION INTRAMUSCULAR; INTRAVENOUS at 01:15

## 2021-05-08 RX ADMIN — CARBAMAZEPINE 200 MG: 200 TABLET ORAL at 08:04

## 2021-05-08 RX ADMIN — Medication 5000 UNITS: at 05:22

## 2021-05-08 RX ADMIN — FENTANYL CITRATE 100 MCG: 50 INJECTION, SOLUTION INTRAMUSCULAR; INTRAVENOUS at 05:21

## 2021-05-08 RX ADMIN — INSULIN HUMAN 3 UNITS: 100 INJECTION, SOLUTION PARENTERAL at 06:00

## 2021-05-08 RX ADMIN — AMPICILLIN SODIUM 2000 MG: 2 INJECTION, POWDER, FOR SOLUTION INTRAMUSCULAR; INTRAVENOUS at 15:00

## 2021-05-08 RX ADMIN — FENTANYL CITRATE 100 MCG: 50 INJECTION, SOLUTION INTRAMUSCULAR; INTRAVENOUS at 08:04

## 2021-05-08 RX ADMIN — AMPICILLIN SODIUM 2000 MG: 2 INJECTION, POWDER, FOR SOLUTION INTRAMUSCULAR; INTRAVENOUS at 11:52

## 2021-05-08 RX ADMIN — INSULIN GLARGINE 50 UNITS: 100 INJECTION, SOLUTION SUBCUTANEOUS at 17:46

## 2021-05-08 RX ADMIN — FAMOTIDINE 20 MG: 20 TABLET ORAL at 17:38

## 2021-05-08 RX ADMIN — AMPICILLIN SODIUM 2000 MG: 2 INJECTION, POWDER, FOR SOLUTION INTRAMUSCULAR; INTRAVENOUS at 05:23

## 2021-05-08 RX ADMIN — ALBUTEROL SULFATE 2.5 MG: 2.5 SOLUTION RESPIRATORY (INHALATION) at 05:04

## 2021-05-08 RX ADMIN — IPRATROPIUM BROMIDE AND ALBUTEROL SULFATE 3 ML: .5; 2.5 SOLUTION RESPIRATORY (INHALATION) at 04:27

## 2021-05-08 RX ADMIN — FAMOTIDINE 20 MG: 20 TABLET ORAL at 05:22

## 2021-05-08 RX ADMIN — ARIPIPRAZOLE 15 MG: 10 TABLET ORAL at 05:22

## 2021-05-08 RX ADMIN — DOCUSATE SODIUM 50 MG AND SENNOSIDES 8.6 MG 1 TABLET: 8.6; 5 TABLET, FILM COATED ORAL at 20:42

## 2021-05-08 RX ADMIN — CARBAMAZEPINE 200 MG: 200 TABLET ORAL at 00:31

## 2021-05-08 RX ADMIN — IPRATROPIUM BROMIDE AND ALBUTEROL SULFATE 3 ML: .5; 2.5 SOLUTION RESPIRATORY (INHALATION) at 19:12

## 2021-05-08 RX ADMIN — LIDOCAINE 3 PATCH: 50 PATCH TOPICAL at 17:39

## 2021-05-08 RX ADMIN — DOCUSATE SODIUM 100 MG: 50 LIQUID ORAL at 17:38

## 2021-05-08 RX ADMIN — ARIPIPRAZOLE 15 MG: 10 TABLET ORAL at 17:38

## 2021-05-08 RX ADMIN — DOCUSATE SODIUM 100 MG: 50 LIQUID ORAL at 05:23

## 2021-05-08 RX ADMIN — NOREPINEPHRINE BITARTRATE 2 MCG/MIN: 1 INJECTION INTRAVENOUS at 06:38

## 2021-05-08 RX ADMIN — AMPICILLIN SODIUM 2000 MG: 2 INJECTION, POWDER, FOR SOLUTION INTRAMUSCULAR; INTRAVENOUS at 21:50

## 2021-05-08 RX ADMIN — AMPICILLIN SODIUM 2000 MG: 2 INJECTION, POWDER, FOR SOLUTION INTRAMUSCULAR; INTRAVENOUS at 18:00

## 2021-05-08 ASSESSMENT — ENCOUNTER SYMPTOMS
FEVER: 0
SPUTUM PRODUCTION: 1

## 2021-05-08 NOTE — ASSESSMENT & PLAN NOTE
Transamanitis is improving. Hep panel negative, TSH normal.   -Hold statin, avoid hepatotoxic medications  -RUQ U/S: unremarkable except for borderline mild hepatomegaly

## 2021-05-08 NOTE — CARE PLAN
Ventilator Daily Summary:    Vent Day #11  PC  24 RR  26  +8  100%   Plan: Continue current ventilator settings and wean mechanical ventilation as tolerated per physician orders.

## 2021-05-08 NOTE — PROGRESS NOTES
Infectious Disease Progress Note    Author: La Nena Khan M.D. Date & Time of service: 2021  11:13 AM    Chief Complaint:  Sepsis and cervical abscess  CVA     Interval History:  60 y.o.  admitted 2021. Pt has a past medical history of uncontrolled diabetes, CAD status post CABG times 2015, marijuana use and to arthritis.  Presented complaining of neck and back pain ongoing for approximately 1 week and fall getting out of the bathtub.  He had been seen at urgent care for back pain approximately 1 week prior to admission and given Toradol injections.     AF, O2 Vent 12/70%, pressors still on the trending down, pt continues to be agitated, not moving any extremities and concern for quadriplegia due to CVA.    AF, O2 Vent 8/40%, pressors off, agitated, without meaningful limb movement.  Decreasing vent requirements and no longer in shock.   AF, O2 Vent 8/40%, stable on low vent settings no significant secretions per nursing.  He continues to be agitated and with no upper or lower extremity movement.    5/3 AF WBC 14 remains intubated and sedated Agitation with decrease sedation. Epidural drain removed FiO2 0.4   AF WBC 19 code blue yesterday-mucus plugs found. S/p bronch this am-on 100%FiO2 On pressors   AF WBC 15.8 nurse noted some prox  movement in BUE (right greater than left) FiO2 0.9   AF WBC 15.7 s/p bronch again this am-thick secretion RML noted   Febrile 101.1 WBC 12.2 No new positive cultures opens eyes-not following commands FiO2 0.6   AF (99.9) WBC 11.9 Palliative appreciated-family wants full code. Remains obtunded FiO2 0.7    Review of Systems:  Review of Systems   Unable to perform ROS: Intubated   Constitutional: Negative for fever.   Respiratory: Positive for sputum production.    Genitourinary: Negative for hematuria.   Skin: Negative for rash.       Hemodynamics:  Temp (24hrs), Av.4 °C (99.3 °F), Min:37.4 °C (99.3 °F), Max:37.4 °C (99.3 °F)  Temperature:  37.4 °C (99.3 °F), Monitored Temp: 37.7 °C (99.9 °F)  Pulse  Av.7  Min: 57  Max: 121   Blood Pressure: 111/53, Arterial BP: 120/46       Physical Exam:  Physical Exam  Vitals and nursing note reviewed.   Constitutional:       General: He is not in acute distress.     Appearance: He is ill-appearing. He is not toxic-appearing or diaphoretic.      Comments: Opens eyes-   HENT:      Nose: No rhinorrhea.   Eyes:      General: No scleral icterus.        Right eye: No discharge.         Left eye: No discharge.      Pupils: Pupils are equal, round, and reactive to light.   Neck:      Comments: Right IJ-no erythema  Cardiovascular:      Rate and Rhythm: Normal rate and regular rhythm.   Pulmonary:      Effort: Pulmonary effort is normal. No respiratory distress.      Breath sounds: No stridor.      Comments: Coarse breath sounds  Abdominal:      General: There is no distension.      Palpations: Abdomen is soft.      Tenderness: There is no abdominal tenderness. There is no guarding.   Genitourinary:     Comments: SP cath +yellow urine  Musculoskeletal:         General: No deformity.   Lymphadenopathy:      Cervical: No cervical adenopathy.   Skin:     General: Skin is warm.      Coloration: Skin is not jaundiced.      Findings: Bruising present.      Comments: tattoos   Neurological:      Comments:   quadriplegic         Meds:    Current Facility-Administered Medications:   •  insulin glargine  •  oxyCODONE immediate-release  •  oxyCODONE immediate-release  •  cyclobenzaprine  •  ampicillin  •  insulin regular **AND** POC blood glucose manual result **AND** NOTIFY MD and PharmD **AND** dextrose 50%  •  enoxaparin (LOVENOX) injection  •  aspirin  •  acetylcysteine  •  albuterol  •  MD Alert...Adult ICU Electrolyte Replacement per Pharmacy  •  lidocaine  •  magnesium hydroxide  •  ondansetron  •  polyethylene glycol/lytes  •  promethazine  •  senna-docusate  •  MD ALERT...DO NOT ADMINISTER NSAIDS or ASPIRIN unless  ORDERED By Neurosurgery  •  bisacodyl  •  fleet  •  Respiratory Therapy Consult  •  ondansetron  •  promethazine  •  prochlorperazine  •  ipratropium-albuterol  •  labetalol  •  Pharmacy  •  ARIPiprazole  •  carBAMazepine  •  cyanocobalamin  •  senna-docusate  •  vitamin D  •  docusate sodium  •  famotidine **OR** famotidine  •  fentaNYL **AND** fentaNYL **AND** [DISCONTINUED] fentaNYL **AND** [DISCONTINUED] propofol **AND** Triglyceride  •  lidocaine    Labs:  Recent Labs     05/06/21 0435 05/07/21 0315 05/08/21  0200   WBC 15.7* 12.2* 11.9*   RBC 3.21* 3.07* 3.10*   HEMOGLOBIN 9.8* 9.4* 9.4*   HEMATOCRIT 30.9* 30.5* 31.2*   MCV 96.3 99.3* 100.6*   MCH 30.5 30.6 30.3   RDW 50.6* 53.6* 55.9*   PLATELETCT 333 346 318   MPV 12.4 11.9 11.9   NEUTSPOLYS 83.30* 80.50* 83.60*   LYMPHOCYTES 9.70* 12.00* 10.00*   MONOCYTES 5.80 5.70 4.90   EOSINOPHILS 0.30 0.70 0.40   BASOPHILS 0.10 0.00 0.20     Recent Labs     05/06/21 0435 05/07/21 0315 05/08/21  0200   SODIUM 139 139 143   POTASSIUM 4.8 4.3 4.7   CHLORIDE 104 103 105   CO2 31 32 33   GLUCOSE 239* 181* 183*   BUN 37* 37* 38*     Recent Labs     05/06/21 0435 05/07/21  0315 05/08/21  0200   ALBUMIN 2.0* 1.7* 1.9*   TBILIRUBIN 0.2 0.5 0.3   ALKPHOSPHAT 216* 351* 365*   TOTPROTEIN 5.9* 5.8* 6.0   ALTSGPT 178* 272* 413*   ASTSGOT 135* 325* 509*   CREATININE 0.58 0.55 0.42*       Imaging:  CT-CSPINE WITHOUT PLUS RECONS    Result Date: 4/25/2021 4/25/2021 1:29 PM HISTORY/REASON FOR EXAM: History of back and neck pain. Fall. TECHNIQUE/EXAM DESCRIPTION: CT cervical spine without contrast, with reconstructions. Helical scanning was performed from the skull base through T1.  Sagittal and coronal multiplanar reconstructions were generated from the axial images. Low dose optimization technique was utilized for this CT exam including automated exposure control and adjustment of the mA and/or kV according to patient size. COMPARISON:  None available. FINDINGS: No compression  fracture is identified. There is an ununited fracture of the spinous process of T1. Intervertebral disc space narrowing and endplate spurring is most prominent at C6/C7. There are degenerative changes of the facet joints. C1/C2 alignment is maintained. There is multilevel uncovertebral spurring and neural foraminal narrowing. There is cervical prevertebral edema. There is carotid atherosclerotic plaque bilaterally. Visualized lung apices are clear.     Multilevel degenerative changes. Prevertebral edema. Further evaluation with MRI is recommended as this can be seen in the setting of ligamentous injury. Bilateral carotid atherosclerotic plaque.     CT-CHEST (THORAX) WITH    Result Date: 4/25/2021 4/25/2021 1:29 PM HISTORY/REASON FOR EXAM:  Rib fracture suspected, traumatic. TECHNIQUE/EXAM DESCRIPTION: CT scan of the chest with contrast. Helical images were obtained from the lung apices through the adrenal glands following the bolus administration of  80 mL of Omnipaque 350 nonionic contrast. Sagittal and coronal reconstructions were performed. Low dose optimization technique was utilized for this CT exam including automated exposure control and adjustment of the mA and/or kV according to patient size. COMPARISON:  None. FINDINGS: There is atherosclerotic plaque. There is coronary artery calcification. The heart is not enlarged. No pericardial or pleural effusion is seen. There are small mediastinal lymph nodes. There is no hilar or axillary lymphadenopathy. No pneumothorax or pulmonary contusion is seen. Central airways are patent. Limited views were obtained of the upper abdomen. Hypodensity along the falciform ligament likely represents focal fat. Patient is status post median sternotomy. Degenerative changes are seen in the spine. No displaced rib fracture is identified.     No displaced rib fracture is identified. No acute cardiopulmonary process is seen. Atherosclerotic plaque including coronary artery  calcification.    CT-HEAD W/O    Result Date: 4/30/2021 4/30/2021 4:27 AM HISTORY/REASON FOR EXAM: Altered mental status; evaluate cerebellar stroke, if no blood present OK for neurology to start aspirin TECHNIQUE/EXAM DESCRIPTION:  CT of the head without contrast. Sequential axial images were obtained from the vertex to the skull base without contrast. Up to date radiation dose reduction adjustments have been utilized to meet ALARA standards for radiation dose reduction. COMPARISON: April 28, 2021 FINDINGS: There is mild diffuse parenchymal volume loss observed. Periventricular and subcortical white matter low-attenuation changes are seen, most commonly associated with small vessel ischemic disease. The ventricles are normal in caliber and configuration. Low-density changes in the right cerebellar hemisphere seen.. There are no abnormal extra axial fluid collections or extra axial hemorrhage identified. The visualized paranasal sinuses and mastoid air cells are well aerated bilaterally. No depressed calvarial fractures are identified. The visualized globes and retrobulbar soft tissues appear within normal limits.  Atherosclerotic intracranial calcifications are seen.     1.  Low-density changes in the right cerebellar hemisphere, compatible with evolving infarct, streak artifacts minimally limits evaluation of the posterior fossa for subtle subarachnoid hemorrhage, no intracranial hemorrhage is readily identified. 2.  Atherosclerosis.    CT-HEAD W/O    Result Date: 4/28/2021 4/28/2021 5:20 AM HISTORY/REASON FOR EXAM: Altered mental status TECHNIQUE/EXAM DESCRIPTION:  CT of the head without contrast. Sequential axial images were obtained from the vertex to the skull base without contrast. Up to date radiation dose reduction adjustments have been utilized to meet ALARA standards for radiation dose reduction. COMPARISON: None FINDINGS: There is moderate diffuse parenchymal volume loss observed. Periventricular and  subcortical white matter low-attenuation changes are seen, most commonly associated with small vessel ischemic disease. The ventricles are normal in caliber and configuration. Area of low-density within the right cerebellar hemisphere is seen. There are no abnormal extra axial fluid collections or extra axial hemorrhage identified. The visualized paranasal sinuses and mastoid air cells are well aerated bilaterally. No depressed calvarial fractures are identified. The visualized globes and retrobulbar soft tissues appear within normal limits.     1.  Low-density in the region of the right cerebellar hemisphere, appearance compatible with evolving infarct. These findings were discussed with the patient's on-call clinician, Deniz, on 4/28/2021 6:14 AM.    CT-LSPINE W/O PLUS RECONS    Result Date: 4/25/2021 4/25/2021 1:29 PM HISTORY/REASON FOR EXAM:  Back pain after injury. TECHNIQUE/EXAM DESCRIPTION AND NUMBER OF VIEWS: CT lumbar spine without contrast, with reconstructions. The study was performed on a X BODY CT scanner. Thin-section helical scanning was performed from T12-L1 to the sacrum. Sagittal and coronal multiplanar reconstructions were generated from the axial images. Low dose optimization technique was utilized for this CT exam including automated exposure control and adjustment of the mA and/or kV according to patient size. COMPARISON: None. FINDINGS: Alignment of the lumbar spine is normal. There is no acute fracture or subluxation. The prevertebral and paraspinous soft tissues show no acute abnormality. There is severe atherosclerosis with a mixture of soft and calcified plaque. There is lumbosacral junction vacuum disc phenomenon with endplate spurring, disc height loss The visualized sacrum and sacroiliac joints show no acute abnormality.     No CT evidence of acute traumatic injury.    CT-TSPINE W/O PLUS RECONS    Result Date: 4/25/2021 4/25/2021 1:29 PM HISTORY/REASON FOR EXAM:  Mid-back trauma Pain  following injury. TECHNIQUE/EXAM DESCRIPTION AND NUMBER OF VIEWS: CT thoracic spine without contrast, with reconstructions. The study was performed on a GE CT scanner. Thin-section helical scanning was performed from C7-T1 through T12-L1. Sagittal and coronal multiplanar reconstructions were generated from the axial images. Low dose optimization technique was utilized for this CT exam including automated exposure control and adjustment of the mA and/or kV according to patient size. COMPARISON: None. FINDINGS: Alignment of the thoracic spine is normal. There is no acute displaced fracture. There is T6/7 interbody fusion with mild anterior wedging likely indicating remote mild compression fracture that has healed There is bulky endplate spurring with some bridging syndesmophytes in the mid thoracic spine Sternotomy wires The cervicothoracic junction appears intact. The prevertebral and paraspinous soft tissues show no acute abnormality.     No CT evidence of acute traumatic abnormality. Mild T6/7 anterior wedging compatible with healed mild chronic compression fracture and there is interbody fusion.    DX-CERVICAL SPINE-2 OR 3 VIEWS    Result Date: 4/22/2021 4/22/2021 6:16 PM HISTORY/REASON FOR EXAM:  Atraumatic Pain. Neck pain for 4 days. TECHNIQUE/EXAM DESCRIPTION AND NUMBER OF VIEWS: Cervical spine series, 2 views. COMPARISON:  None. FINDINGS: Mild reversal of curvature centered at the C5 level. Vertebral body heights are preserved. Multilevel loss of disc height and osteophyte formation. Cervicothoracic junction is obscured. Prevertebral soft tissues are within normal limits. Odontoid is grossly intact.  Lateral masses of C1 are grossly intact.     1.  Limited exam.  Cervicothoracic junction is obscured. 2.  No gross cervical spine fracture or subluxation. 3.  Moderate multilevel degenerative changes.    DX-CHEST-PORTABLE (1 VIEW)    Result Date: 4/30/2021 4/30/2021 1:07 AM HISTORY/REASON FOR EXAM: For  indication of respiratory failure; For indication of respiratory failure TECHNIQUE/EXAM DESCRIPTION:  Single AP view of the chest. COMPARISON: Yesterday FINDINGS: Position of medical devices appears stable. The cardiac silhouette appears within normal limits.  Postsurgical changes of sternotomy are noted. The mediastinal contour appears within normal limits.  The central pulmonary vasculature appears normal. Bilateral lung volumes are diminished.  Bilateral lungs are clear. No significant pleural effusions are identified. The bony structures appear age-appropriate.     1.  No acute cardiopulmonary disease.    DX-CHEST-PORTABLE (1 VIEW)    Result Date: 4/29/2021 4/29/2021 10:06 AM HISTORY/REASON FOR EXAM:  For indication of respiratory failure; For indication of respiratory failure. TECHNIQUE/EXAM DESCRIPTION AND NUMBER OF VIEWS: Single portable view of the chest. COMPARISON: 4/28/2021 FINDINGS: Endotracheal tube projects at the level the clavicular heads. Right central line projects over the SVC. Enteric tube passes below the level of the diaphragm. The heart is not enlarged. Atherosclerotic calcification is seen. There is mild left basilar opacity likely representing atelectasis. No pleural effusion or pneumothorax is seen. Skin staples and a surgical drain project over the cervical spine.     Mild left basilar opacity may represent atelectasis. Infection not excluded. Improved aeration of the left lung. Atherosclerotic plaque.     DX-CHEST-PORTABLE (1 VIEW)    Result Date: 4/28/2021 4/28/2021 12:10 PM HISTORY/REASON FOR EXAM:  CENTRAL LINE PLACEMENT; CENTRAL LINE PLACEMENT. TECHNIQUE/EXAM DESCRIPTION AND NUMBER OF VIEWS: Single portable view of the chest. COMPARISON: 4/28/2021 11:17 AM FINDINGS: Mediastinal structures are shifted to the LEFT.  Increasing opacification of LEFT hemithorax. RIGHT lung is clear. Endotracheal tube in place with tip approximately 5 cm above the you. Feeding tube tip in place  however tip is off the image. RIGHT internal jugular catheter tip at the lower SVC. No pneumothorax. Postoperative change from prior open heart surgery. Postoperative change of the neck with surgical drain present.     1.  Worsening consolidation of LEFT hemithorax with shift of mediastinal structures to the LEFT suggesting atelectasis, possibly due to endobronchial process. 2.  Supportive tubing as described above. 3.  No pneumothorax.    DX-CHEST-PORTABLE (1 VIEW)    Result Date: 4/28/2021 4/28/2021 11:16 AM HISTORY/REASON FOR EXAM:  replaced ETT; replaced ETT TECHNIQUE/EXAM DESCRIPTION AND NUMBER OF VIEWS: Single portable view of the chest. COMPARISON: 4/27/2021 FINDINGS: Endotracheal tube with tip projecting over the mid thoracic trachea. Hazy opacity in the left lower lobe Layering left pleural effusion. No pneumothorax. Stable cardiopericardial silhouette.     Endotracheal tube with tip projecting over the mid thoracic trachea. Layering left pleural effusion with left lower lung opacity.    DX-CHEST-PORTABLE (1 VIEW)    Result Date: 4/27/2021 4/27/2021 5:30 PM HISTORY/REASON FOR EXAM:  Shortness of Breath. TECHNIQUE/EXAM DESCRIPTION AND NUMBER OF VIEWS: Single portable view of the chest. COMPARISON: None. FINDINGS: LUNGS: Hypoinflation with left basilar atelectasis. Mild perihilar interstitial prominence. No effusions. PNEUMOTHORAX: None. LINES AND TUBES: None. MEDIASTINUM: No cardiomegaly. MUSCULOSKELETAL STRUCTURES: No acute displaced fracture. Median sternotomy.     1.  Hypoinflation with left basilar atelectasis. 2.  Mild perihilar interstitial prominence may be related to hypoinflation or edema.    DX-SPINE-ANY ONE VIEW    Result Date: 4/28/2021 4/28/2021 5:30 AM HISTORY/REASON FOR EXAM:  Cervical laminectomy TECHNIQUE/EXAM DESCRIPTION AND NUMBER OF VIEWS:  Single view of the spine. Digitized Intraoperative Radiograph FINDINGS: Fixation hardware projecting over the cervical spine Fluoroscopic time:2  seconds     Digitized intraoperative radiograph is submitted for review.  This examination is not for diagnostic purpose but for guidance during a surgical procedure.    MR-BRAIN-W/O    Result Date: 4/28/2021 4/28/2021 3:31 PM HISTORY/REASON FOR EXAM:  Altered mental status; cerebellar stroke. TECHNIQUE/EXAM DESCRIPTION: MRI of the brain without contrast. T1 sagittal, T2 fast spin-echo axial, T1 coronal, FLAIR coronal, diffusion-weighted and apparent diffusion coefficient (ADC map) axial images were obtained of the whole brain. The study was performed on a [employer] 1.5 Britt MRI scanner. COMPARISON:  Head CT earlier in the day FINDINGS: Large acute right cerebellar infarct suggesting right posterior inferior cerebellar artery territory. There is prominent T2 hyperintensity consistent with cytotoxic edema. A small amount of blooming artifact in the medial aspect of the right cerebellar hemisphere on GRE images could represent a small amount of petechial hemorrhage. There is mild localized mass effect with some mild mass effect on the fourth ventricle. No supratentorial infarct or hemorrhage. No extra-axial fluid collection. No midline shift or hydrocephalus. There is a nasogastric tube and fluid within the nasopharynx. Endotracheal tube partially visualized. Mild posterior ethmoid sinus mucosal thickening.     Acute large right cerebellar posterior inferior cerebellar artery territory infarct with possible small amount of petechial hemorrhage.    MR-CERVICAL SPINE-WITH & W/O    Result Date: 4/27/2021 4/27/2021 1:02 PM HISTORY/REASON FOR EXAM:  Neck pain, abnormal neuro exam; Neck pain, initial exam; sepsis 2/2 strep bacteremia  -unknown source. rule out possible ?? retropharyngeal abscess, ??prevertebral abscess. TECHNIQUE/EXAM DESCRIPTION: MRI of the cervical spine without and with contrast. The study was performed on a [employer] 1.5 Britt MRI scanner. T1 sagittal, T2 fast spin-echo sagittal, and gradient echo  axial images were obtained of the cervical spine. T1 post-contrast fat suppressed sagittal images were obtained of the cervical spine. Optional T1 post-contrast axial images may be obtained. 15 mL ProHance contrast was administered intravenously. COMPARISON: None. FINDINGS: There is abnormal disc T2 hyperintensity at C5-6. Mild amount of bone marrow edema is noted at C5, C6 and C7. There is also focal bone marrow enhancement at C6. There is multiseptated abnormal peripherally enhancing epidural collection noted extending from C2 to the visualized lower thoracic level. Largest collection is noted in the upper thoracic posterior epidural space at the levels of T2 and T3. The lower extent of the collection is not imaged. This is causing multilevel effacement of the thecal sac and cord compression. The thoracic spinal cord is anteriorly displaced and compressed at the levels of T2 and T3. At the level of C2-3,there is small left anterior epidural fluid collection. At the level of C3-4,there is small left anterior epidural fluid collection causing indentation of the thecal sac. At the level of C4-5,there is minimal epidural fluid collection. At the level of C5-6,there is diffuse disc bulge along with right posterior lateral epidural fluid collection causing severe canal compromise. At the level of C6-7,there is mild diffuse disc bulge. There is epidural fluid collection causing severe canal compromise. At the level of C7-T1,there is epidural fluid collection causing severe canal compromise. The visualized brain parenchyma is unremarkable. The cervical spinal cord is mildly enlarged which demonstrates mild increased intramedullary T2 signal intensity. There is abnormal T2 hyperintensity in the visualized bilateral vertebral arteries concerning for age indeterminate occlusion. There is mild amount of edema in the pre and retropharyngeal soft tissue.     1.  There is multiseptated abnormal peripherally enhancing epidural  collection noted extending from C2 to the visualized lower thoracic level. Largest collection is noted in the upper thoracic posterior epidural space at the levels of T2 and T3. The lower extent of the collection is not imaged. This is causing multilevel effacement of the thecal sac and cord compression. The thoracic spinal cord is anteriorly displaced and compressed at the levels of T2 and T3. Pre and postcontrast MR examination of  the thoracic spine is recommended for further evaluation. 2.  There is effacement of the cervical thecal sac due to the multiseptated epidural fluid collection. 3.  The cervical spinal cord is mildly enlarged with minimal increased T2 signal intensity. The possibility of cord inflammation cannot be entirely excluded. 4.  Abnormal T2 hyperintensity at C5-6 disc space likely representing discitis. 5.  Abnormal bone marrow edema of C5, C6 and C7 vertebral bodies with edema concerning for osteomyelitis. 6.  There is also focal enhancement of C6 vertebral body likely secondary to the osteomyelitis. 7.  Prevertebral edema/fluid collection. 8.  Nonvisualization of flow void of bilateral vertebral arteries concerning for age-indeterminate bilateral vertebral occlusions.     MR-LUMBAR SPINE-WITH & W/O    Result Date: 4/28/2021 4/27/2021 11:23 PM HISTORY/REASON FOR EXAM:  Back pain or radiculopathy, cancer or infection suspected; Strep bacteremia, back pain, bilateral leg numbness TECHNIQUE/EXAM DESCRIPTION: MRI of the lumbar spine without and with contrast. The study was performed on a [employer] 1.5 Britt MRI scanner. T1 sagittal, T2 fast spin-echo sagittal, and T2 axial images were obtained of the lumbar spine. T1 postcontrast fat-suppressed sagittal images were obtained. 14 mL ProHance contrast was administered intravenously. COMPARISON:  None. FINDINGS: Normal lumbar lordosis. Preservation of vertebral body heights, alignment and bone marrow signal. No evidence of discitis osteomyelitis.  Conus medullaris terminates at T12-L1 and is normal in signal. No mass or fluid collection is seen within the lumbar spinal canal. T12-L1: Canal and foramina are patent. L1-L2: Mild disc bulge. Canal and foramina are patent. L2-L3: Minimal disc bulge and facet degeneration. Canal and foramina are patent. L3-L4: Minimal disc bulge and facet degeneration. Canal and foramina are patent.. L4-L5: Mild disc bulge and facet degeneration. No significant spinal stenosis. Mild foraminal narrowing. L5-S1: Disc narrowing, mild disc osteophyte. No significant spinal stenosis. Moderate right and mild-to-moderate left foraminal narrowing. Distended urinary bladder.     No evidence of lumbar spine infection or epidural abscess. Mild spondylosis as detailed above without spinal stenosis.    MR-MRA HEAD-W/O    Result Date: 4/28/2021 4/28/2021 3:31 PM HISTORY/REASON FOR EXAM:  Emergency Medical Condition ? Stroke. Cerebellar infarct TECHNIQUE/EXAM DESCRIPTION: MRA of the head ([place]) without contrast. The study was performed on a [employer] Signa 1.5 Britt MRI scanner. MRA of the [place] was performed with 3D time-of-flight technique with axial acquisition. Additional MRA of the internal carotid and vertebrobasilar arteries at the level of the skull base and craniocervical junction was also performed with 3D time-of-flight technique with axial acquisition. Images are reviewed at the PACS workstation as axial source images as well as maximum intensity ray projection (MIP) multiplanar 3D reconstructions. COMPARISON:  None FINDINGS: Nicole cavernous and supraclinoid ICA segments are patent. Patent left posterior communicating artery. MCA and ARA branches are patent. There is no significant flow within the intradural right vertebral artery. The intradural left vertebral artery, basilar artery and posterior cerebral arteries are patent. No significant aneurysm or high flow vascular malformation is seen.     Findings  suggesting right vertebral artery occlusion.    MR-MRA NECK-WITH & W/O AND SEQUENCES    Result Date: 4/28/2021 4/28/2021 3:31 PM HISTORY/REASON FOR EXAM:  Emergency Medical Condition ? Stroke Cerebellar stroke TECHNIQUE/EXAM DESCRIPTION: MRA of the cervical carotid and vertebral arteries without and with contrast. The study was performed on a [employer] Signa 1.5 Britt MRI scanner. Precontrast MRA of the cervical carotid and vertebral arteries was performed with 2D time-of-flight (TOF) technique with acquisition in the axial plane. MRA of the arch origins of the great vessels, and cervical carotid and vertebral arteries was performed with 2D time-of-flight (TOF) technique with coronal slab acquisition from the level of the aortic arch to the carotid siphons during dynamic intravenous infusion of 15 mL of ProHance contrast. Images are reviewed at the PACS workstation as source images as well as maximum intensity ray projection (MIP) multiplanar 3D reconstructions. Cervical internal carotid artery percent stenosis is calculated using the standard method according to the NASCET criteria wherein a segment of uniform caliber distal cervical internal carotid is used as the reference denominator. COMPARISON:  None available. FINDINGS: The arch origins of the great vessels are intact. The cervical right vertebral artery is not well seen on the time-of-flight images. A very small caliber cervical right vertebral artery is seen on the postcontrast images with some mildly increased caliber of the artery at about the C1-C2 level. The fact  that it is not seen on the time-of-flight images and is visualized on contrast enhanced sequence could be related to small nondominant caliber with sluggish flow or could represent retrograde flow within the right vertebral artery. There may be a focal moderate stenosis in the mid cervical left internal carotid artery and mild narrowing at its origin. Mild narrowing of the proximal left internal  carotid artery with less than 50% stenosis. No significant right carotid stenosis is seen.     1.  Small caliber right vertebral artery which is not visualized on the noncontrast time-of-flight technique could be related to retrograde flow or diminutive caliber and sluggish flow. 2.  Possible moderate focal stenosis in the mid cervical left vertebral artery. 3.  No significant carotid stenosis.    MR-THORACIC SPINE-WITH & W/O    Result Date: 4/28/2021 4/27/2021 11:23 PM HISTORY/REASON FOR EXAM:  Mid-back pain, compression fracture suspected; possible epidural abscess/fluid collection TECHNIQUE/EXAM DESCRIPTION: MRI of the thoracic spine without and with contrast. The study was performed on a [employer] Signa 1.5 Britt MRI scanner. T1 sagittal, T2 fast spin-echo sagittal, and T2 axial images were obtained of the thoracic spine. T1 post-contrast fat suppressed sagittal images were obtained. Optional T1 post-contrast axial images may be obtained. 14 mL ProHance contrast was administered intravenously. COMPARISON:  MRI of the cervical spine one-day prior. FINDINGS: There is abnormal dorsal epidural fluid collection seen within the partially visualized lower cervical spine and which extends inferiorly to about the T6 level. The maximum thickness is seen at about the T2-T3 level measuring 8 mm. This raises concern for epidural abscess, although epidural hematoma could also have this appearance. From T1 through T3 there is at least moderate thecal sac stenosis due to the epidural fluid collection. There is significant abnormal signal within the partially visualized  lower cervical and upper thoracic cord which could be infectious/inflammatory or other nonspecific cord edema. There is also suggestion of a small ventral epidural fluid collection at C6-C7. There is also partially visualized abnormal marrow edema in the C6 and C7 vertebral bodies as detailed on earlier MRI report. There is suggestion of some prominent enhancement  around the mid and lower thoracic cord seen on the sagittal postcontrast images however limited evaluation on axial images due to motion artifact. This is of uncertain etiology but could represent some leptomeningeal disease/infection. On the sagittal T2 images there is a questionable slight nodular appearance on the surface of lower thoracic cord. A differential would be a subtle spinal dural aVF. There is no abnormal signal seen within the mid/lower thoracic cord. There is no evidence of discitis osteomyelitis within the thoracic spine. There is T6-T7 interbody ankylosis. There is mild disc degeneration and some prominent anterolateral bridging osteophytes in the mid and lower thoracic spine. No significant posterior disc herniation is seen. Within the mid and lower thoracic spine there  is no significant spinal stenosis.     1.  Posterior epidural fluid collection/abscess within the lower cervical spine extending inferiorly to about the T6 level which could represent epidural abscess and/or hematoma. This measures 8 mm in maximal thickness at T2-T3 with at least moderate thecal sac stenosis. 2.  Abnormal signal within the cervical cord and upper thoracic cord suggesting cord edema or infectious/inflammatory etiology. 3.  Lower cervical spine findings are detailed on earlier report. 4.  Prominent enhancement along the surface of the mid and lower thoracic cord is of uncertain etiology and as detailed above, possibly representing leptomeningeal disease/infection. See details above. These findings were discussed with Dr. Cano on 4/28/2021 10:01 AM.     IR-DRAIN-BLADDER SUPRAPUB W/CATH    Result Date: 4/28/2021  HISTORY/REASON FOR EXAM:  60-year-old man with urinary retention. TECHNIQUE/EXAM DESCRIPTION AND NUMBER OF VIEWS:  Ultrasound and fluoroscopic guided suprapubic bladder catheter placement, Cystogram. MEDICATIONS: The patient remained on his ICU sedation regimen. FLUOROSCOPY TIME: 0.3 minutes; 5fluoroscopic  images obtained CONTRAST: 40mL Omnipaque 300, intraurinary PROCEDURE:  Informed consent was obtained. The suprapubic region was prepped and draped in sterile manner. Following local anesthesia with copious 1% lidocaine, under ultrasound and fluoroscopic monitoring, an 18-gauge needle was advanced into the urinary bladder from a paramedian suprapubic approach. An Amplatz guidewire was placed in the urinary bladder. Serial tract dilatation was performed with a 22 [place] telescoping dilator. A 16 [place] tip silicone Gomez type catheter was then placed in the balloon inflated with 10 mL of sterile saline. A cystogram was performed with AP and both oblique views demonstrating satisfactory position of the catheter with all side holes within the urinary bladder. The catheter was secured to the skin with 2-0 nylon suture and connected to gravity drainage. The Gomez catheter was removed. The patient tolerated the procedure well with no evidence of complication. COMPARISON: None FINDINGS:  The cystogram shows satisfactory catheter position with all sideholes within the urinary bladder. There is no extravasation.     1.     Ultrasound and fluoroscopic guided placement of a 16 [place] tip silicone suprapubic bladder catheter. 2.     Cystogram documenting catheter placement.     DX-PORTABLE FLUOROSCOPY < 1 HOUR    Result Date: 4/28/2021 4/28/2021 5:30 AM HISTORY/REASON FOR EXAM:  Cervical laminectomy TECHNIQUE/EXAM DESCRIPTION AND NUMBER OF VIEWS: Portable fluoroscopy for less than one hour in OR. FINDINGS: The portable fluoroscopy unit was obligated to the procedure for less than one hour. Actual fluoro time was 2 seconds.     Portable fluoroscopy utilized for 2 seconds. INTERPRETING LOCATION: [address] [place] [address], [id number]    EC-ECHOCARDIOGRAM COMPLETE W/O CONT    Result Date: 4/28/2021  Transthoracic Echo Report Echocardiography Laboratory CONCLUSIONS No prior study is available for comparison. Normal left  ventricular systolic function.  Left ventricular ejection fraction is visually estimated to be 60%. Normal diastolic function. Agitated saline (bubble) study was performed. No evidence of right to left shunt by agitated saline challenge. Normal inferior vena cava size and inspiratory collapse. GILDARDO MAGANA Exam Date:         2021                    19:42 Exam Location:     Inpatient Priority:          Routine Ordering Physician:        YESICA SULLIVAN Referring Physician:       [id number] Marie Sonographer:               Faye Moya [facility] Age:    60     Gender:    M MRN:    [id number] :    1960 BSA:    1.87   Ht (in):    69     Wt (lb):    159 Exam Type:     Complete Indications:     CVA ICD Codes:       436 CPT Codes:       54206 BP:   140    /   60     HR:   74 Technical Quality:       Fair MEASUREMENTS  (Male / Female) Normal Values 2D ECHO LV Diastolic Diameter PLAX        3.8 cm                4.2 - 5.9 / 3.9 - 5.3 cm LV Systolic Diameter PLAX         2.4 cm                2.1 - 4.0 cm IVS Diastolic Thickness           1.4 cm                LVPW Diastolic Thickness          1.4 cm                LVOT Diameter                     2 cm                  Estimated LV Ejection Fraction    60 %                  LV Ejection Fraction MOD BP       62.7 %                >= 55  % LV Ejection Fraction MOD 4C       46.6 %                LV Ejection Fraction MOD 2C       55 %                  DOPPLER AV Peak Velocity                  1.7 m/s               AV Peak Gradient                  10.9 mmHg             AV Mean Gradient                  5.5 mmHg              LVOT Peak Velocity                1.2 m/s               AV Area Cont Eq vti               2.3 cm2               Mitral E Point Velocity           0.74 m/s              Mitral E to A Ratio               0.92                  MV Pressure Half Time             69.4 ms               MV Area PHT                       3.2 cm2               MV Deceleration  Time              239 ms                PV Peak Velocity                  1.1 m/s               PV Peak Gradient                  4.6 mmHg              RVOT Peak Velocity                0.66 m/s              * Indicates values subject to auto-interpretation LV EF:  60    % FINDINGS Left Ventricle Normal left ventricular chamber size. Mild concentric left ventricular hypertrophy. Normal left ventricular systolic function.  Left ventricular ejection fraction is visually estimated to be 60%. Normal diastolic function. Right Ventricle Normal right ventricular size and systolic function. Right Atrium Normal right atrial size. Normal inferior vena cava size and inspiratory collapse. Left Atrium Normal left atrial size. Agitated saline (bubble) study was performed. With Valsalva maneuver. No evidence of right to left shunt by agitated saline challenge. Existing IV was used. Mitral Valve Structurally normal mitral valve without significant stenosis or regurgitation. Aortic Valve Aortic sclerosis without stenosis. No aortic insufficiency. Tricuspid Valve Structurally normal tricuspid valve without significant stenosis or regurgitation. Pulmonic Valve The pulmonic valve is not well visualized. No pulmonic insufficiency. Pericardium Normal pericardium without effusion. Aorta Normal aortic root for body surface area. Ascending aorta not well visualized. Zakiya Rebollar MD (Electronically Signed) Final Date:     28 April 2021                 21:30    EC-CHRISTOPHER W/O CONT    Result Date: 4/30/2021  Results Will be Available after Interpretation by Cardiologist.    DX-ABDOMEN FOR TUBE PLACEMENT    Result Date: 4/30/2021 4/30/2021 12:44 PM HISTORY/REASON FOR EXAM:  Line evaluation. TECHNIQUE/EXAM DESCRIPTION AND NUMBER OF VIEWS:  1 view(s) of the abdomen. COMPARISON:  4/28/2021 FINDINGS: Enteric tube has been placed. The tip projects over the distal stomach or duodenal bulb. There are scattered loops of air-filled bowel.     Feeding tube  "placement with the tip projecting over the distal stomach or duodenal bulb    DX-ABDOMEN FOR TUBE PLACEMENT    Result Date: 4/28/2021 4/28/2021 12:24 PM HISTORY/REASON FOR EXAM:  Tube evaluation. TECHNIQUE/EXAM DESCRIPTION AND NUMBER OF VIEWS:  1 view(s) of the abdomen. COMPARISON:  None. FINDINGS: Limited single view of the abdomen performed primarily to evaluate enteric tube position. The tip projects over the expected area of the distal stomach. The bowel gas pattern is within normal limits.     Feeding tube with tip projecting over the expected area of the distal stomach.      Micro:  Results     Procedure Component Value Units Date/Time    Culture Respiratory W/ Grm Stn - BAL [[id number]] Collected: 05/06/21 1055    Order Status: Completed Specimen: Respirate from Bronchoalveolar Lavage Updated: 05/08/21 1100     Significant Indicator NEG     Source RESP     Site BRONCHOALVEOLAR LAVAGE     Culture Result Rare growth usual upper respiratory bruce  including yeast.  No clinically significant Staphylococcus aureus, Methicillin  Resistant Staphylococcus aureus, or Pseudomonas species  isolated.       Gram Stain Result Many WBCs.  No organisms seen.      Narrative:      Collected By:[id number] MOSES KAPLAN  Collected By:[id number] MOSES KAPLAN    BLOOD CULTURE [[id number]] Collected: 05/07/21 0758    Order Status: Completed Specimen: Blood from Peripheral Updated: 05/08/21 0711     Significant Indicator NEG     Source BLD     Site PERIPHERAL     Culture Result No Growth  Note: Blood cultures are incubated for 5 days and  are monitored continuously.Positive blood cultures  are called to the RN and reported as soon as  they are identified.      Narrative:      Collected By:[id number] HARJIT HERRERA  Per Hospital Policy: Only change Specimen Src: to \"Line\" if  specified by physician order.  Left Forearm/Arm    BLOOD CULTURE [[id number]] Collected: 05/07/21 1010    Order Status: Completed Specimen: Blood from Peripheral " "Updated: 05/08/21 0711     Significant Indicator NEG     Source BLD     Site PERIPHERAL     Culture Result No Growth  Note: Blood cultures are incubated for 5 days and  are monitored continuously.Positive blood cultures  are called to the RN and reported as soon as  they are identified.      Narrative:      Collected By:[id number] HARJIT HERRERA  Per Hospital Policy: Only change Specimen Src: to \"Line\" if  specified by physician order.  Left AC    BLOOD CULTURE [[id number]]     Order Status: Canceled Specimen: Blood from Peripheral     BLOOD CULTURE [[id number]]     Order Status: Canceled Specimen: Blood from Peripheral     GRAM STAIN [[id number]] Collected: 05/06/21 1055    Order Status: Completed Specimen: Respirate Updated: 05/06/21 2054     Significant Indicator .     Source RESP     Site BRONCHOALVEOLAR LAVAGE     Gram Stain Result Many WBCs.  No organisms seen.      Narrative:      Collected By:[id number] MOSES KAPLAN  Collected By:[id number] MOSES KAPLAN    BLOOD CULTURE [[id number]] Collected: 05/01/21 1300    Order Status: Completed Specimen: Blood from Peripheral Updated: 05/06/21 1500     Significant Indicator NEG     Source BLD     Site PERIPHERAL     Culture Result No growth after 5 days of incubation.    Narrative:      Collected By:[id number] LESA VIZCARRA  Per Hospital Policy: Only change Specimen Src: to \"Line\" if  specified by physician order.  Right Forearm/Arm    BLOOD CULTURE [[id number]] Collected: 05/01/21 1305    Order Status: Completed Specimen: Blood from Peripheral Updated: 05/06/21 1500     Significant Indicator NEG     Source BLD     Site PERIPHERAL     Culture Result No growth after 5 days of incubation.    Narrative:      Collected By:[id number] LESA VIZCARRA  Per Hospital Policy: Only change Specimen Src: to \"Line\" if  specified by physician order.  Left Forearm/Arm    BLOOD CULTURE [[id number]] Collected: 04/28/21 1134    Order Status: Completed Specimen: Blood from Peripheral Updated: 05/03/21 " "1300     Significant Indicator NEG     Source BLD     Site PERIPHERAL     Culture Result No growth after 5 days of incubation.    Narrative:      Collected By:[id number] SAILAJA BAILEY  Per Hospital Policy: Only change Specimen Src: to \"Line\" if  specified by physician order.  No site indicated    BLOOD CULTURE [[id number]] Collected: 04/27/21 0248    Order Status: Completed Specimen: Blood from Peripheral Updated: 05/02/21 1100     Significant Indicator NEG     Source BLD     Site PERIPHERAL     Culture Result No growth after 5 days of incubation.    Narrative:      Per Hospital Policy: Only change Specimen Src: to \"Line\" if  specified by physician order.  Left AC    CULTURE WOUND W/ GRAM STAIN [[id number]]  (Abnormal) Collected: 04/28/21 0642    Order Status: Completed Specimen: Wound Updated: 05/01/21 1117     Significant Indicator POS     Source WND     Site Cerival Thoracic Epidural 1     Culture Result Growth noted after further incubation, see below for  organism identification.       Gram Stain Result Few WBCs.  Rare Gram positive cocci.       Culture Result Streptococcus anginosus  Light growth      Narrative:      Surgery - swabs received    Anaerobic Culture [[id number]] Collected: 04/28/21 0642    Order Status: Completed Specimen: Wound Updated: 05/01/21 1117     Significant Indicator NEG     Source WND     Site Cerival Thoracic Epidural 1     Culture Result No Anaerobes isolated.    Narrative:      Surgery - swabs received    CULTURE WOUND W/ GRAM STAIN [[id number]]  (Abnormal) Collected: 04/28/21 0655    Order Status: Completed Specimen: Wound Updated: 05/01/21 1117     Significant Indicator POS     Source WND     Site Cerival Thoric Epidural 2     Culture Result Growth noted after further incubation, see below for  organism identification.       Gram Stain Result Few WBCs.  Few Gram positive cocci.       Culture Result Streptococcus anginosus  Moderate growth      Narrative:      Surgery - swabs " received    Anaerobic Culture [[id number]] Collected: 04/28/21 0655    Order Status: Completed Specimen: Wound Updated: 05/01/21 1117     Significant Indicator NEG     Source WND     Site Cerival Thoric Epidural 2     Culture Result No Anaerobes isolated.    Narrative:      Surgery - swabs received          Assessment:  Active Hospital Problems    Diagnosis    • *Spinal epidural abscess [G06.1]    • History of ischemic stroke [Z86.73]    • Sepsis due to Streptococcus species (HCC) [A40.9]    • Acute bilateral back pain [M54.9]    • Thrombocytopenia (HCC) [D69.6]    • Type 2 diabetes mellitus without complication, without long-term current use of insulin (HCC) [E11.9]    • Coronary artery disease due to calcified coronary lesion: CABG x4, July 2015 [I25.10, I25.84]    • Essential hypertension, benign [I10]    • Hypokalemia [E87.6]    • Hypomagnesemia [E83.42]    • Hyponatremia [E87.1]    • Difficulty swallowing [R13.10]    • Diabetic ketoacidosis (HCC) [E11.10]    • Marijuana abuse [F12.10]    • High anion gap metabolic acidosis [E87.2]    • Tobacco abuse [Z72.0]    • Dyslipidemia [E78.5]    • Status post urethrostomy (HCC) [Z93.6]    .    Assessment:  60 y.o.  admitted 4/25/2021. Pt has a past medical history of uncontrolled diabetes, CAD status post CABG times 4/2015, marijuana use and to arthritis.  Presented complaining of neck and back pain ongoing for approximately 1 week and fall getting out of the bathtub.  He had been seen at urgent care for back pain approximately 1 week prior to admission and given Toradol injections.       Hospital Course:   Afebrile and vitals unremarkable other than hypertension.  Leukocytosis ongoing since arrival.  MRI C-spine on 4/26 with epidural collection noted from C2-T3.  Largest collection noted in upper thoracic posterior epidural space at T2-T3.  This is causing multilevel cord compression.  Also with likely discitis at C5-6 and osteomyelitis at C5-C7.  Blood cultures on 4/25+  for Streptococcus species.    Code blue 5/3-mucus plugs     Problem List  Sepsis/shock, strep  -Blood cultures on 4/25 2/2 + Streptococcus anginosus, penicillin and cephalosporin susceptible  -Blood cultures on 4/27 1/2 + viridans Streptococcus   -Blood cultures on 4/28  1/2 + CoNS -likely contaminant  -Blood cultures on 5/1 and 5/7 are no growth to date  -TTE with no vegetations  -CHRISTOPHER on 4/30, no official read as yet    VDRF-Bronchoscopy on 4/28 with thick secretions  Bronch 5/3 mucus plugs, thick secretions  Bronch 5/4 pending-no signif secretions noted  Bronch 5/6 with thick secretion RML    Leukocytosis, persistent. Decreasing  Cervical thoracic infection, epidural abscess at C2-T3 as well as discitis and osteomyelitis, +GPCs-Streptococcus anginosus   -Repeat MRI thoracic spine on 4/27 notes posterior epidural fluid  collection/abscess in lower cervical spine extending to T6 level, also noted abnormal signal within the cervical and upper thoracic cord  -s/p OR 4/28 for see 4-T2 laminectomy, decompression of thecal sac and nerve roots as well as cervical thoracic extradural spinal mass/epidural abscess removal, linda purulence during OR, no CSF leak per op note  -Operative cultures from cervical thoracic epidural 1/ 2 +strep anginosis    CVA, Right cerebellar stroke, possibly due to venous spasm associated with the epidural abscess.   -MRA neck with and without on 4/28, possible moderate focal stenosis in mid left CVA.  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  MRI without on 4/28 with acute large right cerebellar infarct with small hemorrhage  Uncontrolled diabetes  Transaminitis, worse     Plan   Continue ampicillin  Anticipate a 6-week IV antibiotic course for the bacteremia and spinal infection-stop date 6/11/2021  Due to recurrent fever-repeat blood cxs (neg), checked lipase (normal),  Recommend imaging liver/GB if feasible(worsening LFTs)  Repeat MRI spine  prior to stopping antibiotics  FU CHRISTOPHER result-stillpending  Keep BS under 150 to help control current infection  Continue to FU Bronch results (5/6)-last CXR LLL infiltrate.   Cultures neg to date-continue to follow    Palliative care consult and goals of care discussion appreciated     Prognosis guarded

## 2021-05-08 NOTE — CARE PLAN
Problem: Ventilation Defect:  Goal: Ability to achieve and maintain unassisted ventilation or tolerate decreased levels of ventilator support  Intervention: Support and monitor invasive and noninvasive mechanical ventilation  Note:    Ventilator Daily Summary    Vent Day #11  CMV: 26/430/+10/70%    Ventilator settings changed this shift:Pt switched to APVcmv from PC    Weaning trials:No    Respiratory Procedures:No    Plan: Continue current ventilator settings and wean mechanical ventilation as tolerated per physician orders.

## 2021-05-08 NOTE — HOSPITAL COURSE
"\"Perry Davis is a 60 y.o. male with a previous history of uncontrolled Diabetes -Hb1C 13 , HLD, CAD s/p CABGx4  2015, tobacco use ,OA, chronic low pain admitted  4/25 with worsening neck , upper back pain after a GLF , mild DKA. Found to have epidural abscess w/ cord compression, discitis involving cervical and thoracic spine, underwent emergent laminectomy and decompression by Dr. Hazel 4/28. Blood cultures from 4/25 2/2 positive strep anginosus on Ceftriaxone, followed by ID. Acute mental state change noted 4/27 , CT head with right cerebellar CVA, neurology consulting. Intubated for airway protection and transferred to ICU today post surgical intervention.\"  "

## 2021-05-08 NOTE — PALLIATIVE CARE
Palliative Care follow-up  Checked in on pt, Pt remains intubated. Spoke with pts bedside SARAH Arellano. Pts mother remains hopefull and continues to want full treatment. Unknown if pts brother has been able to come in to see him at this time.     Updated: Dr Curry, Bedside SARAH Arellano    Plan: Palliative RN will remain available for pt and family needs and/or continued discussions on GOC/POC.     Thank you for allowing Palliative Care to participate in this patient's care. Please feel free to call [phone] with any questions or concerns.

## 2021-05-08 NOTE — FLOWSHEET NOTE
05/08/21 0534   Events/Summary/Plan   Events/Summary/Plan Increased PIP to 26, decreased peep to 8, rate 24 per Dr Kelley, fio2 100%

## 2021-05-08 NOTE — PROGRESS NOTES
[facility] Progress Note      Admit Date: 4/25/2021    Resident(s): Delroy Fulton M.D.   Attending:  BREANNA ALCARAZ/ Dr. Sarmiento    Patient ID:    Name:  Perry Davis   YOB: 1960  Age:  60 y.o.  male   MRN:  [id number]    Hospital Course (carried forward and updated):  Perry Davis is a 60 y.o. male with a previous history of uncontrolled Diabetes -Hb1C 13 , HLD, CAD s/p CABGx4  2015, tobacco use ,OA, chronic low pain admitted  4/25 with epidural abscess w/ cord compression, discitis involving cervical and thoracic spine--> underwent emergent laminectomy and decompression by Dr. Hazel 4/28.  Hospital course complicated by acute right cerebellar stroke ans strep anginosus bacteremia on Ceftriaxone, followed by ID.   Patient currently intubated    Consultants:  Critical Care  Neurosurgery  Urology  Infectious disease    Interval Events:  -desat to 80's and tachypneic overnight, oral secretions+, seen by Dr Kelley, vent settings changed.  -on ampicillin per ID, BAL c/s NGTD, blood c/c 5/7/21: NGTD  -worsening transaminitis  -hematuria persists  -Palliative GOC discussion: family wants full treatment    Vitals Range last 24h:  Temp:  [37.4 °C (99.3 °F)] 37.4 °C (99.3 °F)  Pulse:  [60-84] 71  Resp:  [23-50] 26  BP: (112-151)/(55-70) 112/55  SpO2:  [89 %-99 %] 92 %      Intake/Output Summary (Last 24 hours) at 5/8/2021 1052  Last data filed at 5/8/2021 1000  Gross per 24 hour   Intake 1644.24 ml   Output 1365 ml   Net 279.24 ml        Review of Systems   Unable to perform ROS: Intubated       PHYSICAL EXAM:  Vitals:    05/08/21 0651 05/08/21 0700 05/08/21 0800 05/08/21 0900   BP:  136/64 113/55 112/55   Pulse: 74 74 72 71   Resp: (!) 26 (!) 26 (!) 26 (!) 26   Temp:       TempSrc:   Bladder    SpO2: 95% 94% 92% 92%   Weight:       Height:        Body mass index is 31.17 kg/m².    O2 therapy: Pulse Oximetry: 92 %, O2 Delivery Device: Ventilator    Date 05/08/21 0700 - 05/09/21 0659   Shift 8710-6739  0905-4763 0305-5797 24 Hour Total   INTAKE   I.V. 4.2   4.2     Norepinephrine Volume 4.2   4.2   Other 30   30     Medications (PO/Enteral Liquids) 30   30   NG/   100     Intake (mL) (Enteral Tube 05/03/21 Cortrak - Gastric) 100   100   Shift Total 134.2   134.2   OUTPUT   Urine 375   375     Output (mL) (Urethral Catheter Other (Comment) 16 Fr.) 375   375   Shift Total 375   375   NET -240.8   -240.8        Physical Exam   Constitutional:   Intubated    HENT:   Head: Normocephalic and atraumatic.   Eyes: Pupils are equal, round, and reactive to light. Conjunctivae are normal.   Cardiovascular: Normal rate, regular rhythm and normal heart sounds.   Pulmonary/Chest: Effort normal and breath sounds normal.   Abdominal: Soft. He exhibits no distension.   Genitourinary:    Genitourinary Comments: Hematuria persists     Musculoskeletal:      Cervical back: Normal range of motion.   Neurological:   Moves head spontaneously  Awakens to verbal commands, does not follow commands  Unable to assess full neuro exam   Skin: Skin is warm.       Recent Labs     05/07/21  0413 05/08/21  0517 05/08/21  0641   ISTATAPH 7.491 7.368* 7.420   ISTATAPCO2 43.4* 60.2* 55.1*   ISTATAPO2 64 52* 116*   ISTATATCO2 34* 36* 37*   [id number] 93 84* 99   ISTATARTHCO3 33.1* 34.7* 35.7*   ISTATARTBE 9* 8* 10*   ISTATTEMP 98.4 F 99.7 F 99.7 F   ISTATFIO2 70 80 100   ISTATSPEC Arterial Arterial Arterial   ISTATAPHTC 7.492 7.359* 7.411   [id number] 63* 54* 120*     Recent Labs     05/06/21  0435 05/07/21  0315 05/08/21  0200   SODIUM 139 139 143   POTASSIUM 4.8 4.3 4.7   CHLORIDE 104 103 105   CO2 31 32 33   BUN 37* 37* 38*   CREATININE 0.58 0.55 0.42*   MAGNESIUM 2.2 2.2 2.4   CALCIUM 7.9* 8.1* 8.0*     Recent Labs     05/06/21  0435 05/07/21  0315 05/07/21  1220 05/08/21  0200   ALTSGPT 178* 272*  --  413*   ASTSGOT 135* 325*  --  509*   ALKPHOSPHAT 216* 351*  --  365*   TBILIRUBIN 0.2 0.5  --  0.3   LIPASE  --   --  18  --    GLUCOSE 239*  181*  --  183*     Recent Labs     05/06/21 0435 05/07/21 0315 05/08/21  0200 05/08/21  0815   RBC 3.21* 3.07* 3.10*  --    HEMOGLOBIN 9.8* 9.4* 9.4*  --    HEMATOCRIT 30.9* 30.5* 31.2*  --    PLATELETCT 333 346 318  --    PROTHROMBTM  --   --   --  16.2*   INR  --   --   --  1.26*     Recent Labs     05/06/21 0435 05/07/21 0315 05/08/21  0200   WBC 15.7* 12.2* 11.9*   NEUTSPOLYS 83.30* 80.50* 83.60*   LYMPHOCYTES 9.70* 12.00* 10.00*   MONOCYTES 5.80 5.70 4.90   EOSINOPHILS 0.30 0.70 0.40   BASOPHILS 0.10 0.00 0.20   ASTSGOT 135* 325* 509*   ALTSGPT 178* 272* 413*   ALKPHOSPHAT 216* 351* 365*   TBILIRUBIN 0.2 0.5 0.3       Meds:  • insulin glargine  50 Units     • oxyCODONE immediate-release  10 mg     • oxyCODONE immediate-release  5 mg     • cyclobenzaprine  5 mg     • ampicillin  2,000 mg Stopped (05/08/21 0553)   • insulin regular  3-14 Units      And   • dextrose 50%  50 mL     • enoxaparin (LOVENOX) injection  40 mg     • aspirin  324 mg     • acetylcysteine  3-4 mL     • albuterol  2.5 mg     • MD Alert...Adult ICU Electrolyte Replacement per Pharmacy       • lidocaine  1-2 mL     • magnesium hydroxide  30 mL     • ondansetron  4 mg     • polyethylene glycol/lytes  1 Packet     • promethazine  12.5-25 mg     • senna-docusate  1 tablet     • MD ALERT...DO NOT ADMINISTER NSAIDS or ASPIRIN unless ORDERED By Neurosurgery  1 Each     • bisacodyl  10 mg     • fleet  1 Each     • Respiratory Therapy Consult       • ondansetron  4 mg     • promethazine  12.5-25 mg     • prochlorperazine  5-10 mg     • ipratropium-albuterol  3 mL     • labetalol  10 mg     • Pharmacy  1 Each     • ARIPiprazole  15 mg     • carBAMazepine  200 mg     • cyanocobalamin  1,000 mcg     • senna-docusate  1 tablet     • vitamin D  5,000 Units     • docusate sodium  100 mg     • famotidine  20 mg      Or   • famotidine  20 mg     • fentaNYL  50 mcg      And   • fentaNYL  100 mcg     • lidocaine  3 Patch          Procedures:  Bronchoscopy  05/06/21  Bronchoscopy on 05/03  -Decompressive laminectomy on 4/28  -Central line placement on 5/01    Imaging:  DX-CHEST-PORTABLE (1 VIEW)   Final Result         Decreased left basilar opacity.      DX-CHEST-PORTABLE (1 VIEW)   Final Result      Increasing left basilar airspace opacities. No significant cardiopulmonary change otherwise. Repositioned endotracheal tube.      US-EXTREMITY ARTERY LOWER UNILAT RIGHT   Final Result      DX-ABDOMEN FOR TUBE PLACEMENT   Final Result      Feeding tube tip projects over the distal stomach or first portion of the duodenum.      CT-CSPINE WITHOUT PLUS RECONS   Final Result      1.  Postsurgical changes C3-T1 laminectomies.   2.  New irregular linear and mottled lucency in the anterior C5 vertebral body which is likely related to osteomyelitis.   3.  Prevertebral soft tissue swelling.   4.  Extensive known extensive epidural abscess seen on prior MRI is not adequately evaluated on CT.      CT-CTA CHEST PULMONARY ARTERY W/ RECONS   Final Result      1.  No evidence of pulmonary embolism.      2.  Bilateral lower lobe atelectasis or pneumonia, left greater than right.      3.  Small left pleural effusion.      4.  CABG changes.            DX-CHEST-PORTABLE (1 VIEW)   Final Result      Endotracheal tube terminates approximately 2.5 cm above the you.      Atelectasis with left lung aeration. No pleural effusion or pneumothorax.      [id number] VIEW   Final Result      No dilated bowel      DX-ABDOMEN FOR TUBE PLACEMENT   Final Result      Feeding tube placement with the tip projecting over the distal stomach or duodenal bulb      EC-CHRISTOPHER W/O CONT         CT-HEAD W/O   Final Result         1.  Low-density changes in the right cerebellar hemisphere, compatible with evolving infarct, streak artifacts minimally limits evaluation of the posterior fossa for subtle subarachnoid hemorrhage, no intracranial hemorrhage is readily identified.   2.  Atherosclerosis.      DX-CHEST-PORTABLE  (1 VIEW)   Final Result         1.  No acute cardiopulmonary disease.      DX-CHEST-PORTABLE (1 VIEW)   Final Result      Mild left basilar opacity may represent atelectasis. Infection not excluded.      Improved aeration of the left lung.      Atherosclerotic plaque.         EC-ECHOCARDIOGRAM COMPLETE W/O CONT   Final Result      IR-DRAIN-BLADDER SUPRAPUB W/CATH   Final Result      1.     Ultrasound and fluoroscopic guided placement of a 16 [place] tip silicone suprapubic bladder catheter.      2.     Cystogram documenting catheter placement.         MR-BRAIN-W/O   Final Result      Acute large right cerebellar posterior inferior cerebellar artery territory infarct with possible small amount of petechial hemorrhage.      MR-MRA HEAD-W/O   Final Result      Findings suggesting right vertebral artery occlusion.      MR-MRA NECK-WITH & W/O AND SEQUENCES   Final Result      1.  Small caliber right vertebral artery which is not visualized on the noncontrast time-of-flight technique could be related to retrograde flow or diminutive caliber and sluggish flow.   2.  Possible moderate focal stenosis in the mid cervical left vertebral artery.   3.  No significant carotid stenosis.      DX-ABDOMEN FOR TUBE PLACEMENT   Final Result         Feeding tube with tip projecting over the expected area of the distal stomach.      DX-CHEST-PORTABLE (1 VIEW)   Final Result      1.  Worsening consolidation of LEFT hemithorax with shift of mediastinal structures to the LEFT suggesting atelectasis, possibly due to endobronchial process.   2.  Supportive tubing as described above.   3.  No pneumothorax.      DX-CHEST-PORTABLE (1 VIEW)   Final Result         Endotracheal tube with tip projecting over the mid thoracic trachea.      Layering left pleural effusion with left lower lung opacity.      DX-SPINE-ANY ONE VIEW   Final Result      Digitized intraoperative radiograph is submitted for review.  This examination is not for  diagnostic purpose but for guidance during a surgical procedure.      DX-PORTABLE FLUOROSCOPY < 1 HOUR   Final Result      Portable fluoroscopy utilized for 2 seconds.         INTERPRETING LOCATION: [address] [facility], [facility], [address]      CT-HEAD W/O   Final Result         1.  Low-density in the region of the right cerebellar hemisphere, appearance compatible with evolving infarct.      These findings were discussed with the patient's on-call clinician, Deniz, on 4/28/2021 6:14 AM.      MR-THORACIC SPINE-WITH & W/O   Final Result      1.  Posterior epidural fluid collection/abscess within the lower cervical spine extending inferiorly to about the T6 level which could represent epidural abscess and/or hematoma. This measures 8 mm in maximal thickness at T2-T3 with at least moderate    thecal sac stenosis.   2.  Abnormal signal within the cervical cord and upper thoracic cord suggesting cord edema or infectious/inflammatory etiology.   3.  Lower cervical spine findings are detailed on earlier report.   4.  Prominent enhancement along the surface of the mid and lower thoracic cord is of uncertain etiology and as detailed above, possibly representing leptomeningeal disease/infection. See details above.   These findings were discussed with Dr. Cano on 4/28/2021 10:01 AM.         MR-LUMBAR SPINE-WITH & W/O   Final Result      No evidence of lumbar spine infection or epidural abscess.      Mild spondylosis as detailed above without spinal stenosis.      DX-CHEST-PORTABLE (1 VIEW)   Final Result      1.  Hypoinflation with left basilar atelectasis.   2.  Mild perihilar interstitial prominence may be related to hypoinflation or edema.      MR-CERVICAL SPINE-WITH & W/O   Final Result      1.  There is multiseptated abnormal peripherally enhancing epidural collection noted extending from C2 to the visualized lower thoracic level. Largest collection is noted in the upper thoracic posterior epidural space at the levels of T2 and  T3. The    lower extent of the collection is not imaged. This is causing multilevel effacement of the thecal sac and cord compression. The thoracic spinal cord is anteriorly displaced and compressed at the levels of T2 and T3. Pre and postcontrast MR examination of    the thoracic spine is recommended for further evaluation.   2.  There is effacement of the cervical thecal sac due to the multiseptated epidural fluid collection.   3.  The cervical spinal cord is mildly enlarged with minimal increased T2 signal intensity. The possibility of cord inflammation cannot be entirely excluded.   4.  Abnormal T2 hyperintensity at C5-6 disc space likely representing discitis.   5.  Abnormal bone marrow edema of C5, C6 and C7 vertebral bodies with edema concerning for osteomyelitis.   6.  There is also focal enhancement of C6 vertebral body likely secondary to the osteomyelitis.   7.  Prevertebral edema/fluid collection.   8.  Nonvisualization of flow void of bilateral vertebral arteries concerning for age-indeterminate bilateral vertebral occlusions.         CT-CHEST (THORAX) WITH   Final Result      No displaced rib fracture is identified.      No acute cardiopulmonary process is seen.      Atherosclerotic plaque including coronary artery calcification.      CT-TSPINE W/O PLUS RECONS   Final Result      No CT evidence of acute traumatic abnormality.      Mild T6/7 anterior wedging compatible with healed mild chronic compression fracture and there is interbody fusion.      CT-LSPINE W/O PLUS RECONS   Final Result      No CT evidence of acute traumatic injury.      CT-CSPINE WITHOUT PLUS RECONS   Final Result      Multilevel degenerative changes.      Prevertebral edema. Further evaluation with MRI is recommended as this can be seen in the setting of ligamentous injury.      Bilateral carotid atherosclerotic plaque.         US-RUQ    (Results Pending)       ASSESSEMENT and PLAN:    * Spinal epidural abscess- (present on  admission)  Assessment & Plan  -MRI C/T-spine with abnormal peripherally enhancing epidural collection extending from C2 to visualize lower thoracic 11.  Largest collection in the upper thoracic posterior epidural space at the level of T2-T3.  Lower extent of the collection is causing multilevel effacement of the thecal sac and cord compression.    -Underwent emergent laminectomy of C3-C4 C5-C6, C6/C7, T1-T2 laminectomy, decompression of thecal sac and nerves by Dr. Hazel 4/28/2021.   -Epidural drain removed on 5/03  -Goal blood pressure systolic of less than 160  -Palliative care consulted for goals of care discussion: Family wants full treatment for now       History of ischemic stroke  Assessment & Plan  -Found to have acute change in mental status 4/27/2021.   -CT head w/o concerning for evolving right cerebellar infarct.   -MRA brain, MRA neck: Findings suggesting right vertebral artery occlusion. Possible moderate focal stenosis in the mid cervical left vertebral artery.  TTE with bubble study: EF 60%, no evidence of right-to-left shunt, no valve lesions.  CHRISTOPHER negative  Goal euthermia, normotension, eunatremia  PT, OT, SLP evaluation as able  Neurology following  -Started on aspirin and DVT prophylaxis  -Hold statin currently given elevated LFTs    Sepsis due to Streptococcus species ([facility])- (present on admission)  Assessment & Plan  MRI C-T-spine with evidence of epidural abscess, discitis, vertebral osteomyelitis.  Underwent emergent laminectomy, decompression 4/28.  Blood culture 4/24 and cervical thoracic 4/28 : Growth of Streptococcus anginosus.   -Repeat blood cultures has been negative  -TTE did not show any concern of infective endocarditis  -CHRISTOPHER negative  -Ceftriaxone changed to ampicillin on 5/06, total duration of 6 weeks last date would be 06/13  -Bronchoscopy with BAL 05/06/21, c/s NGTD, Blood c/s 5/7/21: NGTD  -ID on board    Cardiac arrest ([facility])  Assessment & Plan  -Patient had cardiac arrest on  05/03, ROSC achieved in 4 mins after CPR and  Epinephrine  -Most likely secondary to aspiration, bed side bronchoscopy showed multiple mucous plugs   -Re intubated again     Thrombocytopenia (HCC)- (present on admission)  Assessment & Plan  -Resolved  -Plt 47 on 4/26. This is likely due to sepsis, though lower on the differential includes previously undiagnosed infection.  Hep panel negative. HIV non reactive    Acute bilateral back pain- (present on admission)  Assessment & Plan  -Presented with worsening neck and upper back pain  -Secondary to epidural abscess and cord compression  -S/p laminectomy and currently on IV antibiotics  -PT/ OT once stable    Type 2 diabetes mellitus without complication, without long-term current use of insulin (HCC)- (present on admission)  Assessment & Plan  -Chronic history of diabetes  -Last HbA1c is 12.2 from 4/2021  -Basal bolus insulin  -Hypoglycemia protocol     Essential hypertension, benign- (present on admission)  Assessment & Plan  -Noncompliance issues  -Blood pressure meds on hold given hypotension    Coronary artery disease due to calcified coronary lesion: CABG x4, July 2015- (present on admission)  Assessment & Plan  -Chronic   -Continue aspirin, statin on hold given elevated LFTs     Transaminitis  Assessment & Plan  Transamanitis worsening, etiology unclear. Hep panel negative, TSH normal.   -Hold statin, avoid hepatotoxic medications  -RUQ U/S pending    Status post urethrostomy (HCC)- (present on admission)  Assessment & Plan  -History of perineal urethrostomy 2018  -Acute urinary retention post surgical intervention 4/28  -Unable to place Gomez catheter through urethrostomy.    -Suprapubic catheter placement by interventional radiology    Hyponatremia- (present on admission)  Assessment & Plan  Resolved    Difficulty swallowing- (present on admission)  Assessment & Plan  -Presented with difficulty swallowing, is coughing and vomiting when swallowing large/hard  foods     -SLP evaluation post extubation  -Aspiration and seizure precautions  -Currently on tube feeds      DISPO: ICU    CODE STATUS: Full code    Quality Measures:  Feeding: Tube feeds  Analgesia: Fentanyl, oxycodone  Sedation: none  Thromboprophylaxis: Lovenox  Head of bed: >30 degrees  Ulcer prophylaxis: Famotidine  Glycemic control: Lantus and sliding scale  Bowel care: bowel regimen  Indwelling lines: Right IJ and peripheral line  Deescalation of antibiotics: Ceftriaxone -->ampicillin      Delroy Fulton M.D.

## 2021-05-08 NOTE — RESPIRATORY CARE
Dr Kelley called to bedside due to patient desating to 80's and very tachyneic. Duoneb and mucomyst tx given, unable to suction secretions from ETT, patient has large amount of oral secretions. Dr Kelley made changes to ventilator and after about 10 minutes sats improved, patient still on 100% fio2.

## 2021-05-09 ENCOUNTER — APPOINTMENT (OUTPATIENT)
Dept: RADIOLOGY | Facility: MEDICAL CENTER | Age: 61
DRG: 003 | End: 2021-05-09
Attending: PSYCHIATRY & NEUROLOGY
Payer: MEDICARE

## 2021-05-09 ENCOUNTER — APPOINTMENT (OUTPATIENT)
Dept: RADIOLOGY | Facility: MEDICAL CENTER | Age: 61
DRG: 003 | End: 2021-05-09
Attending: INTERNAL MEDICINE
Payer: MEDICARE

## 2021-05-09 PROBLEM — J96.01 ACUTE RESPIRATORY FAILURE WITH HYPOXIA (HCC): Status: ACTIVE | Noted: 2021-05-09

## 2021-05-09 LAB
ALBUMIN SERPL BCP-MCNC: 1.8 G/DL (ref 3.2–4.9)
ALBUMIN/GLOB SERPL: 0.5 G/DL
ALP SERPL-CCNC: 387 U/L (ref 30–99)
ALT SERPL-CCNC: 449 U/L (ref 2–50)
AMMONIA PLAS-SCNC: 35 UMOL/L (ref 11–45)
ANION GAP SERPL CALC-SCNC: 3 MMOL/L (ref 7–16)
AST SERPL-CCNC: 509 U/L (ref 12–45)
BASE EXCESS BLDA CALC-SCNC: 7 MMOL/L (ref -4–3)
BASOPHILS # BLD AUTO: 0.2 % (ref 0–1.8)
BASOPHILS # BLD: 0.03 K/UL (ref 0–0.12)
BILIRUB SERPL-MCNC: 0.4 MG/DL (ref 0.1–1.5)
BODY TEMPERATURE: ABNORMAL DEGREES
BREATHS SETTING VENT: 26
BUN SERPL-MCNC: 34 MG/DL (ref 8–22)
CALCIUM SERPL-MCNC: 7.9 MG/DL (ref 8.5–10.5)
CHLORIDE SERPL-SCNC: 108 MMOL/L (ref 96–112)
CO2 BLDA-SCNC: 33 MMOL/L (ref 20–33)
CO2 SERPL-SCNC: 32 MMOL/L (ref 20–33)
CREAT SERPL-MCNC: 0.51 MG/DL (ref 0.5–1.4)
DELSYS IDSYS: ABNORMAL
END TIDAL CARBON DIOXIDE IECO2: 29 MMHG
EOSINOPHIL # BLD AUTO: 0.08 K/UL (ref 0–0.51)
EOSINOPHIL NFR BLD: 0.6 % (ref 0–6.9)
ERYTHROCYTE [DISTWIDTH] IN BLOOD BY AUTOMATED COUNT: 57.6 FL (ref 35.9–50)
GLOBULIN SER CALC-MCNC: 4 G/DL (ref 1.9–3.5)
GLUCOSE BLD-MCNC: 121 MG/DL (ref 65–99)
GLUCOSE BLD-MCNC: 71 MG/DL (ref 65–99)
GLUCOSE BLD-MCNC: 92 MG/DL (ref 65–99)
GLUCOSE BLD-MCNC: 94 MG/DL (ref 65–99)
GLUCOSE BLD-MCNC: 98 MG/DL (ref 65–99)
GLUCOSE SERPL-MCNC: 106 MG/DL (ref 65–99)
HCO3 BLDA-SCNC: 31.3 MMOL/L (ref 17–25)
HCT VFR BLD AUTO: 29.8 % (ref 42–52)
HGB BLD-MCNC: 8.9 G/DL (ref 14–18)
HOROWITZ INDEX BLDA+IHG-RTO: 77 MM[HG]
IMM GRANULOCYTES # BLD AUTO: 0.09 K/UL (ref 0–0.11)
IMM GRANULOCYTES NFR BLD AUTO: 0.7 % (ref 0–0.9)
LYMPHOCYTES # BLD AUTO: 1.51 K/UL (ref 1–4.8)
LYMPHOCYTES NFR BLD: 11.9 % (ref 22–41)
MAGNESIUM SERPL-MCNC: 2.3 MG/DL (ref 1.5–2.5)
MCH RBC QN AUTO: 30.4 PG (ref 27–33)
MCHC RBC AUTO-ENTMCNC: 29.9 G/DL (ref 33.7–35.3)
MCV RBC AUTO: 101.7 FL (ref 81.4–97.8)
MODE IMODE: ABNORMAL
MONOCYTES # BLD AUTO: 0.57 K/UL (ref 0–0.85)
MONOCYTES NFR BLD AUTO: 4.5 % (ref 0–13.4)
NEUTROPHILS # BLD AUTO: 10.38 K/UL (ref 1.82–7.42)
NEUTROPHILS NFR BLD: 82.1 % (ref 44–72)
NRBC # BLD AUTO: 0.02 K/UL
NRBC BLD-RTO: 0.2 /100 WBC
O2/TOTAL GAS SETTING VFR VENT: 75 %
PCO2 BLDA: 41.3 MMHG (ref 26–37)
PCO2 TEMP ADJ BLDA: 42.7 MMHG (ref 26–37)
PEEP END EXPIRATORY PRESSURE IPEEP: 10 CMH20
PH BLDA: 7.49 [PH] (ref 7.4–7.5)
PH TEMP ADJ BLDA: 7.48 [PH] (ref 7.4–7.5)
PLATELET # BLD AUTO: 330 K/UL (ref 164–446)
PMV BLD AUTO: 12.8 FL (ref 9–12.9)
PO2 BLDA: 58 MMHG (ref 64–87)
PO2 TEMP ADJ BLDA: 60 MMHG (ref 64–87)
POTASSIUM SERPL-SCNC: 4 MMOL/L (ref 3.6–5.5)
PROT SERPL-MCNC: 5.8 G/DL (ref 6–8.2)
RBC # BLD AUTO: 2.93 M/UL (ref 4.7–6.1)
SAO2 % BLDA: 91 % (ref 93–99)
SODIUM SERPL-SCNC: 143 MMOL/L (ref 135–145)
SPECIMEN DRAWN FROM PATIENT: ABNORMAL
TIDAL VOLUME IVT: 430 ML
WBC # BLD AUTO: 12.7 K/UL (ref 4.8–10.8)

## 2021-05-09 PROCEDURE — 94799 UNLISTED PULMONARY SVC/PX: CPT

## 2021-05-09 PROCEDURE — 94003 VENT MGMT INPAT SUBQ DAY: CPT

## 2021-05-09 PROCEDURE — A9270 NON-COVERED ITEM OR SERVICE: HCPCS | Performed by: STUDENT IN AN ORGANIZED HEALTH CARE EDUCATION/TRAINING PROGRAM

## 2021-05-09 PROCEDURE — 770022 HCHG ROOM/CARE - ICU (200)

## 2021-05-09 PROCEDURE — 700102 HCHG RX REV CODE 250 W/ 637 OVERRIDE(OP): Performed by: STUDENT IN AN ORGANIZED HEALTH CARE EDUCATION/TRAINING PROGRAM

## 2021-05-09 PROCEDURE — A9270 NON-COVERED ITEM OR SERVICE: HCPCS | Performed by: PSYCHIATRY & NEUROLOGY

## 2021-05-09 PROCEDURE — 700111 HCHG RX REV CODE 636 W/ 250 OVERRIDE (IP): Performed by: INTERNAL MEDICINE

## 2021-05-09 PROCEDURE — 700101 HCHG RX REV CODE 250: Performed by: STUDENT IN AN ORGANIZED HEALTH CARE EDUCATION/TRAINING PROGRAM

## 2021-05-09 PROCEDURE — 36600 WITHDRAWAL OF ARTERIAL BLOOD: CPT

## 2021-05-09 PROCEDURE — 99291 CRITICAL CARE FIRST HOUR: CPT | Mod: GC | Performed by: PSYCHIATRY & NEUROLOGY

## 2021-05-09 PROCEDURE — 71045 X-RAY EXAM CHEST 1 VIEW: CPT

## 2021-05-09 PROCEDURE — 83735 ASSAY OF MAGNESIUM: CPT

## 2021-05-09 PROCEDURE — 82140 ASSAY OF AMMONIA: CPT

## 2021-05-09 PROCEDURE — 700105 HCHG RX REV CODE 258: Performed by: INTERNAL MEDICINE

## 2021-05-09 PROCEDURE — 80053 COMPREHEN METABOLIC PANEL: CPT

## 2021-05-09 PROCEDURE — 85025 COMPLETE CBC W/AUTO DIFF WBC: CPT

## 2021-05-09 PROCEDURE — 700111 HCHG RX REV CODE 636 W/ 250 OVERRIDE (IP): Performed by: STUDENT IN AN ORGANIZED HEALTH CARE EDUCATION/TRAINING PROGRAM

## 2021-05-09 PROCEDURE — 92950 HEART/LUNG RESUSCITATION CPR: CPT

## 2021-05-09 PROCEDURE — 700101 HCHG RX REV CODE 250: Performed by: PSYCHIATRY & NEUROLOGY

## 2021-05-09 PROCEDURE — 82962 GLUCOSE BLOOD TEST: CPT | Mod: 91

## 2021-05-09 PROCEDURE — 700102 HCHG RX REV CODE 250 W/ 637 OVERRIDE(OP): Performed by: PSYCHIATRY & NEUROLOGY

## 2021-05-09 PROCEDURE — 82803 BLOOD GASES ANY COMBINATION: CPT

## 2021-05-09 PROCEDURE — 99233 SBSQ HOSP IP/OBS HIGH 50: CPT | Performed by: INTERNAL MEDICINE

## 2021-05-09 RX ADMIN — ARIPIPRAZOLE 15 MG: 10 TABLET ORAL at 18:00

## 2021-05-09 RX ADMIN — DOCUSATE SODIUM 100 MG: 50 LIQUID ORAL at 17:59

## 2021-05-09 RX ADMIN — DOCUSATE SODIUM 100 MG: 50 LIQUID ORAL at 05:33

## 2021-05-09 RX ADMIN — Medication 5000 UNITS: at 05:34

## 2021-05-09 RX ADMIN — DOCUSATE SODIUM 50 MG AND SENNOSIDES 8.6 MG 1 TABLET: 8.6; 5 TABLET, FILM COATED ORAL at 21:02

## 2021-05-09 RX ADMIN — DEXTROSE MONOHYDRATE 50 ML: 25 INJECTION, SOLUTION INTRAVENOUS at 12:12

## 2021-05-09 RX ADMIN — LIDOCAINE 3 PATCH: 50 PATCH TOPICAL at 17:38

## 2021-05-09 RX ADMIN — FAMOTIDINE 20 MG: 20 TABLET ORAL at 05:25

## 2021-05-09 RX ADMIN — CARBAMAZEPINE 200 MG: 200 TABLET ORAL at 08:20

## 2021-05-09 RX ADMIN — INSULIN GLARGINE 50 UNITS: 100 INJECTION, SOLUTION SUBCUTANEOUS at 18:00

## 2021-05-09 RX ADMIN — ENOXAPARIN SODIUM 40 MG: 40 INJECTION SUBCUTANEOUS at 05:25

## 2021-05-09 RX ADMIN — AMPICILLIN SODIUM 2000 MG: 2 INJECTION, POWDER, FOR SOLUTION INTRAMUSCULAR; INTRAVENOUS at 10:02

## 2021-05-09 RX ADMIN — AMPICILLIN SODIUM 2000 MG: 2 INJECTION, POWDER, FOR SOLUTION INTRAMUSCULAR; INTRAVENOUS at 05:55

## 2021-05-09 RX ADMIN — CEFTRIAXONE SODIUM 2 G: 2 INJECTION, POWDER, FOR SOLUTION INTRAMUSCULAR; INTRAVENOUS at 13:53

## 2021-05-09 RX ADMIN — ARIPIPRAZOLE 15 MG: 10 TABLET ORAL at 05:24

## 2021-05-09 RX ADMIN — CYANOCOBALAMIN TAB 500 MCG 1000 MCG: 500 TAB at 05:25

## 2021-05-09 RX ADMIN — AMPICILLIN SODIUM 2000 MG: 2 INJECTION, POWDER, FOR SOLUTION INTRAMUSCULAR; INTRAVENOUS at 01:45

## 2021-05-09 RX ADMIN — FAMOTIDINE 20 MG: 20 TABLET ORAL at 17:59

## 2021-05-09 RX ADMIN — CYCLOBENZAPRINE HYDROCHLORIDE 5 MG: 5 TABLET, FILM COATED ORAL at 21:02

## 2021-05-09 RX ADMIN — CARBAMAZEPINE 200 MG: 200 TABLET ORAL at 00:20

## 2021-05-09 RX ADMIN — ASPIRIN 324 MG: 81 TABLET, CHEWABLE ORAL at 05:32

## 2021-05-09 ASSESSMENT — ENCOUNTER SYMPTOMS
FEVER: 0
VOMITING: 0

## 2021-05-09 ASSESSMENT — FIBROSIS 4 INDEX: FIB4 SCORE: 4.22

## 2021-05-09 NOTE — PROGRESS NOTES
[facility] Progress Note      Admit Date: 4/25/2021    Resident(s): Delroy Fulton M.D.   Attending:  BREANNA ALCARAZ/ Dr. Sarmiento    Patient ID:    Name:  Perry Davis   YOB: 1960  Age:  60 y.o.  male   MRN:  [id number]    Hospital Course (carried forward and updated):  Perry Davis is a 60 y.o. male with a previous history of uncontrolled Diabetes -Hb1C 13 , HLD, CAD s/p CABGx4  2015, tobacco use ,OA, chronic low pain admitted  4/25 with epidural abscess w/ cord compression, discitis involving cervical and thoracic spine--> underwent emergent laminectomy and decompression by Dr. Hazel 4/28.  Hospital course complicated by acute right cerebellar stroke ans strep anginosus bacteremia on Ceftriaxone, followed by ID.   Patient currently intubated    Consultants:  Critical Care  Neurosurgery  Urology  Infectious disease    Interval Events:  -no acute events overnight  -on ampicillin per ID, BAL c/s NGTD, blood c/c 5/7/21: NGTD  -worsening transaminitis, tegretol held  -hematuria persists, but improving  -Palliative GOC discussion: family wants full treatment  -likely require trach, will discuss with family    Vitals Range last 24h:  Temp:  [37.6 °C (99.7 °F)-38 °C (100.4 °F)] 38 °C (100.4 °F)  Pulse:  [61-79] 74  Resp:  [20-43] 28  BP: (101-138)/(51-62) 123/58  SpO2:  [89 %-98 %] 92 %      Intake/Output Summary (Last 24 hours) at 5/9/2021 1002  Last data filed at 5/9/2021 0600  Gross per 24 hour   Intake 1921.67 ml   Output 1160 ml   Net 761.67 ml        Review of Systems   Unable to perform ROS: Intubated       PHYSICAL EXAM:  Vitals:    05/09/21 0600 05/09/21 0640 05/09/21 0800 05/09/21 1000   BP: 123/58      Pulse: 73 74     Resp: (!) 30 (!) 28     Temp: 38 °C (100.4 °F)      TempSrc: Rectal  Rectal Rectal   SpO2:       Weight:       Height:        Body mass index is 31.17 kg/m².    O2 therapy: Pulse Oximetry: 92 %, O2 Delivery Device: Ventilator         Physical Exam   Constitutional:    Intubated    HENT:   Head: Normocephalic and atraumatic.   Eyes: Pupils are equal, round, and reactive to light. Conjunctivae are normal.   Cardiovascular: Normal rate, regular rhythm and normal heart sounds.   Pulmonary/Chest: Effort normal and breath sounds normal.   Abdominal: Soft. He exhibits no distension.   Genitourinary:    Genitourinary Comments: Hematuria improving     Musculoskeletal:      Cervical back: Normal range of motion.   Neurological:   Moves head spontaneously  Awakens to verbal commands  Not able to move bilateral upper and lower extremities  Unable to assess full neuro exam   Skin: Skin is warm.       Recent Labs     05/08/21  0641 05/08/21  1200 05/09/21  0222   ISTATAPH 7.420 7.479 7.488   ISTATAPCO2 55.1* 45.3* 41.3*   ISTATAPO2 116* 62* 58*   ISTATATCO2 37* 35* 33   COBKIAF2SAI 99 93 91*   ISTATARTHCO3 35.7* 33.6* 31.3*   ISTATARTBE 10* 9* 7*   ISTATTEMP 99.7 F 99.9 F 99.9 F   ISTATFIO2 100 70 75   ISTATSPEC Arterial Arterial Arterial   ISTATAPHTC 7.411 7.468 7.477   PBDEXDXP3UB 120* 65 60*     Recent Labs     05/07/21 0315 05/08/21  0200 05/09/21  0341   SODIUM 139 143 143   POTASSIUM 4.3 4.7 4.0   CHLORIDE 103 105 108   CO2 32 33 32   BUN 37* 38* 34*   CREATININE 0.55 0.42* 0.51   MAGNESIUM 2.2 2.4 2.3   CALCIUM 8.1* 8.0* 7.9*     Recent Labs     05/07/21  0315 05/07/21  1220 05/08/21  0200 05/09/21  0341   ALTSGPT 272*  --  413* 449*   ASTSGOT 325*  --  509* 509*   ALKPHOSPHAT 351*  --  365* 387*   TBILIRUBIN 0.5  --  0.3 0.4   LIPASE  --  18  --   --    GLUCOSE 181*  --  183* 106*     Recent Labs     05/07/21  0315 05/08/21  0200 05/08/21  0815 05/09/21  0341   RBC 3.07* 3.10*  --  2.93*   HEMOGLOBIN 9.4* 9.4*  --  8.9*   HEMATOCRIT 30.5* 31.2*  --  29.8*   PLATELETCT 346 318  --  330   PROTHROMBTM  --   --  16.2*  --    INR  --   --  1.26*  --      Recent Labs     05/07/21  0315 05/08/21  0200 05/09/21  0341   WBC 12.2* 11.9* 12.7*   NEUTSPOLYS 80.50* 83.60* 82.10*   LYMPHOCYTES  12.00* 10.00* 11.90*   MONOCYTES 5.70 4.90 4.50   EOSINOPHILS 0.70 0.40 0.60   BASOPHILS 0.00 0.20 0.20   ASTSGOT 325* 509* 509*   ALTSGPT 272* 413* 449*   ALKPHOSPHAT 351* 365* 387*   TBILIRUBIN 0.5 0.3 0.4       Meds:  • insulin glargine  50 Units     • oxyCODONE immediate-release  10 mg     • oxyCODONE immediate-release  5 mg     • cyclobenzaprine  5 mg     • ampicillin  2,000 mg 2,000 mg (05/09/21 1002)   • insulin regular  3-14 Units      And   • dextrose 50%  50 mL     • enoxaparin (LOVENOX) injection  40 mg     • aspirin  324 mg     • acetylcysteine  3-4 mL     • albuterol  2.5 mg     • MD Alert...Adult ICU Electrolyte Replacement per Pharmacy       • lidocaine  1-2 mL     • magnesium hydroxide  30 mL     • ondansetron  4 mg     • polyethylene glycol/lytes  1 Packet     • promethazine  12.5-25 mg     • senna-docusate  1 tablet     • MD ALERT...DO NOT ADMINISTER NSAIDS or ASPIRIN unless ORDERED By Neurosurgery  1 Each     • bisacodyl  10 mg     • fleet  1 Each     • Respiratory Therapy Consult       • ondansetron  4 mg     • promethazine  12.5-25 mg     • prochlorperazine  5-10 mg     • ipratropium-albuterol  3 mL     • labetalol  10 mg     • Pharmacy  1 Each     • ARIPiprazole  15 mg     • [Held by provider] carBAMazepine  200 mg     • cyanocobalamin  1,000 mcg     • senna-docusate  1 tablet     • vitamin D  5,000 Units     • docusate sodium  100 mg     • famotidine  20 mg      Or   • famotidine  20 mg     • fentaNYL  50 mcg      And   • fentaNYL  100 mcg     • lidocaine  3 Patch          Procedures:  Bronchoscopy 05/06/21  Bronchoscopy on 05/03  -Decompressive laminectomy on 4/28  -Central line placement on 5/01    Imaging:  DX-CHEST-PORTABLE (1 VIEW)   Final Result         No significant interval change.      US-RUQ   Final Result      Borderline mild hepatomegaly.      Otherwise negative right upper quadrant ultrasound.      DX-CHEST-PORTABLE (1 VIEW)   Final Result         Decreased left basilar opacity.       DX-CHEST-PORTABLE (1 VIEW)   Final Result      Increasing left basilar airspace opacities. No significant cardiopulmonary change otherwise. Repositioned endotracheal tube.      US-EXTREMITY ARTERY LOWER UNILAT RIGHT   Final Result      DX-ABDOMEN FOR TUBE PLACEMENT   Final Result      Feeding tube tip projects over the distal stomach or first portion of the duodenum.      CT-CSPINE WITHOUT PLUS RECONS   Final Result      1.  Postsurgical changes C3-T1 laminectomies.   2.  New irregular linear and mottled lucency in the anterior C5 vertebral body which is likely related to osteomyelitis.   3.  Prevertebral soft tissue swelling.   4.  Extensive known extensive epidural abscess seen on prior MRI is not adequately evaluated on CT.      CT-CTA CHEST PULMONARY ARTERY W/ RECONS   Final Result      1.  No evidence of pulmonary embolism.      2.  Bilateral lower lobe atelectasis or pneumonia, left greater than right.      3.  Small left pleural effusion.      4.  CABG changes.            DX-CHEST-PORTABLE (1 VIEW)   Final Result      Endotracheal tube terminates approximately 2.5 cm above the you.      Atelectasis with left lung aeration. No pleural effusion or pneumothorax.      US-LRIDPCG-5 VIEW   Final Result      No dilated bowel      DX-ABDOMEN FOR TUBE PLACEMENT   Final Result      Feeding tube placement with the tip projecting over the distal stomach or duodenal bulb      EC-CHRISTOPHER W/O CONT         CT-HEAD W/O   Final Result         1.  Low-density changes in the right cerebellar hemisphere, compatible with evolving infarct, streak artifacts minimally limits evaluation of the posterior fossa for subtle subarachnoid hemorrhage, no intracranial hemorrhage is readily identified.   2.  Atherosclerosis.      DX-CHEST-PORTABLE (1 VIEW)   Final Result         1.  No acute cardiopulmonary disease.      DX-CHEST-PORTABLE (1 VIEW)   Final Result      Mild left basilar opacity may represent atelectasis. Infection not  excluded.      Improved aeration of the left lung.      Atherosclerotic plaque.         EC-ECHOCARDIOGRAM COMPLETE W/O CONT   Final Result      IR-DRAIN-BLADDER SUPRAPUB W/CATH   Final Result      1.     Ultrasound and fluoroscopic guided placement of a 16 [place] tip silicone suprapubic bladder catheter.      2.     Cystogram documenting catheter placement.         MR-BRAIN-W/O   Final Result      Acute large right cerebellar posterior inferior cerebellar artery territory infarct with possible small amount of petechial hemorrhage.      MR-MRA HEAD-W/O   Final Result      Findings suggesting right vertebral artery occlusion.      MR-MRA NECK-WITH & W/O AND SEQUENCES   Final Result      1.  Small caliber right vertebral artery which is not visualized on the noncontrast time-of-flight technique could be related to retrograde flow or diminutive caliber and sluggish flow.   2.  Possible moderate focal stenosis in the mid cervical left vertebral artery.   3.  No significant carotid stenosis.      DX-ABDOMEN FOR TUBE PLACEMENT   Final Result         Feeding tube with tip projecting over the expected area of the distal stomach.      DX-CHEST-PORTABLE (1 VIEW)   Final Result      1.  Worsening consolidation of LEFT hemithorax with shift of mediastinal structures to the LEFT suggesting atelectasis, possibly due to endobronchial process.   2.  Supportive tubing as described above.   3.  No pneumothorax.      DX-CHEST-PORTABLE (1 VIEW)   Final Result         Endotracheal tube with tip projecting over the mid thoracic trachea.      Layering left pleural effusion with left lower lung opacity.      DX-SPINE-ANY ONE VIEW   Final Result      Digitized intraoperative radiograph is submitted for review.  This examination is not for diagnostic purpose but for guidance during a surgical procedure.      DX-PORTABLE FLUOROSCOPY < 1 HOUR   Final Result      Portable fluoroscopy utilized for 2 seconds.         INTERPRETING LOCATION:  [address] [facility], [address]      CT-HEAD W/O   Final Result         1.  Low-density in the region of the right cerebellar hemisphere, appearance compatible with evolving infarct.      These findings were discussed with the patient's on-call clinician, Deniz, on 4/28/2021 6:14 AM.      MR-THORACIC SPINE-WITH & W/O   Final Result      1.  Posterior epidural fluid collection/abscess within the lower cervical spine extending inferiorly to about the T6 level which could represent epidural abscess and/or hematoma. This measures 8 mm in maximal thickness at T2-T3 with at least moderate    thecal sac stenosis.   2.  Abnormal signal within the cervical cord and upper thoracic cord suggesting cord edema or infectious/inflammatory etiology.   3.  Lower cervical spine findings are detailed on earlier report.   4.  Prominent enhancement along the surface of the mid and lower thoracic cord is of uncertain etiology and as detailed above, possibly representing leptomeningeal disease/infection. See details above.   These findings were discussed with Dr. Cano on 4/28/2021 10:01 AM.         MR-LUMBAR SPINE-WITH & W/O   Final Result      No evidence of lumbar spine infection or epidural abscess.      Mild spondylosis as detailed above without spinal stenosis.      DX-CHEST-PORTABLE (1 VIEW)   Final Result      1.  Hypoinflation with left basilar atelectasis.   2.  Mild perihilar interstitial prominence may be related to hypoinflation or edema.      MR-CERVICAL SPINE-WITH & W/O   Final Result      1.  There is multiseptated abnormal peripherally enhancing epidural collection noted extending from C2 to the visualized lower thoracic level. Largest collection is noted in the upper thoracic posterior epidural space at the levels of T2 and T3. The    lower extent of the collection is not imaged. This is causing multilevel effacement of the thecal sac and cord compression. The thoracic spinal cord is anteriorly displaced and compressed at  the levels of T2 and T3. Pre and postcontrast MR examination of    the thoracic spine is recommended for further evaluation.   2.  There is effacement of the cervical thecal sac due to the multiseptated epidural fluid collection.   3.  The cervical spinal cord is mildly enlarged with minimal increased T2 signal intensity. The possibility of cord inflammation cannot be entirely excluded.   4.  Abnormal T2 hyperintensity at C5-6 disc space likely representing discitis.   5.  Abnormal bone marrow edema of C5, C6 and C7 vertebral bodies with edema concerning for osteomyelitis.   6.  There is also focal enhancement of C6 vertebral body likely secondary to the osteomyelitis.   7.  Prevertebral edema/fluid collection.   8.  Nonvisualization of flow void of bilateral vertebral arteries concerning for age-indeterminate bilateral vertebral occlusions.         CT-CHEST (THORAX) WITH   Final Result      No displaced rib fracture is identified.      No acute cardiopulmonary process is seen.      Atherosclerotic plaque including coronary artery calcification.      CT-TSPINE W/O PLUS RECONS   Final Result      No CT evidence of acute traumatic abnormality.      Mild T6/7 anterior wedging compatible with healed mild chronic compression fracture and there is interbody fusion.      CT-LSPINE W/O PLUS RECONS   Final Result      No CT evidence of acute traumatic injury.      CT-CSPINE WITHOUT PLUS RECONS   Final Result      Multilevel degenerative changes.      Prevertebral edema. Further evaluation with MRI is recommended as this can be seen in the setting of ligamentous injury.      Bilateral carotid atherosclerotic plaque.             ASSESSEMENT and PLAN:    * Spinal epidural abscess- (present on admission)  Assessment & Plan  -MRI C/T-spine with abnormal peripherally enhancing epidural collection extending from C2 to visualize lower thoracic 11.  Largest collection in the upper thoracic posterior epidural space at the level of  T2-T3.  Lower extent of the collection is causing multilevel effacement of the thecal sac and cord compression.    -Underwent emergent laminectomy of C3-C4 C5-C6, C6/C7, T1-T2 laminectomy, decompression of thecal sac and nerves by Dr. aHzel 4/28/2021.   -Epidural drain removed on 5/03  -Goal blood pressure systolic of less than 160  -Palliative care consulted for goals of care discussion: Family wants full treatment for now       History of ischemic stroke  Assessment & Plan  -Found to have acute change in mental status 4/27/2021.   -CT head w/o concerning for evolving right cerebellar infarct.   -MRA brain, MRA neck: Findings suggesting right vertebral artery occlusion. Possible moderate focal stenosis in the mid cervical left vertebral artery.  TTE with bubble study: EF 60%, no evidence of right-to-left shunt, no valve lesions.  CHRISTOPHER negative  Goal euthermia, normotension, eunatremia  PT, OT, SLP evaluation as able  Neurology following  -Started on aspirin and DVT prophylaxis  -Hold statin currently given elevated LFTs    Sepsis due to Streptococcus species (HCC)- (present on admission)  Assessment & Plan  MRI C-T-spine with evidence of epidural abscess, discitis, vertebral osteomyelitis.  Underwent emergent laminectomy, decompression 4/28.  Blood culture 4/24 and cervical thoracic 4/28 : Growth of Streptococcus anginosus.   -Repeat blood cultures has been negative  -TTE did not show any concern of infective endocarditis  -CHRISTOPHER negative  -Ceftriaxone changed to ampicillin on 5/06, total duration of 6 weeks last date would be 06/13  -Bronchoscopy with BAL 05/06/21, c/s NGTD, Blood c/s 5/7/21: NGTD  -ID on board    Acute respiratory failure with hypoxia (HCC)  Assessment & Plan   RT/O2 protocols            Daily and PRN ABGs            Titration of ventilator therapy based on ABGs and patient's status            Sedation as tolerated/indicated            Daily CXR            HOB >30 degrees and peridex for VAP  prevention            Pepcid for GI prophylaxis            SAT/SBT when able (ABCDEF Bundle)            Early mobility            Likely will require trach, will discuss with family    Cardiac arrest ([facility])  Assessment & Plan  -Patient had cardiac arrest on 05/03, ROSC achieved in 4 mins after CPR and  Epinephrine  -Most likely secondary to aspiration, bed side bronchoscopy showed multiple mucous plugs   -Re intubated again     Thrombocytopenia (HCC)- (present on admission)  Assessment & Plan  -Resolved  -Plt 47 on 4/26. This is likely due to sepsis, though lower on the differential includes previously undiagnosed infection.  Hep panel negative. HIV non reactive    Acute bilateral back pain- (present on admission)  Assessment & Plan  -Presented with worsening neck and upper back pain  -Secondary to epidural abscess and cord compression  -S/p laminectomy and currently on IV antibiotics  -PT/ OT once stable    Type 2 diabetes mellitus without complication, without long-term current use of insulin ([facility])- (present on admission)  Assessment & Plan  -Chronic history of diabetes  -Last HbA1c is 12.2 from 4/2021  -Basal bolus insulin  -Hypoglycemia protocol     Essential hypertension, benign- (present on admission)  Assessment & Plan  -Noncompliance issues  -Blood pressure meds on hold given hypotension    Coronary artery disease due to calcified coronary lesion: CABG x4, July 2015- (present on admission)  Assessment & Plan  -Chronic   -Continue aspirin, statin on hold given elevated LFTs     Transaminitis  Assessment & Plan  Transamanitis worsening, etiology unclear. Hep panel negative, TSH normal.   -Hold statin, avoid hepatotoxic medications  -RUQ U/S: unremarkable except for borderline mild hepatomegaly    Status post urethrostomy ([facility])- (present on admission)  Assessment & Plan  -History of perineal urethrostomy 2018  -Acute urinary retention post surgical intervention 4/28  -Unable to place Gomez catheter through  urethrostomy.    -Suprapubic catheter placement by interventional radiology    Hyponatremia- (present on admission)  Assessment & Plan  Resolved    Difficulty swallowing- (present on admission)  Assessment & Plan  -Presented with difficulty swallowing, is coughing and vomiting when swallowing large/hard foods     -SLP evaluation post extubation  -Aspiration and seizure precautions  -Currently on tube feeds      DISPO: ICU    CODE STATUS: Full code    Quality Measures:  Feeding: Tube feeds  Analgesia: Fentanyl, oxycodone  Sedation: none  Thromboprophylaxis: Lovenox  Head of bed: >30 degrees  Ulcer prophylaxis: Famotidine  Glycemic control: Lantus and sliding scale  Bowel care: bowel regimen  Indwelling lines: Right IJ and peripheral line  Deescalation of antibiotics: Ceftriaxone -->ampicillin      Delroy Fulton M.D.

## 2021-05-09 NOTE — PROGRESS NOTES
"Patient refusing cortrak placement, mouthing \"I don't want it\" and shaking head. Dr Sarmiento and palliative care RN notified.  "

## 2021-05-09 NOTE — PALLIATIVE CARE
"Palliative Care follow-up  Appreciate call from BS RN noting plan for f/u visit with family today given pt's expression of not wanting a trach/long-term vent support or the cortrak. Per BS team, pt more lucid and responding appropriately to questions. He unfortunately has no purposeful movement of his extremities and can only communicate by nodding. PC visited with Perry at BS with family out and he nodded appropriately to orientation questions. PC discussed the ventilator being a source of life support and without it he would pass away and he nodded in agreement. PC discussed the need for long-term placement of this with tracheostomy, but him also needing to reside in a facility for potentially the remainder of his life. He was trying to mouth words but this RN and BS RN unable to understand him. Through yes and no questions, he notes not wanting to be on long-term support but also not wanting to die.     Pt's family returned to BS and MD present. He reiterated his discussion with the pt this AM and expressed the need for a plan for Perry. The family remains adamant for the trach and PEG. Pt sister Maris states \"You want them to move the machine to your neck right?\" and the pt nods \"yes.\" She boasted that this would be \"more comfortable.\" MD expressed that he would likely be facility bound and on a machine to breathe long term and the sister states \"You can get the trach and feeding tube and go fishing like you like.\" PC clarified that Perry would not be able to go fishing as they hope and his brother shouted \"dont' be so negative.\" PC expressed not wanting to remove hope but also clarifying realistic expectations. MD clarified what the expected improvements are for Perry. The family expressed understanding and state that \"we'll be there for you and visit you all the time.\"  Mary confirmed her wish for the trach and PEG.     PC further discussed DC planning once the trach/PEG are complete and pt is medically " "cleared. PC expressed there being 2 local LTAC facilities but uncertain if Perry will meeting criteria for those given his expectation for recovery. PC further explained that if he does not meet criteria, he may need to go [facility] a [facility] or a long-term facility in another part of [place]. They expressed understanding. Mary inquired about transitioning to [facility] if Perry were to \"be over it\" and PC explained that hospice/comfort care is an option down the road as well. The family denied any questions/needs at this time. PC ensured Mary had PC number for any needs.    Updated: MD/RN    Plan: follow and support with POC- plan for trach/PEG. Pt refusing cortrak at this time    Thank you for allowing Palliative Care to support this patient and family. Contact [phone] for additional assistance, change in patient status, or with any questions/concerns.     "

## 2021-05-09 NOTE — CARE PLAN
Problem: Ventilation Defect:  Goal: Ability to achieve and maintain unassisted ventilation or tolerate decreased levels of ventilator support  Outcome: NOT MET   Vent day 12  26/430/+10, 80%  PEEP greater than 10 increases plateau pressure to 32 cmh2o.

## 2021-05-09 NOTE — ASSESSMENT & PLAN NOTE
-secondary to epidural abscess and stroke   -Was intubated initially    -Patient underwent trach placement on 05/10  -RT/O2 protocols

## 2021-05-09 NOTE — PROGRESS NOTES
Infectious Disease Progress Note    Author: La Nena Khan M.D. Date & Time of service: 2021  11:33 AM    Chief Complaint:  Sepsis and cervical abscess  CVA     Interval History:  60 y.o.  admitted 2021. Pt has a past medical history of uncontrolled diabetes, CAD status post CABG times 2015, marijuana use and to arthritis.  Presented complaining of neck and back pain ongoing for approximately 1 week and fall getting out of the bathtub.  He had been seen at urgent care for back pain approximately 1 week prior to admission and given Toradol injections.     AF, O2 Vent 12/70%, pressors still on the trending down, pt continues to be agitated, not moving any extremities and concern for quadriplegia due to CVA.    AF, O2 Vent 8/40%, pressors off, agitated, without meaningful limb movement.  Decreasing vent requirements and no longer in shock.   AF, O2 Vent 8/40%, stable on low vent settings no significant secretions per nursing.  He continues to be agitated and with no upper or lower extremity movement.    5/3 AF WBC 14 remains intubated and sedated Agitation with decrease sedation. Epidural drain removed FiO2 0.4   AF WBC 19 code blue yesterday-mucus plugs found. S/p bronch this am-on 100%FiO2 On pressors   AF WBC 15.8 nurse noted some prox  movement in BUE (right greater than left) FiO2 0.9  / AF WBC 15.7 s/p bronch again this am-thick secretion RML noted   Febrile 101.1 WBC 12.2 No new positive cultures opens eyes-not following commands FiO2 0.6  /8 AF (99.9) WBC 11.9 Palliative appreciated-family wants full code. Remains obtunded FiO2 0.7  5/0 Temp 100.4 WBC 12.7 Remains intubated and sedated FiO2 0.7    Review of Systems:  Review of Systems   Unable to perform ROS: Intubated   Constitutional: Negative for fever.   Gastrointestinal: Negative for vomiting.   Genitourinary: Negative for hematuria.   Skin: Negative for rash.       Hemodynamics:  Temp (24hrs), Av.7 °C (99.9 °F),  Min:37.6 °C (99.7 °F), Max:38 °C (100.4 °F)  Temperature: 38 °C (100.4 °F), Monitored Temp: 37.5 °C (99.5 °F)  Pulse  Av.2  Min: 57  Max: 121   Blood Pressure: 142/67, Arterial BP: 119/88       Physical Exam:  Physical Exam  Vitals and nursing note reviewed.   Constitutional:       General: He is not in acute distress.     Appearance: He is ill-appearing. He is not toxic-appearing or diaphoretic.      Comments: Opens eyes-   HENT:      Nose: No rhinorrhea.   Eyes:      General: No scleral icterus.        Right eye: No discharge.         Left eye: No discharge.      Pupils: Pupils are equal, round, and reactive to light.   Neck:      Comments: Right IJ-no erythema  Cardiovascular:      Rate and Rhythm: Normal rate and regular rhythm.   Pulmonary:      Effort: Pulmonary effort is normal. No respiratory distress.      Breath sounds: No stridor. No wheezing.      Comments: Coarse breath sounds  Abdominal:      General: There is no distension.      Palpations: Abdomen is soft.      Tenderness: There is no abdominal tenderness.   Genitourinary:     Comments: SP cath +yellow urine  Musculoskeletal:         General: No deformity.   Lymphadenopathy:      Cervical: No cervical adenopathy.   Skin:     General: Skin is warm.      Coloration: Skin is not jaundiced.      Findings: Bruising present.      Comments: tattoos   Neurological:      Comments: Sedated  quadriplegic         Meds:    Current Facility-Administered Medications:   •  insulin glargine  •  oxyCODONE immediate-release  •  oxyCODONE immediate-release  •  cyclobenzaprine  •  ampicillin  •  insulin regular **AND** POC blood glucose manual result **AND** NOTIFY MD and PharmD **AND** dextrose 50%  •  enoxaparin (LOVENOX) injection  •  aspirin  •  acetylcysteine  •  albuterol  •  MD Alert...Adult ICU Electrolyte Replacement per Pharmacy  •  lidocaine  •  magnesium hydroxide  •  ondansetron  •  polyethylene glycol/lytes  •  promethazine  •  senna-docusate  •  MD  ALERT...DO NOT ADMINISTER NSAIDS or ASPIRIN unless ORDERED By Neurosurgery  •  bisacodyl  •  fleet  •  Respiratory Therapy Consult  •  ondansetron  •  promethazine  •  prochlorperazine  •  ipratropium-albuterol  •  labetalol  •  Pharmacy  •  ARIPiprazole  •  [Held by provider] carBAMazepine  •  cyanocobalamin  •  senna-docusate  •  vitamin D  •  docusate sodium  •  famotidine **OR** famotidine  •  fentaNYL **AND** fentaNYL **AND** [DISCONTINUED] fentaNYL **AND** [DISCONTINUED] propofol **AND** Triglyceride  •  lidocaine    Labs:  Recent Labs     05/07/21 0315 05/08/21  0200 05/09/21  0341   WBC 12.2* 11.9* 12.7*   RBC 3.07* 3.10* 2.93*   HEMOGLOBIN 9.4* 9.4* 8.9*   HEMATOCRIT 30.5* 31.2* 29.8*   MCV 99.3* 100.6* 101.7*   MCH 30.6 30.3 30.4   RDW 53.6* 55.9* 57.6*   PLATELETCT 346 318 330   MPV 11.9 11.9 12.8   NEUTSPOLYS 80.50* 83.60* 82.10*   LYMPHOCYTES 12.00* 10.00* 11.90*   MONOCYTES 5.70 4.90 4.50   EOSINOPHILS 0.70 0.40 0.60   BASOPHILS 0.00 0.20 0.20     Recent Labs     05/07/21 0315 05/08/21  0200 05/09/21  0341   SODIUM 139 143 143   POTASSIUM 4.3 4.7 4.0   CHLORIDE 103 105 108   CO2 32 33 32   GLUCOSE 181* 183* 106*   BUN 37* 38* 34*     Recent Labs     05/07/21 0315 05/08/21  0200 05/09/21  0341   ALBUMIN 1.7* 1.9* 1.8*   TBILIRUBIN 0.5 0.3 0.4   ALKPHOSPHAT 351* 365* 387*   TOTPROTEIN 5.8* 6.0 5.8*   ALTSGPT 272* 413* 449*   ASTSGOT 325* 509* 509*   CREATININE 0.55 0.42* 0.51       Imaging:  CT-CSPINE WITHOUT PLUS RECONS    Result Date: 4/25/2021 4/25/2021 1:29 PM HISTORY/REASON FOR EXAM: History of back and neck pain. Fall. TECHNIQUE/EXAM DESCRIPTION: CT cervical spine without contrast, with reconstructions. Helical scanning was performed from the skull base through T1.  Sagittal and coronal multiplanar reconstructions were generated from the axial images. Low dose optimization technique was utilized for this CT exam including automated exposure control and adjustment of the mA and/or kV according  to patient size. COMPARISON:  None available. FINDINGS: No compression fracture is identified. There is an ununited fracture of the spinous process of T1. Intervertebral disc space narrowing and endplate spurring is most prominent at C6/C7. There are degenerative changes of the facet joints. C1/C2 alignment is maintained. There is multilevel uncovertebral spurring and neural foraminal narrowing. There is cervical prevertebral edema. There is carotid atherosclerotic plaque bilaterally. Visualized lung apices are clear.     Multilevel degenerative changes. Prevertebral edema. Further evaluation with MRI is recommended as this can be seen in the setting of ligamentous injury. Bilateral carotid atherosclerotic plaque.     CT-CHEST (THORAX) WITH    Result Date: 4/25/2021 4/25/2021 1:29 PM HISTORY/REASON FOR EXAM:  Rib fracture suspected, traumatic. TECHNIQUE/EXAM DESCRIPTION: CT scan of the chest with contrast. Helical images were obtained from the lung apices through the adrenal glands following the bolus administration of  80 mL of Omnipaque 350 nonionic contrast. Sagittal and coronal reconstructions were performed. Low dose optimization technique was utilized for this CT exam including automated exposure control and adjustment of the mA and/or kV according to patient size. COMPARISON:  None. FINDINGS: There is atherosclerotic plaque. There is coronary artery calcification. The heart is not enlarged. No pericardial or pleural effusion is seen. There are small mediastinal lymph nodes. There is no hilar or axillary lymphadenopathy. No pneumothorax or pulmonary contusion is seen. Central airways are patent. Limited views were obtained of the upper abdomen. Hypodensity along the falciform ligament likely represents focal fat. Patient is status post median sternotomy. Degenerative changes are seen in the spine. No displaced rib fracture is identified.     No displaced rib fracture is identified. No acute cardiopulmonary  process is seen. Atherosclerotic plaque including coronary artery calcification.    CT-HEAD W/O    Result Date: 4/30/2021 4/30/2021 4:27 AM HISTORY/REASON FOR EXAM: Altered mental status; evaluate cerebellar stroke, if no blood present OK for neurology to start aspirin TECHNIQUE/EXAM DESCRIPTION:  CT of the head without contrast. Sequential axial images were obtained from the vertex to the skull base without contrast. Up to date radiation dose reduction adjustments have been utilized to meet ALARA standards for radiation dose reduction. COMPARISON: April 28, 2021 FINDINGS: There is mild diffuse parenchymal volume loss observed. Periventricular and subcortical white matter low-attenuation changes are seen, most commonly associated with small vessel ischemic disease. The ventricles are normal in caliber and configuration. Low-density changes in the right cerebellar hemisphere seen.. There are no abnormal extra axial fluid collections or extra axial hemorrhage identified. The visualized paranasal sinuses and mastoid air cells are well aerated bilaterally. No depressed calvarial fractures are identified. The visualized globes and retrobulbar soft tissues appear within normal limits.  Atherosclerotic intracranial calcifications are seen.     1.  Low-density changes in the right cerebellar hemisphere, compatible with evolving infarct, streak artifacts minimally limits evaluation of the posterior fossa for subtle subarachnoid hemorrhage, no intracranial hemorrhage is readily identified. 2.  Atherosclerosis.    CT-HEAD W/O    Result Date: 4/28/2021 4/28/2021 5:20 AM HISTORY/REASON FOR EXAM: Altered mental status TECHNIQUE/EXAM DESCRIPTION:  CT of the head without contrast. Sequential axial images were obtained from the vertex to the skull base without contrast. Up to date radiation dose reduction adjustments have been utilized to meet ALARA standards for radiation dose reduction. COMPARISON: None FINDINGS: There is  moderate diffuse parenchymal volume loss observed. Periventricular and subcortical white matter low-attenuation changes are seen, most commonly associated with small vessel ischemic disease. The ventricles are normal in caliber and configuration. Area of low-density within the right cerebellar hemisphere is seen. There are no abnormal extra axial fluid collections or extra axial hemorrhage identified. The visualized paranasal sinuses and mastoid air cells are well aerated bilaterally. No depressed calvarial fractures are identified. The visualized globes and retrobulbar soft tissues appear within normal limits.     1.  Low-density in the region of the right cerebellar hemisphere, appearance compatible with evolving infarct. These findings were discussed with the patient's on-call clinician, Deniz, on 4/28/2021 6:14 AM.    CT-LSPINE W/O PLUS RECONS    Result Date: 4/25/2021 4/25/2021 1:29 PM HISTORY/REASON FOR EXAM:  Back pain after injury. TECHNIQUE/EXAM DESCRIPTION AND NUMBER OF VIEWS: CT lumbar spine without contrast, with reconstructions. The study was performed on a brotips CT scanner. Thin-section helical scanning was performed from T12-L1 to the sacrum. Sagittal and coronal multiplanar reconstructions were generated from the axial images. Low dose optimization technique was utilized for this CT exam including automated exposure control and adjustment of the mA and/or kV according to patient size. COMPARISON: None. FINDINGS: Alignment of the lumbar spine is normal. There is no acute fracture or subluxation. The prevertebral and paraspinous soft tissues show no acute abnormality. There is severe atherosclerosis with a mixture of soft and calcified plaque. There is lumbosacral junction vacuum disc phenomenon with endplate spurring, disc height loss The visualized sacrum and sacroiliac joints show no acute abnormality.     No CT evidence of acute traumatic injury.    CT-TSPINE W/O PLUS RECONS    Result Date:  4/25/2021 4/25/2021 1:29 PM HISTORY/REASON FOR EXAM:  Mid-back trauma Pain following injury. TECHNIQUE/EXAM DESCRIPTION AND NUMBER OF VIEWS: CT thoracic spine without contrast, with reconstructions. The study was performed on a [employer] CT scanner. Thin-section helical scanning was performed from C7-T1 through T12-L1. Sagittal and coronal multiplanar reconstructions were generated from the axial images. Low dose optimization technique was utilized for this CT exam including automated exposure control and adjustment of the mA and/or kV according to patient size. COMPARISON: None. FINDINGS: Alignment of the thoracic spine is normal. There is no acute displaced fracture. There is T6/7 interbody fusion with mild anterior wedging likely indicating remote mild compression fracture that has healed There is bulky endplate spurring with some bridging syndesmophytes in the mid thoracic spine Sternotomy wires The cervicothoracic junction appears intact. The prevertebral and paraspinous soft tissues show no acute abnormality.     No CT evidence of acute traumatic abnormality. Mild T6/7 anterior wedging compatible with healed mild chronic compression fracture and there is interbody fusion.    DX-CERVICAL SPINE-2 OR 3 VIEWS    Result Date: 4/22/2021 4/22/2021 6:16 PM HISTORY/REASON FOR EXAM:  Atraumatic Pain. Neck pain for 4 days. TECHNIQUE/EXAM DESCRIPTION AND NUMBER OF VIEWS: Cervical spine series, 2 views. COMPARISON:  None. FINDINGS: Mild reversal of curvature centered at the C5 level. Vertebral body heights are preserved. Multilevel loss of disc height and osteophyte formation. Cervicothoracic junction is obscured. Prevertebral soft tissues are within normal limits. Odontoid is grossly intact.  Lateral masses of C1 are grossly intact.     1.  Limited exam.  Cervicothoracic junction is obscured. 2.  No gross cervical spine fracture or subluxation. 3.  Moderate multilevel degenerative changes.    DX-CHEST-PORTABLE (1  VIEW)    Result Date: 4/30/2021 4/30/2021 1:07 AM HISTORY/REASON FOR EXAM: For indication of respiratory failure; For indication of respiratory failure TECHNIQUE/EXAM DESCRIPTION:  Single AP view of the chest. COMPARISON: Yesterday FINDINGS: Position of medical devices appears stable. The cardiac silhouette appears within normal limits.  Postsurgical changes of sternotomy are noted. The mediastinal contour appears within normal limits.  The central pulmonary vasculature appears normal. Bilateral lung volumes are diminished.  Bilateral lungs are clear. No significant pleural effusions are identified. The bony structures appear age-appropriate.     1.  No acute cardiopulmonary disease.    DX-CHEST-PORTABLE (1 VIEW)    Result Date: 4/29/2021 4/29/2021 10:06 AM HISTORY/REASON FOR EXAM:  For indication of respiratory failure; For indication of respiratory failure. TECHNIQUE/EXAM DESCRIPTION AND NUMBER OF VIEWS: Single portable view of the chest. COMPARISON: 4/28/2021 FINDINGS: Endotracheal tube projects at the level the clavicular heads. Right central line projects over the SVC. Enteric tube passes below the level of the diaphragm. The heart is not enlarged. Atherosclerotic calcification is seen. There is mild left basilar opacity likely representing atelectasis. No pleural effusion or pneumothorax is seen. Skin staples and a surgical drain project over the cervical spine.     Mild left basilar opacity may represent atelectasis. Infection not excluded. Improved aeration of the left lung. Atherosclerotic plaque.     DX-CHEST-PORTABLE (1 VIEW)    Result Date: 4/28/2021 4/28/2021 12:10 PM HISTORY/REASON FOR EXAM:  CENTRAL LINE PLACEMENT; CENTRAL LINE PLACEMENT. TECHNIQUE/EXAM DESCRIPTION AND NUMBER OF VIEWS: Single portable view of the chest. COMPARISON: 4/28/2021 11:17 AM FINDINGS: Mediastinal structures are shifted to the LEFT.  Increasing opacification of LEFT hemithorax. RIGHT lung is clear. Endotracheal tube in  place with tip approximately 5 cm above the you. Feeding tube tip in place however tip is off the image. RIGHT internal jugular catheter tip at the lower SVC. No pneumothorax. Postoperative change from prior open heart surgery. Postoperative change of the neck with surgical drain present.     1.  Worsening consolidation of LEFT hemithorax with shift of mediastinal structures to the LEFT suggesting atelectasis, possibly due to endobronchial process. 2.  Supportive tubing as described above. 3.  No pneumothorax.    DX-CHEST-PORTABLE (1 VIEW)    Result Date: 4/28/2021 4/28/2021 11:16 AM HISTORY/REASON FOR EXAM:  replaced ETT; replaced ETT TECHNIQUE/EXAM DESCRIPTION AND NUMBER OF VIEWS: Single portable view of the chest. COMPARISON: 4/27/2021 FINDINGS: Endotracheal tube with tip projecting over the mid thoracic trachea. Hazy opacity in the left lower lobe Layering left pleural effusion. No pneumothorax. Stable cardiopericardial silhouette.     Endotracheal tube with tip projecting over the mid thoracic trachea. Layering left pleural effusion with left lower lung opacity.    DX-CHEST-PORTABLE (1 VIEW)    Result Date: 4/27/2021 4/27/2021 5:30 PM HISTORY/REASON FOR EXAM:  Shortness of Breath. TECHNIQUE/EXAM DESCRIPTION AND NUMBER OF VIEWS: Single portable view of the chest. COMPARISON: None. FINDINGS: LUNGS: Hypoinflation with left basilar atelectasis. Mild perihilar interstitial prominence. No effusions. PNEUMOTHORAX: None. LINES AND TUBES: None. MEDIASTINUM: No cardiomegaly. MUSCULOSKELETAL STRUCTURES: No acute displaced fracture. Median sternotomy.     1.  Hypoinflation with left basilar atelectasis. 2.  Mild perihilar interstitial prominence may be related to hypoinflation or edema.    DX-SPINE-ANY ONE VIEW    Result Date: 4/28/2021 4/28/2021 5:30 AM HISTORY/REASON FOR EXAM:  Cervical laminectomy TECHNIQUE/EXAM DESCRIPTION AND NUMBER OF VIEWS:  Single view of the spine. Digitized Intraoperative Radiograph  FINDINGS: Fixation hardware projecting over the cervical spine Fluoroscopic time:2 seconds     Digitized intraoperative radiograph is submitted for review.  This examination is not for diagnostic purpose but for guidance during a surgical procedure.    MR-BRAIN-W/O    Result Date: 4/28/2021 4/28/2021 3:31 PM HISTORY/REASON FOR EXAM:  Altered mental status; cerebellar stroke. TECHNIQUE/EXAM DESCRIPTION: MRI of the brain without contrast. T1 sagittal, T2 fast spin-echo axial, T1 coronal, FLAIR coronal, diffusion-weighted and apparent diffusion coefficient (ADC map) axial images were obtained of the whole brain. The study was performed on a [employer] 1.5 Britt MRI scanner. COMPARISON:  Head CT earlier in the day FINDINGS: Large acute right cerebellar infarct suggesting right posterior inferior cerebellar artery territory. There is prominent T2 hyperintensity consistent with cytotoxic edema. A small amount of blooming artifact in the medial aspect of the right cerebellar hemisphere on GRE images could represent a small amount of petechial hemorrhage. There is mild localized mass effect with some mild mass effect on the fourth ventricle. No supratentorial infarct or hemorrhage. No extra-axial fluid collection. No midline shift or hydrocephalus. There is a nasogastric tube and fluid within the nasopharynx. Endotracheal tube partially visualized. Mild posterior ethmoid sinus mucosal thickening.     Acute large right cerebellar posterior inferior cerebellar artery territory infarct with possible small amount of petechial hemorrhage.    MR-CERVICAL SPINE-WITH & W/O    Result Date: 4/27/2021 4/27/2021 1:02 PM HISTORY/REASON FOR EXAM:  Neck pain, abnormal neuro exam; Neck pain, initial exam; sepsis 2/2 strep bacteremia  -unknown source. rule out possible ?? retropharyngeal abscess, ??prevertebral abscess. TECHNIQUE/EXAM DESCRIPTION: MRI of the cervical spine without and with contrast. The study was performed on a [employer]  1.5 Britt MRI scanner. T1 sagittal, T2 fast spin-echo sagittal, and gradient echo axial images were obtained of the cervical spine. T1 post-contrast fat suppressed sagittal images were obtained of the cervical spine. Optional T1 post-contrast axial images may be obtained. 15 mL ProHance contrast was administered intravenously. COMPARISON: None. FINDINGS: There is abnormal disc T2 hyperintensity at C5-6. Mild amount of bone marrow edema is noted at C5, C6 and C7. There is also focal bone marrow enhancement at C6. There is multiseptated abnormal peripherally enhancing epidural collection noted extending from C2 to the visualized lower thoracic level. Largest collection is noted in the upper thoracic posterior epidural space at the levels of T2 and T3. The lower extent of the collection is not imaged. This is causing multilevel effacement of the thecal sac and cord compression. The thoracic spinal cord is anteriorly displaced and compressed at the levels of T2 and T3. At the level of C2-3,there is small left anterior epidural fluid collection. At the level of C3-4,there is small left anterior epidural fluid collection causing indentation of the thecal sac. At the level of C4-5,there is minimal epidural fluid collection. At the level of C5-6,there is diffuse disc bulge along with right posterior lateral epidural fluid collection causing severe canal compromise. At the level of C6-7,there is mild diffuse disc bulge. There is epidural fluid collection causing severe canal compromise. At the level of C7-T1,there is epidural fluid collection causing severe canal compromise. The visualized brain parenchyma is unremarkable. The cervical spinal cord is mildly enlarged which demonstrates mild increased intramedullary T2 signal intensity. There is abnormal T2 hyperintensity in the visualized bilateral vertebral arteries concerning for age indeterminate occlusion. There is mild amount of edema in the pre and retropharyngeal soft  tissue.     1.  There is multiseptated abnormal peripherally enhancing epidural collection noted extending from C2 to the visualized lower thoracic level. Largest collection is noted in the upper thoracic posterior epidural space at the levels of T2 and T3. The lower extent of the collection is not imaged. This is causing multilevel effacement of the thecal sac and cord compression. The thoracic spinal cord is anteriorly displaced and compressed at the levels of T2 and T3. Pre and postcontrast MR examination of  the thoracic spine is recommended for further evaluation. 2.  There is effacement of the cervical thecal sac due to the multiseptated epidural fluid collection. 3.  The cervical spinal cord is mildly enlarged with minimal increased T2 signal intensity. The possibility of cord inflammation cannot be entirely excluded. 4.  Abnormal T2 hyperintensity at C5-6 disc space likely representing discitis. 5.  Abnormal bone marrow edema of C5, C6 and C7 vertebral bodies with edema concerning for osteomyelitis. 6.  There is also focal enhancement of C6 vertebral body likely secondary to the osteomyelitis. 7.  Prevertebral edema/fluid collection. 8.  Nonvisualization of flow void of bilateral vertebral arteries concerning for age-indeterminate bilateral vertebral occlusions.     MR-LUMBAR SPINE-WITH & W/O    Result Date: 4/28/2021 4/27/2021 11:23 PM HISTORY/REASON FOR EXAM:  Back pain or radiculopathy, cancer or infection suspected; Strep bacteremia, back pain, bilateral leg numbness TECHNIQUE/EXAM DESCRIPTION: MRI of the lumbar spine without and with contrast. The study was performed on a [employer] 1.5 Britt MRI scanner. T1 sagittal, T2 fast spin-echo sagittal, and T2 axial images were obtained of the lumbar spine. T1 postcontrast fat-suppressed sagittal images were obtained. 14 mL ProHance contrast was administered intravenously. COMPARISON:  None. FINDINGS: Normal lumbar lordosis. Preservation of vertebral body  heights, alignment and bone marrow signal. No evidence of discitis osteomyelitis. Conus medullaris terminates at T12-L1 and is normal in signal. No mass or fluid collection is seen within the lumbar spinal canal. T12-L1: Canal and foramina are patent. L1-L2: Mild disc bulge. Canal and foramina are patent. L2-L3: Minimal disc bulge and facet degeneration. Canal and foramina are patent. L3-L4: Minimal disc bulge and facet degeneration. Canal and foramina are patent.. L4-L5: Mild disc bulge and facet degeneration. No significant spinal stenosis. Mild foraminal narrowing. L5-S1: Disc narrowing, mild disc osteophyte. No significant spinal stenosis. Moderate right and mild-to-moderate left foraminal narrowing. Distended urinary bladder.     No evidence of lumbar spine infection or epidural abscess. Mild spondylosis as detailed above without spinal stenosis.    MR-MRA HEAD-W/O    Result Date: 4/28/2021 4/28/2021 3:31 PM HISTORY/REASON FOR EXAM:  Emergency Medical Condition ? Stroke. Cerebellar infarct TECHNIQUE/EXAM DESCRIPTION: MRA of the head ([place]) without contrast. The study was performed on a [email or web address] Signa 1.5 Britt MRI scanner. MRA of the [place] was performed with 3D time-of-flight technique with axial acquisition. Additional MRA of the internal carotid and vertebrobasilar arteries at the level of the skull base and craniocervical junction was also performed with 3D time-of-flight technique with axial acquisition. Images are reviewed at the PACS workstation as axial source images as well as maximum intensity ray projection (MIP) multiplanar 3D reconstructions. COMPARISON:  None FINDINGS: Nicole cavernous and supraclinoid ICA segments are patent. Patent left posterior communicating artery. MCA and ARA branches are patent. There is no significant flow within the intradural right vertebral artery. The intradural left vertebral artery, basilar artery and posterior cerebral arteries are patent. No  significant aneurysm or high flow vascular malformation is seen.     Findings suggesting right vertebral artery occlusion.    MR-MRA NECK-WITH & W/O AND SEQUENCES    Result Date: 4/28/2021 4/28/2021 3:31 PM HISTORY/REASON FOR EXAM:  Emergency Medical Condition ? Stroke Cerebellar stroke TECHNIQUE/EXAM DESCRIPTION: MRA of the cervical carotid and vertebral arteries without and with contrast. The study was performed on a [employer] Signa 1.5 Britt MRI scanner. Precontrast MRA of the cervical carotid and vertebral arteries was performed with 2D time-of-flight (TOF) technique with acquisition in the axial plane. MRA of the arch origins of the great vessels, and cervical carotid and vertebral arteries was performed with 2D time-of-flight (TOF) technique with coronal slab acquisition from the level of the aortic arch to the carotid siphons during dynamic intravenous infusion of 15 mL of ProHance contrast. Images are reviewed at the PACS workstation as source images as well as maximum intensity ray projection (MIP) multiplanar 3D reconstructions. Cervical internal carotid artery percent stenosis is calculated using the standard method according to the NASCET criteria wherein a segment of uniform caliber distal cervical internal carotid is used as the reference denominator. COMPARISON:  None available. FINDINGS: The arch origins of the great vessels are intact. The cervical right vertebral artery is not well seen on the time-of-flight images. A very small caliber cervical right vertebral artery is seen on the postcontrast images with some mildly increased caliber of the artery at about the C1-C2 level. The fact  that it is not seen on the time-of-flight images and is visualized on contrast enhanced sequence could be related to small nondominant caliber with sluggish flow or could represent retrograde flow within the right vertebral artery. There may be a focal moderate stenosis in the mid cervical left internal carotid artery  and mild narrowing at its origin. Mild narrowing of the proximal left internal carotid artery with less than 50% stenosis. No significant right carotid stenosis is seen.     1.  Small caliber right vertebral artery which is not visualized on the noncontrast time-of-flight technique could be related to retrograde flow or diminutive caliber and sluggish flow. 2.  Possible moderate focal stenosis in the mid cervical left vertebral artery. 3.  No significant carotid stenosis.    MR-THORACIC SPINE-WITH & W/O    Result Date: 4/28/2021 4/27/2021 11:23 PM HISTORY/REASON FOR EXAM:  Mid-back pain, compression fracture suspected; possible epidural abscess/fluid collection TECHNIQUE/EXAM DESCRIPTION: MRI of the thoracic spine without and with contrast. The study was performed on a [employer] Signa 1.5 Britt MRI scanner. T1 sagittal, T2 fast spin-echo sagittal, and T2 axial images were obtained of the thoracic spine. T1 post-contrast fat suppressed sagittal images were obtained. Optional T1 post-contrast axial images may be obtained. 14 mL ProHance contrast was administered intravenously. COMPARISON:  MRI of the cervical spine one-day prior. FINDINGS: There is abnormal dorsal epidural fluid collection seen within the partially visualized lower cervical spine and which extends inferiorly to about the T6 level. The maximum thickness is seen at about the T2-T3 level measuring 8 mm. This raises concern for epidural abscess, although epidural hematoma could also have this appearance. From T1 through T3 there is at least moderate thecal sac stenosis due to the epidural fluid collection. There is significant abnormal signal within the partially visualized  lower cervical and upper thoracic cord which could be infectious/inflammatory or other nonspecific cord edema. There is also suggestion of a small ventral epidural fluid collection at C6-C7. There is also partially visualized abnormal marrow edema in the C6 and C7 vertebral bodies as  detailed on earlier MRI report. There is suggestion of some prominent enhancement around the mid and lower thoracic cord seen on the sagittal postcontrast images however limited evaluation on axial images due to motion artifact. This is of uncertain etiology but could represent some leptomeningeal disease/infection. On the sagittal T2 images there is a questionable slight nodular appearance on the surface of lower thoracic cord. A differential would be a subtle spinal dural aVF. There is no abnormal signal seen within the mid/lower thoracic cord. There is no evidence of discitis osteomyelitis within the thoracic spine. There is T6-T7 interbody ankylosis. There is mild disc degeneration and some prominent anterolateral bridging osteophytes in the mid and lower thoracic spine. No significant posterior disc herniation is seen. Within the mid and lower thoracic spine there  is no significant spinal stenosis.     1.  Posterior epidural fluid collection/abscess within the lower cervical spine extending inferiorly to about the T6 level which could represent epidural abscess and/or hematoma. This measures 8 mm in maximal thickness at T2-T3 with at least moderate thecal sac stenosis. 2.  Abnormal signal within the cervical cord and upper thoracic cord suggesting cord edema or infectious/inflammatory etiology. 3.  Lower cervical spine findings are detailed on earlier report. 4.  Prominent enhancement along the surface of the mid and lower thoracic cord is of uncertain etiology and as detailed above, possibly representing leptomeningeal disease/infection. See details above. These findings were discussed with Dr. Cano on 4/28/2021 10:01 AM.     IR-DRAIN-BLADDER SUPRAPUB W/CATH    Result Date: 4/28/2021  HISTORY/REASON FOR EXAM:  60-year-old man with urinary retention. TECHNIQUE/EXAM DESCRIPTION AND NUMBER OF VIEWS:  Ultrasound and fluoroscopic guided suprapubic bladder catheter placement, Cystogram. MEDICATIONS: The patient  remained on his ICU sedation regimen. FLUOROSCOPY TIME: 0.3 minutes; 5fluoroscopic images obtained CONTRAST: 40mL Omnipaque 300, intraurinary PROCEDURE:  Informed consent was obtained. The suprapubic region was prepped and draped in sterile manner. Following local anesthesia with copious 1% lidocaine, under ultrasound and fluoroscopic monitoring, an 18-gauge needle was advanced into the urinary bladder from a paramedian suprapubic approach. An Amplatz guidewire was placed in the urinary bladder. Serial tract dilatation was performed with a 22 [place] telescoping dilator. A 16 [place] tip silicone Gomez type catheter was then placed in the balloon inflated with 10 mL of sterile saline. A cystogram was performed with AP and both oblique views demonstrating satisfactory position of the catheter with all side holes within the urinary bladder. The catheter was secured to the skin with 2-0 nylon suture and connected to gravity drainage. The Goemz catheter was removed. The patient tolerated the procedure well with no evidence of complication. COMPARISON: None FINDINGS:  The cystogram shows satisfactory catheter position with all sideholes within the urinary bladder. There is no extravasation.     1.     Ultrasound and fluoroscopic guided placement of a 16 [place] tip silicone suprapubic bladder catheter. 2.     Cystogram documenting catheter placement.     DX-PORTABLE FLUOROSCOPY < 1 HOUR    Result Date: 4/28/2021 4/28/2021 5:30 AM HISTORY/REASON FOR EXAM:  Cervical laminectomy TECHNIQUE/EXAM DESCRIPTION AND NUMBER OF VIEWS: Portable fluoroscopy for less than one hour in OR. FINDINGS: The portable fluoroscopy unit was obligated to the procedure for less than one hour. Actual fluoro time was 2 seconds.     Portable fluoroscopy utilized for 2 seconds. INTERPRETING LOCATION: [address] [place] [address] [place], [id number]    EC-ECHOCARDIOGRAM COMPLETE W/O CONT    Result Date: 4/28/2021  Transthoracic Echo Report Echocardiography  Laboratory CONCLUSIONS No prior study is available for comparison. Normal left ventricular systolic function.  Left ventricular ejection fraction is visually estimated to be 60%. Normal diastolic function. Agitated saline (bubble) study was performed. No evidence of right to left shunt by agitated saline challenge. Normal inferior vena cava size and inspiratory collapse. GILDARDO MAGANA Exam Date:         2021                    19:42 Exam Location:     Inpatient Priority:          Routine Ordering Physician:        YESICA SULLIVAN Referring Physician:       [id number] Marie Sonographer:               Faye Moya RDCS Age:    60     Gender:    M MRN:    [id number] :    1960 BSA:    1.87   Ht (in):    69     Wt (lb):    159 Exam Type:     Complete Indications:     CVA ICD Codes:       436 CPT Codes:       68519 BP:   140    /   60     HR:   74 Technical Quality:       Fair MEASUREMENTS  (Male / Female) Normal Values 2D ECHO LV Diastolic Diameter PLAX        3.8 cm                4.2 - 5.9 / 3.9 - 5.3 cm LV Systolic Diameter PLAX         2.4 cm                2.1 - 4.0 cm IVS Diastolic Thickness           1.4 cm                LVPW Diastolic Thickness          1.4 cm                LVOT Diameter                     2 cm                  Estimated LV Ejection Fraction    60 %                  LV Ejection Fraction MOD BP       62.7 %                >= 55  % LV Ejection Fraction MOD 4C       46.6 %                LV Ejection Fraction MOD 2C       55 %                  DOPPLER AV Peak Velocity                  1.7 m/s               AV Peak Gradient                  10.9 mmHg             AV Mean Gradient                  5.5 mmHg              LVOT Peak Velocity                1.2 m/s               AV Area Cont Eq vti               2.3 cm2               Mitral E Point Velocity           0.74 m/s              Mitral E to A Ratio               0.92                  MV Pressure Half Time             69.4 ms                MV Area PHT                       3.2 cm2               MV Deceleration Time              239 ms                PV Peak Velocity                  1.1 m/s               PV Peak Gradient                  4.6 mmHg              RVOT Peak Velocity                0.66 m/s              * Indicates values subject to auto-interpretation LV EF:  60    % FINDINGS Left Ventricle Normal left ventricular chamber size. Mild concentric left ventricular hypertrophy. Normal left ventricular systolic function.  Left ventricular ejection fraction is visually estimated to be 60%. Normal diastolic function. Right Ventricle Normal right ventricular size and systolic function. Right Atrium Normal right atrial size. Normal inferior vena cava size and inspiratory collapse. Left Atrium Normal left atrial size. Agitated saline (bubble) study was performed. With Valsalva maneuver. No evidence of right to left shunt by agitated saline challenge. Existing IV was used. Mitral Valve Structurally normal mitral valve without significant stenosis or regurgitation. Aortic Valve Aortic sclerosis without stenosis. No aortic insufficiency. Tricuspid Valve Structurally normal tricuspid valve without significant stenosis or regurgitation. Pulmonic Valve The pulmonic valve is not well visualized. No pulmonic insufficiency. Pericardium Normal pericardium without effusion. Aorta Normal aortic root for body surface area. Ascending aorta not well visualized. Zakiya Rebollar MD (Electronically Signed) Final Date:     28 April 2021                 21:30    EC-CHRISTOPHER W/O CONT    Result Date: 4/30/2021  Results Will be Available after Interpretation by Cardiologist.    DX-ABDOMEN FOR TUBE PLACEMENT    Result Date: 4/30/2021 4/30/2021 12:44 PM HISTORY/REASON FOR EXAM:  Line evaluation. TECHNIQUE/EXAM DESCRIPTION AND NUMBER OF VIEWS:  1 view(s) of the abdomen. COMPARISON:  4/28/2021 FINDINGS: Enteric tube has been placed. The tip projects over the distal stomach or  "duodenal bulb. There are scattered loops of air-filled bowel.     Feeding tube placement with the tip projecting over the distal stomach or duodenal bulb    DX-ABDOMEN FOR TUBE PLACEMENT    Result Date: 4/28/2021 4/28/2021 12:24 PM HISTORY/REASON FOR EXAM:  Tube evaluation. TECHNIQUE/EXAM DESCRIPTION AND NUMBER OF VIEWS:  1 view(s) of the abdomen. COMPARISON:  None. FINDINGS: Limited single view of the abdomen performed primarily to evaluate enteric tube position. The tip projects over the expected area of the distal stomach. The bowel gas pattern is within normal limits.     Feeding tube with tip projecting over the expected area of the distal stomach.      Micro:  Results     Procedure Component Value Units Date/Time    Culture Respiratory W/ Grm Stn - BAL [[id number]] Collected: 05/06/21 1055    Order Status: Completed Specimen: Respirate from Bronchoalveolar Lavage Updated: 05/08/21 1100     Significant Indicator NEG     Source RESP     Site BRONCHOALVEOLAR LAVAGE     Culture Result Rare growth usual upper respiratory bruce  including yeast.  No clinically significant Staphylococcus aureus, Methicillin  Resistant Staphylococcus aureus, or Pseudomonas species  isolated.       Gram Stain Result Many WBCs.  No organisms seen.      Narrative:      Collected By:[id number] MOSES KAPLAN  Collected By:[id number] MOSES KAPLAN    BLOOD CULTURE [[id number]] Collected: 05/07/21 0758    Order Status: Completed Specimen: Blood from Peripheral Updated: 05/08/21 0711     Significant Indicator NEG     Source BLD     Site PERIPHERAL     Culture Result No Growth  Note: Blood cultures are incubated for 5 days and  are monitored continuously.Positive blood cultures  are called to the RN and reported as soon as  they are identified.      Narrative:      Collected By:[id number] HARJIT HERRERA  Per Hospital Policy: Only change Specimen Src: to \"Line\" if  specified by physician order.  Left Forearm/Arm    BLOOD CULTURE [[id number]] Collected: " "05/07/21 1010    Order Status: Completed Specimen: Blood from Peripheral Updated: 05/08/21 0711     Significant Indicator NEG     Source BLD     Site PERIPHERAL     Culture Result No Growth  Note: Blood cultures are incubated for 5 days and  are monitored continuously.Positive blood cultures  are called to the RN and reported as soon as  they are identified.      Narrative:      Collected By:[id number] HARJIT HERRERA  Per Hospital Policy: Only change Specimen Src: to \"Line\" if  specified by physician order.  Left AC    BLOOD CULTURE [[id number]]     Order Status: Canceled Specimen: Blood from Peripheral     BLOOD CULTURE [[id number]]     Order Status: Canceled Specimen: Blood from Peripheral     GRAM STAIN [[id number]] Collected: 05/06/21 1055    Order Status: Completed Specimen: Respirate Updated: 05/06/21 2054     Significant Indicator .     Source RESP     Site BRONCHOALVEOLAR LAVAGE     Gram Stain Result Many WBCs.  No organisms seen.      Narrative:      Collected By:[id number] MOSES KAPLAN  Collected By:[id number] MOSES KAPLAN    BLOOD CULTURE [[id number]] Collected: 05/01/21 1300    Order Status: Completed Specimen: Blood from Peripheral Updated: 05/06/21 1500     Significant Indicator NEG     Source BLD     Site PERIPHERAL     Culture Result No growth after 5 days of incubation.    Narrative:      Collected By:[id number] LESA VIZCARRA  Per Hospital Policy: Only change Specimen Src: to \"Line\" if  specified by physician order.  Right Forearm/Arm    BLOOD CULTURE [[id number]] Collected: 05/01/21 1305    Order Status: Completed Specimen: Blood from Peripheral Updated: 05/06/21 1500     Significant Indicator NEG     Source BLD     Site PERIPHERAL     Culture Result No growth after 5 days of incubation.    Narrative:      Collected By:[id number] LESA VIZCARRA  Per Hospital Policy: Only change Specimen Src: to \"Line\" if  specified by physician order.  Left Forearm/Arm    BLOOD CULTURE [[id number]] Collected: 04/28/21 1134    " "Order Status: Completed Specimen: Blood from Peripheral Updated: 05/03/21 1300     Significant Indicator NEG     Source BLD     Site PERIPHERAL     Culture Result No growth after 5 days of incubation.    Narrative:      Collected By:[id number] SAILAJA BAILEY  Per Hospital Policy: Only change Specimen Src: to \"Line\" if  specified by physician order.  No site indicated          Assessment:  Active Hospital Problems    Diagnosis    • *Spinal epidural abscess [G06.1]    • History of ischemic stroke [Z86.73]    • Sepsis due to Streptococcus species (HCC) [A40.9]    • Acute bilateral back pain [M54.9]    • Thrombocytopenia (HCC) [D69.6]    • Type 2 diabetes mellitus without complication, without long-term current use of insulin (HCC) [E11.9]    • Coronary artery disease due to calcified coronary lesion: CABG x4, July 2015 [I25.10, I25.84]    • Essential hypertension, benign [I10]    • Hypokalemia [E87.6]    • Hypomagnesemia [E83.42]    • Hyponatremia [E87.1]    • Difficulty swallowing [R13.10]    • Diabetic ketoacidosis (HCC) [E11.10]    • Marijuana abuse [F12.10]    • High anion gap metabolic acidosis [E87.2]    • Tobacco abuse [Z72.0]    • Dyslipidemia [E78.5]    • Status post urethrostomy (HCC) [Z93.6]    .    Assessment:  60 y.o.  admitted 4/25/2021. Pt has a past medical history of uncontrolled diabetes, CAD status post CABG times 4/2015, marijuana use and to arthritis.  Presented complaining of neck and back pain ongoing for approximately 1 week and fall getting out of the bathtub.  He had been seen at urgent care for back pain approximately 1 week prior to admission and given Toradol injections.       Hospital Course:   Afebrile and vitals unremarkable other than hypertension.  Leukocytosis ongoing since arrival.  MRI C-spine on 4/26 with epidural collection noted from C2-T3.  Largest collection noted in upper thoracic posterior epidural space at T2-T3.  This is causing multilevel cord compression.  Also with " likely discitis at C5-6 and osteomyelitis at C5-C7.  Blood cultures on 4/25+ for Streptococcus species.    Code blue 5/3-mucus plugs     Problem List  Sepsis/shock, strep anginosis  -Blood cultures on 4/25 2/2 + Streptococcus anginosus, penicillin and cephalosporin susceptible  -Blood cultures on 4/27 1/2 + viridans Streptococcus   -Blood cultures on 4/28  1/2 + CoNS -likely contaminant  -Blood cultures on 5/1 and 5/7 are no growth to date  -TTE with no vegetations  -CHRISTOPHER on 4/30, no official read as yet-query delay    VDRF-Bronchoscopy on 4/28 with thick secretions  Bronch 5/3 mucus plugs, thick secretions  Bronch 5/4 pending-no signif secretions noted  Bronch 5/6 with thick secretion RML    Leukocytosis, persistent.   Cervical thoracic infection, epidural abscess at C2-T3 as well as discitis and osteomyelitis, +GPCs-Streptococcus anginosus   -Repeat MRI thoracic spine on 4/27 notes posterior epidural fluid  collection/abscess in lower cervical spine extending to T6 level, also noted abnormal signal within the cervical and upper thoracic cord  -s/p OR 4/28 for see 4-T2 laminectomy, decompression of thecal sac and nerve roots as well as cervical thoracic extradural spinal mass/epidural abscess removal, linda purulence during OR, no CSF leak per op note  -Operative cultures from cervical thoracic epidural 1/ 2 +strep anginosis    CVA, Right cerebellar stroke, possibly due to venous spasm associated with the epidural abscess.   -MRA neck with and without on 4/28, possible moderate focal stenosis in mid left CVA.  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  MRI without on 4/28 with acute large right cerebellar infarct with small hemorrhage  Uncontrolled diabetes  Transaminitis, not improving     Plan   Trial switch back to ceftriaxone to see if LFTs improve  Anticipate a 6-week IV antibiotic course for the bacteremia and spinal infection-stop date 6/11/2021  Due to recurrent  fever-repeat blood cxs (neg), checked lipase (normal),  RUQ USG mild HMG  Repeat MRI spine prior to stopping antibiotics  FU CHRISTOPHER result-still pending  Keep BS under 150 to help control current infection  Continue to FU Bronch results (5/6)-last CXR LLL infiltrate.   Cultures neg to date-continue to follow    Palliative care consult and goals of care discussion appreciated     Prognosis guarded

## 2021-05-10 ENCOUNTER — APPOINTMENT (OUTPATIENT)
Dept: RADIOLOGY | Facility: MEDICAL CENTER | Age: 61
DRG: 003 | End: 2021-05-10
Attending: PSYCHIATRY & NEUROLOGY
Payer: MEDICARE

## 2021-05-10 ENCOUNTER — APPOINTMENT (OUTPATIENT)
Dept: RADIOLOGY | Facility: MEDICAL CENTER | Age: 61
DRG: 003 | End: 2021-05-10
Attending: INTERNAL MEDICINE
Payer: MEDICARE

## 2021-05-10 ENCOUNTER — APPOINTMENT (OUTPATIENT)
Dept: RADIOLOGY | Facility: MEDICAL CENTER | Age: 61
DRG: 003 | End: 2021-05-10
Attending: STUDENT IN AN ORGANIZED HEALTH CARE EDUCATION/TRAINING PROGRAM
Payer: MEDICARE

## 2021-05-10 LAB
ALBUMIN SERPL BCP-MCNC: 1.9 G/DL (ref 3.2–4.9)
ALBUMIN/GLOB SERPL: 0.5 G/DL
ALP SERPL-CCNC: 335 U/L (ref 30–99)
ALT SERPL-CCNC: 351 U/L (ref 2–50)
ANION GAP SERPL CALC-SCNC: 5 MMOL/L (ref 7–16)
AST SERPL-CCNC: 244 U/L (ref 12–45)
BASE EXCESS BLDA CALC-SCNC: 6 MMOL/L (ref -4–3)
BASOPHILS # BLD AUTO: 0.2 % (ref 0–1.8)
BASOPHILS # BLD: 0.02 K/UL (ref 0–0.12)
BILIRUB SERPL-MCNC: 0.3 MG/DL (ref 0.1–1.5)
BODY TEMPERATURE: ABNORMAL DEGREES
BREATHS SETTING VENT: 26
BUN SERPL-MCNC: 36 MG/DL (ref 8–22)
CALCIUM SERPL-MCNC: 7.8 MG/DL (ref 8.5–10.5)
CHLORIDE SERPL-SCNC: 110 MMOL/L (ref 96–112)
CO2 BLDA-SCNC: 31 MMOL/L (ref 20–33)
CO2 SERPL-SCNC: 30 MMOL/L (ref 20–33)
CREAT SERPL-MCNC: 0.5 MG/DL (ref 0.5–1.4)
CRP SERPL HS-MCNC: 20.01 MG/DL (ref 0–0.75)
DELSYS IDSYS: ABNORMAL
END TIDAL CARBON DIOXIDE IECO2: 27 MMHG
EOSINOPHIL # BLD AUTO: 0.12 K/UL (ref 0–0.51)
EOSINOPHIL NFR BLD: 1.1 % (ref 0–6.9)
ERYTHROCYTE [DISTWIDTH] IN BLOOD BY AUTOMATED COUNT: 55.1 FL (ref 35.9–50)
GLOBULIN SER CALC-MCNC: 4 G/DL (ref 1.9–3.5)
GLUCOSE BLD-MCNC: 102 MG/DL (ref 65–99)
GLUCOSE BLD-MCNC: 105 MG/DL (ref 65–99)
GLUCOSE BLD-MCNC: 125 MG/DL (ref 65–99)
GLUCOSE SERPL-MCNC: 119 MG/DL (ref 65–99)
HCO3 BLDA-SCNC: 29.8 MMOL/L (ref 17–25)
HCT VFR BLD AUTO: 30.4 % (ref 42–52)
HGB BLD-MCNC: 9.4 G/DL (ref 14–18)
HOROWITZ INDEX BLDA+IHG-RTO: 81 MM[HG]
IMM GRANULOCYTES # BLD AUTO: 0.06 K/UL (ref 0–0.11)
IMM GRANULOCYTES NFR BLD AUTO: 0.6 % (ref 0–0.9)
LYMPHOCYTES # BLD AUTO: 1.78 K/UL (ref 1–4.8)
LYMPHOCYTES NFR BLD: 16.8 % (ref 22–41)
MAGNESIUM SERPL-MCNC: 2.3 MG/DL (ref 1.5–2.5)
MCH RBC QN AUTO: 30.6 PG (ref 27–33)
MCHC RBC AUTO-ENTMCNC: 30.9 G/DL (ref 33.7–35.3)
MCV RBC AUTO: 99 FL (ref 81.4–97.8)
MODE IMODE: ABNORMAL
MONOCYTES # BLD AUTO: 0.57 K/UL (ref 0–0.85)
MONOCYTES NFR BLD AUTO: 5.4 % (ref 0–13.4)
NEUTROPHILS # BLD AUTO: 8.05 K/UL (ref 1.82–7.42)
NEUTROPHILS NFR BLD: 75.9 % (ref 44–72)
NRBC # BLD AUTO: 0.02 K/UL
NRBC BLD-RTO: 0.2 /100 WBC
O2/TOTAL GAS SETTING VFR VENT: 80 %
PCO2 BLDA: 38.9 MMHG (ref 26–37)
PCO2 TEMP ADJ BLDA: 39.5 MMHG (ref 26–37)
PEEP END EXPIRATORY PRESSURE IPEEP: 10 CMH20
PH BLDA: 7.49 [PH] (ref 7.4–7.5)
PH TEMP ADJ BLDA: 7.49 [PH] (ref 7.4–7.5)
PLATELET # BLD AUTO: 345 K/UL (ref 164–446)
PMV BLD AUTO: 11.9 FL (ref 9–12.9)
PO2 BLDA: 65 MMHG (ref 64–87)
PO2 TEMP ADJ BLDA: 66 MMHG (ref 64–87)
POTASSIUM SERPL-SCNC: 3.6 MMOL/L (ref 3.6–5.5)
PROT SERPL-MCNC: 5.9 G/DL (ref 6–8.2)
RBC # BLD AUTO: 3.07 M/UL (ref 4.7–6.1)
SAO2 % BLDA: 94 % (ref 93–99)
SODIUM SERPL-SCNC: 145 MMOL/L (ref 135–145)
SPECIMEN DRAWN FROM PATIENT: ABNORMAL
TIDAL VOLUME IVT: 430 ML
TRIGL SERPL-MCNC: 94 MG/DL (ref 0–149)
WBC # BLD AUTO: 10.6 K/UL (ref 4.8–10.8)

## 2021-05-10 PROCEDURE — 82962 GLUCOSE BLOOD TEST: CPT

## 2021-05-10 PROCEDURE — 99153 MOD SED SAME PHYS/QHP EA: CPT

## 2021-05-10 PROCEDURE — 94003 VENT MGMT INPAT SUBQ DAY: CPT

## 2021-05-10 PROCEDURE — A9270 NON-COVERED ITEM OR SERVICE: HCPCS | Performed by: STUDENT IN AN ORGANIZED HEALTH CARE EDUCATION/TRAINING PROGRAM

## 2021-05-10 PROCEDURE — 770022 HCHG ROOM/CARE - ICU (200)

## 2021-05-10 PROCEDURE — A9270 NON-COVERED ITEM OR SERVICE: HCPCS | Performed by: INTERNAL MEDICINE

## 2021-05-10 PROCEDURE — 71045 X-RAY EXAM CHEST 1 VIEW: CPT

## 2021-05-10 PROCEDURE — 94799 UNLISTED PULMONARY SVC/PX: CPT

## 2021-05-10 PROCEDURE — A9270 NON-COVERED ITEM OR SERVICE: HCPCS | Performed by: PSYCHIATRY & NEUROLOGY

## 2021-05-10 PROCEDURE — 31600 PLANNED TRACHEOSTOMY: CPT

## 2021-05-10 PROCEDURE — 94669 MECHANICAL CHEST WALL OSCILL: CPT

## 2021-05-10 PROCEDURE — 99291 CRITICAL CARE FIRST HOUR: CPT | Mod: 25,GC | Performed by: INTERNAL MEDICINE

## 2021-05-10 PROCEDURE — 99152 MOD SED SAME PHYS/QHP 5/>YRS: CPT

## 2021-05-10 PROCEDURE — 700101 HCHG RX REV CODE 250: Performed by: INTERNAL MEDICINE

## 2021-05-10 PROCEDURE — 31645 BRNCHSC W/THER ASPIR 1ST: CPT | Performed by: INTERNAL MEDICINE

## 2021-05-10 PROCEDURE — 86140 C-REACTIVE PROTEIN: CPT

## 2021-05-10 PROCEDURE — 700102 HCHG RX REV CODE 250 W/ 637 OVERRIDE(OP): Performed by: INTERNAL MEDICINE

## 2021-05-10 PROCEDURE — 700102 HCHG RX REV CODE 250 W/ 637 OVERRIDE(OP): Performed by: STUDENT IN AN ORGANIZED HEALTH CARE EDUCATION/TRAINING PROGRAM

## 2021-05-10 PROCEDURE — 700111 HCHG RX REV CODE 636 W/ 250 OVERRIDE (IP): Performed by: INTERNAL MEDICINE

## 2021-05-10 PROCEDURE — 83735 ASSAY OF MAGNESIUM: CPT

## 2021-05-10 PROCEDURE — 99233 SBSQ HOSP IP/OBS HIGH 50: CPT | Performed by: INTERNAL MEDICINE

## 2021-05-10 PROCEDURE — 700105 HCHG RX REV CODE 258: Performed by: INTERNAL MEDICINE

## 2021-05-10 PROCEDURE — 84478 ASSAY OF TRIGLYCERIDES: CPT

## 2021-05-10 PROCEDURE — 302977 HCHG BRONCHOSCOPY PROC-THERAPEUTIC

## 2021-05-10 PROCEDURE — 85025 COMPLETE CBC W/AUTO DIFF WBC: CPT

## 2021-05-10 PROCEDURE — 80053 COMPREHEN METABOLIC PANEL: CPT

## 2021-05-10 PROCEDURE — 0B113F4 BYPASS TRACHEA TO CUTANEOUS WITH TRACHEOSTOMY DEVICE, PERCUTANEOUS APPROACH: ICD-10-PCS | Performed by: INTERNAL MEDICINE

## 2021-05-10 PROCEDURE — 700101 HCHG RX REV CODE 250: Performed by: STUDENT IN AN ORGANIZED HEALTH CARE EDUCATION/TRAINING PROGRAM

## 2021-05-10 PROCEDURE — 700102 HCHG RX REV CODE 250 W/ 637 OVERRIDE(OP): Performed by: PSYCHIATRY & NEUROLOGY

## 2021-05-10 PROCEDURE — 94760 N-INVAS EAR/PLS OXIMETRY 1: CPT

## 2021-05-10 PROCEDURE — 700111 HCHG RX REV CODE 636 W/ 250 OVERRIDE (IP): Performed by: STUDENT IN AN ORGANIZED HEALTH CARE EDUCATION/TRAINING PROGRAM

## 2021-05-10 PROCEDURE — 700111 HCHG RX REV CODE 636 W/ 250 OVERRIDE (IP): Performed by: NURSE PRACTITIONER

## 2021-05-10 PROCEDURE — 36600 WITHDRAWAL OF ARTERIAL BLOOD: CPT

## 2021-05-10 PROCEDURE — 82803 BLOOD GASES ANY COMBINATION: CPT

## 2021-05-10 RX ORDER — POLYETHYLENE GLYCOL 3350 17 G/17G
1 POWDER, FOR SOLUTION ORAL DAILY
Status: DISCONTINUED | OUTPATIENT
Start: 2021-05-10 | End: 2021-05-13

## 2021-05-10 RX ORDER — INSULIN GLARGINE 100 [IU]/ML
40 INJECTION, SOLUTION SUBCUTANEOUS EVERY EVENING
Status: DISCONTINUED | OUTPATIENT
Start: 2021-05-10 | End: 2021-05-14 | Stop reason: HOSPADM

## 2021-05-10 RX ORDER — MIDAZOLAM HYDROCHLORIDE 1 MG/ML
2 INJECTION INTRAMUSCULAR; INTRAVENOUS ONCE
Status: COMPLETED | OUTPATIENT
Start: 2021-05-10 | End: 2021-05-10

## 2021-05-10 RX ORDER — BISACODYL 10 MG
10 SUPPOSITORY, RECTAL RECTAL DAILY
Status: DISCONTINUED | OUTPATIENT
Start: 2021-05-10 | End: 2021-05-13

## 2021-05-10 RX ORDER — ROCURONIUM BROMIDE 10 MG/ML
50 INJECTION, SOLUTION INTRAVENOUS ONCE
Status: COMPLETED | OUTPATIENT
Start: 2021-05-10 | End: 2021-05-10

## 2021-05-10 RX ADMIN — ARIPIPRAZOLE 15 MG: 10 TABLET ORAL at 05:44

## 2021-05-10 RX ADMIN — OXYCODONE 5 MG: 5 TABLET ORAL at 00:15

## 2021-05-10 RX ADMIN — ROCURONIUM BROMIDE 50 MG: 10 INJECTION, SOLUTION INTRAVENOUS at 13:13

## 2021-05-10 RX ADMIN — FENTANYL CITRATE 50 MCG: 50 INJECTION, SOLUTION INTRAMUSCULAR; INTRAVENOUS at 13:14

## 2021-05-10 RX ADMIN — ASPIRIN 324 MG: 81 TABLET, CHEWABLE ORAL at 05:44

## 2021-05-10 RX ADMIN — DOCUSATE SODIUM 100 MG: 50 LIQUID ORAL at 17:13

## 2021-05-10 RX ADMIN — LIDOCAINE 3 PATCH: 50 PATCH TOPICAL at 17:14

## 2021-05-10 RX ADMIN — CYANOCOBALAMIN TAB 500 MCG 1000 MCG: 500 TAB at 06:00

## 2021-05-10 RX ADMIN — CYCLOBENZAPRINE HYDROCHLORIDE 5 MG: 5 TABLET, FILM COATED ORAL at 23:24

## 2021-05-10 RX ADMIN — INSULIN GLARGINE 40 UNITS: 100 INJECTION, SOLUTION SUBCUTANEOUS at 17:23

## 2021-05-10 RX ADMIN — DOCUSATE SODIUM 50 MG AND SENNOSIDES 8.6 MG 1 TABLET: 8.6; 5 TABLET, FILM COATED ORAL at 20:27

## 2021-05-10 RX ADMIN — OXYCODONE 5 MG: 5 TABLET ORAL at 05:52

## 2021-05-10 RX ADMIN — POLYETHYLENE GLYCOL 3350 1 PACKET: 17 POWDER, FOR SOLUTION ORAL at 11:43

## 2021-05-10 RX ADMIN — DOCUSATE SODIUM 100 MG: 50 LIQUID ORAL at 05:43

## 2021-05-10 RX ADMIN — FAMOTIDINE 20 MG: 20 TABLET ORAL at 06:00

## 2021-05-10 RX ADMIN — OXYCODONE 5 MG: 5 TABLET ORAL at 13:27

## 2021-05-10 RX ADMIN — OXYCODONE 5 MG: 5 TABLET ORAL at 21:42

## 2021-05-10 RX ADMIN — POTASSIUM BICARBONATE 50 MEQ: 978 TABLET, EFFERVESCENT ORAL at 08:09

## 2021-05-10 RX ADMIN — MIDAZOLAM HYDROCHLORIDE 2 MG: 1 INJECTION, SOLUTION INTRAMUSCULAR; INTRAVENOUS at 13:13

## 2021-05-10 RX ADMIN — CEFTRIAXONE SODIUM 2 G: 2 INJECTION, POWDER, FOR SOLUTION INTRAMUSCULAR; INTRAVENOUS at 05:45

## 2021-05-10 RX ADMIN — OXYCODONE 5 MG: 5 TABLET ORAL at 17:29

## 2021-05-10 RX ADMIN — FENTANYL CITRATE 50 MCG: 50 INJECTION, SOLUTION INTRAMUSCULAR; INTRAVENOUS at 13:25

## 2021-05-10 RX ADMIN — CYCLOBENZAPRINE HYDROCHLORIDE 5 MG: 5 TABLET, FILM COATED ORAL at 09:13

## 2021-05-10 RX ADMIN — ENOXAPARIN SODIUM 40 MG: 40 INJECTION SUBCUTANEOUS at 06:00

## 2021-05-10 RX ADMIN — Medication 5000 UNITS: at 05:44

## 2021-05-10 RX ADMIN — FAMOTIDINE 20 MG: 20 TABLET ORAL at 17:13

## 2021-05-10 RX ADMIN — BISACODYL 10 MG: 10 SUPPOSITORY RECTAL at 17:14

## 2021-05-10 ASSESSMENT — ENCOUNTER SYMPTOMS
FEVER: 0
VOMITING: 0
COUGH: 0

## 2021-05-10 NOTE — OP REPORT
DATE OF OPERATION: 5/10/2021     PREOPERATIVE DIAGNOSIS: acute on chronic respiratory failure and cerebrovascular accident (CVA).    POSTOPERATIVE DIAGNOSIS: Same.     PROCEDURE PERFORMED: Percutaneous tracheostomy under bronchoscopic guidance.    SURGEON: Wilman Gomez M.D.     ASSISTANT AND ENDOSCOPIST: Marcello Gannon M.D.    ANESTHESIA: Intravenous sedation, analgesia, and pharmacologic restraint.    INDICATIONS: The patient is a 60 year-old man with acute on chronic respiratory failure.  Percutaneous tracheostomy is carried out at the bedside under bronchoscopic guidance.     PROCEDURE: Following informed consent consent, the patient was properly identified and optimally positioned in bed. He was preoxygenated with 100% oxygen and placed on a regular ventilatory rate. Intravenous sedation, analgesia, and pharmacologic restraint was administered.    The fiberoptic bronchoscope was advance through the indwelling endotracheal tube. The endotracheal tube was repositioned just above the vocal cords under direct vision. Satisfactory ventilation and delivered tidal volumes were confirmed. The anterior neck was transilluminated at the surface landmark site for the tracheostomy. Please see Dr Gannon's dictation for full details of the bronchoscopy.    The anterior neck was prepped. The skin over the tracheostomy site was infiltrated with lidocaine with epinephrine. The bronchoscope was withdrawn well back into the endotracheal tube. A Portex® ULTRAperc® Single Stage Dilator Technique Kit was employed. The trachea was cannulated in the midline with a 14 gauge angiocatheter midway between the thyroid cartilage and suprasternal notch. A flexible guidewire was passed without resistance. The scope was advanced distally and satisfactory cannulation and wire placement was confirmed.  A #7 Blue Line Ultra® Suctionaid tracheostomy tube was passed using Selldinger technique and secured to the skin with sutures and a tracheostomy  tape.     The patient tolerated the procedure well. There were no apparent complications.         ____________________________________     Wilman Gomez M.D.    DD: 5/10/2021  1:35 PM

## 2021-05-10 NOTE — PROGRESS NOTES
Pulmonary/Critical Care Medicine   Cross Cover Note    Date of service: 5/9/2021  Time: 1830    Called to the bedside as the patient suddenly had desaturations to mid 70s after turning patient.  RT noted that PIPs elevated as well.  Patient required bagging and then lavage/suction with secretions noted.  STAT CXR ordered and noted no PTX, atelectasis, or obvious mucous plugging.    At my arrival, the patient is breathing comfortably with the ventilator with normal PIPs, but PEEP at 10 and FiO2 at 100%.  Reviewed the patient's case with the bedside RN and RT.  Patient will need aggressive pulmonary toilet and CPT.  Discussed with RT who will start CPT.    I spent 20 min in reviewing the patient's condition, physical examination, laboratory and imaging data, prior documentation, in discussion with RT and RN in formulating an assessment/plan.    Critical Care time: 20 min. No time overlap, procedures not included in time.

## 2021-05-10 NOTE — CARE PLAN
Problem: Pain Management  Goal: Pain level will decrease to patient's comfort goal  Outcome: PROGRESSING AS EXPECTED  Intervention: Educate and implement non-pharmacologic comfort measures. Examples: relaxation, distration, play therapy, activity therapy, massage, etc.  Note: Pain medications available per MAR. CPOT pain scale assessed by RN. Non-pharmacological pain interventions in place, including repositioning, rest, music.     Problem: Skin Integrity  Goal: Risk for impaired skin integrity will decrease  Outcome: PROGRESSING SLOWER THAN EXPECTED  Intervention: Assess and monitor skin integrity, appearance and/or temperature  Note: Patient turned [id number] and lines repositioned with turns. Mepilex in place on sacrumand heels floated with mepilex in place.

## 2021-05-10 NOTE — RESPIRATORY CARE
"Desaturation associated with bradycardia noted x2 without recovery from suctioning and increasing FIO2. Bag ventilation performed for several minutes. Patient stated, \"you're killing me.\" Comfort measures provided as well as education. Two RNs then presented to bedside. Stat chest x-ray ordered.    **Attempted to perform IPV in-ine with vent. Patient declining therapy at this time despite education. Patient is currently resting, PEEP 10, 100% FIO2. RN notified. Will continue to monitor and provide care as needed.   "

## 2021-05-10 NOTE — PROGRESS NOTES
[facility] Progress Note      Admit Date: 4/25/2021    Resident(s): Lissette Singh M.D.   Attending:  BREANNA ALCARAZ/ Dr. Sarmiento    Patient ID:    Name:  Perry Davis   YOB: 1960  Age:  60 y.o.  male   MRN:  [id number]    Hospital Course (carried forward and updated):  Perry Davis is a 60 y.o. male with a previous history of uncontrolled Diabetes -Hb1C 13 , HLD, CAD s/p CABGx4  2015, tobacco use ,OA, chronic low pain admitted  4/25 with epidural abscess w/ cord compression, discitis involving cervical and thoracic spine--> underwent emergent laminectomy and decompression by Dr. Hazel 4/28.  Hospital course complicated by acute right cerebellar stroke ans strep anginosus bacteremia on Ceftriaxone, followed by ID.   Patient currently intubated    Consultants:  Critical Care  Infectious disease   Neurology   General surgery     Interval Events:  No acute events overnight   Patient remains intubated, will repeat chest x ray to look for air leak   -After goals of care discussion with family yesterday, family and patient decided for proceeding with trach and PEG tube.   -Consulted General surgery for trach and GI for PEG   -Ampicillin changed back to ceftriaxone given trans aminitis as per ID. Last day of antibiotic is 06/11  -Holding all meds that have potential to cause elevated Lft's      Vitals Range last 24h:  Pulse:  [55-68] 60  Resp:  [2-32] 25  BP: (105-142)/(51-67) 118/57  SpO2:  [90 %-100 %] 93 %      Intake/Output Summary (Last 24 hours) at 5/10/2021 1031  Last data filed at 5/10/2021 1000  Gross per 24 hour   Intake 1630 ml   Output 1125 ml   Net 505 ml        ROS  Unable to assess as the patient is intubated     PHYSICAL EXAM:  Vitals:    05/10/21 0700 05/10/21 0800 05/10/21 0900 05/10/21 1000   BP: 117/58 110/56 105/57 118/57   Pulse: (!) 56 (!) 58 (!) 57 60   Resp: (!) 26 (!) 26 (!) 24 (!) 25   Temp:       TempSrc:  Bladder  Temporal   SpO2: 97% 97% 91% 93%   Weight:        Height:        Body mass index is 29.51 kg/m².    O2 therapy: Pulse Oximetry: 93 %, O2 (LPM): 100, O2 Delivery Device: Ventilator    Date 05/10/21 0700 - 05/11/21 0659   Shift 6470-6367 8796-9168 7278-5488 24 Hour Total   INTAKE   P.O. 0   0     P.O. 0   0   Other 90   90     Medications (PO/Enteral Liquids) 90   90   NG/   200     Intake (mL) (Enteral Tube 05/09/21 Cortrak - Gastric Left nare) 200   200   Enteral 60   60     Free Water / Tube Flush 60   60   Shift Total 350   350   OUTPUT   Urine 225   225     Output (mL) (Urethral Catheter Other (Comment) 16 Fr.) 225   225   Shift Total 225   225      125        Physical Exam   Constitutional: He is well-developed, well-nourished, and in no distress.   intubated   HENT:   Head: Normocephalic and atraumatic.   Eyes: Pupils are equal, round, and reactive to light. Conjunctivae are normal.   Cardiovascular: Normal rate, regular rhythm and normal heart sounds.   Pulmonary/Chest: Effort normal and breath sounds normal. No respiratory distress.   Abdominal: Soft. Bowel sounds are normal. He exhibits no distension.   Neurological:   Patient nods his head for questions   Is flaccid in all extremities  Unable to assess full neuro exam        Recent Labs     05/08/21  1200 05/09/21  0222 05/10/21  0140   ISTATAPH 7.479 7.488 7.493   ISTATAPCO2 45.3* 41.3* 38.9*   ISTATAPO2 62* 58* 65   ISTATATCO2 35* 33 31   EMMLYKD3AXY 93 91* 94   ISTATARTHCO3 33.6* 31.3* 29.8*   ISTATARTBE 9* 7* 6*   ISTATTEMP 99.9 F 99.9 F 99.3 F   ISTATFIO2 70 75 80   ISTATSPEC Arterial Arterial Arterial   ISTATAPHTC 7.468 7.477 7.487   HLDUWAYJ7OD 65 60* 66     Recent Labs     05/08/21  0200 05/09/21  0341 05/10/21  0423   SODIUM 143 143 145   POTASSIUM 4.7 4.0 3.6   CHLORIDE 105 108 110   CO2 33 32 30   BUN 38* 34* 36*   CREATININE 0.42* 0.51 0.50   MAGNESIUM 2.4 2.3 2.3   CALCIUM 8.0* 7.9* 7.8*     Recent Labs     05/07/21  1220 05/08/21  0200 05/09/21  0341 05/10/21  0423   ALTSGPT   --  413* 449* 351*   ASTSGOT  --  509* 509* 244*   ALKPHOSPHAT  --  365* 387* 335*   TBILIRUBIN  --  0.3 0.4 0.3   LIPASE 18  --   --   --    GLUCOSE  --  183* 106* 119*     Recent Labs     05/08/21  0200 05/08/21  0815 05/09/21  0341 05/10/21  0423   RBC 3.10*  --  2.93* 3.07*   HEMOGLOBIN 9.4*  --  8.9* 9.4*   HEMATOCRIT 31.2*  --  29.8* 30.4*   PLATELETCT 318  --  330 345   PROTHROMBTM  --  16.2*  --   --    INR  --  1.26*  --   --      Recent Labs     05/08/21  0200 05/09/21  0341 05/10/21  0423   WBC 11.9* 12.7* 10.6   NEUTSPOLYS 83.60* 82.10* 75.90*   LYMPHOCYTES 10.00* 11.90* 16.80*   MONOCYTES 4.90 4.50 5.40   EOSINOPHILS 0.40 0.60 1.10   BASOPHILS 0.20 0.20 0.20   ASTSGOT 509* 509* 244*   ALTSGPT 413* 449* 351*   ALKPHOSPHAT 365* 387* 335*   TBILIRUBIN 0.3 0.4 0.3       Meds:  • insulin glargine  40 Units     • cefTRIAXone (ROCEPHIN) IV  2 g Stopped (05/10/21 0615)   • oxyCODONE immediate-release  10 mg     • oxyCODONE immediate-release  5 mg     • cyclobenzaprine  5 mg     • insulin regular  3-14 Units      And   • dextrose 50%  50 mL     • enoxaparin (LOVENOX) injection  40 mg     • aspirin  324 mg     • acetylcysteine  3-4 mL     • albuterol  2.5 mg     • MD Alert...Adult ICU Electrolyte Replacement per Pharmacy       • lidocaine  1-2 mL     • magnesium hydroxide  30 mL     • ondansetron  4 mg     • polyethylene glycol/lytes  1 Packet     • promethazine  12.5-25 mg     • senna-docusate  1 tablet     • MD ALERT...DO NOT ADMINISTER NSAIDS or ASPIRIN unless ORDERED By Neurosurgery  1 Each     • bisacodyl  10 mg     • fleet  1 Each     • Respiratory Therapy Consult       • ondansetron  4 mg     • promethazine  12.5-25 mg     • prochlorperazine  5-10 mg     • ipratropium-albuterol  3 mL     • labetalol  10 mg     • Pharmacy  1 Each     • [Held by provider] ARIPiprazole  15 mg     • [Held by provider] carBAMazepine  200 mg     • cyanocobalamin  1,000 mcg     • senna-docusate  1 tablet     • vitamin D  5,000  Units     • docusate sodium  100 mg     • famotidine  20 mg      Or   • famotidine  20 mg     • fentaNYL  50 mcg      And   • fentaNYL  100 mcg     • lidocaine  3 Patch          Procedures:  Bronchoscopy 05/06/21  Bronchoscopy on 05/03  -Decompressive laminectomy on 4/28  -Central line placement on 5/01    Imaging:  DX-CHEST-PORTABLE (1 VIEW)   Final Result         1.  Hazy left lower lobe infiltrates.      DX-CHEST-PORTABLE (1 VIEW)   Final Result      No significant interval change.      DX-ABDOMEN FOR TUBE PLACEMENT   Final Result      Cortrak feeding tube tip projects in the region of the proximal stomach.      DX-CHEST-PORTABLE (1 VIEW)   Final Result         No significant interval change.      US-RUQ   Final Result      Borderline mild hepatomegaly.      Otherwise negative right upper quadrant ultrasound.      DX-CHEST-PORTABLE (1 VIEW)   Final Result         Decreased left basilar opacity.      DX-CHEST-PORTABLE (1 VIEW)   Final Result      Increasing left basilar airspace opacities. No significant cardiopulmonary change otherwise. Repositioned endotracheal tube.      US-EXTREMITY ARTERY LOWER UNILAT RIGHT   Final Result      DX-ABDOMEN FOR TUBE PLACEMENT   Final Result      Feeding tube tip projects over the distal stomach or first portion of the duodenum.      CT-CSPINE WITHOUT PLUS RECONS   Final Result      1.  Postsurgical changes C3-T1 laminectomies.   2.  New irregular linear and mottled lucency in the anterior C5 vertebral body which is likely related to osteomyelitis.   3.  Prevertebral soft tissue swelling.   4.  Extensive known extensive epidural abscess seen on prior MRI is not adequately evaluated on CT.      CT-CTA CHEST PULMONARY ARTERY W/ RECONS   Final Result      1.  No evidence of pulmonary embolism.      2.  Bilateral lower lobe atelectasis or pneumonia, left greater than right.      3.  Small left pleural effusion.      4.  CABG changes.            DX-CHEST-PORTABLE (1 VIEW)   Final  Result      Endotracheal tube terminates approximately 2.5 cm above the you.      Atelectasis with left lung aeration. No pleural effusion or pneumothorax.      SW-TDOMCHW-2 VIEW   Final Result      No dilated bowel      DX-ABDOMEN FOR TUBE PLACEMENT   Final Result      Feeding tube placement with the tip projecting over the distal stomach or duodenal bulb      EC-CHRISTOPHER W/O CONT         CT-HEAD W/O   Final Result         1.  Low-density changes in the right cerebellar hemisphere, compatible with evolving infarct, streak artifacts minimally limits evaluation of the posterior fossa for subtle subarachnoid hemorrhage, no intracranial hemorrhage is readily identified.   2.  Atherosclerosis.      DX-CHEST-PORTABLE (1 VIEW)   Final Result         1.  No acute cardiopulmonary disease.      DX-CHEST-PORTABLE (1 VIEW)   Final Result      Mild left basilar opacity may represent atelectasis. Infection not excluded.      Improved aeration of the left lung.      Atherosclerotic plaque.         EC-ECHOCARDIOGRAM COMPLETE W/O CONT   Final Result      IR-DRAIN-BLADDER SUPRAPUB W/CATH   Final Result      1.     Ultrasound and fluoroscopic guided placement of a 16 [place] tip silicone suprapubic bladder catheter.      2.     Cystogram documenting catheter placement.         MR-BRAIN-W/O   Final Result      Acute large right cerebellar posterior inferior cerebellar artery territory infarct with possible small amount of petechial hemorrhage.      MR-MRA HEAD-W/O   Final Result      Findings suggesting right vertebral artery occlusion.      MR-MRA NECK-WITH & W/O AND SEQUENCES   Final Result      1.  Small caliber right vertebral artery which is not visualized on the noncontrast time-of-flight technique could be related to retrograde flow or diminutive caliber and sluggish flow.   2.  Possible moderate focal stenosis in the mid cervical left vertebral artery.   3.  No significant carotid stenosis.      DX-ABDOMEN FOR TUBE  PLACEMENT   Final Result         Feeding tube with tip projecting over the expected area of the distal stomach.      DX-CHEST-PORTABLE (1 VIEW)   Final Result      1.  Worsening consolidation of LEFT hemithorax with shift of mediastinal structures to the LEFT suggesting atelectasis, possibly due to endobronchial process.   2.  Supportive tubing as described above.   3.  No pneumothorax.      DX-CHEST-PORTABLE (1 VIEW)   Final Result         Endotracheal tube with tip projecting over the mid thoracic trachea.      Layering left pleural effusion with left lower lung opacity.      DX-SPINE-ANY ONE VIEW   Final Result      Digitized intraoperative radiograph is submitted for review.  This examination is not for diagnostic purpose but for guidance during a surgical procedure.      DX-PORTABLE FLUOROSCOPY < 1 HOUR   Final Result      Portable fluoroscopy utilized for 2 seconds.         INTERPRETING LOCATION: [address] [place] [address], [facility], [id number]      CT-HEAD W/O   Final Result         1.  Low-density in the region of the right cerebellar hemisphere, appearance compatible with evolving infarct.      These findings were discussed with the patient's on-call clinician, Deniz, on 4/28/2021 6:14 AM.      MR-THORACIC SPINE-WITH & W/O   Final Result      1.  Posterior epidural fluid collection/abscess within the lower cervical spine extending inferiorly to about the T6 level which could represent epidural abscess and/or hematoma. This measures 8 mm in maximal thickness at T2-T3 with at least moderate    thecal sac stenosis.   2.  Abnormal signal within the cervical cord and upper thoracic cord suggesting cord edema or infectious/inflammatory etiology.   3.  Lower cervical spine findings are detailed on earlier report.   4.  Prominent enhancement along the surface of the mid and lower thoracic cord is of uncertain etiology and as detailed above, possibly representing leptomeningeal disease/infection. See details above.   These findings  were discussed with Dr. Cano on 4/28/2021 10:01 AM.         MR-LUMBAR SPINE-WITH & W/O   Final Result      No evidence of lumbar spine infection or epidural abscess.      Mild spondylosis as detailed above without spinal stenosis.      DX-CHEST-PORTABLE (1 VIEW)   Final Result      1.  Hypoinflation with left basilar atelectasis.   2.  Mild perihilar interstitial prominence may be related to hypoinflation or edema.      MR-CERVICAL SPINE-WITH & W/O   Final Result      1.  There is multiseptated abnormal peripherally enhancing epidural collection noted extending from C2 to the visualized lower thoracic level. Largest collection is noted in the upper thoracic posterior epidural space at the levels of T2 and T3. The    lower extent of the collection is not imaged. This is causing multilevel effacement of the thecal sac and cord compression. The thoracic spinal cord is anteriorly displaced and compressed at the levels of T2 and T3. Pre and postcontrast MR examination of    the thoracic spine is recommended for further evaluation.   2.  There is effacement of the cervical thecal sac due to the multiseptated epidural fluid collection.   3.  The cervical spinal cord is mildly enlarged with minimal increased T2 signal intensity. The possibility of cord inflammation cannot be entirely excluded.   4.  Abnormal T2 hyperintensity at C5-6 disc space likely representing discitis.   5.  Abnormal bone marrow edema of C5, C6 and C7 vertebral bodies with edema concerning for osteomyelitis.   6.  There is also focal enhancement of C6 vertebral body likely secondary to the osteomyelitis.   7.  Prevertebral edema/fluid collection.   8.  Nonvisualization of flow void of bilateral vertebral arteries concerning for age-indeterminate bilateral vertebral occlusions.         CT-CHEST (THORAX) WITH   Final Result      No displaced rib fracture is identified.      No acute cardiopulmonary process is seen.      Atherosclerotic plaque including  coronary artery calcification.      CT-TSPINE W/O PLUS RECONS   Final Result      No CT evidence of acute traumatic abnormality.      Mild T6/7 anterior wedging compatible with healed mild chronic compression fracture and there is interbody fusion.      CT-LSPINE W/O PLUS RECONS   Final Result      No CT evidence of acute traumatic injury.      CT-CSPINE WITHOUT PLUS RECONS   Final Result      Multilevel degenerative changes.      Prevertebral edema. Further evaluation with MRI is recommended as this can be seen in the setting of ligamentous injury.      Bilateral carotid atherosclerotic plaque.         DX-CHEST-PORTABLE (1 VIEW)    (Results Pending)       ASSESSEMENT and PLAN:    * Spinal epidural abscess- (present on admission)  Assessment & Plan  -MRI C/T-spine with abnormal peripherally enhancing epidural collection extending from C2 to visualize lower thoracic 11.  Largest collection in the upper thoracic posterior epidural space at the level of T2-T3.  Lower extent of the collection is causing multilevel effacement of the thecal sac and cord compression.    -Underwent emergent laminectomy of C3-C4 C5-C6, C6/C7, T1-T2 laminectomy, decompression of thecal sac and nerves by Dr. Hazel 4/28/2021.   -Epidural drain removed on 5/03  -Goal blood pressure systolic of less than 160  -Palliative care consulted for goals of care discussion: Family wants full treatment for now       History of ischemic stroke  Assessment & Plan  -Found to have acute change in mental status 4/27/2021.   -CT head w/o concerning for evolving right cerebellar infarct.   -MRA brain, MRA neck: Findings suggesting right vertebral artery occlusion. Possible moderate focal stenosis in the mid cervical left vertebral artery.  TTE with bubble study: EF 60%, no evidence of right-to-left shunt, no valve lesions.  CHRISTOPHER negative  Goal euthermia, normotension, eunatremia  -Might need Long term care facility  -Started on aspirin and DVT prophylaxis  -Hold  statin currently given elevated LFTs    Sepsis due to Streptococcus species (HCC)- (present on admission)  Assessment & Plan  MRI C-T-spine with evidence of epidural abscess, discitis, vertebral osteomyelitis.  Underwent emergent laminectomy, decompression 4/28.  Blood culture 4/24 and cervical thoracic 4/28 : Growth of Streptococcus anginosus.   -Repeat blood cultures has been negative  -TTE did not show any concern of infective endocarditis  -CHRISTOPHER negative  -Currently on ceftriaxone,  total duration of 6 weeks last date would be 06/11  -Bronchoscopy with BAL 05/06/21, c/s NGTD, Blood c/s 5/7/21: NGTD  -ID on board    Acute respiratory failure with hypoxia (HCC)  Assessment & Plan   RT/O2 protocols            Daily and PRN ABGs            Titration of ventilator therapy based on ABGs and patient's status            Sedation as tolerated/indicated            Daily CXR            HOB >30 degrees and peridex for VAP prevention            Pepcid for GI prophylaxis            SAT/SBT when able (ABCDEF Bundle)            Early mobility          General surgery consulted who planned for trach today     Cardiac arrest (HCC)  Assessment & Plan  -Patient had cardiac arrest on 05/03, ROSC achieved in 4 mins after CPR and  Epinephrine  -Most likely secondary to aspiration, bed side bronchoscopy showed multiple mucous plugs   -Re intubated again     Thrombocytopenia (HCC)- (present on admission)  Assessment & Plan  -Resolved  -Plt 47 on 4/26. This is likely due to sepsis, though lower on the differential includes previously undiagnosed infection.  Hep panel negative. HIV non reactive    Acute bilateral back pain- (present on admission)  Assessment & Plan  -Presented with worsening neck and upper back pain  -Secondary to epidural abscess and cord compression  -S/p laminectomy and currently on IV antibiotics  -Needs long term facility     Type 2 diabetes mellitus without complication, without long-term current use of insulin  (HCC)- (present on admission)  Assessment & Plan  -Chronic history of diabetes  -Last HbA1c is 12.2 from 4/2021  -Lantus with sliding scale   -Hypoglycemia protocol     Essential hypertension, benign- (present on admission)  Assessment & Plan  -Noncompliance issues  -Blood pressure meds on hold given hypotension    Coronary artery disease due to calcified coronary lesion: CABG x4, July 2015- (present on admission)  Assessment & Plan  -Chronic   -Continue aspirin, statin on hold given elevated LFTs     Transaminitis  Assessment & Plan  Transamanitis worsening, etiology unclear. Hep panel negative, TSH normal.   -Hold statin, avoid hepatotoxic medications  -RUQ U/S: unremarkable except for borderline mild hepatomegaly    Status post urethrostomy (HCC)- (present on admission)  Assessment & Plan  -History of perineal urethrostomy 2018  -Acute urinary retention post surgical intervention 4/28  -Unable to place Gomez catheter through urethrostomy.    -Suprapubic catheter placement by interventional radiology    Hyponatremia- (present on admission)  Assessment & Plan  Resolved    Difficulty swallowing- (present on admission)  Assessment & Plan  -Presented with difficulty swallowing, is coughing and vomiting when swallowing large/hard foods  -Complicated by stroke   -Currently on coretrak for tube feeds, consulted GI for PEG tube       DISPO: ICU    CODE STATUS: Full code    Quality Measures:  Feeding: tube feeds  Analgesia: none   Sedation: propafol  Thromboprophylaxis: Lovenox   Head of bed: >30 degrees  Ulcer prophylaxis: famotidine  Glycemic control: lantus and SSI  Bowel care: bowel regimen  Indwelling lines: Right IJ and iv line   Deescalation of antibiotics: ceftriaxone       Lissette Singh M.D.

## 2021-05-10 NOTE — FLOWSHEET NOTE
Respiratory Therapy Update                       Cough: Non Productive (05/10/21 1600)  Sputum Amount: Small (05/10/21 1525)  Sputum Color: Pink Tinged (05/10/21 1525)  Sputum Consistency: Thin (05/10/21 1525)               FiO2%: 70 % (05/10/21 1600)  O2 (LPM): 100 (05/09/21 1800)       Breath Sounds  Pre/Post Intervention: Post Intervention Assessment (05/08/21 0000)  RUL Breath Sounds: Rhonchi (05/10/21 1600)  RML Breath Sounds: Rhonchi (05/10/21 1600)  RLL Breath Sounds: Rhonchi (05/10/21 1600)  YAMILE Breath Sounds: Coarse Crackles;Diminished (05/10/21 1600)  LLL Breath Sounds: Diminished (05/10/21 1600)        Events/Summary/Plan: Vent checked and IPV done.  Pt tolerated fair. (05/10/21 1525)

## 2021-05-10 NOTE — PROGRESS NOTES
Infectious Disease Progress Note    Author: La Nena Khan M.D. Date & Time of service: 5/10/2021  11:36 AM    Chief Complaint:  Sepsis and cervical abscess  CVA     Interval History:  60 y.o.  admitted 4/25/2021. Pt has a past medical history of uncontrolled diabetes, CAD status post CABG times 4/2015, marijuana use and to arthritis.  Presented complaining of neck and back pain ongoing for approximately 1 week and fall getting out of the bathtub.  He had been seen at urgent care for back pain approximately 1 week prior to admission and given Toradol injections.    4/29 AF, O2 Vent 12/70%, pressors still on the trending down, pt continues to be agitated, not moving any extremities and concern for quadriplegia due to CVA.   4/30 AF, O2 Vent 8/40%, pressors off, agitated, without meaningful limb movement.  Decreasing vent requirements and no longer in shock.  5/1 AF, O2 Vent 8/40%, stable on low vent settings no significant secretions per nursing.  He continues to be agitated and with no upper or lower extremity movement.    5/3 AF WBC 14 remains intubated and sedated Agitation with decrease sedation. Epidural drain removed FiO2 0.4  5/4 AF WBC 19 code blue yesterday-mucus plugs found. S/p bronch this am-on 100%FiO2 On pressors  5/5 AF WBC 15.8 nurse noted some prox  movement in BUE (right greater than left) FiO2 0.9  5/6 AF WBC 15.7 s/p bronch again this am-thick secretion RML noted  5/7 Febrile 101.1 WBC 12.2 No new positive cultures opens eyes-not following commands FiO2 0.6  5/8 AF (99.9) WBC 11.9 Palliative appreciated-family wants full code. Remains obtunded FiO2 0.7  5/9 Temp 100.4 WBC 12.7 Remains intubated and sedated FiO2 0.7  5/10 AF WBC 10.6 getting trach and PEG. Episode of desat noted    Review of Systems:  Review of Systems   Unable to perform ROS: Intubated   Constitutional: Negative for fever.   Respiratory: Negative for cough.    Gastrointestinal: Negative for vomiting.   Genitourinary: Negative  for hematuria.   Skin: Negative for rash.       Hemodynamics:  No data recorded.  Monitored Temp: 37.6 °C (99.7 °F)  Pulse  Av.2  Min: 55  Max: 121   Blood Pressure: 118/57       Physical Exam:  Physical Exam  Vitals and nursing note reviewed.   Constitutional:       General: He is not in acute distress.     Appearance: He is ill-appearing. He is not toxic-appearing or diaphoretic.      Comments: Opens eyes and tracking   HENT:      Nose: No rhinorrhea.   Eyes:      General: No scleral icterus.        Right eye: No discharge.         Left eye: No discharge.      Pupils: Pupils are equal, round, and reactive to light.   Neck:      Comments: Right IJ-no erythema  Cardiovascular:      Rate and Rhythm: Normal rate and regular rhythm.   Pulmonary:      Effort: Pulmonary effort is normal. No respiratory distress.      Breath sounds: No stridor. No wheezing.      Comments: Coarse breath sounds  Abdominal:      General: There is no distension.      Palpations: Abdomen is soft.      Tenderness: There is no abdominal tenderness.   Genitourinary:     Comments: SP cath +yellow urine  Musculoskeletal:         General: No deformity.   Lymphadenopathy:      Cervical: No cervical adenopathy.   Skin:     General: Skin is warm.      Coloration: Skin is not jaundiced.      Findings: Bruising present.      Comments: tattoos   Neurological:      Comments: Quadriplegic         Meds:    Current Facility-Administered Medications:   •  insulin glargine  •  bisacodyl  •  polyethylene glycol/lytes  •  cefTRIAXone (ROCEPHIN) IV  •  oxyCODONE immediate-release  •  oxyCODONE immediate-release  •  cyclobenzaprine  •  insulin regular **AND** POC blood glucose manual result **AND** NOTIFY MD and PharmD **AND** dextrose 50%  •  enoxaparin (LOVENOX) injection  •  aspirin  •  acetylcysteine  •  albuterol  •  MD Alert...Adult ICU Electrolyte Replacement per Pharmacy  •  lidocaine  •  ondansetron  •  MD ALERT...DO NOT ADMINISTER NSAIDS or ASPIRIN  unless ORDERED By Neurosurgery  •  fleet  •  Respiratory Therapy Consult  •  ondansetron  •  ipratropium-albuterol  •  labetalol  •  Pharmacy  •  [Held by provider] ARIPiprazole  •  [Held by provider] carBAMazepine  •  cyanocobalamin  •  senna-docusate  •  vitamin D  •  docusate sodium  •  famotidine **OR** [DISCONTINUED] famotidine  •  fentaNYL **AND** fentaNYL **AND** [DISCONTINUED] fentaNYL **AND** [DISCONTINUED] propofol **AND** Triglyceride  •  lidocaine    Labs:  Recent Labs     05/08/21  0200 05/09/21  0341 05/10/21  0423   WBC 11.9* 12.7* 10.6   RBC 3.10* 2.93* 3.07*   HEMOGLOBIN 9.4* 8.9* 9.4*   HEMATOCRIT 31.2* 29.8* 30.4*   .6* 101.7* 99.0*   MCH 30.3 30.4 30.6   RDW 55.9* 57.6* 55.1*   PLATELETCT 318 330 345   MPV 11.9 12.8 11.9   NEUTSPOLYS 83.60* 82.10* 75.90*   LYMPHOCYTES 10.00* 11.90* 16.80*   MONOCYTES 4.90 4.50 5.40   EOSINOPHILS 0.40 0.60 1.10   BASOPHILS 0.20 0.20 0.20     Recent Labs     05/08/21  0200 05/09/21  0341 05/10/21  0423   SODIUM 143 143 145   POTASSIUM 4.7 4.0 3.6   CHLORIDE 105 108 110   CO2 33 32 30   GLUCOSE 183* 106* 119*   BUN 38* 34* 36*     Recent Labs     05/08/21  0200 05/09/21  0341 05/10/21  0423   ALBUMIN 1.9* 1.8* 1.9*   TBILIRUBIN 0.3 0.4 0.3   ALKPHOSPHAT 365* 387* 335*   TOTPROTEIN 6.0 5.8* 5.9*   ALTSGPT 413* 449* 351*   ASTSGOT 509* 509* 244*   CREATININE 0.42* 0.51 0.50       Imaging:  CT-CSPINE WITHOUT PLUS RECONS    Result Date: 4/25/2021 4/25/2021 1:29 PM HISTORY/REASON FOR EXAM: History of back and neck pain. Fall. TECHNIQUE/EXAM DESCRIPTION: CT cervical spine without contrast, with reconstructions. Helical scanning was performed from the skull base through T1.  Sagittal and coronal multiplanar reconstructions were generated from the axial images. Low dose optimization technique was utilized for this CT exam including automated exposure control and adjustment of the mA and/or kV according to patient size. COMPARISON:  None available. FINDINGS: No  compression fracture is identified. There is an ununited fracture of the spinous process of T1. Intervertebral disc space narrowing and endplate spurring is most prominent at C6/C7. There are degenerative changes of the facet joints. C1/C2 alignment is maintained. There is multilevel uncovertebral spurring and neural foraminal narrowing. There is cervical prevertebral edema. There is carotid atherosclerotic plaque bilaterally. Visualized lung apices are clear.     Multilevel degenerative changes. Prevertebral edema. Further evaluation with MRI is recommended as this can be seen in the setting of ligamentous injury. Bilateral carotid atherosclerotic plaque.     CT-CHEST (THORAX) WITH    Result Date: 4/25/2021 4/25/2021 1:29 PM HISTORY/REASON FOR EXAM:  Rib fracture suspected, traumatic. TECHNIQUE/EXAM DESCRIPTION: CT scan of the chest with contrast. Helical images were obtained from the lung apices through the adrenal glands following the bolus administration of  80 mL of Omnipaque 350 nonionic contrast. Sagittal and coronal reconstructions were performed. Low dose optimization technique was utilized for this CT exam including automated exposure control and adjustment of the mA and/or kV according to patient size. COMPARISON:  None. FINDINGS: There is atherosclerotic plaque. There is coronary artery calcification. The heart is not enlarged. No pericardial or pleural effusion is seen. There are small mediastinal lymph nodes. There is no hilar or axillary lymphadenopathy. No pneumothorax or pulmonary contusion is seen. Central airways are patent. Limited views were obtained of the upper abdomen. Hypodensity along the falciform ligament likely represents focal fat. Patient is status post median sternotomy. Degenerative changes are seen in the spine. No displaced rib fracture is identified.     No displaced rib fracture is identified. No acute cardiopulmonary process is seen. Atherosclerotic plaque including coronary  artery calcification.    CT-HEAD W/O    Result Date: 4/30/2021 4/30/2021 4:27 AM HISTORY/REASON FOR EXAM: Altered mental status; evaluate cerebellar stroke, if no blood present OK for neurology to start aspirin TECHNIQUE/EXAM DESCRIPTION:  CT of the head without contrast. Sequential axial images were obtained from the vertex to the skull base without contrast. Up to date radiation dose reduction adjustments have been utilized to meet ALARA standards for radiation dose reduction. COMPARISON: April 28, 2021 FINDINGS: There is mild diffuse parenchymal volume loss observed. Periventricular and subcortical white matter low-attenuation changes are seen, most commonly associated with small vessel ischemic disease. The ventricles are normal in caliber and configuration. Low-density changes in the right cerebellar hemisphere seen.. There are no abnormal extra axial fluid collections or extra axial hemorrhage identified. The visualized paranasal sinuses and mastoid air cells are well aerated bilaterally. No depressed calvarial fractures are identified. The visualized globes and retrobulbar soft tissues appear within normal limits.  Atherosclerotic intracranial calcifications are seen.     1.  Low-density changes in the right cerebellar hemisphere, compatible with evolving infarct, streak artifacts minimally limits evaluation of the posterior fossa for subtle subarachnoid hemorrhage, no intracranial hemorrhage is readily identified. 2.  Atherosclerosis.    CT-HEAD W/O    Result Date: 4/28/2021 4/28/2021 5:20 AM HISTORY/REASON FOR EXAM: Altered mental status TECHNIQUE/EXAM DESCRIPTION:  CT of the head without contrast. Sequential axial images were obtained from the vertex to the skull base without contrast. Up to date radiation dose reduction adjustments have been utilized to meet ALARA standards for radiation dose reduction. COMPARISON: None FINDINGS: There is moderate diffuse parenchymal volume loss observed.  Periventricular and subcortical white matter low-attenuation changes are seen, most commonly associated with small vessel ischemic disease. The ventricles are normal in caliber and configuration. Area of low-density within the right cerebellar hemisphere is seen. There are no abnormal extra axial fluid collections or extra axial hemorrhage identified. The visualized paranasal sinuses and mastoid air cells are well aerated bilaterally. No depressed calvarial fractures are identified. The visualized globes and retrobulbar soft tissues appear within normal limits.     1.  Low-density in the region of the right cerebellar hemisphere, appearance compatible with evolving infarct. These findings were discussed with the patient's on-call clinician, Deniz, on 4/28/2021 6:14 AM.    CT-LSPINE W/O PLUS RECONS    Result Date: 4/25/2021 4/25/2021 1:29 PM HISTORY/REASON FOR EXAM:  Back pain after injury. TECHNIQUE/EXAM DESCRIPTION AND NUMBER OF VIEWS: CT lumbar spine without contrast, with reconstructions. The study was performed on a GE CT scanner. Thin-section helical scanning was performed from T12-L1 to the sacrum. Sagittal and coronal multiplanar reconstructions were generated from the axial images. Low dose optimization technique was utilized for this CT exam including automated exposure control and adjustment of the mA and/or kV according to patient size. COMPARISON: None. FINDINGS: Alignment of the lumbar spine is normal. There is no acute fracture or subluxation. The prevertebral and paraspinous soft tissues show no acute abnormality. There is severe atherosclerosis with a mixture of soft and calcified plaque. There is lumbosacral junction vacuum disc phenomenon with endplate spurring, disc height loss The visualized sacrum and sacroiliac joints show no acute abnormality.     No CT evidence of acute traumatic injury.    CT-TSPINE W/O PLUS RECONS    Result Date: 4/25/2021 4/25/2021 1:29 PM HISTORY/REASON FOR EXAM:   Mid-back trauma Pain following injury. TECHNIQUE/EXAM DESCRIPTION AND NUMBER OF VIEWS: CT thoracic spine without contrast, with reconstructions. The study was performed on a GE CT scanner. Thin-section helical scanning was performed from C7-T1 through T12-L1. Sagittal and coronal multiplanar reconstructions were generated from the axial images. Low dose optimization technique was utilized for this CT exam including automated exposure control and adjustment of the mA and/or kV according to patient size. COMPARISON: None. FINDINGS: Alignment of the thoracic spine is normal. There is no acute displaced fracture. There is T6/7 interbody fusion with mild anterior wedging likely indicating remote mild compression fracture that has healed There is bulky endplate spurring with some bridging syndesmophytes in the mid thoracic spine Sternotomy wires The cervicothoracic junction appears intact. The prevertebral and paraspinous soft tissues show no acute abnormality.     No CT evidence of acute traumatic abnormality. Mild T6/7 anterior wedging compatible with healed mild chronic compression fracture and there is interbody fusion.    DX-CERVICAL SPINE-2 OR 3 VIEWS    Result Date: 4/22/2021 4/22/2021 6:16 PM HISTORY/REASON FOR EXAM:  Atraumatic Pain. Neck pain for 4 days. TECHNIQUE/EXAM DESCRIPTION AND NUMBER OF VIEWS: Cervical spine series, 2 views. COMPARISON:  None. FINDINGS: Mild reversal of curvature centered at the C5 level. Vertebral body heights are preserved. Multilevel loss of disc height and osteophyte formation. Cervicothoracic junction is obscured. Prevertebral soft tissues are within normal limits. Odontoid is grossly intact.  Lateral masses of C1 are grossly intact.     1.  Limited exam.  Cervicothoracic junction is obscured. 2.  No gross cervical spine fracture or subluxation. 3.  Moderate multilevel degenerative changes.    DX-CHEST-PORTABLE (1 VIEW)    Result Date: 4/30/2021 4/30/2021 1:07 AM HISTORY/REASON  FOR EXAM: For indication of respiratory failure; For indication of respiratory failure TECHNIQUE/EXAM DESCRIPTION:  Single AP view of the chest. COMPARISON: Yesterday FINDINGS: Position of medical devices appears stable. The cardiac silhouette appears within normal limits.  Postsurgical changes of sternotomy are noted. The mediastinal contour appears within normal limits.  The central pulmonary vasculature appears normal. Bilateral lung volumes are diminished.  Bilateral lungs are clear. No significant pleural effusions are identified. The bony structures appear age-appropriate.     1.  No acute cardiopulmonary disease.    DX-CHEST-PORTABLE (1 VIEW)    Result Date: 4/29/2021 4/29/2021 10:06 AM HISTORY/REASON FOR EXAM:  For indication of respiratory failure; For indication of respiratory failure. TECHNIQUE/EXAM DESCRIPTION AND NUMBER OF VIEWS: Single portable view of the chest. COMPARISON: 4/28/2021 FINDINGS: Endotracheal tube projects at the level the clavicular heads. Right central line projects over the SVC. Enteric tube passes below the level of the diaphragm. The heart is not enlarged. Atherosclerotic calcification is seen. There is mild left basilar opacity likely representing atelectasis. No pleural effusion or pneumothorax is seen. Skin staples and a surgical drain project over the cervical spine.     Mild left basilar opacity may represent atelectasis. Infection not excluded. Improved aeration of the left lung. Atherosclerotic plaque.     DX-CHEST-PORTABLE (1 VIEW)    Result Date: 4/28/2021 4/28/2021 12:10 PM HISTORY/REASON FOR EXAM:  CENTRAL LINE PLACEMENT; CENTRAL LINE PLACEMENT. TECHNIQUE/EXAM DESCRIPTION AND NUMBER OF VIEWS: Single portable view of the chest. COMPARISON: 4/28/2021 11:17 AM FINDINGS: Mediastinal structures are shifted to the LEFT.  Increasing opacification of LEFT hemithorax. RIGHT lung is clear. Endotracheal tube in place with tip approximately 5 cm above the you. Feeding tube tip  in place however tip is off the image. RIGHT internal jugular catheter tip at the lower SVC. No pneumothorax. Postoperative change from prior open heart surgery. Postoperative change of the neck with surgical drain present.     1.  Worsening consolidation of LEFT hemithorax with shift of mediastinal structures to the LEFT suggesting atelectasis, possibly due to endobronchial process. 2.  Supportive tubing as described above. 3.  No pneumothorax.    DX-CHEST-PORTABLE (1 VIEW)    Result Date: 4/28/2021 4/28/2021 11:16 AM HISTORY/REASON FOR EXAM:  replaced ETT; replaced ETT TECHNIQUE/EXAM DESCRIPTION AND NUMBER OF VIEWS: Single portable view of the chest. COMPARISON: 4/27/2021 FINDINGS: Endotracheal tube with tip projecting over the mid thoracic trachea. Hazy opacity in the left lower lobe Layering left pleural effusion. No pneumothorax. Stable cardiopericardial silhouette.     Endotracheal tube with tip projecting over the mid thoracic trachea. Layering left pleural effusion with left lower lung opacity.    DX-CHEST-PORTABLE (1 VIEW)    Result Date: 4/27/2021 4/27/2021 5:30 PM HISTORY/REASON FOR EXAM:  Shortness of Breath. TECHNIQUE/EXAM DESCRIPTION AND NUMBER OF VIEWS: Single portable view of the chest. COMPARISON: None. FINDINGS: LUNGS: Hypoinflation with left basilar atelectasis. Mild perihilar interstitial prominence. No effusions. PNEUMOTHORAX: None. LINES AND TUBES: None. MEDIASTINUM: No cardiomegaly. MUSCULOSKELETAL STRUCTURES: No acute displaced fracture. Median sternotomy.     1.  Hypoinflation with left basilar atelectasis. 2.  Mild perihilar interstitial prominence may be related to hypoinflation or edema.    DX-SPINE-ANY ONE VIEW    Result Date: 4/28/2021 4/28/2021 5:30 AM HISTORY/REASON FOR EXAM:  Cervical laminectomy TECHNIQUE/EXAM DESCRIPTION AND NUMBER OF VIEWS:  Single view of the spine. Digitized Intraoperative Radiograph FINDINGS: Fixation hardware projecting over the cervical spine Fluoroscopic  time:2 seconds     Digitized intraoperative radiograph is submitted for review.  This examination is not for diagnostic purpose but for guidance during a surgical procedure.    MR-BRAIN-W/O    Result Date: 4/28/2021 4/28/2021 3:31 PM HISTORY/REASON FOR EXAM:  Altered mental status; cerebellar stroke. TECHNIQUE/EXAM DESCRIPTION: MRI of the brain without contrast. T1 sagittal, T2 fast spin-echo axial, T1 coronal, FLAIR coronal, diffusion-weighted and apparent diffusion coefficient (ADC map) axial images were obtained of the whole brain. The study was performed on a [employer] 1.5 Britt MRI scanner. COMPARISON:  Head CT earlier in the day FINDINGS: Large acute right cerebellar infarct suggesting right posterior inferior cerebellar artery territory. There is prominent T2 hyperintensity consistent with cytotoxic edema. A small amount of blooming artifact in the medial aspect of the right cerebellar hemisphere on GRE images could represent a small amount of petechial hemorrhage. There is mild localized mass effect with some mild mass effect on the fourth ventricle. No supratentorial infarct or hemorrhage. No extra-axial fluid collection. No midline shift or hydrocephalus. There is a nasogastric tube and fluid within the nasopharynx. Endotracheal tube partially visualized. Mild posterior ethmoid sinus mucosal thickening.     Acute large right cerebellar posterior inferior cerebellar artery territory infarct with possible small amount of petechial hemorrhage.    MR-CERVICAL SPINE-WITH & W/O    Result Date: 4/27/2021 4/27/2021 1:02 PM HISTORY/REASON FOR EXAM:  Neck pain, abnormal neuro exam; Neck pain, initial exam; sepsis 2/2 strep bacteremia  -unknown source. rule out possible ?? retropharyngeal abscess, ??prevertebral abscess. TECHNIQUE/EXAM DESCRIPTION: MRI of the cervical spine without and with contrast. The study was performed on a [employer] 1.5 Britt MRI scanner. T1 sagittal, T2 fast spin-echo sagittal, and  gradient echo axial images were obtained of the cervical spine. T1 post-contrast fat suppressed sagittal images were obtained of the cervical spine. Optional T1 post-contrast axial images may be obtained. 15 mL ProHance contrast was administered intravenously. COMPARISON: None. FINDINGS: There is abnormal disc T2 hyperintensity at C5-6. Mild amount of bone marrow edema is noted at C5, C6 and C7. There is also focal bone marrow enhancement at C6. There is multiseptated abnormal peripherally enhancing epidural collection noted extending from C2 to the visualized lower thoracic level. Largest collection is noted in the upper thoracic posterior epidural space at the levels of T2 and T3. The lower extent of the collection is not imaged. This is causing multilevel effacement of the thecal sac and cord compression. The thoracic spinal cord is anteriorly displaced and compressed at the levels of T2 and T3. At the level of C2-3,there is small left anterior epidural fluid collection. At the level of C3-4,there is small left anterior epidural fluid collection causing indentation of the thecal sac. At the level of C4-5,there is minimal epidural fluid collection. At the level of C5-6,there is diffuse disc bulge along with right posterior lateral epidural fluid collection causing severe canal compromise. At the level of C6-7,there is mild diffuse disc bulge. There is epidural fluid collection causing severe canal compromise. At the level of C7-T1,there is epidural fluid collection causing severe canal compromise. The visualized brain parenchyma is unremarkable. The cervical spinal cord is mildly enlarged which demonstrates mild increased intramedullary T2 signal intensity. There is abnormal T2 hyperintensity in the visualized bilateral vertebral arteries concerning for age indeterminate occlusion. There is mild amount of edema in the pre and retropharyngeal soft tissue.     1.  There is multiseptated abnormal peripherally  enhancing epidural collection noted extending from C2 to the visualized lower thoracic level. Largest collection is noted in the upper thoracic posterior epidural space at the levels of T2 and T3. The lower extent of the collection is not imaged. This is causing multilevel effacement of the thecal sac and cord compression. The thoracic spinal cord is anteriorly displaced and compressed at the levels of T2 and T3. Pre and postcontrast MR examination of  the thoracic spine is recommended for further evaluation. 2.  There is effacement of the cervical thecal sac due to the multiseptated epidural fluid collection. 3.  The cervical spinal cord is mildly enlarged with minimal increased T2 signal intensity. The possibility of cord inflammation cannot be entirely excluded. 4.  Abnormal T2 hyperintensity at C5-6 disc space likely representing discitis. 5.  Abnormal bone marrow edema of C5, C6 and C7 vertebral bodies with edema concerning for osteomyelitis. 6.  There is also focal enhancement of C6 vertebral body likely secondary to the osteomyelitis. 7.  Prevertebral edema/fluid collection. 8.  Nonvisualization of flow void of bilateral vertebral arteries concerning for age-indeterminate bilateral vertebral occlusions.     MR-LUMBAR SPINE-WITH & W/O    Result Date: 4/28/2021 4/27/2021 11:23 PM HISTORY/REASON FOR EXAM:  Back pain or radiculopathy, cancer or infection suspected; Strep bacteremia, back pain, bilateral leg numbness TECHNIQUE/EXAM DESCRIPTION: MRI of the lumbar spine without and with contrast. The study was performed on a [employer] 1.5 Britt MRI scanner. T1 sagittal, T2 fast spin-echo sagittal, and T2 axial images were obtained of the lumbar spine. T1 postcontrast fat-suppressed sagittal images were obtained. 14 mL ProHance contrast was administered intravenously. COMPARISON:  None. FINDINGS: Normal lumbar lordosis. Preservation of vertebral body heights, alignment and bone marrow signal. No evidence of  discitis osteomyelitis. Conus medullaris terminates at T12-L1 and is normal in signal. No mass or fluid collection is seen within the lumbar spinal canal. T12-L1: Canal and foramina are patent. L1-L2: Mild disc bulge. Canal and foramina are patent. L2-L3: Minimal disc bulge and facet degeneration. Canal and foramina are patent. L3-L4: Minimal disc bulge and facet degeneration. Canal and foramina are patent.. L4-L5: Mild disc bulge and facet degeneration. No significant spinal stenosis. Mild foraminal narrowing. L5-S1: Disc narrowing, mild disc osteophyte. No significant spinal stenosis. Moderate right and mild-to-moderate left foraminal narrowing. Distended urinary bladder.     No evidence of lumbar spine infection or epidural abscess. Mild spondylosis as detailed above without spinal stenosis.    MR-MRA HEAD-W/O    Result Date: 4/28/2021 4/28/2021 3:31 PM HISTORY/REASON FOR EXAM:  Emergency Medical Condition ? Stroke. Cerebellar infarct TECHNIQUE/EXAM DESCRIPTION: MRA of the head ([place]) without contrast. The study was performed on a [employer] 1.5 Britt MRI scanner. MRA of the [place] was performed with 3D time-of-flight technique with axial acquisition. Additional MRA of the internal carotid and vertebrobasilar arteries at the level of the skull base and craniocervical junction was also performed with 3D time-of-flight technique with axial acquisition. Images are reviewed at the PACS workstation as axial source images as well as maximum intensity ray projection (MIP) multiplanar 3D reconstructions. COMPARISON:  None FINDINGS: Nicole cavernous and supraclinoid ICA segments are patent. Patent left posterior communicating artery. MCA and ARA branches are patent. There is no significant flow within the intradural right vertebral artery. The intradural left vertebral artery, basilar artery and posterior cerebral arteries are patent. No significant aneurysm or high flow vascular malformation is  seen.     Findings suggesting right vertebral artery occlusion.    MR-MRA NECK-WITH & W/O AND SEQUENCES    Result Date: 4/28/2021 4/28/2021 3:31 PM HISTORY/REASON FOR EXAM:  Emergency Medical Condition ? Stroke Cerebellar stroke TECHNIQUE/EXAM DESCRIPTION: MRA of the cervical carotid and vertebral arteries without and with contrast. The study was performed on a [employer] Signa 1.5 Britt MRI scanner. Precontrast MRA of the cervical carotid and vertebral arteries was performed with 2D time-of-flight (TOF) technique with acquisition in the axial plane. MRA of the arch origins of the great vessels, and cervical carotid and vertebral arteries was performed with 2D time-of-flight (TOF) technique with coronal slab acquisition from the level of the aortic arch to the carotid siphons during dynamic intravenous infusion of 15 mL of ProHance contrast. Images are reviewed at the PACS workstation as source images as well as maximum intensity ray projection (MIP) multiplanar 3D reconstructions. Cervical internal carotid artery percent stenosis is calculated using the standard method according to the NASCET criteria wherein a segment of uniform caliber distal cervical internal carotid is used as the reference denominator. COMPARISON:  None available. FINDINGS: The arch origins of the great vessels are intact. The cervical right vertebral artery is not well seen on the time-of-flight images. A very small caliber cervical right vertebral artery is seen on the postcontrast images with some mildly increased caliber of the artery at about the C1-C2 level. The fact  that it is not seen on the time-of-flight images and is visualized on contrast enhanced sequence could be related to small nondominant caliber with sluggish flow or could represent retrograde flow within the right vertebral artery. There may be a focal moderate stenosis in the mid cervical left internal carotid artery and mild narrowing at its origin. Mild narrowing of the  proximal left internal carotid artery with less than 50% stenosis. No significant right carotid stenosis is seen.     1.  Small caliber right vertebral artery which is not visualized on the noncontrast time-of-flight technique could be related to retrograde flow or diminutive caliber and sluggish flow. 2.  Possible moderate focal stenosis in the mid cervical left vertebral artery. 3.  No significant carotid stenosis.    MR-THORACIC SPINE-WITH & W/O    Result Date: 4/28/2021 4/27/2021 11:23 PM HISTORY/REASON FOR EXAM:  Mid-back pain, compression fracture suspected; possible epidural abscess/fluid collection TECHNIQUE/EXAM DESCRIPTION: MRI of the thoracic spine without and with contrast. The study was performed on a [employer] Signa 1.5 Britt MRI scanner. T1 sagittal, T2 fast spin-echo sagittal, and T2 axial images were obtained of the thoracic spine. T1 post-contrast fat suppressed sagittal images were obtained. Optional T1 post-contrast axial images may be obtained. 14 mL ProHance contrast was administered intravenously. COMPARISON:  MRI of the cervical spine one-day prior. FINDINGS: There is abnormal dorsal epidural fluid collection seen within the partially visualized lower cervical spine and which extends inferiorly to about the T6 level. The maximum thickness is seen at about the T2-T3 level measuring 8 mm. This raises concern for epidural abscess, although epidural hematoma could also have this appearance. From T1 through T3 there is at least moderate thecal sac stenosis due to the epidural fluid collection. There is significant abnormal signal within the partially visualized  lower cervical and upper thoracic cord which could be infectious/inflammatory or other nonspecific cord edema. There is also suggestion of a small ventral epidural fluid collection at C6-C7. There is also partially visualized abnormal marrow edema in the C6 and C7 vertebral bodies as detailed on earlier MRI report. There is suggestion of some  prominent enhancement around the mid and lower thoracic cord seen on the sagittal postcontrast images however limited evaluation on axial images due to motion artifact. This is of uncertain etiology but could represent some leptomeningeal disease/infection. On the sagittal T2 images there is a questionable slight nodular appearance on the surface of lower thoracic cord. A differential would be a subtle spinal dural aVF. There is no abnormal signal seen within the mid/lower thoracic cord. There is no evidence of discitis osteomyelitis within the thoracic spine. There is T6-T7 interbody ankylosis. There is mild disc degeneration and some prominent anterolateral bridging osteophytes in the mid and lower thoracic spine. No significant posterior disc herniation is seen. Within the mid and lower thoracic spine there  is no significant spinal stenosis.     1.  Posterior epidural fluid collection/abscess within the lower cervical spine extending inferiorly to about the T6 level which could represent epidural abscess and/or hematoma. This measures 8 mm in maximal thickness at T2-T3 with at least moderate thecal sac stenosis. 2.  Abnormal signal within the cervical cord and upper thoracic cord suggesting cord edema or infectious/inflammatory etiology. 3.  Lower cervical spine findings are detailed on earlier report. 4.  Prominent enhancement along the surface of the mid and lower thoracic cord is of uncertain etiology and as detailed above, possibly representing leptomeningeal disease/infection. See details above. These findings were discussed with Dr. Cano on 4/28/2021 10:01 AM.     IR-DRAIN-BLADDER SUPRAPUB W/CATH    Result Date: 4/28/2021  HISTORY/REASON FOR EXAM:  60-year-old man with urinary retention. TECHNIQUE/EXAM DESCRIPTION AND NUMBER OF VIEWS:  Ultrasound and fluoroscopic guided suprapubic bladder catheter placement, Cystogram. MEDICATIONS: The patient remained on his ICU sedation regimen. FLUOROSCOPY TIME: 0.3  minutes; 5fluoroscopic images obtained CONTRAST: 40mL Omnipaque 300, intraurinary PROCEDURE:  Informed consent was obtained. The suprapubic region was prepped and draped in sterile manner. Following local anesthesia with copious 1% lidocaine, under ultrasound and fluoroscopic monitoring, an 18-gauge needle was advanced into the urinary bladder from a paramedian suprapubic approach. An Amplatz guidewire was placed in the urinary bladder. Serial tract dilatation was performed with a 22 Cymraes telescoping dilator. A 16 Cymraes Wiyot tip silicone Gomez type catheter was then placed in the balloon inflated with 10 mL of sterile saline. A cystogram was performed with AP and both oblique views demonstrating satisfactory position of the catheter with all side holes within the urinary bladder. The catheter was secured to the skin with 2-0 nylon suture and connected to gravity drainage. The Gomez catheter was removed. The patient tolerated the procedure well with no evidence of complication. COMPARISON: None FINDINGS:  The cystogram shows satisfactory catheter position with all sideholes within the urinary bladder. There is no extravasation.     1.     Ultrasound and fluoroscopic guided placement of a 16 Cymraes Wiyot tip silicone suprapubic bladder catheter. 2.     Cystogram documenting catheter placement.     DX-PORTABLE FLUOROSCOPY < 1 HOUR    Result Date: 4/28/2021 4/28/2021 5:30 AM HISTORY/REASON FOR EXAM:  Cervical laminectomy TECHNIQUE/EXAM DESCRIPTION AND NUMBER OF VIEWS: Portable fluoroscopy for less than one hour in OR. FINDINGS: The portable fluoroscopy unit was obligated to the procedure for less than one hour. Actual fluoro time was 2 seconds.     Portable fluoroscopy utilized for 2 seconds. INTERPRETING LOCATION: [address] [place] [address], [id number]    EC-ECHOCARDIOGRAM COMPLETE W/O CONT    Result Date: 4/28/2021  Transthoracic Echo Report Echocardiography Laboratory CONCLUSIONS No prior study is available for  comparison. Normal left ventricular systolic function.  Left ventricular ejection fraction is visually estimated to be 60%. Normal diastolic function. Agitated saline (bubble) study was performed. No evidence of right to left shunt by agitated saline challenge. Normal inferior vena cava size and inspiratory collapse. GILDARDO MAGANA Exam Date:         2021                    19:42 Exam Location:     Inpatient Priority:          Routine Ordering Physician:        YESICA SULLIVAN Referring Physician:       [id number] Marie Sonographer:               Faye Moya RDCS Age:    60     Gender:    M MRN:    [id number] :    1960 BSA:    1.87   Ht (in):    69     Wt (lb):    159 Exam Type:     Complete Indications:     CVA ICD Codes:       436 CPT Codes:       69952 BP:   140    /   60     HR:   74 Technical Quality:       Fair MEASUREMENTS  (Male / Female) Normal Values 2D ECHO LV Diastolic Diameter PLAX        3.8 cm                4.2 - 5.9 / 3.9 - 5.3 cm LV Systolic Diameter PLAX         2.4 cm                2.1 - 4.0 cm IVS Diastolic Thickness           1.4 cm                LVPW Diastolic Thickness          1.4 cm                LVOT Diameter                     2 cm                  Estimated LV Ejection Fraction    60 %                  LV Ejection Fraction MOD BP       62.7 %                >= 55  % LV Ejection Fraction MOD 4C       46.6 %                LV Ejection Fraction MOD 2C       55 %                  DOPPLER AV Peak Velocity                  1.7 m/s               AV Peak Gradient                  10.9 mmHg             AV Mean Gradient                  5.5 mmHg              LVOT Peak Velocity                1.2 m/s               AV Area Cont Eq vti               2.3 cm2               Mitral E Point Velocity           0.74 m/s              Mitral E to A Ratio               0.92                  MV Pressure Half Time             69.4 ms               MV Area PHT                       3.2 cm2                MV Deceleration Time              239 ms                PV Peak Velocity                  1.1 m/s               PV Peak Gradient                  4.6 mmHg              RVOT Peak Velocity                0.66 m/s              * Indicates values subject to auto-interpretation LV EF:  60    % FINDINGS Left Ventricle Normal left ventricular chamber size. Mild concentric left ventricular hypertrophy. Normal left ventricular systolic function.  Left ventricular ejection fraction is visually estimated to be 60%. Normal diastolic function. Right Ventricle Normal right ventricular size and systolic function. Right Atrium Normal right atrial size. Normal inferior vena cava size and inspiratory collapse. Left Atrium Normal left atrial size. Agitated saline (bubble) study was performed. With Valsalva maneuver. No evidence of right to left shunt by agitated saline challenge. Existing IV was used. Mitral Valve Structurally normal mitral valve without significant stenosis or regurgitation. Aortic Valve Aortic sclerosis without stenosis. No aortic insufficiency. Tricuspid Valve Structurally normal tricuspid valve without significant stenosis or regurgitation. Pulmonic Valve The pulmonic valve is not well visualized. No pulmonic insufficiency. Pericardium Normal pericardium without effusion. Aorta Normal aortic root for body surface area. Ascending aorta not well visualized. Zakiya Rebollar MD (Electronically Signed) Final Date:     28 April 2021                 21:30    EC-CHRISTOPHER W/O CONT    Result Date: 4/30/2021  Results Will be Available after Interpretation by Cardiologist.    DX-ABDOMEN FOR TUBE PLACEMENT    Result Date: 4/30/2021 4/30/2021 12:44 PM HISTORY/REASON FOR EXAM:  Line evaluation. TECHNIQUE/EXAM DESCRIPTION AND NUMBER OF VIEWS:  1 view(s) of the abdomen. COMPARISON:  4/28/2021 FINDINGS: Enteric tube has been placed. The tip projects over the distal stomach or duodenal bulb. There are scattered loops of air-filled  "bowel.     Feeding tube placement with the tip projecting over the distal stomach or duodenal bulb    DX-ABDOMEN FOR TUBE PLACEMENT    Result Date: 4/28/2021 4/28/2021 12:24 PM HISTORY/REASON FOR EXAM:  Tube evaluation. TECHNIQUE/EXAM DESCRIPTION AND NUMBER OF VIEWS:  1 view(s) of the abdomen. COMPARISON:  None. FINDINGS: Limited single view of the abdomen performed primarily to evaluate enteric tube position. The tip projects over the expected area of the distal stomach. The bowel gas pattern is within normal limits.     Feeding tube with tip projecting over the expected area of the distal stomach.      Micro:  Results     Procedure Component Value Units Date/Time    Culture Respiratory W/ Grm Stn - BAL [[id number]] Collected: 05/06/21 1055    Order Status: Completed Specimen: Respirate from Bronchoalveolar Lavage Updated: 05/08/21 1100     Significant Indicator NEG     Source RESP     Site BRONCHOALVEOLAR LAVAGE     Culture Result Rare growth usual upper respiratory bruce  including yeast.  No clinically significant Staphylococcus aureus, Methicillin  Resistant Staphylococcus aureus, or Pseudomonas species  isolated.       Gram Stain Result Many WBCs.  No organisms seen.      Narrative:      Collected By:[id number] MOSES KAPLAN  Collected By:[id number] MOSES KAPLAN    BLOOD CULTURE [[id number]] Collected: 05/07/21 0758    Order Status: Completed Specimen: Blood from Peripheral Updated: 05/08/21 0711     Significant Indicator NEG     Source BLD     Site PERIPHERAL     Culture Result No Growth  Note: Blood cultures are incubated for 5 days and  are monitored continuously.Positive blood cultures  are called to the RN and reported as soon as  they are identified.      Narrative:      Collected By:[id number] HARJIT HERRERA  Per Hospital Policy: Only change Specimen Src: to \"Line\" if  specified by physician order.  Left Forearm/Arm    BLOOD CULTURE [[id number]] Collected: 05/07/21 1010    Order Status: Completed Specimen: Blood " "from Peripheral Updated: 05/08/21 0711     Significant Indicator NEG     Source BLD     Site PERIPHERAL     Culture Result No Growth  Note: Blood cultures are incubated for 5 days and  are monitored continuously.Positive blood cultures  are called to the RN and reported as soon as  they are identified.      Narrative:      Collected By:[id number] HARJIT HERRERA  Per Hospital Policy: Only change Specimen Src: to \"Line\" if  specified by physician order.  Left AC    BLOOD CULTURE [[id number]]     Order Status: Canceled Specimen: Blood from Peripheral     BLOOD CULTURE [[id number]]     Order Status: Canceled Specimen: Blood from Peripheral     GRAM STAIN [[id number]] Collected: 05/06/21 1055    Order Status: Completed Specimen: Respirate Updated: 05/06/21 2054     Significant Indicator .     Source RESP     Site BRONCHOALVEOLAR LAVAGE     Gram Stain Result Many WBCs.  No organisms seen.      Narrative:      Collected By:[id number] MOSES KAPLAN  Collected By:[id number] MOSES KAPLAN    BLOOD CULTURE [[id number]] Collected: 05/01/21 1300    Order Status: Completed Specimen: Blood from Peripheral Updated: 05/06/21 1500     Significant Indicator NEG     Source BLD     Site PERIPHERAL     Culture Result No growth after 5 days of incubation.    Narrative:      Collected By:[id number] LESA VIZCARRA  Per Hospital Policy: Only change Specimen Src: to \"Line\" if  specified by physician order.  Right Forearm/Arm    BLOOD CULTURE [[id number]] Collected: 05/01/21 1305    Order Status: Completed Specimen: Blood from Peripheral Updated: 05/06/21 1500     Significant Indicator NEG     Source BLD     Site PERIPHERAL     Culture Result No growth after 5 days of incubation.    Narrative:      Collected By:[id number] LESA VIZCARRA  Per Hospital Policy: Only change Specimen Src: to \"Line\" if  specified by physician order.  Left Forearm/Arm    BLOOD CULTURE [[id number]] Collected: 04/28/21 1134    Order Status: Completed Specimen: Blood from Peripheral " "Updated: 05/03/21 1300     Significant Indicator NEG     Source BLD     Site PERIPHERAL     Culture Result No growth after 5 days of incubation.    Narrative:      Collected By:[id number] SAILAJA BAILEY  Per Hospital Policy: Only change Specimen Src: to \"Line\" if  specified by physician order.  No site indicated          Assessment:  Active Hospital Problems    Diagnosis    • *Spinal epidural abscess [G06.1]    • History of ischemic stroke [Z86.73]    • Sepsis due to Streptococcus species (HCC) [A40.9]    • Acute bilateral back pain [M54.9]    • Thrombocytopenia (HCC) [D69.6]    • Type 2 diabetes mellitus without complication, without long-term current use of insulin (HCC) [E11.9]    • Coronary artery disease due to calcified coronary lesion: CABG x4, July 2015 [I25.10, I25.84]    • Essential hypertension, benign [I10]    • Hypokalemia [E87.6]    • Hypomagnesemia [E83.42]    • Hyponatremia [E87.1]    • Difficulty swallowing [R13.10]    • Diabetic ketoacidosis (HCC) [E11.10]    • Marijuana abuse [F12.10]    • High anion gap metabolic acidosis [E87.2]    • Tobacco abuse [Z72.0]    • Dyslipidemia [E78.5]    • Status post urethrostomy (HCC) [Z93.6]    .    Assessment:  60 y.o.  admitted 4/25/2021. Pt has a past medical history of uncontrolled diabetes, CAD status post CABG times 4/2015, marijuana use and to arthritis.  Presented complaining of neck and back pain ongoing for approximately 1 week and fall getting out of the bathtub.  He had been seen at urgent care for back pain approximately 1 week prior to admission and given Toradol injections.       Hospital Course:   Afebrile and vitals unremarkable other than hypertension.  Leukocytosis ongoing since arrival.  MRI C-spine on 4/26 with epidural collection noted from C2-T3.  Largest collection noted in upper thoracic posterior epidural space at T2-T3.  This is causing multilevel cord compression.  Also with likely discitis at C5-6 and osteomyelitis at " "C5-C7.  Blood cultures on 4/25+ for Streptococcus species.    Code blue 5/3-mucus plugs     Problem List  Sepsis/shock, strep anginosis  -Blood cultures on 4/25 2/2 + Streptococcus anginosus, penicillin and cephalosporin susceptible  -Blood cultures on 4/27 1/2 + viridans Streptococcus   -Blood cultures on 4/28  1/2 + CoNS -likely contaminant  -Blood cultures on 5/1 and 5/7 are no growth to date  -TTE with no vegetations  -CHRISTOPHER on 4/30, no official read as yet-query delay \"Results Will be Available after Interpretation by Cardiologist\"    VDRF-Bronchoscopy on 4/28 with thick secretions  Bronch 5/3 mucus plugs, thick secretions  Bronch 5/4 pending-no signif secretions noted  Bronch 5/6 with thick secretion RML    Leukocytosis, persistent.   Cervical thoracic infection, epidural abscess at C2-T3 as well as discitis and osteomyelitis, +GPCs-Streptococcus anginosus   -Repeat MRI thoracic spine on 4/27 notes posterior epidural fluid  collection/abscess in lower cervical spine extending to T6 level, also noted abnormal signal within the cervical and upper thoracic cord  -s/p OR 4/28 for see 4-T2 laminectomy, decompression of thecal sac and nerve roots as well as cervical thoracic extradural spinal mass/epidural abscess removal, linda purulence during OR, no CSF leak per op note  -Operative cultures from cervical thoracic epidural 1/ 2 +strep anginosis    CVA, Right cerebellar stroke, possibly due to venous spasm associated with the epidural abscess.   -MRA neck with and without on 4/28, possible moderate focal stenosis in mid left CVA.  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  MRI without on 4/28 with acute large right cerebellar infarct with small hemorrhage  Uncontrolled diabetes  Transaminitis, improving RUQ USG mild HMG. Switched off ampicillin 5/9/2021     Plan   Continue ceftriaxone- LFTs improved  Anticipate a 6-week IV antibiotic course for the bacteremia and spinal " infection-stop date 6/11/2021  Due to recurrent fever-repeat blood cxs (neg), checked lipase (normal),  Repeat MRI spine prior to stopping antibiotics  FU CHRISTOPHER result-still pending  Keep BS under 150 to help control current infection  Continue to FU Bronch results (5/6)-neg  Cultures neg to date-continue to follow    Palliative care consult and goals of care discussion appreciated  Plan for trach and PEG     Prognosis guarded

## 2021-05-10 NOTE — RESPIRATORY CARE
MD updated regarding increased O2 requirements and Plateau pressures greater than 30 and sustaining. MD presented to bedside.    Vent day 13  26/430/+10, 90% FIo2

## 2021-05-10 NOTE — PROCEDURES
Procedures  Procedure Note    Date: 5/10/2021  Time: 1315    Procedure: Bronchoscopy to facilitate Percutaneous tracheostomy  Indication: Ventilator dependency  Consent: Informed consent obtained from patient or designated decision maker after explaining the benefits/risks of the procedure including but not limited to bleeding, infection, airway trauma or loss therof, pneumothorax/hemothorax, arrythmia, or death. Patient or surrogate expressed understanding and agreement and signed consent which can be found in the patient's chart.    Procedure: After obtaining consent, a time-out was performed. Respiratory therapy and nursing at bedside throughout procedure. Patient provided sedation and analgesia throughout the procedure. Placed on full ventilator support with an FiO2 of 100% throughout the procedure. Using a fiberoptic bronchoscope, trachea entered via 8.0 ETT.  The you was identified and left and right mainstem's were clear.  Mucosa pink and healthy.  Scant amount of white thin secretions noted.  The endotracheal tube was pulled back to 19 cm.  The finder needle and guidewire were identified during the procedure.  After the tracheostomy was dilated.  A size 7 tracheostomy tube was inserted under direct bronchoscopic visualization.  The cuff was inflated and the tracheostomy secured.  The bronchoscope was then removed from the endotracheal tube and inserted into the tracheostomy tube which confirmed appropriate placement in the midline of the trachea.  Complications: None  CXR tracheostomy in good position.

## 2021-05-10 NOTE — WOUND TEAM
[facility]  Inpatient Services  Initial Wound and Skin Care Evaluation    Admission Date: 4/25/2021     Last order of IP CONSULT TO WOUND CARE was found on 5/6/2021 from Hospital Encounter on 4/25/2021     HPI, PMH, SH: Reviewed    Past Surgical History:   Procedure Laterality Date   • CERVICAL LAMINECTOMY POSTERIOR Bilateral 4/28/2021    Procedure: LAMINECTOMY, SPINE, CERVICAL, POSTERIOR APPROACH - C3-T2 REMOVAL EPIDURAL ABSCESS;  Surgeon: New Hazel III, M.D.;  Location: SURGERY [facility];  Service: Neurosurgery   • GROIN EXPLORATION  7/10/2018    Procedure: GROIN EXPLORATION;  Surgeon: Valdez Zuleta M.D.;  Location: SURGERY [facility];  Service: Urology   • URETHRECTOMY  7/5/2018    Procedure: PERINEAL URETHROSTOMY;  Surgeon: Valdez Zuleta M.D.;  Location: SURGERY [facility];  Service: Urology   • EVACUATION OF HEMATOMA Bilateral 7/5/2018    Procedure: EVACUATION OF HEMATOMA;  Surgeon: Valdez Zuleta M.D.;  Location: SURGERY [facility];  Service: Urology   • CYSTOSCOPY  04/24/2018    SP cath placed.   • HEMORRHOIDECTOMY  02/2016   • HIP ARTHROSCOPY Right 01/2016   • KNEE ARTHROSCOPY Left 01/2016   • LITHOTRIPSY  2016    unsuccessful   • CYSTOSCOPY  2016    S/P unsuccessful lithotripsy   • MULTIPLE CORONARY ARTERY BYPASS  07/2015    4 vessel   • OTHER SURGICAL PROCEDURE Right 1993    Closed some veins in leg.   • THORACIC FUSION POSTERIOR  late 1980's     Social History     Tobacco Use   • Smoking status: Current Every Day Smoker     Packs/day: 0.50     Years: 40.00     Pack years: 20.00     Types: Cigarettes   • Smokeless tobacco: Never Used   Substance Use Topics   • Alcohol use: No     Chief Complaint   Patient presents with   • Back Pain   • Neck Pain     Diagnosis: DKA, type 2, not at goal (HCC) [E11.10]  Cerebellar cerebrovascular accident (CVA) without late effect [Z86.73]    Unit where seen by Wound Team: R107/00     WOUND CONSULT/FOLLOW UP RELATED  TO:  sacrum    WOUND HISTORY:  Pt admitted with excoriation on coccyx per LDA. Consult for sacrum .     WOUND ASSESSMENT/LDA  Wound 21 Pressure Injury Sacrum DTI  (Active)      21 1800   Site Assessment Red;Purple;White    Periwound Assessment Red    Margins Defined edges    Closure Open to air    Drainage Amount Scant    Drainage Description Serosanguineous    Treatments Cleansed    Wound Cleansing Foam Cleanser/Washcloth    Periwound Protectant Not Applicable    Dressing Cleansing/Solutions Not Applicable    Dressing Options Mepilex    Dressing Changed Changed    Dressing Status Intact    Dressing Change/Treatment Frequency Every 72 hrs, and As Needed    NEXT Dressing Change/Treatment Date 21    NEXT Weekly Photo (Inpatient Only) 21    Pressure Injury Stage DTPI    Wound Length (cm) 6 cm 21 1800   Wound Width (cm) 5 cm 21 1800   Wound Surface Area (cm^2) 30 cm^2 21 1800   Shape circular    Wound Odor None    Exposed Structures None    Number of days: 12       Vascular:    DAVID:   DAVID Results, Last 30 Days US-EXTREMITY ARTERY LOWER UNILAT RIGHT    Result Date: 2021  Narrative Lower Extremity  Arterial Duplex Report  Vascular Laboratory  CONCLUSIONS  RLE: Prox profunda femoral artery stenosis.  Occlusion of SFA at mid thigh level with distal collateral reconstitiution.  LLE: No significant stenosis identified in CFA. LLE not completely  studied..  GILDARDO MAGANA  Exam Date:     2021 15:29  Room #:     Inpatient  Priority:     Routine  Ht (in):     69      Wt (lb):     198  Ordering Physician:        YANNI ALBARRAN  Referring Physician:  Sonographer:               Ortiz Gonzalez                             [facility], [facility]  Study Type:                Complete Unilateral  Technical Quality:         Adequate  Age:    60    Gender:     M  MRN:    [id number]  :    1960      BSA:    2.06  Indications:     Pain in Limb  CPT Codes:        76103  ICD Codes:       729.5  History:         Diminished and or absent pulses  Limitations:                RIGHT  Waveform        Peak Systolic Velocity (cm/s)                  Prox    Prox-Mid  Mid    Mid-Dist  Distal  Triphasic                         85                       CFA  Monophasic      349                                        PFA  Monophasic      50                0                124     SFA  Monophasic                        62                       POP  Monophasic      65                                 31      AT  Monophasic      66                                 48      PT  Monophasic      42                                 31      RITA                LEFT  Waveform        Peak Systolic Velocity (cm/s)                  Prox    Prox-Mid  Mid    Mid-Dist  Distal  Monophasic                        104                      CFA                                                             PFA                                                             SFA                                                             POP                                                             AT                                                             PT                                                             RITA  FINDINGS  Right.  Plaque is seen in the  CFA ,PFA and SFA  artery with elevated velocities   only at proximal  Deep femoral artery  to indicate hemodynamic significance  stenosis..  SFA is occluded  at the mid thigh (? Thrombus vs chronic) with  reconstitution  of flow at the distal femoral artery and all tibial and  peroneal arteries are patent with monophasic flow.  Left.  Plaque is seen in the  CFA  artery with no elevated velocities to indicate  hemodynamic significance but it is monophasic flow..  Marcello Gilliam MD  (Electronically Signed)  Final Date:      04 May 2021 17:56      Lab Values:    Lab Results   Component Value Date/Time    WBC 12.7 (H) 05/09/2021 03:41 AM    RBC 2.93 (L)  05/09/2021 03:41 AM    HEMOGLOBIN 8.9 (L) 05/09/2021 03:41 AM    HEMATOCRIT 29.8 (L) 05/09/2021 03:41 AM    CREACTPROT 20.59 (H) 05/03/2021 11:56 AM    HBA1C 12.2 (H) 04/28/2021 12:27 PM        Culture Results show:  Recent Results (from the past 720 hour(s))   CULTURE WOUND W/ GRAM STAIN    Collection Time: 04/28/21  6:55 AM    Specimen: Wound   Result Value Ref Range    Significant Indicator POS (POS)     Source WND     Site Cerival Thoric Epidural 2     Culture Result (A)      Growth noted after further incubation, see below for  organism identification.      Gram Stain Result Few WBCs.  Few Gram positive cocci.       Culture Result Streptococcus anginosus  Moderate growth   (A)        Pain Level/Medicated:  No s/s of distress      INTERVENTIONS BY WOUND TEAM:  Chart and images reviewed. Discussed with bedside RN. This RN in to assess patient. Performed standard wound care which includes appropriate positioning, dressing removal and non-selective debridement.   Preparation for Dressing removal: Dressing easily removed without trauma  Cleansed with:  foaming soap and warm moist washcloths  Sharp debridement: NA  Anel wound: Cleansed with foaming soap and warm moist washcloths, dried  Primary Dressing: Mepilex  Secondary (Outer) Dressing: NA    Interdisciplinary consultation: Patient, Bedside RN    EVALUATION / RATIONALE FOR TREATMENT:  Most Recent Date:  5/9: Pt has a DTI to sacrococcygeal area that has ~60% purple and 40% red. Skin has some maceration that peeled off with cleaning.      Goals: Steady decrease in wound area and depth weekly.    WOUND TEAM PLAN OF CARE ([X] for frequency of wound follow up,):   Nursing to follow orders written for wound care. Contact wound team if area fails to progress, deteriorates or with any questions/concerns  Dressing changes by wound team:                   Follow up 3 times weekly:                NPWT change 3 times weekly:     Follow up 1-2 times weekly:  x    Follow up  Bi-Monthly:                   Follow up as needed:     Other (explain):     NURSING PLAN OF CARE ORDERS (X):  Dressing changes: See Dressing Care orders: x  Skin care: See Skin Care orders:   RN Prevention Protocol: x  Rectal tube care: See Rectal Tube Care orders:   Other orders:    RSKIN:   CURRENTLY IN PLACE (X), APPLIED THIS VISIT (A), ORDERED (O):   Q shift Chema:  X  Q shift pressure point assessments:  X    Surface/Positioning   Pressure redistribution mattress            Low Airloss   x       Bariatric foam      Bariatric LEANA     Waffle cushion        Waffle Overlay          Reposition q 2 hours   x   TAPs Turning system     Z Rubio Pillow     Offloading/Redistribution   Sacral Mepilex (Silicone dressing)   x  Heel Mepilex (Silicone dressing)  x       Heel float boots (Prevalon boot)             Float Heels off Bed with Pillows   x        Respiratory   Silicone O2 tubing         Gray Foam Ear protectors     Cannula fixation Device (Tender )          High flow offloading Clip    Elastic head band offloading device      Anchorfast    x                                                     Trach with Optifoam split foam             Containment/Moisture Prevention   Rectal tube or BMS    Purwick/Condom Cath        Gomez Catheter  suprapubic  Barrier wipes           Barrier paste       Antifungal tx      Interdry        Mobilization TEZ      Up to chair        Ambulate      PT/OT      Nutrition       Dietician    x    Diabetes Education      PO     TF  x   TPN     NPO   # days     Other        Anticipated discharge plans:TBD  LTACH:        SNF/Rehab:                  Home Health Care:           Outpatient Wound Center:            Self/Family Care:        Other:

## 2021-05-10 NOTE — PROGRESS NOTES
Dr. Gannon, Dr. Reed, Dr. Singh,, Ravindra RT, Catrachita RN, and Nathaly RN.   Time out called at 1312.  Vitals Q3 min.   Medications per Dr. Reed.   50 mg Rocuronium given at 1313  2 mg versed in at [id number]  50 mcg fentanyl given at 1314.     Vitals 1317  BP:138/65  HR:61  O2:100%    Vitals 1321  BP: 136/65  HR: 63  O2: 100    Trach placed at 1321.   Vent placed on standby by at 1322. O2 100%.  Vent off standby by at 1322. O2 100%.    Vitals 1323  BP:152/72  HR:69  O2: 100    Per Dr. Gannon administer 50 mcg fentanyl. 50 mcg fentanyl administered at 1325.  Order DX Chest per Dr. Gannon.   End time 1326.

## 2021-05-10 NOTE — CONSULTS
"                                           Gastroenterology Consult Note     Date of Consult: 5/10/2021    Requesting Physician: Dr. Davis     Reason for consult: PEG evaluation, dysphagia     History of Present Illness:   This is a 60-year-old male with CAD status post CABG and uncontrolled diabetes mellitus type 2 who is consulted for gastrostomy tube placement.  Due to patient's medical condition, he is unable to provide history.  History was obtained from the patient's nurse and from medical records.  Patient initially presented with neck and back pain.  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  Patient underwent a laminectomy and decompression of C3, C4, C5, C6, C7, T1 and T2 as well as extradural spinal mass/epidural abscess removal on 4/28/2021.  Patient is currently receiving nutrition via Deisy and is unable to tolerate oral intake.  He had a tracheostomy placed today.  GI was asked to see the patient for PEG placement.     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    • Bronchitis     \"I cough\"   • Diabetes (HCC)     oral medication . 5/21/18-AVG am=90's.   • Heart burn     Treated with Zantac.   • High cholesterol    • Hypertension    • Indigestion     Treated with Zantac.    • Infectious disease 1989 or 1+990    had staph infection in spine, had PICC line, rash all over body,  then thoracic fusion   • Marijuana use 05/21/2018    Daily use for pain control.   • Myocardial infarct (HCC) 04/05/2014    CABG-2015. Cardiologist in [facility].   • Pain     hip, knees   • Pain 05/21/2018    \"all over\"   • Psychiatric problem     depression, anxiety, bipolar    • Renal disorder     Hx of renal stone.   • Seizure ([facility])     last seizure " 4/5/2014-unknown etiology, but possibly related to stress.   • Sleep apnea     no CPAP, no supplemental oxygen   • Snoring    • Urinary bladder disorder     5/21/18-currently has donald cath to leg bag.   • Urinary incontinence     urethral strictures.        Past Surgical History:   Past Surgical History:   Procedure Laterality Date   • CERVICAL LAMINECTOMY POSTERIOR Bilateral 4/28/2021    Procedure: LAMINECTOMY, SPINE, CERVICAL, POSTERIOR APPROACH - C3-T2 REMOVAL EPIDURAL ABSCESS;  Surgeon: New Hazel III, M.D.;  Location: SURGERY [facility];  Service: Neurosurgery   • GROIN EXPLORATION  7/10/2018    Procedure: GROIN EXPLORATION;  Surgeon: Valdez Zuleta M.D.;  Location: SURGERY [facility];  Service: Urology   • URETHRECTOMY  7/5/2018    Procedure: PERINEAL URETHROSTOMY;  Surgeon: Valdez Zuleta M.D.;  Location: SURGERY [facility];  Service: Urology   • EVACUATION OF HEMATOMA Bilateral 7/5/2018    Procedure: EVACUATION OF HEMATOMA;  Surgeon: Valdez Zuleta M.D.;  Location: SURGERY [facility];  Service: Urology   • CYSTOSCOPY  04/24/2018    SP cath placed.   • HEMORRHOIDECTOMY  02/2016   • HIP ARTHROSCOPY Right 01/2016   • KNEE ARTHROSCOPY Left 01/2016   • LITHOTRIPSY  2016    unsuccessful   • CYSTOSCOPY  2016    S/P unsuccessful lithotripsy   • MULTIPLE CORONARY ARTERY BYPASS  07/2015    4 vessel   • OTHER SURGICAL PROCEDURE Right 1993    Closed some veins in leg.   • THORACIC FUSION POSTERIOR  late 1980's        Allergies  Patient has no known allergies.     Social History:   Social History     Socioeconomic History   • Marital status: Single     Spouse name: Not on file   • Number of children: Not on file   • Years of education: Not on file   • Highest education level: Not on file   Occupational History   • Not on file   Tobacco Use   • Smoking status: Current Every Day Smoker     Packs/day: 0.50     Years: 40.00     Pack years: 20.00     Types: Cigarettes   • Smokeless  tobacco: Never Used   Substance and Sexual Activity   • Alcohol use: No   • Drug use: Yes     Types: Inhaled     Comment: last smoked marijuana 7/3/2018   • Sexual activity: Not on file   Other Topics Concern   • Not on file   Social History Narrative   • Not on file     Social Determinants of Health     Financial Resource Strain:    • Difficulty of Paying Living Expenses:    Food Insecurity:    • Worried About Running Out of Food in the Last Year:    • Ran Out of Food in the Last Year:    Transportation Needs:    • Lack of Transportation (Medical):    • Lack of Transportation (Non-Medical):    Physical Activity:    • Days of Exercise per Week:    • Minutes of Exercise per Session:    Stress:    • Feeling of Stress :    Social Connections:    • Frequency of Communication with Friends and Family:    • Frequency of Social Gatherings with Friends and Family:    • Attends [employer] Services:    • Active Member of Clubs or Organizations:    • Attends Club or Organization Meetings:    • Marital Status:    Intimate Partner Violence:    • Fear of Current or Ex-Partner:    • Emotionally Abused:    • Physically Abused:    • Sexually Abused:         Family History:   Family History   Problem Relation Age of Onset   • Heart Attack Father 75   • Heart Disease Father 59        CABG   • Heart Disease Sister 55        CABG        Review of Systems:  Unable to obtain due to patient's medical condition.    Physical Exam:  Vitals:    05/10/21 1515 05/10/21 1525 05/10/21 1530 05/10/21 1600   BP:   117/58 124/58   Pulse: (!) 58 (!) 59 (!) 58 (!) 56   Resp: (!) 26 (!) 26 (!) 26 (!) 26   Temp:       TempSrc:    Rectal   SpO2: 91% 93% 93% 93%   Weight:       Height:         General: No acute cardiopulmonary distress.  Head: Normocephalic.  EENT: Scleral anicterus. Mucosa dry.  Tracheostomy in situ.  Cortrak in place.  Respiratory: Breath sounds present. Symmetrical rise of anterior thorax.  Cardiovascular: Normal S1, S2.  Gastrointestinal:  Soft, nontender, nondistended. Normoactive bowel sounds. No guarding.   Neurologic: Awake but does not follow commands.  Skin: Warm and dry.  Midsternal scar noted.      LABS:  Lab Results   Component Value Date/Time    SODIUM 145 05/10/2021 04:23 AM    POTASSIUM 3.6 05/10/2021 04:23 AM    CHLORIDE 110 05/10/2021 04:23 AM    CO2 30 05/10/2021 04:23 AM    GLUCOSE 119 (H) 05/10/2021 04:23 AM    BUN 36 (H) 05/10/2021 04:23 AM    CREATININE 0.50 05/10/2021 04:23 AM      Lab Results   Component Value Date/Time    WBC 10.6 05/10/2021 04:23 AM    RBC 3.07 (L) 05/10/2021 04:23 AM    HEMOGLOBIN 9.4 (L) 05/10/2021 04:23 AM    HEMATOCRIT 30.4 (L) 05/10/2021 04:23 AM    MCV 99.0 (H) 05/10/2021 04:23 AM    MCH 30.6 05/10/2021 04:23 AM    MCHC 30.9 (L) 05/10/2021 04:23 AM    MPV 11.9 05/10/2021 04:23 AM    NEUTSPOLYS 75.90 (H) 05/10/2021 04:23 AM    LYMPHOCYTES 16.80 (L) 05/10/2021 04:23 AM    MONOCYTES 5.40 05/10/2021 04:23 AM    EOSINOPHILS 1.10 05/10/2021 04:23 AM    BASOPHILS 0.20 05/10/2021 04:23 AM    ANISOCYTOSIS 1+ 05/03/2021 11:56 AM        Lab Results   Component Value Date/Time    PROTHROMBTM 16.2 (H) 05/08/2021 08:15 AM    INR 1.26 (H) 05/08/2021 08:15 AM      Recent Labs     05/04/21  0230 05/05/21  0517 05/06/21  0435 05/07/21  0315 05/08/21  0200 05/08/21  0815 05/09/21  0341 05/09/21  0815 05/10/21  0423   ASTSGOT 287* 112* 135* 325* 509*  --  509*  --  244*   ALTSGPT 199* 169* 178* 272* 413*  --  449*  --  351*   TBILIRUBIN 0.3 0.3 0.2 0.5 0.3  --  0.4  --  0.3   GLOBULIN 3.9* 3.8* 3.9* 4.1* 4.1*  --  4.0*  --  4.0*   INR  --   --   --   --   --  1.26*  --   --   --    AMMONIA  --   --   --   --   --   --   --  35  --        IMAGING: Reviewed personally and summarized in HPI.    Principal Problem:    Spinal epidural abscess POA: Yes  Active Problems:    Sepsis due to Streptococcus species (HCC) POA: Yes    History of ischemic stroke POA: No    Coronary artery disease due to calcified coronary lesion: CABG x4,  July 2015 POA: Yes    Essential hypertension, benign POA: Yes    Type 2 diabetes mellitus without complication, without long-term current use of insulin (HCC) POA: Yes    Acute bilateral back pain POA: Yes    Thrombocytopenia (HCC) POA: Yes    Cardiac arrest (HCC) POA: Unknown    Acute respiratory failure with hypoxia (HCC) POA: Unknown      Overview:      Status post urethrostomy (HCC) POA: Yes    Constipation POA: Unknown    Transaminitis POA: Unknown    Dyslipidemia POA: Yes    Diabetic ketoacidosis (HCC) POA: Yes    Marijuana abuse (Chronic) POA: Yes    High anion gap metabolic acidosis POA: Yes    Tobacco abuse (Chronic) POA: Yes    Difficulty swallowing POA: Yes    Hypokalemia POA: Yes    Hypomagnesemia POA: Yes    Hyponatremia POA: Yes  Resolved Problems:    * No resolved hospital problems. *          ASSESSMENT:   1.  Dysphagia secondary to stroke and comorbidities  2.  Acute on chronic respiratory failure, tracheostomy on 5/10/2021  3.  Right cerebellar infarct  4.  Cervical spinal epidural abscess with myelopathy status post laminectomy, decompression and removal of abscess, 4/28/2021  5.  Uncontrolled diabetes mellitus type 2  6.  CAD status post CABG     PLAN:   This is a 60-year-old male who is consulted for PEG evaluation for dysphagia secondary to cerebellar infarct and cervical spinal abscess.  Patient cannot provide consent for himself due to his medical condition.  I called his mother, Mary, as listed on his chart but she did not answer.  I will try again tomorrow to discuss PEG placement with her.  Continue NGT feeding in the interim.    In addition, patient is currently on aspirin 325 mg, which puts him at risk for post-procedure bleed.  Recommend discussing with neurology if okay to hold for procedure.       Thank you for the consultation. Please call with any questions or concerns.    Cheri Roberson D.O.  Gastroenterology  [facility]  [phone]

## 2021-05-11 ENCOUNTER — APPOINTMENT (OUTPATIENT)
Dept: RADIOLOGY | Facility: MEDICAL CENTER | Age: 61
DRG: 003 | End: 2021-05-11
Attending: PSYCHIATRY & NEUROLOGY
Payer: MEDICARE

## 2021-05-11 LAB
ALBUMIN SERPL BCP-MCNC: 2 G/DL (ref 3.2–4.9)
ALBUMIN/GLOB SERPL: 0.5 G/DL
ALP SERPL-CCNC: 319 U/L (ref 30–99)
ALT SERPL-CCNC: 267 U/L (ref 2–50)
ANION GAP SERPL CALC-SCNC: 3 MMOL/L (ref 7–16)
AST SERPL-CCNC: 146 U/L (ref 12–45)
BASOPHILS # BLD AUTO: 0.1 % (ref 0–1.8)
BASOPHILS # BLD: 0.01 K/UL (ref 0–0.12)
BILIRUB SERPL-MCNC: 0.3 MG/DL (ref 0.1–1.5)
BUN SERPL-MCNC: 35 MG/DL (ref 8–22)
CALCIUM SERPL-MCNC: 8.1 MG/DL (ref 8.5–10.5)
CHLORIDE SERPL-SCNC: 106 MMOL/L (ref 96–112)
CO2 SERPL-SCNC: 29 MMOL/L (ref 20–33)
CREAT SERPL-MCNC: 0.41 MG/DL (ref 0.5–1.4)
EOSINOPHIL # BLD AUTO: 0.14 K/UL (ref 0–0.51)
EOSINOPHIL NFR BLD: 1.5 % (ref 0–6.9)
ERYTHROCYTE [DISTWIDTH] IN BLOOD BY AUTOMATED COUNT: 54.9 FL (ref 35.9–50)
GLOBULIN SER CALC-MCNC: 4 G/DL (ref 1.9–3.5)
GLUCOSE BLD-MCNC: 118 MG/DL (ref 65–99)
GLUCOSE BLD-MCNC: 127 MG/DL (ref 65–99)
GLUCOSE BLD-MCNC: 144 MG/DL (ref 65–99)
GLUCOSE BLD-MCNC: 94 MG/DL (ref 65–99)
GLUCOSE SERPL-MCNC: 102 MG/DL (ref 65–99)
HCT VFR BLD AUTO: 30.8 % (ref 42–52)
HGB BLD-MCNC: 9.5 G/DL (ref 14–18)
IMM GRANULOCYTES # BLD AUTO: 0.09 K/UL (ref 0–0.11)
IMM GRANULOCYTES NFR BLD AUTO: 0.9 % (ref 0–0.9)
LYMPHOCYTES # BLD AUTO: 1.74 K/UL (ref 1–4.8)
LYMPHOCYTES NFR BLD: 18 % (ref 22–41)
MAGNESIUM SERPL-MCNC: 2.3 MG/DL (ref 1.5–2.5)
MCH RBC QN AUTO: 30.4 PG (ref 27–33)
MCHC RBC AUTO-ENTMCNC: 30.8 G/DL (ref 33.7–35.3)
MCV RBC AUTO: 98.4 FL (ref 81.4–97.8)
MONOCYTES # BLD AUTO: 0.48 K/UL (ref 0–0.85)
MONOCYTES NFR BLD AUTO: 5 % (ref 0–13.4)
NEUTROPHILS # BLD AUTO: 7.19 K/UL (ref 1.82–7.42)
NEUTROPHILS NFR BLD: 74.5 % (ref 44–72)
NRBC # BLD AUTO: 0 K/UL
NRBC BLD-RTO: 0 /100 WBC
PLATELET # BLD AUTO: 330 K/UL (ref 164–446)
PMV BLD AUTO: 12.3 FL (ref 9–12.9)
POTASSIUM SERPL-SCNC: 3.8 MMOL/L (ref 3.6–5.5)
PROT SERPL-MCNC: 6 G/DL (ref 6–8.2)
RBC # BLD AUTO: 3.13 M/UL (ref 4.7–6.1)
SODIUM SERPL-SCNC: 138 MMOL/L (ref 135–145)
WBC # BLD AUTO: 9.7 K/UL (ref 4.8–10.8)

## 2021-05-11 PROCEDURE — 83735 ASSAY OF MAGNESIUM: CPT

## 2021-05-11 PROCEDURE — 82962 GLUCOSE BLOOD TEST: CPT

## 2021-05-11 PROCEDURE — 700101 HCHG RX REV CODE 250: Performed by: STUDENT IN AN ORGANIZED HEALTH CARE EDUCATION/TRAINING PROGRAM

## 2021-05-11 PROCEDURE — 700111 HCHG RX REV CODE 636 W/ 250 OVERRIDE (IP): Performed by: STUDENT IN AN ORGANIZED HEALTH CARE EDUCATION/TRAINING PROGRAM

## 2021-05-11 PROCEDURE — 700102 HCHG RX REV CODE 250 W/ 637 OVERRIDE(OP): Performed by: INTERNAL MEDICINE

## 2021-05-11 PROCEDURE — A9270 NON-COVERED ITEM OR SERVICE: HCPCS | Performed by: STUDENT IN AN ORGANIZED HEALTH CARE EDUCATION/TRAINING PROGRAM

## 2021-05-11 PROCEDURE — 94799 UNLISTED PULMONARY SVC/PX: CPT

## 2021-05-11 PROCEDURE — A9270 NON-COVERED ITEM OR SERVICE: HCPCS | Performed by: INTERNAL MEDICINE

## 2021-05-11 PROCEDURE — 71045 X-RAY EXAM CHEST 1 VIEW: CPT

## 2021-05-11 PROCEDURE — 80053 COMPREHEN METABOLIC PANEL: CPT

## 2021-05-11 PROCEDURE — 700105 HCHG RX REV CODE 258: Performed by: INTERNAL MEDICINE

## 2021-05-11 PROCEDURE — 700102 HCHG RX REV CODE 250 W/ 637 OVERRIDE(OP): Performed by: PSYCHIATRY & NEUROLOGY

## 2021-05-11 PROCEDURE — A9270 NON-COVERED ITEM OR SERVICE: HCPCS | Performed by: PSYCHIATRY & NEUROLOGY

## 2021-05-11 PROCEDURE — 99233 SBSQ HOSP IP/OBS HIGH 50: CPT | Performed by: INTERNAL MEDICINE

## 2021-05-11 PROCEDURE — 94003 VENT MGMT INPAT SUBQ DAY: CPT

## 2021-05-11 PROCEDURE — 700111 HCHG RX REV CODE 636 W/ 250 OVERRIDE (IP): Performed by: INTERNAL MEDICINE

## 2021-05-11 PROCEDURE — 99291 CRITICAL CARE FIRST HOUR: CPT | Mod: GC | Performed by: INTERNAL MEDICINE

## 2021-05-11 PROCEDURE — 700102 HCHG RX REV CODE 250 W/ 637 OVERRIDE(OP): Performed by: STUDENT IN AN ORGANIZED HEALTH CARE EDUCATION/TRAINING PROGRAM

## 2021-05-11 PROCEDURE — 85025 COMPLETE CBC W/AUTO DIFF WBC: CPT

## 2021-05-11 PROCEDURE — 94669 MECHANICAL CHEST WALL OSCILL: CPT

## 2021-05-11 PROCEDURE — 770022 HCHG ROOM/CARE - ICU (200)

## 2021-05-11 RX ORDER — ASPIRIN 81 MG/1
81 TABLET, CHEWABLE ORAL DAILY
Status: DISCONTINUED | OUTPATIENT
Start: 2021-05-12 | End: 2021-05-14 | Stop reason: HOSPADM

## 2021-05-11 RX ADMIN — BISACODYL 10 MG: 10 SUPPOSITORY RECTAL at 17:04

## 2021-05-11 RX ADMIN — OXYCODONE HYDROCHLORIDE 10 MG: 10 TABLET ORAL at 14:08

## 2021-05-11 RX ADMIN — Medication 5000 UNITS: at 05:50

## 2021-05-11 RX ADMIN — ENOXAPARIN SODIUM 40 MG: 40 INJECTION SUBCUTANEOUS at 05:51

## 2021-05-11 RX ADMIN — OXYCODONE HYDROCHLORIDE 10 MG: 10 TABLET ORAL at 02:05

## 2021-05-11 RX ADMIN — POLYETHYLENE GLYCOL 3350 1 PACKET: 17 POWDER, FOR SOLUTION ORAL at 05:51

## 2021-05-11 RX ADMIN — ASPIRIN 324 MG: 81 TABLET, CHEWABLE ORAL at 05:51

## 2021-05-11 RX ADMIN — CYANOCOBALAMIN TAB 500 MCG 1000 MCG: 500 TAB at 05:50

## 2021-05-11 RX ADMIN — POTASSIUM BICARBONATE 25 MEQ: 978 TABLET, EFFERVESCENT ORAL at 07:34

## 2021-05-11 RX ADMIN — OXYCODONE HYDROCHLORIDE 10 MG: 10 TABLET ORAL at 07:37

## 2021-05-11 RX ADMIN — CYCLOBENZAPRINE HYDROCHLORIDE 5 MG: 5 TABLET, FILM COATED ORAL at 21:00

## 2021-05-11 RX ADMIN — FAMOTIDINE 20 MG: 20 TABLET ORAL at 05:50

## 2021-05-11 RX ADMIN — LIDOCAINE 3 PATCH: 50 PATCH TOPICAL at 17:03

## 2021-05-11 RX ADMIN — CYCLOBENZAPRINE HYDROCHLORIDE 5 MG: 5 TABLET, FILM COATED ORAL at 10:20

## 2021-05-11 RX ADMIN — FAMOTIDINE 20 MG: 20 TABLET ORAL at 17:02

## 2021-05-11 RX ADMIN — DOCUSATE SODIUM 100 MG: 50 LIQUID ORAL at 17:04

## 2021-05-11 RX ADMIN — DOCUSATE SODIUM 50 MG AND SENNOSIDES 8.6 MG 1 TABLET: 8.6; 5 TABLET, FILM COATED ORAL at 21:00

## 2021-05-11 RX ADMIN — CEFTRIAXONE SODIUM 2 G: 2 INJECTION, POWDER, FOR SOLUTION INTRAMUSCULAR; INTRAVENOUS at 05:48

## 2021-05-11 RX ADMIN — INSULIN GLARGINE 40 UNITS: 100 INJECTION, SOLUTION SUBCUTANEOUS at 17:09

## 2021-05-11 RX ADMIN — DOCUSATE SODIUM 100 MG: 50 LIQUID ORAL at 05:51

## 2021-05-11 ASSESSMENT — ENCOUNTER SYMPTOMS: FEVER: 0

## 2021-05-11 ASSESSMENT — FIBROSIS 4 INDEX: FIB4 SCORE: 2.19

## 2021-05-11 NOTE — CARE PLAN
Problem: Communication  Goal: The ability to communicate needs accurately and effectively will improve  Outcome: PROGRESSING SLOWER THAN EXPECTED  Note: Patient is non-verbal, but able to nod and shake head appropriately from time to time. Patient attempts to mouth words to communicate, but unsuccessful.     Problem: Knowledge Deficit  Goal: Knowledge of disease process/condition, treatment plan, diagnostic tests, and medications will improve  Outcome: PROGRESSING SLOWER THAN EXPECTED  Note: Went over plan of care with patient. Patient reoriented as needed.

## 2021-05-11 NOTE — CARE PLAN
Problem: Infection  Goal: Will remain free from infection  Outcome: PROGRESSING AS EXPECTED  Intervention: Implement standard precautions and perform hand washing before and after patient contact  Note: Patient on antibiotics per ID team and vital signs being monitored for signs and symptoms of new/worsening infection. Hand hygiene performed per protocol.      Problem: Bowel/Gastric:  Goal: Normal bowel function is maintained or improved  Outcome: PROGRESSING SLOWER THAN EXPECTED  Note: Patient on scheduled stool protocol. Small formed BM overnight. Will continue protocol.

## 2021-05-11 NOTE — RESPIRATORY CARE
Ventilator Daily Summary    Vent Day # 14  Trach Day #2    Ventilator settings changed this shift:CMV 26, 430 cc,Peep 8, Fio2 70%.    Weaning trials: [place]    Respiratory Procedures:    Plan: Continue current ventilator settings and wean mechanical ventilation as tolerated per physician orders.

## 2021-05-11 NOTE — DISCHARGE PLANNING
Hospital Care Management Discharge Planning       Anticipated Discharge Disposition:   · [facility]     Action:   · This RN CM received update during IDT ICU rounds that Pt will likely need LTACH   · LTACH referral placed by MD  · RN CM called Pt's mother, Mary, at [phone] to discuss LTACH choice   · Mary states she is unable to talk as she is currently driving  · Mary states she will be back at the hospital later today at 1500  · Plan made to discuss d/c plan with Mary at 1500 at the bedside     Barriers to Discharge:   · LTACH choice, acceptance, and bed availability   · PEG placement  · Medical clearance       Plan:   · F/U with Mary at 1500 for LTACH choice   · Hospital Care Management Team to continue to provide support services and assistance with discharge planning as needed.     1500 Addendum: This RN CM met with Mary at the bedside to discuss LTACH choice. Mary chose [facility] and gave verbal consent to send referral. All questions answered. [facility] brochure provided. Choice form faxed to [employer] [facility] at extension [phone]. Fax receipt received. Elias updated on incoming choice.

## 2021-05-11 NOTE — PROGRESS NOTES
Gastroenterology Progress Note         Author: Cheri Roberson D.O.                                 Date & Time Created: 5/11/2021 10:37 AM         Chief Complaint:  PEG evaluation    Interval History:  This is a 60-year-old male with CAD status post CABG and respiratory failure s/p tracheostomy 5/10/21 who is consulted for gastrostomy tube placement.  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  Patient underwent a laminectomy and decompression of C3, C4, C5, C6, C7, T1 and T2 as well as extradural spinal mass/epidural abscess removal on 4/28/2021.  Patient is currently receiving nutrition via [employer] and is unable to tolerate oral intake.     No acute events overnight. Tube feedings running at 50 cc/h.    Review of Systems:  Unable to obtain from the patient due to his medical condition.    Physical Exam:  Vitals:    05/11/21 0623 05/11/21 0700 05/11/21 0800 05/11/21 0955   BP:  157/70 121/56    Pulse: (!) 55 (!) 57 (!) 57 (!) 58   Resp: (!) 41 (!) 53 (!) 26 (!) 26   Temp:   36 °C (96.8 °F)    TempSrc:   Temporal    SpO2: 96% 97% 97% 94%   Weight:       Height:         General: No acute cardiopulmonary distress.  Head: Normocephalic.  EENT: Tracheostomy in situ.  Cortrak in place.  Respiratory: Breath sounds present. Symmetrical rise of anterior thorax.  Cardiovascular: Normal S1, S2.  Gastrointestinal: Soft, nontender, nondistended. Normoactive bowel sounds. No guarding.   Neurologic: Asleep.  Skin: Warm and dry.  Midsternal scar noted.    Labs:  Recent Labs     05/08/21  1200 05/09/21  0222 05/10/21  0140   ISTATAPH 7.479 7.488 7.493   ISTATAPCO2 45.3* 41.3* 38.9*   ISTATAPO2 62* 58* 65   ISTATATCO2 35* 33 31   ZMTAGVR8KAP 93 91* 94   ISTATARTHCO3 33.6* 31.3* 29.8*   ISTATARTBE 9* 7* 6*   ISTATTEMP 99.9 F 99.9 F 99.3 F   ISTATFIO2 70 75 80   ISTATSPEC Arterial Arterial Arterial   ISTATAPHTC 7.468 7.477 7.487    WMDNBGDK1LU 65 60* 66             Recent Labs     05/09/21  0341 05/10/21  0423 05/11/21  0420   SODIUM 143 145 138   POTASSIUM 4.0 3.6 3.8   CHLORIDE 108 110 106   CO2 32 30 29   BUN 34* 36* 35*   CREATININE 0.51 0.50 0.41*   MAGNESIUM 2.3 2.3 2.3   CALCIUM 7.9* 7.8* 8.1*       Recent Labs     05/09/21  0341 05/10/21  0423 05/11/21  0420   ALTSGPT 449* 351* 267*   ASTSGOT 509* 244* 146*   ALKPHOSPHAT 387* 335* 319*   TBILIRUBIN 0.4 0.3 0.3   GLUCOSE 106* 119* 102*       Recent Labs     05/09/21  0341 05/10/21  0423 05/11/21  0420   RBC 2.93* 3.07* 3.13*   HEMOGLOBIN 8.9* 9.4* 9.5*   HEMATOCRIT 29.8* 30.4* 30.8*   PLATELETCT 330 345 330       Recent Labs     05/09/21  0341 05/10/21  0423 05/11/21  0420   WBC 12.7* 10.6 9.7   NEUTSPOLYS 82.10* 75.90* 74.50*   LYMPHOCYTES 11.90* 16.80* 18.00*   MONOCYTES 4.50 5.40 5.00   EOSINOPHILS 0.60 1.10 1.50   BASOPHILS 0.20 0.20 0.10   ASTSGOT 509* 244* 146*   ALTSGPT 449* 351* 267*   ALKPHOSPHAT 387* 335* 319*   TBILIRUBIN 0.4 0.3 0.3       Hemodynamics:    Temp (24hrs), Av °C (96.8 °F), Min:36 °C (96.8 °F), Max:36 °C (96.8 °F)  Temperature: 36 °C (96.8 °F), Monitored Temp: 37.2 °C (99 °F)    Pulse  Av  Min: 52  Max: 121     Blood Pressure: 121/56       Respiratory:    Vent Mode: APVCMV, Rate (breaths/min): 26, PEEP/CPAP: 10, PIP: 31, MAP: 16 Respiration: (!) 26, Pulse Oximetry: 94 %         Work Of Breathing / Effort: Vented    RUL Breath Sounds: Crackles, RML Breath Sounds: Diminished, RLL Breath Sounds: Diminished, YAMILE Breath Sounds: Crackles, LLL Breath Sounds: Diminished    Fluids:      Intake/Output Summary (Last 24 hours) at 2021 1037  Last data filed at 2021 0800  Gross per 24 hour   Intake 2050 ml   Output 1450 ml   Net 600 ml       Weight: 89.8 kg (197 lb 15.6 oz)    GI/Nutrition:    Orders Placed This Encounter   Procedures   • Diet NPO     Standing Status:   Standing     Number of Occurrences:   1     Order Specific Question:   Type:      Answer:   Now [1]     Order Specific Question:   Restrict to:     Answer:   Strict [1]       Medical Decision Making, by Problem:    Active Hospital Problems    Diagnosis    • *Spinal epidural abscess [G06.1]    • History of ischemic stroke [Z86.73]    • Sepsis due to Streptococcus species ([facility]) [A40.9]    • Acute respiratory failure with hypoxia ([facility]) [J96.01]    • Cardiac arrest ([facility]) [I46.9]    • Acute bilateral back pain [M54.9]    • Thrombocytopenia ([facility]) [D69.6]    • Type 2 diabetes mellitus without complication, without long-term current use of insulin ([facility]) [E11.9]    • Coronary artery disease due to calcified coronary lesion: CABG x4, July 2015 [I25.10, I25.84]    • Essential hypertension, benign [I10]    • Hypokalemia [E87.6]    • Hypomagnesemia [E83.42]    • Hyponatremia [E87.1]    • Difficulty swallowing [R13.10]    • Diabetic ketoacidosis ([facility]) [E11.10]    • Marijuana abuse [F12.10]    • High anion gap metabolic acidosis [E87.2]    • Tobacco abuse [Z72.0]    • Dyslipidemia [E78.5]    • Transaminitis [R74.01]    • Constipation [K59.00]    • Status post urethrostomy ([facility]) [Z93.6]             ASSESSMENT:   1.  Dysphagia secondary to stroke and comorbidities  2.  Acute on chronic respiratory failure, tracheostomy on 5/10/2021  3.  Right cerebellar infarct  4.  Cervical spinal epidural abscess with myelopathy status post laminectomy, decompression and removal of abscess, 4/28/2021  5.  Uncontrolled diabetes mellitus type 2  6.  CAD status post CABG     PLAN:   This is a 60-year-old male who is consulted for PEG evaluation for dysphagia secondary to cerebellar infarct and cervical spinal abscess.  Patient cannot provide consent for himself due to his medical condition.  I called his mother, Mary, and discussed EGD with PEG placement.  The risks, benefits and alternatives of PEG placement were discussed, including but not limited to bowel perforation, anesthesia side effect, bleeding and pain.  Mary  verbalized understanding and would like to proceed with the procedure.      In addition, patient was on aspirin 325 mg with last dose on 5/11/21.  Discussed with the primary team and neurology okay to switch to ASA 81 mg.  EGD/PEG placement within the next few days due to recent aspirin 325 mg use.  Mary is also aware of this and agreed.        Thank you for the consultation. Please call with any questions or concerns.       [employer]    Cheri Roberson D.O.  Gastroenterology  [facility]  [phone]

## 2021-05-11 NOTE — PROGRESS NOTES
[facility] Progress Note      Admit Date: 4/25/2021    Resident(s): Lissette Singh M.D.   Attending:  BREANNA ALCARAZ/ Dr. Gannon    Patient ID:    Name:  Perry Davis   YOB: 1960  Age:  60 y.o.  male   MRN:  [id number]    Hospital Course (carried forward and updated):  Perry Davis is a 60 y.o. male with a previous history of uncontrolled Diabetes -Hb1C 13 , HLD, CAD s/p CABGx4  2015, tobacco use ,OA, chronic low pain admitted  4/25 with epidural abscess w/ cord compression, discitis involving cervical and thoracic spine--> underwent emergent laminectomy and decompression by Dr. Hazel 4/28.  Hospital course complicated by acute right cerebellar stroke and strep anginosus bacteremia on Ceftriaxone, followed by ID.   Patient currently on tracheostomy     Consultants:  Critical Care  Neurology   Infectious disease   General surgery   Gastroenterology     Interval Events:  -No acute events overnight   -Tracheostomy tube placed yesterday and is on ventilator, ET tube pulled out   -Consulted GI for PEG tube, will change aspirin 325 to 81 mg pre procedure   -Continue ceftriaxone for bacteremia   -Trans aminitis improving     Vitals Range last 24h:  Temp:  [36 °C (96.8 °F)] 36 °C (96.8 °F)  Pulse:  [52-67] 58  Resp:  [24-53] 26  BP: (109-164)/(55-86) 121/56  SpO2:  [90 %-100 %] 94 %      Intake/Output Summary (Last 24 hours) at 5/11/2021 1020  Last data filed at 5/11/2021 0800  Gross per 24 hour   Intake 2050 ml   Output 1450 ml   Net 600 ml        ROS  Unable to obtain     PHYSICAL EXAM:  Vitals:    05/11/21 0623 05/11/21 0700 05/11/21 0800 05/11/21 0955   BP:  157/70 121/56    Pulse: (!) 55 (!) 57 (!) 57 (!) 58   Resp: (!) 41 (!) 53 (!) 26 (!) 26   Temp:   36 °C (96.8 °F)    TempSrc:   Temporal    SpO2: 96% 97% 97% 94%   Weight:       Height:        Body mass index is 29.22 kg/m².    O2 therapy: Pulse Oximetry: 94 %, O2 Delivery Device: Ventilator    Date 05/11/21 0700 - 05/12/21 0659   Shift  [id number] 7610-8031 4466-7633 24 Hour Total   INTAKE   P.O. 0   0     P.O. 0   0   Other 60   60     Medications (PO/Enteral Liquids) 60   60   NG/   100     Intake (mL) (Enteral Tube 05/09/21 Cortrak - Gastric Left nare) 100   100   Enteral 30   30     Free Water / Tube Flush 30   30   Shift Total 190   190   OUTPUT   Urine 75   75     Output (mL) (Urethral Catheter Other (Comment) 16 Fr.) 75   75   Stool         Number of Times Stooled 0 x   0 x   Shift Total 75   75      115        Physical Exam   Constitutional: He is well-developed, well-nourished, and in no distress.   HENT:   Head: Normocephalic and atraumatic.   Trach in place    Eyes: Pupils are equal, round, and reactive to light. Conjunctivae are normal.   Cardiovascular: Normal rate, regular rhythm and normal heart sounds.   Pulmonary/Chest: Effort normal and breath sounds normal.   Abdominal: Soft. He exhibits no distension.   Musculoskeletal:      Cervical back: Normal range of motion.   Neurological:   Patient tries to talk but unable to understand   Lower extremities flaccid   Withdraws upper extremities minimally   Skin: Skin is warm.       Recent Labs     05/08/21  1200 05/09/21  0222 05/10/21  0140   ISTATAPH 7.479 7.488 7.493   ISTATAPCO2 45.3* 41.3* 38.9*   ISTATAPO2 62* 58* 65   ISTATATCO2 35* 33 31   RADZGKO2CWF 93 91* 94   ISTATARTHCO3 33.6* 31.3* 29.8*   ISTATARTBE 9* 7* 6*   ISTATTEMP 99.9 F 99.9 F 99.3 F   ISTATFIO2 70 75 80   ISTATSPEC Arterial Arterial Arterial   ISTATAPHTC 7.468 7.477 7.487   OQZOZOTZ0SV 65 60* 66     Recent Labs     05/09/21  0341 05/10/21  0423 05/11/21  0420   SODIUM 143 145 138   POTASSIUM 4.0 3.6 3.8   CHLORIDE 108 110 106   CO2 32 30 29   BUN 34* 36* 35*   CREATININE 0.51 0.50 0.41*   MAGNESIUM 2.3 2.3 2.3   CALCIUM 7.9* 7.8* 8.1*     Recent Labs     05/09/21  0341 05/10/21  0423 05/11/21  0420   ALTSGPT 449* 351* 267*   ASTSGOT 509* 244* 146*   ALKPHOSPHAT 387* 335* 319*   TBILIRUBIN 0.4 0.3 0.3    GLUCOSE 106* 119* 102*     Recent Labs     05/09/21  0341 05/10/21  0423 05/11/21  0420   RBC 2.93* 3.07* 3.13*   HEMOGLOBIN 8.9* 9.4* 9.5*   HEMATOCRIT 29.8* 30.4* 30.8*   PLATELETCT 330 345 330     Recent Labs     05/09/21  0341 05/10/21  0423 05/11/21  0420   WBC 12.7* 10.6 9.7   NEUTSPOLYS 82.10* 75.90* 74.50*   LYMPHOCYTES 11.90* 16.80* 18.00*   MONOCYTES 4.50 5.40 5.00   EOSINOPHILS 0.60 1.10 1.50   BASOPHILS 0.20 0.20 0.10   ASTSGOT 509* 244* 146*   ALTSGPT 449* 351* 267*   ALKPHOSPHAT 387* 335* 319*   TBILIRUBIN 0.4 0.3 0.3       Meds:  • [START ON 5/12/2021] aspirin  81 mg     • insulin glargine  40 Units     • bisacodyl  10 mg     • polyethylene glycol/lytes  1 Packet     • cefTRIAXone (ROCEPHIN) IV  2 g Stopped (05/11/21 0618)   • oxyCODONE immediate-release  10 mg     • oxyCODONE immediate-release  5 mg     • cyclobenzaprine  5 mg     • insulin regular  3-14 Units      And   • dextrose 50%  50 mL     • enoxaparin (LOVENOX) injection  40 mg     • acetylcysteine  3-4 mL     • albuterol  2.5 mg     • MD Alert...Adult ICU Electrolyte Replacement per Pharmacy       • lidocaine  1-2 mL     • ondansetron  4 mg     • MD ALERT...DO NOT ADMINISTER NSAIDS or ASPIRIN unless ORDERED By Neurosurgery  1 Each     • fleet  1 Each     • Respiratory Therapy Consult       • ondansetron  4 mg     • ipratropium-albuterol  3 mL     • labetalol  10 mg     • Pharmacy  1 Each     • cyanocobalamin  1,000 mcg     • senna-docusate  1 tablet     • vitamin D  5,000 Units     • docusate sodium  100 mg     • famotidine  20 mg     • fentaNYL  50 mcg      And   • fentaNYL  100 mcg     • lidocaine  3 Patch          Procedures:  Bronchoscopy 05/06/21  Bronchoscopy on 05/03  -Decompressive laminectomy on 4/28  -Central line placement on 5/01    Imaging:  DX-CHEST-PORTABLE (1 VIEW)   Final Result         1.  Interstitial pulmonary parenchymal prominence suggest chronic underlying lung disease, component of interstitial edema and/or  infiltrates not excluded.      DX-CHEST-LIMITED (1 VIEW)   Final Result      Interval tracheostomy tube placement      New retrocardiac opacity partially effaces the diaphragm and most likely represents atelectasis      DX-CHEST-PORTABLE (1 VIEW)   Final Result      1.  Improvement of LEFT lung base infiltrate or atelectasis.   2.  No other significant change from prior exam.         DX-CHEST-PORTABLE (1 VIEW)   Final Result         1.  Hazy left lower lobe infiltrates.      DX-CHEST-PORTABLE (1 VIEW)   Final Result      No significant interval change.      DX-ABDOMEN FOR TUBE PLACEMENT   Final Result      Cortrak feeding tube tip projects in the region of the proximal stomach.      DX-CHEST-PORTABLE (1 VIEW)   Final Result         No significant interval change.      US-RUQ   Final Result      Borderline mild hepatomegaly.      Otherwise negative right upper quadrant ultrasound.      DX-CHEST-PORTABLE (1 VIEW)   Final Result         Decreased left basilar opacity.      DX-CHEST-PORTABLE (1 VIEW)   Final Result      Increasing left basilar airspace opacities. No significant cardiopulmonary change otherwise. Repositioned endotracheal tube.      US-EXTREMITY ARTERY LOWER UNILAT RIGHT   Final Result      DX-ABDOMEN FOR TUBE PLACEMENT   Final Result      Feeding tube tip projects over the distal stomach or first portion of the duodenum.      CT-CSPINE WITHOUT PLUS RECONS   Final Result      1.  Postsurgical changes C3-T1 laminectomies.   2.  New irregular linear and mottled lucency in the anterior C5 vertebral body which is likely related to osteomyelitis.   3.  Prevertebral soft tissue swelling.   4.  Extensive known extensive epidural abscess seen on prior MRI is not adequately evaluated on CT.      CT-CTA CHEST PULMONARY ARTERY W/ RECONS   Final Result      1.  No evidence of pulmonary embolism.      2.  Bilateral lower lobe atelectasis or pneumonia, left greater than right.      3.  Small left pleural effusion.       4.  CABG changes.            DX-CHEST-PORTABLE (1 VIEW)   Final Result      Endotracheal tube terminates approximately 2.5 cm above the you.      Atelectasis with left lung aeration. No pleural effusion or pneumothorax.      CP-HXUKCQL-3 VIEW   Final Result      No dilated bowel      DX-ABDOMEN FOR TUBE PLACEMENT   Final Result      Feeding tube placement with the tip projecting over the distal stomach or duodenal bulb      EC-CHRISTOPHER W/O CONT         CT-HEAD W/O   Final Result         1.  Low-density changes in the right cerebellar hemisphere, compatible with evolving infarct, streak artifacts minimally limits evaluation of the posterior fossa for subtle subarachnoid hemorrhage, no intracranial hemorrhage is readily identified.   2.  Atherosclerosis.      DX-CHEST-PORTABLE (1 VIEW)   Final Result         1.  No acute cardiopulmonary disease.      DX-CHEST-PORTABLE (1 VIEW)   Final Result      Mild left basilar opacity may represent atelectasis. Infection not excluded.      Improved aeration of the left lung.      Atherosclerotic plaque.         EC-ECHOCARDIOGRAM COMPLETE W/O CONT   Final Result      IR-DRAIN-BLADDER SUPRAPUB W/CATH   Final Result      1.     Ultrasound and fluoroscopic guided placement of a 16 [place] tip silicone suprapubic bladder catheter.      2.     Cystogram documenting catheter placement.         MR-BRAIN-W/O   Final Result      Acute large right cerebellar posterior inferior cerebellar artery territory infarct with possible small amount of petechial hemorrhage.      MR-MRA HEAD-W/O   Final Result      Findings suggesting right vertebral artery occlusion.      MR-MRA NECK-WITH & W/O AND SEQUENCES   Final Result      1.  Small caliber right vertebral artery which is not visualized on the noncontrast time-of-flight technique could be related to retrograde flow or diminutive caliber and sluggish flow.   2.  Possible moderate focal stenosis in the mid cervical left vertebral artery.   3.   No significant carotid stenosis.      DX-ABDOMEN FOR TUBE PLACEMENT   Final Result         Feeding tube with tip projecting over the expected area of the distal stomach.      DX-CHEST-PORTABLE (1 VIEW)   Final Result      1.  Worsening consolidation of LEFT hemithorax with shift of mediastinal structures to the LEFT suggesting atelectasis, possibly due to endobronchial process.   2.  Supportive tubing as described above.   3.  No pneumothorax.      DX-CHEST-PORTABLE (1 VIEW)   Final Result         Endotracheal tube with tip projecting over the mid thoracic trachea.      Layering left pleural effusion with left lower lung opacity.      DX-SPINE-ANY ONE VIEW   Final Result      Digitized intraoperative radiograph is submitted for review.  This examination is not for diagnostic purpose but for guidance during a surgical procedure.      DX-PORTABLE FLUOROSCOPY < 1 HOUR   Final Result      Portable fluoroscopy utilized for 2 seconds.         INTERPRETING LOCATION: [address] [place] [address], [facility], [facility]      CT-HEAD W/O   Final Result         1.  Low-density in the region of the right cerebellar hemisphere, appearance compatible with evolving infarct.      These findings were discussed with the patient's on-call clinician, Deniz, on 4/28/2021 6:14 AM.      MR-THORACIC SPINE-WITH & W/O   Final Result      1.  Posterior epidural fluid collection/abscess within the lower cervical spine extending inferiorly to about the T6 level which could represent epidural abscess and/or hematoma. This measures 8 mm in maximal thickness at T2-T3 with at least moderate    thecal sac stenosis.   2.  Abnormal signal within the cervical cord and upper thoracic cord suggesting cord edema or infectious/inflammatory etiology.   3.  Lower cervical spine findings are detailed on earlier report.   4.  Prominent enhancement along the surface of the mid and lower thoracic cord is of uncertain etiology and as detailed above, possibly representing  leptomeningeal disease/infection. See details above.   These findings were discussed with Dr. Cano on 4/28/2021 10:01 AM.         MR-LUMBAR SPINE-WITH & W/O   Final Result      No evidence of lumbar spine infection or epidural abscess.      Mild spondylosis as detailed above without spinal stenosis.      DX-CHEST-PORTABLE (1 VIEW)   Final Result      1.  Hypoinflation with left basilar atelectasis.   2.  Mild perihilar interstitial prominence may be related to hypoinflation or edema.      MR-CERVICAL SPINE-WITH & W/O   Final Result      1.  There is multiseptated abnormal peripherally enhancing epidural collection noted extending from C2 to the visualized lower thoracic level. Largest collection is noted in the upper thoracic posterior epidural space at the levels of T2 and T3. The    lower extent of the collection is not imaged. This is causing multilevel effacement of the thecal sac and cord compression. The thoracic spinal cord is anteriorly displaced and compressed at the levels of T2 and T3. Pre and postcontrast MR examination of    the thoracic spine is recommended for further evaluation.   2.  There is effacement of the cervical thecal sac due to the multiseptated epidural fluid collection.   3.  The cervical spinal cord is mildly enlarged with minimal increased T2 signal intensity. The possibility of cord inflammation cannot be entirely excluded.   4.  Abnormal T2 hyperintensity at C5-6 disc space likely representing discitis.   5.  Abnormal bone marrow edema of C5, C6 and C7 vertebral bodies with edema concerning for osteomyelitis.   6.  There is also focal enhancement of C6 vertebral body likely secondary to the osteomyelitis.   7.  Prevertebral edema/fluid collection.   8.  Nonvisualization of flow void of bilateral vertebral arteries concerning for age-indeterminate bilateral vertebral occlusions.         CT-CHEST (THORAX) WITH   Final Result      No displaced rib fracture is identified.      No acute  cardiopulmonary process is seen.      Atherosclerotic plaque including coronary artery calcification.      CT-TSPINE W/O PLUS RECONS   Final Result      No CT evidence of acute traumatic abnormality.      Mild T6/7 anterior wedging compatible with healed mild chronic compression fracture and there is interbody fusion.      CT-LSPINE W/O PLUS RECONS   Final Result      No CT evidence of acute traumatic injury.      CT-CSPINE WITHOUT PLUS RECONS   Final Result      Multilevel degenerative changes.      Prevertebral edema. Further evaluation with MRI is recommended as this can be seen in the setting of ligamentous injury.      Bilateral carotid atherosclerotic plaque.             ASSESSEMENT and PLAN:    * Spinal epidural abscess- (present on admission)  Assessment & Plan  -MRI C/T-spine with abnormal peripherally enhancing epidural collection extending from C2 to visualize lower thoracic 11.  Largest collection in the upper thoracic posterior epidural space at the level of T2-T3.  Lower extent of the collection is causing multilevel effacement of the thecal sac and cord compression.    -Underwent emergent laminectomy of C3-C4 C5-C6, C6/C7, T1-T2 laminectomy, decompression of thecal sac and nerves by Dr. Hazel 4/28/2021.   -Epidural drain removed on 5/03  -Goal blood pressure systolic of less than 160  -Palliative care consulted for goals of care discussion: Family wants full treatment for now       History of ischemic stroke  Assessment & Plan  -Found to have acute change in mental status 4/27/2021.   -CT head w/o concerning for evolving right cerebellar infarct.   -MRA brain, MRA neck: Findings suggesting right vertebral artery occlusion. Possible moderate focal stenosis in the mid cervical left vertebral artery.  TTE with bubble study: EF 60%, no evidence of right-to-left shunt, no valve lesions.  CHRISTOPHER negative  Goal euthermia, normotension, eunatremia  -Might need Long term care facility  -Started on aspirin and DVT  prophylaxis  -Hold statin currently given elevated LFTs    Sepsis due to Streptococcus species (HCC)- (present on admission)  Assessment & Plan  MRI C-T-spine with evidence of epidural abscess, discitis, vertebral osteomyelitis.  Underwent emergent laminectomy, decompression 4/28.  Blood culture 4/24 and cervical thoracic 4/28 : Growth of Streptococcus anginosus.   -Repeat blood cultures has been negative  -TTE did not show any concern of infective endocarditis  -CHRISTOPHER negative  -Currently on ceftriaxone,  total duration of 6 weeks last date would be 06/11  -Bronchoscopy with BAL 05/06/21, c/s NGTD, Blood c/s 5/7/21: NGTD  -ID on board    Acute respiratory failure with hypoxia (HCC)  Assessment & Plan  -secondary to epidural abscess and stroke   -Was intubated initially    -Patient underwent trach placement on 05/10  -RT/O2 protocols                  Cardiac arrest ([facility])  Assessment & Plan  -Patient had cardiac arrest on 05/03, ROSC achieved in 4 mins after CPR and  Epinephrine  -Most likely secondary to aspiration, bed side bronchoscopy showed multiple mucous plugs   -Re intubated again     Thrombocytopenia (HCC)- (present on admission)  Assessment & Plan  -Resolved  -Plt 47 on 4/26. This is likely due to sepsis, though lower on the differential includes previously undiagnosed infection.  Hep panel negative. HIV non reactive    Acute bilateral back pain- (present on admission)  Assessment & Plan  -Presented with worsening neck and upper back pain  -Secondary to epidural abscess and cord compression  -S/p laminectomy and currently on IV antibiotics  -Needs long term facility     Type 2 diabetes mellitus without complication, without long-term current use of insulin ([facility])- (present on admission)  Assessment & Plan  -Chronic history of diabetes  -Last HbA1c is 12.2 from 4/2021  -Lantus with sliding scale   -Hypoglycemia protocol     Essential hypertension, benign- (present on admission)  Assessment &  Plan  -Noncompliance issues  -Blood pressure meds on hold given hypotension    Coronary artery disease due to calcified coronary lesion: CABG x4, July 2015- (present on admission)  Assessment & Plan  -Chronic   -Continue aspirin, statin on hold given elevated LFTs     Transaminitis  Assessment & Plan  Transamanitis is improving. Hep panel negative, TSH normal.   -Hold statin, avoid hepatotoxic medications  -RUQ U/S: unremarkable except for borderline mild hepatomegaly    Status post urethrostomy (HCC)- (present on admission)  Assessment & Plan  -History of perineal urethrostomy 2018  -Acute urinary retention post surgical intervention 4/28  -Unable to place Gomez catheter through urethrostomy.    -Suprapubic catheter placement by interventional radiology    Hyponatremia- (present on admission)  Assessment & Plan  Resolved    Difficulty swallowing- (present on admission)  Assessment & Plan  -Presented with difficulty swallowing, is coughing and vomiting when swallowing large/hard foods  -Complicated by stroke   -Currently on coretrak for tube feeds, consulted GI for PEG tube       DISPO: ICU    CODE STATUS: Full code     Quality Measures:  Feeding: tube feeds  Analgesia: none   Sedation: none   Thromboprophylaxis: Lovenox   Head of bed: >30 degrees  Ulcer prophylaxis: famotidine   Glycemic control: lantus with SSI  Bowel care: bowel regimen  Indwelling lines: IJ and iv line   Deescalation of antibiotics: ceftriaxone       Lissette Singh M.D.

## 2021-05-11 NOTE — PROGRESS NOTES
Infectious Disease Progress Note    Author: La Nena Khan M.D. Date & Time of service: 2021  11:35 AM    Chief Complaint:  Sepsis and cervical abscess  CVA     Interval History:  60 y.o.  admitted 2021. Pt has a past medical history of uncontrolled diabetes, CAD status post CABG times 2015, marijuana use and to arthritis.  Presented complaining of neck and back pain ongoing for approximately 1 week and fall getting out of the bathtub.  He had been seen at urgent care for back pain approximately 1 week prior to admission and given Toradol injections.     AF, O2 Vent 12/70%, pressors still on the trending down, pt continues to be agitated, not moving any extremities and concern for quadriplegia due to CVA.    AF, O2 Vent 8/40%, pressors off, agitated, without meaningful limb movement.  Decreasing vent requirements and no longer in shock.   AF, O2 Vent 8/40%, stable on low vent settings no significant secretions per nursing.  He continues to be agitated and with no upper or lower extremity movement.    5/3 AF WBC 14 remains intubated and sedated Agitation with decrease sedation. Epidural drain removed FiO2 0.4   AF WBC 19 code blue yesterday-mucus plugs found. S/p bronch this am-on 100%FiO2 On pressors   AF WBC 15.8 nurse noted some prox  movement in BUE (right greater than left) FiO2 0.9   AF WBC 15.7 s/p bronch again this am-thick secretion RML noted   Febrile 101.1 WBC 12.2 No new positive cultures opens eyes-not following commands FiO2 0.6   AF (99.9) WBC 11.9 Palliative appreciated-family wants full code. Remains obtunded FiO2 0.7   Temp 100.4 WBC 12.7 Remains intubated and sedated FiO2 0.7  5/10 AF WBC 10.6 getting trach and PEG. Episode of desat noted   AF WBC 9.7 s/p trach-sedated today FiO2 0.6    Review of Systems:  Review of Systems   Unable to perform ROS: Intubated   Constitutional: Negative for fever.       Hemodynamics:  Temp (24hrs), Av °C (96.8  °F), Min:36 °C (96.8 °F), Max:36.1 °C (96.9 °F)  Temperature: 36.1 °C (96.9 °F), Monitored Temp: 37.2 °C (99 °F)  Pulse  Av.9  Min: 52  Max: 121   Blood Pressure: 118/59       Physical Exam:  Physical Exam  Vitals and nursing note reviewed.   Constitutional:       General: He is not in acute distress.     Appearance: He is ill-appearing. He is not toxic-appearing or diaphoretic.   HENT:      Nose: No rhinorrhea.   Eyes:      General:         Right eye: No discharge.         Left eye: No discharge.   Neck:      Comments: Right IJ-no erythema  trach  Cardiovascular:      Rate and Rhythm: Normal rate and regular rhythm.   Pulmonary:      Effort: Pulmonary effort is normal. No respiratory distress.      Breath sounds: No stridor. No wheezing.      Comments: Coarse breath sounds  Abdominal:      General: There is no distension.      Palpations: Abdomen is soft.      Tenderness: There is no abdominal tenderness.   Genitourinary:     Comments: SP cath +yellow urine  Musculoskeletal:         General: No deformity.   Lymphadenopathy:      Cervical: No cervical adenopathy.   Skin:     General: Skin is warm.      Coloration: Skin is not jaundiced.      Findings: Bruising present.      Comments: tattoos   Neurological:      Comments: Quadriplegic  sedated         Meds:    Current Facility-Administered Medications:   •  [START ON 2021] aspirin  •  insulin glargine  •  bisacodyl  •  polyethylene glycol/lytes  •  cefTRIAXone (ROCEPHIN) IV  •  oxyCODONE immediate-release  •  oxyCODONE immediate-release  •  cyclobenzaprine  •  insulin regular **AND** POC blood glucose manual result **AND** NOTIFY MD and PharmD **AND** dextrose 50%  •  enoxaparin (LOVENOX) injection  •  acetylcysteine  •  albuterol  •  MD Alert...Adult ICU Electrolyte Replacement per Pharmacy  •  lidocaine  •  ondansetron  •  MD ALERT...DO NOT ADMINISTER NSAIDS or ASPIRIN unless ORDERED By Neurosurgery  •  fleet  •  Respiratory Therapy Consult  •   ondansetron  •  ipratropium-albuterol  •  labetalol  •  Pharmacy  •  cyanocobalamin  •  senna-docusate  •  vitamin D  •  docusate sodium  •  famotidine **OR** [DISCONTINUED] famotidine  •  fentaNYL **AND** fentaNYL **AND** [DISCONTINUED] fentaNYL **AND** [DISCONTINUED] propofol **AND** Triglyceride  •  lidocaine    Labs:  Recent Labs     05/09/21  0341 05/10/21  0423 05/11/21  0420   WBC 12.7* 10.6 9.7   RBC 2.93* 3.07* 3.13*   HEMOGLOBIN 8.9* 9.4* 9.5*   HEMATOCRIT 29.8* 30.4* 30.8*   .7* 99.0* 98.4*   MCH 30.4 30.6 30.4   RDW 57.6* 55.1* 54.9*   PLATELETCT 330 345 330   MPV 12.8 11.9 12.3   NEUTSPOLYS 82.10* 75.90* 74.50*   LYMPHOCYTES 11.90* 16.80* 18.00*   MONOCYTES 4.50 5.40 5.00   EOSINOPHILS 0.60 1.10 1.50   BASOPHILS 0.20 0.20 0.10     Recent Labs     05/09/21  0341 05/10/21  0423 05/11/21  0420   SODIUM 143 145 138   POTASSIUM 4.0 3.6 3.8   CHLORIDE 108 110 106   CO2 32 30 29   GLUCOSE 106* 119* 102*   BUN 34* 36* 35*     Recent Labs     05/09/21  0341 05/10/21  0423 05/11/21  0420   ALBUMIN 1.8* 1.9* 2.0*   TBILIRUBIN 0.4 0.3 0.3   ALKPHOSPHAT 387* 335* 319*   TOTPROTEIN 5.8* 5.9* 6.0   ALTSGPT 449* 351* 267*   ASTSGOT 509* 244* 146*   CREATININE 0.51 0.50 0.41*       Imaging:  CT-CSPINE WITHOUT PLUS RECONS    Result Date: 4/25/2021 4/25/2021 1:29 PM HISTORY/REASON FOR EXAM: History of back and neck pain. Fall. TECHNIQUE/EXAM DESCRIPTION: CT cervical spine without contrast, with reconstructions. Helical scanning was performed from the skull base through T1.  Sagittal and coronal multiplanar reconstructions were generated from the axial images. Low dose optimization technique was utilized for this CT exam including automated exposure control and adjustment of the mA and/or kV according to patient size. COMPARISON:  None available. FINDINGS: No compression fracture is identified. There is an ununited fracture of the spinous process of T1. Intervertebral disc space narrowing and endplate spurring is  most prominent at C6/C7. There are degenerative changes of the facet joints. C1/C2 alignment is maintained. There is multilevel uncovertebral spurring and neural foraminal narrowing. There is cervical prevertebral edema. There is carotid atherosclerotic plaque bilaterally. Visualized lung apices are clear.     Multilevel degenerative changes. Prevertebral edema. Further evaluation with MRI is recommended as this can be seen in the setting of ligamentous injury. Bilateral carotid atherosclerotic plaque.     CT-CHEST (THORAX) WITH    Result Date: 4/25/2021 4/25/2021 1:29 PM HISTORY/REASON FOR EXAM:  Rib fracture suspected, traumatic. TECHNIQUE/EXAM DESCRIPTION: CT scan of the chest with contrast. Helical images were obtained from the lung apices through the adrenal glands following the bolus administration of  80 mL of Omnipaque 350 nonionic contrast. Sagittal and coronal reconstructions were performed. Low dose optimization technique was utilized for this CT exam including automated exposure control and adjustment of the mA and/or kV according to patient size. COMPARISON:  None. FINDINGS: There is atherosclerotic plaque. There is coronary artery calcification. The heart is not enlarged. No pericardial or pleural effusion is seen. There are small mediastinal lymph nodes. There is no hilar or axillary lymphadenopathy. No pneumothorax or pulmonary contusion is seen. Central airways are patent. Limited views were obtained of the upper abdomen. Hypodensity along the falciform ligament likely represents focal fat. Patient is status post median sternotomy. Degenerative changes are seen in the spine. No displaced rib fracture is identified.     No displaced rib fracture is identified. No acute cardiopulmonary process is seen. Atherosclerotic plaque including coronary artery calcification.    CT-HEAD W/O    Result Date: 4/30/2021 4/30/2021 4:27 AM HISTORY/REASON FOR EXAM: Altered mental status; evaluate cerebellar stroke,  if no blood present OK for neurology to start aspirin TECHNIQUE/EXAM DESCRIPTION:  CT of the head without contrast. Sequential axial images were obtained from the vertex to the skull base without contrast. Up to date radiation dose reduction adjustments have been utilized to meet ALARA standards for radiation dose reduction. COMPARISON: April 28, 2021 FINDINGS: There is mild diffuse parenchymal volume loss observed. Periventricular and subcortical white matter low-attenuation changes are seen, most commonly associated with small vessel ischemic disease. The ventricles are normal in caliber and configuration. Low-density changes in the right cerebellar hemisphere seen.. There are no abnormal extra axial fluid collections or extra axial hemorrhage identified. The visualized paranasal sinuses and mastoid air cells are well aerated bilaterally. No depressed calvarial fractures are identified. The visualized globes and retrobulbar soft tissues appear within normal limits.  Atherosclerotic intracranial calcifications are seen.     1.  Low-density changes in the right cerebellar hemisphere, compatible with evolving infarct, streak artifacts minimally limits evaluation of the posterior fossa for subtle subarachnoid hemorrhage, no intracranial hemorrhage is readily identified. 2.  Atherosclerosis.    CT-HEAD W/O    Result Date: 4/28/2021 4/28/2021 5:20 AM HISTORY/REASON FOR EXAM: Altered mental status TECHNIQUE/EXAM DESCRIPTION:  CT of the head without contrast. Sequential axial images were obtained from the vertex to the skull base without contrast. Up to date radiation dose reduction adjustments have been utilized to meet ALARA standards for radiation dose reduction. COMPARISON: None FINDINGS: There is moderate diffuse parenchymal volume loss observed. Periventricular and subcortical white matter low-attenuation changes are seen, most commonly associated with small vessel ischemic disease. The ventricles are normal in  caliber and configuration. Area of low-density within the right cerebellar hemisphere is seen. There are no abnormal extra axial fluid collections or extra axial hemorrhage identified. The visualized paranasal sinuses and mastoid air cells are well aerated bilaterally. No depressed calvarial fractures are identified. The visualized globes and retrobulbar soft tissues appear within normal limits.     1.  Low-density in the region of the right cerebellar hemisphere, appearance compatible with evolving infarct. These findings were discussed with the patient's on-call clinician, Deniz, on 4/28/2021 6:14 AM.    CT-LSPINE W/O PLUS RECONS    Result Date: 4/25/2021 4/25/2021 1:29 PM HISTORY/REASON FOR EXAM:  Back pain after injury. TECHNIQUE/EXAM DESCRIPTION AND NUMBER OF VIEWS: CT lumbar spine without contrast, with reconstructions. The study was performed on a [employer] CT scanner. Thin-section helical scanning was performed from T12-L1 to the sacrum. Sagittal and coronal multiplanar reconstructions were generated from the axial images. Low dose optimization technique was utilized for this CT exam including automated exposure control and adjustment of the mA and/or kV according to patient size. COMPARISON: None. FINDINGS: Alignment of the lumbar spine is normal. There is no acute fracture or subluxation. The prevertebral and paraspinous soft tissues show no acute abnormality. There is severe atherosclerosis with a mixture of soft and calcified plaque. There is lumbosacral junction vacuum disc phenomenon with endplate spurring, disc height loss The visualized sacrum and sacroiliac joints show no acute abnormality.     No CT evidence of acute traumatic injury.    CT-TSPINE W/O PLUS RECONS    Result Date: 4/25/2021 4/25/2021 1:29 PM HISTORY/REASON FOR EXAM:  Mid-back trauma Pain following injury. TECHNIQUE/EXAM DESCRIPTION AND NUMBER OF VIEWS: CT thoracic spine without contrast, with reconstructions. The study was performed on a  [employer] CT scanner. Thin-section helical scanning was performed from C7-T1 through T12-L1. Sagittal and coronal multiplanar reconstructions were generated from the axial images. Low dose optimization technique was utilized for this CT exam including automated exposure control and adjustment of the mA and/or kV according to patient size. COMPARISON: None. FINDINGS: Alignment of the thoracic spine is normal. There is no acute displaced fracture. There is T6/7 interbody fusion with mild anterior wedging likely indicating remote mild compression fracture that has healed There is bulky endplate spurring with some bridging syndesmophytes in the mid thoracic spine Sternotomy wires The cervicothoracic junction appears intact. The prevertebral and paraspinous soft tissues show no acute abnormality.     No CT evidence of acute traumatic abnormality. Mild T6/7 anterior wedging compatible with healed mild chronic compression fracture and there is interbody fusion.    DX-CERVICAL SPINE-2 OR 3 VIEWS    Result Date: 4/22/2021 4/22/2021 6:16 PM HISTORY/REASON FOR EXAM:  Atraumatic Pain. Neck pain for 4 days. TECHNIQUE/EXAM DESCRIPTION AND NUMBER OF VIEWS: Cervical spine series, 2 views. COMPARISON:  None. FINDINGS: Mild reversal of curvature centered at the C5 level. Vertebral body heights are preserved. Multilevel loss of disc height and osteophyte formation. Cervicothoracic junction is obscured. Prevertebral soft tissues are within normal limits. Odontoid is grossly intact.  Lateral masses of C1 are grossly intact.     1.  Limited exam.  Cervicothoracic junction is obscured. 2.  No gross cervical spine fracture or subluxation. 3.  Moderate multilevel degenerative changes.    DX-CHEST-PORTABLE (1 VIEW)    Result Date: 4/30/2021 4/30/2021 1:07 AM HISTORY/REASON FOR EXAM: For indication of respiratory failure; For indication of respiratory failure TECHNIQUE/EXAM DESCRIPTION:  Single AP view of the chest. COMPARISON: Yesterday  FINDINGS: Position of medical devices appears stable. The cardiac silhouette appears within normal limits.  Postsurgical changes of sternotomy are noted. The mediastinal contour appears within normal limits.  The central pulmonary vasculature appears normal. Bilateral lung volumes are diminished.  Bilateral lungs are clear. No significant pleural effusions are identified. The bony structures appear age-appropriate.     1.  No acute cardiopulmonary disease.    DX-CHEST-PORTABLE (1 VIEW)    Result Date: 4/29/2021 4/29/2021 10:06 AM HISTORY/REASON FOR EXAM:  For indication of respiratory failure; For indication of respiratory failure. TECHNIQUE/EXAM DESCRIPTION AND NUMBER OF VIEWS: Single portable view of the chest. COMPARISON: 4/28/2021 FINDINGS: Endotracheal tube projects at the level the clavicular heads. Right central line projects over the SVC. Enteric tube passes below the level of the diaphragm. The heart is not enlarged. Atherosclerotic calcification is seen. There is mild left basilar opacity likely representing atelectasis. No pleural effusion or pneumothorax is seen. Skin staples and a surgical drain project over the cervical spine.     Mild left basilar opacity may represent atelectasis. Infection not excluded. Improved aeration of the left lung. Atherosclerotic plaque.     DX-CHEST-PORTABLE (1 VIEW)    Result Date: 4/28/2021 4/28/2021 12:10 PM HISTORY/REASON FOR EXAM:  CENTRAL LINE PLACEMENT; CENTRAL LINE PLACEMENT. TECHNIQUE/EXAM DESCRIPTION AND NUMBER OF VIEWS: Single portable view of the chest. COMPARISON: 4/28/2021 11:17 AM FINDINGS: Mediastinal structures are shifted to the LEFT.  Increasing opacification of LEFT hemithorax. RIGHT lung is clear. Endotracheal tube in place with tip approximately 5 cm above the you. Feeding tube tip in place however tip is off the image. RIGHT internal jugular catheter tip at the lower SVC. No pneumothorax. Postoperative change from prior open heart surgery.  Postoperative change of the neck with surgical drain present.     1.  Worsening consolidation of LEFT hemithorax with shift of mediastinal structures to the LEFT suggesting atelectasis, possibly due to endobronchial process. 2.  Supportive tubing as described above. 3.  No pneumothorax.    DX-CHEST-PORTABLE (1 VIEW)    Result Date: 4/28/2021 4/28/2021 11:16 AM HISTORY/REASON FOR EXAM:  replaced ETT; replaced ETT TECHNIQUE/EXAM DESCRIPTION AND NUMBER OF VIEWS: Single portable view of the chest. COMPARISON: 4/27/2021 FINDINGS: Endotracheal tube with tip projecting over the mid thoracic trachea. Hazy opacity in the left lower lobe Layering left pleural effusion. No pneumothorax. Stable cardiopericardial silhouette.     Endotracheal tube with tip projecting over the mid thoracic trachea. Layering left pleural effusion with left lower lung opacity.    DX-CHEST-PORTABLE (1 VIEW)    Result Date: 4/27/2021 4/27/2021 5:30 PM HISTORY/REASON FOR EXAM:  Shortness of Breath. TECHNIQUE/EXAM DESCRIPTION AND NUMBER OF VIEWS: Single portable view of the chest. COMPARISON: None. FINDINGS: LUNGS: Hypoinflation with left basilar atelectasis. Mild perihilar interstitial prominence. No effusions. PNEUMOTHORAX: None. LINES AND TUBES: None. MEDIASTINUM: No cardiomegaly. MUSCULOSKELETAL STRUCTURES: No acute displaced fracture. Median sternotomy.     1.  Hypoinflation with left basilar atelectasis. 2.  Mild perihilar interstitial prominence may be related to hypoinflation or edema.    DX-SPINE-ANY ONE VIEW    Result Date: 4/28/2021 4/28/2021 5:30 AM HISTORY/REASON FOR EXAM:  Cervical laminectomy TECHNIQUE/EXAM DESCRIPTION AND NUMBER OF VIEWS:  Single view of the spine. Digitized Intraoperative Radiograph FINDINGS: Fixation hardware projecting over the cervical spine Fluoroscopic time:2 seconds     Digitized intraoperative radiograph is submitted for review.  This examination is not for diagnostic purpose but for guidance during a surgical  procedure.    MR-BRAIN-W/O    Result Date: 4/28/2021 4/28/2021 3:31 PM HISTORY/REASON FOR EXAM:  Altered mental status; cerebellar stroke. TECHNIQUE/EXAM DESCRIPTION: MRI of the brain without contrast. T1 sagittal, T2 fast spin-echo axial, T1 coronal, FLAIR coronal, diffusion-weighted and apparent diffusion coefficient (ADC map) axial images were obtained of the whole brain. The study was performed on a [employer] 1.5 Britt MRI scanner. COMPARISON:  Head CT earlier in the day FINDINGS: Large acute right cerebellar infarct suggesting right posterior inferior cerebellar artery territory. There is prominent T2 hyperintensity consistent with cytotoxic edema. A small amount of blooming artifact in the medial aspect of the right cerebellar hemisphere on GRE images could represent a small amount of petechial hemorrhage. There is mild localized mass effect with some mild mass effect on the fourth ventricle. No supratentorial infarct or hemorrhage. No extra-axial fluid collection. No midline shift or hydrocephalus. There is a nasogastric tube and fluid within the nasopharynx. Endotracheal tube partially visualized. Mild posterior ethmoid sinus mucosal thickening.     Acute large right cerebellar posterior inferior cerebellar artery territory infarct with possible small amount of petechial hemorrhage.    MR-CERVICAL SPINE-WITH & W/O    Result Date: 4/27/2021 4/27/2021 1:02 PM HISTORY/REASON FOR EXAM:  Neck pain, abnormal neuro exam; Neck pain, initial exam; sepsis 2/2 strep bacteremia  -unknown source. rule out possible ?? retropharyngeal abscess, ??prevertebral abscess. TECHNIQUE/EXAM DESCRIPTION: MRI of the cervical spine without and with contrast. The study was performed on a [employer] 1.5 Britt MRI scanner. T1 sagittal, T2 fast spin-echo sagittal, and gradient echo axial images were obtained of the cervical spine. T1 post-contrast fat suppressed sagittal images were obtained of the cervical spine. Optional T1  post-contrast axial images may be obtained. 15 mL ProHance contrast was administered intravenously. COMPARISON: None. FINDINGS: There is abnormal disc T2 hyperintensity at C5-6. Mild amount of bone marrow edema is noted at C5, C6 and C7. There is also focal bone marrow enhancement at C6. There is multiseptated abnormal peripherally enhancing epidural collection noted extending from C2 to the visualized lower thoracic level. Largest collection is noted in the upper thoracic posterior epidural space at the levels of T2 and T3. The lower extent of the collection is not imaged. This is causing multilevel effacement of the thecal sac and cord compression. The thoracic spinal cord is anteriorly displaced and compressed at the levels of T2 and T3. At the level of C2-3,there is small left anterior epidural fluid collection. At the level of C3-4,there is small left anterior epidural fluid collection causing indentation of the thecal sac. At the level of C4-5,there is minimal epidural fluid collection. At the level of C5-6,there is diffuse disc bulge along with right posterior lateral epidural fluid collection causing severe canal compromise. At the level of C6-7,there is mild diffuse disc bulge. There is epidural fluid collection causing severe canal compromise. At the level of C7-T1,there is epidural fluid collection causing severe canal compromise. The visualized brain parenchyma is unremarkable. The cervical spinal cord is mildly enlarged which demonstrates mild increased intramedullary T2 signal intensity. There is abnormal T2 hyperintensity in the visualized bilateral vertebral arteries concerning for age indeterminate occlusion. There is mild amount of edema in the pre and retropharyngeal soft tissue.     1.  There is multiseptated abnormal peripherally enhancing epidural collection noted extending from C2 to the visualized lower thoracic level. Largest collection is noted in the upper thoracic posterior epidural space  at the levels of T2 and T3. The lower extent of the collection is not imaged. This is causing multilevel effacement of the thecal sac and cord compression. The thoracic spinal cord is anteriorly displaced and compressed at the levels of T2 and T3. Pre and postcontrast MR examination of  the thoracic spine is recommended for further evaluation. 2.  There is effacement of the cervical thecal sac due to the multiseptated epidural fluid collection. 3.  The cervical spinal cord is mildly enlarged with minimal increased T2 signal intensity. The possibility of cord inflammation cannot be entirely excluded. 4.  Abnormal T2 hyperintensity at C5-6 disc space likely representing discitis. 5.  Abnormal bone marrow edema of C5, C6 and C7 vertebral bodies with edema concerning for osteomyelitis. 6.  There is also focal enhancement of C6 vertebral body likely secondary to the osteomyelitis. 7.  Prevertebral edema/fluid collection. 8.  Nonvisualization of flow void of bilateral vertebral arteries concerning for age-indeterminate bilateral vertebral occlusions.     MR-LUMBAR SPINE-WITH & W/O    Result Date: 4/28/2021 4/27/2021 11:23 PM HISTORY/REASON FOR EXAM:  Back pain or radiculopathy, cancer or infection suspected; Strep bacteremia, back pain, bilateral leg numbness TECHNIQUE/EXAM DESCRIPTION: MRI of the lumbar spine without and with contrast. The study was performed on a [employer] 1.5 Britt MRI scanner. T1 sagittal, T2 fast spin-echo sagittal, and T2 axial images were obtained of the lumbar spine. T1 postcontrast fat-suppressed sagittal images were obtained. 14 mL ProHance contrast was administered intravenously. COMPARISON:  None. FINDINGS: Normal lumbar lordosis. Preservation of vertebral body heights, alignment and bone marrow signal. No evidence of discitis osteomyelitis. Conus medullaris terminates at T12-L1 and is normal in signal. No mass or fluid collection is seen within the lumbar spinal canal. T12-L1: Canal and  foramina are patent. L1-L2: Mild disc bulge. Canal and foramina are patent. L2-L3: Minimal disc bulge and facet degeneration. Canal and foramina are patent. L3-L4: Minimal disc bulge and facet degeneration. Canal and foramina are patent.. L4-L5: Mild disc bulge and facet degeneration. No significant spinal stenosis. Mild foraminal narrowing. L5-S1: Disc narrowing, mild disc osteophyte. No significant spinal stenosis. Moderate right and mild-to-moderate left foraminal narrowing. Distended urinary bladder.     No evidence of lumbar spine infection or epidural abscess. Mild spondylosis as detailed above without spinal stenosis.    MR-MRA HEAD-W/O    Result Date: 4/28/2021 4/28/2021 3:31 PM HISTORY/REASON FOR EXAM:  Emergency Medical Condition ? Stroke. Cerebellar infarct TECHNIQUE/EXAM DESCRIPTION: MRA of the head ([place]) without contrast. The study was performed on a [employer] Signa 1.5 Britt MRI scanner. MRA of the [place] was performed with 3D time-of-flight technique with axial acquisition. Additional MRA of the internal carotid and vertebrobasilar arteries at the level of the skull base and craniocervical junction was also performed with 3D time-of-flight technique with axial acquisition. Images are reviewed at the PACS workstation as axial source images as well as maximum intensity ray projection (MIP) multiplanar 3D reconstructions. COMPARISON:  None FINDINGS: Nicole cavernous and supraclinoid ICA segments are patent. Patent left posterior communicating artery. MCA and ARA branches are patent. There is no significant flow within the intradural right vertebral artery. The intradural left vertebral artery, basilar artery and posterior cerebral arteries are patent. No significant aneurysm or high flow vascular malformation is seen.     Findings suggesting right vertebral artery occlusion.    MR-MRA NECK-WITH & W/O AND SEQUENCES    Result Date: 4/28/2021 4/28/2021 3:31 PM HISTORY/REASON FOR EXAM:   Emergency Medical Condition ? Stroke Cerebellar stroke TECHNIQUE/EXAM DESCRIPTION: MRA of the cervical carotid and vertebral arteries without and with contrast. The study was performed on a [employer] Signa 1.5 Britt MRI scanner. Precontrast MRA of the cervical carotid and vertebral arteries was performed with 2D time-of-flight (TOF) technique with acquisition in the axial plane. MRA of the arch origins of the great vessels, and cervical carotid and vertebral arteries was performed with 2D time-of-flight (TOF) technique with coronal slab acquisition from the level of the aortic arch to the carotid siphons during dynamic intravenous infusion of 15 mL of ProHance contrast. Images are reviewed at the PACS workstation as source images as well as maximum intensity ray projection (MIP) multiplanar 3D reconstructions. Cervical internal carotid artery percent stenosis is calculated using the standard method according to the NASCET criteria wherein a segment of uniform caliber distal cervical internal carotid is used as the reference denominator. COMPARISON:  None available. FINDINGS: The arch origins of the great vessels are intact. The cervical right vertebral artery is not well seen on the time-of-flight images. A very small caliber cervical right vertebral artery is seen on the postcontrast images with some mildly increased caliber of the artery at about the C1-C2 level. The fact  that it is not seen on the time-of-flight images and is visualized on contrast enhanced sequence could be related to small nondominant caliber with sluggish flow or could represent retrograde flow within the right vertebral artery. There may be a focal moderate stenosis in the mid cervical left internal carotid artery and mild narrowing at its origin. Mild narrowing of the proximal left internal carotid artery with less than 50% stenosis. No significant right carotid stenosis is seen.     1.  Small caliber right vertebral artery which is not  visualized on the noncontrast time-of-flight technique could be related to retrograde flow or diminutive caliber and sluggish flow. 2.  Possible moderate focal stenosis in the mid cervical left vertebral artery. 3.  No significant carotid stenosis.    MR-THORACIC SPINE-WITH & W/O    Result Date: 4/28/2021 4/27/2021 11:23 PM HISTORY/REASON FOR EXAM:  Mid-back pain, compression fracture suspected; possible epidural abscess/fluid collection TECHNIQUE/EXAM DESCRIPTION: MRI of the thoracic spine without and with contrast. The study was performed on a [employer] Signa 1.5 Britt MRI scanner. T1 sagittal, T2 fast spin-echo sagittal, and T2 axial images were obtained of the thoracic spine. T1 post-contrast fat suppressed sagittal images were obtained. Optional T1 post-contrast axial images may be obtained. 14 mL ProHance contrast was administered intravenously. COMPARISON:  MRI of the cervical spine one-day prior. FINDINGS: There is abnormal dorsal epidural fluid collection seen within the partially visualized lower cervical spine and which extends inferiorly to about the T6 level. The maximum thickness is seen at about the T2-T3 level measuring 8 mm. This raises concern for epidural abscess, although epidural hematoma could also have this appearance. From T1 through T3 there is at least moderate thecal sac stenosis due to the epidural fluid collection. There is significant abnormal signal within the partially visualized  lower cervical and upper thoracic cord which could be infectious/inflammatory or other nonspecific cord edema. There is also suggestion of a small ventral epidural fluid collection at C6-C7. There is also partially visualized abnormal marrow edema in the C6 and C7 vertebral bodies as detailed on earlier MRI report. There is suggestion of some prominent enhancement around the mid and lower thoracic cord seen on the sagittal postcontrast images however limited evaluation on axial images due to motion artifact.  This is of uncertain etiology but could represent some leptomeningeal disease/infection. On the sagittal T2 images there is a questionable slight nodular appearance on the surface of lower thoracic cord. A differential would be a subtle spinal dural aVF. There is no abnormal signal seen within the mid/lower thoracic cord. There is no evidence of discitis osteomyelitis within the thoracic spine. There is T6-T7 interbody ankylosis. There is mild disc degeneration and some prominent anterolateral bridging osteophytes in the mid and lower thoracic spine. No significant posterior disc herniation is seen. Within the mid and lower thoracic spine there  is no significant spinal stenosis.     1.  Posterior epidural fluid collection/abscess within the lower cervical spine extending inferiorly to about the T6 level which could represent epidural abscess and/or hematoma. This measures 8 mm in maximal thickness at T2-T3 with at least moderate thecal sac stenosis. 2.  Abnormal signal within the cervical cord and upper thoracic cord suggesting cord edema or infectious/inflammatory etiology. 3.  Lower cervical spine findings are detailed on earlier report. 4.  Prominent enhancement along the surface of the mid and lower thoracic cord is of uncertain etiology and as detailed above, possibly representing leptomeningeal disease/infection. See details above. These findings were discussed with Dr. Cano on 4/28/2021 10:01 AM.     IR-DRAIN-BLADDER SUPRAPUB W/CATH    Result Date: 4/28/2021  HISTORY/REASON FOR EXAM:  60-year-old man with urinary retention. TECHNIQUE/EXAM DESCRIPTION AND NUMBER OF VIEWS:  Ultrasound and fluoroscopic guided suprapubic bladder catheter placement, Cystogram. MEDICATIONS: The patient remained on his ICU sedation regimen. FLUOROSCOPY TIME: 0.3 minutes; 5fluoroscopic images obtained CONTRAST: 40mL Omnipaque 300, intraurinary PROCEDURE:  Informed consent was obtained. The suprapubic region was prepped and draped  in sterile manner. Following local anesthesia with copious 1% lidocaine, under ultrasound and fluoroscopic monitoring, an 18-gauge needle was advanced into the urinary bladder from a paramedian suprapubic approach. An Amplatz guidewire was placed in the urinary bladder. Serial tract dilatation was performed with a 22 [place] telescoping dilator. A 16 [place] tip silicone Gomez type catheter was then placed in the balloon inflated with 10 mL of sterile saline. A cystogram was performed with AP and both oblique views demonstrating satisfactory position of the catheter with all side holes within the urinary bladder. The catheter was secured to the skin with 2-0 nylon suture and connected to gravity drainage. The Gomez catheter was removed. The patient tolerated the procedure well with no evidence of complication. COMPARISON: None FINDINGS:  The cystogram shows satisfactory catheter position with all sideholes within the urinary bladder. There is no extravasation.     1.     Ultrasound and fluoroscopic guided placement of a 16 [place] tip silicone suprapubic bladder catheter. 2.     Cystogram documenting catheter placement.     DX-PORTABLE FLUOROSCOPY < 1 HOUR    Result Date: 4/28/2021 4/28/2021 5:30 AM HISTORY/REASON FOR EXAM:  Cervical laminectomy TECHNIQUE/EXAM DESCRIPTION AND NUMBER OF VIEWS: Portable fluoroscopy for less than one hour in OR. FINDINGS: The portable fluoroscopy unit was obligated to the procedure for less than one hour. Actual fluoro time was 2 seconds.     Portable fluoroscopy utilized for 2 seconds. INTERPRETING LOCATION: [address] [place] [address], [id number]    EC-ECHOCARDIOGRAM COMPLETE W/O CONT    Result Date: 4/28/2021  Transthoracic Echo Report Echocardiography Laboratory CONCLUSIONS No prior study is available for comparison. Normal left ventricular systolic function.  Left ventricular ejection fraction is visually estimated to be 60%. Normal diastolic function. Agitated saline (bubble)  study was performed. No evidence of right to left shunt by agitated saline challenge. Normal inferior vena cava size and inspiratory collapse. GILDARDO MAGANA Exam Date:         2021                    19:42 Exam Location:     Inpatient Priority:          Routine Ordering Physician:        YESICA SULLIVAN Referring Physician:       [id number] Sonographer:               Faye Moya RDCS Age:    60     Gender:    M MRN:    [id number] :    1960 BSA:    1.87   Ht (in):    69     Wt (lb):    159 Exam Type:     Complete Indications:     CVA ICD Codes:       436 CPT Codes:       10308 BP:   140    /   60     HR:   74 Technical Quality:       Fair MEASUREMENTS  (Male / Female) Normal Values 2D ECHO LV Diastolic Diameter PLAX        3.8 cm                4.2 - 5.9 / 3.9 - 5.3 cm LV Systolic Diameter PLAX         2.4 cm                2.1 - 4.0 cm IVS Diastolic Thickness           1.4 cm                LVPW Diastolic Thickness          1.4 cm                LVOT Diameter                     2 cm                  Estimated LV Ejection Fraction    60 %                  LV Ejection Fraction MOD BP       62.7 %                >= 55  % LV Ejection Fraction MOD 4C       46.6 %                LV Ejection Fraction MOD 2C       55 %                  DOPPLER AV Peak Velocity                  1.7 m/s               AV Peak Gradient                  10.9 mmHg             AV Mean Gradient                  5.5 mmHg              LVOT Peak Velocity                1.2 m/s               AV Area Cont Eq vti               2.3 cm2               Mitral E Point Velocity           0.74 m/s              Mitral E to A Ratio               0.92                  MV Pressure Half Time             69.4 ms               MV Area PHT                       3.2 cm2               MV Deceleration Time              239 ms                PV Peak Velocity                  1.1 m/s               PV Peak Gradient                  4.6 mmHg               RVOT Peak Velocity                0.66 m/s              * Indicates values subject to auto-interpretation LV EF:  60    % FINDINGS Left Ventricle Normal left ventricular chamber size. Mild concentric left ventricular hypertrophy. Normal left ventricular systolic function.  Left ventricular ejection fraction is visually estimated to be 60%. Normal diastolic function. Right Ventricle Normal right ventricular size and systolic function. Right Atrium Normal right atrial size. Normal inferior vena cava size and inspiratory collapse. Left Atrium Normal left atrial size. Agitated saline (bubble) study was performed. With Valsalva maneuver. No evidence of right to left shunt by agitated saline challenge. Existing IV was used. Mitral Valve Structurally normal mitral valve without significant stenosis or regurgitation. Aortic Valve Aortic sclerosis without stenosis. No aortic insufficiency. Tricuspid Valve Structurally normal tricuspid valve without significant stenosis or regurgitation. Pulmonic Valve The pulmonic valve is not well visualized. No pulmonic insufficiency. Pericardium Normal pericardium without effusion. Aorta Normal aortic root for body surface area. Ascending aorta not well visualized. Zakiya Rebollar MD (Electronically Signed) Final Date:     28 April 2021                 21:30    EC-CHRISTOPHER W/O CONT    Result Date: 4/30/2021  Results Will be Available after Interpretation by Cardiologist.    DX-ABDOMEN FOR TUBE PLACEMENT    Result Date: 4/30/2021 4/30/2021 12:44 PM HISTORY/REASON FOR EXAM:  Line evaluation. TECHNIQUE/EXAM DESCRIPTION AND NUMBER OF VIEWS:  1 view(s) of the abdomen. COMPARISON:  4/28/2021 FINDINGS: Enteric tube has been placed. The tip projects over the distal stomach or duodenal bulb. There are scattered loops of air-filled bowel.     Feeding tube placement with the tip projecting over the distal stomach or duodenal bulb    DX-ABDOMEN FOR TUBE PLACEMENT    Result Date: 4/28/2021 4/28/2021 12:24 PM  "HISTORY/REASON FOR EXAM:  Tube evaluation. TECHNIQUE/EXAM DESCRIPTION AND NUMBER OF VIEWS:  1 view(s) of the abdomen. COMPARISON:  None. FINDINGS: Limited single view of the abdomen performed primarily to evaluate enteric tube position. The tip projects over the expected area of the distal stomach. The bowel gas pattern is within normal limits.     Feeding tube with tip projecting over the expected area of the distal stomach.      Micro:  Results     Procedure Component Value Units Date/Time    Culture Respiratory W/ Grm Stn - BAL [[id number]] Collected: 05/06/21 1055    Order Status: Completed Specimen: Respirate from Bronchoalveolar Lavage Updated: 05/08/21 1100     Significant Indicator NEG     Source RESP     Site BRONCHOALVEOLAR LAVAGE     Culture Result Rare growth usual upper respiratory bruce  including yeast.  No clinically significant Staphylococcus aureus, Methicillin  Resistant Staphylococcus aureus, or Pseudomonas species  isolated.       Gram Stain Result Many WBCs.  No organisms seen.      Narrative:      Collected By:[id number] MOSES KAPLAN  Collected By:[id number] MOSES KAPLAN    BLOOD CULTURE [[id number]] Collected: 05/07/21 0758    Order Status: Completed Specimen: Blood from Peripheral Updated: 05/08/21 0711     Significant Indicator NEG     Source BLD     Site PERIPHERAL     Culture Result No Growth  Note: Blood cultures are incubated for 5 days and  are monitored continuously.Positive blood cultures  are called to the RN and reported as soon as  they are identified.      Narrative:      Collected By:[id number] HARJIT HERRERA  Per Hospital Policy: Only change Specimen Src: to \"Line\" if  specified by physician order.  Left Forearm/Arm    BLOOD CULTURE [[id number]] Collected: 05/07/21 1010    Order Status: Completed Specimen: Blood from Peripheral Updated: 05/08/21 0711     Significant Indicator NEG     Source BLD     Site PERIPHERAL     Culture Result No Growth  Note: Blood cultures are incubated for 5 " "days and  are monitored continuously.Positive blood cultures  are called to the RN and reported as soon as  they are identified.      Narrative:      Collected By:[id number] HARJIT HERRERA  Per Hospital Policy: Only change Specimen Src: to \"Line\" if  specified by physician order.  Left AC    BLOOD CULTURE [[id number]]     Order Status: Canceled Specimen: Blood from Peripheral     BLOOD CULTURE [[id number]]     Order Status: Canceled Specimen: Blood from Peripheral     GRAM STAIN [[id number]] Collected: 05/06/21 1055    Order Status: Completed Specimen: Respirate Updated: 05/06/21 2054     Significant Indicator .     Source RESP     Site BRONCHOALVEOLAR LAVAGE     Gram Stain Result Many WBCs.  No organisms seen.      Narrative:      Collected By:[id number] MOSES KAPLAN  Collected By:[id number] MOSES KAPLAN    BLOOD CULTURE [[id number]] Collected: 05/01/21 1300    Order Status: Completed Specimen: Blood from Peripheral Updated: 05/06/21 1500     Significant Indicator NEG     Source BLD     Site PERIPHERAL     Culture Result No growth after 5 days of incubation.    Narrative:      Collected By:[id number] LESA VIZCARRA  Per Hospital Policy: Only change Specimen Src: to \"Line\" if  specified by physician order.  Right Forearm/Arm    BLOOD CULTURE [[id number]] Collected: 05/01/21 1305    Order Status: Completed Specimen: Blood from Peripheral Updated: 05/06/21 1500     Significant Indicator NEG     Source BLD     Site PERIPHERAL     Culture Result No growth after 5 days of incubation.    Narrative:      Collected By:[id number] LESA VIZCARRA  Per Hospital Policy: Only change Specimen Src: to \"Line\" if  specified by physician order.  Left Forearm/Arm          Assessment:  Active Hospital Problems    Diagnosis    • *Spinal epidural abscess [G06.1]    • History of ischemic stroke [Z86.73]    • Sepsis due to Streptococcus species ([facility]) [A40.9]    • Acute bilateral back pain [M54.9]    • Thrombocytopenia ([facility]) [D69.6]    • Type 2 diabetes " "mellitus without complication, without long-term current use of insulin (HCC) [E11.9]    • Coronary artery disease due to calcified coronary lesion: CABG x4, July 2015 [I25.10, I25.84]    • Essential hypertension, benign [I10]    • Hypokalemia [E87.6]    • Hypomagnesemia [E83.42]    • Hyponatremia [E87.1]    • Difficulty swallowing [R13.10]    • Diabetic ketoacidosis (HCC) [E11.10]    • Marijuana abuse [F12.10]    • High anion gap metabolic acidosis [E87.2]    • Tobacco abuse [Z72.0]    • Dyslipidemia [E78.5]    • Status post urethrostomy (HCC) [Z93.6]    .    Assessment:  60 y.o.  admitted 4/25/2021. Pt has a past medical history of uncontrolled diabetes, CAD status post CABG times 4/2015, marijuana use and to arthritis.  Presented complaining of neck and back pain ongoing for approximately 1 week and fall getting out of the bathtub.  He had been seen at urgent care for back pain approximately 1 week prior to admission and given Toradol injections.       Hospital Course:   Afebrile and vitals unremarkable other than hypertension.  Leukocytosis ongoing since arrival.  MRI C-spine on 4/26 with epidural collection noted from C2-T3.  Largest collection noted in upper thoracic posterior epidural space at T2-T3.  This is causing multilevel cord compression.  Also with likely discitis at C5-6 and osteomyelitis at C5-C7.  Blood cultures on 4/25+ for Streptococcus species.    Code blue 5/3-mucus plugs     Problem List  Sepsis/shock, strep anginosis  -Blood cultures on 4/25 2/2 + Streptococcus anginosus, penicillin and cephalosporin susceptible  -Blood cultures on 4/27 1/2 + viridans Streptococcus   -Blood cultures on 4/28  1/2 + CoNS -likely contaminant  -Blood cultures on 5/1 and 5/7 are no growth to date  -TTE with no vegetations  -CHRISTOPHER on 4/30, still no official read  \"Results Will be Available after Interpretation by Cardiologist\"    VDRF-Bronchoscopy on 4/28 with thick secretions  Bronch 5/3 mucus plugs, thick " secretions  Bronch 5/4 pending-no signif secretions noted  Bronch 5/6 with thick secretion RML-culture neg    Leukocytosis, persistent.   Cervical thoracic infection, epidural abscess at C2-T3 as well as discitis and osteomyelitis, +GPCs-Streptococcus anginosus   -Repeat MRI thoracic spine on 4/27 notes posterior epidural fluid  collection/abscess in lower cervical spine extending to T6 level, also noted abnormal signal within the cervical and upper thoracic cord  -s/p OR 4/28 for see 4-T2 laminectomy, decompression of thecal sac and nerve roots as well as cervical thoracic extradural spinal mass/epidural abscess removal, linda purulence during OR, no CSF leak per op note  -Operative cultures from cervical thoracic epidural 1/ 2 +strep anginosis    CVA, Right cerebellar stroke, possibly due to venous spasm associated with the epidural abscess.   -MRA neck with and without on 4/28, possible moderate focal stenosis in mid left CVA.  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  MRI without on 4/28 with acute large right cerebellar infarct with small hemorrhage  Uncontrolled diabetes  Transaminitis, improving RUQ USG mild HMG. Switched off ampicillin 5/9/2021     Plan   Continue ceftriaxone- LFTs improved  Anticipate a 6-week IV antibiotic course for the bacteremia and spinal infection-stop date 6/11/2021  Repeat MRI spine prior to stopping antibiotics  FU CHRISTOPHER result-still pending  Keep BS under 150 to help control current infection  Bronch results (5/6)-neg    Palliative care consult and goals of care discussion appreciated  Plan for PEG     Prognosis guarded

## 2021-05-12 LAB
ALBUMIN SERPL BCP-MCNC: 2 G/DL (ref 3.2–4.9)
ALBUMIN/GLOB SERPL: 0.5 G/DL
ALP SERPL-CCNC: 332 U/L (ref 30–99)
ALT SERPL-CCNC: 199 U/L (ref 2–50)
ANION GAP SERPL CALC-SCNC: 5 MMOL/L (ref 7–16)
AST SERPL-CCNC: 88 U/L (ref 12–45)
BACTERIA BLD CULT: NORMAL
BACTERIA BLD CULT: NORMAL
BASOPHILS # BLD AUTO: 0.2 % (ref 0–1.8)
BASOPHILS # BLD: 0.02 K/UL (ref 0–0.12)
BILIRUB SERPL-MCNC: 0.3 MG/DL (ref 0.1–1.5)
BUN SERPL-MCNC: 33 MG/DL (ref 8–22)
CALCIUM SERPL-MCNC: 8.2 MG/DL (ref 8.5–10.5)
CHLORIDE SERPL-SCNC: 110 MMOL/L (ref 96–112)
CO2 SERPL-SCNC: 27 MMOL/L (ref 20–33)
CREAT SERPL-MCNC: 0.46 MG/DL (ref 0.5–1.4)
EOSINOPHIL # BLD AUTO: 0.14 K/UL (ref 0–0.51)
EOSINOPHIL NFR BLD: 1.6 % (ref 0–6.9)
ERYTHROCYTE [DISTWIDTH] IN BLOOD BY AUTOMATED COUNT: 54.3 FL (ref 35.9–50)
GLOBULIN SER CALC-MCNC: 4.1 G/DL (ref 1.9–3.5)
GLUCOSE BLD-MCNC: 101 MG/DL (ref 65–99)
GLUCOSE BLD-MCNC: 104 MG/DL (ref 65–99)
GLUCOSE BLD-MCNC: 109 MG/DL (ref 65–99)
GLUCOSE BLD-MCNC: 138 MG/DL (ref 65–99)
GLUCOSE SERPL-MCNC: 113 MG/DL (ref 65–99)
HCT VFR BLD AUTO: 30.8 % (ref 42–52)
HGB BLD-MCNC: 9.3 G/DL (ref 14–18)
IMM GRANULOCYTES # BLD AUTO: 0.06 K/UL (ref 0–0.11)
IMM GRANULOCYTES NFR BLD AUTO: 0.7 % (ref 0–0.9)
LYMPHOCYTES # BLD AUTO: 2.05 K/UL (ref 1–4.8)
LYMPHOCYTES NFR BLD: 23.5 % (ref 22–41)
MAGNESIUM SERPL-MCNC: 2.2 MG/DL (ref 1.5–2.5)
MCH RBC QN AUTO: 29.7 PG (ref 27–33)
MCHC RBC AUTO-ENTMCNC: 30.2 G/DL (ref 33.7–35.3)
MCV RBC AUTO: 98.4 FL (ref 81.4–97.8)
MONOCYTES # BLD AUTO: 0.52 K/UL (ref 0–0.85)
MONOCYTES NFR BLD AUTO: 5.9 % (ref 0–13.4)
NEUTROPHILS # BLD AUTO: 5.95 K/UL (ref 1.82–7.42)
NEUTROPHILS NFR BLD: 68.1 % (ref 44–72)
NRBC # BLD AUTO: 0 K/UL
NRBC BLD-RTO: 0 /100 WBC
PLATELET # BLD AUTO: 291 K/UL (ref 164–446)
PMV BLD AUTO: 12.5 FL (ref 9–12.9)
POTASSIUM SERPL-SCNC: 4.2 MMOL/L (ref 3.6–5.5)
PROT SERPL-MCNC: 6.1 G/DL (ref 6–8.2)
RBC # BLD AUTO: 3.13 M/UL (ref 4.7–6.1)
SIGNIFICANT IND 70042: NORMAL
SIGNIFICANT IND 70042: NORMAL
SITE SITE: NORMAL
SITE SITE: NORMAL
SODIUM SERPL-SCNC: 142 MMOL/L (ref 135–145)
SOURCE SOURCE: NORMAL
SOURCE SOURCE: NORMAL
WBC # BLD AUTO: 8.7 K/UL (ref 4.8–10.8)

## 2021-05-12 PROCEDURE — 85025 COMPLETE CBC W/AUTO DIFF WBC: CPT

## 2021-05-12 PROCEDURE — 82962 GLUCOSE BLOOD TEST: CPT

## 2021-05-12 PROCEDURE — 99291 CRITICAL CARE FIRST HOUR: CPT | Mod: GC | Performed by: INTERNAL MEDICINE

## 2021-05-12 PROCEDURE — A9270 NON-COVERED ITEM OR SERVICE: HCPCS | Performed by: INTERNAL MEDICINE

## 2021-05-12 PROCEDURE — 80053 COMPREHEN METABOLIC PANEL: CPT

## 2021-05-12 PROCEDURE — 700105 HCHG RX REV CODE 258: Performed by: INTERNAL MEDICINE

## 2021-05-12 PROCEDURE — 94669 MECHANICAL CHEST WALL OSCILL: CPT

## 2021-05-12 PROCEDURE — 770022 HCHG ROOM/CARE - ICU (200)

## 2021-05-12 PROCEDURE — 700102 HCHG RX REV CODE 250 W/ 637 OVERRIDE(OP): Performed by: INTERNAL MEDICINE

## 2021-05-12 PROCEDURE — 94799 UNLISTED PULMONARY SVC/PX: CPT

## 2021-05-12 PROCEDURE — 700102 HCHG RX REV CODE 250 W/ 637 OVERRIDE(OP): Performed by: PSYCHIATRY & NEUROLOGY

## 2021-05-12 PROCEDURE — 700111 HCHG RX REV CODE 636 W/ 250 OVERRIDE (IP): Performed by: INTERNAL MEDICINE

## 2021-05-12 PROCEDURE — 94003 VENT MGMT INPAT SUBQ DAY: CPT

## 2021-05-12 PROCEDURE — 700101 HCHG RX REV CODE 250: Performed by: STUDENT IN AN ORGANIZED HEALTH CARE EDUCATION/TRAINING PROGRAM

## 2021-05-12 PROCEDURE — A9270 NON-COVERED ITEM OR SERVICE: HCPCS | Performed by: PSYCHIATRY & NEUROLOGY

## 2021-05-12 PROCEDURE — 700111 HCHG RX REV CODE 636 W/ 250 OVERRIDE (IP): Performed by: STUDENT IN AN ORGANIZED HEALTH CARE EDUCATION/TRAINING PROGRAM

## 2021-05-12 PROCEDURE — 71045 X-RAY EXAM CHEST 1 VIEW: CPT

## 2021-05-12 PROCEDURE — 83735 ASSAY OF MAGNESIUM: CPT

## 2021-05-12 PROCEDURE — 99232 SBSQ HOSP IP/OBS MODERATE 35: CPT | Performed by: INTERNAL MEDICINE

## 2021-05-12 PROCEDURE — 700102 HCHG RX REV CODE 250 W/ 637 OVERRIDE(OP): Performed by: STUDENT IN AN ORGANIZED HEALTH CARE EDUCATION/TRAINING PROGRAM

## 2021-05-12 PROCEDURE — A9270 NON-COVERED ITEM OR SERVICE: HCPCS | Performed by: STUDENT IN AN ORGANIZED HEALTH CARE EDUCATION/TRAINING PROGRAM

## 2021-05-12 RX ADMIN — DOCUSATE SODIUM 100 MG: 50 LIQUID ORAL at 05:35

## 2021-05-12 RX ADMIN — DOCUSATE SODIUM 100 MG: 50 LIQUID ORAL at 17:45

## 2021-05-12 RX ADMIN — LIDOCAINE 3 PATCH: 50 PATCH TOPICAL at 17:45

## 2021-05-12 RX ADMIN — CYCLOBENZAPRINE HYDROCHLORIDE 5 MG: 5 TABLET, FILM COATED ORAL at 08:18

## 2021-05-12 RX ADMIN — INSULIN GLARGINE 40 UNITS: 100 INJECTION, SOLUTION SUBCUTANEOUS at 17:45

## 2021-05-12 RX ADMIN — DOCUSATE SODIUM 50 MG AND SENNOSIDES 8.6 MG 1 TABLET: 8.6; 5 TABLET, FILM COATED ORAL at 23:49

## 2021-05-12 RX ADMIN — CYANOCOBALAMIN TAB 500 MCG 1000 MCG: 500 TAB at 05:36

## 2021-05-12 RX ADMIN — POLYETHYLENE GLYCOL 3350 1 PACKET: 17 POWDER, FOR SOLUTION ORAL at 05:35

## 2021-05-12 RX ADMIN — ENOXAPARIN SODIUM 40 MG: 40 INJECTION SUBCUTANEOUS at 05:35

## 2021-05-12 RX ADMIN — FAMOTIDINE 20 MG: 20 TABLET ORAL at 17:46

## 2021-05-12 RX ADMIN — CEFTRIAXONE SODIUM 2 G: 2 INJECTION, POWDER, FOR SOLUTION INTRAMUSCULAR; INTRAVENOUS at 05:50

## 2021-05-12 RX ADMIN — OXYCODONE HYDROCHLORIDE 10 MG: 10 TABLET ORAL at 03:18

## 2021-05-12 RX ADMIN — Medication 5000 UNITS: at 05:36

## 2021-05-12 RX ADMIN — FAMOTIDINE 20 MG: 20 TABLET ORAL at 05:37

## 2021-05-12 RX ADMIN — BISACODYL 10 MG: 10 SUPPOSITORY RECTAL at 17:45

## 2021-05-12 RX ADMIN — OXYCODONE HYDROCHLORIDE 10 MG: 10 TABLET ORAL at 08:18

## 2021-05-12 RX ADMIN — ASPIRIN 81 MG: 81 TABLET, CHEWABLE ORAL at 05:36

## 2021-05-12 ASSESSMENT — FIBROSIS 4 INDEX: FIB4 SCORE: 1.57

## 2021-05-12 ASSESSMENT — ENCOUNTER SYMPTOMS
FEVER: 0
VOMITING: 0

## 2021-05-12 NOTE — DISCHARGE PLANNING
Hospital Care Management Discharge Planning       Anticipated Discharge Disposition:   · [facility]     Action:   · RN CM received update from [facility], that they have received the referral and it is being reviewed   · Gaby asking if Pt is medically cleared   · Patient discussed during interdisciplinary ICU rounds   · Pt reportedly has been telling the nursing staff that he doesn't want this and is asking to stop treatment   · MD ordering cognitive evaluation and will have goals of care discussion   · Pt not medically cleared, per MD  · RN LEXY updated JUSTIN Angeles RN, of above  · [facility] updated       Barriers to Discharge:   · Medical clearance   · Cognitive evaluation   · Goals of care      Plan:   · F/U with treatment team and [facility]  · Hospital Care Management Team to continue to provide support services and assistance with discharge planning as needed.

## 2021-05-12 NOTE — DISCHARGE PLANNING
Agency/Facility Name: [facility]  Spoke To: Gaby  Outcome: Referral under review, no bed for him today.

## 2021-05-12 NOTE — CARE PLAN
Ventilator Daily Summary    Vent Day # 14   Trach Day # 2     Ventilator settings changed this shift: FiO2 to 60%     Weaning trials: none at this time     Respiratory Procedures: IPV QID     Plan: Continue current ventilator settings and wean mechanical ventilation as tolerated per physician orders.

## 2021-05-12 NOTE — PROGRESS NOTES
[facility] Progress Note      Admit Date: 4/25/2021    Resident(s): Lissette Singh M.D.   Attending:  BREANNA ALCARAZ/ Dr. Gannon    Patient ID:    Name:  Perry Davis   YOB: 1960  Age:  60 y.o.  male   MRN:  [id number]    Hospital Course (carried forward and updated):  Perry Davis is a 60 y.o. male with a previous history of uncontrolled Diabetes -Hb1C 13 , HLD, CAD s/p CABGx4  2015, tobacco use ,OA, chronic low pain admitted  4/25 with epidural abscess w/ cord compression, discitis involving cervical and thoracic spine--> underwent emergent laminectomy and decompression by Dr. Hazel 4/28.  Hospital course complicated by acute right cerebellar stroke and strep anginosus bacteremia on Ceftriaxone, followed by ID.   Patient currently on tracheostomy     Consultants:  Critical Care  Neurology   Infectious disease   General surgery   Gastroenterology     Interval Events:  -No acute events overnight   -Patient remains stable on tracheostomy. No new concerns   -Aspirin changed to 81 mg, PEG tube placement by GI within the next few days   -Will get neurocognitive evaluation and goals of care in regards to transition to LTAC  -On ceftriaxone for bacteremia     Vitals Range last 24h:  Temp:  [36 °C (96.8 °F)-36.5 °C (97.7 °F)] 36.4 °C (97.5 °F)  Pulse:  [49-66] 62  Resp:  [24-53] 29  BP: (107-160)/(56-81) 133/64  SpO2:  [92 %-99 %] 96 %      Intake/Output Summary (Last 24 hours) at 5/12/2021 1302  Last data filed at 5/12/2021 1200  Gross per 24 hour   Intake 1590 ml   Output 775 ml   Net 815 ml        ROS  Unable to assess    PHYSICAL EXAM:  Vitals:    05/12/21 0940 05/12/21 1015 05/12/21 1100 05/12/21 1200   BP:  133/63 126/59 133/64   Pulse: 61 60 (!) 59 62   Resp: (!) 25 (!) 26 (!) 26 (!) 29   Temp:       TempSrc:       SpO2: 99% 99% 96% 96%   Weight:       Height:        Body mass index is 30.91 kg/m².    O2 therapy: Pulse Oximetry: 96 %, O2 Delivery Device: Ventilator    Date 05/12/21 0781  - 05/13/21 0659   Shift 3217-0983 2492-4403 6599-7881 24 Hour Total   INTAKE   Other 240   240     Medications (PO/Enteral Liquids) 60   60     Flush / Irrigation Volume (Catheter Flush) 180   180   NG/   200     Intake (mL) (Enteral Tube 05/09/21 Cortrak - Gastric Left nare) 200   200   Enteral 40   40     Free Water / Tube Flush 40   40   Shift Total 480   480   OUTPUT   Urine 125   125     Output (mL) (Urethral Catheter Other (Comment) 16 Fr.) 125   125   Shift Total 125   125      355        Physical Exam  Constitutional:       Appearance: Normal appearance.   HENT:      Head: Normocephalic and atraumatic.      Nose: Nose normal.      Mouth/Throat:      Mouth: Mucous membranes are moist.   Eyes:      Extraocular Movements: Extraocular movements intact.      Pupils: Pupils are equal, round, and reactive to light.   Cardiovascular:      Rate and Rhythm: Normal rate and regular rhythm.      Pulses: Normal pulses.      Heart sounds: Normal heart sounds.   Pulmonary:      Effort: Pulmonary effort is normal.      Breath sounds: Normal breath sounds.   Abdominal:      General: There is no distension.      Palpations: Abdomen is soft.   Musculoskeletal:      Cervical back: Normal range of motion.   Skin:     General: Skin is warm.   Neurological:      Mental Status: He is alert.      Comments: Patient tries to talk but unable to understand   Lower extremities flaccid   Withdraws upper extremities minimally          Recent Labs     05/10/21  0140   ISTATAPH 7.493   ISTATAPCO2 38.9*   ISTATAPO2 65   ISTATATCO2 31   QMDRGVP1BJO 94   ISTATARTHCO3 29.8*   ISTATARTBE 6*   ISTATTEMP 99.3 F   ISTATFIO2 80   ISTATSPEC Arterial   ISTATAPHTC 7.487   RMEVUDSI2GZ 66     Recent Labs     05/10/21  0423 05/11/21  0420 05/12/21  0549   SODIUM 145 138 142   POTASSIUM 3.6 3.8 4.2   CHLORIDE 110 106 110   CO2 30 29 27   BUN 36* 35* 33*   CREATININE 0.50 0.41* 0.46*   MAGNESIUM 2.3 2.3 2.2   CALCIUM 7.8* 8.1* 8.2*     Recent  Labs     05/10/21  0423 05/11/21  0420 05/12/21  0549   ALTSGPT 351* 267* 199*   ASTSGOT 244* 146* 88*   ALKPHOSPHAT 335* 319* 332*   TBILIRUBIN 0.3 0.3 0.3   GLUCOSE 119* 102* 113*     Recent Labs     05/10/21  0423 05/11/21  0420 05/12/21  0549   RBC 3.07* 3.13* 3.13*   HEMOGLOBIN 9.4* 9.5* 9.3*   HEMATOCRIT 30.4* 30.8* 30.8*   PLATELETCT 345 330 291     Recent Labs     05/10/21  0423 05/11/21  0420 05/12/21  0549   WBC 10.6 9.7 8.7   NEUTSPOLYS 75.90* 74.50* 68.10   LYMPHOCYTES 16.80* 18.00* 23.50   MONOCYTES 5.40 5.00 5.90   EOSINOPHILS 1.10 1.50 1.60   BASOPHILS 0.20 0.10 0.20   ASTSGOT 244* 146* 88*   ALTSGPT 351* 267* 199*   ALKPHOSPHAT 335* 319* 332*   TBILIRUBIN 0.3 0.3 0.3       Meds:  • aspirin  81 mg     • insulin glargine  40 Units     • bisacodyl  10 mg     • polyethylene glycol/lytes  1 Packet     • cefTRIAXone (ROCEPHIN) IV  2 g Stopped (05/12/21 0620)   • oxyCODONE immediate-release  10 mg     • oxyCODONE immediate-release  5 mg     • cyclobenzaprine  5 mg     • insulin regular  3-14 Units      And   • dextrose 50%  50 mL     • enoxaparin (LOVENOX) injection  40 mg     • acetylcysteine  3-4 mL     • albuterol  2.5 mg     • MD Alert...Adult ICU Electrolyte Replacement per Pharmacy       • lidocaine  1-2 mL     • ondansetron  4 mg     • MD ALERT...DO NOT ADMINISTER NSAIDS or ASPIRIN unless ORDERED By Neurosurgery  1 Each     • fleet  1 Each     • Respiratory Therapy Consult       • ondansetron  4 mg     • ipratropium-albuterol  3 mL     • labetalol  10 mg     • Pharmacy  1 Each     • cyanocobalamin  1,000 mcg     • senna-docusate  1 tablet     • vitamin D  5,000 Units     • docusate sodium  100 mg     • famotidine  20 mg     • fentaNYL  50 mcg      And   • fentaNYL  100 mcg     • lidocaine  3 Patch          Procedures:  Bronchoscopy 05/06/21  Bronchoscopy on 05/03  -Decompressive laminectomy on 4/28  -Central line placement on 5/01    Imaging:  DX-CHEST-PORTABLE (1 VIEW)   Final Result         1.   Interstitial pulmonary parenchymal prominence suggest chronic underlying lung disease, component of interstitial edema and/or infiltrates not excluded.      DX-CHEST-PORTABLE (1 VIEW)   Final Result         1.  Interstitial pulmonary parenchymal prominence suggest chronic underlying lung disease, component of interstitial edema and/or infiltrates not excluded.      DX-CHEST-LIMITED (1 VIEW)   Final Result      Interval tracheostomy tube placement      New retrocardiac opacity partially effaces the diaphragm and most likely represents atelectasis      DX-CHEST-PORTABLE (1 VIEW)   Final Result      1.  Improvement of LEFT lung base infiltrate or atelectasis.   2.  No other significant change from prior exam.         DX-CHEST-PORTABLE (1 VIEW)   Final Result         1.  Hazy left lower lobe infiltrates.      DX-CHEST-PORTABLE (1 VIEW)   Final Result      No significant interval change.      DX-ABDOMEN FOR TUBE PLACEMENT   Final Result      Cortrak feeding tube tip projects in the region of the proximal stomach.      DX-CHEST-PORTABLE (1 VIEW)   Final Result         No significant interval change.      US-RUQ   Final Result      Borderline mild hepatomegaly.      Otherwise negative right upper quadrant ultrasound.      DX-CHEST-PORTABLE (1 VIEW)   Final Result         Decreased left basilar opacity.      DX-CHEST-PORTABLE (1 VIEW)   Final Result      Increasing left basilar airspace opacities. No significant cardiopulmonary change otherwise. Repositioned endotracheal tube.      US-EXTREMITY ARTERY LOWER UNILAT RIGHT   Final Result      DX-ABDOMEN FOR TUBE PLACEMENT   Final Result      Feeding tube tip projects over the distal stomach or first portion of the duodenum.      CT-CSPINE WITHOUT PLUS RECONS   Final Result      1.  Postsurgical changes C3-T1 laminectomies.   2.  New irregular linear and mottled lucency in the anterior C5 vertebral body which is likely related to osteomyelitis.   3.  Prevertebral soft tissue  swelling.   4.  Extensive known extensive epidural abscess seen on prior MRI is not adequately evaluated on CT.      CT-CTA CHEST PULMONARY ARTERY W/ RECONS   Final Result      1.  No evidence of pulmonary embolism.      2.  Bilateral lower lobe atelectasis or pneumonia, left greater than right.      3.  Small left pleural effusion.      4.  CABG changes.            DX-CHEST-PORTABLE (1 VIEW)   Final Result      Endotracheal tube terminates approximately 2.5 cm above the you.      Atelectasis with left lung aeration. No pleural effusion or pneumothorax.      XY-USGLDQC-2 VIEW   Final Result      No dilated bowel      DX-ABDOMEN FOR TUBE PLACEMENT   Final Result      Feeding tube placement with the tip projecting over the distal stomach or duodenal bulb      EC-CHRISTOPHER W/O CONT         CT-HEAD W/O   Final Result         1.  Low-density changes in the right cerebellar hemisphere, compatible with evolving infarct, streak artifacts minimally limits evaluation of the posterior fossa for subtle subarachnoid hemorrhage, no intracranial hemorrhage is readily identified.   2.  Atherosclerosis.      DX-CHEST-PORTABLE (1 VIEW)   Final Result         1.  No acute cardiopulmonary disease.      DX-CHEST-PORTABLE (1 VIEW)   Final Result      Mild left basilar opacity may represent atelectasis. Infection not excluded.      Improved aeration of the left lung.      Atherosclerotic plaque.         EC-ECHOCARDIOGRAM COMPLETE W/O CONT   Final Result      IR-DRAIN-BLADDER SUPRAPUB W/CATH   Final Result      1.     Ultrasound and fluoroscopic guided placement of a 16 [place] tip silicone suprapubic bladder catheter.      2.     Cystogram documenting catheter placement.         MR-BRAIN-W/O   Final Result      Acute large right cerebellar posterior inferior cerebellar artery territory infarct with possible small amount of petechial hemorrhage.      MR-MRA HEAD-W/O   Final Result      Findings suggesting right vertebral artery  occlusion.      MR-MRA NECK-WITH & W/O AND SEQUENCES   Final Result      1.  Small caliber right vertebral artery which is not visualized on the noncontrast time-of-flight technique could be related to retrograde flow or diminutive caliber and sluggish flow.   2.  Possible moderate focal stenosis in the mid cervical left vertebral artery.   3.  No significant carotid stenosis.      DX-ABDOMEN FOR TUBE PLACEMENT   Final Result         Feeding tube with tip projecting over the expected area of the distal stomach.      DX-CHEST-PORTABLE (1 VIEW)   Final Result      1.  Worsening consolidation of LEFT hemithorax with shift of mediastinal structures to the LEFT suggesting atelectasis, possibly due to endobronchial process.   2.  Supportive tubing as described above.   3.  No pneumothorax.      DX-CHEST-PORTABLE (1 VIEW)   Final Result         Endotracheal tube with tip projecting over the mid thoracic trachea.      Layering left pleural effusion with left lower lung opacity.      DX-SPINE-ANY ONE VIEW   Final Result      Digitized intraoperative radiograph is submitted for review.  This examination is not for diagnostic purpose but for guidance during a surgical procedure.      DX-PORTABLE FLUOROSCOPY < 1 HOUR   Final Result      Portable fluoroscopy utilized for 2 seconds.         INTERPRETING LOCATION: [address] [place] [address], [id number]      CT-HEAD W/O   Final Result         1.  Low-density in the region of the right cerebellar hemisphere, appearance compatible with evolving infarct.      These findings were discussed with the patient's on-call clinician, Deniz, on 4/28/2021 6:14 AM.      MR-THORACIC SPINE-WITH & W/O   Final Result      1.  Posterior epidural fluid collection/abscess within the lower cervical spine extending inferiorly to about the T6 level which could represent epidural abscess and/or hematoma. This measures 8 mm in maximal thickness at T2-T3 with at least moderate    thecal sac stenosis.   2.  Abnormal  signal within the cervical cord and upper thoracic cord suggesting cord edema or infectious/inflammatory etiology.   3.  Lower cervical spine findings are detailed on earlier report.   4.  Prominent enhancement along the surface of the mid and lower thoracic cord is of uncertain etiology and as detailed above, possibly representing leptomeningeal disease/infection. See details above.   These findings were discussed with Dr. Cano on 4/28/2021 10:01 AM.         MR-LUMBAR SPINE-WITH & W/O   Final Result      No evidence of lumbar spine infection or epidural abscess.      Mild spondylosis as detailed above without spinal stenosis.      DX-CHEST-PORTABLE (1 VIEW)   Final Result      1.  Hypoinflation with left basilar atelectasis.   2.  Mild perihilar interstitial prominence may be related to hypoinflation or edema.      MR-CERVICAL SPINE-WITH & W/O   Final Result      1.  There is multiseptated abnormal peripherally enhancing epidural collection noted extending from C2 to the visualized lower thoracic level. Largest collection is noted in the upper thoracic posterior epidural space at the levels of T2 and T3. The    lower extent of the collection is not imaged. This is causing multilevel effacement of the thecal sac and cord compression. The thoracic spinal cord is anteriorly displaced and compressed at the levels of T2 and T3. Pre and postcontrast MR examination of    the thoracic spine is recommended for further evaluation.   2.  There is effacement of the cervical thecal sac due to the multiseptated epidural fluid collection.   3.  The cervical spinal cord is mildly enlarged with minimal increased T2 signal intensity. The possibility of cord inflammation cannot be entirely excluded.   4.  Abnormal T2 hyperintensity at C5-6 disc space likely representing discitis.   5.  Abnormal bone marrow edema of C5, C6 and C7 vertebral bodies with edema concerning for osteomyelitis.   6.  There is also focal enhancement of C6  vertebral body likely secondary to the osteomyelitis.   7.  Prevertebral edema/fluid collection.   8.  Nonvisualization of flow void of bilateral vertebral arteries concerning for age-indeterminate bilateral vertebral occlusions.         CT-CHEST (THORAX) WITH   Final Result      No displaced rib fracture is identified.      No acute cardiopulmonary process is seen.      Atherosclerotic plaque including coronary artery calcification.      CT-TSPINE W/O PLUS RECONS   Final Result      No CT evidence of acute traumatic abnormality.      Mild T6/7 anterior wedging compatible with healed mild chronic compression fracture and there is interbody fusion.      CT-LSPINE W/O PLUS RECONS   Final Result      No CT evidence of acute traumatic injury.      CT-CSPINE WITHOUT PLUS RECONS   Final Result      Multilevel degenerative changes.      Prevertebral edema. Further evaluation with MRI is recommended as this can be seen in the setting of ligamentous injury.      Bilateral carotid atherosclerotic plaque.             ASSESSEMENT and PLAN:    * Spinal epidural abscess- (present on admission)  Assessment & Plan  -MRI C/T-spine with abnormal peripherally enhancing epidural collection extending from C2 to visualize lower thoracic 11.  Largest collection in the upper thoracic posterior epidural space at the level of T2-T3.  Lower extent of the collection is causing multilevel effacement of the thecal sac and cord compression.    -Underwent emergent laminectomy of C3-C4 C5-C6, C6/C7, T1-T2 laminectomy, decompression of thecal sac and nerves by Dr. Hazel 4/28/2021.   -Epidural drain removed on 5/03  -Goal blood pressure systolic of less than 160  -Palliative care consulted for goals of care discussion: Family wants full treatment for now       Acute respiratory failure with hypoxia (HCC)  Assessment & Plan  -secondary to epidural abscess and stroke   -Was intubated initially    -Patient underwent trach placement on 05/10  -RT/O2  protocols                  Transaminitis  Assessment & Plan  Transamanitis is improving. Hep panel negative, TSH normal.   -Hold statin, avoid hepatotoxic medications  -RUQ U/S: unremarkable except for borderline mild hepatomegaly    Cardiac arrest (HCC)  Assessment & Plan  -Patient had cardiac arrest on 05/03, ROSC achieved in 4 mins after CPR and  Epinephrine  -Most likely secondary to aspiration, bed side bronchoscopy showed multiple mucous plugs   -Re intubated again     History of ischemic stroke  Assessment & Plan  -Found to have acute change in mental status 4/27/2021.   -CT head w/o concerning for evolving right cerebellar infarct.   -MRA brain, MRA neck: Findings suggesting right vertebral artery occlusion. Possible moderate focal stenosis in the mid cervical left vertebral artery.  TTE with bubble study: EF 60%, no evidence of right-to-left shunt, no valve lesions.  CHRISTOPHER negative  Goal euthermia, normotension, eunatremia  -Might need Long term care facility  - on aspirin and DVT prophylaxis  -Hold statin currently given elevated LFTs    Hyponatremia- (present on admission)  Assessment & Plan  Resolved    Difficulty swallowing- (present on admission)  Assessment & Plan  -Presented with difficulty swallowing, is coughing and vomiting when swallowing large/hard foods  -Complicated by stroke   -Currently on coretrak for tube feeds, consulted GI for PEG tube     Sepsis due to Streptococcus species ([facility])- (present on admission)  Assessment & Plan  MRI C-T-spine with evidence of epidural abscess, discitis, vertebral osteomyelitis.  Underwent emergent laminectomy, decompression 4/28.  Blood culture 4/24 and cervical thoracic 4/28 : Growth of Streptococcus anginosus.   -Repeat blood cultures has been negative  -TTE did not show any concern of infective endocarditis  -CHRISTOPHER negative  -Currently on ceftriaxone,  total duration of 6 weeks last date would be 06/11  -Bronchoscopy with BAL 05/06/21, c/s NGTD, Blood c/s  5/7/21: NGTD  -ID on board    Thrombocytopenia (HCC)- (present on admission)  Assessment & Plan  -Resolved  -Plt 47 on 4/26. This is likely due to sepsis, though lower on the differential includes previously undiagnosed infection.  Hep panel negative. HIV non reactive    Acute bilateral back pain- (present on admission)  Assessment & Plan  -Presented with worsening neck and upper back pain  -Secondary to epidural abscess and cord compression  -S/p laminectomy and currently on IV antibiotics  -Needs long term facility     Status post urethrostomy (HCC)- (present on admission)  Assessment & Plan  -History of perineal urethrostomy 2018  -Acute urinary retention post surgical intervention 4/28  -Unable to place Gomez catheter through urethrostomy.    -Suprapubic catheter placement by interventional radiology    Type 2 diabetes mellitus without complication, without long-term current use of insulin ([facility])- (present on admission)  Assessment & Plan  -Chronic history of diabetes  -Last HbA1c is 12.2 from 4/2021  -Lantus with sliding scale   -Hypoglycemia protocol     Essential hypertension, benign- (present on admission)  Assessment & Plan  -Noncompliance issues  -Blood pressure meds on hold given hypotension    Coronary artery disease due to calcified coronary lesion: CABG x4, July 2015- (present on admission)  Assessment & Plan  -Chronic   -Continue aspirin, statin on hold given elevated LFTs       DISPO: ICU    CODE STATUS: Full code    Quality Measures:  Feeding: tube feeds  Analgesia: none   Sedation: none   Thromboprophylaxis: lovenox   Head of bed: >30 degrees  Ulcer prophylaxis: famotidine   Glycemic control: lantus and SSI  Bowel care: bowel regimen  Indwelling lines: central and iv line  Deescalation of antibiotics: ceftriaxone       Lissette Singh M.D.

## 2021-05-12 NOTE — PROGRESS NOTES
Infectious Disease Progress Note    Author: La Nena Khan M.D. Date & Time of service: 5/12/2021  11:40 AM    Chief Complaint:  Sepsis and cervical abscess  CVA     Interval History:  60 y.o.  admitted 4/25/2021. Pt has a past medical history of uncontrolled diabetes, CAD status post CABG times 4/2015, marijuana use and to arthritis.  Presented complaining of neck and back pain ongoing for approximately 1 week and fall getting out of the bathtub.  He had been seen at urgent care for back pain approximately 1 week prior to admission and given Toradol injections.    4/29 AF, O2 Vent 12/70%, pressors still on the trending down, pt continues to be agitated, not moving any extremities and concern for quadriplegia due to CVA.   4/30 AF, O2 Vent 8/40%, pressors off, agitated, without meaningful limb movement.  Decreasing vent requirements and no longer in shock.  5/1 AF, O2 Vent 8/40%, stable on low vent settings no significant secretions per nursing.  He continues to be agitated and with no upper or lower extremity movement.    5/3 AF WBC 14 remains intubated and sedated Agitation with decrease sedation. Epidural drain removed FiO2 0.4  5/4 AF WBC 19 code blue yesterday-mucus plugs found. S/p bronch this am-on 100%FiO2 On pressors  5/5 AF WBC 15.8 nurse noted some prox  movement in BUE (right greater than left) FiO2 0.9  5/6 AF WBC 15.7 s/p bronch again this am-thick secretion RML noted  5/7 Febrile 101.1 WBC 12.2 No new positive cultures opens eyes-not following commands FiO2 0.6  5/8 AF (99.9) WBC 11.9 Palliative appreciated-family wants full code. Remains obtunded FiO2 0.7  5/9 Temp 100.4 WBC 12.7 Remains intubated and sedated FiO2 0.7  5/10 AF WBC 10.6 getting trach and PEG. Episode of desat noted  5/11 AF WBC 9.7 s/p trach-sedated today FiO2 0.6  5/12 AF FiO2 down to 0.5 more alert today-actively attempting to move BUE    Review of Systems:  Review of Systems   Unable to perform ROS: Intubated   Constitutional:  Negative for fever.   Gastrointestinal: Negative for vomiting.       Hemodynamics:  Temp (24hrs), Av.2 °C (97.1 °F), Min:35.9 °C (96.7 °F), Max:36.5 °C (97.7 °F)  Temperature: 36.4 °C (97.5 °F), Monitored Temp: 36 °C (96.8 °F)  Pulse  Av.1  Min: 49  Max: 121   Blood Pressure: 126/59       Physical Exam:  Physical Exam  Vitals and nursing note reviewed.   Constitutional:       General: He is not in acute distress.     Appearance: He is not toxic-appearing or diaphoretic.   HENT:      Nose: No rhinorrhea.   Eyes:      Extraocular Movements: Extraocular movements intact.      Pupils: Pupils are equal, round, and reactive to light.   Neck:      Comments: Right IJ-no erythema  trach  Cardiovascular:      Rate and Rhythm: Normal rate and regular rhythm.   Pulmonary:      Effort: Pulmonary effort is normal. No respiratory distress.      Breath sounds: No stridor. No wheezing.      Comments: Coarse breath sounds  Abdominal:      General: There is no distension.      Palpations: Abdomen is soft.      Tenderness: There is no abdominal tenderness. There is no guarding.   Genitourinary:     Comments: SP cath +yellow urine  No erythema  Musculoskeletal:         General: No deformity.   Lymphadenopathy:      Cervical: No cervical adenopathy.   Skin:     General: Skin is warm.      Coloration: Skin is not jaundiced.      Findings: Bruising present.      Comments: tattoos   Neurological:      Mental Status: He is alert.      Comments: Quadriplegic  Able to move right shoulder 2-3/5; slight movement left         Meds:    Current Facility-Administered Medications:   •  aspirin  •  insulin glargine  •  bisacodyl  •  polyethylene glycol/lytes  •  cefTRIAXone (ROCEPHIN) IV  •  oxyCODONE immediate-release  •  oxyCODONE immediate-release  •  cyclobenzaprine  •  insulin regular **AND** POC blood glucose manual result **AND** NOTIFY MD and PharmD **AND** dextrose 50%  •  enoxaparin (LOVENOX) injection  •  acetylcysteine  •   albuterol  •  MD Alert...Adult ICU Electrolyte Replacement per Pharmacy  •  lidocaine  •  ondansetron  •  MD ALERT...DO NOT ADMINISTER NSAIDS or ASPIRIN unless ORDERED By Neurosurgery  •  fleet  •  Respiratory Therapy Consult  •  ondansetron  •  ipratropium-albuterol  •  labetalol  •  Pharmacy  •  cyanocobalamin  •  senna-docusate  •  vitamin D  •  docusate sodium  •  famotidine **OR** [DISCONTINUED] famotidine  •  fentaNYL **AND** fentaNYL **AND** [DISCONTINUED] fentaNYL **AND** [DISCONTINUED] propofol **AND** Triglyceride  •  lidocaine    Labs:  Recent Labs     05/10/21  0423 05/11/21  0420 05/12/21  0549   WBC 10.6 9.7 8.7   RBC 3.07* 3.13* 3.13*   HEMOGLOBIN 9.4* 9.5* 9.3*   HEMATOCRIT 30.4* 30.8* 30.8*   MCV 99.0* 98.4* 98.4*   MCH 30.6 30.4 29.7   RDW 55.1* 54.9* 54.3*   PLATELETCT 345 330 291   MPV 11.9 12.3 12.5   NEUTSPOLYS 75.90* 74.50* 68.10   LYMPHOCYTES 16.80* 18.00* 23.50   MONOCYTES 5.40 5.00 5.90   EOSINOPHILS 1.10 1.50 1.60   BASOPHILS 0.20 0.10 0.20     Recent Labs     05/10/21  0423 05/11/21  0420 05/12/21  0549   SODIUM 145 138 142   POTASSIUM 3.6 3.8 4.2   CHLORIDE 110 106 110   CO2 30 29 27   GLUCOSE 119* 102* 113*   BUN 36* 35* 33*     Recent Labs     05/10/21  0423 05/11/21  0420 05/12/21  0549   ALBUMIN 1.9* 2.0* 2.0*   TBILIRUBIN 0.3 0.3 0.3   ALKPHOSPHAT 335* 319* 332*   TOTPROTEIN 5.9* 6.0 6.1   ALTSGPT 351* 267* 199*   ASTSGOT 244* 146* 88*   CREATININE 0.50 0.41* 0.46*       Imaging:  CT-CSPINE WITHOUT PLUS RECONS    Result Date: 4/25/2021 4/25/2021 1:29 PM HISTORY/REASON FOR EXAM: History of back and neck pain. Fall. TECHNIQUE/EXAM DESCRIPTION: CT cervical spine without contrast, with reconstructions. Helical scanning was performed from the skull base through T1.  Sagittal and coronal multiplanar reconstructions were generated from the axial images. Low dose optimization technique was utilized for this CT exam including automated exposure control and adjustment of the mA and/or kV  according to patient size. COMPARISON:  None available. FINDINGS: No compression fracture is identified. There is an ununited fracture of the spinous process of T1. Intervertebral disc space narrowing and endplate spurring is most prominent at C6/C7. There are degenerative changes of the facet joints. C1/C2 alignment is maintained. There is multilevel uncovertebral spurring and neural foraminal narrowing. There is cervical prevertebral edema. There is carotid atherosclerotic plaque bilaterally. Visualized lung apices are clear.     Multilevel degenerative changes. Prevertebral edema. Further evaluation with MRI is recommended as this can be seen in the setting of ligamentous injury. Bilateral carotid atherosclerotic plaque.     CT-CHEST (THORAX) WITH    Result Date: 4/25/2021 4/25/2021 1:29 PM HISTORY/REASON FOR EXAM:  Rib fracture suspected, traumatic. TECHNIQUE/EXAM DESCRIPTION: CT scan of the chest with contrast. Helical images were obtained from the lung apices through the adrenal glands following the bolus administration of  80 mL of Omnipaque 350 nonionic contrast. Sagittal and coronal reconstructions were performed. Low dose optimization technique was utilized for this CT exam including automated exposure control and adjustment of the mA and/or kV according to patient size. COMPARISON:  None. FINDINGS: There is atherosclerotic plaque. There is coronary artery calcification. The heart is not enlarged. No pericardial or pleural effusion is seen. There are small mediastinal lymph nodes. There is no hilar or axillary lymphadenopathy. No pneumothorax or pulmonary contusion is seen. Central airways are patent. Limited views were obtained of the upper abdomen. Hypodensity along the falciform ligament likely represents focal fat. Patient is status post median sternotomy. Degenerative changes are seen in the spine. No displaced rib fracture is identified.     No displaced rib fracture is identified. No acute  cardiopulmonary process is seen. Atherosclerotic plaque including coronary artery calcification.    CT-HEAD W/O    Result Date: 4/30/2021 4/30/2021 4:27 AM HISTORY/REASON FOR EXAM: Altered mental status; evaluate cerebellar stroke, if no blood present OK for neurology to start aspirin TECHNIQUE/EXAM DESCRIPTION:  CT of the head without contrast. Sequential axial images were obtained from the vertex to the skull base without contrast. Up to date radiation dose reduction adjustments have been utilized to meet ALARA standards for radiation dose reduction. COMPARISON: April 28, 2021 FINDINGS: There is mild diffuse parenchymal volume loss observed. Periventricular and subcortical white matter low-attenuation changes are seen, most commonly associated with small vessel ischemic disease. The ventricles are normal in caliber and configuration. Low-density changes in the right cerebellar hemisphere seen.. There are no abnormal extra axial fluid collections or extra axial hemorrhage identified. The visualized paranasal sinuses and mastoid air cells are well aerated bilaterally. No depressed calvarial fractures are identified. The visualized globes and retrobulbar soft tissues appear within normal limits.  Atherosclerotic intracranial calcifications are seen.     1.  Low-density changes in the right cerebellar hemisphere, compatible with evolving infarct, streak artifacts minimally limits evaluation of the posterior fossa for subtle subarachnoid hemorrhage, no intracranial hemorrhage is readily identified. 2.  Atherosclerosis.    CT-HEAD W/O    Result Date: 4/28/2021 4/28/2021 5:20 AM HISTORY/REASON FOR EXAM: Altered mental status TECHNIQUE/EXAM DESCRIPTION:  CT of the head without contrast. Sequential axial images were obtained from the vertex to the skull base without contrast. Up to date radiation dose reduction adjustments have been utilized to meet ALARA standards for radiation dose reduction. COMPARISON: None FINDINGS:  There is moderate diffuse parenchymal volume loss observed. Periventricular and subcortical white matter low-attenuation changes are seen, most commonly associated with small vessel ischemic disease. The ventricles are normal in caliber and configuration. Area of low-density within the right cerebellar hemisphere is seen. There are no abnormal extra axial fluid collections or extra axial hemorrhage identified. The visualized paranasal sinuses and mastoid air cells are well aerated bilaterally. No depressed calvarial fractures are identified. The visualized globes and retrobulbar soft tissues appear within normal limits.     1.  Low-density in the region of the right cerebellar hemisphere, appearance compatible with evolving infarct. These findings were discussed with the patient's on-call clinician, Deniz, on 4/28/2021 6:14 AM.    CT-LSPINE W/O PLUS RECONS    Result Date: 4/25/2021 4/25/2021 1:29 PM HISTORY/REASON FOR EXAM:  Back pain after injury. TECHNIQUE/EXAM DESCRIPTION AND NUMBER OF VIEWS: CT lumbar spine without contrast, with reconstructions. The study was performed on a [employer] CT scanner. Thin-section helical scanning was performed from T12-L1 to the sacrum. Sagittal and coronal multiplanar reconstructions were generated from the axial images. Low dose optimization technique was utilized for this CT exam including automated exposure control and adjustment of the mA and/or kV according to patient size. COMPARISON: None. FINDINGS: Alignment of the lumbar spine is normal. There is no acute fracture or subluxation. The prevertebral and paraspinous soft tissues show no acute abnormality. There is severe atherosclerosis with a mixture of soft and calcified plaque. There is lumbosacral junction vacuum disc phenomenon with endplate spurring, disc height loss The visualized sacrum and sacroiliac joints show no acute abnormality.     No CT evidence of acute traumatic injury.    CT-TSPINE W/O PLUS RECONS    Result Date:  4/25/2021 4/25/2021 1:29 PM HISTORY/REASON FOR EXAM:  Mid-back trauma Pain following injury. TECHNIQUE/EXAM DESCRIPTION AND NUMBER OF VIEWS: CT thoracic spine without contrast, with reconstructions. The study was performed on a [employer] CT scanner. Thin-section helical scanning was performed from C7-T1 through T12-L1. Sagittal and coronal multiplanar reconstructions were generated from the axial images. Low dose optimization technique was utilized for this CT exam including automated exposure control and adjustment of the mA and/or kV according to patient size. COMPARISON: None. FINDINGS: Alignment of the thoracic spine is normal. There is no acute displaced fracture. There is T6/7 interbody fusion with mild anterior wedging likely indicating remote mild compression fracture that has healed There is bulky endplate spurring with some bridging syndesmophytes in the mid thoracic spine Sternotomy wires The cervicothoracic junction appears intact. The prevertebral and paraspinous soft tissues show no acute abnormality.     No CT evidence of acute traumatic abnormality. Mild T6/7 anterior wedging compatible with healed mild chronic compression fracture and there is interbody fusion.    DX-CERVICAL SPINE-2 OR 3 VIEWS    Result Date: 4/22/2021 4/22/2021 6:16 PM HISTORY/REASON FOR EXAM:  Atraumatic Pain. Neck pain for 4 days. TECHNIQUE/EXAM DESCRIPTION AND NUMBER OF VIEWS: Cervical spine series, 2 views. COMPARISON:  None. FINDINGS: Mild reversal of curvature centered at the C5 level. Vertebral body heights are preserved. Multilevel loss of disc height and osteophyte formation. Cervicothoracic junction is obscured. Prevertebral soft tissues are within normal limits. Odontoid is grossly intact.  Lateral masses of C1 are grossly intact.     1.  Limited exam.  Cervicothoracic junction is obscured. 2.  No gross cervical spine fracture or subluxation. 3.  Moderate multilevel degenerative changes.    DX-CHEST-PORTABLE (1  VIEW)    Result Date: 4/30/2021 4/30/2021 1:07 AM HISTORY/REASON FOR EXAM: For indication of respiratory failure; For indication of respiratory failure TECHNIQUE/EXAM DESCRIPTION:  Single AP view of the chest. COMPARISON: Yesterday FINDINGS: Position of medical devices appears stable. The cardiac silhouette appears within normal limits.  Postsurgical changes of sternotomy are noted. The mediastinal contour appears within normal limits.  The central pulmonary vasculature appears normal. Bilateral lung volumes are diminished.  Bilateral lungs are clear. No significant pleural effusions are identified. The bony structures appear age-appropriate.     1.  No acute cardiopulmonary disease.    DX-CHEST-PORTABLE (1 VIEW)    Result Date: 4/29/2021 4/29/2021 10:06 AM HISTORY/REASON FOR EXAM:  For indication of respiratory failure; For indication of respiratory failure. TECHNIQUE/EXAM DESCRIPTION AND NUMBER OF VIEWS: Single portable view of the chest. COMPARISON: 4/28/2021 FINDINGS: Endotracheal tube projects at the level the clavicular heads. Right central line projects over the SVC. Enteric tube passes below the level of the diaphragm. The heart is not enlarged. Atherosclerotic calcification is seen. There is mild left basilar opacity likely representing atelectasis. No pleural effusion or pneumothorax is seen. Skin staples and a surgical drain project over the cervical spine.     Mild left basilar opacity may represent atelectasis. Infection not excluded. Improved aeration of the left lung. Atherosclerotic plaque.     DX-CHEST-PORTABLE (1 VIEW)    Result Date: 4/28/2021 4/28/2021 12:10 PM HISTORY/REASON FOR EXAM:  CENTRAL LINE PLACEMENT; CENTRAL LINE PLACEMENT. TECHNIQUE/EXAM DESCRIPTION AND NUMBER OF VIEWS: Single portable view of the chest. COMPARISON: 4/28/2021 11:17 AM FINDINGS: Mediastinal structures are shifted to the LEFT.  Increasing opacification of LEFT hemithorax. RIGHT lung is clear. Endotracheal tube in  place with tip approximately 5 cm above the you. Feeding tube tip in place however tip is off the image. RIGHT internal jugular catheter tip at the lower SVC. No pneumothorax. Postoperative change from prior open heart surgery. Postoperative change of the neck with surgical drain present.     1.  Worsening consolidation of LEFT hemithorax with shift of mediastinal structures to the LEFT suggesting atelectasis, possibly due to endobronchial process. 2.  Supportive tubing as described above. 3.  No pneumothorax.    DX-CHEST-PORTABLE (1 VIEW)    Result Date: 4/28/2021 4/28/2021 11:16 AM HISTORY/REASON FOR EXAM:  replaced ETT; replaced ETT TECHNIQUE/EXAM DESCRIPTION AND NUMBER OF VIEWS: Single portable view of the chest. COMPARISON: 4/27/2021 FINDINGS: Endotracheal tube with tip projecting over the mid thoracic trachea. Hazy opacity in the left lower lobe Layering left pleural effusion. No pneumothorax. Stable cardiopericardial silhouette.     Endotracheal tube with tip projecting over the mid thoracic trachea. Layering left pleural effusion with left lower lung opacity.    DX-CHEST-PORTABLE (1 VIEW)    Result Date: 4/27/2021 4/27/2021 5:30 PM HISTORY/REASON FOR EXAM:  Shortness of Breath. TECHNIQUE/EXAM DESCRIPTION AND NUMBER OF VIEWS: Single portable view of the chest. COMPARISON: None. FINDINGS: LUNGS: Hypoinflation with left basilar atelectasis. Mild perihilar interstitial prominence. No effusions. PNEUMOTHORAX: None. LINES AND TUBES: None. MEDIASTINUM: No cardiomegaly. MUSCULOSKELETAL STRUCTURES: No acute displaced fracture. Median sternotomy.     1.  Hypoinflation with left basilar atelectasis. 2.  Mild perihilar interstitial prominence may be related to hypoinflation or edema.    DX-SPINE-ANY ONE VIEW    Result Date: 4/28/2021 4/28/2021 5:30 AM HISTORY/REASON FOR EXAM:  Cervical laminectomy TECHNIQUE/EXAM DESCRIPTION AND NUMBER OF VIEWS:  Single view of the spine. Digitized Intraoperative Radiograph  FINDINGS: Fixation hardware projecting over the cervical spine Fluoroscopic time:2 seconds     Digitized intraoperative radiograph is submitted for review.  This examination is not for diagnostic purpose but for guidance during a surgical procedure.    MR-BRAIN-W/O    Result Date: 4/28/2021 4/28/2021 3:31 PM HISTORY/REASON FOR EXAM:  Altered mental status; cerebellar stroke. TECHNIQUE/EXAM DESCRIPTION: MRI of the brain without contrast. T1 sagittal, T2 fast spin-echo axial, T1 coronal, FLAIR coronal, diffusion-weighted and apparent diffusion coefficient (ADC map) axial images were obtained of the whole brain. The study was performed on a [email or web address] 1.[employer] Britt MRI scanner. COMPARISON:  Head CT earlier in the day FINDINGS: Large acute right cerebellar infarct suggesting right posterior inferior cerebellar artery territory. There is prominent T2 hyperintensity consistent with cytotoxic edema. A small amount of blooming artifact in the medial aspect of the right cerebellar hemisphere on GRE images could represent a small amount of petechial hemorrhage. There is mild localized mass effect with some mild mass effect on the fourth ventricle. No supratentorial infarct or hemorrhage. No extra-axial fluid collection. No midline shift or hydrocephalus. There is a nasogastric tube and fluid within the nasopharynx. Endotracheal tube partially visualized. Mild posterior ethmoid sinus mucosal thickening.     Acute large right cerebellar posterior inferior cerebellar artery territory infarct with possible small amount of petechial hemorrhage.    MR-CERVICAL SPINE-WITH & W/O    Result Date: 4/27/2021 4/27/2021 1:02 PM HISTORY/REASON FOR EXAM:  Neck pain, abnormal neuro exam; Neck pain, initial exam; sepsis 2/2 strep bacteremia  -unknown source. rule out possible ?? retropharyngeal abscess, ??prevertebral abscess. TECHNIQUE/EXAM DESCRIPTION: MRI of the cervical spine without and with contrast. The study was performed on a [email or web address]  1.5 Britt MRI scanner. T1 sagittal, T2 fast spin-echo sagittal, and gradient echo axial images were obtained of the cervical spine. T1 post-contrast fat suppressed sagittal images were obtained of the cervical spine. Optional T1 post-contrast axial images may be obtained. 15 mL ProHance contrast was administered intravenously. COMPARISON: None. FINDINGS: There is abnormal disc T2 hyperintensity at C5-6. Mild amount of bone marrow edema is noted at C5, C6 and C7. There is also focal bone marrow enhancement at C6. There is multiseptated abnormal peripherally enhancing epidural collection noted extending from C2 to the visualized lower thoracic level. Largest collection is noted in the upper thoracic posterior epidural space at the levels of T2 and T3. The lower extent of the collection is not imaged. This is causing multilevel effacement of the thecal sac and cord compression. The thoracic spinal cord is anteriorly displaced and compressed at the levels of T2 and T3. At the level of C2-3,there is small left anterior epidural fluid collection. At the level of C3-4,there is small left anterior epidural fluid collection causing indentation of the thecal sac. At the level of C4-5,there is minimal epidural fluid collection. At the level of C5-6,there is diffuse disc bulge along with right posterior lateral epidural fluid collection causing severe canal compromise. At the level of C6-7,there is mild diffuse disc bulge. There is epidural fluid collection causing severe canal compromise. At the level of C7-T1,there is epidural fluid collection causing severe canal compromise. The visualized brain parenchyma is unremarkable. The cervical spinal cord is mildly enlarged which demonstrates mild increased intramedullary T2 signal intensity. There is abnormal T2 hyperintensity in the visualized bilateral vertebral arteries concerning for age indeterminate occlusion. There is mild amount of edema in the pre and retropharyngeal soft  tissue.     1.  There is multiseptated abnormal peripherally enhancing epidural collection noted extending from C2 to the visualized lower thoracic level. Largest collection is noted in the upper thoracic posterior epidural space at the levels of T2 and T3. The lower extent of the collection is not imaged. This is causing multilevel effacement of the thecal sac and cord compression. The thoracic spinal cord is anteriorly displaced and compressed at the levels of T2 and T3. Pre and postcontrast MR examination of  the thoracic spine is recommended for further evaluation. 2.  There is effacement of the cervical thecal sac due to the multiseptated epidural fluid collection. 3.  The cervical spinal cord is mildly enlarged with minimal increased T2 signal intensity. The possibility of cord inflammation cannot be entirely excluded. 4.  Abnormal T2 hyperintensity at C5-6 disc space likely representing discitis. 5.  Abnormal bone marrow edema of C5, C6 and C7 vertebral bodies with edema concerning for osteomyelitis. 6.  There is also focal enhancement of C6 vertebral body likely secondary to the osteomyelitis. 7.  Prevertebral edema/fluid collection. 8.  Nonvisualization of flow void of bilateral vertebral arteries concerning for age-indeterminate bilateral vertebral occlusions.     MR-LUMBAR SPINE-WITH & W/O    Result Date: 4/28/2021 4/27/2021 11:23 PM HISTORY/REASON FOR EXAM:  Back pain or radiculopathy, cancer or infection suspected; Strep bacteremia, back pain, bilateral leg numbness TECHNIQUE/EXAM DESCRIPTION: MRI of the lumbar spine without and with contrast. The study was performed on a [employer] 1.5 Britt MRI scanner. T1 sagittal, T2 fast spin-echo sagittal, and T2 axial images were obtained of the lumbar spine. T1 postcontrast fat-suppressed sagittal images were obtained. 14 mL ProHance contrast was administered intravenously. COMPARISON:  None. FINDINGS: Normal lumbar lordosis. Preservation of vertebral body  heights, alignment and bone marrow signal. No evidence of discitis osteomyelitis. Conus medullaris terminates at T12-L1 and is normal in signal. No mass or fluid collection is seen within the lumbar spinal canal. T12-L1: Canal and foramina are patent. L1-L2: Mild disc bulge. Canal and foramina are patent. L2-L3: Minimal disc bulge and facet degeneration. Canal and foramina are patent. L3-L4: Minimal disc bulge and facet degeneration. Canal and foramina are patent.. L4-L5: Mild disc bulge and facet degeneration. No significant spinal stenosis. Mild foraminal narrowing. L5-S1: Disc narrowing, mild disc osteophyte. No significant spinal stenosis. Moderate right and mild-to-moderate left foraminal narrowing. Distended urinary bladder.     No evidence of lumbar spine infection or epidural abscess. Mild spondylosis as detailed above without spinal stenosis.    MR-MRA HEAD-W/O    Result Date: 4/28/2021 4/28/2021 3:31 PM HISTORY/REASON FOR EXAM:  Emergency Medical Condition ? Stroke. Cerebellar infarct TECHNIQUE/EXAM DESCRIPTION: MRA of the head ([place]) without contrast. The study was performed on a [employer] Signa 1.5 Britt MRI scanner. MRA of the [place] was performed with 3D time-of-flight technique with axial acquisition. Additional MRA of the internal carotid and vertebrobasilar arteries at the level of the skull base and craniocervical junction was also performed with 3D time-of-flight technique with axial acquisition. Images are reviewed at the PACS workstation as axial source images as well as maximum intensity ray projection (MIP) multiplanar 3D reconstructions. COMPARISON:  None FINDINGS: Nicole cavernous and supraclinoid ICA segments are patent. Patent left posterior communicating artery. MCA and ARA branches are patent. There is no significant flow within the intradural right vertebral artery. The intradural left vertebral artery, basilar artery and posterior cerebral arteries are patent. No  significant aneurysm or high flow vascular malformation is seen.     Findings suggesting right vertebral artery occlusion.    MR-MRA NECK-WITH & W/O AND SEQUENCES    Result Date: 4/28/2021 4/28/2021 3:31 PM HISTORY/REASON FOR EXAM:  Emergency Medical Condition ? Stroke Cerebellar stroke TECHNIQUE/EXAM DESCRIPTION: MRA of the cervical carotid and vertebral arteries without and with contrast. The study was performed on a [employer] Signa 1.5 Britt MRI scanner. Precontrast MRA of the cervical carotid and vertebral arteries was performed with 2D time-of-flight (TOF) technique with acquisition in the axial plane. MRA of the arch origins of the great vessels, and cervical carotid and vertebral arteries was performed with 2D time-of-flight (TOF) technique with coronal slab acquisition from the level of the aortic arch to the carotid siphons during dynamic intravenous infusion of 15 mL of ProHance contrast. Images are reviewed at the PACS workstation as source images as well as maximum intensity ray projection (MIP) multiplanar 3D reconstructions. Cervical internal carotid artery percent stenosis is calculated using the standard method according to the NASCET criteria wherein a segment of uniform caliber distal cervical internal carotid is used as the reference denominator. COMPARISON:  None available. FINDINGS: The arch origins of the great vessels are intact. The cervical right vertebral artery is not well seen on the time-of-flight images. A very small caliber cervical right vertebral artery is seen on the postcontrast images with some mildly increased caliber of the artery at about the C1-C2 level. The fact  that it is not seen on the time-of-flight images and is visualized on contrast enhanced sequence could be related to small nondominant caliber with sluggish flow or could represent retrograde flow within the right vertebral artery. There may be a focal moderate stenosis in the mid cervical left internal carotid artery  and mild narrowing at its origin. Mild narrowing of the proximal left internal carotid artery with less than 50% stenosis. No significant right carotid stenosis is seen.     1.  Small caliber right vertebral artery which is not visualized on the noncontrast time-of-flight technique could be related to retrograde flow or diminutive caliber and sluggish flow. 2.  Possible moderate focal stenosis in the mid cervical left vertebral artery. 3.  No significant carotid stenosis.    MR-THORACIC SPINE-WITH & W/O    Result Date: 4/28/2021 4/27/2021 11:23 PM HISTORY/REASON FOR EXAM:  Mid-back pain, compression fracture suspected; possible epidural abscess/fluid collection TECHNIQUE/EXAM DESCRIPTION: MRI of the thoracic spine without and with contrast. The study was performed on a [employer] Signa 1.5 Britt MRI scanner. T1 sagittal, T2 fast spin-echo sagittal, and T2 axial images were obtained of the thoracic spine. T1 post-contrast fat suppressed sagittal images were obtained. Optional T1 post-contrast axial images may be obtained. 14 mL ProHance contrast was administered intravenously. COMPARISON:  MRI of the cervical spine one-day prior. FINDINGS: There is abnormal dorsal epidural fluid collection seen within the partially visualized lower cervical spine and which extends inferiorly to about the T6 level. The maximum thickness is seen at about the T2-T3 level measuring 8 mm. This raises concern for epidural abscess, although epidural hematoma could also have this appearance. From T1 through T3 there is at least moderate thecal sac stenosis due to the epidural fluid collection. There is significant abnormal signal within the partially visualized  lower cervical and upper thoracic cord which could be infectious/inflammatory or other nonspecific cord edema. There is also suggestion of a small ventral epidural fluid collection at C6-C7. There is also partially visualized abnormal marrow edema in the C6 and C7 vertebral bodies as  detailed on earlier MRI report. There is suggestion of some prominent enhancement around the mid and lower thoracic cord seen on the sagittal postcontrast images however limited evaluation on axial images due to motion artifact. This is of uncertain etiology but could represent some leptomeningeal disease/infection. On the sagittal T2 images there is a questionable slight nodular appearance on the surface of lower thoracic cord. A differential would be a subtle spinal dural aVF. There is no abnormal signal seen within the mid/lower thoracic cord. There is no evidence of discitis osteomyelitis within the thoracic spine. There is T6-T7 interbody ankylosis. There is mild disc degeneration and some prominent anterolateral bridging osteophytes in the mid and lower thoracic spine. No significant posterior disc herniation is seen. Within the mid and lower thoracic spine there  is no significant spinal stenosis.     1.  Posterior epidural fluid collection/abscess within the lower cervical spine extending inferiorly to about the T6 level which could represent epidural abscess and/or hematoma. This measures 8 mm in maximal thickness at T2-T3 with at least moderate thecal sac stenosis. 2.  Abnormal signal within the cervical cord and upper thoracic cord suggesting cord edema or infectious/inflammatory etiology. 3.  Lower cervical spine findings are detailed on earlier report. 4.  Prominent enhancement along the surface of the mid and lower thoracic cord is of uncertain etiology and as detailed above, possibly representing leptomeningeal disease/infection. See details above. These findings were discussed with Dr. Cano on 4/28/2021 10:01 AM.     IR-DRAIN-BLADDER SUPRAPUB W/CATH    Result Date: 4/28/2021  HISTORY/REASON FOR EXAM:  60-year-old man with urinary retention. TECHNIQUE/EXAM DESCRIPTION AND NUMBER OF VIEWS:  Ultrasound and fluoroscopic guided suprapubic bladder catheter placement, Cystogram. MEDICATIONS: The patient  remained on his ICU sedation regimen. FLUOROSCOPY TIME: 0.3 minutes; 5fluoroscopic images obtained CONTRAST: 40mL Omnipaque 300, intraurinary PROCEDURE:  Informed consent was obtained. The suprapubic region was prepped and draped in sterile manner. Following local anesthesia with copious 1% lidocaine, under ultrasound and fluoroscopic monitoring, an 18-gauge needle was advanced into the urinary bladder from a paramedian suprapubic approach. An Amplatz guidewire was placed in the urinary bladder. Serial tract dilatation was performed with a 22 [place] telescoping dilator. A 16 [place] tip silicone Gomez type catheter was then placed in the balloon inflated with 10 mL of sterile saline. A cystogram was performed with AP and both oblique views demonstrating satisfactory position of the catheter with all side holes within the urinary bladder. The catheter was secured to the skin with 2-0 nylon suture and connected to gravity drainage. The Gomez catheter was removed. The patient tolerated the procedure well with no evidence of complication. COMPARISON: None FINDINGS:  The cystogram shows satisfactory catheter position with all sideholes within the urinary bladder. There is no extravasation.     1.     Ultrasound and fluoroscopic guided placement of a 16 [place] tip silicone suprapubic bladder catheter. 2.     Cystogram documenting catheter placement.     DX-PORTABLE FLUOROSCOPY < 1 HOUR    Result Date: 4/28/2021 4/28/2021 5:30 AM HISTORY/REASON FOR EXAM:  Cervical laminectomy TECHNIQUE/EXAM DESCRIPTION AND NUMBER OF VIEWS: Portable fluoroscopy for less than one hour in OR. FINDINGS: The portable fluoroscopy unit was obligated to the procedure for less than one hour. Actual fluoro time was 2 seconds.     Portable fluoroscopy utilized for 2 seconds. INTERPRETING LOCATION: [address] [place] [address] SHI THOMAS, [id number]    EC-ECHOCARDIOGRAM COMPLETE W/O CONT    Result Date: 4/28/2021  Transthoracic Echo Report Echocardiography  Laboratory CONCLUSIONS No prior study is available for comparison. Normal left ventricular systolic function.  Left ventricular ejection fraction is visually estimated to be 60%. Normal diastolic function. Agitated saline (bubble) study was performed. No evidence of right to left shunt by agitated saline challenge. Normal inferior vena cava size and inspiratory collapse. GILDARDO MAGANA Exam Date:         2021                    19:42 Exam Location:     Inpatient Priority:          Routine Ordering Physician:        YESICA SULLIVAN Referring Physician:       [id number] Marie Sonographer:               Faye Moya RDCS Age:    60     Gender:    M MRN:    [id number] :    1960 BSA:    1.87   Ht (in):    69     Wt (lb):    159 Exam Type:     Complete Indications:     CVA ICD Codes:       436 CPT Codes:       24987 BP:   140    /   60     HR:   74 Technical Quality:       Fair MEASUREMENTS  (Male / Female) Normal Values 2D ECHO LV Diastolic Diameter PLAX        3.8 cm                4.2 - 5.9 / 3.9 - 5.3 cm LV Systolic Diameter PLAX         2.4 cm                2.1 - 4.0 cm IVS Diastolic Thickness           1.4 cm                LVPW Diastolic Thickness          1.4 cm                LVOT Diameter                     2 cm                  Estimated LV Ejection Fraction    60 %                  LV Ejection Fraction MOD BP       62.7 %                >= 55  % LV Ejection Fraction MOD 4C       46.6 %                LV Ejection Fraction MOD 2C       55 %                  DOPPLER AV Peak Velocity                  1.7 m/s               AV Peak Gradient                  10.9 mmHg             AV Mean Gradient                  5.5 mmHg              LVOT Peak Velocity                1.2 m/s               AV Area Cont Eq vti               2.3 cm2               Mitral E Point Velocity           0.74 m/s              Mitral E to A Ratio               0.92                  MV Pressure Half Time             69.4 ms                MV Area PHT                       3.2 cm2               MV Deceleration Time              239 ms                PV Peak Velocity                  1.1 m/s               PV Peak Gradient                  4.6 mmHg              RVOT Peak Velocity                0.66 m/s              * Indicates values subject to auto-interpretation LV EF:  60    % FINDINGS Left Ventricle Normal left ventricular chamber size. Mild concentric left ventricular hypertrophy. Normal left ventricular systolic function.  Left ventricular ejection fraction is visually estimated to be 60%. Normal diastolic function. Right Ventricle Normal right ventricular size and systolic function. Right Atrium Normal right atrial size. Normal inferior vena cava size and inspiratory collapse. Left Atrium Normal left atrial size. Agitated saline (bubble) study was performed. With Valsalva maneuver. No evidence of right to left shunt by agitated saline challenge. Existing IV was used. Mitral Valve Structurally normal mitral valve without significant stenosis or regurgitation. Aortic Valve Aortic sclerosis without stenosis. No aortic insufficiency. Tricuspid Valve Structurally normal tricuspid valve without significant stenosis or regurgitation. Pulmonic Valve The pulmonic valve is not well visualized. No pulmonic insufficiency. Pericardium Normal pericardium without effusion. Aorta Normal aortic root for body surface area. Ascending aorta not well visualized. Zakiya Rebollar MD (Electronically Signed) Final Date:     28 April 2021                 21:30    EC-CHRISTOPHER W/O CONT    Result Date: 4/30/2021  Results Will be Available after Interpretation by Cardiologist.    DX-ABDOMEN FOR TUBE PLACEMENT    Result Date: 4/30/2021 4/30/2021 12:44 PM HISTORY/REASON FOR EXAM:  Line evaluation. TECHNIQUE/EXAM DESCRIPTION AND NUMBER OF VIEWS:  1 view(s) of the abdomen. COMPARISON:  4/28/2021 FINDINGS: Enteric tube has been placed. The tip projects over the distal stomach or  "duodenal bulb. There are scattered loops of air-filled bowel.     Feeding tube placement with the tip projecting over the distal stomach or duodenal bulb    DX-ABDOMEN FOR TUBE PLACEMENT    Result Date: 4/28/2021 4/28/2021 12:24 PM HISTORY/REASON FOR EXAM:  Tube evaluation. TECHNIQUE/EXAM DESCRIPTION AND NUMBER OF VIEWS:  1 view(s) of the abdomen. COMPARISON:  None. FINDINGS: Limited single view of the abdomen performed primarily to evaluate enteric tube position. The tip projects over the expected area of the distal stomach. The bowel gas pattern is within normal limits.     Feeding tube with tip projecting over the expected area of the distal stomach.      Micro:  Results     Procedure Component Value Units Date/Time    BLOOD CULTURE [[id number]] Collected: 05/07/21 0758    Order Status: Completed Specimen: Blood from Peripheral Updated: 05/12/21 1100     Significant Indicator NEG     Source BLD     Site PERIPHERAL     Culture Result No growth after 5 days of incubation.    Narrative:      Collected By:[id number] HARJIT HERRERA  Per Hospital Policy: Only change Specimen Src: to \"Line\" if  specified by physician order.  Left Forearm/Arm    Culture Respiratory W/ Grm Stn - BAL [[id number]] Collected: 05/06/21 1055    Order Status: Completed Specimen: Respirate from Bronchoalveolar Lavage Updated: 05/08/21 1100     Significant Indicator NEG     Source RESP     Site BRONCHOALVEOLAR LAVAGE     Culture Result Rare growth usual upper respiratory bruce  including yeast.  No clinically significant Staphylococcus aureus, Methicillin  Resistant Staphylococcus aureus, or Pseudomonas species  isolated.       Gram Stain Result Many WBCs.  No organisms seen.      Narrative:      Collected By:[id number] MOSES KAPLAN  Collected By:[id number] MOSES KAPLAN    BLOOD CULTURE [[id number]] Collected: 05/07/21 1010    Order Status: Completed Specimen: Blood from Peripheral Updated: 05/08/21 0711     Significant Indicator NEG     Source BLD     " "Site PERIPHERAL     Culture Result No Growth  Note: Blood cultures are incubated for 5 days and  are monitored continuously.Positive blood cultures  are called to the RN and reported as soon as  they are identified.      Narrative:      Collected By:[id number] HARJIT HERRERA  Per Hospital Policy: Only change Specimen Src: to \"Line\" if  specified by physician order.  Left AC    BLOOD CULTURE [[id number]]     Order Status: Canceled Specimen: Blood from Peripheral     BLOOD CULTURE [[id number]]     Order Status: Canceled Specimen: Blood from Peripheral     GRAM STAIN [[id number]] Collected: 05/06/21 1055    Order Status: Completed Specimen: Respirate Updated: 05/06/21 2054     Significant Indicator .     Source RESP     Site BRONCHOALVEOLAR LAVAGE     Gram Stain Result Many WBCs.  No organisms seen.      Narrative:      Collected By:[id number] MOSES KAPLAN  Collected By:[id number] MOSES KAPLAN    BLOOD CULTURE [[id number]] Collected: 05/01/21 1300    Order Status: Completed Specimen: Blood from Peripheral Updated: 05/06/21 1500     Significant Indicator NEG     Source BLD     Site PERIPHERAL     Culture Result No growth after 5 days of incubation.    Narrative:      Collected By:[id number] LESA VIZCARRA  Per Hospital Policy: Only change Specimen Src: to \"Line\" if  specified by physician order.  Right Forearm/Arm    BLOOD CULTURE [[id number]] Collected: 05/01/21 1305    Order Status: Completed Specimen: Blood from Peripheral Updated: 05/06/21 1500     Significant Indicator NEG     Source BLD     Site PERIPHERAL     Culture Result No growth after 5 days of incubation.    Narrative:      Collected By:[id number] LESA VIZCARRA  Per Hospital Policy: Only change Specimen Src: to \"Line\" if  specified by physician order.  Left Forearm/Arm          Assessment:  Active Hospital Problems    Diagnosis    • *Spinal epidural abscess [G06.1]    • History of ischemic stroke [Z86.73]    • Sepsis due to Streptococcus species ([facility]) [A40.9]    • " "Acute bilateral back pain [M54.9]    • Thrombocytopenia (HCC) [D69.6]    • Type 2 diabetes mellitus without complication, without long-term current use of insulin (HCC) [E11.9]    • Coronary artery disease due to calcified coronary lesion: CABG x4, July 2015 [I25.10, I25.84]    • Essential hypertension, benign [I10]    • Hypokalemia [E87.6]    • Hypomagnesemia [E83.42]    • Hyponatremia [E87.1]    • Difficulty swallowing [R13.10]    • Diabetic ketoacidosis (HCC) [E11.10]    • Marijuana abuse [F12.10]    • High anion gap metabolic acidosis [E87.2]    • Tobacco abuse [Z72.0]    • Dyslipidemia [E78.5]    • Status post urethrostomy (HCC) [Z93.6]    .    Assessment:  60 y.o.  admitted 4/25/2021. Pt has a past medical history of uncontrolled diabetes, CAD status post CABG times 4/2015, marijuana use and to arthritis.  Presented complaining of neck and back pain ongoing for approximately 1 week and fall getting out of the bathtub.  He had been seen at urgent care for back pain approximately 1 week prior to admission and given Toradol injections.       Hospital Course:   Afebrile and vitals unremarkable other than hypertension.  Leukocytosis ongoing since arrival.  MRI C-spine on 4/26 with epidural collection noted from C2-T3.  Largest collection noted in upper thoracic posterior epidural space at T2-T3.  This is causing multilevel cord compression.  Also with likely discitis at C5-6 and osteomyelitis at C5-C7.  Blood cultures on 4/25+ for Streptococcus species.    Code blue 5/3-mucus plugs     Problem List  Sepsis/shock, strep anginosis  -Blood cultures on 4/25 2/2 + Streptococcus anginosus, penicillin and cephalosporin susceptible  -Blood cultures on 4/27 1/2 + viridans Streptococcus   -Blood cultures on 4/28 1/2 + CoNS -likely contaminant  -Blood cultures on 5/1 and 5/7 are no growth to date  -TTE with no vegetations  -CHRISTOPHER on 4/30, still no official read  \"Results Will be Available after Interpretation by " "Cardiologist\"    VDRF-Bronchoscopy on 4/28 with thick secretions  Bronch 5/3 mucus plugs, thick secretions  Bronch 5/4 pending-no signif secretions noted  Bronch 5/6 with thick secretion RML-culture neg    Leukocytosis, persistent.   Cervical thoracic infection, epidural abscess at C2-T3 as well as discitis and osteomyelitis, +GPCs-Streptococcus anginosus   MRI thoracic spine on 4/27 + posterior epidural fluidcollection/abscess in lower cervical spine extending to T6 level, also noted abnormal signal within the cervical and upper thoracic cord  -s/p OR 4/28 for see 4-T2 laminectomy, decompression of thecal sac and nerve roots as well as cervical thoracic extradural spinal mass/epidural abscess removal, linda purulence during OR, no CSF leak per op note  -Operative cultures from cervical thoracic epidural 1/ 2 +strep anginosis    CVA, Right cerebellar stroke, possibly due to venous spasm associated with the epidural abscess.   -MRA neck with and without on 4/28, possible moderate focal stenosis in mid left CVA.  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  MRI without on 4/28 with acute large right cerebellar infarct with small hemorrhage  Uncontrolled diabetes  Transaminitis, improving RUQ USG mild HMG. Switched off ampicillin 5/9/2021     Plan   Continue ceftriaxone- LFTs continue to improve  Anticipate a 6-week IV antibiotic course for the bacteremia and spinal infection-stop date 6/11/2021  Repeat MRI spine prior to stopping antibiotics-extend antibiotics if indicated  FU CHRISTOPHER result-still pending  Keep BS under 150 to help control current infection  Bronch results (5/6)-neg    Palliative care consult and goals of care discussion appreciated  Plan for PEG  Plan for rehab     Prognosis guarded     "

## 2021-05-12 NOTE — RESPIRATORY CARE
Ventilator Daily Summary    Vent Day # 15  Trach  Day #3      Ventilator settings changed this shift: None         Respiratory Procedures:    Plan: Continue current ventilator settings and wean mechanical ventilation as tolerated per physician orders.

## 2021-05-12 NOTE — DIETARY
Nutrition support weekly update:  Day 17 of admit.  Perry Davis is a 60 y.o. male with admitting DX of DKA, type 2, not at goal, Cerebellar cerebrovascular accident (CVA) without late effect.  Tube feeding initiated on 4/28/21. Current TF via gastric Cortrak is Impact Peptide @ 50 mL/hour providing 1800 kcals, 113 gms protein and 924 mL free water.    Assessment:  Weight 5/12/21: 95 kg. Weights have been variable with range of 72.3 kg to 95.8 kg since admit. RD questions accuracy of pt's weights.   Re-estimate of nutritional needs as indicated: not indicated.      Evaluation:   1. Per GI note on 5/11/21, pt will have placement of PEG within the next few days.   2. Skin: DTI to sacrococcygeal area. Wound nurse following. Current tube feed is providing 113 gms of protein. This should meet pt's protein needs to encourage wound healing. This should also meet pt's micronutrient needs for healing.  3. I&Os:  +11.7 liters of fluid. Positive fluid balance may contribute to weight gain; however, most likely will not be responsible for extent of pt's weight variability.    4. GI: LBM on 5/12/21 (large, soft)  5. : garcia UOP  6. Labs: 5/12/21: Bun=33 (H but trending down), creatinine=0.46 (L), sodium=142. 5/10/21: triglycerides=94. Glucose acc-ck=  7. Meds: Dulcolax, Rocephin, vitamin B12, Colace, Pepcid, Lantus, SSI, Miralax, senna-docusate, vitamin D  8. Current tube feed order of Impact Peptide continues to be appropriate.     Malnutrition risk: criteria not met    Recommendations/Plan:  1. Continue with Impact Peptide at 50 mL/hour as ordered.  2. Additional fluids per MD.    3. Monitor weights    RD will follow.

## 2021-05-12 NOTE — CARE PLAN
The patient is Stable - Low risk of patient condition declining or worsening         Summary of progress made towards problems/goals:          Problem: Communication  Goal: The ability to communicate needs accurately and effectively will improve  Outcome: Not Progressing     Problem: Mobility  Goal: Risk for activity intolerance will decrease  Outcome: Not Progressing     Problem: Urinary Elimination:  Goal: Ability to reestablish a normal urinary elimination pattern will improve  Outcome: Not Progressing     Problem: Dysphagia  Goal: Dysphagia will improve  Outcome: Not Progressing     Problem: Bowel Elimination  Goal: Establish and maintain regular bowel function  Outcome: Not Progressing     Problem: Mobility - Stroke  Goal: Patient's capacity to carry out activities will improve  Outcome: Not Progressing  Goal: Spasticity will be prevented or improved  Outcome: Not Progressing  Goal: Subluxation will be prevented or improved  Outcome: Not Progressing     Problem: Self Care  Goal: Patient will have the ability to perform ADLs independently or with assistance (bathe, groom, dress, toilet and feed)  Outcome: Not Progressing

## 2021-05-13 ENCOUNTER — APPOINTMENT (OUTPATIENT)
Dept: RADIOLOGY | Facility: MEDICAL CENTER | Age: 61
DRG: 003 | End: 2021-05-13
Attending: PSYCHIATRY & NEUROLOGY
Payer: MEDICARE

## 2021-05-13 LAB
ALBUMIN SERPL BCP-MCNC: 1.9 G/DL (ref 3.2–4.9)
ALBUMIN/GLOB SERPL: 0.4 G/DL
ALP SERPL-CCNC: 313 U/L (ref 30–99)
ALT SERPL-CCNC: 139 U/L (ref 2–50)
ANION GAP SERPL CALC-SCNC: 7 MMOL/L (ref 7–16)
AST SERPL-CCNC: 55 U/L (ref 12–45)
BASOPHILS # BLD AUTO: 0.2 % (ref 0–1.8)
BASOPHILS # BLD: 0.02 K/UL (ref 0–0.12)
BILIRUB SERPL-MCNC: 0.4 MG/DL (ref 0.1–1.5)
BUN SERPL-MCNC: 29 MG/DL (ref 8–22)
CALCIUM SERPL-MCNC: 8.2 MG/DL (ref 8.5–10.5)
CHLORIDE SERPL-SCNC: 110 MMOL/L (ref 96–112)
CO2 SERPL-SCNC: 24 MMOL/L (ref 20–33)
CREAT SERPL-MCNC: 0.47 MG/DL (ref 0.5–1.4)
EOSINOPHIL # BLD AUTO: 0.12 K/UL (ref 0–0.51)
EOSINOPHIL NFR BLD: 1.2 % (ref 0–6.9)
ERYTHROCYTE [DISTWIDTH] IN BLOOD BY AUTOMATED COUNT: 55.1 FL (ref 35.9–50)
GLOBULIN SER CALC-MCNC: 4.3 G/DL (ref 1.9–3.5)
GLUCOSE BLD-MCNC: 103 MG/DL (ref 65–99)
GLUCOSE BLD-MCNC: 122 MG/DL (ref 65–99)
GLUCOSE BLD-MCNC: 137 MG/DL (ref 65–99)
GLUCOSE SERPL-MCNC: 108 MG/DL (ref 65–99)
HCT VFR BLD AUTO: 31.2 % (ref 42–52)
HGB BLD-MCNC: 9.4 G/DL (ref 14–18)
IMM GRANULOCYTES # BLD AUTO: 0.07 K/UL (ref 0–0.11)
IMM GRANULOCYTES NFR BLD AUTO: 0.7 % (ref 0–0.9)
LYMPHOCYTES # BLD AUTO: 1.81 K/UL (ref 1–4.8)
LYMPHOCYTES NFR BLD: 18.2 % (ref 22–41)
MAGNESIUM SERPL-MCNC: 2.2 MG/DL (ref 1.5–2.5)
MCH RBC QN AUTO: 29.9 PG (ref 27–33)
MCHC RBC AUTO-ENTMCNC: 30.1 G/DL (ref 33.7–35.3)
MCV RBC AUTO: 99.4 FL (ref 81.4–97.8)
MONOCYTES # BLD AUTO: 0.56 K/UL (ref 0–0.85)
MONOCYTES NFR BLD AUTO: 5.6 % (ref 0–13.4)
NEUTROPHILS # BLD AUTO: 7.39 K/UL (ref 1.82–7.42)
NEUTROPHILS NFR BLD: 74.1 % (ref 44–72)
NRBC # BLD AUTO: 0 K/UL
NRBC BLD-RTO: 0 /100 WBC
PLATELET # BLD AUTO: 273 K/UL (ref 164–446)
PMV BLD AUTO: 12.2 FL (ref 9–12.9)
POTASSIUM SERPL-SCNC: 4.1 MMOL/L (ref 3.6–5.5)
PROT SERPL-MCNC: 6.2 G/DL (ref 6–8.2)
RBC # BLD AUTO: 3.14 M/UL (ref 4.7–6.1)
SODIUM SERPL-SCNC: 141 MMOL/L (ref 135–145)
TRIGL SERPL-MCNC: 61 MG/DL (ref 0–149)
WBC # BLD AUTO: 10 K/UL (ref 4.8–10.8)

## 2021-05-13 PROCEDURE — 94003 VENT MGMT INPAT SUBQ DAY: CPT

## 2021-05-13 PROCEDURE — 700102 HCHG RX REV CODE 250 W/ 637 OVERRIDE(OP): Performed by: PSYCHIATRY & NEUROLOGY

## 2021-05-13 PROCEDURE — 770022 HCHG ROOM/CARE - ICU (200)

## 2021-05-13 PROCEDURE — 85025 COMPLETE CBC W/AUTO DIFF WBC: CPT

## 2021-05-13 PROCEDURE — 700105 HCHG RX REV CODE 258: Performed by: INTERNAL MEDICINE

## 2021-05-13 PROCEDURE — 700111 HCHG RX REV CODE 636 W/ 250 OVERRIDE (IP): Performed by: STUDENT IN AN ORGANIZED HEALTH CARE EDUCATION/TRAINING PROGRAM

## 2021-05-13 PROCEDURE — 71045 X-RAY EXAM CHEST 1 VIEW: CPT

## 2021-05-13 PROCEDURE — 700102 HCHG RX REV CODE 250 W/ 637 OVERRIDE(OP): Performed by: INTERNAL MEDICINE

## 2021-05-13 PROCEDURE — 700101 HCHG RX REV CODE 250: Performed by: STUDENT IN AN ORGANIZED HEALTH CARE EDUCATION/TRAINING PROGRAM

## 2021-05-13 PROCEDURE — 80053 COMPREHEN METABOLIC PANEL: CPT

## 2021-05-13 PROCEDURE — A9270 NON-COVERED ITEM OR SERVICE: HCPCS | Performed by: INTERNAL MEDICINE

## 2021-05-13 PROCEDURE — 700102 HCHG RX REV CODE 250 W/ 637 OVERRIDE(OP): Performed by: STUDENT IN AN ORGANIZED HEALTH CARE EDUCATION/TRAINING PROGRAM

## 2021-05-13 PROCEDURE — 83735 ASSAY OF MAGNESIUM: CPT

## 2021-05-13 PROCEDURE — A9270 NON-COVERED ITEM OR SERVICE: HCPCS | Performed by: STUDENT IN AN ORGANIZED HEALTH CARE EDUCATION/TRAINING PROGRAM

## 2021-05-13 PROCEDURE — 99291 CRITICAL CARE FIRST HOUR: CPT | Mod: GC | Performed by: INTERNAL MEDICINE

## 2021-05-13 PROCEDURE — 82962 GLUCOSE BLOOD TEST: CPT | Mod: 91

## 2021-05-13 PROCEDURE — 94150 VITAL CAPACITY TEST: CPT

## 2021-05-13 PROCEDURE — 84478 ASSAY OF TRIGLYCERIDES: CPT

## 2021-05-13 PROCEDURE — 94669 MECHANICAL CHEST WALL OSCILL: CPT

## 2021-05-13 PROCEDURE — 700111 HCHG RX REV CODE 636 W/ 250 OVERRIDE (IP): Performed by: INTERNAL MEDICINE

## 2021-05-13 PROCEDURE — 94799 UNLISTED PULMONARY SVC/PX: CPT

## 2021-05-13 RX ORDER — ATORVASTATIN CALCIUM 40 MG/1
40 TABLET, FILM COATED ORAL EVERY EVENING
Status: DISCONTINUED | OUTPATIENT
Start: 2021-05-13 | End: 2021-05-14 | Stop reason: HOSPADM

## 2021-05-13 RX ORDER — DOCUSATE SODIUM 50 MG/5ML
100 LIQUID ORAL 2 TIMES DAILY PRN
Status: DISCONTINUED | OUTPATIENT
Start: 2021-05-13 | End: 2021-05-14 | Stop reason: HOSPADM

## 2021-05-13 RX ORDER — POLYETHYLENE GLYCOL 3350 17 G/17G
1 POWDER, FOR SOLUTION ORAL
Status: DISCONTINUED | OUTPATIENT
Start: 2021-05-13 | End: 2021-05-14 | Stop reason: HOSPADM

## 2021-05-13 RX ORDER — BISACODYL 10 MG
10 SUPPOSITORY, RECTAL RECTAL
Status: DISCONTINUED | OUTPATIENT
Start: 2021-05-13 | End: 2021-05-14 | Stop reason: HOSPADM

## 2021-05-13 RX ADMIN — LIDOCAINE 3 PATCH: 50 PATCH TOPICAL at 17:40

## 2021-05-13 RX ADMIN — ASPIRIN 81 MG: 81 TABLET, CHEWABLE ORAL at 05:58

## 2021-05-13 RX ADMIN — CYANOCOBALAMIN TAB 500 MCG 1000 MCG: 500 TAB at 05:57

## 2021-05-13 RX ADMIN — Medication 5000 UNITS: at 05:57

## 2021-05-13 RX ADMIN — FAMOTIDINE 20 MG: 20 TABLET ORAL at 05:58

## 2021-05-13 RX ADMIN — INSULIN HUMAN 3 UNITS: 100 INJECTION, SOLUTION PARENTERAL at 23:45

## 2021-05-13 RX ADMIN — ATORVASTATIN CALCIUM 40 MG: 40 TABLET, FILM COATED ORAL at 17:37

## 2021-05-13 RX ADMIN — ENOXAPARIN SODIUM 40 MG: 40 INJECTION SUBCUTANEOUS at 05:57

## 2021-05-13 RX ADMIN — CEFTRIAXONE SODIUM 2 G: 2 INJECTION, POWDER, FOR SOLUTION INTRAMUSCULAR; INTRAVENOUS at 05:57

## 2021-05-13 RX ADMIN — FAMOTIDINE 20 MG: 20 TABLET ORAL at 17:37

## 2021-05-13 RX ADMIN — INSULIN GLARGINE 40 UNITS: 100 INJECTION, SOLUTION SUBCUTANEOUS at 17:38

## 2021-05-13 ASSESSMENT — PULMONARY FUNCTION TESTS: FVC: 0

## 2021-05-13 NOTE — CARE PLAN
Problem: Communication  Goal: The ability to communicate needs accurately and effectively will improve  Outcome: Progressing  Intervention: Develop alternate methods of communication with patient and significant other/support system  Note: Pt nods intermittently to yes/no questionsa and can mouth words (sometimes not understood by staff).      Problem: Risk for Deep Vein Thrombosis/Venous Thromboembolism  Goal: DVT/VTE Prevention Measures in Place  Outcome: Progressing  Intervention: Intermittent sequential compression device in place 23 hours per day  Flowsheets  Taken 5/13/2021 1346  SCDs, Sequential Compression Device: On  Taken 5/13/2021 0800  Mechanical Prophylaxis: SCDs, Sequential Compression Device         :

## 2021-05-13 NOTE — DISCHARGE PLANNING
Hospital Care Management Discharge Planning       Anticipated Discharge Disposition:   · [facility]     Action:   · Patient discussed during interdisciplinary ICU rounds   · Pt is medically cleared  · RN LEXY updated [facility], at [phone]  · Gaby states there is no bed available today but likely tomorrow (5/14/21)  · RN LEXY updated Dr. Singh     Barriers to Discharge:   · Bed availability      Plan:   · F/U with [facility]   · Hospital Care Management Team to continue to provide support services and assistance with discharge planning as needed.

## 2021-05-13 NOTE — PROGRESS NOTES
Gastroenterology Progress Note         Author: Cheri Roberson D.O.                                 Date & Time Created: 5/13/2021 3:20 PM         Chief complaint  PEG placement    Interval History:    This is a 60-year-old male with CAD status post CABG and uncontrolled diabetes mellitus type 2 who is consulted for gastrostomy tube placement.  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 Patient underwent a laminectomy and decompression of C3, C4, C5, C6, C7, T1 and T2 as well as extradural spinal mass/epidural abscess removal on 4/28/2021.      Patient is more alert today.  He is able to mouth words and appears frustrated.  TF at 50 cc/hr.    Review of Systems:  Unable to obtain due to his medical condition.    Physical Exam:  Vitals:    05/13/21 1300 05/13/21 1400 05/13/21 1442 05/13/21 1500   BP: 139/67 154/70  150/71   Pulse: 60 (!) 55 (!) 55 64   Resp: (!) 23 (!) 26 (!) 26 (!) 28   Temp:       TempSrc:       SpO2: 96% 96% 96% 96%   Weight:       Height:         General: No acute cardiopulmonary distress.  EENT: Tracheostomy in situ.  Cortrak in place.  Respiratory: Breath sounds present. Symmetrical rise of anterior thorax.  Cardiovascular: Normal S1, S2.  Gastrointestinal: Soft, nontender, nondistended. Normoactive bowel sounds. No guarding.   Neurologic: Awake but does not follow commands.  Skin: Warm and dry.  Midsternal scar noted.  Labs:              Recent Labs     05/11/21  0420 05/12/21  0549 05/13/21  0555   SODIUM 138 142 141   POTASSIUM 3.8 4.2 4.1   CHLORIDE 106 110 110   CO2 29 27 24   BUN 35* 33* 29*   CREATININE 0.41* 0.46* 0.47*   MAGNESIUM 2.3 2.2 2.2   CALCIUM 8.1* 8.2* 8.2*       Recent Labs     05/11/21  0420 05/12/21  0549 05/13/21  0555   ALTSGPT 267* 199* 139*   ASTSGOT 146* 88* 55*   ALKPHOSPHAT 319* 332* 313*   TBILIRUBIN 0.3 0.3 0.4   GLUCOSE 102* 113* 108*       Recent Labs      21  0420 21  0549 21  0555   RBC 3.13* 3.13* 3.14*   HEMOGLOBIN 9.5* 9.3* 9.4*   HEMATOCRIT 30.8* 30.8* 31.2*   PLATELETCT 330 291 273       Recent Labs     21  0420 21  0549 21  0555   WBC 9.7 8.7 10.0   NEUTSPOLYS 74.50* 68.10 74.10*   LYMPHOCYTES 18.00* 23.50 18.20*   MONOCYTES 5.00 5.90 5.60   EOSINOPHILS 1.50 1.60 1.20   BASOPHILS 0.10 0.20 0.20   ASTSGOT 146* 88* 55*   ALTSGPT 267* 199* 139*   ALKPHOSPHAT 319* 332* 313*   TBILIRUBIN 0.3 0.3 0.4       Hemodynamics:    Temp (24hrs), Av.4 °C (97.5 °F), Min:36.1 °C (97 °F), Max:36.8 °C (98.3 °F)  Temperature: 36.5 °C (97.7 °F), Monitored Temp: 35.9 °C (96.7 °F)    Pulse  Av.5  Min: 49  Max: 121     Blood Pressure: 150/71       Respiratory:    Vent Mode: Spont, Rate (breaths/min): 26, PEEP/CPAP: 8, PIP: 28, MAP: 15 Respiration: (!) 28, Pulse Oximetry: 96 %         Work Of Breathing / Effort: Vented    RUL Breath Sounds: Clear, RML Breath Sounds: Clear, RLL Breath Sounds: Diminished, YAMILE Breath Sounds: Clear, LLL Breath Sounds: Diminished    Fluids:      Intake/Output Summary (Last 24 hours) at 2021 1520  Last data filed at 2021 1400  Gross per 24 hour   Intake 1510 ml   Output 1825 ml   Net -315 ml            GI/Nutrition:    Orders Placed This Encounter   Procedures   • Diet NPO     Standing Status:   Standing     Number of Occurrences:   1     Order Specific Question:   Type:     Answer:   Now [1]     Order Specific Question:   Restrict to:     Answer:   Strict [1]       Medical Decision Making, by Problem:    Active Hospital Problems    Diagnosis    • *Spinal epidural abscess [G06.1]    • Acute respiratory failure with hypoxia (HCC) [J96.01]    • Transaminitis [R74.01]    • Cardiac arrest ([facility]) [I46.9]    • Constipation [K59.00]    • History of ischemic stroke [Z86.73]    • Hypokalemia [E87.6]    • Hypomagnesemia [E83.42]    • Hyponatremia [E87.1]    • Sepsis due to Streptococcus species ([facility]) [A40.9]    •  Difficulty swallowing [R13.10]    • Diabetic ketoacidosis (HCC) [E11.10]    • Acute bilateral back pain [M54.9]    • Marijuana abuse [F12.10]    • High anion gap metabolic acidosis [E87.2]    • Thrombocytopenia (HCC) [D69.6]    • Tobacco abuse [Z72.0]    • Status post urethrostomy (HCC) [Z93.6]    • Type 2 diabetes mellitus without complication, without long-term current use of insulin (HCC) [E11.9]    • Dyslipidemia [E78.5]    • Coronary artery disease due to calcified coronary lesion: CABG x4, July 2015 [I25.10, I25.84]    • Essential hypertension, benign [I10]             ASSESSMENT:   1.  Dysphagia secondary to stroke and comorbidities  2.  Acute on chronic respiratory failure, tracheostomy on 5/10/2021  3.  Right cerebellar infarct  4.  Cervical spinal epidural abscess with myelopathy status post laminectomy, decompression and removal of abscess, 4/28/2021  5.  Uncontrolled diabetes mellitus type 2  6.  CAD status post CABG     PLAN:   This is a 60-year-old male who is consulted for PEG evaluation for dysphagia secondary to cerebellar infarct and cervical spinal abscess.       Aspirin 325 mg last given on 5/11/2021.  He is currently on aspirin 81 mg.  Plan for an EGD/PEG early next week likely Monday.  Hold TF at midnight on Sunday.    Discussed with the patient's nurse and resident physician.       Quality-Core Measures    Cheri Roberson D.O.  Gastroenterology  [facility]  [phone]

## 2021-05-13 NOTE — RESPIRATORY CARE
Ventilator Daily Summary    Vent Day # 16  Trach Days 4    Ventilator settings changed this shift: None    Weaning trials: None    Respiratory Procedures:    Plan: Continue current ventilator settings and wean mechanical ventilation as tolerated per physician orders.

## 2021-05-13 NOTE — PROGRESS NOTES
[facility] Progress Note      Admit Date: 4/25/2021    Resident(s): Lissette Singh M.D.   Attending:  BREANNA ALCARAZ/ Dr. Gannon     Patient ID:    Name:  Perry Davis   YOB: 1960  Age:  60 y.o.  male   MRN:  [id number]    Hospital Course (carried forward and updated):  Perry Davis is a 60 y.o. male with a previous history of uncontrolled Diabetes -Hb1C 13 , HLD, CAD s/p CABGx4  2015, tobacco use ,OA, chronic low pain admitted  4/25 with epidural abscess w/ cord compression, discitis involving cervical and thoracic spine--> underwent emergent laminectomy and decompression by Dr. Hazel 4/28.  Hospital course complicated by acute right cerebellar stroke and strep anginosus bacteremia on Ceftriaxone, followed by ID.   Patient currently on tracheostomy     Consultants:  Critical Care  Neurology   Infectious disease   General surgery   Gastroenterology     Interval Events:  -No acute events overnight   -Trach in place, connected to ventilator. No issues noted   -As per GI, PEG to be done after 7 days of last dose of aspirin 325 mg which tentatively would be Monday. In the meantime, if he gets accepted to LTAC will consider PEG tube at the facility   -Pending neurocognitive eval by speech       Vitals Range last 24h:  Temp:  [36.1 °C (97 °F)-36.8 °C (98.3 °F)] 36.5 °C (97.7 °F)  Pulse:  [55-68] 59  Resp:  [25-55] 26  BP: (103-153)/(51-66) 122/59  SpO2:  [92 %-100 %] 92 %      Intake/Output Summary (Last 24 hours) at 5/13/2021 1251  Last data filed at 5/13/2021 1200  Gross per 24 hour   Intake 1510 ml   Output 1825 ml   Net -315 ml        ROS  Unable to obtain     PHYSICAL EXAM:  Vitals:    05/13/21 1000 05/13/21 1100 05/13/21 1143 05/13/21 1200   BP: 120/59 111/56  122/59   Pulse: 60 60 67 (!) 59   Resp: (!) 26 (!) 26 (!) 26 (!) 26   Temp:  36.4 °C (97.6 °F)  36.5 °C (97.7 °F)   TempSrc:  Temporal  Temporal   SpO2: 99% 97% 97% 92%   Weight:       Height:        Body mass index is 30.91  kg/m².    O2 therapy: Pulse Oximetry: 92 %, O2 Delivery Device: Ventilator    Date 05/13/21 0700 - 05/14/21 0659   Shift 8652-6785 8575-1325 9022-9983 24 Hour Total   INTAKE   Other 60   60     Medications (PO/Enteral Liquids) 60   60   NG/   300     Intake (mL) (Enteral Tube 05/09/21 Cortrak - Gastric Left nare) 300   300   IV Piggyback 90   90     Volume (mL) (cefTRIAXone (ROCEPHIN) 2 g in  mL IVPB) 90   90   Shift Total 450   450   OUTPUT   Urine 150   150     Output (mL) (Urethral Catheter Other (Comment) 16 Fr.) 150   150   Shift Total 150   150      300        Physical Exam  Constitutional:       Appearance: Normal appearance.   HENT:      Head: Normocephalic and atraumatic.      Comments: Has trach in place      Nose: Nose normal.      Mouth/Throat:      Mouth: Mucous membranes are moist.   Eyes:      Pupils: Pupils are equal, round, and reactive to light.   Cardiovascular:      Rate and Rhythm: Normal rate and regular rhythm.      Pulses: Normal pulses.      Heart sounds: Normal heart sounds.   Pulmonary:      Effort: Pulmonary effort is normal.      Breath sounds: Normal breath sounds.   Abdominal:      General: There is no distension.      Palpations: Abdomen is soft.   Skin:     General: Skin is warm.   Neurological:      Comments: Patient tries to talk but unable to understand   Lower extremities flaccid   Withdraws upper extremities minimally               Recent Labs     05/11/21 0420 05/12/21  0549 05/13/21  0555   SODIUM 138 142 141   POTASSIUM 3.8 4.2 4.1   CHLORIDE 106 110 110   CO2 29 27 24   BUN 35* 33* 29*   CREATININE 0.41* 0.46* 0.47*   MAGNESIUM 2.3 2.2 2.2   CALCIUM 8.1* 8.2* 8.2*     Recent Labs     05/11/21 0420 05/12/21  0549 05/13/21  0555   ALTSGPT 267* 199* 139*   ASTSGOT 146* 88* 55*   ALKPHOSPHAT 319* 332* 313*   TBILIRUBIN 0.3 0.3 0.4   GLUCOSE 102* 113* 108*     Recent Labs     05/11/21 0420 05/12/21  0549 05/13/21  0555   RBC 3.13* 3.13* 3.14*   HEMOGLOBIN  9.5* 9.3* 9.4*   HEMATOCRIT 30.8* 30.8* 31.2*   PLATELETCT 330 291 273     Recent Labs     05/11/21  0420 05/12/21  0549 05/13/21  0555   WBC 9.7 8.7 10.0   NEUTSPOLYS 74.50* 68.10 74.10*   LYMPHOCYTES 18.00* 23.50 18.20*   MONOCYTES 5.00 5.90 5.60   EOSINOPHILS 1.50 1.60 1.20   BASOPHILS 0.10 0.20 0.20   ASTSGOT 146* 88* 55*   ALTSGPT 267* 199* 139*   ALKPHOSPHAT 319* 332* 313*   TBILIRUBIN 0.3 0.3 0.4       Meds:  • docusate sodium  100 mg     • bisacodyl  10 mg     • polyethylene glycol/lytes  1 Packet     • atorvastatin  40 mg     • aspirin  81 mg     • insulin glargine  40 Units     • cefTRIAXone (ROCEPHIN) IV  2 g Stopped (05/13/21 0627)   • oxyCODONE immediate-release  10 mg     • oxyCODONE immediate-release  5 mg     • cyclobenzaprine  5 mg     • insulin regular  3-14 Units      And   • dextrose 50%  50 mL     • enoxaparin (LOVENOX) injection  40 mg     • acetylcysteine  3-4 mL     • albuterol  2.5 mg     • MD Alert...Adult ICU Electrolyte Replacement per Pharmacy       • lidocaine  1-2 mL     • ondansetron  4 mg     • MD ALERT...DO NOT ADMINISTER NSAIDS or ASPIRIN unless ORDERED By Neurosurgery  1 Each     • fleet  1 Each     • Respiratory Therapy Consult       • ondansetron  4 mg     • ipratropium-albuterol  3 mL     • labetalol  10 mg     • Pharmacy  1 Each     • cyanocobalamin  1,000 mcg     • senna-docusate  1 tablet     • vitamin D  5,000 Units     • famotidine  20 mg     • fentaNYL  50 mcg      And   • fentaNYL  100 mcg     • lidocaine  3 Patch          Procedures:  Bronchoscopy 05/06/21  Bronchoscopy on 05/03  -Decompressive laminectomy on 4/28  -Central line placement on 5/01    Imaging:  DX-CHEST-PORTABLE (1 VIEW)   Final Result         1.  Pulmonary edema and/or infiltrates are identified, which appear somewhat increased since the prior exam.      DX-CHEST-PORTABLE (1 VIEW)   Final Result         1.  Interstitial pulmonary parenchymal prominence suggest chronic underlying lung disease, component  of interstitial edema and/or infiltrates not excluded.      DX-CHEST-PORTABLE (1 VIEW)   Final Result         1.  Interstitial pulmonary parenchymal prominence suggest chronic underlying lung disease, component of interstitial edema and/or infiltrates not excluded.      DX-CHEST-LIMITED (1 VIEW)   Final Result      Interval tracheostomy tube placement      New retrocardiac opacity partially effaces the diaphragm and most likely represents atelectasis      DX-CHEST-PORTABLE (1 VIEW)   Final Result      1.  Improvement of LEFT lung base infiltrate or atelectasis.   2.  No other significant change from prior exam.         DX-CHEST-PORTABLE (1 VIEW)   Final Result         1.  Hazy left lower lobe infiltrates.      DX-CHEST-PORTABLE (1 VIEW)   Final Result      No significant interval change.      DX-ABDOMEN FOR TUBE PLACEMENT   Final Result      Cortrak feeding tube tip projects in the region of the proximal stomach.      DX-CHEST-PORTABLE (1 VIEW)   Final Result         No significant interval change.      US-RUQ   Final Result      Borderline mild hepatomegaly.      Otherwise negative right upper quadrant ultrasound.      DX-CHEST-PORTABLE (1 VIEW)   Final Result         Decreased left basilar opacity.      DX-CHEST-PORTABLE (1 VIEW)   Final Result      Increasing left basilar airspace opacities. No significant cardiopulmonary change otherwise. Repositioned endotracheal tube.      US-EXTREMITY ARTERY LOWER UNILAT RIGHT   Final Result      DX-ABDOMEN FOR TUBE PLACEMENT   Final Result      Feeding tube tip projects over the distal stomach or first portion of the duodenum.      CT-CSPINE WITHOUT PLUS RECONS   Final Result      1.  Postsurgical changes C3-T1 laminectomies.   2.  New irregular linear and mottled lucency in the anterior C5 vertebral body which is likely related to osteomyelitis.   3.  Prevertebral soft tissue swelling.   4.  Extensive known extensive epidural abscess seen on prior MRI is not adequately  evaluated on CT.      CT-CTA CHEST PULMONARY ARTERY W/ RECONS   Final Result      1.  No evidence of pulmonary embolism.      2.  Bilateral lower lobe atelectasis or pneumonia, left greater than right.      3.  Small left pleural effusion.      4.  CABG changes.            DX-CHEST-PORTABLE (1 VIEW)   Final Result      Endotracheal tube terminates approximately 2.5 cm above the you.      Atelectasis with left lung aeration. No pleural effusion or pneumothorax.      OB-AVCBROD-3 VIEW   Final Result      No dilated bowel      DX-ABDOMEN FOR TUBE PLACEMENT   Final Result      Feeding tube placement with the tip projecting over the distal stomach or duodenal bulb      EC-CHRISTOPHER W/O CONT         CT-HEAD W/O   Final Result         1.  Low-density changes in the right cerebellar hemisphere, compatible with evolving infarct, streak artifacts minimally limits evaluation of the posterior fossa for subtle subarachnoid hemorrhage, no intracranial hemorrhage is readily identified.   2.  Atherosclerosis.      DX-CHEST-PORTABLE (1 VIEW)   Final Result         1.  No acute cardiopulmonary disease.      DX-CHEST-PORTABLE (1 VIEW)   Final Result      Mild left basilar opacity may represent atelectasis. Infection not excluded.      Improved aeration of the left lung.      Atherosclerotic plaque.         EC-ECHOCARDIOGRAM COMPLETE W/O CONT   Final Result      IR-DRAIN-BLADDER SUPRAPUB W/CATH   Final Result      1.     Ultrasound and fluoroscopic guided placement of a 16 [place] tip silicone suprapubic bladder catheter.      2.     Cystogram documenting catheter placement.         MR-BRAIN-W/O   Final Result      Acute large right cerebellar posterior inferior cerebellar artery territory infarct with possible small amount of petechial hemorrhage.      MR-MRA HEAD-W/O   Final Result      Findings suggesting right vertebral artery occlusion.      MR-MRA NECK-WITH & W/O AND SEQUENCES   Final Result      1.  Small caliber right  vertebral artery which is not visualized on the noncontrast time-of-flight technique could be related to retrograde flow or diminutive caliber and sluggish flow.   2.  Possible moderate focal stenosis in the mid cervical left vertebral artery.   3.  No significant carotid stenosis.      DX-ABDOMEN FOR TUBE PLACEMENT   Final Result         Feeding tube with tip projecting over the expected area of the distal stomach.      DX-CHEST-PORTABLE (1 VIEW)   Final Result      1.  Worsening consolidation of LEFT hemithorax with shift of mediastinal structures to the LEFT suggesting atelectasis, possibly due to endobronchial process.   2.  Supportive tubing as described above.   3.  No pneumothorax.      DX-CHEST-PORTABLE (1 VIEW)   Final Result         Endotracheal tube with tip projecting over the mid thoracic trachea.      Layering left pleural effusion with left lower lung opacity.      DX-SPINE-ANY ONE VIEW   Final Result      Digitized intraoperative radiograph is submitted for review.  This examination is not for diagnostic purpose but for guidance during a surgical procedure.      DX-PORTABLE FLUOROSCOPY < 1 HOUR   Final Result      Portable fluoroscopy utilized for 2 seconds.         INTERPRETING LOCATION: [address] [place] [address], [facility]      CT-HEAD W/O   Final Result         1.  Low-density in the region of the right cerebellar hemisphere, appearance compatible with evolving infarct.      These findings were discussed with the patient's on-call clinician, Deniz, on 4/28/2021 6:14 AM.      MR-THORACIC SPINE-WITH & W/O   Final Result      1.  Posterior epidural fluid collection/abscess within the lower cervical spine extending inferiorly to about the T6 level which could represent epidural abscess and/or hematoma. This measures 8 mm in maximal thickness at T2-T3 with at least moderate    thecal sac stenosis.   2.  Abnormal signal within the cervical cord and upper thoracic cord suggesting cord edema or  infectious/inflammatory etiology.   3.  Lower cervical spine findings are detailed on earlier report.   4.  Prominent enhancement along the surface of the mid and lower thoracic cord is of uncertain etiology and as detailed above, possibly representing leptomeningeal disease/infection. See details above.   These findings were discussed with Dr. Cano on 4/28/2021 10:01 AM.         MR-LUMBAR SPINE-WITH & W/O   Final Result      No evidence of lumbar spine infection or epidural abscess.      Mild spondylosis as detailed above without spinal stenosis.      DX-CHEST-PORTABLE (1 VIEW)   Final Result      1.  Hypoinflation with left basilar atelectasis.   2.  Mild perihilar interstitial prominence may be related to hypoinflation or edema.      MR-CERVICAL SPINE-WITH & W/O   Final Result      1.  There is multiseptated abnormal peripherally enhancing epidural collection noted extending from C2 to the visualized lower thoracic level. Largest collection is noted in the upper thoracic posterior epidural space at the levels of T2 and T3. The    lower extent of the collection is not imaged. This is causing multilevel effacement of the thecal sac and cord compression. The thoracic spinal cord is anteriorly displaced and compressed at the levels of T2 and T3. Pre and postcontrast MR examination of    the thoracic spine is recommended for further evaluation.   2.  There is effacement of the cervical thecal sac due to the multiseptated epidural fluid collection.   3.  The cervical spinal cord is mildly enlarged with minimal increased T2 signal intensity. The possibility of cord inflammation cannot be entirely excluded.   4.  Abnormal T2 hyperintensity at C5-6 disc space likely representing discitis.   5.  Abnormal bone marrow edema of C5, C6 and C7 vertebral bodies with edema concerning for osteomyelitis.   6.  There is also focal enhancement of C6 vertebral body likely secondary to the osteomyelitis.   7.  Prevertebral  edema/fluid collection.   8.  Nonvisualization of flow void of bilateral vertebral arteries concerning for age-indeterminate bilateral vertebral occlusions.         CT-CHEST (THORAX) WITH   Final Result      No displaced rib fracture is identified.      No acute cardiopulmonary process is seen.      Atherosclerotic plaque including coronary artery calcification.      CT-TSPINE W/O PLUS RECONS   Final Result      No CT evidence of acute traumatic abnormality.      Mild T6/7 anterior wedging compatible with healed mild chronic compression fracture and there is interbody fusion.      CT-LSPINE W/O PLUS RECONS   Final Result      No CT evidence of acute traumatic injury.      CT-CSPINE WITHOUT PLUS RECONS   Final Result      Multilevel degenerative changes.      Prevertebral edema. Further evaluation with MRI is recommended as this can be seen in the setting of ligamentous injury.      Bilateral carotid atherosclerotic plaque.             ASSESSEMENT and PLAN:    * Spinal epidural abscess- (present on admission)  Assessment & Plan  -MRI C/T-spine with abnormal peripherally enhancing epidural collection extending from C2 to visualize lower thoracic 11.  Largest collection in the upper thoracic posterior epidural space at the level of T2-T3.  Lower extent of the collection is causing multilevel effacement of the thecal sac and cord compression.    -Underwent emergent laminectomy of C3-C4 C5-C6, C6/C7, T1-T2 laminectomy, decompression of thecal sac and nerves by Dr. Hazel 4/28/2021.   -Epidural drain removed on 5/03  -Goal blood pressure systolic of less than 160  -Palliative care consulted for goals of care discussion: Family wants full treatment for now       Acute respiratory failure with hypoxia (HCC)  Assessment & Plan  -secondary to epidural abscess and stroke   -Was intubated initially    -Patient underwent trach placement on 05/10  -RT/O2 protocols                  Transaminitis  Assessment & Plan  Transamanitis is  improving. Hep panel negative, TSH normal.   -Hold statin, avoid hepatotoxic medications  -RUQ U/S: unremarkable except for borderline mild hepatomegaly    Cardiac arrest (HCC)  Assessment & Plan  -Patient had cardiac arrest on 05/03, ROSC achieved in 4 mins after CPR and  Epinephrine  -Most likely secondary to aspiration, bed side bronchoscopy showed multiple mucous plugs   -Re intubated again     History of ischemic stroke  Assessment & Plan  -Found to have acute change in mental status 4/27/2021.   -CT head w/o concerning for evolving right cerebellar infarct.   -MRA brain, MRA neck: Findings suggesting right vertebral artery occlusion. Possible moderate focal stenosis in the mid cervical left vertebral artery.  TTE with bubble study: EF 60%, no evidence of right-to-left shunt, no valve lesions.  CHRISTOPHER negative  Goal euthermia, normotension, eunatremia  -Might need Long term care facility  - on aspirin and DVT prophylaxis  -Hold statin currently given elevated LFTs    Hyponatremia- (present on admission)  Assessment & Plan  Resolved    Difficulty swallowing- (present on admission)  Assessment & Plan  -Presented with difficulty swallowing, is coughing and vomiting when swallowing large/hard foods  -Complicated by stroke   -Currently on coretrak for tube feeds, consulted GI for PEG tube     Sepsis due to Streptococcus species (HCC)- (present on admission)  Assessment & Plan  MRI C-T-spine with evidence of epidural abscess, discitis, vertebral osteomyelitis.  Underwent emergent laminectomy, decompression 4/28.  Blood culture 4/24 and cervical thoracic 4/28 : Growth of Streptococcus anginosus.   -Repeat blood cultures has been negative  -TTE did not show any concern of infective endocarditis  -CHRISTOPHER negative  -Currently on ceftriaxone,  total duration of 6 weeks last date would be 06/11  -Bronchoscopy with BAL 05/06/21, c/s NGTD, Blood c/s 5/7/21: NGTD  -ID on board    Thrombocytopenia (HCC)- (present on  admission)  Assessment & Plan  -Resolved  -Plt 47 on 4/26. This is likely due to sepsis, though lower on the differential includes previously undiagnosed infection.  Hep panel negative. HIV non reactive    Acute bilateral back pain- (present on admission)  Assessment & Plan  -Presented with worsening neck and upper back pain  -Secondary to epidural abscess and cord compression  -S/p laminectomy and currently on IV antibiotics  -Needs long term facility     Status post urethrostomy (HCC)- (present on admission)  Assessment & Plan  -History of perineal urethrostomy 2018  -Acute urinary retention post surgical intervention 4/28  -Unable to place Gomez catheter through urethrostomy.    -Suprapubic catheter placement by interventional radiology    Type 2 diabetes mellitus without complication, without long-term current use of insulin ([facility])- (present on admission)  Assessment & Plan  -Chronic history of diabetes  -Last HbA1c is 12.2 from 4/2021  -Lantus with sliding scale   -Hypoglycemia protocol     Essential hypertension, benign- (present on admission)  Assessment & Plan  -Noncompliance issues  -Blood pressure meds on hold given hypotension    Coronary artery disease due to calcified coronary lesion: CABG x4, July 2015- (present on admission)  Assessment & Plan  -Chronic   -Continue aspirin, statin on hold given elevated LFTs       DISPO: ICU     CODE STATUS: Full code     Quality Measures:  Feeding: tube feeds  Analgesia: none   Sedation: none   Thromboprophylaxis: lovenox   Head of bed: >30 degrees  Ulcer prophylaxis: famotidine   Glycemic control: lantus and SSI  Bowel care: bowel regimen  Indwelling lines: central and iv  Deescalation of antibiotics: ceftriaxone       Lissette Singh M.D.

## 2021-05-13 NOTE — DISCHARGE PLANNING
@1030  Agency/Facility Name: [facility]  Spoke To: Gaby  Outcome: No bed today.    @0935  Agency/Facility Name: [facility]  Outcome: Left message, awaiting call back.

## 2021-05-13 NOTE — PROGRESS NOTES
I certify that the patient requires continued medically necessary hospital services for the treatment of spinal epidural abscess and acute respiratory failure and will remain in the hospital for at least another 7 days.  Discharge plan is anticipated to be 5/20/2021.

## 2021-05-14 ENCOUNTER — APPOINTMENT (OUTPATIENT)
Dept: RADIOLOGY | Facility: MEDICAL CENTER | Age: 61
DRG: 003 | End: 2021-05-14
Attending: STUDENT IN AN ORGANIZED HEALTH CARE EDUCATION/TRAINING PROGRAM
Payer: MEDICARE

## 2021-05-14 ENCOUNTER — APPOINTMENT (OUTPATIENT)
Dept: RADIOLOGY | Facility: MEDICAL CENTER | Age: 61
DRG: 003 | End: 2021-05-14
Attending: PSYCHIATRY & NEUROLOGY
Payer: MEDICARE

## 2021-05-14 ENCOUNTER — HOSPITAL ENCOUNTER (INPATIENT)
Dept: RADIOLOGY | Facility: MEDICAL CENTER | Age: 61
DRG: 003 | End: 2021-05-14
Attending: PSYCHIATRY & NEUROLOGY | Admitting: INTERNAL MEDICINE
Payer: MEDICARE

## 2021-05-14 VITALS
RESPIRATION RATE: 19 BRPM | TEMPERATURE: 96.8 F | OXYGEN SATURATION: 98 % | WEIGHT: 209.44 LBS | HEART RATE: 57 BPM | BODY MASS INDEX: 31.02 KG/M2 | SYSTOLIC BLOOD PRESSURE: 155 MMHG | HEIGHT: 69 IN | DIASTOLIC BLOOD PRESSURE: 75 MMHG

## 2021-05-14 LAB
ALBUMIN SERPL BCP-MCNC: 1.9 G/DL (ref 3.2–4.9)
ALBUMIN/GLOB SERPL: 0.4 G/DL
ALP SERPL-CCNC: 272 U/L (ref 30–99)
ALT SERPL-CCNC: 113 U/L (ref 2–50)
ANION GAP SERPL CALC-SCNC: 6 MMOL/L (ref 7–16)
AST SERPL-CCNC: 56 U/L (ref 12–45)
BASOPHILS # BLD AUTO: 0.1 % (ref 0–1.8)
BASOPHILS # BLD: 0.01 K/UL (ref 0–0.12)
BILIRUB SERPL-MCNC: 0.4 MG/DL (ref 0.1–1.5)
BUN SERPL-MCNC: 31 MG/DL (ref 8–22)
CALCIUM SERPL-MCNC: 8 MG/DL (ref 8.5–10.5)
CHLORIDE SERPL-SCNC: 112 MMOL/L (ref 96–112)
CO2 SERPL-SCNC: 24 MMOL/L (ref 20–33)
CREAT SERPL-MCNC: 0.38 MG/DL (ref 0.5–1.4)
EOSINOPHIL # BLD AUTO: 0.17 K/UL (ref 0–0.51)
EOSINOPHIL NFR BLD: 2 % (ref 0–6.9)
ERYTHROCYTE [DISTWIDTH] IN BLOOD BY AUTOMATED COUNT: 54.1 FL (ref 35.9–50)
GLOBULIN SER CALC-MCNC: 4.6 G/DL (ref 1.9–3.5)
GLUCOSE BLD-MCNC: 103 MG/DL (ref 65–99)
GLUCOSE BLD-MCNC: 134 MG/DL (ref 65–99)
GLUCOSE BLD-MCNC: 146 MG/DL (ref 65–99)
GLUCOSE BLD-MCNC: 152 MG/DL (ref 65–99)
GLUCOSE BLD-MCNC: 180 MG/DL (ref 65–99)
GLUCOSE BLD-MCNC: 49 MG/DL (ref 65–99)
GLUCOSE BLD-MCNC: 58 MG/DL (ref 65–99)
GLUCOSE SERPL-MCNC: 102 MG/DL (ref 65–99)
HCT VFR BLD AUTO: 31.8 % (ref 42–52)
HGB BLD-MCNC: 9.8 G/DL (ref 14–18)
IMM GRANULOCYTES # BLD AUTO: 0.06 K/UL (ref 0–0.11)
IMM GRANULOCYTES NFR BLD AUTO: 0.7 % (ref 0–0.9)
LYMPHOCYTES # BLD AUTO: 1.97 K/UL (ref 1–4.8)
LYMPHOCYTES NFR BLD: 23 % (ref 22–41)
MAGNESIUM SERPL-MCNC: 2.2 MG/DL (ref 1.5–2.5)
MCH RBC QN AUTO: 30.1 PG (ref 27–33)
MCHC RBC AUTO-ENTMCNC: 30.8 G/DL (ref 33.7–35.3)
MCV RBC AUTO: 97.5 FL (ref 81.4–97.8)
MONOCYTES # BLD AUTO: 0.58 K/UL (ref 0–0.85)
MONOCYTES NFR BLD AUTO: 6.8 % (ref 0–13.4)
NEUTROPHILS # BLD AUTO: 5.79 K/UL (ref 1.82–7.42)
NEUTROPHILS NFR BLD: 67.4 % (ref 44–72)
NRBC # BLD AUTO: 0 K/UL
NRBC BLD-RTO: 0 /100 WBC
PLATELET # BLD AUTO: 256 K/UL (ref 164–446)
PMV BLD AUTO: 12.3 FL (ref 9–12.9)
POTASSIUM SERPL-SCNC: 3.8 MMOL/L (ref 3.6–5.5)
PROT SERPL-MCNC: 6.5 G/DL (ref 6–8.2)
RBC # BLD AUTO: 3.26 M/UL (ref 4.7–6.1)
SODIUM SERPL-SCNC: 142 MMOL/L (ref 135–145)
WBC # BLD AUTO: 8.6 K/UL (ref 4.8–10.8)

## 2021-05-14 PROCEDURE — A9270 NON-COVERED ITEM OR SERVICE: HCPCS | Performed by: INTERNAL MEDICINE

## 2021-05-14 PROCEDURE — 94669 MECHANICAL CHEST WALL OSCILL: CPT

## 2021-05-14 PROCEDURE — 94799 UNLISTED PULMONARY SVC/PX: CPT

## 2021-05-14 PROCEDURE — 99238 HOSP IP/OBS DSCHRG MGMT 30/<: CPT | Mod: GC | Performed by: INTERNAL MEDICINE

## 2021-05-14 PROCEDURE — 85025 COMPLETE CBC W/AUTO DIFF WBC: CPT

## 2021-05-14 PROCEDURE — 700111 HCHG RX REV CODE 636 W/ 250 OVERRIDE (IP): Performed by: STUDENT IN AN ORGANIZED HEALTH CARE EDUCATION/TRAINING PROGRAM

## 2021-05-14 PROCEDURE — 82962 GLUCOSE BLOOD TEST: CPT | Mod: 91

## 2021-05-14 PROCEDURE — 700102 HCHG RX REV CODE 250 W/ 637 OVERRIDE(OP): Performed by: STUDENT IN AN ORGANIZED HEALTH CARE EDUCATION/TRAINING PROGRAM

## 2021-05-14 PROCEDURE — A9270 NON-COVERED ITEM OR SERVICE: HCPCS | Performed by: STUDENT IN AN ORGANIZED HEALTH CARE EDUCATION/TRAINING PROGRAM

## 2021-05-14 PROCEDURE — 700111 HCHG RX REV CODE 636 W/ 250 OVERRIDE (IP): Performed by: INTERNAL MEDICINE

## 2021-05-14 PROCEDURE — 99233 SBSQ HOSP IP/OBS HIGH 50: CPT | Performed by: INTERNAL MEDICINE

## 2021-05-14 PROCEDURE — 80053 COMPREHEN METABOLIC PANEL: CPT

## 2021-05-14 PROCEDURE — 94760 N-INVAS EAR/PLS OXIMETRY 1: CPT

## 2021-05-14 PROCEDURE — 700105 HCHG RX REV CODE 258: Performed by: INTERNAL MEDICINE

## 2021-05-14 PROCEDURE — 71045 X-RAY EXAM CHEST 1 VIEW: CPT

## 2021-05-14 PROCEDURE — 94003 VENT MGMT INPAT SUBQ DAY: CPT

## 2021-05-14 PROCEDURE — 83735 ASSAY OF MAGNESIUM: CPT

## 2021-05-14 PROCEDURE — 700102 HCHG RX REV CODE 250 W/ 637 OVERRIDE(OP): Performed by: INTERNAL MEDICINE

## 2021-05-14 PROCEDURE — 700101 HCHG RX REV CODE 250: Performed by: PSYCHIATRY & NEUROLOGY

## 2021-05-14 RX ORDER — ASPIRIN 81 MG/1
81 TABLET, CHEWABLE ORAL DAILY
Qty: 30 TABLET | Refills: 0 | Status: SHIPPED | OUTPATIENT
Start: 2021-05-15

## 2021-05-14 RX ORDER — FAMOTIDINE 20 MG/1
20 TABLET, FILM COATED ORAL 2 TIMES DAILY
Qty: 30 TABLET | Refills: 0 | Status: SHIPPED | OUTPATIENT
Start: 2021-05-14

## 2021-05-14 RX ORDER — ATORVASTATIN CALCIUM 40 MG/1
40 TABLET, FILM COATED ORAL EVERY EVENING
Status: SHIPPED
Start: 2021-05-14 | End: 2021-06-13

## 2021-05-14 RX ORDER — INSULIN GLARGINE 100 [IU]/ML
40 INJECTION, SOLUTION SUBCUTANEOUS EVERY EVENING
Qty: 10 ML | Refills: 0 | Status: SHIPPED | OUTPATIENT
Start: 2021-05-14

## 2021-05-14 RX ADMIN — FAMOTIDINE 20 MG: 20 TABLET ORAL at 17:13

## 2021-05-14 RX ADMIN — FAMOTIDINE 20 MG: 20 TABLET ORAL at 06:15

## 2021-05-14 RX ADMIN — ASPIRIN 81 MG: 81 TABLET, CHEWABLE ORAL at 06:15

## 2021-05-14 RX ADMIN — DEXTROSE MONOHYDRATE 50 ML: 25 INJECTION, SOLUTION INTRAVENOUS at 17:07

## 2021-05-14 RX ADMIN — Medication 5000 UNITS: at 06:16

## 2021-05-14 RX ADMIN — CYANOCOBALAMIN TAB 500 MCG 1000 MCG: 500 TAB at 06:15

## 2021-05-14 RX ADMIN — CEFTRIAXONE SODIUM 2 G: 2 INJECTION, POWDER, FOR SOLUTION INTRAMUSCULAR; INTRAVENOUS at 06:16

## 2021-05-14 RX ADMIN — DEXTROSE MONOHYDRATE 50 ML: 25 INJECTION, SOLUTION INTRAVENOUS at 12:11

## 2021-05-14 RX ADMIN — ENOXAPARIN SODIUM 40 MG: 40 INJECTION SUBCUTANEOUS at 06:16

## 2021-05-14 RX ADMIN — ATORVASTATIN CALCIUM 40 MG: 40 TABLET, FILM COATED ORAL at 17:13

## 2021-05-14 ASSESSMENT — ENCOUNTER SYMPTOMS
FEVER: 0
VOMITING: 1

## 2021-05-14 NOTE — DISCHARGE PLANNING
Received Transport Form @ 110  Spoke to Loraine @ [facility]    Transport is scheduled for 5/14/21 @1800 [facility].    [facility] called and is pushing the  time back to 1900.    Notified RN and case management via Voalte

## 2021-05-14 NOTE — PROGRESS NOTES
CROSS COVERAGE NOTE:  -Patient with noted emesis with concern for aspiration event around 12:30 am Tube feeds stopped, STAT CXR ordered. Patient not hypoxic, already on ceftriaxone IV.    -CXR reviewed before report, does not appear to be significant change noted and patient continues to have high oxygen saturations. Will not change antibiotics/escalate at this time and monitor for hypoxia.

## 2021-05-14 NOTE — PROGRESS NOTES
Dr. Singh notified that patient going in and out of a junctional rhythm.  MD at bedside and cardiac strip reviewed by her.

## 2021-05-14 NOTE — DISCHARGE SUMMARY
Discharge Summary    Date of Admission: 4/25/2021  Date of Discharge: 05/14/2021  Discharging Attending: Marcello Gannon M.D.   Discharging Senior Resident: Dr. Singh    CHIEF COMPLAINT ON ADMISSION  Chief Complaint   Patient presents with   • Back Pain   • Neck Pain       Reason for Admission  -Epidural abscess and  Streptococcus Anginosus bacteremia   -Acute hypoxic respiratory failure requiring tracheostomy     Secondary problems   -Diabetes mellitus type II  -Right cerebellar stroke   -Dysphagia secondary to stroke   -Cardiac arrest   -Acute urinary retention s/p supra pubic catheter  -Coronary artery disease   -Hypertension     Admission Date  4/25/2021    CODE STATUS  Full Code    HPI & HOSPITAL COURSE  Mr. Davis is a 60 year old male with above mentioned past medical history is admitted on 04/25 with Acute back and lower neck pain and found to have cervical and thoracic epidural abscess and subsequent bacteremia with strep viridans.     During the hospitalization, he underwent decompressive surgery of the spine by Dr. Hazel on 04/29. He was started on Ceftriaxone after his blood cultures 2/2 showed strep Anginosus as per ID. His hospital course was also complicated by Right cerebellar stroke and he was started on Aspirin and statin and followed by neurology. He underwent TTE and CHRISTOPHER for endocarditis given bacteremia but was unremarkable. His uncontrolled Diabetes was managed with titrated Lantus and high sliding scale to control the infection. He received enteral nutrition through core trak throughout the hospitalization. Patient also has H/o perineal urethrostomy and given his Acute urinary retention, supra pubic catheter was placed for drainage. He developed trans aminitis and her home psychiatric medications were held. He also had cardiac arrest on 05/03 after extubation and presumed to be from aspiration and was re intubated again.     Patient remained intubated as a result severe epidural abscess  and stroke. Extensive goals of care discussion was done with family who decided to have full medical care provided for him. Hence he underwent tracheostomy placement on 05/10 for long term respiratory support. Patient was undergoing SBT with TP trials before discharge. Given his dysphagia secondary to stroke, GI was consulted for PEG tube but the procedure was held as the patient was accepted to LT[facility] and needs to be followed up. Patient will need to be on ceftriaxone for total of six weeks (last date being 06/11) as per ID.        Therefore, he is discharged in guarded and stable condition to a long-term acute care hospital.    The patient met 2-midnight criteria for an inpatient stay at the time of discharge.    PHYSICAL EXAM ON DISCHARGE  Temp:  [35.8 °C (96.4 °F)-36.5 °C (97.7 °F)] 35.8 °C (96.4 °F)  Pulse:  [52-67] 64  Resp:  [17-82] 42  BP: (111-176)/(56-79) 160/70  SpO2:  [92 %-100 %] 98 %    Physical Exam  Constitutional:       Appearance: Normal appearance.   HENT:      Head: Normocephalic.      Comments: Has trach in place     Nose: Nose normal.      Mouth/Throat:      Mouth: Mucous membranes are moist.   Eyes:      Extraocular Movements: Extraocular movements intact.      Pupils: Pupils are equal, round, and reactive to light.   Cardiovascular:      Rate and Rhythm: Normal rate and regular rhythm.      Pulses: Normal pulses.      Heart sounds: Normal heart sounds.   Pulmonary:      Effort: Pulmonary effort is normal.      Breath sounds: Normal breath sounds.   Abdominal:      General: Bowel sounds are normal. There is no distension.      Palpations: Abdomen is soft.      Tenderness: There is no abdominal tenderness.   Musculoskeletal:      Cervical back: Normal range of motion.   Skin:     General: Skin is warm.   Neurological:      Comments: Withdraws right side extremities spontaneously but not on left    Psychiatric:         Mood and Affect: Mood normal.         Discharge Date  05/14/2021    FOLLOW UP  ITEMS POST DISCHARGE  -Needs PEG tube for long term nutrition, currently on coretrak   -Patient's Aspirin was changed from 325 to 81 mg for PEG procedure, can go back to 325 after procedure for secondary stroke prevention   -Needs follow up and care for suprapubic catheter    DISCHARGE DIAGNOSES  Principal Problem:    Spinal epidural abscess POA: Yes  Active Problems:    Coronary artery disease due to calcified coronary lesion: CABG x4, July 2015 POA: Yes    Dyslipidemia POA: Yes    Essential hypertension, benign POA: Yes    Type 2 diabetes mellitus without complication, without long-term current use of insulin (HCC) POA: Yes    Status post urethrostomy (HCC) POA: Yes    Diabetic ketoacidosis (HCC) POA: Yes    Acute bilateral back pain POA: Yes    Marijuana abuse (Chronic) POA: Yes    High anion gap metabolic acidosis POA: Yes    Thrombocytopenia (HCC) POA: Yes    Tobacco abuse (Chronic) POA: Yes    Sepsis due to Streptococcus species (HCC) POA: Yes    Difficulty swallowing POA: Yes    Hypokalemia POA: Yes    Hypomagnesemia POA: Yes    Hyponatremia POA: Yes    History of ischemic stroke POA: No    Constipation POA: Unknown    Cardiac arrest (HCC) POA: Unknown    Transaminitis POA: Unknown    Acute respiratory failure with hypoxia (HCC) POA: Unknown      Overview:    Resolved Problems:    * No resolved hospital problems. *      FOLLOW UP  No future appointments.  No follow-up provider specified.    MEDICATIONS ON DISCHARGE     Medication List      START taking these medications      Instructions   albuterol 2.5mg/0.5ml Nebu  Commonly known as: PROVENTIL   Take 0.5 mL by nebulization every four hours as needed for Shortness of Breath.  Dose: 2.5 mg     aspirin 81 MG Chew chewable tablet  Start taking on: May 15, 2021  Commonly known as: ASA   1 tablet by Enteral Tube route every day.  Dose: 81 mg     famotidine 20 MG Tabs  Commonly known as: PEPCID   1 tablet by Enteral Tube route 2 times a day.  Dose: 20 mg      insulin glargine 100 UNIT/ML Soln  Commonly known as: Lantus   Inject 40 Units under the skin every evening.  Dose: 40 Units     NS SOLN 100 mL with cefTRIAXone 2 GM SOLR 2 g  Start taking on: May 15, 2021   Infuse 2 g into a venous catheter every 24 hours.  Dose: 2 g        CHANGE how you take these medications      Instructions   atorvastatin 40 MG Tabs  What changed:   · medication strength  · how much to take  · how to take this  · when to take this  Commonly known as: LIPITOR   1 tablet by Enteral Tube route every evening for 30 days.  Dose: 40 mg        CONTINUE taking these medications      Instructions   cyclobenzaprine 10 mg Tabs  Commonly known as: Flexeril   Take 1 tablet by mouth 3 times a day as needed for Muscle Spasms.  Dose: 10 mg     gabapentin 300 MG Caps  Commonly known as: NEURONTIN   Take 300 mg by mouth 3 times a day.  Dose: 300 mg     losartan 25 MG Tabs  Commonly known as: COZAAR   Take 25 mg by mouth every day.  Dose: 25 mg     metoprolol tartrate 25 MG Tabs  Commonly known as: LOPRESSOR   Take 25 mg by mouth 2 times a day.  Dose: 25 mg     nitroglycerin 0.4 MG Subl  Commonly known as: NITROSTAT   Place 0.4 mg under tongue every 5 minutes as needed for Chest Pain.  Dose: 0.4 mg     Synjardy 12.5-1000 MG Tabs  Generic drug: Empagliflozin-metFORMIN HCl   Take 1 Tab by mouth every day.  Dose: 1 tablet     Tradjenta 5 MG Tabs tablet  Generic drug: linagliptin   Take 5 mg by mouth every day.  Dose: 5 mg     vitamin B-12 1000 MCG Tabs   Take 1,000 mcg by mouth every day.  Dose: 1,000 mcg        STOP taking these medications    Acetaminophen-Codeine 300-30 MG Tabs  Commonly known as: TYLENOL/CODEINE #3     ARIPiprazole 15 MG Tabs  Commonly known as: Abilify     carBAMazepine 200 MG Tabs  Commonly known as: TEGRETOL     diclofenac sodium 1 % Gel     methylPREDNISolone 4 MG Tbpk  Commonly known as: MEDROL DOSEPAK     oxybutynin 5 MG Tabs  Commonly known as: DITROPAN            Allergies  No Known  Allergies    DIET  Orders Placed This Encounter   Procedures   • Diet NPO     Standing Status:   Standing     Number of Occurrences:   1     Order Specific Question:   Type:     Answer:   Now [1]     Order Specific Question:   Restrict to:     Answer:   Strict [1]       ACTIVITY  As tolerated.  Weight bearing as tolerated    CONSULTATIONS  Dr. Roberson with Gastroenterology consulted.  Treatment options were discussed and plan of care agreed upon.   Dr. Saenz from neurology   Dr. Hazel from neurosurgery   Dr. Mcmahan from infectious disease    PROCEDURES  -Decompressive laminectomy on 04/28   -Central line placement on 04/28  -Supra pubic catheter placement   -BAL on 05/03    Time spent on discharge: 40 mins

## 2021-05-14 NOTE — DISCHARGE PLANNING
Hospital Care Management Discharge Planning       Anticipated Discharge Disposition:   · hospitals [facility]     Action:   · This RN CM received update from [facility] Liaison, that there is a bed available for the Pt  · Transport will need to be late ~[id number]  · RN CM confirmed that the Pt is medically cleared with Dr. Singh and Dr. Gannon   · Pt still medically clear, per MDs  · RN CM clarified with Gaby whether Pt needs a PICC prior to d/c, per BS RN request   · Gaby states [facility] can place midline if needed and Pt can transport with central line   · Per Gaby, Dr. Catalan is accepting MD and transport can be arranged for  at 1800  · RN CM spoke to Mary, Pt's mother, over the phone at [phone] to update her on acceptance and transport   · Mary gave verbal consent to transport Pt to hospitals  · [facility] visiting hours discussed   · All questions answered   · Transport forms completed and faxed to [facility] Coordinators: Fabi Ann, and [place] at [phone]  · Fax receipt received at [phone]  · Ride Line Coordinators notified via Voalte     Barriers to Discharge:   · Confirmed transport  · D/C summary      Plan:   · F/U with Ride Line Coordinators for transport   · F/U with MD for d/c summary   · Hospital Care Management Team to continue to provide support services and assistance with discharge planning as needed.     1140 Addendum: This RN CM received update from [facility] Coordinator, that transport has been scheduled for 1800 via [facility]. Gaby at hospitals and bedside RN updated.     1300 Addendum: This RN CM requested d/c summary from Dr. Singh by 1500 so this RN or [facility] Liaison can send d/c summary to [facility] prior to d/c. MD states d/c summary is ready and just needs to be signed. RN CM will continue to follow up.

## 2021-05-14 NOTE — PROGRESS NOTES
Patient with one episode of emesis. Appearance of tube feeds. Tube feeds held per MD. RT at bedside, cuff pressure measured and adjusted by RT. CXR ordered. Lung sounds assessed. See assessment.

## 2021-05-14 NOTE — PROGRESS NOTES
Infectious Disease Progress Note    Author: La Nena Khan M.D. Date & Time of service: 5/14/2021  12:06 PM    Chief Complaint:  Sepsis and cervical abscess  CVA     Interval History:  60 y.o.  admitted 4/25/2021. Pt has a past medical history of uncontrolled diabetes, CAD status post CABG times 4/2015, marijuana use and to arthritis.  Presented complaining of neck and back pain ongoing for approximately 1 week and fall getting out of the bathtub.  He had been seen at urgent care for back pain approximately 1 week prior to admission and given Toradol injections.    4/29 AF, O2 Vent 12/70%, pressors still on the trending down, pt continues to be agitated, not moving any extremities and concern for quadriplegia due to CVA.   4/30 AF, O2 Vent 8/40%, pressors off, agitated, without meaningful limb movement.  Decreasing vent requirements and no longer in shock.  5/1 AF, O2 Vent 8/40%, stable on low vent settings no significant secretions per nursing.  He continues to be agitated and with no upper or lower extremity movement.    5/3 AF WBC 14 remains intubated and sedated Agitation with decrease sedation. Epidural drain removed FiO2 0.4  5/4 AF WBC 19 code blue yesterday-mucus plugs found. S/p bronch this am-on 100%FiO2 On pressors  5/5 AF WBC 15.8 nurse noted some prox  movement in BUE (right greater than left) FiO2 0.9  5/6 AF WBC 15.7 s/p bronch again this am-thick secretion RML noted  5/7 Febrile 101.1 WBC 12.2 No new positive cultures opens eyes-not following commands FiO2 0.6  5/8 AF (99.9) WBC 11.9 Palliative appreciated-family wants full code. Remains obtunded FiO2 0.7  5/9 Temp 100.4 WBC 12.7 Remains intubated and sedated FiO2 0.7  5/10 AF WBC 10.6 getting trach and PEG. Episode of desat noted  5/11 AF WBC 9.7 s/p trach-sedated today FiO2 0.6  5/12 AF FiO2 down to 0.5 more alert today-actively attempting to move BUE  5/14 AF WBC 8.6 episode vomiting noted- FiO2 down to 0.4 plan for PEG next week    Review of  Systems:  Review of Systems   Unable to perform ROS: Intubated   Constitutional: Negative for fever.   Gastrointestinal: Positive for vomiting.       Hemodynamics:  Temp (24hrs), Av.1 °C (97 °F), Min:35.8 °C (96.4 °F), Max:36.3 °C (97.4 °F)  Temperature: (!) 35.8 °C (96.4 °F)  Pulse  Av.1  Min: 49  Max: 121   Blood Pressure: 136/64       Physical Exam:  Physical Exam  Vitals and nursing note reviewed.   Constitutional:       General: He is not in acute distress.     Appearance: He is not toxic-appearing or diaphoretic.   HENT:      Nose: No rhinorrhea.   Eyes:      General:         Right eye: No discharge.         Left eye: No discharge.   Neck:      Vascular: No carotid bruit.      Comments: Right IJ-no erythema  trach  Cardiovascular:      Rate and Rhythm: Normal rate. Rhythm irregular.      Comments: Episodes lisa  Pulmonary:      Effort: Pulmonary effort is normal. No respiratory distress.      Breath sounds: No stridor. No wheezing or rhonchi.      Comments: Coarse breath sounds  Abdominal:      General: There is no distension.      Palpations: Abdomen is soft.      Tenderness: There is no abdominal tenderness. There is no guarding.   Genitourinary:     Comments: SP cath +yellow urine  No erythema  Musculoskeletal:      Right lower leg: Edema present.      Left lower leg: Edema present.   Lymphadenopathy:      Cervical: No cervical adenopathy.   Skin:     General: Skin is warm.      Coloration: Skin is not jaundiced.      Findings: Bruising present.      Comments: tattoos   Neurological:      Comments: Quadriplegic  Able to move right shoulder 2-3/5; slight movement left  somnolent         Meds:    Current Facility-Administered Medications:   •  docusate sodium  •  bisacodyl  •  polyethylene glycol/lytes  •  atorvastatin  •  aspirin  •  insulin glargine  •  cefTRIAXone (ROCEPHIN) IV  •  oxyCODONE immediate-release  •  oxyCODONE immediate-release  •  cyclobenzaprine  •  insulin regular **AND** POC  blood glucose manual result **AND** NOTIFY MD and PharmD **AND** dextrose 50%  •  enoxaparin (LOVENOX) injection  •  acetylcysteine  •  albuterol  •  MD Alert...Adult ICU Electrolyte Replacement per Pharmacy  •  lidocaine  •  ondansetron  •  MD ALERT...DO NOT ADMINISTER NSAIDS or ASPIRIN unless ORDERED By Neurosurgery  •  fleet  •  Respiratory Therapy Consult  •  ondansetron  •  ipratropium-albuterol  •  labetalol  •  Pharmacy  •  cyanocobalamin  •  senna-docusate  •  vitamin D  •  famotidine **OR** [DISCONTINUED] famotidine  •  fentaNYL **AND** fentaNYL **AND** [DISCONTINUED] fentaNYL **AND** [DISCONTINUED] propofol **AND** Triglyceride  •  lidocaine    Labs:  Recent Labs     05/12/21 0549 05/13/21 0555 05/14/21  0610   WBC 8.7 10.0 8.6   RBC 3.13* 3.14* 3.26*   HEMOGLOBIN 9.3* 9.4* 9.8*   HEMATOCRIT 30.8* 31.2* 31.8*   MCV 98.4* 99.4* 97.5   MCH 29.7 29.9 30.1   RDW 54.3* 55.1* 54.1*   PLATELETCT 291 273 256   MPV 12.5 12.2 12.3   NEUTSPOLYS 68.10 74.10* 67.40   LYMPHOCYTES 23.50 18.20* 23.00   MONOCYTES 5.90 5.60 6.80   EOSINOPHILS 1.60 1.20 2.00   BASOPHILS 0.20 0.20 0.10     Recent Labs     05/12/21 0549 05/13/21  0555 05/14/21  0610   SODIUM 142 141 142   POTASSIUM 4.2 4.1 3.8   CHLORIDE 110 110 112   CO2 27 24 24   GLUCOSE 113* 108* 102*   BUN 33* 29* 31*     Recent Labs     05/12/21 0549 05/13/21  0555 05/14/21  0610   ALBUMIN 2.0* 1.9* 1.9*   TBILIRUBIN 0.3 0.4 0.4   ALKPHOSPHAT 332* 313* 272*   TOTPROTEIN 6.1 6.2 6.5   ALTSGPT 199* 139* 113*   ASTSGOT 88* 55* 56*   CREATININE 0.46* 0.47* 0.38*       Imaging:  CT-CSPINE WITHOUT PLUS RECONS    Result Date: 4/25/2021 4/25/2021 1:29 PM HISTORY/REASON FOR EXAM: History of back and neck pain. Fall. TECHNIQUE/EXAM DESCRIPTION: CT cervical spine without contrast, with reconstructions. Helical scanning was performed from the skull base through T1.  Sagittal and coronal multiplanar reconstructions were generated from the axial images. Low dose optimization  technique was utilized for this CT exam including automated exposure control and adjustment of the mA and/or kV according to patient size. COMPARISON:  None available. FINDINGS: No compression fracture is identified. There is an ununited fracture of the spinous process of T1. Intervertebral disc space narrowing and endplate spurring is most prominent at C6/C7. There are degenerative changes of the facet joints. C1/C2 alignment is maintained. There is multilevel uncovertebral spurring and neural foraminal narrowing. There is cervical prevertebral edema. There is carotid atherosclerotic plaque bilaterally. Visualized lung apices are clear.     Multilevel degenerative changes. Prevertebral edema. Further evaluation with MRI is recommended as this can be seen in the setting of ligamentous injury. Bilateral carotid atherosclerotic plaque.     CT-CHEST (THORAX) WITH    Result Date: 4/25/2021 4/25/2021 1:29 PM HISTORY/REASON FOR EXAM:  Rib fracture suspected, traumatic. TECHNIQUE/EXAM DESCRIPTION: CT scan of the chest with contrast. Helical images were obtained from the lung apices through the adrenal glands following the bolus administration of  80 mL of Omnipaque 350 nonionic contrast. Sagittal and coronal reconstructions were performed. Low dose optimization technique was utilized for this CT exam including automated exposure control and adjustment of the mA and/or kV according to patient size. COMPARISON:  None. FINDINGS: There is atherosclerotic plaque. There is coronary artery calcification. The heart is not enlarged. No pericardial or pleural effusion is seen. There are small mediastinal lymph nodes. There is no hilar or axillary lymphadenopathy. No pneumothorax or pulmonary contusion is seen. Central airways are patent. Limited views were obtained of the upper abdomen. Hypodensity along the falciform ligament likely represents focal fat. Patient is status post median sternotomy. Degenerative changes are seen in  the spine. No displaced rib fracture is identified.     No displaced rib fracture is identified. No acute cardiopulmonary process is seen. Atherosclerotic plaque including coronary artery calcification.    CT-HEAD W/O    Result Date: 4/30/2021 4/30/2021 4:27 AM HISTORY/REASON FOR EXAM: Altered mental status; evaluate cerebellar stroke, if no blood present OK for neurology to start aspirin TECHNIQUE/EXAM DESCRIPTION:  CT of the head without contrast. Sequential axial images were obtained from the vertex to the skull base without contrast. Up to date radiation dose reduction adjustments have been utilized to meet ALARA standards for radiation dose reduction. COMPARISON: April 28, 2021 FINDINGS: There is mild diffuse parenchymal volume loss observed. Periventricular and subcortical white matter low-attenuation changes are seen, most commonly associated with small vessel ischemic disease. The ventricles are normal in caliber and configuration. Low-density changes in the right cerebellar hemisphere seen.. There are no abnormal extra axial fluid collections or extra axial hemorrhage identified. The visualized paranasal sinuses and mastoid air cells are well aerated bilaterally. No depressed calvarial fractures are identified. The visualized globes and retrobulbar soft tissues appear within normal limits.  Atherosclerotic intracranial calcifications are seen.     1.  Low-density changes in the right cerebellar hemisphere, compatible with evolving infarct, streak artifacts minimally limits evaluation of the posterior fossa for subtle subarachnoid hemorrhage, no intracranial hemorrhage is readily identified. 2.  Atherosclerosis.    CT-HEAD W/O    Result Date: 4/28/2021 4/28/2021 5:20 AM HISTORY/REASON FOR EXAM: Altered mental status TECHNIQUE/EXAM DESCRIPTION:  CT of the head without contrast. Sequential axial images were obtained from the vertex to the skull base without contrast. Up to date radiation dose reduction  adjustments have been utilized to meet ALARA standards for radiation dose reduction. COMPARISON: None FINDINGS: There is moderate diffuse parenchymal volume loss observed. Periventricular and subcortical white matter low-attenuation changes are seen, most commonly associated with small vessel ischemic disease. The ventricles are normal in caliber and configuration. Area of low-density within the right cerebellar hemisphere is seen. There are no abnormal extra axial fluid collections or extra axial hemorrhage identified. The visualized paranasal sinuses and mastoid air cells are well aerated bilaterally. No depressed calvarial fractures are identified. The visualized globes and retrobulbar soft tissues appear within normal limits.     1.  Low-density in the region of the right cerebellar hemisphere, appearance compatible with evolving infarct. These findings were discussed with the patient's on-call clinician, Deniz, on 4/28/2021 6:14 AM.    CT-LSPINE W/O PLUS RECONS    Result Date: 4/25/2021 4/25/2021 1:29 PM HISTORY/REASON FOR EXAM:  Back pain after injury. TECHNIQUE/EXAM DESCRIPTION AND NUMBER OF VIEWS: CT lumbar spine without contrast, with reconstructions. The study was performed on a GE CT scanner. Thin-section helical scanning was performed from T12-L1 to the sacrum. Sagittal and coronal multiplanar reconstructions were generated from the axial images. Low dose optimization technique was utilized for this CT exam including automated exposure control and adjustment of the mA and/or kV according to patient size. COMPARISON: None. FINDINGS: Alignment of the lumbar spine is normal. There is no acute fracture or subluxation. The prevertebral and paraspinous soft tissues show no acute abnormality. There is severe atherosclerosis with a mixture of soft and calcified plaque. There is lumbosacral junction vacuum disc phenomenon with endplate spurring, disc height loss The visualized sacrum and sacroiliac joints show  no acute abnormality.     No CT evidence of acute traumatic injury.    CT-TSPINE W/O PLUS RECONS    Result Date: 4/25/2021 4/25/2021 1:29 PM HISTORY/REASON FOR EXAM:  Mid-back trauma Pain following injury. TECHNIQUE/EXAM DESCRIPTION AND NUMBER OF VIEWS: CT thoracic spine without contrast, with reconstructions. The study was performed on a [employer] CT scanner. Thin-section helical scanning was performed from C7-T1 through T12-L1. Sagittal and coronal multiplanar reconstructions were generated from the axial images. Low dose optimization technique was utilized for this CT exam including automated exposure control and adjustment of the mA and/or kV according to patient size. COMPARISON: None. FINDINGS: Alignment of the thoracic spine is normal. There is no acute displaced fracture. There is T6/7 interbody fusion with mild anterior wedging likely indicating remote mild compression fracture that has healed There is bulky endplate spurring with some bridging syndesmophytes in the mid thoracic spine Sternotomy wires The cervicothoracic junction appears intact. The prevertebral and paraspinous soft tissues show no acute abnormality.     No CT evidence of acute traumatic abnormality. Mild T6/7 anterior wedging compatible with healed mild chronic compression fracture and there is interbody fusion.    DX-CERVICAL SPINE-2 OR 3 VIEWS    Result Date: 4/22/2021 4/22/2021 6:16 PM HISTORY/REASON FOR EXAM:  Atraumatic Pain. Neck pain for 4 days. TECHNIQUE/EXAM DESCRIPTION AND NUMBER OF VIEWS: Cervical spine series, 2 views. COMPARISON:  None. FINDINGS: Mild reversal of curvature centered at the C5 level. Vertebral body heights are preserved. Multilevel loss of disc height and osteophyte formation. Cervicothoracic junction is obscured. Prevertebral soft tissues are within normal limits. Odontoid is grossly intact.  Lateral masses of C1 are grossly intact.     1.  Limited exam.  Cervicothoracic junction is obscured. 2.  No gross cervical  spine fracture or subluxation. 3.  Moderate multilevel degenerative changes.    DX-CHEST-PORTABLE (1 VIEW)    Result Date: 4/30/2021 4/30/2021 1:07 AM HISTORY/REASON FOR EXAM: For indication of respiratory failure; For indication of respiratory failure TECHNIQUE/EXAM DESCRIPTION:  Single AP view of the chest. COMPARISON: Yesterday FINDINGS: Position of medical devices appears stable. The cardiac silhouette appears within normal limits.  Postsurgical changes of sternotomy are noted. The mediastinal contour appears within normal limits.  The central pulmonary vasculature appears normal. Bilateral lung volumes are diminished.  Bilateral lungs are clear. No significant pleural effusions are identified. The bony structures appear age-appropriate.     1.  No acute cardiopulmonary disease.    DX-CHEST-PORTABLE (1 VIEW)    Result Date: 4/29/2021 4/29/2021 10:06 AM HISTORY/REASON FOR EXAM:  For indication of respiratory failure; For indication of respiratory failure. TECHNIQUE/EXAM DESCRIPTION AND NUMBER OF VIEWS: Single portable view of the chest. COMPARISON: 4/28/2021 FINDINGS: Endotracheal tube projects at the level the clavicular heads. Right central line projects over the SVC. Enteric tube passes below the level of the diaphragm. The heart is not enlarged. Atherosclerotic calcification is seen. There is mild left basilar opacity likely representing atelectasis. No pleural effusion or pneumothorax is seen. Skin staples and a surgical drain project over the cervical spine.     Mild left basilar opacity may represent atelectasis. Infection not excluded. Improved aeration of the left lung. Atherosclerotic plaque.     DX-CHEST-PORTABLE (1 VIEW)    Result Date: 4/28/2021 4/28/2021 12:10 PM HISTORY/REASON FOR EXAM:  CENTRAL LINE PLACEMENT; CENTRAL LINE PLACEMENT. TECHNIQUE/EXAM DESCRIPTION AND NUMBER OF VIEWS: Single portable view of the chest. COMPARISON: 4/28/2021 11:17 AM FINDINGS: Mediastinal structures are shifted to  the LEFT.  Increasing opacification of LEFT hemithorax. RIGHT lung is clear. Endotracheal tube in place with tip approximately 5 cm above the you. Feeding tube tip in place however tip is off the image. RIGHT internal jugular catheter tip at the lower SVC. No pneumothorax. Postoperative change from prior open heart surgery. Postoperative change of the neck with surgical drain present.     1.  Worsening consolidation of LEFT hemithorax with shift of mediastinal structures to the LEFT suggesting atelectasis, possibly due to endobronchial process. 2.  Supportive tubing as described above. 3.  No pneumothorax.    DX-CHEST-PORTABLE (1 VIEW)    Result Date: 4/28/2021 4/28/2021 11:16 AM HISTORY/REASON FOR EXAM:  replaced ETT; replaced ETT TECHNIQUE/EXAM DESCRIPTION AND NUMBER OF VIEWS: Single portable view of the chest. COMPARISON: 4/27/2021 FINDINGS: Endotracheal tube with tip projecting over the mid thoracic trachea. Hazy opacity in the left lower lobe Layering left pleural effusion. No pneumothorax. Stable cardiopericardial silhouette.     Endotracheal tube with tip projecting over the mid thoracic trachea. Layering left pleural effusion with left lower lung opacity.    DX-CHEST-PORTABLE (1 VIEW)    Result Date: 4/27/2021 4/27/2021 5:30 PM HISTORY/REASON FOR EXAM:  Shortness of Breath. TECHNIQUE/EXAM DESCRIPTION AND NUMBER OF VIEWS: Single portable view of the chest. COMPARISON: None. FINDINGS: LUNGS: Hypoinflation with left basilar atelectasis. Mild perihilar interstitial prominence. No effusions. PNEUMOTHORAX: None. LINES AND TUBES: None. MEDIASTINUM: No cardiomegaly. MUSCULOSKELETAL STRUCTURES: No acute displaced fracture. Median sternotomy.     1.  Hypoinflation with left basilar atelectasis. 2.  Mild perihilar interstitial prominence may be related to hypoinflation or edema.    DX-SPINE-ANY ONE VIEW    Result Date: 4/28/2021 4/28/2021 5:30 AM HISTORY/REASON FOR EXAM:  Cervical laminectomy TECHNIQUE/EXAM  DESCRIPTION AND NUMBER OF VIEWS:  Single view of the spine. Digitized Intraoperative Radiograph FINDINGS: Fixation hardware projecting over the cervical spine Fluoroscopic time:2 seconds     Digitized intraoperative radiograph is submitted for review.  This examination is not for diagnostic purpose but for guidance during a surgical procedure.    MR-BRAIN-W/O    Result Date: 4/28/2021 4/28/2021 3:31 PM HISTORY/REASON FOR EXAM:  Altered mental status; cerebellar stroke. TECHNIQUE/EXAM DESCRIPTION: MRI of the brain without contrast. T1 sagittal, T2 fast spin-echo axial, T1 coronal, FLAIR coronal, diffusion-weighted and apparent diffusion coefficient (ADC map) axial images were obtained of the whole brain. The study was performed on a [employer] 1.5 Britt MRI scanner. COMPARISON:  Head CT earlier in the day FINDINGS: Large acute right cerebellar infarct suggesting right posterior inferior cerebellar artery territory. There is prominent T2 hyperintensity consistent with cytotoxic edema. A small amount of blooming artifact in the medial aspect of the right cerebellar hemisphere on GRE images could represent a small amount of petechial hemorrhage. There is mild localized mass effect with some mild mass effect on the fourth ventricle. No supratentorial infarct or hemorrhage. No extra-axial fluid collection. No midline shift or hydrocephalus. There is a nasogastric tube and fluid within the nasopharynx. Endotracheal tube partially visualized. Mild posterior ethmoid sinus mucosal thickening.     Acute large right cerebellar posterior inferior cerebellar artery territory infarct with possible small amount of petechial hemorrhage.    MR-CERVICAL SPINE-WITH & W/O    Result Date: 4/27/2021 4/27/2021 1:02 PM HISTORY/REASON FOR EXAM:  Neck pain, abnormal neuro exam; Neck pain, initial exam; sepsis 2/2 strep bacteremia  -unknown source. rule out possible ?? retropharyngeal abscess, ??prevertebral abscess. TECHNIQUE/EXAM  DESCRIPTION: MRI of the cervical spine without and with contrast. The study was performed on a [employer] 1.5 Britt MRI scanner. T1 sagittal, T2 fast spin-echo sagittal, and gradient echo axial images were obtained of the cervical spine. T1 post-contrast fat suppressed sagittal images were obtained of the cervical spine. Optional T1 post-contrast axial images may be obtained. 15 mL ProHance contrast was administered intravenously. COMPARISON: None. FINDINGS: There is abnormal disc T2 hyperintensity at C5-6. Mild amount of bone marrow edema is noted at C5, C6 and C7. There is also focal bone marrow enhancement at C6. There is multiseptated abnormal peripherally enhancing epidural collection noted extending from C2 to the visualized lower thoracic level. Largest collection is noted in the upper thoracic posterior epidural space at the levels of T2 and T3. The lower extent of the collection is not imaged. This is causing multilevel effacement of the thecal sac and cord compression. The thoracic spinal cord is anteriorly displaced and compressed at the levels of T2 and T3. At the level of C2-3,there is small left anterior epidural fluid collection. At the level of C3-4,there is small left anterior epidural fluid collection causing indentation of the thecal sac. At the level of C4-5,there is minimal epidural fluid collection. At the level of C5-6,there is diffuse disc bulge along with right posterior lateral epidural fluid collection causing severe canal compromise. At the level of C6-7,there is mild diffuse disc bulge. There is epidural fluid collection causing severe canal compromise. At the level of C7-T1,there is epidural fluid collection causing severe canal compromise. The visualized brain parenchyma is unremarkable. The cervical spinal cord is mildly enlarged which demonstrates mild increased intramedullary T2 signal intensity. There is abnormal T2 hyperintensity in the visualized bilateral vertebral arteries  concerning for age indeterminate occlusion. There is mild amount of edema in the pre and retropharyngeal soft tissue.     1.  There is multiseptated abnormal peripherally enhancing epidural collection noted extending from C2 to the visualized lower thoracic level. Largest collection is noted in the upper thoracic posterior epidural space at the levels of T2 and T3. The lower extent of the collection is not imaged. This is causing multilevel effacement of the thecal sac and cord compression. The thoracic spinal cord is anteriorly displaced and compressed at the levels of T2 and T3. Pre and postcontrast MR examination of  the thoracic spine is recommended for further evaluation. 2.  There is effacement of the cervical thecal sac due to the multiseptated epidural fluid collection. 3.  The cervical spinal cord is mildly enlarged with minimal increased T2 signal intensity. The possibility of cord inflammation cannot be entirely excluded. 4.  Abnormal T2 hyperintensity at C5-6 disc space likely representing discitis. 5.  Abnormal bone marrow edema of C5, C6 and C7 vertebral bodies with edema concerning for osteomyelitis. 6.  There is also focal enhancement of C6 vertebral body likely secondary to the osteomyelitis. 7.  Prevertebral edema/fluid collection. 8.  Nonvisualization of flow void of bilateral vertebral arteries concerning for age-indeterminate bilateral vertebral occlusions.     MR-LUMBAR SPINE-WITH & W/O    Result Date: 4/28/2021 4/27/2021 11:23 PM HISTORY/REASON FOR EXAM:  Back pain or radiculopathy, cancer or infection suspected; Strep bacteremia, back pain, bilateral leg numbness TECHNIQUE/EXAM DESCRIPTION: MRI of the lumbar spine without and with contrast. The study was performed on a [employer] 1.5 Britt MRI scanner. T1 sagittal, T2 fast spin-echo sagittal, and T2 axial images were obtained of the lumbar spine. T1 postcontrast fat-suppressed sagittal images were obtained. 14 mL ProHance contrast was  administered intravenously. COMPARISON:  None. FINDINGS: Normal lumbar lordosis. Preservation of vertebral body heights, alignment and bone marrow signal. No evidence of discitis osteomyelitis. Conus medullaris terminates at T12-L1 and is normal in signal. No mass or fluid collection is seen within the lumbar spinal canal. T12-L1: Canal and foramina are patent. L1-L2: Mild disc bulge. Canal and foramina are patent. L2-L3: Minimal disc bulge and facet degeneration. Canal and foramina are patent. L3-L4: Minimal disc bulge and facet degeneration. Canal and foramina are patent.. L4-L5: Mild disc bulge and facet degeneration. No significant spinal stenosis. Mild foraminal narrowing. L5-S1: Disc narrowing, mild disc osteophyte. No significant spinal stenosis. Moderate right and mild-to-moderate left foraminal narrowing. Distended urinary bladder.     No evidence of lumbar spine infection or epidural abscess. Mild spondylosis as detailed above without spinal stenosis.    MR-MRA HEAD-W/O    Result Date: 4/28/2021 4/28/2021 3:31 PM HISTORY/REASON FOR EXAM:  Emergency Medical Condition ? Stroke. Cerebellar infarct TECHNIQUE/EXAM DESCRIPTION: MRA of the head ([place]) without contrast. The study was performed on a Scienion Signa 1.5 Britt MRI scanner. MRA of the [place] was performed with 3D time-of-flight technique with axial acquisition. Additional MRA of the internal carotid and vertebrobasilar arteries at the level of the skull base and craniocervical junction was also performed with 3D time-of-flight technique with axial acquisition. Images are reviewed at the PACS workstation as axial source images as well as maximum intensity ray projection (MIP) multiplanar 3D reconstructions. COMPARISON:  None FINDINGS: Nicole cavernous and supraclinoid ICA segments are patent. Patent left posterior communicating artery. MCA and ARA branches are patent. There is no significant flow within the intradural right vertebral  artery. The intradural left vertebral artery, basilar artery and posterior cerebral arteries are patent. No significant aneurysm or high flow vascular malformation is seen.     Findings suggesting right vertebral artery occlusion.    MR-MRA NECK-WITH & W/O AND SEQUENCES    Result Date: 4/28/2021 4/28/2021 3:31 PM HISTORY/REASON FOR EXAM:  Emergency Medical Condition ? Stroke Cerebellar stroke TECHNIQUE/EXAM DESCRIPTION: MRA of the cervical carotid and vertebral arteries without and with contrast. The study was performed on a Clinkle Signa 1.5 Britt MRI scanner. Precontrast MRA of the cervical carotid and vertebral arteries was performed with 2D time-of-flight (TOF) technique with acquisition in the axial plane. MRA of the arch origins of the great vessels, and cervical carotid and vertebral arteries was performed with 2D time-of-flight (TOF) technique with coronal slab acquisition from the level of the aortic arch to the carotid siphons during dynamic intravenous infusion of 15 mL of ProHance contrast. Images are reviewed at the PACS workstation as source images as well as maximum intensity ray projection (MIP) multiplanar 3D reconstructions. Cervical internal carotid artery percent stenosis is calculated using the standard method according to the NASCET criteria wherein a segment of uniform caliber distal cervical internal carotid is used as the reference denominator. COMPARISON:  None available. FINDINGS: The arch origins of the great vessels are intact. The cervical right vertebral artery is not well seen on the time-of-flight images. A very small caliber cervical right vertebral artery is seen on the postcontrast images with some mildly increased caliber of the artery at about the C1-C2 level. The fact  that it is not seen on the time-of-flight images and is visualized on contrast enhanced sequence could be related to small nondominant caliber with sluggish flow or could represent retrograde flow within the right  vertebral artery. There may be a focal moderate stenosis in the mid cervical left internal carotid artery and mild narrowing at its origin. Mild narrowing of the proximal left internal carotid artery with less than 50% stenosis. No significant right carotid stenosis is seen.     1.  Small caliber right vertebral artery which is not visualized on the noncontrast time-of-flight technique could be related to retrograde flow or diminutive caliber and sluggish flow. 2.  Possible moderate focal stenosis in the mid cervical left vertebral artery. 3.  No significant carotid stenosis.    MR-THORACIC SPINE-WITH & W/O    Result Date: 4/28/2021 4/27/2021 11:23 PM HISTORY/REASON FOR EXAM:  Mid-back pain, compression fracture suspected; possible epidural abscess/fluid collection TECHNIQUE/EXAM DESCRIPTION: MRI of the thoracic spine without and with contrast. The study was performed on a Telepo Signa 1.5 Britt MRI scanner. T1 sagittal, T2 fast spin-echo sagittal, and T2 axial images were obtained of the thoracic spine. T1 post-contrast fat suppressed sagittal images were obtained. Optional T1 post-contrast axial images may be obtained. 14 mL ProHance contrast was administered intravenously. COMPARISON:  MRI of the cervical spine one-day prior. FINDINGS: There is abnormal dorsal epidural fluid collection seen within the partially visualized lower cervical spine and which extends inferiorly to about the T6 level. The maximum thickness is seen at about the T2-T3 level measuring 8 mm. This raises concern for epidural abscess, although epidural hematoma could also have this appearance. From T1 through T3 there is at least moderate thecal sac stenosis due to the epidural fluid collection. There is significant abnormal signal within the partially visualized  lower cervical and upper thoracic cord which could be infectious/inflammatory or other nonspecific cord edema. There is also suggestion of a small ventral epidural fluid collection at  C6-C7. There is also partially visualized abnormal marrow edema in the C6 and C7 vertebral bodies as detailed on earlier MRI report. There is suggestion of some prominent enhancement around the mid and lower thoracic cord seen on the sagittal postcontrast images however limited evaluation on axial images due to motion artifact. This is of uncertain etiology but could represent some leptomeningeal disease/infection. On the sagittal T2 images there is a questionable slight nodular appearance on the surface of lower thoracic cord. A differential would be a subtle spinal dural aVF. There is no abnormal signal seen within the mid/lower thoracic cord. There is no evidence of discitis osteomyelitis within the thoracic spine. There is T6-T7 interbody ankylosis. There is mild disc degeneration and some prominent anterolateral bridging osteophytes in the mid and lower thoracic spine. No significant posterior disc herniation is seen. Within the mid and lower thoracic spine there  is no significant spinal stenosis.     1.  Posterior epidural fluid collection/abscess within the lower cervical spine extending inferiorly to about the T6 level which could represent epidural abscess and/or hematoma. This measures 8 mm in maximal thickness at T2-T3 with at least moderate thecal sac stenosis. 2.  Abnormal signal within the cervical cord and upper thoracic cord suggesting cord edema or infectious/inflammatory etiology. 3.  Lower cervical spine findings are detailed on earlier report. 4.  Prominent enhancement along the surface of the mid and lower thoracic cord is of uncertain etiology and as detailed above, possibly representing leptomeningeal disease/infection. See details above. These findings were discussed with Dr. Cano on 4/28/2021 10:01 AM.     IR-DRAIN-BLADDER SUPRAPUB W/CATH    Result Date: 4/28/2021  HISTORY/REASON FOR EXAM:  60-year-old man with urinary retention. TECHNIQUE/EXAM DESCRIPTION AND NUMBER OF VIEWS:  Ultrasound  and fluoroscopic guided suprapubic bladder catheter placement, Cystogram. MEDICATIONS: The patient remained on his ICU sedation regimen. FLUOROSCOPY TIME: 0.3 minutes; 5fluoroscopic images obtained CONTRAST: 40mL Omnipaque 300, intraurinary PROCEDURE:  Informed consent was obtained. The suprapubic region was prepped and draped in sterile manner. Following local anesthesia with copious 1% lidocaine, under ultrasound and fluoroscopic monitoring, an 18-gauge needle was advanced into the urinary bladder from a paramedian suprapubic approach. An Amplatz guidewire was placed in the urinary bladder. Serial tract dilatation was performed with a 22 Maltese telescoping dilator. A 16 Maltese [place] tip silicone Gomez type catheter was then placed in the balloon inflated with 10 mL of sterile saline. A cystogram was performed with AP and both oblique views demonstrating satisfactory position of the catheter with all side holes within the urinary bladder. The catheter was secured to the skin with 2-0 nylon suture and connected to gravity drainage. The Gomez catheter was removed. The patient tolerated the procedure well with no evidence of complication. COMPARISON: None FINDINGS:  The cystogram shows satisfactory catheter position with all sideholes within the urinary bladder. There is no extravasation.     1.     Ultrasound and fluoroscopic guided placement of a 16 M[place]ese [place] tip silicone suprapubic bladder catheter. 2.     Cystogram documenting catheter placement.     DX-PORTABLE FLUOROSCOPY < 1 HOUR    Result Date: 4/28/2021 4/28/2021 5:30 AM HISTORY/REASON FOR EXAM:  Cervical laminectomy TECHNIQUE/EXAM DESCRIPTION AND NUMBER OF VIEWS: Portable fluoroscopy for less than one hour in OR. FINDINGS: The portable fluoroscopy unit was obligated to the procedure for less than one hour. Actual fluoro time was 2 seconds.     Portable fluoroscopy utilized for 2 seconds. INTERPRETING LOCATION: [address] [place] [address],  [id number]    EC-ECHOCARDIOGRAM COMPLETE W/O CONT    Result Date: 2021  Transthoracic Echo Report Echocardiography Laboratory CONCLUSIONS No prior study is available for comparison. Normal left ventricular systolic function.  Left ventricular ejection fraction is visually estimated to be 60%. Normal diastolic function. Agitated saline (bubble) study was performed. No evidence of right to left shunt by agitated saline challenge. Normal inferior vena cava size and inspiratory collapse. MAGANAGILDARDO Exam Date:         2021                    19:42 Exam Location:     Inpatient Priority:          Routine Ordering Physician:        YESICA SULLIVAN Referring Physician:       [id number] Marie Sonographer:               Faye Moya [facility] Age:    60     Gender:    M MRN:    [id number] :    1960 BSA:    1.87   Ht (in):    69     Wt (lb):    159 Exam Type:     Complete Indications:     CVA ICD Codes:       436 CPT Codes:       87748 BP:   140    /   60     HR:   74 Technical Quality:       Fair MEASUREMENTS  (Male / Female) Normal Values 2D ECHO LV Diastolic Diameter PLAX        3.8 cm                4.2 - 5.9 / 3.9 - 5.3 cm LV Systolic Diameter PLAX         2.4 cm                2.1 - 4.0 cm IVS Diastolic Thickness           1.4 cm                LVPW Diastolic Thickness          1.4 cm                LVOT Diameter                     2 cm                  Estimated LV Ejection Fraction    60 %                  LV Ejection Fraction MOD BP       62.7 %                >= 55  % LV Ejection Fraction MOD 4C       46.6 %                LV Ejection Fraction MOD 2C       55 %                  DOPPLER AV Peak Velocity                  1.7 m/s               AV Peak Gradient                  10.9 mmHg             AV Mean Gradient                  5.5 mmHg              LVOT Peak Velocity                1.2 m/s               AV Area Cont Eq vti               2.3 cm2               Mitral E Point Velocity           0.74 m/s               Mitral E to A Ratio               0.92                  MV Pressure Half Time             69.4 ms               MV Area PHT                       3.2 cm2               MV Deceleration Time              239 ms                PV Peak Velocity                  1.1 m/s               PV Peak Gradient                  4.6 mmHg              RVOT Peak Velocity                0.66 m/s              * Indicates values subject to auto-interpretation LV EF:  60    % FINDINGS Left Ventricle Normal left ventricular chamber size. Mild concentric left ventricular hypertrophy. Normal left ventricular systolic function.  Left ventricular ejection fraction is visually estimated to be 60%. Normal diastolic function. Right Ventricle Normal right ventricular size and systolic function. Right Atrium Normal right atrial size. Normal inferior vena cava size and inspiratory collapse. Left Atrium Normal left atrial size. Agitated saline (bubble) study was performed. With Valsalva maneuver. No evidence of right to left shunt by agitated saline challenge. Existing IV was used. Mitral Valve Structurally normal mitral valve without significant stenosis or regurgitation. Aortic Valve Aortic sclerosis without stenosis. No aortic insufficiency. Tricuspid Valve Structurally normal tricuspid valve without significant stenosis or regurgitation. Pulmonic Valve The pulmonic valve is not well visualized. No pulmonic insufficiency. Pericardium Normal pericardium without effusion. Aorta Normal aortic root for body surface area. Ascending aorta not well visualized. Zakiya Rebollar MD (Electronically Signed) Final Date:     28 April 2021                 21:30    EC-CHRISTOPHER W/O CONT    Result Date: 4/30/2021  Results Will be Available after Interpretation by Cardiologist.    DX-ABDOMEN FOR TUBE PLACEMENT    Result Date: 4/30/2021 4/30/2021 12:44 PM HISTORY/REASON FOR EXAM:  Line evaluation. TECHNIQUE/EXAM DESCRIPTION AND NUMBER OF VIEWS:  1 view(s) of the  "abdomen. COMPARISON:  4/28/2021 FINDINGS: Enteric tube has been placed. The tip projects over the distal stomach or duodenal bulb. There are scattered loops of air-filled bowel.     Feeding tube placement with the tip projecting over the distal stomach or duodenal bulb    DX-ABDOMEN FOR TUBE PLACEMENT    Result Date: 4/28/2021 4/28/2021 12:24 PM HISTORY/REASON FOR EXAM:  Tube evaluation. TECHNIQUE/EXAM DESCRIPTION AND NUMBER OF VIEWS:  1 view(s) of the abdomen. COMPARISON:  None. FINDINGS: Limited single view of the abdomen performed primarily to evaluate enteric tube position. The tip projects over the expected area of the distal stomach. The bowel gas pattern is within normal limits.     Feeding tube with tip projecting over the expected area of the distal stomach.      Micro:  Results     Procedure Component Value Units Date/Time    BLOOD CULTURE [[id number]] Collected: 05/07/21 1010    Order Status: Completed Specimen: Blood from Peripheral Updated: 05/12/21 1700     Significant Indicator NEG     Source BLD     Site PERIPHERAL     Culture Result No growth after 5 days of incubation.    Narrative:      Collected By:[id number] HARJIT HERRERA  Per Hospital Policy: Only change Specimen Src: to \"Line\" if  specified by physician order.  Left AC    BLOOD CULTURE [[id number]] Collected: 05/07/21 0758    Order Status: Completed Specimen: Blood from Peripheral Updated: 05/12/21 1100     Significant Indicator NEG     Source BLD     Site PERIPHERAL     Culture Result No growth after 5 days of incubation.    Narrative:      Collected By:[id number] HARJIT HERRERA  Per Hospital Policy: Only change Specimen Src: to \"Line\" if  specified by physician order.  Left Forearm/Arm    Culture Respiratory W/ Grm Stn - BAL [[id number]] Collected: 05/06/21 1055    Order Status: Completed Specimen: Respirate from Bronchoalveolar Lavage Updated: 05/08/21 1100     Significant Indicator NEG     Source RESP     Site BRONCHOALVEOLAR LAVAGE     Culture " Result Rare growth usual upper respiratory bruce  including yeast.  No clinically significant Staphylococcus aureus, Methicillin  Resistant Staphylococcus aureus, or Pseudomonas species  isolated.       Gram Stain Result Many WBCs.  No organisms seen.      Narrative:      Collected By:[id number] MOSES KAPLAN  Collected By:[id number] MOSES KAPLAN          Assessment:  Active Hospital Problems    Diagnosis    • *Spinal epidural abscess [G06.1]    • History of ischemic stroke [Z86.73]    • Sepsis due to Streptococcus species (HCC) [A40.9]    • Acute bilateral back pain [M54.9]    • Thrombocytopenia (HCC) [D69.6]    • Type 2 diabetes mellitus without complication, without long-term current use of insulin (HCC) [E11.9]    • Coronary artery disease due to calcified coronary lesion: CABG x4, July 2015 [I25.10, I25.84]    • Essential hypertension, benign [I10]    • Hypokalemia [E87.6]    • Hypomagnesemia [E83.42]    • Hyponatremia [E87.1]    • Difficulty swallowing [R13.10]    • Diabetic ketoacidosis (HCC) [E11.10]    • Marijuana abuse [F12.10]    • High anion gap metabolic acidosis [E87.2]    • Tobacco abuse [Z72.0]    • Dyslipidemia [E78.5]    • Status post urethrostomy (HCC) [Z93.6]    .    Assessment:  60 y.o.  admitted 4/25/2021. Pt has a past medical history of uncontrolled diabetes, CAD status post CABG times 4/2015, marijuana use and to arthritis.  Presented complaining of neck and back pain ongoing for approximately 1 week and fall getting out of the bathtub.  He had been seen at urgent care for back pain approximately 1 week prior to admission and given Toradol injections.       Hospital Course:   Afebrile and vitals unremarkable other than hypertension.  Leukocytosis ongoing since arrival.  MRI C-spine on 4/26 with epidural collection noted from C2-T3.  Largest collection noted in upper thoracic posterior epidural space at T2-T3.  This is causing multilevel cord compression.  Also with likely discitis at C5-6 and  "osteomyelitis at C5-C7.  Blood cultures on 4/25+ for Streptococcus species.    Code blue 5/3-mucus plugs     Problem List  Sepsis/shock, strep anginosis  -Blood cultures on 4/25 2/2 + Streptococcus anginosus, penicillin and cephalosporin susceptible  -Blood cultures on 4/27 1/2 + viridans Streptococcus   -Blood cultures on 4/28  1/2 + CoNS -likely contaminant  -Blood cultures on 5/1 and 5/7 are no growth to date  -TTE with no vegetations  -CHRISTOPHER on 4/30, No official read  \"Results Will be Available after Interpretation by Cardiologist\"    VDRF-Bronchoscopy on 4/28 with thick secretions  Bronch 5/3 mucus plugs, thick secretions  Bronch 5/4 pending-no signif secretions noted  Bronch 5/6 with thick secretion RML-culture neg    Leukocytosis, persistent.   Cervical thoracic infection, epidural abscess at C2-T3 as well as discitis and osteomyelitis, +GPCs-Streptococcus anginosus   MRI thoracic spine on 4/27 + posterior epidural fluidcollection/abscess in lower cervical spine extending to T6 level, also noted abnormal signal within the cervical and upper thoracic cord  -s/p OR 4/28 for see 4-T2 laminectomy, decompression of thecal sac and nerve roots as well as cervical thoracic extradural spinal mass/epidural abscess removal, linda purulence during OR, no CSF leak per op note  -Operative cultures from cervical thoracic epidural 1/ 2 +strep anginosis    CVA, Right cerebellar stroke, possibly due to venous spasm associated with the epidural abscess.   -MRA neck with and without on 4/28, possible moderate focal stenosis in mid left CVA.  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  MRI without on 4/28 with acute large right cerebellar infarct with small hemorrhage  Diabetes  Transaminitis, improving RUQ USG mild HMG. Switched off ampicillin 5/9/2021     Plan   Continue ceftriaxone- LFTs continue to improve  Anticipate a 6-week IV antibiotic course for the bacteremia and spinal " infection-stop date 6/11/2021  Repeat MRI spine prior to stopping antibiotics-extend antibiotics if indicated  FU CHRISTOPHER result-still pending  Keep BS under 150 to help control current infection  Bronch results (5/6)-neg to date    Palliative care consult and goals of care discussion appreciated  Plan for PEG next week  Plan for rehab     Prognosis guarded

## 2021-05-14 NOTE — PALLIATIVE CARE
Palliative Care follow-up  Received update from Robyn GONZALEZ that pts cog eval will be done at a later date once pt is able to work with speaking valve to ensure accuracy. Pt will likely DC to LTAC    Plan: DC to LTAC once bed becomes available.     Thank you for allowing Palliative Care to participate in this patient's care. Please feel free to call [phone] with any questions or concerns.

## 2021-05-14 NOTE — CARE PLAN
Problem: Safety  Goal: Will remain free from injury  Outcome: Progressing  Goal: Will remain free from falls  Outcome: Progressing     Problem: Infection  Goal: Will remain free from infection  Outcome: Progressing     Problem: Pain Management  Goal: Pain level will decrease to patient's comfort goal  Outcome: Progressing     Problem: Skin Integrity  Goal: Risk for impaired skin integrity will decrease  Outcome: Progressing   The patient is Watcher - Medium risk of patient condition declining or worsening         Progress made toward(s) clinical / shift goals:  Patient with bed low and locked, alarm on, close to nursing station.     Patient is not progressing towards the following goals:

## 2021-05-14 NOTE — DISCHARGE PLANNING
This RN CM completed transport packet including Cobra, facesheets, and transfer report. Packet placed in chart drawer. Bedside RN updated.

## 2021-05-15 NOTE — PROGRESS NOTES
MD Singh notified that patient's blood sugar Is 49, both of his q6 hour sugars today have required D50.  Will recheck sugar in 15 min per protocol.

## 2021-05-15 NOTE — FLOWSHEET NOTE
05/14/21 1529   Events/Summary/Plan   Events/Summary/Plan pt remains on t-piece    Respiratory Assessment   Level of Consciousness Alert   Chest Exam   Work Of Breathing / Effort Mild   Breath Sounds   RUL Breath Sounds Clear   RML Breath Sounds Diminished   RLL Breath Sounds Diminished   YAMILE Breath Sounds Clear   LLL Breath Sounds Diminished   Airway Trach Tracheostomy 7.0   Placement Date/Time: 05/10/21 1315   Airway Type: Trach  Style: Cuffed  Airway Location: Tracheostomy  Airway Size: 7.0  Inserted In: Unit  Inserted by: MD  Patient left with airway type: Trach  Patient left with airway brand: Portex  Patient left wit...   Site Assessment Intact   Airway Tube Secured Velcro attachment   Cuffless No   Cuff Pressure cmH2O (R.C.) 26   Extra Tracheostomy Tube at Bedside Yes   Oxygen   O2 (LPM) 10   FiO2% 100 %   O2 Delivery Device T-Piece

## 2021-05-17 LAB — LV EJECT FRACT  99904: 55

## 2021-05-18 ENCOUNTER — TELEPHONE (OUTPATIENT)
Dept: INFECTIOUS DISEASES | Facility: MEDICAL CENTER | Age: 61
End: 2021-05-18

## 2021-06-17 ENCOUNTER — TELEPHONE (OUTPATIENT)
Dept: INFECTIOUS DISEASES | Facility: MEDICAL CENTER | Age: 61
End: 2021-06-17

## 2021-06-17 NOTE — TELEPHONE ENCOUNTER
Per Discharge summary scanned in media patient was switched to Meropenem at [facility] infectious disease consulted while patient was there. Patient discharged to [facility]

## 2022-05-13 NOTE — OR NURSING
Patient back to OR before discharge from Recovery, for post-operative bleeding. Patient seen and evaluated by surgeon and anesthesia, new consents generated, including for Bloodless protocol.  Patient aware of new procedure, but unable to sign consents due to post-op medication. Dual physician signing of consents for emergency procedure.   No

## 2022-08-09 NOTE — PROGRESS NOTES
12 hour cc   Kenyon doesn't want to make a trip in here as per debby,   Will let us know if she worsens

## 2024-09-25 NOTE — PROGRESS NOTES
"Urology Progress Note    Post op Day # 7    Overnight Events: scrotal us reveals likely right orchitis    S: No fevers, chills, nausea or vomiting.  + flatus.    O:   Blood pressure 114/61, pulse 89, temperature 36.9 °C (98.4 °F), resp. rate 18, height 1.753 m (5' 9\"), weight 94.1 kg (207 lb 7.3 oz), SpO2 92 %.      Recent Labs      07/09/18   0936  07/10/18   0414   WBC  9.4  10.5   RBC  3.64*  3.37*   HEMOGLOBIN  11.6*  10.6*   HEMATOCRIT  34.1*  32.3*   MCV  93.7  95.8   MCH  31.9  31.5   MCHC  34.0  32.8*   RDW  43.8  45.1   PLATELETCT  205  232   MPV  11.1  10.5         Intake/Output Summary (Last 24 hours) at 07/12/18 0702  Last data filed at 07/12/18 0350   Gross per 24 hour   Intake             2360 ml   Output             3050 ml   Net             -690 ml       Exam:  Abdomen soft, benign.  Tenderness over right spermatic cord and testis. Incisions clean, dry, intact.  No bleeding from urethrostomy site.  Mild drip from right scrotal penrose site.   Urine: clear      A/P:  There are no active hospital problems to display for this patient.      Stable.  Right orchitis.  1.  Cipro  2.  heplock IVF  3.  Ambulate/IS  4.  Bowel regimen  5.  Continue bacitracin to urethrostomy site bid  6.  Possible d/c tomorrow.  " No

## (undated) DEVICE — SUTURE 4-0 VICRYL PLUS RB-1 - 27 INCH (36/BX)

## (undated) DEVICE — GOWN WARMING STANDARD FLEX - (30/CA)

## (undated) DEVICE — BONE WAX (12PK/BX)

## (undated) DEVICE — GOWN SURGEONS LARGE - (32/CA)

## (undated) DEVICE — RINGDISP RETRACTOR LONESTAR

## (undated) DEVICE — GLOVE BIOGEL SZ 8 SURGICAL PF LTX - (50PR/BX 4BX/CA)

## (undated) DEVICE — PACK NEURO - (2EA/CA)

## (undated) DEVICE — SUTURE 5-0 MONOCRYL PLUS P-3 - 18 INCH (12/BX)

## (undated) DEVICE — SET IRRIGATION CYSTOSCOPY Y-TYPE L81 IN (20EA/CA)

## (undated) DEVICE — DRAPE UNDER BUTTOCK LRG (40/CA)

## (undated) DEVICE — LEGGING LITHOTOMY 31 X 48 IN - (2EA/PK 20PK/CA)

## (undated) DEVICE — SODIUM CHL IRRIGATION 0.9% 1000ML (12EA/CA)

## (undated) DEVICE — SUCTION INSTRUMENT YANKAUER BULBOUS TIP W/O VENT (50EA/CA)

## (undated) DEVICE — GLOVE BIOGEL SZ 7.5 SURGICAL PF LTX - (50PR/BX 4BX/CA)

## (undated) DEVICE — BRIEF STRETCH MATERNITY M/L - FITS 20-60IN (5EA/BG 20BG/CA)

## (undated) DEVICE — ELECTRODE DUAL RETURN W/ CORD - (50/PK)

## (undated) DEVICE — SYRINGE EAR/NOSE 3 OZ STERILE (50/CA

## (undated) DEVICE — DRAIN J-VAC 10MM FLAT - (10/CA)

## (undated) DEVICE — BLADE SURGICAL #15 - (50/BX 3BX/CA)

## (undated) DEVICE — CORDS BIPOLAR COAGULATION - 12FT STERILE DISP. (10EA/BX)

## (undated) DEVICE — GAUZE FLUFF STERILE 2-PLY 36 X 36 (100EA/CA)

## (undated) DEVICE — DRESSING PETROLEUM GAUZE 5 X 9" (50EA/BX 4BX/CA)"

## (undated) DEVICE — BOVIE BLADE COATED &INSULATED - 25/PK

## (undated) DEVICE — TUBING CLEARLINK DUO-VENT - C-FLO (48EA/CA)

## (undated) DEVICE — DRAPE STRLE REG TOWEL 18X24 - (10/BX 4BX/CA)"

## (undated) DEVICE — TRAY CATHETER FOLEY URINE METER W/STATLOCK 350ML (10EA/CA)

## (undated) DEVICE — NEEDLE SPINAL NON-SAFETY 18 GA X 3 IN (25EA/BX)

## (undated) DEVICE — BLADE SURGICAL CLIPPER - (50EA/CA)

## (undated) DEVICE — SUTURE GENERAL

## (undated) DEVICE — TUBING C&T SET FLYING LEADS DRAIN TUBING (10EA/BX)

## (undated) DEVICE — DRAPE VAGINAL BIB W/ POUCH (10EA/CA)

## (undated) DEVICE — NEPTUNE 4 PORT MANIFOLD - (20/PK)

## (undated) DEVICE — SPONGE GAUZESTER 4 X 4 4PLY - (128PK/CA)

## (undated) DEVICE — CATHETER LUMBAR HERMETIC

## (undated) DEVICE — GLOVE BIOGEL PI INDICATOR SZ 6.5 SURGICAL PF LF - (50/BX 4BX/CA)

## (undated) DEVICE — TOOL DISSECT MATCH HEAD

## (undated) DEVICE — TUBE E-T HI-LO CUFF 7.5MM (10EA/PK)

## (undated) DEVICE — PROTECTOR ULNA NERVE - (36PR/CA)

## (undated) DEVICE — CANISTER SUCTION 3000ML MECHANICAL FILTER AUTO SHUTOFF MEDI-VAC NONSTERILE LF DISP  (40EA/CA)

## (undated) DEVICE — RESERVOIR SUCTION 100 CC - SILICONE (20EA/CA)

## (undated) DEVICE — SET EXTENSION WITH 2 PORTS (48EA/CA) ***PART #2C8610 IS A SUBSTITUTE*****

## (undated) DEVICE — SLEEVE, VASO, THIGH, MED

## (undated) DEVICE — LACTATED RINGERS INJ. 500 ML - (24EA/CA)

## (undated) DEVICE — SET LEADWIRE 5 LEAD BEDSIDE DISPOSABLE ECG (1SET OF 5/EA)

## (undated) DEVICE — Device

## (undated) DEVICE — SUTURE 3-0 VICRYL PLUS SH - 8X 18 INCH (12/BX)

## (undated) DEVICE — SPONGE PEANUT - (5/PK 50PK/CA)

## (undated) DEVICE — KIT ROOM DECONTAMINATION

## (undated) DEVICE — FOLEY SLIPPERY 5CC 16 FR LF ALL SILICONE (12EA/CA)

## (undated) DEVICE — SET IRRIGATION CYSTOSCOPY TUBE L80 IN (20EA/CA)

## (undated) DEVICE — SUTURE 3-0 ETHILON FS-1 - (36/BX) 30 INCH

## (undated) DEVICE — DRESSING POST OP BORDER 4INX6IN AG (70/CA)

## (undated) DEVICE — HANDPIECE 10FT INTPLS SCT PLS IRRIGATION HAND CONTROL SET (6/PK)

## (undated) DEVICE — SYRINGE 10 ML CONTROL LL (25EA/BX 4BX/CA)

## (undated) DEVICE — DRAPE LAPAROTOMY T SHEET - (12EA/CA)

## (undated) DEVICE — SUTURE 2-0 ETHILON FS - (36/BX) 18 INCH

## (undated) DEVICE — SUTURE 3-0 VICRYL PLUS SH - 27 INCH (36/BX)

## (undated) DEVICE — PACK MINOR BASIN - (2EA/CA)

## (undated) DEVICE — CONNECTOR HOSE NEPTUNE FOR CYSTO ROOM

## (undated) DEVICE — DRAIN PENROSE STERILE 1/4 X - 18 IN  (25EA/BX)

## (undated) DEVICE — SYRINGE ASEPTO - (50EA/CA

## (undated) DEVICE — BAG DRAINAGE URINARY CLOSED 2000ML (20EA/CA)

## (undated) DEVICE — MASK ANESTHESIA ADULT  - (100/CA)

## (undated) DEVICE — GVL 4 STAT DISPOSABLE - (10/BX)

## (undated) DEVICE — SUTURE 5-0 MONOCRYL PLUS PC-3 - (12/BX)

## (undated) DEVICE — SUTURE 2-0 VICRYL PLUS SH - 8 X 18 INCH (12/BX)

## (undated) DEVICE — DRAPE LARGE 3 QUARTER - (20/CA)

## (undated) DEVICE — BAG DRAINAGE LEG TWIST-VALVE STRAP MEDIUM 20OZ (48EA/CA)

## (undated) DEVICE — GLOVE BIOGEL SZ 7 SURGICAL PF LTX - (50PR/BX 4BX/CA)

## (undated) DEVICE — GLOVE BIOGEL INDICATOR SZ 8 SURGICAL PF LTX - (50/BX 4BX/CA)

## (undated) DEVICE — TIP INTPLS HFLO ML ORFC BTRY - (12/CS)  FOR SURGILAV

## (undated) DEVICE — GOWN SURGEONS X-LARGE - DISP. (30/CA)

## (undated) DEVICE — CHLORAPREP 26 ML APPLICATOR - ORANGE TINT(25/CA)

## (undated) DEVICE — DRESSING ABDOMINAL PAD STERILE 8 X 10" (360EA/CA)"

## (undated) DEVICE — MIDAS LUBRICATOR DIFFUSER PACK (4EA/CA)

## (undated) DEVICE — SUTURE 2-0 VICRYL PLUS SH - 27 INCH (36/BX)

## (undated) DEVICE — KIT ANESTHESIA W/CIRCUIT & 3/LT BAG W/FILTER (20EA/CA)

## (undated) DEVICE — SENSOR SPO2 NEO LNCS ADHESIVE (20/BX) SEE USER NOTES

## (undated) DEVICE — MEDICINE CUP STERILE 2 OZ - (100/CA)

## (undated) DEVICE — TOWEL STOP TIMEOUT SAFETY FLAG (40EA/CA)

## (undated) DEVICE — ELECTRODE 850 FOAM ADHESIVE - HYDROGEL RADIOTRNSPRNT (50/PK)

## (undated) DEVICE — SYRINGE SAFETY 10 ML 18 GA X 1 1/2 BLUNT LL (100/BX 4BX/CA)

## (undated) DEVICE — DRAPE MAYO STAND - (30/CA)

## (undated) DEVICE — CLIP MED INTNL HRZN TI ESCP - (25/BX)

## (undated) DEVICE — PLUG CATHETER DRAIN TUBE PROTECTOR CAP

## (undated) DEVICE — SUTURE 3-0 VICRYL PLUS RB-1 - (36/BX)

## (undated) DEVICE — HEAD HOLDER JUNIOR/ADULT

## (undated) DEVICE — GLOVE BIOGEL SZ 6 PF LATEX - (50EA/BX 4BX/CA)

## (undated) DEVICE — CATHETER URETHRAL FOLEY SILICONE OD18 FR 10 ML (10EA/CA)

## (undated) DEVICE — COVER LIGHT HANDLE FLEXIBLE - SOFT (2EA/PK 80PK/CA)

## (undated) DEVICE — TOWELS CLOTH SURGICAL - (4/PK 20PK/CA)

## (undated) DEVICE — CATHETER URETHRAL FOLEY SILICONE OD14 FR 10 ML (10EA/CA)

## (undated) DEVICE — STAY ELASTIC BLUNT RETRACTOR 12MM (8EA/PK)

## (undated) DEVICE — SPONGE XRAY 8X4 STERL. 12PL - (10EA/TY 80TY/CA)

## (undated) DEVICE — GLOVE BIOGEL PI INDICATOR SZ 7.0 SURGICAL PF LF - (50/BX 4BX/CA)

## (undated) DEVICE — CLIP MED LG INTNL HRZN TI ESCP - (20/BX)

## (undated) DEVICE — COVER MAYO STAND X-LG - (22EA/CA)

## (undated) DEVICE — GLOVE BIOGEL PI ORTHO SZ 7 PF LF (40PR/BX)

## (undated) DEVICE — PAD LAP STERILE 18 X 18 - (5/PK 40PK/CA)

## (undated) DEVICE — CATHETER DRN 35CM 2.9MM 1.6MM LG HERMETIC LARGE-STYLE - SUB ITEM USE #4384

## (undated) DEVICE — BAG URODRAIN WITH TUBING - (20/CA)

## (undated) DEVICE — LACTATED RINGERS INJ 1000 ML - (14EA/CA 60CA/PF)

## (undated) DEVICE — DRAIN PENROSE 3/8 IN X 12 IN - STERILE (25EA/BX)

## (undated) DEVICE — CATHETER SUPRAPUBIC INTRO SET (6/CA)

## (undated) DEVICE — WATER IRRIG. STER 3000 ML - (4/CA)

## (undated) DEVICE — TRAY SRGPRP PVP IOD WT PRP - (20/CA)

## (undated) DEVICE — SET SUCT.W/SLEEVE VIA-GUARD - (10/BX10BX/CA)

## (undated) DEVICE — CATHETER FOLEY ROBINSON 16FR 16IN STRL (12EA/CA)

## (undated) DEVICE — KIT SURGIFLO W/OUT THROMBIN - (6EA/CA)

## (undated) DEVICE — DRESSING XEROFORM 1X8 - (50/BX 4BX/CA)

## (undated) DEVICE — STAPLER SKIN DISP - (6/BX 10BX/CA) VISISTAT